# Patient Record
Sex: MALE | Race: BLACK OR AFRICAN AMERICAN | Employment: OTHER | RURAL
[De-identification: names, ages, dates, MRNs, and addresses within clinical notes are randomized per-mention and may not be internally consistent; named-entity substitution may affect disease eponyms.]

---

## 2017-01-05 ENCOUNTER — DOCUMENTATION ONLY (OUTPATIENT)
Dept: FAMILY MEDICINE CLINIC | Age: 72
End: 2017-01-05

## 2017-01-09 RX ORDER — OXYCODONE AND ACETAMINOPHEN 5; 325 MG/1; MG/1
1 TABLET ORAL
Qty: 30 TAB | Refills: 0 | Status: SHIPPED | OUTPATIENT
Start: 2017-01-09 | End: 2017-08-17 | Stop reason: ALTCHOICE

## 2017-01-15 DIAGNOSIS — M1A.39X0 CHRONIC GOUT DUE TO RENAL IMPAIRMENT OF MULTIPLE SITES WITHOUT TOPHUS: ICD-10-CM

## 2017-01-15 DIAGNOSIS — M05.9 RHEUMATOID ARTHRITIS WITH POSITIVE RHEUMATOID FACTOR, INVOLVING UNSPECIFIED SITE (HCC): ICD-10-CM

## 2017-01-16 RX ORDER — PREDNISONE 20 MG/1
TABLET ORAL
Qty: 30 TAB | Refills: 0 | Status: SHIPPED | OUTPATIENT
Start: 2017-01-16 | End: 2017-01-31 | Stop reason: SDUPTHER

## 2017-01-23 ENCOUNTER — OFFICE VISIT (OUTPATIENT)
Dept: FAMILY MEDICINE CLINIC | Age: 72
End: 2017-01-23

## 2017-01-23 VITALS
TEMPERATURE: 97.6 F | OXYGEN SATURATION: 97 % | HEART RATE: 81 BPM | HEIGHT: 73 IN | RESPIRATION RATE: 18 BRPM | BODY MASS INDEX: 20.94 KG/M2 | WEIGHT: 158 LBS | DIASTOLIC BLOOD PRESSURE: 72 MMHG | SYSTOLIC BLOOD PRESSURE: 144 MMHG

## 2017-01-23 DIAGNOSIS — N18.4 CKD (CHRONIC KIDNEY DISEASE), STAGE 4 (SEVERE): ICD-10-CM

## 2017-01-23 DIAGNOSIS — F10.90 HABITUAL ALCOHOL USE: ICD-10-CM

## 2017-01-23 DIAGNOSIS — M05.9 RHEUMATOID ARTHRITIS WITH POSITIVE RHEUMATOID FACTOR, INVOLVING UNSPECIFIED SITE (HCC): ICD-10-CM

## 2017-01-23 DIAGNOSIS — S80.01XA CONTUSION OF RIGHT KNEE, INITIAL ENCOUNTER: Primary | ICD-10-CM

## 2017-01-23 NOTE — MR AVS SNAPSHOT
Visit Information Date & Time Provider Department Dept. Phone Encounter #  
 1/23/2017  4:20 PM Law Hernandez MD [facility] BE[facility]IOR[facility]IC[facility] Primary Care [phone] [id number] Your Appointments 2/13/2017  2:40 PM  
PRE OP with Zahira Mercado MD  
[facility] BE[facility]AVIORAL MEDICINE Primary Care ([address] [facility]) Appt Note: pre-op [address] [facility] [phone] [id number] [phone]  
  
   
 [address] [facility] [phone] Upcoming Health Maintenance Date Due FOBT Q 1 YEAR AGE 50-75 3/8/1995 INFLUENZA AGE 9 TO ADULT 8/1/2016 Pneumococcal 65+ High/Highest Risk (2 of 2 - PCV13) 9/29/2016 MEDICARE YEARLY EXAM 6/15/2017 GLAUCOMA SCREENING Q2Y 11/4/2018 DTaP/Tdap/Td series (2 - Td) 7/1/2025 Allergies as of 1/23/2017  Review Complete On: 1/23/2017 By: Law Hernandez MD  
 No Known Allergies Current Immunizations  Never Reviewed Name Date Pneumococcal Polysaccharide (PPSV-23) 9/29/2015 TB Skin Test (PPD) Intradermal 2/19/2014 Tdap 7/1/2015 Not reviewed this visit Vitals BP Pulse Temp Resp Height(growth percentile) Weight(growth percentile) 144/72 (BP 1 Location: Right arm, BP Patient Position: Sitting) 81 97.6 °F (36.4 °C) (Oral) 18 6' 1\" (1.854 m) 158 lb (71.7 kg) SpO2 BMI Smoking Status 97% 20.85 kg/m2 Former Smoker Vitals History BMI and BSA Data Body Mass Index Body Surface Area  
 20.85 kg/m 2 1.92 m 2 Preferred Pharmacy Pharmacy Name Phone [id number] [address] [phone] Your Updated Medication List  
  
   
This list is accurate as of: 1/23/17  4:34 PM.  Always use your most recent med list.  
  
  
  
  
 albuterol 90 mcg/actuation inhaler Commonly known as:  PROAIR HFA Take 2 puffs by inhalation every four (4) hours as needed for Wheezing. allopurinol 100 mg tablet Commonly known as:  Moo Bless  
 Patient to take 1 tablet daily for 5 days and thereafter take 2 tablets daily by mouth  Indications: GOUTY ARTHRITIS  
  
 ammonium lactate 12 % topical cream  
Commonly known as:  LAC-HYDRIN Apply  to affected area two (2) times a day. rub in to affected area well * colchicine 0.6 mg capsule Commonly known as:  Afia Seed Take 2 pills now and then 1 daily for 3 days * COLCRYS 0.6 mg tablet Generic drug:  colchicine  
  
 famotidine 20 mg tablet Commonly known as:  PEPCID  
  
 ferrous sulfate 325 mg (65 mg iron) tablet Commonly known as:  Iron Take 1 Tab by mouth three (3) times daily (with meals). gabapentin 600 mg tablet Commonly known as:  NEURONTIN  
TAKE ONE TABLET BY MOUTH AT BEDTIME FOR PAIN  
  
 HUMIRA PEN 40 mg/0.8 mL Pnkt Generic drug:  adalimumab  
by SubCUTAneous route. latanoprost 0.005 % ophthalmic solution Commonly known as:  XALATAN  
  
 montelukast 10 mg tablet Commonly known as:  SINGULAIR  
TAKE 1 TABLET BY MOUTH ONCE DAILY  
  
 NIFEdipine ER 60 mg ER tablet Commonly known as:  ADALAT CC  
TAKE 2 TABLETS BY MOUTH ONCE DAILY  
  
 omeprazole 40 mg capsule Commonly known as:  PRILOSEC Take 1 Cap by mouth daily. oxyCODONE-acetaminophen 5-325 mg per tablet Commonly known as:  PERCOCET Take 1 Tab by mouth every eight (8) hours as needed for Pain. Max Daily Amount: 3 Tabs. * predniSONE 20 mg tablet Commonly known as:  DELTASONE  
TAKE 2 TABLETS BY MOUTH ONCE DAILY WITH BREAKFAST * predniSONE 20 mg tablet Commonly known as:  DELTASONE  
TAKE 5TABLETS BY MOUTH DAILY FOR 4 DAYS, 4TABS ON DAY 5,3TABS ON DAY 6,2TABS ON DAY 7,1TAB ON DAY 8  
  
 triamcinolone acetonide 0.1 % ointment Commonly known as:  KENALOG Apply 60 g to affected area two (2) times a day. use thin layer * Notice: This list has 4 medication(s) that are the same as other medications prescribed for you.  Read the directions carefully, and ask your doctor or other care provider to review them with you. Introducing [facility]! Glendy Arellano introduces [employer] patient portal. Now you can access parts of your medical record, email your doctor's office, and request medication refills online. 1. In your internet browser, go to [email or web address] 2. Click on the First Time User? Click Here link in the Sign In box. You will see the New Member Sign Up page. 3. Enter your [employer] Access Code exactly as it appears below. You will not need to use this code after youve completed the sign-up process. If you do not sign up before the expiration date, you must request a new code. · [employer] Access Code: [email or web address] Expires: 3/27/2017  9:41 AM 
 
4. Enter the last four digits of your Social Security Number (xxxx) and Date of Birth (mm/dd/yyyy) as indicated and click Submit. You will be taken to the next sign-up page. 5. Create a [employer] ID. This will be your [employer] login ID and cannot be changed, so think of one that is secure and easy to remember. 6. Create a [employer] password. You can change your password at any time. 7. Enter your Password Reset Question and Answer. This can be used at a later time if you forget your password. 8. Enter your e-mail address. You will receive e-mail notification when new information is available in [address] 9. Click Sign Up. You can now view and download portions of your medical record. 10. Click the Download Summary menu link to download a portable copy of your medical information. If you have questions, please visit the Frequently Asked Questions section of the [employer] website. Remember, [employer] is NOT to be used for urgent needs. For medical emergencies, dial 911. Now available from your iPhone and Android! Please provide this summary of care documentation to your next provider. Your primary care clinician is listed as Gunnar Knutson.  If you have any questions after today's visit, please call [phone].
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
## 2017-01-31 DIAGNOSIS — M05.9 RHEUMATOID ARTHRITIS WITH POSITIVE RHEUMATOID FACTOR, INVOLVING UNSPECIFIED SITE (HCC): ICD-10-CM

## 2017-01-31 DIAGNOSIS — M1A.39X0 CHRONIC GOUT DUE TO RENAL IMPAIRMENT OF MULTIPLE SITES WITHOUT TOPHUS: ICD-10-CM

## 2017-01-31 RX ORDER — PREDNISONE 20 MG/1
TABLET ORAL
Qty: 30 TAB | Refills: 0 | Status: SHIPPED | OUTPATIENT
Start: 2017-01-31 | End: 2017-02-13 | Stop reason: SDUPTHER

## 2017-02-07 RX ORDER — OXYCODONE AND ACETAMINOPHEN 5; 325 MG/1; MG/1
1 TABLET ORAL
Qty: 30 TAB | Refills: 0 | OUTPATIENT
Start: 2017-02-07

## 2017-02-13 ENCOUNTER — OFFICE VISIT (OUTPATIENT)
Dept: FAMILY MEDICINE CLINIC | Age: 72
End: 2017-02-13

## 2017-02-13 VITALS
OXYGEN SATURATION: 97 % | TEMPERATURE: 97.4 F | HEIGHT: 73 IN | DIASTOLIC BLOOD PRESSURE: 74 MMHG | HEART RATE: 66 BPM | RESPIRATION RATE: 18 BRPM | WEIGHT: 159.38 LBS | SYSTOLIC BLOOD PRESSURE: 163 MMHG | BODY MASS INDEX: 21.12 KG/M2

## 2017-02-13 DIAGNOSIS — Z87.19 HISTORY OF GI BLEED: ICD-10-CM

## 2017-02-13 DIAGNOSIS — K29.40 CHRONIC ATROPHIC GASTRITIS: ICD-10-CM

## 2017-02-13 DIAGNOSIS — D63.1 ANEMIA DUE TO CHRONIC KIDNEY DISEASE: ICD-10-CM

## 2017-02-13 DIAGNOSIS — M1A.39X0 CHRONIC GOUT DUE TO RENAL IMPAIRMENT OF MULTIPLE SITES WITHOUT TOPHUS: ICD-10-CM

## 2017-02-13 DIAGNOSIS — Z01.818 PRE-OP EXAMINATION: Primary | ICD-10-CM

## 2017-02-13 DIAGNOSIS — J45.20 MILD INTERMITTENT ASTHMA WITHOUT COMPLICATION: ICD-10-CM

## 2017-02-13 DIAGNOSIS — N18.9 ANEMIA DUE TO CHRONIC KIDNEY DISEASE: ICD-10-CM

## 2017-02-13 DIAGNOSIS — I10 ESSENTIAL HYPERTENSION, BENIGN: ICD-10-CM

## 2017-02-13 DIAGNOSIS — N18.4 CKD (CHRONIC KIDNEY DISEASE) STAGE 4, GFR 15-29 ML/MIN (HCC): ICD-10-CM

## 2017-02-13 NOTE — MR AVS SNAPSHOT
Visit Information Date & Time Provider Department Dept. Phone Encounter #  
 2/13/2017  2:40 PM Abdon [facility] MD [facility] [phone] [id number] Follow-up Instructions Return in about 3 months (around 5/13/2017). Follow-up and Disposition History Upcoming Health Maintenance Date Due FOBT Q 1 YEAR AGE 50-75 3/8/1995 INFLUENZA AGE 9 TO ADULT 8/1/2016 Pneumococcal 65+ High/Highest Risk (2 of 2 - PCV13) 9/29/2016 MEDICARE YEARLY EXAM 6/15/2017 GLAUCOMA SCREENING Q2Y 11/4/2018 DTaP/Tdap/Td series (2 - Td) 7/1/2025 Allergies as of 2/13/2017  Review Complete On: 2/13/2017 By: Abdon Barrett MD  
 No Known Allergies Current Immunizations  Never Reviewed Name Date Pneumococcal Polysaccharide (PPSV-23) 9/29/2015 TB Skin Test (PPD) Intradermal 2/19/2014 Tdap 7/1/2015 Not reviewed this visit You Were Diagnosed With   
  
 Codes Comments Pre-op examination    -  Primary ICD-10-CM: M48.827 ICD-9-CM: V72.84   
 CKD (chronic kidney disease) stage 4, GFR 15-29 ml/min ([facility])     ICD-10-CM: N18.4 ICD-9-CM: 784. 4 Chronic gout due to renal impairment of multiple sites without tophus     ICD-10-CM: M1A. 87 Sophia Eden ICD-9-CM: 274.02 Anemia due to chronic kidney disease     ICD-10-CM: N18.9, D63.1 ICD-9-CM: 285.21 Essential hypertension, benign     ICD-10-CM: I10 
ICD-9-CM: 401.1 Mild intermittent asthma without complication     YED-47-ZO: J45.20 ICD-9-CM: 493.90 Chronic atrophic gastritis     ICD-10-CM: K29.40 ICD-9-CM: 535.10 History of GI bleed     ICD-10-CM: Z87.19 ICD-9-CM: V12.79 Vitals BP Pulse Temp Resp Height(growth percentile) Weight(growth percentile) 163/74 66 97.4 °F (36.3 °C) (Oral) 18 6' 1\" (1.854 m) 159 lb 6 oz (72.3 kg) SpO2 BMI Smoking Status 97% 21.03 kg/m2 Former Smoker Vitals History BMI and BSA Data Body Mass Index Body Surface Area 21.03 kg/m 2 1.93 m 2 Preferred Pharmacy Pharmacy Name Phone [id number] [address] [address] [phone] Your Updated Medication List  
  
   
This list is accurate as of: 2/13/17  2:52 PM.  Always use your most recent med list.  
  
  
  
  
 albuterol 90 mcg/actuation inhaler Commonly known as:  PROAIR HFA Take 2 puffs by inhalation every four (4) hours as needed for Wheezing. allopurinol 100 mg tablet Commonly known as:  Shayy Huh Patient to take 1 tablet daily for 5 days and thereafter take 2 tablets daily by mouth  Indications: GOUTY ARTHRITIS  
  
 ammonium lactate 12 % topical cream  
Commonly known as:  LAC-HYDRIN Apply  to affected area two (2) times a day. rub in to affected area well * colchicine 0.6 mg capsule Commonly known as:  Medina Mais Take 2 pills now and then 1 daily for 3 days * COLCRYS 0.6 mg tablet Generic drug:  colchicine  
  
 famotidine 20 mg tablet Commonly known as:  PEPCID  
  
 ferrous sulfate 325 mg (65 mg iron) tablet Commonly known as:  Iron Take 1 Tab by mouth three (3) times daily (with meals). gabapentin 600 mg tablet Commonly known as:  NEURONTIN  
TAKE ONE TABLET BY MOUTH AT BEDTIME FOR PAIN  
  
 HUMIRA PEN 40 mg/0.8 mL Pnkt Generic drug:  adalimumab  
by SubCUTAneous route. latanoprost 0.005 % ophthalmic solution Commonly known as:  XALATAN  
  
 montelukast 10 mg tablet Commonly known as:  SINGULAIR  
TAKE 1 TABLET BY MOUTH ONCE DAILY  
  
 NIFEdipine ER 60 mg ER tablet Commonly known as:  ADALAT CC  
TAKE 2 TABLETS BY MOUTH ONCE DAILY  
  
 omeprazole 40 mg capsule Commonly known as:  PRILOSEC Take 1 Cap by mouth daily. oxyCODONE-acetaminophen 5-325 mg per tablet Commonly known as:  PERCOCET Take 1 Tab by mouth every eight (8) hours as needed for Pain. Max Daily Amount: 3 Tabs. predniSONE 20 mg tablet Commonly known as:  DELTASONE  
TAKE 2 TABLETS BY MOUTH ONCE DAILY WITH BREAKFAST  
  
 triamcinolone acetonide 0.1 % ointment Commonly known as:  KENALOG Apply 60 g to affected area two (2) times a day. use thin layer * Notice: This list has 2 medication(s) that are the same as other medications prescribed for you. Read the directions carefully, and ask your doctor or other care provider to review them with you. We Performed the Following CBC WITH AUTOMATED DIFF [38097 CPT(R)] IRON PROFILE [id number] CPT(R)] METABOLIC PANEL, COMPREHENSIVE [55234 CPT(R)] TSH 3RD GENERATION [71046 CPT(R)] Follow-up Instructions Return in about 3 months (around 5/13/2017). Introducing [facility]! [employer] introduces [employer] patient portal. Now you can access parts of your medical record, email your doctor's office, and request medication refills online. 1. In your internet browser, go to [email or web address] 2. Click on the First Time User? Click Here link in the Sign In box. You will see the New Member Sign Up page. 3. Enter your [employer] Access Code exactly as it appears below. You will not need to use this code after youve completed the sign-up process. If you do not sign up before the expiration date, you must request a new code. · [employer] Access Code: [email or web address] Expires: 3/27/2017  9:41 AM 
 
4. Enter the last four digits of your Social Security Number (xxxx) and Date of Birth (mm/dd/yyyy) as indicated and click Submit. You will be taken to the next sign-up page. 5. Create a [employer] ID. This will be your [employer] login ID and cannot be changed, so think of one that is secure and easy to remember. 6. Create a [employer] password. You can change your password at any time. 7. Enter your Password Reset Question and Answer. This can be used at a later time if you forget your password. 8. Enter your e-mail address. You will receive e-mail notification when new information is available in [address] 9. Click Sign Up. You can now view and download portions of your medical record. 10. Click the Download Summary menu link to download a portable copy of your medical information. If you have questions, please visit the Frequently Asked Questions section of the [employer] website. Remember, [employer] is NOT to be used for urgent needs. For medical emergencies, dial 911. Now available from your iPhone and Android! Please provide this summary of care documentation to your next provider. Your primary care clinician is listed as Barbara Flores. If you have any questions after today's visit, please call [phone].
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
## 2017-02-14 LAB
ALBUMIN SERPL-MCNC: 4.2 G/DL (ref 3.5–4.8)
ALBUMIN/GLOB SERPL: 1.8 {RATIO} (ref 1.1–2.5)
ALP SERPL-CCNC: 283 IU/L (ref 39–117)
ALT SERPL-CCNC: 8 IU/L (ref 0–44)
AST SERPL-CCNC: 11 IU/L (ref 0–40)
BASOPHILS # BLD AUTO: 0 X10E3/UL (ref 0–0.2)
BASOPHILS NFR BLD AUTO: 0 %
BILIRUB SERPL-MCNC: 0.2 MG/DL (ref 0–1.2)
BUN SERPL-MCNC: 61 MG/DL (ref 8–27)
BUN/CREAT SERPL: 17 (ref 10–22)
CALCIUM SERPL-MCNC: 8.7 MG/DL (ref 8.6–10.2)
CHLORIDE SERPL-SCNC: 105 MMOL/L (ref 96–106)
CO2 SERPL-SCNC: 15 MMOL/L (ref 18–29)
CREAT SERPL-MCNC: 3.68 MG/DL (ref 0.76–1.27)
EOSINOPHIL # BLD AUTO: 0 X10E3/UL (ref 0–0.4)
EOSINOPHIL NFR BLD AUTO: 0 %
ERYTHROCYTE [DISTWIDTH] IN BLOOD BY AUTOMATED COUNT: 16.4 % (ref 12.3–15.4)
GLOBULIN SER CALC-MCNC: 2.4 G/DL (ref 1.5–4.5)
GLUCOSE SERPL-MCNC: 198 MG/DL (ref 65–99)
HCT VFR BLD AUTO: 31.9 % (ref 37.5–51)
HGB BLD-MCNC: 9.6 G/DL (ref 12.6–17.7)
IMM GRANULOCYTES # BLD: 0 X10E3/UL (ref 0–0.1)
IMM GRANULOCYTES NFR BLD: 1 %
INTERPRETATION: NORMAL
IRON SATN MFR SERPL: 27 % (ref 15–55)
IRON SERPL-MCNC: 65 UG/DL (ref 38–169)
LYMPHOCYTES # BLD AUTO: 0.7 X10E3/UL (ref 0.7–3.1)
LYMPHOCYTES NFR BLD AUTO: 9 %
MCH RBC QN AUTO: 29.6 PG (ref 26.6–33)
MCHC RBC AUTO-ENTMCNC: 30.1 G/DL (ref 31.5–35.7)
MCV RBC AUTO: 99 FL (ref 79–97)
MONOCYTES # BLD AUTO: 0.3 X10E3/UL (ref 0.1–0.9)
MONOCYTES NFR BLD AUTO: 4 %
NEUTROPHILS # BLD AUTO: 6.6 X10E3/UL (ref 1.4–7)
NEUTROPHILS NFR BLD AUTO: 86 %
PLATELET # BLD AUTO: 351 X10E3/UL (ref 150–379)
POTASSIUM SERPL-SCNC: 6.1 MMOL/L (ref 3.5–5.2)
PROT SERPL-MCNC: 6.6 G/DL (ref 6–8.5)
RBC # BLD AUTO: 3.24 X10E6/UL (ref 4.14–5.8)
SODIUM SERPL-SCNC: 139 MMOL/L (ref 134–144)
TIBC SERPL-MCNC: 238 UG/DL (ref 250–450)
TSH SERPL DL<=0.005 MIU/L-ACNC: 0.99 UIU/ML (ref 0.45–4.5)
UIBC SERPL-MCNC: 173 UG/DL (ref 111–343)
WBC # BLD AUTO: 7.6 X10E3/UL (ref 3.4–10.8)

## 2017-02-15 DIAGNOSIS — M05.9 RHEUMATOID ARTHRITIS WITH POSITIVE RHEUMATOID FACTOR, INVOLVING UNSPECIFIED SITE (HCC): ICD-10-CM

## 2017-02-15 DIAGNOSIS — M1A.39X0 CHRONIC GOUT DUE TO RENAL IMPAIRMENT OF MULTIPLE SITES WITHOUT TOPHUS: ICD-10-CM

## 2017-02-15 RX ORDER — PREDNISONE 20 MG/1
TABLET ORAL
Qty: 30 TAB | Refills: 0 | Status: ON HOLD | OUTPATIENT
Start: 2017-02-15 | End: 2017-04-11

## 2017-02-16 ENCOUNTER — TELEPHONE (OUTPATIENT)
Dept: FAMILY MEDICINE CLINIC | Age: 72
End: 2017-02-16

## 2017-02-27 RX ORDER — NIFEDIPINE 60 MG/1
TABLET, EXTENDED RELEASE ORAL
Qty: 60 TAB | Refills: 4 | Status: ON HOLD | OUTPATIENT
Start: 2017-02-27 | End: 2017-04-13 | Stop reason: ALTCHOICE

## 2017-03-06 ENCOUNTER — TELEPHONE (OUTPATIENT)
Dept: FAMILY MEDICINE CLINIC | Age: 72
End: 2017-03-06

## 2017-03-06 NOTE — TELEPHONE ENCOUNTER
Swapna called to confirm RA dx and that patient was taking something for that. Stated that patient takes gabapentin. They stated ok and would add that to patient's chart.

## 2017-03-15 RX ORDER — PREDNISONE 20 MG/1
TABLET ORAL
Qty: 60 TAB | Refills: 2 | Status: ON HOLD | OUTPATIENT
Start: 2017-03-15 | End: 2017-04-11

## 2017-04-06 PROBLEM — S82.109A FRACTURE, TIBIA AND FIBULA, PROXIMAL: Status: ACTIVE | Noted: 2017-04-06

## 2017-04-06 PROBLEM — S82.839A FRACTURE, TIBIA AND FIBULA, PROXIMAL: Status: ACTIVE | Noted: 2017-04-06

## 2017-04-06 PROBLEM — S82.143A FRACTURE OF TIBIAL PLATEAU: Status: ACTIVE | Noted: 2017-04-06

## 2017-04-07 PROBLEM — M35.3 PMR (POLYMYALGIA RHEUMATICA) (HCC): Chronic | Status: ACTIVE | Noted: 2017-04-07

## 2017-04-07 PROBLEM — Z98.49 S/P CATARACT SURGERY: Status: ACTIVE | Noted: 2017-04-07

## 2017-04-07 PROBLEM — I10 UNCONTROLLED STAGE 2 HYPERTENSION: Status: ACTIVE | Noted: 2017-04-07

## 2017-04-08 PROBLEM — S82.143A FRACTURE OF TIBIAL PLATEAU: Status: RESOLVED | Noted: 2017-04-06 | Resolved: 2017-04-08

## 2017-04-08 PROBLEM — S82.839A FRACTURE, TIBIA AND FIBULA, PROXIMAL: Status: RESOLVED | Noted: 2017-04-06 | Resolved: 2017-04-08

## 2017-04-08 PROBLEM — S82.109A FRACTURE, TIBIA AND FIBULA, PROXIMAL: Status: RESOLVED | Noted: 2017-04-06 | Resolved: 2017-04-08

## 2017-04-10 PROBLEM — S82.401D CLOSED FRACTURE OF RIGHT TIBIA AND FIBULA WITH ROUTINE HEALING: Status: ACTIVE | Noted: 2017-04-10

## 2017-04-10 PROBLEM — S82.201D CLOSED FRACTURE OF RIGHT TIBIA AND FIBULA WITH ROUTINE HEALING: Status: ACTIVE | Noted: 2017-04-10

## 2017-04-13 PROBLEM — K56.609 SMALL BOWEL OBSTRUCTION (HCC): Status: ACTIVE | Noted: 2017-04-13

## 2017-04-21 ENCOUNTER — HOME HEALTH ADMISSION (OUTPATIENT)
Dept: HOME HEALTH SERVICES | Facility: HOME HEALTH | Age: 72
End: 2017-04-21

## 2017-04-22 ENCOUNTER — HOME CARE VISIT (OUTPATIENT)
Dept: HOME HEALTH SERVICES | Facility: HOME HEALTH | Age: 72
End: 2017-04-22

## 2017-05-05 ENCOUNTER — OFFICE VISIT (OUTPATIENT)
Dept: FAMILY MEDICINE CLINIC | Age: 72
End: 2017-05-05

## 2017-05-05 VITALS
HEIGHT: 72 IN | SYSTOLIC BLOOD PRESSURE: 125 MMHG | RESPIRATION RATE: 20 BRPM | DIASTOLIC BLOOD PRESSURE: 64 MMHG | TEMPERATURE: 96.5 F | OXYGEN SATURATION: 98 % | BODY MASS INDEX: 19.1 KG/M2 | HEART RATE: 76 BPM | WEIGHT: 141 LBS

## 2017-05-05 DIAGNOSIS — J32.0 CHRONIC MAXILLARY SINUSITIS: Primary | ICD-10-CM

## 2017-05-05 RX ORDER — CLINDAMYCIN HYDROCHLORIDE 150 MG/1
150 CAPSULE ORAL 3 TIMES DAILY
Qty: 30 CAP | Refills: 0 | Status: SHIPPED | OUTPATIENT
Start: 2017-05-05 | End: 2017-05-15

## 2017-05-05 RX ORDER — PREDNISONE 20 MG/1
TABLET ORAL
Qty: 30 TAB | Refills: 0 | Status: SHIPPED | OUTPATIENT
Start: 2017-05-05 | End: 2017-06-20 | Stop reason: SDUPTHER

## 2017-05-05 NOTE — MR AVS SNAPSHOT
Visit Information Date & Time Provider Department Dept. Phone Encounter #  
 5/5/2017  3:20 PM Kati Guallpa MD [facility] [phone] [id number] Your Appointments 8/4/2017  2:00 PM  
Medicare Physical with Kati Guallpa MD  
[facility]) Appt Note: [facility] Wellness Annual  
 [phone] [id number] [phone]  
  
   
 [address] [id number] Upcoming Health Maintenance Date Due FOBT Q 1 YEAR AGE 50-75 3/8/1995 Pneumococcal 65+ Low/Medium Risk (2 of 2 - PCV13) 9/29/2016 MEDICARE YEARLY EXAM 6/15/2017 INFLUENZA AGE 9 TO ADULT 8/1/2017 GLAUCOMA SCREENING Q2Y 11/4/2018 DTaP/Tdap/Td series (2 - Td) 7/1/2025 Allergies as of 5/5/2017  Review Complete On: 5/5/2017 By: Kati Guallpa MD  
 No Known Allergies Current Immunizations  Never Reviewed Name Date Pneumococcal Polysaccharide (PPSV-23) 9/29/2015 TB Skin Test (PPD) Intradermal 2/19/2014 Tdap 7/1/2015 Not reviewed this visit You Were Diagnosed With   
  
 Codes Comments Chronic maxillary sinusitis    -  Primary ICD-10-CM: J32.0 ICD-9-CM: 473.0 Vitals BP Pulse Temp Resp Height(growth percentile) Weight(growth percentile) 125/64 (BP 1 Location: Left arm, BP Patient Position: Sitting) 76 96.5 °F (35.8 °C) 20 6' (1.829 m) 141 lb (64 kg) SpO2 BMI Smoking Status 98% 19.12 kg/m2 Former Smoker Vitals History BMI and BSA Data Body Mass Index Body Surface Area  
 19.12 kg/m 2 1.8 m 2 Preferred Pharmacy Pharmacy Name Phone [id number] [address] [place] [phone] Your Updated Medication List  
  
   
This list is accurate as of: 5/5/17  4:26 PM.  Always use your most recent med list.  
  
  
  
  
 albuterol 90 mcg/actuation inhaler Commonly known as:  PROAIR HFA Take 2 puffs by inhalation every four (4) hours as needed for Wheezing. allopurinol 100 mg tablet Commonly known as:  Marigene  Patient to take 1 tablet daily for 5 days and thereafter take 2 tablets daily by mouth  Indications: GOUTY ARTHRITIS  
  
 ammonium lactate 12 % topical cream  
Commonly known as:  LAC-HYDRIN Apply  to affected area two (2) times a day. rub in to affected area well  
  
 clindamycin 150 mg capsule Commonly known as:  CLEOCIN Take 1 Cap by mouth three (3) times daily for 10 days. COLCRYS 0.6 mg tablet Generic drug:  colchicine  
  
 diclofenac 0.1 % ophthalmic solution Commonly known as:  VOLTAREN  
INSTILL 1 DROP INTO LEFT EYE 4 TIMES A DAY  
  
 docusate sodium 100 mg capsule Commonly known as:  COLACE  
TAKE ONE CAPSULE BY MOUTH TWICE A DAY  
  
 famotidine 20 mg tablet Commonly known as:  PEPCID Take 20 mg by mouth daily. ferrous sulfate 325 mg (65 mg iron) tablet Commonly known as:  Iron Take 1 Tab by mouth three (3) times daily (with meals). furosemide 40 mg tablet Commonly known as:  LASIX Take one tab po daily  Indications: RENAL DISEASE WITH EDEMA  
  
 gabapentin 600 mg tablet Commonly known as:  NEURONTIN  
TAKE ONE TABLET BY MOUTH AT BEDTIME FOR PAIN  
  
 guaiFENesin-codeine 100-10 mg/5 mL solution Commonly known as:  ROBITUSSIN AC Take 5 mL by mouth three (3) times daily as needed for Cough. Max Daily Amount: 15 mL. ipratropium-albuterol  mcg/actuation inhaler Commonly known as:  Rubye January Take 1 Puff by inhalation every six (6) hours as needed for Wheezing. labetalol 100 mg tablet Commonly known as:  Deetta Pu Take 2 Tabs by mouth two (2) times a day. latanoprost 0.005 % ophthalmic solution Commonly known as:  La Nena Rudd Administer 1 Drop to both eyes nightly. levoFLOXacin 500 mg tablet Commonly known as:  Jim Austin  
 Take 1 Tab by mouth daily. montelukast 10 mg tablet Commonly known as:  SINGULAIR  
TAKE 1 TABLET BY MOUTH ONCE DAILY  
  
 * NIFEdipine ER 60 mg ER tablet Commonly known as:  ADALAT CC Take 2 Tabs by mouth daily. * NIFEdipine ER 60 mg ER tablet Commonly known as:  ADALAT CC  
TAKE 2 TABLETS BY MOUTH EVERY DAY  
  
 * oxyCODONE-acetaminophen 5-325 mg per tablet Commonly known as:  PERCOCET Take 1 Tab by mouth every eight (8) hours as needed for Pain. Max Daily Amount: 3 Tabs. * oxyCODONE-acetaminophen 5-325 mg per tablet Commonly known as:  PERCOCET Take 1 Tab by mouth every eight (8) hours as needed. Max Daily Amount: 3 Tabs. prednisoLONE acetate 1 % ophthalmic suspension Commonly known as:  PRED FORTE Administer 1 Drop to left eye six (6) times daily. predniSONE 20 mg tablet Commonly known as:  DELTASONE  
5 po every day x 4 days then 4 on day 5, 3 on day 6, 2 on day 7, and 1 on day 8  
  
 triamcinolone acetonide 0.1 % ointment Commonly known as:  KENALOG Apply 60 g to affected area two (2) times a day. use thin layer * Notice: This list has 4 medication(s) that are the same as other medications prescribed for you. Read the directions carefully, and ask your doctor or other care provider to review them with you. Prescriptions Sent to Pharmacy Refills  
 predniSONE (DELTASONE) 20 mg tablet 0 Si po every day x 4 days then 4 on day 5, 3 on day 6, 2 on day 7, and 1 on day 8 Class: Normal  
 Pharmacy: [facility]acy #[id number] Yonis Rouse  [address] Ph #: [phone]  
 clindamycin (CLEOCIN) 150 mg capsule 0 Sig: Take 1 Cap by mouth three (3) times daily for 10 days. Class: Normal  
 Pharmacy: [facility]/pharmacy #[id number] Ignacio Sosa [facility] [address] Ph #: [phone] Route: Oral  
  
Introducing [facility]!    
 [facility] introduces PollVaultr patient portal. Now you can access parts of your medical record, email your doctor's office, and request medication refills online. 1. In your internet browser, go to [email or web address] 2. Click on the First Time User? Click Here link in the Sign In box. You will see the New Member Sign Up page. 3. Enter your Qnary Access Code exactly as it appears below. You will not need to use this code after youve completed the sign-up process. If you do not sign up before the expiration date, you must request a new code. · Qnary Access Code: [email or web address] Expires: 6/25/2017 12:03 PM 
 
4. Enter the last four digits of your Social Security Number (xxxx) and Date of Birth (mm/dd/yyyy) as indicated and click Submit. You will be taken to the next sign-up page. 5. Create a Qnary ID. This will be your Qnary login ID and cannot be changed, so think of one that is secure and easy to remember. 6. Create a Qnary password. You can change your password at any time. 7. Enter your Password Reset Question and Answer. This can be used at a later time if you forget your password. 8. Enter your e-mail address. You will receive e-mail notification when new information is available in [address] 9. Click Sign Up. You can now view and download portions of your medical record. 10. Click the Download Summary menu link to download a portable copy of your medical information. If you have questions, please visit the Frequently Asked Questions section of the Qnary website. Remember, Qnary is NOT to be used for urgent needs. For medical emergencies, dial 911. Now available from your iPhone and Android! Please provide this summary of care documentation to your next provider. Your primary care clinician is listed as Renetta Mirza. If you have any questions after today's visit, please call [phone].
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
## 2017-06-17 DIAGNOSIS — J32.0 CHRONIC MAXILLARY SINUSITIS: ICD-10-CM

## 2017-06-19 RX ORDER — PREDNISONE 20 MG/1
TABLET ORAL
Qty: 30 TAB | Refills: 0 | OUTPATIENT
Start: 2017-06-19

## 2017-06-20 RX ORDER — PREDNISONE 20 MG/1
TABLET ORAL
Qty: 30 TAB | Refills: 0 | Status: SHIPPED | OUTPATIENT
Start: 2017-06-20 | End: 2017-07-24 | Stop reason: SDUPTHER

## 2017-06-29 DIAGNOSIS — Z87.19 HISTORY OF GI BLEED: ICD-10-CM

## 2017-06-29 DIAGNOSIS — F10.90 HABITUAL ALCOHOL USE: ICD-10-CM

## 2017-06-29 RX ORDER — OMEPRAZOLE 40 MG/1
CAPSULE, DELAYED RELEASE ORAL
Qty: 90 CAP | Refills: 1 | OUTPATIENT
Start: 2017-06-29

## 2017-07-13 ENCOUNTER — TELEPHONE (OUTPATIENT)
Dept: FAMILY MEDICINE CLINIC | Age: 72
End: 2017-07-13

## 2017-07-13 NOTE — TELEPHONE ENCOUNTER
Kristen Birch from [facility] called as Mr. Karie Norris is in their office and is need of a referral    Dr Halima Smith [phone] [facility], [place] [id number]  [address] [facility], [address]  [phone]     DX: ESRD

## 2017-07-24 ENCOUNTER — OFFICE VISIT (OUTPATIENT)
Dept: FAMILY MEDICINE CLINIC | Age: 72
End: 2017-07-24

## 2017-07-24 VITALS
SYSTOLIC BLOOD PRESSURE: 123 MMHG | DIASTOLIC BLOOD PRESSURE: 74 MMHG | BODY MASS INDEX: 19.3 KG/M2 | WEIGHT: 142.5 LBS | OXYGEN SATURATION: 96 % | HEART RATE: 80 BPM | RESPIRATION RATE: 18 BRPM | HEIGHT: 72 IN

## 2017-07-24 DIAGNOSIS — Z00.00 ROUTINE GENERAL MEDICAL EXAMINATION AT A HEALTH CARE FACILITY: ICD-10-CM

## 2017-07-24 DIAGNOSIS — J32.0 CHRONIC MAXILLARY SINUSITIS: ICD-10-CM

## 2017-07-24 DIAGNOSIS — N18.4 CKD (CHRONIC KIDNEY DISEASE) STAGE 4, GFR 15-29 ML/MIN (HCC): ICD-10-CM

## 2017-07-24 DIAGNOSIS — M75.41 IMPINGEMENT SYNDROME OF BOTH SHOULDERS: ICD-10-CM

## 2017-07-24 DIAGNOSIS — I10 ESSENTIAL HYPERTENSION, BENIGN: ICD-10-CM

## 2017-07-24 DIAGNOSIS — M1A.39X0 CHRONIC GOUT DUE TO RENAL IMPAIRMENT OF MULTIPLE SITES WITHOUT TOPHUS: Primary | ICD-10-CM

## 2017-07-24 DIAGNOSIS — S82.201D CLOSED FRACTURE OF RIGHT TIBIA AND FIBULA WITH ROUTINE HEALING: ICD-10-CM

## 2017-07-24 DIAGNOSIS — Z79.52 LONG TERM (CURRENT) USE OF SYSTEMIC STEROIDS: ICD-10-CM

## 2017-07-24 DIAGNOSIS — Z23 ENCOUNTER FOR IMMUNIZATION: ICD-10-CM

## 2017-07-24 DIAGNOSIS — Z71.89 ADVANCE CARE PLANNING: ICD-10-CM

## 2017-07-24 DIAGNOSIS — Z13.39 SCREENING FOR ALCOHOLISM: ICD-10-CM

## 2017-07-24 DIAGNOSIS — N18.9 ANEMIA DUE TO CHRONIC KIDNEY DISEASE: ICD-10-CM

## 2017-07-24 DIAGNOSIS — M75.42 IMPINGEMENT SYNDROME OF BOTH SHOULDERS: ICD-10-CM

## 2017-07-24 DIAGNOSIS — S82.401D CLOSED FRACTURE OF RIGHT TIBIA AND FIBULA WITH ROUTINE HEALING: ICD-10-CM

## 2017-07-24 DIAGNOSIS — D63.1 ANEMIA DUE TO CHRONIC KIDNEY DISEASE: ICD-10-CM

## 2017-07-24 RX ORDER — PREDNISONE 20 MG/1
TABLET ORAL
Qty: 30 TAB | Refills: 0 | Status: SHIPPED | OUTPATIENT
Start: 2017-07-24 | End: 2017-08-17 | Stop reason: ALTCHOICE

## 2017-07-24 RX ORDER — GABAPENTIN 600 MG/1
600 TABLET ORAL 2 TIMES DAILY
Qty: 60 TAB | Refills: 2 | Status: SHIPPED | OUTPATIENT
Start: 2017-07-24 | End: 2018-03-05 | Stop reason: SDUPTHER

## 2017-07-24 NOTE — ACP (ADVANCE CARE PLANNING)
Advance Care Planning 7/24/2017   Patient's Healthcare Decision Maker is: -   Primary Decision Maker Name -   Primary Decision Maker Phone Number -   Primary Decision Maker Relationship to Patient -   Secondary Decision Maker Name -   Secondary Decision Maker Phone Number -   Secondary Decision Maker Relationship to Patient -   Confirm Advance Directive None   Patient Would Like to Complete Advance Directive Yes     Pt given form to take home

## 2017-07-24 NOTE — MR AVS SNAPSHOT
Visit Information Date & Time Provider Department Dept. Phone Encounter #  
 7/24/2017  6:00 PM Ave Lee MD [facility] [phone] [id number] Follow-up Instructions Return in about 3 months (around 10/24/2017). Your Appointments 8/4/2017  2:00 PM  
Medicare Physical with Mely Florez MD  
[facility]) Appt Note: [facility] Annual  
 [facility] [place] [id number] [phone]  
  
   
 [address] [place] [id number]  
  
    
 10/24/2017  2:00 PM  
Follow Up with Ave Lee MD  
[facility]) Appt Note: 3 mo f/u  
 [address] [place] [address] [phone]  
  
   
 [address] [place] [id number] Upcoming Health Maintenance Date Due FOBT Q 1 YEAR AGE 50-75 3/8/1995 INFLUENZA AGE 9 TO ADULT 8/1/2017 MEDICARE YEARLY EXAM 7/25/2018 GLAUCOMA SCREENING Q2Y 11/4/2018 DTaP/Tdap/Td series (2 - Td) 7/1/2025 Allergies as of 7/24/2017  Review Complete On: 7/24/2017 By: Ave Lee MD  
 No Known Allergies Current Immunizations  Never Reviewed Name Date Pneumococcal Conjugate (PCV-13) 7/24/2017 Pneumococcal Polysaccharide (PPSV-23) 9/29/2015 TB Skin Test (PPD) Intradermal 2/19/2014 Tdap 7/1/2015 Not reviewed this visit You Were Diagnosed With   
  
 Codes Comments Chronic gout due to renal impairment of multiple sites without tophus    -  Primary ICD-10-CM: M1A. 87 Sophia Sukumar Normannet ICD-9-CM: 274.02 Routine general medical examination at a health care facility     ICD-10-CM: Z00.00 ICD-9-CM: V70.0 Screening for alcoholism     ICD-10-CM: Z13.89 ICD-9-CM: V79.1 Anemia due to chronic kidney disease     ICD-10-CM: N18.9, D63.1 ICD-9-CM: 285.21 Impingement syndrome of both shoulders     ICD-10-CM: M75.41, M75.42 
ICD-9-CM: 726.2 Essential hypertension, benign     ICD-10-CM: I10 
ICD-9-CM: 351. 1 Chronic maxillary sinusitis     ICD-10-CM: J32.0 ICD-9-CM: 473.0 Long term (current) use of systemic steroids     ICD-10-CM: Z79.52 
ICD-9-CM: V58.65   
 CKD (chronic kidney disease) stage 4, GFR 15-29 ml/min ([facility])     ICD-10-CM: N18.4 ICD-9-CM: 607. 4 Closed fracture of right tibia and fibula with routine healing     ICD-10-CM: S82.201D, S82.401D ICD-9-CM: V54.16 Encounter for immunization     ICD-10-CM: P24 ICD-9-CM: V03.89 Vitals BP Pulse Resp Height(growth percentile) Weight(growth percentile) SpO2  
 123/74 (BP 1 Location: Left arm) 80 18 6' (1.829 m) 142 lb 8 oz (64.6 kg) 96% BMI Smoking Status 19.33 kg/m2 Former Smoker Vitals History BMI and BSA Data Body Mass Index Body Surface Area  
 19.33 kg/m 2 1.81 m 2 Preferred Pharmacy Pharmacy Name Phone [id number], [address] [phone] Your Updated Medication List  
  
   
This list is accurate as of: 7/24/17  6:49 PM.  Always use your most recent med list.  
  
  
  
  
 albuterol 90 mcg/actuation inhaler Commonly known as:  PROAIR HFA Take 2 puffs by inhalation every four (4) hours as needed for Wheezing. allopurinol 100 mg tablet Commonly known as:  Lovely Dias Patient to take 1 tablet daily for 5 days and thereafter take 2 tablets daily by mouth  Indications: GOUTY ARTHRITIS  
  
 ammonium lactate 12 % topical cream  
Commonly known as:  LAC-HYDRIN Apply  to affected area two (2) times a day. rub in to affected area well COLCRYS 0.6 mg tablet Generic drug:  colchicine  
  
 diclofenac 0.1 % ophthalmic solution Commonly known as:  VOLTAREN  
INSTILL 1 DROP INTO LEFT EYE 4 TIMES A DAY  
  
 docusate sodium 100 mg capsule Commonly known as:  COLACE  
TAKE ONE CAPSULE BY MOUTH TWICE A DAY  
  
 famotidine 20 mg tablet Commonly known as:  PEPCID Take 20 mg by mouth daily. ferrous sulfate 325 mg (65 mg iron) tablet Commonly known as:  Iron Take 1 Tab by mouth three (3) times daily (with meals). furosemide 40 mg tablet Commonly known as:  LASIX Take one tab po daily  Indications: RENAL DISEASE WITH EDEMA  
  
 gabapentin 600 mg tablet Commonly known as:  NEURONTIN Take 1 Tab by mouth two (2) times a day. guaiFENesin-codeine 100-10 mg/5 mL solution Commonly known as:  ROBITUSSIN AC Take 5 mL by mouth three (3) times daily as needed for Cough. Max Daily Amount: 15 mL. ipratropium-albuterol  mcg/actuation inhaler Commonly known as:  Daina Lints Take 1 Puff by inhalation every six (6) hours as needed for Wheezing. labetalol 100 mg tablet Commonly known as:  Caio Dubonnet Take 2 Tabs by mouth two (2) times a day. latanoprost 0.005 % ophthalmic solution Commonly known as:  Renetta Love Administer 1 Drop to both eyes nightly. levoFLOXacin 500 mg tablet Commonly known as:  Sheilda Laurier Take 1 Tab by mouth daily. montelukast 10 mg tablet Commonly known as:  SINGULAIR  
TAKE 1 TABLET BY MOUTH ONCE DAILY  
  
 * NIFEdipine ER 60 mg ER tablet Commonly known as:  ADALAT CC Take 2 Tabs by mouth daily. * NIFEdipine ER 60 mg ER tablet Commonly known as:  ADALAT CC  
TAKE 2 TABLETS BY MOUTH EVERY DAY  
  
 oxyCODONE IR 5 mg immediate release tablet Commonly known as:  Verlan Galo Take 1 Tab by mouth every four (4) hours as needed for Pain. Max Daily Amount: 30 mg.  
  
 oxyCODONE-acetaminophen 5-325 mg per tablet Commonly known as:  PERCOCET Take 1 Tab by mouth every eight (8) hours as needed for Pain. Max Daily Amount: 3 Tabs. prednisoLONE acetate 1 % ophthalmic suspension Commonly known as:  PRED FORTE Administer 1 Drop to left eye six (6) times daily. predniSONE 20 mg tablet Commonly known as:  Tram Smoker  
 5 po every day x 4 days then 4 on day 5, 3 on day 6, 2 on day 7, and 1 on day 8  
  
 triamcinolone acetonide 0.1 % ointment Commonly known as:  KENALOG Apply 60 g to affected area two (2) times a day. use thin layer * Notice: This list has 2 medication(s) that are the same as other medications prescribed for you. Read the directions carefully, and ask your doctor or other care provider to review them with you. Prescriptions Sent to Pharmacy Refills  
 gabapentin (NEURONTIN) 600 mg tablet 2 Sig: Take 1 Tab by mouth two (2) times a day. Class: Normal  
 Pharmacy: [id number] [place] Der, [address] [facility] [place] Ph #: [phone] Route: Oral  
 predniSONE (DELTASONE) 20 mg tablet 0 Si po every day x 4 days then 4 on day 5, 3 on day 6, 2 on day 7, and 1 on day 8 Class: Normal  
 Pharmacy: [facility] #[id number] [facility], [address] Ph #: [phone] We Performed the Following ADMIN PNEUMOCOCCAL VACCINE [ [facility]] CBC WITH AUTOMATED DIFF [88683 CPT(R)] COLLECTION VENOUS BLOOD,VENIPUNCTURE [id number] CPT(R)] HEMOGLOBIN A1C W/O EAG [68533 CPT(R)] METABOLIC PANEL, BASIC [92350 CPT(R)] OCCULT BLOOD, IMMUNOASSAY (FIT) [id number] CPT(R)] PNEUMOCOCCAL CONJ VACCINE 13 VALENT IM [id number] CPT(R)] PROSTATE SPECIFIC AG (PSA) [id number] CPT(R)] Follow-up Instructions Return in about 3 months (around 10/24/2017). Introducing [facility]! Bee Atkins introduces [employer] patient portal. Now you can access parts of your medical record, email your doctor's office, and request medication refills online. 1. In your internet browser, go to [email or web address]. [email or web address] 2. Click on the First Time User? Click Here link in the Sign In box. You will see the New Member Sign Up page. 3. Enter your [employer] Access Code exactly as it appears below.  You will not need to use this code after youve completed the sign-up process. If you do not sign up before the expiration date, you must request a new code. · [employer] Access Code: [email or web address] Expires: 10/21/2017  9:14 PM 
 
4. Enter the last four digits of your Social Security Number (xxxx) and Date of Birth (mm/dd/yyyy) as indicated and click Submit. You will be taken to the next sign-up page. 5. Create a [employer] ID. This will be your [employer] login ID and cannot be changed, so think of one that is secure and easy to remember. 6. Create a [employer] password. You can change your password at any time. 7. Enter your Password Reset Question and Answer. This can be used at a later time if you forget your password. 8. Enter your e-mail address. You will receive e-mail notification when new information is available in [address]. 9. Click Sign Up. You can now view and download portions of your medical record. 10. Click the Download Summary menu link to download a portable copy of your medical information. If you have questions, please visit the Frequently Asked Questions section of the [employer] website. Remember, [employer] is NOT to be used for urgent needs. For medical emergencies, dial 911. Now available from your iPhone and Android! Please provide this summary of care documentation to your next provider. Your primary care clinician is listed as Mickeal Crews. If you have any questions after today's visit, please call [phone].
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
## 2017-07-26 DIAGNOSIS — F10.90 HABITUAL ALCOHOL USE: ICD-10-CM

## 2017-07-26 DIAGNOSIS — Z87.19 HISTORY OF GI BLEED: ICD-10-CM

## 2017-07-26 LAB
BASOPHILS # BLD AUTO: 0 X10E3/UL (ref 0–0.2)
BASOPHILS NFR BLD AUTO: 0 %
BUN SERPL-MCNC: 55 MG/DL (ref 8–27)
BUN/CREAT SERPL: 15 (ref 10–24)
CALCIUM SERPL-MCNC: 8.6 MG/DL (ref 8.6–10.2)
CHLORIDE SERPL-SCNC: 105 MMOL/L (ref 96–106)
CO2 SERPL-SCNC: 17 MMOL/L (ref 18–29)
CREAT SERPL-MCNC: 3.56 MG/DL (ref 0.76–1.27)
EOSINOPHIL # BLD AUTO: 0.3 X10E3/UL (ref 0–0.4)
EOSINOPHIL NFR BLD AUTO: 3 %
ERYTHROCYTE [DISTWIDTH] IN BLOOD BY AUTOMATED COUNT: 16.1 % (ref 12.3–15.4)
GLUCOSE SERPL-MCNC: 124 MG/DL (ref 65–99)
HBA1C MFR BLD: 5.6 % (ref 4.8–5.6)
HCT VFR BLD AUTO: 36.5 % (ref 37.5–51)
HGB BLD-MCNC: 11.7 G/DL (ref 12.6–17.7)
IMM GRANULOCYTES # BLD: 0.1 X10E3/UL (ref 0–0.1)
IMM GRANULOCYTES NFR BLD: 1 %
INTERPRETATION: NORMAL
LYMPHOCYTES # BLD AUTO: 1.6 X10E3/UL (ref 0.7–3.1)
LYMPHOCYTES NFR BLD AUTO: 15 %
MCH RBC QN AUTO: 30 PG (ref 26.6–33)
MCHC RBC AUTO-ENTMCNC: 32.1 G/DL (ref 31.5–35.7)
MCV RBC AUTO: 94 FL (ref 79–97)
MONOCYTES # BLD AUTO: 1.4 X10E3/UL (ref 0.1–0.9)
MONOCYTES NFR BLD AUTO: 14 %
NEUTROPHILS # BLD AUTO: 6.9 X10E3/UL (ref 1.4–7)
NEUTROPHILS NFR BLD AUTO: 67 %
PLATELET # BLD AUTO: 245 X10E3/UL (ref 150–379)
POTASSIUM SERPL-SCNC: 4.7 MMOL/L (ref 3.5–5.2)
PSA SERPL-MCNC: 2.3 NG/ML (ref 0–4)
RBC # BLD AUTO: 3.9 X10E6/UL (ref 4.14–5.8)
SODIUM SERPL-SCNC: 140 MMOL/L (ref 134–144)
WBC # BLD AUTO: 10.3 X10E3/UL (ref 3.4–10.8)

## 2017-07-26 RX ORDER — OMEPRAZOLE 40 MG/1
40 CAPSULE, DELAYED RELEASE ORAL DAILY
Qty: 90 CAP | Refills: 1 | Status: SHIPPED | OUTPATIENT
Start: 2017-07-26 | End: 2018-01-31 | Stop reason: SDUPTHER

## 2017-07-27 LAB — HEMOCCULT STL QL IA: NEGATIVE

## 2017-08-17 ENCOUNTER — OFFICE VISIT (OUTPATIENT)
Dept: FAMILY MEDICINE CLINIC | Age: 72
End: 2017-08-17

## 2017-08-17 VITALS
HEIGHT: 72 IN | WEIGHT: 160 LBS | HEART RATE: 80 BPM | DIASTOLIC BLOOD PRESSURE: 95 MMHG | RESPIRATION RATE: 18 BRPM | OXYGEN SATURATION: 100 % | SYSTOLIC BLOOD PRESSURE: 160 MMHG | BODY MASS INDEX: 21.67 KG/M2

## 2017-08-17 DIAGNOSIS — N18.4 CKD (CHRONIC KIDNEY DISEASE) STAGE 4, GFR 15-29 ML/MIN (HCC): Chronic | ICD-10-CM

## 2017-08-17 DIAGNOSIS — I10 ESSENTIAL HYPERTENSION, BENIGN: ICD-10-CM

## 2017-08-17 DIAGNOSIS — M25.572 ACUTE LEFT ANKLE PAIN: Primary | ICD-10-CM

## 2017-08-17 RX ORDER — PREDNISONE 20 MG/1
TABLET ORAL
Qty: 30 TAB | Refills: 0 | Status: SHIPPED | OUTPATIENT
Start: 2017-08-17 | End: 2017-11-27 | Stop reason: SDUPTHER

## 2017-08-17 NOTE — MR AVS SNAPSHOT
Visit Information Date & Time Provider Department Dept. Phone Encounter #  
 8/17/2017  1:40 PM Edwin Vasuqez MD [facility] [phone] [id number] Your Appointments 10/24/2017  2:00 PM  
Follow Up with Shaan Salas MD  
[facility]) Appt Note: 3 mo f/u  
 [address] [facility] 67 [id number] [phone]  
  
   
 [address] [place] [address] 67 [phone] Upcoming Health Maintenance Date Due INFLUENZA AGE 9 TO ADULT 8/1/2017 FOBT Q 1 YEAR AGE 50-75 7/24/2018 MEDICARE YEARLY EXAM 7/25/2018 GLAUCOMA SCREENING Q2Y 11/4/2018 DTaP/Tdap/Td series (2 - Td) 7/1/2025 Allergies as of 8/17/2017  Review Complete On: 8/17/2017 By: Edwin Vasquez MD  
 No Known Allergies Current Immunizations  Never Reviewed Name Date Pneumococcal Conjugate (PCV-13) 7/24/2017 Pneumococcal Polysaccharide (PPSV-23) 9/29/2015 TB Skin Test (PPD) Intradermal 2/19/2014 Tdap 7/1/2015 Not reviewed this visit You Were Diagnosed With   
  
 Codes Comments Acute left ankle pain    -  Primary ICD-10-CM: M25.572 ICD-9-CM: 719.47 CKD (chronic kidney disease) stage 4, GFR 15-29 ml/min ([facility])     ICD-10-CM: N18.4 ICD-9-CM: 585.4 Essential hypertension, benign     ICD-10-CM: I10 
ICD-9-CM: 401.1 Vitals BP Pulse Resp Height(growth percentile) Weight(growth percentile) SpO2  
 (!) 160/95 (BP 1 Location: Left arm, BP Patient Position: Sitting) 80 18 6' (1.829 m) 160 lb (72.6 kg) 100% BMI Smoking Status 21.7 kg/m2 Former Smoker Vitals History BMI and BSA Data Body Mass Index Body Surface Area 21.7 kg/m 2 1.92 m 2 Preferred Pharmacy Pharmacy Name Phone [id number], [address] [facility] [address] [place] [phone] Your Updated Medication List  
  
   
 This list is accurate as of: 8/17/17  2:35 PM.  Always use your most recent med list.  
  
  
  
  
 albuterol 90 mcg/actuation inhaler Commonly known as:  PROAIR HFA Take 2 puffs by inhalation every four (4) hours as needed for Wheezing. allopurinol 100 mg tablet Commonly known as:  Ronny Massed Patient to take 1 tablet daily for 5 days and thereafter take 2 tablets daily by mouth  Indications: GOUTY ARTHRITIS  
  
 ammonium lactate 12 % topical cream  
Commonly known as:  LAC-HYDRIN Apply  to affected area two (2) times a day. rub in to affected area well COLCRYS 0.6 mg tablet Generic drug:  colchicine  
  
 diclofenac 0.1 % ophthalmic solution Commonly known as:  VOLTAREN  
INSTILL 1 DROP INTO LEFT EYE 4 TIMES A DAY  
  
 docusate sodium 100 mg capsule Commonly known as:  COLACE  
TAKE ONE CAPSULE BY MOUTH TWICE A DAY  
  
 famotidine 20 mg tablet Commonly known as:  PEPCID Take 20 mg by mouth daily. ferrous sulfate 325 mg (65 mg iron) tablet Commonly known as:  Iron Take 1 Tab by mouth three (3) times daily (with meals). furosemide 40 mg tablet Commonly known as:  LASIX Take one tab po daily  Indications: RENAL DISEASE WITH EDEMA  
  
 gabapentin 600 mg tablet Commonly known as:  NEURONTIN Take 1 Tab by mouth two (2) times a day. ipratropium-albuterol  mcg/actuation inhaler Commonly known as:  Dany Seneca Take 1 Puff by inhalation every six (6) hours as needed for Wheezing. labetalol 100 mg tablet Commonly known as:  Dacia Usman Take 2 Tabs by mouth two (2) times a day. latanoprost 0.005 % ophthalmic solution Commonly known as:  Luis Enrique Labrum Administer 1 Drop to both eyes nightly. montelukast 10 mg tablet Commonly known as:  SINGULAIR  
TAKE 1 TABLET BY MOUTH ONCE DAILY  
  
 NIFEdipine ER 60 mg ER tablet Commonly known as:  ADALAT CC Take 2 Tabs by mouth daily. omeprazole 40 mg capsule Commonly known as:  PRILOSEC Take 1 Cap by mouth daily. prednisoLONE acetate 1 % ophthalmic suspension Commonly known as:  PRED FORTE Administer 1 Drop to left eye six (6) times daily. predniSONE 20 mg tablet Commonly known as:  DELTASONE  
5 po every day x 4 days then 4 on day 5, 3 on day 6, 2 on day 7, and 1 on day 8  
  
 triamcinolone acetonide 0.1 % ointment Commonly known as:  KENALOG Apply 60 g to affected area two (2) times a day. use thin layer Prescriptions Sent to Pharmacy Refills  
 predniSONE (DELTASONE) 20 mg tablet 0 Si po every day x 4 days then 4 on day 5, 3 on day 6, 2 on day 7, and 1 on day 8 Class: Normal  
 Pharmacy: [facility][id number] [place]ba, [address] Ph #: [phone] We Performed the Following METABOLIC PANEL, COMPREHENSIVE [32676 CPT(R)] URIC ACID [id number] CPT(R)] To-Do List   
 2017 Imaging:  XR ANKLE LT MIN 3 V Introducing [facility]! Cornelio Rojas introduces [employer] patient portal. Now you can access parts of your medical record, email your doctor's office, and request medication refills online. 1. In your internet browser, go to [email or web address] [facility] 2. Click on the First Time User? Click Here link in the Sign In box. You will see the New Member Sign Up page. 3. Enter your Tickade Access Code exactly as it appears below. You will not need to use this code after youve completed the sign-up process. If you do not sign up before the expiration date, you must request a new code. · [employer] Access Code: [email or web address] Expires: 10/21/2017  9:14 PM 
 
4. Enter the last four digits of your Social Security Number (xxxx) and Date of Birth (mm/dd/yyyy) as indicated and click Submit. You will be taken to the next sign-up page. 5. Create a Tickade ID.  This will be your Tickade login ID and cannot be changed, so think of one that is secure and easy to remember. 6. Create a [employer] password. You can change your password at any time. 7. Enter your Password Reset Question and Answer. This can be used at a later time if you forget your password. 8. Enter your e-mail address. You will receive e-mail notification when new information is available in [address] 9. Click Sign Up. You can now view and download portions of your medical record. 10. Click the Download Summary menu link to download a portable copy of your medical information. If you have questions, please visit the Frequently Asked Questions section of the [employer] website. Remember, [employer] is NOT to be used for urgent needs. For medical emergencies, dial 911. Now available from your iPhone and Android! Please provide this summary of care documentation to your next provider. Your primary care clinician is listed as Felix Vieyra. If you have any questions after today's visit, please call [phone].
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
## 2017-08-18 LAB
ALBUMIN SERPL-MCNC: 3.7 G/DL (ref 3.5–4.8)
ALBUMIN/GLOB SERPL: 1.5 {RATIO} (ref 1.2–2.2)
ALP SERPL-CCNC: 88 IU/L (ref 39–117)
ALT SERPL-CCNC: 6 IU/L (ref 0–44)
AST SERPL-CCNC: 10 IU/L (ref 0–40)
BILIRUB SERPL-MCNC: <0.2 MG/DL (ref 0–1.2)
BUN SERPL-MCNC: 60 MG/DL (ref 8–27)
BUN/CREAT SERPL: 20 (ref 10–24)
CALCIUM SERPL-MCNC: 8.7 MG/DL (ref 8.6–10.2)
CHLORIDE SERPL-SCNC: 108 MMOL/L (ref 96–106)
CO2 SERPL-SCNC: 17 MMOL/L (ref 18–29)
CREAT SERPL-MCNC: 2.99 MG/DL (ref 0.76–1.27)
GLOBULIN SER CALC-MCNC: 2.4 G/DL (ref 1.5–4.5)
GLUCOSE SERPL-MCNC: 107 MG/DL (ref 65–99)
INTERPRETATION: NORMAL
POTASSIUM SERPL-SCNC: 5.9 MMOL/L (ref 3.5–5.2)
PROT SERPL-MCNC: 6.1 G/DL (ref 6–8.5)
SODIUM SERPL-SCNC: 140 MMOL/L (ref 134–144)
URATE SERPL-MCNC: 3.4 MG/DL (ref 3.7–8.6)

## 2017-08-18 NOTE — PROGRESS NOTES
Spoke with patient. Patient aware of results and states understanding at this time. Patient had xray done today.   Patient will be in on Monday for repeat K+

## 2017-09-08 DIAGNOSIS — M1A.39X0 CHRONIC GOUT DUE TO RENAL IMPAIRMENT OF MULTIPLE SITES WITHOUT TOPHUS: ICD-10-CM

## 2017-09-08 DIAGNOSIS — N18.4 CKD (CHRONIC KIDNEY DISEASE), STAGE 4 (SEVERE): ICD-10-CM

## 2017-09-08 RX ORDER — ALLOPURINOL 100 MG/1
TABLET ORAL
Qty: 60 TAB | Refills: 5 | Status: SHIPPED | OUTPATIENT
Start: 2017-09-08 | End: 2018-04-16 | Stop reason: SDUPTHER

## 2017-10-24 ENCOUNTER — OFFICE VISIT (OUTPATIENT)
Dept: FAMILY MEDICINE CLINIC | Age: 72
End: 2017-10-24

## 2017-10-24 VITALS
HEART RATE: 86 BPM | OXYGEN SATURATION: 97 % | WEIGHT: 152.5 LBS | HEIGHT: 72 IN | DIASTOLIC BLOOD PRESSURE: 90 MMHG | RESPIRATION RATE: 22 BRPM | BODY MASS INDEX: 20.65 KG/M2 | TEMPERATURE: 97 F | SYSTOLIC BLOOD PRESSURE: 185 MMHG

## 2017-10-24 DIAGNOSIS — I10 ESSENTIAL HYPERTENSION, BENIGN: ICD-10-CM

## 2017-10-24 DIAGNOSIS — K21.9 GASTROESOPHAGEAL REFLUX DISEASE WITHOUT ESOPHAGITIS: ICD-10-CM

## 2017-10-24 DIAGNOSIS — Z23 ENCOUNTER FOR IMMUNIZATION: ICD-10-CM

## 2017-10-24 DIAGNOSIS — G89.29 CHRONIC LEFT SHOULDER PAIN: ICD-10-CM

## 2017-10-24 DIAGNOSIS — M25.512 CHRONIC LEFT SHOULDER PAIN: ICD-10-CM

## 2017-10-24 DIAGNOSIS — N18.4 CKD (CHRONIC KIDNEY DISEASE) STAGE 4, GFR 15-29 ML/MIN (HCC): Primary | Chronic | ICD-10-CM

## 2017-10-24 RX ORDER — METHYLPREDNISOLONE ACETATE 80 MG/ML
80 INJECTION, SUSPENSION INTRA-ARTICULAR; INTRALESIONAL; INTRAMUSCULAR; SOFT TISSUE ONCE
Qty: 1 VIAL | Refills: 0
Start: 2017-10-24 | End: 2017-10-24

## 2017-10-24 NOTE — MR AVS SNAPSHOT
Visit Information Date & Time Provider Department Dept. Phone Encounter #  
 10/24/2017  2:00 PM Jose German MD [facility] [phone] [id number] Follow-up Instructions Return in about 3 months (around 1/24/2018). Upcoming Health Maintenance Date Due INFLUENZA AGE 9 TO ADULT 8/1/2017 FOBT Q 1 YEAR AGE 50-75 7/24/2018 MEDICARE YEARLY EXAM 7/25/2018 GLAUCOMA SCREENING Q2Y 11/4/2018 DTaP/Tdap/Td series (2 - Td) 7/1/2025 Allergies as of 10/24/2017  Review Complete On: 10/24/2017 By: Jose German MD  
 No Known Allergies Current Immunizations  Reviewed on 10/24/2017 Name Date Influenza High Dose Vaccine PF 10/24/2017 Pneumococcal Conjugate (PCV-13) 7/24/2017 Pneumococcal Polysaccharide (PPSV-23) 9/29/2015 TB Skin Test (PPD) Intradermal 2/19/2014 Tdap 7/1/2015 Reviewed by Milton Plaza LPN on 10/74/8948 at  2:20 PM  
You Were Diagnosed With   
  
 Codes Comments CKD (chronic kidney disease) stage 4, GFR 15-29 ml/min ([facility])    -  Primary ICD-10-CM: N18.4 ICD-9-CM: 585.4 Essential hypertension, benign     ICD-10-CM: I10 
ICD-9-CM: 069. 1 Chronic left shoulder pain     ICD-10-CM: M25.512, G89.29 ICD-9-CM: 719.41, 338.29 Encounter for immunization     ICD-10-CM: C17 ICD-9-CM: V03.89 Gastroesophageal reflux disease without esophagitis     ICD-10-CM: K21.9 ICD-9-CM: 530.81 Vitals BP Pulse Temp Resp Height(growth percentile) Weight(growth percentile) 185/90 (BP 1 Location: Right arm) 86 97 °F (36.1 °C) (Oral) 22 6' (1.829 m) 152 lb 8 oz (69.2 kg) SpO2 BMI Smoking Status 97% 20.68 kg/m2 Former Smoker Vitals History BMI and BSA Data Body Mass Index Body Surface Area  
 20.68 kg/m 2 1.87 m 2 Preferred Pharmacy Pharmacy Name Phone [id number], [facility] [address] [phone] Your Updated Medication List  
  
   
This list is accurate as of: 10/24/17  2:27 PM.  Always use your most recent med list.  
  
  
  
  
 albuterol 90 mcg/actuation inhaler Commonly known as:  PROAIR HFA Take 2 puffs by inhalation every four (4) hours as needed for Wheezing. allopurinol 100 mg tablet Commonly known as:  ZYLOPRIM  
TAKE 1 TABLET BY MOUTH DAILY FOR 5DAYS THEN TAKE 2 TABLETS BY MOUTH DAILY  
  
 ammonium lactate 12 % topical cream  
Commonly known as:  LAC-HYDRIN Apply  to affected area two (2) times a day. rub in to affected area well COLCRYS 0.6 mg tablet Generic drug:  colchicine  
  
 diclofenac 0.1 % ophthalmic solution Commonly known as:  VOLTAREN  
INSTILL 1 DROP INTO LEFT EYE 4 TIMES A DAY  
  
 docusate sodium 100 mg capsule Commonly known as:  COLACE  
TAKE ONE CAPSULE BY MOUTH TWICE A DAY  
  
 famotidine 20 mg tablet Commonly known as:  PEPCID Take 20 mg by mouth daily. ferrous sulfate 325 mg (65 mg iron) tablet Commonly known as:  Iron Take 1 Tab by mouth three (3) times daily (with meals). furosemide 40 mg tablet Commonly known as:  LASIX Take one tab po daily  Indications: RENAL DISEASE WITH EDEMA  
  
 gabapentin 600 mg tablet Commonly known as:  NEURONTIN Take 1 Tab by mouth two (2) times a day. ipratropium-albuterol  mcg/actuation inhaler Commonly known as:  Levell Jacks Take 1 Puff by inhalation every six (6) hours as needed for Wheezing. labetalol 100 mg tablet Commonly known as:  Jannetta Pore Take 2 Tabs by mouth two (2) times a day. latanoprost 0.005 % ophthalmic solution Commonly known as:  Fargo Sam Administer 1 Drop to both eyes nightly. methylPREDNISolone acetate 80 mg/mL injection Commonly known as:  DEPO-MEDROL 1 mL by IntraMUSCular route once for 1 dose. montelukast 10 mg tablet Commonly known as:  SINGULAIR  
TAKE 1 TABLET BY MOUTH ONCE DAILY NIFEdipine ER 60 mg ER tablet Commonly known as:  ADALAT CC Take 2 Tabs by mouth daily. omeprazole 40 mg capsule Commonly known as:  PRILOSEC Take 1 Cap by mouth daily. prednisoLONE acetate 1 % ophthalmic suspension Commonly known as:  PRED FORTE Administer 1 Drop to left eye six (6) times daily. predniSONE 20 mg tablet Commonly known as:  DELTASONE  
5 po every day x 4 days then 4 on day 5, 3 on day 6, 2 on day 7, and 1 on day 8  
  
 triamcinolone acetonide 0.1 % ointment Commonly known as:  KENALOG Apply 60 g to affected area two (2) times a day. use thin layer We Performed the Following ADMIN INFLUENZA VIRUS VAC [ [facility]] CBC WITH AUTOMATED DIFF [77712 CPT(R)] COLLECTION VENOUS BLOOD,VENIPUNCTURE [id number] CPT(R)] DRAIN/INJECT LARGE JOINT/BURSA [id number] CPT(R)] INFLUENZA VIRUS VACCINE, HIGH DOSE SEASONAL, PRESERVATIVE FREE [41568 CPT(R)] METABOLIC PANEL, COMPREHENSIVE [90584 CPT(R)] METHYLPREDNISOLONE ACETATE INJECTION 80 MG [ [facility]] DE THER/PROPH/DIAG INJECTION, SUBCUT/IM [id number] CPT(R)] Follow-up Instructions Return in about 3 months (around 1/24/2018). Introducing [facility]! [facility] introduces [employer] patient portal. Now you can access parts of your medical record, email your doctor's office, and request medication refills online. 1. In your internet browser, go to [email or web address] 2. Click on the First Time User? Click Here link in the Sign In box. You will see the New Member Sign Up page. 3. Enter your [employer] Access Code exactly as it appears below. You will not need to use this code after youve completed the sign-up process. If you do not sign up before the expiration date, you must request a new code. · [employer] Access Code: [email or web address] Expires: 1/22/2018  2:16 PM 
 
4.  Enter the last four digits of your Social Security Number (xxxx) and Date of Birth (mm/dd/yyyy) as indicated and click Submit. You will be taken to the next sign-up page. 5. Create a [facility] ID. This will be your [facility] login ID and cannot be changed, so think of one that is secure and easy to remember. 6. Create a [facility] password. You can change your password at any time. 7. Enter your Password Reset Question and Answer. This can be used at a later time if you forget your password. 8. Enter your e-mail address. You will receive e-mail notification when new information is available in [address] 9. Click Sign Up. You can now view and download portions of your medical record. 10. Click the Download Summary menu link to download a portable copy of your medical information. If you have questions, please visit the Frequently Asked Questions section of the [facility] website. Remember, [facility] [facility] is NOT to be used for urgent needs. For medical emergencies, dial 911. Now available from your iPhone and Android! Please provide this summary of care documentation to your next provider. Your primary care clinician is listed as Geroge Pleasure. If you have any questions after today's visit, please call [phone].
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
## 2017-10-25 LAB
ALBUMIN SERPL-MCNC: 3.5 G/DL (ref 3.5–4.8)
ALBUMIN/GLOB SERPL: 1.3 {RATIO} (ref 1.2–2.2)
ALP SERPL-CCNC: 104 IU/L (ref 39–117)
ALT SERPL-CCNC: 6 IU/L (ref 0–44)
AST SERPL-CCNC: 8 IU/L (ref 0–40)
BASOPHILS # BLD AUTO: 0 X10E3/UL (ref 0–0.2)
BASOPHILS NFR BLD AUTO: 0 %
BILIRUB SERPL-MCNC: 0.2 MG/DL (ref 0–1.2)
BUN SERPL-MCNC: 59 MG/DL (ref 8–27)
BUN/CREAT SERPL: 17 (ref 10–24)
CALCIUM SERPL-MCNC: 8.6 MG/DL (ref 8.6–10.2)
CHLORIDE SERPL-SCNC: 100 MMOL/L (ref 96–106)
CO2 SERPL-SCNC: 22 MMOL/L (ref 18–29)
CREAT SERPL-MCNC: 3.5 MG/DL (ref 0.76–1.27)
EOSINOPHIL # BLD AUTO: 0.6 X10E3/UL (ref 0–0.4)
EOSINOPHIL NFR BLD AUTO: 4 %
ERYTHROCYTE [DISTWIDTH] IN BLOOD BY AUTOMATED COUNT: 15.6 % (ref 12.3–15.4)
GFR SERPLBLD CREATININE-BSD FMLA CKD-EPI: 16 ML/MIN/1.73
GFR SERPLBLD CREATININE-BSD FMLA CKD-EPI: 19 ML/MIN/1.73
GLOBULIN SER CALC-MCNC: 2.6 G/DL (ref 1.5–4.5)
GLUCOSE SERPL-MCNC: 154 MG/DL (ref 65–99)
HCT VFR BLD AUTO: 28.8 % (ref 37.5–51)
HGB BLD-MCNC: 9.3 G/DL (ref 12.6–17.7)
IMM GRANULOCYTES # BLD: 0 X10E3/UL (ref 0–0.1)
IMM GRANULOCYTES NFR BLD: 0 %
INTERPRETATION: NORMAL
LYMPHOCYTES # BLD AUTO: 0.7 X10E3/UL (ref 0.7–3.1)
LYMPHOCYTES NFR BLD AUTO: 5 %
MCH RBC QN AUTO: 30.1 PG (ref 26.6–33)
MCHC RBC AUTO-ENTMCNC: 32.3 G/DL (ref 31.5–35.7)
MCV RBC AUTO: 93 FL (ref 79–97)
MONOCYTES # BLD AUTO: 1 X10E3/UL (ref 0.1–0.9)
MONOCYTES NFR BLD AUTO: 7 %
NEUTROPHILS # BLD AUTO: 11.8 X10E3/UL (ref 1.4–7)
NEUTROPHILS NFR BLD AUTO: 84 %
PLATELET # BLD AUTO: 383 X10E3/UL (ref 150–379)
POTASSIUM SERPL-SCNC: 4.9 MMOL/L (ref 3.5–5.2)
PROT SERPL-MCNC: 6.1 G/DL (ref 6–8.5)
RBC # BLD AUTO: 3.09 X10E6/UL (ref 4.14–5.8)
SODIUM SERPL-SCNC: 138 MMOL/L (ref 134–144)
WBC # BLD AUTO: 14.1 X10E3/UL (ref 3.4–10.8)

## 2017-10-30 NOTE — PROGRESS NOTES
Kidney function is worsening. Please send labs to Dr Rose Keith. His hemoglobin is lower than it was 3 months ago which may be due to the kidneys not functioning as well. Blood sugar is up. Please have him RTO to draw an A1c.

## 2017-11-27 DIAGNOSIS — M25.572 ACUTE LEFT ANKLE PAIN: ICD-10-CM

## 2017-11-27 RX ORDER — PREDNISONE 20 MG/1
TABLET ORAL
Qty: 30 TAB | Refills: 0 | Status: SHIPPED | OUTPATIENT
Start: 2017-11-27 | End: 2018-01-05 | Stop reason: SDUPTHER

## 2018-01-05 DIAGNOSIS — M25.572 ACUTE LEFT ANKLE PAIN: ICD-10-CM

## 2018-01-07 RX ORDER — PREDNISONE 20 MG/1
TABLET ORAL
Qty: 30 TAB | Refills: 0 | Status: SHIPPED | OUTPATIENT
Start: 2018-01-07 | End: 2018-02-03 | Stop reason: SDUPTHER

## 2018-01-08 ENCOUNTER — TELEPHONE (OUTPATIENT)
Dept: FAMILY MEDICINE CLINIC | Age: 73
End: 2018-01-08

## 2018-01-10 RX ORDER — NIFEDIPINE 60 MG/1
120 TABLET, EXTENDED RELEASE ORAL DAILY
Qty: 60 TAB | Refills: 1 | Status: SHIPPED | OUTPATIENT
Start: 2018-01-10 | End: 2018-05-16 | Stop reason: SDUPTHER

## 2018-01-11 RX ORDER — NIFEDIPINE 60 MG/1
TABLET, EXTENDED RELEASE ORAL
Qty: 60 TAB | Refills: 4 | Status: SHIPPED | OUTPATIENT
Start: 2018-01-11 | End: 2018-03-23 | Stop reason: CLARIF

## 2018-01-31 DIAGNOSIS — F10.90 HABITUAL ALCOHOL USE: ICD-10-CM

## 2018-01-31 DIAGNOSIS — Z87.19 HISTORY OF GI BLEED: ICD-10-CM

## 2018-01-31 RX ORDER — OMEPRAZOLE 40 MG/1
CAPSULE, DELAYED RELEASE ORAL
Qty: 90 CAP | Refills: 1 | Status: SHIPPED | OUTPATIENT
Start: 2018-01-31 | End: 2018-10-18 | Stop reason: SDUPTHER

## 2018-02-03 DIAGNOSIS — M25.572 ACUTE LEFT ANKLE PAIN: ICD-10-CM

## 2018-02-04 RX ORDER — PREDNISONE 20 MG/1
TABLET ORAL
Qty: 30 TAB | Refills: 0 | Status: SHIPPED | OUTPATIENT
Start: 2018-02-04 | End: 2018-02-20 | Stop reason: SDUPTHER

## 2018-02-12 RX ORDER — METHOCARBAMOL 500 MG/1
TABLET, FILM COATED ORAL
Qty: 20 TAB | Refills: 0 | Status: SHIPPED | OUTPATIENT
Start: 2018-02-12 | End: 2018-03-21 | Stop reason: CLARIF

## 2018-02-20 DIAGNOSIS — M25.572 ACUTE LEFT ANKLE PAIN: ICD-10-CM

## 2018-02-20 RX ORDER — PREDNISONE 20 MG/1
TABLET ORAL
Qty: 30 TAB | Refills: 0 | Status: SHIPPED | OUTPATIENT
Start: 2018-02-20 | End: 2018-03-19 | Stop reason: SDUPTHER

## 2018-02-21 RX ORDER — MONTELUKAST SODIUM 10 MG/1
TABLET ORAL
Qty: 30 TAB | Refills: 5 | Status: SHIPPED | OUTPATIENT
Start: 2018-02-21 | End: 2018-09-13 | Stop reason: SDUPTHER

## 2018-03-05 RX ORDER — GABAPENTIN 600 MG/1
TABLET ORAL
Qty: 60 TAB | Refills: 2 | Status: SHIPPED | OUTPATIENT
Start: 2018-03-05 | End: 2018-03-23 | Stop reason: CLARIF

## 2018-03-19 DIAGNOSIS — M25.572 ACUTE LEFT ANKLE PAIN: ICD-10-CM

## 2018-03-19 RX ORDER — PREDNISONE 20 MG/1
TABLET ORAL
Qty: 30 TAB | Refills: 0 | Status: CANCELLED | OUTPATIENT
Start: 2018-03-19

## 2018-03-19 RX ORDER — PREDNISONE 20 MG/1
100 TABLET ORAL DAILY
Qty: 5 TAB | Refills: 0 | Status: SHIPPED | OUTPATIENT
Start: 2018-03-19 | End: 2018-03-20

## 2018-03-20 ENCOUNTER — OFFICE VISIT (OUTPATIENT)
Dept: FAMILY MEDICINE CLINIC | Age: 73
End: 2018-03-20

## 2018-03-20 VITALS
WEIGHT: 141 LBS | OXYGEN SATURATION: 99 % | BODY MASS INDEX: 19.1 KG/M2 | TEMPERATURE: 97.6 F | DIASTOLIC BLOOD PRESSURE: 88 MMHG | RESPIRATION RATE: 18 BRPM | HEART RATE: 97 BPM | HEIGHT: 72 IN | SYSTOLIC BLOOD PRESSURE: 138 MMHG

## 2018-03-20 DIAGNOSIS — L85.3 DRY SKIN DERMATITIS: Primary | ICD-10-CM

## 2018-03-20 RX ORDER — PREDNISONE 20 MG/1
TABLET ORAL
Qty: 30 TAB | Refills: 0 | Status: SHIPPED | OUTPATIENT
Start: 2018-03-20 | End: 2018-04-09 | Stop reason: SDUPTHER

## 2018-03-20 RX ORDER — BETAMETHASONE DIPROPIONATE 0.5 MG/G
CREAM TOPICAL 2 TIMES DAILY
Qty: 50 G | Refills: 0 | Status: SHIPPED | OUTPATIENT
Start: 2018-03-20 | End: 2019-01-08 | Stop reason: ALTCHOICE
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
## 2018-03-20 NOTE — MR AVS SNAPSHOT
[address] [phone] Patient: Autumn Dale MRN: [id number] Visit Information Date & Time Provider Department Dept. Phone Encounter #  
 3/20/2018 11:20 AM Helen Taylor  [address] [place] [address] [phone] [id number] Upcoming Health Maintenance Date Due FOBT Q 1 YEAR AGE 50-75 7/24/2018 MEDICARE YEARLY EXAM 7/25/2018 GLAUCOMA SCREENING Q2Y 11/4/2018 DTaP/Tdap/Td series (2 - Td) 7/1/2025 Allergies as of 3/20/2018  Review Complete On: 3/20/2018 By: Raj Garcia RN No Known Allergies Current Immunizations  Reviewed on 10/24/2017 Name Date Influenza High Dose Vaccine PF 10/24/2017 Pneumococcal Conjugate (PCV-13) 7/24/2017 Pneumococcal Polysaccharide (PPSV-23) 9/29/2015 TB Skin Test (PPD) Intradermal 2/19/2014 Tdap 7/1/2015 Not reviewed this visit Vitals BP Pulse Temp Resp Height(growth percentile) Weight(growth percentile) (!) 146/92 (BP 1 Location: Left arm, BP Patient Position: Sitting) 97 97.6 °F (36.4 °C) (Oral) 18 6' (1.829 m) 141 lb (64 kg) SpO2 BMI Smoking Status 99% 19.12 kg/m2 Former Smoker Vitals History BMI and BSA Data Body Mass Index Body Surface Area  
 19.12 kg/m 2 1.8 m 2 Preferred Pharmacy Pharmacy Name Phone [id number], [address] [phone] Your Updated Medication List  
  
   
This list is accurate as of 3/20/18 11:54 AM.  Always use your most recent med list.  
  
  
  
  
 albuterol 90 mcg/actuation inhaler Commonly known as:  PROAIR HFA Take 2 puffs by inhalation every four (4) hours as needed for Wheezing. allopurinol 100 mg tablet Commonly known as:  ZYLOPRIM  
TAKE 1 TABLET BY MOUTH DAILY FOR 5DAYS THEN TAKE 2 TABLETS BY MOUTH DAILY ammonium lactate 12 % topical cream  
Commonly known as:  LAC-HYDRIN Apply  to affected area two (2) times a day. rub in to affected area well COLCRYS 0.6 mg tablet Generic drug:  colchicine  
  
 diclofenac 0.1 % ophthalmic solution Commonly known as:  VOLTAREN  
INSTILL 1 DROP INTO LEFT EYE 4 TIMES A DAY  
  
 docusate sodium 100 mg capsule Commonly known as:  COLACE  
TAKE ONE CAPSULE BY MOUTH TWICE A DAY  
  
 famotidine 20 mg tablet Commonly known as:  PEPCID Take 20 mg by mouth daily. ferrous sulfate 325 mg (65 mg iron) tablet Commonly known as:  Iron Take 1 Tab by mouth three (3) times daily (with meals). furosemide 40 mg tablet Commonly known as:  LASIX Take one tab po daily  Indications: RENAL DISEASE WITH EDEMA  
  
 gabapentin 600 mg tablet Commonly known as:  NEURONTIN  
TAKE 1 TABLET BY MOUTH TWICE A DAY  
  
 ipratropium-albuterol  mcg/actuation inhaler Commonly known as:  Raymond Evelio Take 1 Puff by inhalation every six (6) hours as needed for Wheezing. labetalol 100 mg tablet Commonly known as:  Chery Aleksandr Take 2 Tabs by mouth two (2) times a day. latanoprost 0.005 % ophthalmic solution Commonly known as:  Virlinda Seeds Administer 1 Drop to both eyes nightly. methocarbamol 500 mg tablet Commonly known as:  ROBAXIN  
TAKE 1 TABLET BY MOUTH THREE (3) TIMES DAILY. montelukast 10 mg tablet Commonly known as:  SINGULAIR  
TAKE 1 TABLET BY MOUTH ONCE DAILY  
  
 * NIFEdipine ER 60 mg ER tablet Commonly known as:  ADALAT CC Take 2 Tabs by mouth daily. * NIFEdipine ER 60 mg ER tablet Commonly known as:  ADALAT CC  
TAKE 2 TABLETS BY MOUTH EVERY DAY  
  
 omeprazole 40 mg capsule Commonly known as:  PRILOSEC  
TAKE ONE CAPSULE BY MOUTH DAILY prednisoLONE acetate 1 % ophthalmic suspension Commonly known as:  PRED FORTE Administer 1 Drop to left eye six (6) times daily. predniSONE 20 mg tablet Commonly known as:  Barbie Blew Take 5 Tabs by mouth daily for 1 day. traMADol 50 mg tablet Commonly known as:  ULTRAM  
Take 1 Tab by mouth every six (6) hours as needed for Pain. Max Daily Amount: 200 mg.  
  
 triamcinolone acetonide 0.1 % ointment Commonly known as:  KENALOG Apply 60 g to affected area two (2) times a day. use thin layer * Notice: This list has 2 medication(s) that are the same as other medications prescribed for you. Read the directions carefully, and ask your doctor or other care provider to review them with you. Introducing [facility]! [employer] introduces [employer] patient portal. Now you can access parts of your medical record, email your doctor's office, and request medication refills online. 1. In your internet browser, go to [email or web address] 2. Click on the First Time User? Click Here link in the Sign In box. You will see the New Member Sign Up page. 3. Enter your [employer] Access Code exactly as it appears below. You will not need to use this code after youve completed the sign-up process. If you do not sign up before the expiration date, you must request a new code. · [employer] Access Code: [email or web address] Expires: 4/30/2018  2:23 PM 
 
4. Enter the last four digits of your Social Security Number (xxxx) and Date of Birth (mm/dd/yyyy) as indicated and click Submit. You will be taken to the next sign-up page. 5. Create a Icarus ID. This will be your I[employer]us login ID and cannot be changed, so think of one that is secure and easy to remember. 6. Create a I[employer] password. You can change your password at any time. 7. Enter your Password Reset Question and Answer. This can be used at a later time if you forget your password. 8. Enter your e-mail address. You will receive e-mail notification when new information is available in [address] 9. Click Sign Up. You can now view and download portions of your medical record. 10. Click the Download Summary menu link to download a portable copy of your medical information. If you have questions, please visit the Frequently Asked Questions section of the [employer] website. Remember, [employer] is NOT to be used for urgent needs. For medical emergencies, dial 911. Now available from your iPhone and Android! Please provide this summary of care documentation to your next provider. Your primary care clinician is listed as Shanda Truong. If you have any questions after today's visit, please call [phone].

## 2018-03-23 RX ORDER — METOPROLOL SUCCINATE 100 MG/1
100 TABLET, EXTENDED RELEASE ORAL DAILY
COMMUNITY
End: 2018-08-01 | Stop reason: SDUPTHER

## 2018-03-23 RX ORDER — SODIUM BICARBONATE 650 MG/1
650 TABLET ORAL 4 TIMES DAILY
COMMUNITY
End: 2018-09-20 | Stop reason: SDUPTHER

## 2018-03-23 NOTE — PERIOP NOTES
Patient called to review meds. He is very [place], poor historian. Reviewed meds several times with readback. Gave address of hospital several times to patient & spouse. Patients wife upset about time change due to transportation concerns.

## 2018-03-23 NOTE — PERIOP NOTES
[facility]  Preoperative Instructions        Surgery Date 3/26/18          Time of Arrival 0730    1. On the day of your surgery, please report to the Surgical Services Registration Desk and sign in at your designated time. The Surgery Center is located to the right of the Emergency Room. 2. You must have someone with you to drive you home. You should not drive a car for 24 hours following surgery. Please make arrangements for a friend or family member to stay with you for the first 24 hours after your surgery. 3. Do not have anything to eat or drink (including water, gum, mints, coffee, juice) after midnight 3/25/18. ?? Stella Veras ? This may not apply to medications prescribed by your physician. ?(Please note below the special instructions with medications to take the morning of your procedure.)    4. We recommend you do not drink any alcoholic beverages for 24 hours before and after your surgery. 5. Contact your surgeons office for instructions on the following medications: non-steroidal anti-inflammatory drugs (i.e. Advil, Aleve), vitamins, and supplements. (Some surgeons will want you to stop these medications prior to surgery and others may allow you to take them)  **If you are currently taking Plavix, Coumadin, Aspirin and/or other blood-thinning agents, contact your surgeon for instructions. ** Your surgeon will partner with the physician prescribing these medications to determine if it is safe to stop or if you need to continue taking. Please do not stop taking these medications without instructions from your surgeon    6. Wear comfortable clothes. Wear glasses instead of contacts. Do not bring any money or jewelry. Please bring picture ID, insurance card, and any prearranged co-payment or hospital payment. Do not wear make-up, particularly mascara the morning of your surgery. Do not wear nail polish, particularly if you are having foot /hand surgery.   Wear your hair loose or down, no ponytails, buns, nichelle pins or clips. All body piercings must be removed. Please shower with antibacterial soap for three consecutive days before and on the morning of surgery, but do not apply any lotions, powders or deodorants after the shower on the day of surgery. Please use a fresh towels after each shower. Please sleep in clean clothes and change bed linens the night before surgery. Please do not shave for 48 hours prior to surgery. Shaving of the face is acceptable. 7. You should understand that if you do not follow these instructions your surgery may be cancelled. If your physical condition changes (I.e. fever, cold or flu) please contact your surgeon as soon as possible. 8. It is important that you be on time. If a situation occurs where you may be late, please call [phone] (OR Holding Area). 9. If you have any questions and or problems, please call [phone] (Pre-admission Testing). 10. Your surgery time may be subject to change. You will receive a phone call the evening prior if your time changes. 11.  If having outpatient surgery, you must have someone to drive you here, stay with you during the duration of your stay, and to drive you home at time of discharge. 12.   In an effort to improve the efficiency, privacy, and safety for all of our Pre-op patients visitors are not allowed in the Holding area. Once you arrive and are registered your family/visitors will be asked to remain in the waiting room. The Pre-op staff will get you from the Surgical Waiting Area and will explain to you and your family/visitors that the Pre-op phase is beginning. The staff will answer any questions and provide instructions for tracking of the patient, by use of the existing tracking number and color-coded status board in the waiting room.   At this time the staff will also ask for your designated spokesperson information in the event that the physician or staff need to provide an update or obtain any pertinent information. The designated spokesperson will be notified if the physician needs to speak to family during the pre-operative phase. If at any time your family/visitors has questions or concerns they may approach the volunteer desk in the waiting area for assistance. Special Instructions:use & bring inhaler. MEDICATIONS TO TAKE THE MORNING OF SURGERY WITH A SIP OF WATER:metoprolol, prednisone if still taking, prilosec,nifedipine      I understand a pre-operative phone call will be made to verify my surgery time. In the event that I am not available, I give permission for a message to be left on my answering service and/or with another person?   {yes @ [phone]         ___________________      __________   _________    (Signature of Patient)             (Witness)                (Date and Time)

## 2018-03-26 ENCOUNTER — ANESTHESIA EVENT (OUTPATIENT)
Dept: SURGERY | Age: 73
End: 2018-03-26
Payer: MEDICARE

## 2018-03-26 ENCOUNTER — ANESTHESIA (OUTPATIENT)
Dept: SURGERY | Age: 73
End: 2018-03-26
Payer: MEDICARE

## 2018-03-26 ENCOUNTER — HOSPITAL ENCOUNTER (OUTPATIENT)
Age: 73
Setting detail: OUTPATIENT SURGERY
Discharge: HOME | End: 2018-03-26
Attending: SURGERY | Admitting: SURGERY
Payer: MEDICARE

## 2018-03-26 VITALS
RESPIRATION RATE: 14 BRPM | WEIGHT: 143.96 LBS | SYSTOLIC BLOOD PRESSURE: 144 MMHG | DIASTOLIC BLOOD PRESSURE: 74 MMHG | HEIGHT: 72 IN | TEMPERATURE: 97.6 F | HEART RATE: 58 BPM | BODY MASS INDEX: 19.5 KG/M2 | OXYGEN SATURATION: 100 %

## 2018-03-26 DIAGNOSIS — L76.82 PAIN AT SURGICAL INCISION: Primary | ICD-10-CM

## 2018-03-26 LAB
ANION GAP BLD CALC-SCNC: 16 MMOL/L (ref 10–20)
BUN BLD-MCNC: 91 MG/DL (ref 9–20)
CA-I BLD-MCNC: 1.13 MMOL/L (ref 1.12–1.32)
CHLORIDE BLD-SCNC: 109 MMOL/L (ref 98–107)
CO2 BLD-SCNC: 21 MMOL/L (ref 21–32)
CREAT BLD-MCNC: 4.2 MG/DL (ref 0.6–1.3)
GLUCOSE BLD-MCNC: 90 MG/DL (ref 65–100)
HCT VFR BLD CALC: 34 % (ref 36.6–50.3)
POTASSIUM BLD-SCNC: 4.7 MMOL/L (ref 3.5–5.1)
SERVICE CMNT-IMP: ABNORMAL
SODIUM BLD-SCNC: 140 MMOL/L (ref 136–145)

## 2018-03-26 PROCEDURE — 77030002987 HC SUT PROL J&J -B: Performed by: SURGERY

## 2018-03-26 PROCEDURE — 77030020153 HC PRB DOPLR DISP MIZU -C: Performed by: SURGERY

## 2018-03-26 PROCEDURE — 74011000250 HC RX REV CODE- 250

## 2018-03-26 PROCEDURE — A4565 SLINGS: HCPCS

## 2018-03-26 PROCEDURE — 74011250636 HC RX REV CODE- 250/636: Performed by: SURGERY

## 2018-03-26 PROCEDURE — 77030018836 HC SOL IRR NACL ICUM -A: Performed by: SURGERY

## 2018-03-26 PROCEDURE — 76060000033 HC ANESTHESIA 1 TO 1.5 HR: Performed by: SURGERY

## 2018-03-26 PROCEDURE — 77030008463 HC STPLR SKN PROX J&J -B: Performed by: SURGERY

## 2018-03-26 PROCEDURE — 74011250636 HC RX REV CODE- 250/636

## 2018-03-26 PROCEDURE — 80047 BASIC METABLC PNL IONIZED CA: CPT

## 2018-03-26 PROCEDURE — 74011000272 HC RX REV CODE- 272: Performed by: SURGERY

## 2018-03-26 PROCEDURE — 74011000250 HC RX REV CODE- 250: Performed by: SURGERY

## 2018-03-26 PROCEDURE — 77030002996 HC SUT SLK J&J -A: Performed by: SURGERY

## 2018-03-26 PROCEDURE — 76010000149 HC OR TIME 1 TO 1.5 HR: Performed by: SURGERY

## 2018-03-26 PROCEDURE — 76210000006 HC OR PH I REC 0.5 TO 1 HR: Performed by: SURGERY

## 2018-03-26 PROCEDURE — 77030014008 HC SPNG HEMSTAT J&J -C: Performed by: SURGERY

## 2018-03-26 PROCEDURE — 77030011640 HC PAD GRND REM COVD -A: Performed by: SURGERY

## 2018-03-26 PROCEDURE — 77030020256 HC SOL INJ NACL 0.9%  500ML: Performed by: SURGERY

## 2018-03-26 PROCEDURE — 76210000021 HC REC RM PH II 0.5 TO 1 HR: Performed by: SURGERY

## 2018-03-26 RX ORDER — SODIUM CHLORIDE 0.9 % (FLUSH) 0.9 %
5-10 SYRINGE (ML) INJECTION AS NEEDED
Status: DISCONTINUED | OUTPATIENT
Start: 2018-03-26 | End: 2018-03-26 | Stop reason: HOSPADM

## 2018-03-26 RX ORDER — FENTANYL CITRATE 50 UG/ML
INJECTION, SOLUTION INTRAMUSCULAR; INTRAVENOUS AS NEEDED
Status: DISCONTINUED | OUTPATIENT
Start: 2018-03-26 | End: 2018-03-26 | Stop reason: HOSPADM

## 2018-03-26 RX ORDER — ONDANSETRON 2 MG/ML
4 INJECTION INTRAMUSCULAR; INTRAVENOUS AS NEEDED
Status: DISCONTINUED | OUTPATIENT
Start: 2018-03-26 | End: 2018-03-26 | Stop reason: HOSPADM

## 2018-03-26 RX ORDER — MIDAZOLAM HYDROCHLORIDE 1 MG/ML
1 INJECTION, SOLUTION INTRAMUSCULAR; INTRAVENOUS AS NEEDED
Status: DISCONTINUED | OUTPATIENT
Start: 2018-03-26 | End: 2018-03-26 | Stop reason: HOSPADM

## 2018-03-26 RX ORDER — MIDAZOLAM HYDROCHLORIDE 1 MG/ML
INJECTION, SOLUTION INTRAMUSCULAR; INTRAVENOUS AS NEEDED
Status: DISCONTINUED | OUTPATIENT
Start: 2018-03-26 | End: 2018-03-26 | Stop reason: HOSPADM

## 2018-03-26 RX ORDER — ROPIVACAINE HYDROCHLORIDE 5 MG/ML
INJECTION, SOLUTION EPIDURAL; INFILTRATION; PERINEURAL AS NEEDED
Status: DISCONTINUED | OUTPATIENT
Start: 2018-03-26 | End: 2018-03-26 | Stop reason: HOSPADM

## 2018-03-26 RX ORDER — CEFAZOLIN SODIUM/WATER 2 G/20 ML
2 SYRINGE (ML) INTRAVENOUS ONCE
Status: COMPLETED | OUTPATIENT
Start: 2018-03-26 | End: 2018-03-26

## 2018-03-26 RX ORDER — LIDOCAINE HYDROCHLORIDE 10 MG/ML
0.1 INJECTION, SOLUTION EPIDURAL; INFILTRATION; INTRACAUDAL; PERINEURAL AS NEEDED
Status: DISCONTINUED | OUTPATIENT
Start: 2018-03-26 | End: 2018-03-26 | Stop reason: HOSPADM

## 2018-03-26 RX ORDER — SODIUM CHLORIDE, SODIUM LACTATE, POTASSIUM CHLORIDE, CALCIUM CHLORIDE 600; 310; 30; 20 MG/100ML; MG/100ML; MG/100ML; MG/100ML
125 INJECTION, SOLUTION INTRAVENOUS CONTINUOUS
Status: DISCONTINUED | OUTPATIENT
Start: 2018-03-26 | End: 2018-03-26 | Stop reason: HOSPADM

## 2018-03-26 RX ORDER — SODIUM CHLORIDE 9 MG/ML
INJECTION, SOLUTION INTRAVENOUS
Status: DISCONTINUED | OUTPATIENT
Start: 2018-03-26 | End: 2018-03-26 | Stop reason: HOSPADM

## 2018-03-26 RX ORDER — CEFAZOLIN SODIUM 1 G/3ML
2 INJECTION, POWDER, FOR SOLUTION INTRAMUSCULAR; INTRAVENOUS ONCE
Status: DISCONTINUED | OUTPATIENT
Start: 2018-03-26 | End: 2018-03-26 | Stop reason: SDUPTHER

## 2018-03-26 RX ORDER — PROPOFOL 10 MG/ML
INJECTION, EMULSION INTRAVENOUS
Status: DISCONTINUED | OUTPATIENT
Start: 2018-03-26 | End: 2018-03-26 | Stop reason: HOSPADM

## 2018-03-26 RX ORDER — PROPOFOL 10 MG/ML
INJECTION, EMULSION INTRAVENOUS AS NEEDED
Status: DISCONTINUED | OUTPATIENT
Start: 2018-03-26 | End: 2018-03-26 | Stop reason: HOSPADM

## 2018-03-26 RX ORDER — SODIUM CHLORIDE 0.9 % (FLUSH) 0.9 %
5-10 SYRINGE (ML) INJECTION EVERY 8 HOURS
Status: DISCONTINUED | OUTPATIENT
Start: 2018-03-26 | End: 2018-03-26 | Stop reason: HOSPADM

## 2018-03-26 RX ORDER — FENTANYL CITRATE 50 UG/ML
25 INJECTION, SOLUTION INTRAMUSCULAR; INTRAVENOUS
Status: DISCONTINUED | OUTPATIENT
Start: 2018-03-26 | End: 2018-03-26 | Stop reason: HOSPADM

## 2018-03-26 RX ORDER — SODIUM CHLORIDE, SODIUM LACTATE, POTASSIUM CHLORIDE, CALCIUM CHLORIDE 600; 310; 30; 20 MG/100ML; MG/100ML; MG/100ML; MG/100ML
25 INJECTION, SOLUTION INTRAVENOUS CONTINUOUS
Status: DISCONTINUED | OUTPATIENT
Start: 2018-03-26 | End: 2018-03-26 | Stop reason: HOSPADM

## 2018-03-26 RX ORDER — FENTANYL CITRATE 50 UG/ML
50 INJECTION, SOLUTION INTRAMUSCULAR; INTRAVENOUS AS NEEDED
Status: DISCONTINUED | OUTPATIENT
Start: 2018-03-26 | End: 2018-03-26 | Stop reason: HOSPADM

## 2018-03-26 RX ORDER — SODIUM CHLORIDE 9 MG/ML
25 INJECTION, SOLUTION INTRAVENOUS CONTINUOUS
Status: DISCONTINUED | OUTPATIENT
Start: 2018-03-26 | End: 2018-03-26 | Stop reason: HOSPADM

## 2018-03-26 RX ORDER — HEPARIN SODIUM 1000 [USP'U]/ML
INJECTION, SOLUTION INTRAVENOUS; SUBCUTANEOUS AS NEEDED
Status: DISCONTINUED | OUTPATIENT
Start: 2018-03-26 | End: 2018-03-26 | Stop reason: HOSPADM

## 2018-03-26 RX ORDER — LIDOCAINE HYDROCHLORIDE 20 MG/ML
INJECTION, SOLUTION EPIDURAL; INFILTRATION; INTRACAUDAL; PERINEURAL AS NEEDED
Status: DISCONTINUED | OUTPATIENT
Start: 2018-03-26 | End: 2018-03-26 | Stop reason: HOSPADM

## 2018-03-26 RX ORDER — SODIUM CHLORIDE 9 MG/ML
500 INJECTION, SOLUTION INTRAVENOUS CONTINUOUS
Status: DISCONTINUED | OUTPATIENT
Start: 2018-03-26 | End: 2018-03-26 | Stop reason: HOSPADM

## 2018-03-26 RX ORDER — HYDROMORPHONE HYDROCHLORIDE 1 MG/ML
0.2 INJECTION, SOLUTION INTRAMUSCULAR; INTRAVENOUS; SUBCUTANEOUS
Status: DISCONTINUED | OUTPATIENT
Start: 2018-03-26 | End: 2018-03-26 | Stop reason: HOSPADM

## 2018-03-26 RX ORDER — OXYCODONE HYDROCHLORIDE 5 MG/1
5 TABLET ORAL AS NEEDED
Status: DISCONTINUED | OUTPATIENT
Start: 2018-03-26 | End: 2018-03-26 | Stop reason: HOSPADM

## 2018-03-26 RX ORDER — DIPHENHYDRAMINE HYDROCHLORIDE 50 MG/ML
12.5 INJECTION, SOLUTION INTRAMUSCULAR; INTRAVENOUS AS NEEDED
Status: DISCONTINUED | OUTPATIENT
Start: 2018-03-26 | End: 2018-03-26 | Stop reason: HOSPADM

## 2018-03-26 RX ORDER — HYDROCODONE BITARTRATE AND ACETAMINOPHEN 5; 325 MG/1; MG/1
1 TABLET ORAL
Qty: 15 TAB | Refills: 0 | Status: SHIPPED | OUTPATIENT
Start: 2018-03-26 | End: 2018-06-06 | Stop reason: ALTCHOICE

## 2018-03-26 RX ORDER — MIDAZOLAM HYDROCHLORIDE 1 MG/ML
0.5 INJECTION, SOLUTION INTRAMUSCULAR; INTRAVENOUS
Status: DISCONTINUED | OUTPATIENT
Start: 2018-03-26 | End: 2018-03-26 | Stop reason: HOSPADM

## 2018-03-26 RX ORDER — MORPHINE SULFATE 10 MG/ML
2 INJECTION, SOLUTION INTRAMUSCULAR; INTRAVENOUS
Status: DISCONTINUED | OUTPATIENT
Start: 2018-03-26 | End: 2018-03-26 | Stop reason: HOSPADM

## 2018-03-26 RX ADMIN — PROPOFOL 20 MG: 10 INJECTION, EMULSION INTRAVENOUS at 09:54

## 2018-03-26 RX ADMIN — PROPOFOL 20 MG: 10 INJECTION, EMULSION INTRAVENOUS at 09:58

## 2018-03-26 RX ADMIN — ROPIVACAINE HYDROCHLORIDE 30 ML: 5 INJECTION, SOLUTION EPIDURAL; INFILTRATION; PERINEURAL at 08:52

## 2018-03-26 RX ADMIN — PROPOFOL 20 MG: 10 INJECTION, EMULSION INTRAVENOUS at 09:56

## 2018-03-26 RX ADMIN — PROPOFOL 60 MCG/KG/MIN: 10 INJECTION, EMULSION INTRAVENOUS at 09:50

## 2018-03-26 RX ADMIN — HEPARIN SODIUM 5000 UNITS: 1000 INJECTION, SOLUTION INTRAVENOUS; SUBCUTANEOUS at 10:13

## 2018-03-26 RX ADMIN — LIDOCAINE HYDROCHLORIDE 60 MG: 20 INJECTION, SOLUTION EPIDURAL; INFILTRATION; INTRACAUDAL; PERINEURAL at 09:50

## 2018-03-26 RX ADMIN — FENTANYL CITRATE 50 MCG: 50 INJECTION, SOLUTION INTRAMUSCULAR; INTRAVENOUS at 08:51

## 2018-03-26 RX ADMIN — SODIUM CHLORIDE: 9 INJECTION, SOLUTION INTRAVENOUS at 09:36

## 2018-03-26 RX ADMIN — MIDAZOLAM HYDROCHLORIDE 2 MG: 1 INJECTION, SOLUTION INTRAMUSCULAR; INTRAVENOUS at 08:48

## 2018-03-26 RX ADMIN — FENTANYL CITRATE 50 MCG: 50 INJECTION, SOLUTION INTRAMUSCULAR; INTRAVENOUS at 08:48

## 2018-03-26 RX ADMIN — Medication 2 G: at 09:57

## 2018-03-26 NOTE — ANESTHESIA PREPROCEDURE EVALUATION
Anesthetic History   No history of anesthetic complications            Review of Systems / Medical History  Patient summary reviewed, nursing notes reviewed and pertinent labs reviewed    Pulmonary            Asthma : well controlled       Neuro/Psych              Cardiovascular    Hypertension          Hyperlipidemia         GI/Hepatic/Renal     GERD: well controlled    Renal disease: CRI       Endo/Other        Arthritis     Other Findings   Comments: Polymyalgia RheumaticaRheumatoid Arthritis           Physical Exam    Airway  Mallampati: II  TM Distance: > 6 cm  Neck ROM: normal range of motion   Mouth opening: Normal     Cardiovascular  Regular rate and rhythm,  S1 and S2 normal,  no murmur, click, rub, or gallop             Dental      Comments: Multiple missing teeth   Pulmonary  Breath sounds clear to auscultation               Abdominal  GI exam deferred       Other Findings            Anesthetic Plan    ASA: 3  Anesthesia type: MAC      Post-op pain plan if not by surgeon: peripheral nerve block single    Induction: Intravenous  Anesthetic plan and risks discussed with: Patient

## 2018-03-26 NOTE — ANESTHESIA POSTPROCEDURE EVALUATION
Post-Anesthesia Evaluation and Assessment    Patient: Tricia Tadeo MRN: [id number]  SSN: [id number]    YOB: 1945  Age: 68 y.o. Sex: male       Cardiovascular Function/Vital Signs  Visit Vitals    /89 (BP 1 Location: Right arm, BP Patient Position: At rest;Head of bed elevated (Comment degrees))    Pulse (!) 58    Temp 36.3 °C (97.4 °F)    Resp 11    Ht 6' (1.829 m)    Wt 65.3 kg (143 lb 15.4 oz)    SpO2 100%    BMI 19.52 kg/m2       Patient is status post MAC anesthesia for Procedure(s):  CREATION LEFT ARM ARTERIO VENOUS GRAFT . Nausea/Vomiting: None    Postoperative hydration reviewed and adequate. Pain:  Pain Scale 1: Numeric (0 - 10) (03/26/18 1130)  Pain Intensity 1: 0 (03/26/18 1130)   Managed    Neurological Status:   Neuro (WDL): Exceptions to WDL (03/26/18 1057)  Neuro  Neurologic State: Drowsy; Eyes open spontaneously; Eyes open to voice (03/26/18 1057)  Orientation Level: Oriented to person;Oriented to place;Oriented to situation (03/26/18 1057)  Cognition: Appropriate for age attention/concentration; Follows commands (03/26/18 1057)  Speech: Delayed responses; Appropriate for age (03/26/18 1057)  LUE Motor Response: Pharmacologically paralyzed; Flaccid;Numbness (03/26/18 1057)  LLE Motor Response: Purposeful (03/26/18 1057)  RUE Motor Response: Purposeful (03/26/18 1057)  RLE Motor Response: Purposeful (03/26/18 1057)   At baseline    Mental Status and Level of Consciousness: Arousable    Pulmonary Status:   O2 Device: Room air (03/26/18 1130)   Adequate oxygenation and airway patent    Complications related to anesthesia: None    Post-anesthesia assessment completed.  No concerns    Signed By: Connie Kelly MD     March 26, 2018

## 2018-03-26 NOTE — IP AVS SNAPSHOT
[address] 
[phone] Patient: Darling Mckinney MRN: [id number] [id number] About your hospitalization You were admitted on:  March 26, 2018 You last received care in the:  [facility] PACU You were discharged on:  March 26, 2018 Why you were hospitalized Your primary diagnosis was:  Not on File Follow-up Information Follow up With Details Comments Contact Info Phyllis Mason MD   [address] [place] [id number] [phone] Discharge Orders None A check phyllis indicates which time of day the medication should be taken. My Medications START taking these medications Instructions Each Dose to Equal  
 Morning Noon Evening Bedtime HYDROcodone-acetaminophen 5-325 mg per tablet Commonly known as:  Saad Jarvis Your last dose was: Your next dose is: Take 1 Tab by mouth every four (4) hours as needed for Pain. Max Daily Amount: 6 Tabs. 1 Tab CHANGE how you take these medications Instructions Each Dose to Equal  
 Morning Noon Evening Bedtime  
 triamcinolone acetonide 0.1 % ointment Commonly known as:  KENALOG What changed:   
- how much to take 
- additional instructions Your last dose was: Your next dose is:    
   
   
 Apply 60 g to affected area two (2) times a day. use thin layer 60 g CONTINUE taking these medications Instructions Each Dose to Equal  
 Morning Noon Evening Bedtime  
 albuterol 90 mcg/actuation inhaler Commonly known as:  PROAIR HFA Your last dose was: Your next dose is: Take 2 puffs by inhalation every four (4) hours as needed for Wheezing. 2 Puff  
    
   
   
   
  
 allopurinol 100 mg tablet Commonly known as:  Pheobe Price Your last dose was: Your next dose is: TAKE 1 TABLET BY MOUTH DAILY FOR 5DAYS THEN TAKE 2 TABLETS BY MOUTH DAILY  
     
   
   
   
  
 augmented betamethasone dipropionate 0.05 % topical cream  
Commonly known as:  Sung Hinojosa Your last dose was: Your next dose is:    
   
   
 Apply  to affected area two (2) times a day. COLCRYS 0.6 mg tablet Generic drug:  colchicine Your last dose was: Your next dose is:    
   
   
      
   
   
   
  
 diclofenac 0.1 % ophthalmic solution Commonly known as:  VOLTAREN Your last dose was: Your next dose is:    
   
   
 INSTILL 1 DROP INTO LEFT EYE 4 TIMES A DAY  
     
   
   
   
  
 ferrous sulfate 325 mg (65 mg iron) tablet Commonly known as:  Iron Your last dose was: Your next dose is: Take 1 Tab by mouth three (3) times daily (with meals). 325 mg  
    
   
   
   
  
 ipratropium-albuterol  mcg/actuation inhaler Commonly known as:  Citlali Anna Your last dose was: Your next dose is: Take 1 Puff by inhalation every six (6) hours as needed for Wheezing. 1 Puff  
    
   
   
   
  
 latanoprost 0.005 % ophthalmic solution Commonly known as:  Huan Coloradoaria Your last dose was: Your next dose is:    
   
   
 Administer 1 Drop to both eyes nightly. 1 Drop  
    
   
   
   
  
 metoprolol succinate 100 mg tablet Commonly known as:  TOPROL-XL Your last dose was: Your next dose is: Take 100 mg by mouth daily. 100 mg  
    
   
   
   
  
 montelukast 10 mg tablet Commonly known as:  SINGULAIR Your last dose was: Your next dose is: TAKE 1 TABLET BY MOUTH ONCE DAILY  
     
   
   
   
  
 NIFEdipine ER 60 mg ER tablet Commonly known as:  ADALAT CC Your last dose was: Your next dose is: Take 2 Tabs by mouth daily.   
 120 mg  
    
   
   
   
  
 omeprazole 40 mg capsule Commonly known as:  PRILOSEC Your last dose was: Your next dose is: TAKE ONE CAPSULE BY MOUTH DAILY prednisoLONE acetate 1 % ophthalmic suspension Commonly known as:  PRED FORTE Your last dose was: Your next dose is:    
   
   
 Administer 1 Drop to left eye six (6) times daily. 1 Drop  
    
   
   
   
  
 predniSONE 20 mg tablet Commonly known as:  Zenon Mccracken Your last dose was: Your next dose is:    
   
   
 5 tablets day for days 1 through 4, then 4 tablets on day 5, then 3 tablets on day 6, then 2 tablets on day 7, then 1 tablet on day 8.  
     
   
   
   
  
 sodium bicarbonate 650 mg tablet Your last dose was: Your next dose is: Take 650 mg by mouth four (4) times daily. 650 mg  
    
   
   
   
  
 traMADol 50 mg tablet Commonly known as:  ULTRAM  
   
Your last dose was: Your next dose is: Take 1 Tab by mouth every six (6) hours as needed for Pain. Max Daily Amount: 200 mg.  
 50 mg Where to Get Your Medications Information on where to get these meds will be given to you by the nurse or doctor. ! Ask your nurse or doctor about these medications HYDROcodone-acetaminophen 5-325 mg per tablet Discharge Instructions Patient Discharge Instructions Landen Sood / [id number] : 1945 Admitted 3/26/2018 Discharged: 3/26/2018 FOLLOW UP VISIT Appointment in: Two Weeks Call for appt at [facility] [phone] Emilia Saxena (RGUHO [id number]) Follow Up Date and Time: 3/26/2018 11:11 AM  
Department: [facility] PACU Released By/Authorizing: Chandler Beyer MD (auto-released) What to do at [facility] Recommended activity: Elevate arm No needle sticks or BP checks in affected arm Leave dressing for 72-96 hours. Information obtained by : 
I understand that if any problems occur once I am at home I am to contact my physician. I understand and acknowledge receipt of the instructions indicated above. R.N.'s Signature                                                                  Date/Time Patient or Representative Signature                                                          Date/Time Alicia Castillo MD  
 
 
DISCHARGE SUMMARY from Nurse PATIENT INSTRUCTIONS: 
 
After general anesthesia or intravenous sedation, for 24 hours or while taking prescription Narcotics: · Limit your activities · Do not drive and operate hazardous machinery · Do not make important personal or business decisions · Do  not drink alcoholic beverages · If you have not urinated within 8 hours after discharge, please contact your surgeon on call. Report the following to your surgeon: 
· Excessive pain, swelling, redness or odor of or around the surgical area · Temperature over 100.5 · Nausea and vomiting lasting longer than 4 hours or if unable to take medications · Any signs of decreased circulation or nerve impairment to extremity: change in color, persistent  numbness, tingling, coldness or increase pain · Any questions What to do at Home: *  Please give a list of your current medications to your Primary Care Provider. *  Please update this list whenever your medications are discontinued, doses are 
    changed, or new medications (including over-the-counter products) are added. *  Please carry medication information at all times in case of emergency situations. These are general instructions for a healthy lifestyle: No smoking/ No tobacco products/ Avoid exposure to second hand smoke Surgeon General's Warning:  Quitting smoking now greatly reduces serious risk to your health. Obesity, smoking, and sedentary lifestyle greatly increases your risk for illness A healthy diet, regular physical exercise & weight monitoring are important for maintaining a healthy lifestyle You may be retaining fluid if you have a history of heart failure or if you experience any of the following symptoms:  Weight gain of 3 pounds or more overnight or 5 pounds in a week, increased swelling in our hands or feet or shortness of breath while lying flat in bed. Please call your doctor as soon as you notice any of these symptoms; do not wait until your next office visit. Recognize signs and symptoms of STROKE: 
 
 
? soup 
? broth 
?  toast  
? crackers ? applesauce 
? bananas  
? mashed potatoes, 
? soft or scrambled eggs 
? oatmeal 
?  jello It is important to eat when taking your pain medication. This will help to prevent nausea. If possible, please try to time your meals with your medications. It is very important to stay hydrated following surgery. Sip fluids frequently while awake. Avoid acidic drinks such as citrus juices and soda for 24 hours. Carbonated beverages may cause bloating and gas. Acceptable fluids include: 
 
? water (flavor packets may add variety) ? coffee or tea (in moderation) ? Gatorade ? Ana Talia ? apple juice 
? cranberry juice You are encouraged to cough and deep breathe every hour when awake. This will help to prevent respiratory complications following anesthesia. You may want to hug a pillow when coughing and sneezing to add additional support to the surgical area and to decrease discomfort if you had abdominal or chest surgery. If you are discharged home with support stockings, you may remove them after 24 hours. Support stockings are used to help prevent blood clots in the legs following surgery. Please take time to review all of your Home Care Instructions and Medication Information sheets provided in your discharge packet. If you have any questions, please contact your surgeons office. Thank you. and appropriate educational materials and side effects teaching were provided. ___________________________________________________________________________________________________________________________________ Narcotic-Analgesic/Acetaminophen (Percocet, Norco, Lorcet HD, Lortab 10/325) - (By mouth) Why this medicine is used:  
Relieves pain. Contact a nurse or doctor right away if you have: 
· Extreme weakness, shallow breathing, slow heartbeat · Severe confusion, lightheadedness, dizziness, fainting · Yellow skin or eyes, dark urine or pale stools · Severe constipation, severe stomach pain, nausea, vomiting, loss of appetite · Sweating or cold, clammy skin Common side effects: · Mild constipation, nausea, vomiting · Sleepiness, tiredness · Itching, rash © 2017 [facility] Information is for End User's use only and may not be sold, redistributed or otherwise used for commercial purposes. Introducing [facility]! Kristin Sommer introduces Brandark patient portal. Now you can access parts of your medical record, email your doctor's office, and request medication refills online. 1. In your internet browser, go to [email or web address] 2. Click on the First Time User? Click Here link in the Sign In box. You will see the New Member Sign Up page. 3. Enter your Brandark Access Code exactly as it appears below. You will not need to use this code after youve completed the sign-up process. If you do not sign up before the expiration date, you must request a new code. · Brandark Access Code: [email or web address] Expires: 4/30/2018  2:23 PM 
 
4. Enter the last four digits of your Social Security Number (xxxx) and Date of Birth (mm/dd/yyyy) as indicated and click Submit. You will be taken to the next sign-up page. 5. Create a Flickrhart ID. This will be your iS[employer] Partnerst login ID and cannot be changed, so think of one that is secure and easy to remember. 6. Create a Lifetable password. You can change your password at any time. 7. Enter your Password Reset Question and Answer. This can be used at a later time if you forget your password. 8. Enter your e-mail address. You will receive e-mail notification when new information is available in [address] 9. Click Sign Up. You can now view and download portions of your medical record. 10. Click the Download Summary menu link to download a portable copy of your medical information. If you have questions, please visit the Frequently Asked Questions section of the Lifetable website. Remember, Lifetable is NOT to be used for urgent needs. For medical emergencies, dial 911. Now available from your iPhone and Android! Providers Seen During Your Hospitalization Provider Specialty Primary office phone Kaylin Zaragoza MD General and Vascular Surgery [phone] Your Primary Care Physician (PCP) Primary Care Physician Office Phone Office Fax Abdirizak Chavira [phone] You are allergic to the following No active allergies Recent Documentation Height Weight BMI Smoking Status 1.829 m 65.3 kg 19.52 kg/m2 Former Smoker Emergency Contacts Name Discharge Info Relation Home Work Mobile Amanda Dalton CAREGIVER [3] Daughter [21] [phone] Patient Belongings The following personal items are in your possession at time of discharge: 
  Dental Appliances: None  Visual Aid: Glasses, At home (prescription)   Hearing Aids/Status: Does not own  Home Medications: None   Jewelry: None  Clothing: Other (comment) (street clothes)    Other Valuables: None  Personal Items Sent to Safe: none Please provide this summary of care documentation to your next provider. Signatures-by signing, you are acknowledging that this After Visit Summary has been reviewed with you and you have received a copy. Patient Signature:  ____________________________________________________________ Date:  ____________________________________________________________  
  
Mita Chinmay Provider Signature:  ____________________________________________________________ Date:  ____________________________________________________________

## 2018-03-26 NOTE — DISCHARGE INSTRUCTIONS
Patient Discharge Instructions    Adan Guthrie / [id number] : 1945    Admitted 3/26/2018 Discharged: 3/26/2018     FOLLOW UP VISIT Appointment in: Two Weeks Call for appt at [facility] [phone] Susanne Borges ([id number])   Follow Up    Date and Time: 3/26/2018 11:11 AM   Department[facility]   Released By/Authorizing: Zackary Peraza MD (auto-released)              What to do at Home  Recommended activity: Elevate arm   No needle sticks or BP checks in affected arm  Leave dressing for 72-96 hours. Information obtained by :  I understand that if any problems occur once I am at home I am to contact my physician. I understand and acknowledge receipt of the instructions indicated above. R.N.'s Signature                                                                  Date/Time                                                                                                                                              Patient or Representative Signature                                                          Date/Time      Zackary Peraza MD       DISCHARGE SUMMARY from Nurse    PATIENT INSTRUCTIONS:    After general anesthesia or intravenous sedation, for 24 hours or while taking prescription Narcotics:  · Limit your activities  · Do not drive and operate hazardous machinery  · Do not make important personal or business decisions  · Do  not drink alcoholic beverages  · If you have not urinated within 8 hours after discharge, please contact your surgeon on call.     Report the following to your surgeon:  · Excessive pain, swelling, redness or odor of or around the surgical area  · Temperature over 100.5  · Nausea and vomiting lasting longer than 4 hours or if unable to take medications  · Any signs of decreased circulation or nerve impairment to extremity: change in color, persistent  numbness, tingling, coldness or increase pain  · Any questions    What to do at Home:    *  Please give a list of your current medications to your Primary Care Provider. *  Please update this list whenever your medications are discontinued, doses are      changed, or new medications (including over-the-counter products) are added. *  Please carry medication information at all times in case of emergency situations. These are general instructions for a healthy lifestyle:    No smoking/ No tobacco products/ Avoid exposure to second hand smoke  Surgeon General's Warning:  Quitting smoking now greatly reduces serious risk to your health. Obesity, smoking, and sedentary lifestyle greatly increases your risk for illness    A healthy diet, regular physical exercise & weight monitoring are important for maintaining a healthy lifestyle    You may be retaining fluid if you have a history of heart failure or if you experience any of the following symptoms:  Weight gain of 3 pounds or more overnight or 5 pounds in a week, increased swelling in our hands or feet or shortness of breath while lying flat in bed. Please call your doctor as soon as you notice any of these symptoms; do not wait until your next office visit. Recognize signs and symptoms of STROKE:    F-face looks uneven    A-arms unable to move or move unevenly    S-speech slurred or non-existent    T-time-call 911 as soon as signs and symptoms begin-DO NOT go       Back to bed or wait to see if you get better-TIME IS BRAIN. Warning Signs of HEART ATTACK     Call 911 if you have these symptoms:   Chest discomfort. Most heart attacks involve discomfort in the center of the chest that lasts more than a few minutes, or that goes away and comes back. It can feel like uncomfortable pressure, squeezing, fullness, or pain.  Discomfort in other areas of the upper body.  Symptoms can include pain or discomfort in one or both arms, the back, neck, jaw, or stomach.  Shortness of breath with or without chest discomfort.  Other signs may include breaking out in a cold sweat, nausea, or lightheadedness. Don't wait more than five minutes to call 911 - MINUTES MATTER! Fast action can save your life. Calling 911 is almost always the fastest way to get lifesaving treatment. Emergency Medical Services staff can begin treatment when they arrive -- up to an hour sooner than if someone gets to the hospital by car. The discharge information has been reviewed with the patient and caregiver. The patient and caregiver verbalized understanding. Discharge medications reviewed with the patient and caregiverA common side effect of anesthesia following surgery is nausea and/or vomiting. In order to decrease symptoms, it is wise to avoid foods that are high in fat, greasy foods, milk products, and spicy foods for the first 24 hours. Acceptable foods for the first 24 hours following surgery include but are not limited to:     soup   broth    toast    crackers    applesauce    bananas    mashed potatoes,   soft or scrambled eggs   oatmeal    jello    It is important to eat when taking your pain medication. This will help to prevent nausea. If possible, please try to time your meals with your medications. It is very important to stay hydrated following surgery. Sip fluids frequently while awake. Avoid acidic drinks such as citrus juices and soda for 24 hours. Carbonated beverages may cause bloating and gas. Acceptable fluids include:    - water (flavor packets may add variety)  - coffee or tea (in moderation)  - Gatorade  - Richard-aid  - apple juice  - cranberry juice    You are encouraged to cough and deep breathe every hour when awake. This will help to prevent respiratory complications following anesthesia.  You may want to hug a pillow when coughing and sneezing to add additional support to the surgical area and to decrease discomfort if you had abdominal or chest surgery. If you are discharged home with support stockings, you may remove them after 24 hours. Support stockings are used to help prevent blood clots in the legs following surgery. Please take time to review all of your Home Care Instructions and Medication Information sheets provided in your discharge packet. If you have any questions, please contact your surgeons office. Thank you. and appropriate educational materials and side effects teaching were provided. ___________________________________________________________________________________________________________________________________     Narcotic-Analgesic/Acetaminophen (Percocet, Norco, Lorcet HD, Lortab 10/325) - (By mouth)   Why this medicine is used:   Relieves pain. Contact a nurse or doctor right away if you have:  · Extreme weakness, shallow breathing, slow heartbeat  · Severe confusion, lightheadedness, dizziness, fainting  · Yellow skin or eyes, dark urine or pale stools  · Severe constipation, severe stomach pain, nausea, vomiting, loss of appetite  · Sweating or cold, clammy skin     Common side effects:  · Mild constipation, nausea, vomiting  · Sleepiness, tiredness  · Itching, rash  © 2017 [address] Information is for End User's use only and may not be sold, redistributed or otherwise used for commercial purposes.

## 2018-03-26 NOTE — OP NOTES
[facility]  ACUTE CARE OP NOTE    Sancho Morales  MR#: [id number]  : 1945  ACCOUNT #: [de-identified]   DATE OF SERVICE: 2018    PREOPERATIVE DIAGNOSIS:  End-stage renal disease. POSTOPERATIVE DIAGNOSIS:  End-stage renal disease. PROCEDURE PERFORMED:  Creation of a left arm radiocephalic (Shakeel) arteriovenous fistula. SURGEON:  Carolina Purcell MD     ANESTHESIA:  Regional block. ESTIMATED BLOOD LOSS: none    SPECIMENS REMOVED: none    INDICATIONS:  The patient is a 68-year-old gentleman in need of permanent hemodialysis access. It was initially thought that on office-based vein survey and physical exam, he had an adequate left arm vein for primary fistula. He was originally scheduled for a prosthetic bridge graft. However, following the sympathetic block in the holding area, he was noted to have excellent caliber and quality cephalic vein and plans were altered to create a primary fistula. PROCEDURE:  After informed consent, the patient was placed supine on the operating table. After adequate induction of regional block anesthesia, site and patient confirmation, administration of prophylactic antibiotics, the left arm was prepped and draped in sterile fashion. A vertical incision was made over the strong radial artery pulse and the radial artery dissected free and controlled. The cephalic vein was exposed through a parallel incision. It was ligated distally, transected. Distended with heparinized saline, marked for orientation and passed under a skin bridge to the radial artery. After systemic heparinization it was anastomosed end-to-side to the radial artery using a continuous Prolene suture technique. At the completion of the anastomosis, suture line hemostatic. There was excellent flow through the fistula as evidenced by a good Doppler signal and a palpable thrill. The two wounds were carefully closed with interrupted simple fine nylon sutures. Sterile dressing was applied. The patient tolerated the procedure well with no complications.       MD LISSA Pagan / Kaleb Lucas  D: 03/26/2018 15:47     T: 03/26/2018 19:18  JOB #: [id number]  CC: Demetrio Maciel MD

## 2018-03-26 NOTE — PERIOP NOTES
Left SC block completed by Dr Liza Cruz w/out complications noted. Pt awake and responds to verbal and nonverbal stimuli appropriately. Pt denies of any SOB or CP at this time. VSS.  Pt on 2lpm NC o2. Will continue to closely monitor pt;

## 2018-03-26 NOTE — ANESTHESIA PROCEDURE NOTES
Peripheral Block    Start time: 3/26/2018 8:47 AM  End time: 3/26/2018 8:52 AM  Performed by: Bijal Ahn  Authorized by: Bijal Ahn       Pre-procedure:    Indications: at surgeon's request and post-op pain management    Preanesthetic Checklist: patient identified, risks and benefits discussed, site marked, timeout performed and patient being monitored      Block Type:   Block Type:  Supraclavicular  Laterality:  Left  Monitoring:  Standard ASA monitoring, continuous pulse ox, frequent vital sign checks, heart rate, responsive to questions and oxygen  Injection Technique:  Single shot  Procedures: ultrasound guided and nerve stimulator    Patient Position: supine  Prep: betadine and povidone-iodine 7.5% surgical scrub    Location:  Supraclavicular  Needle Type:  Stimuplex  Needle Gauge:  22 G  Needle Localization:  Nerve stimulator and ultrasound guidance  Motor Response: minimal motor response >0.4 mA    Medication Injected:  0.5%  ropivacaine  Volume (mL):  30    Assessment:  Number of attempts:  1  Injection Assessment:  Incremental injection every 5 mL, local visualized surrounding nerve on ultrasound, negative aspiration for blood, no intravascular symptoms, negative aspiration for CSF, no paresthesia and ultrasound image on chart  Patient tolerance:  Patient tolerated the procedure well with no immediate complications

## 2018-03-26 NOTE — PERIOP NOTES
Handoff Report from Operating Room to PACU    Report received from DELIO Alfaro RN and Eri Brennan CRNA regarding Alfonso Mccurdy. Surgeon(s):  Milagros Small MD  And Procedure(s) (LRB):  CREATION LEFT ARM ARTERIO VENOUS GRAFT  (Left)  confirmed   with allergies and dressings discussed. Anesthesia type, drugs, patient history, complications, estimated blood loss, vital signs, intake and output, and last pain medication, lines, temperature and Peripheral nerve block were reviewed.

## 2018-04-04 ENCOUNTER — OFFICE VISIT (OUTPATIENT)
Dept: FAMILY MEDICINE CLINIC | Age: 73
End: 2018-04-04

## 2018-04-04 VITALS
HEART RATE: 65 BPM | SYSTOLIC BLOOD PRESSURE: 143 MMHG | RESPIRATION RATE: 18 BRPM | HEIGHT: 72 IN | OXYGEN SATURATION: 98 % | WEIGHT: 145 LBS | TEMPERATURE: 98.2 F | DIASTOLIC BLOOD PRESSURE: 80 MMHG | BODY MASS INDEX: 19.64 KG/M2

## 2018-04-04 DIAGNOSIS — R07.81 RIB PAIN ON RIGHT SIDE: ICD-10-CM

## 2018-04-04 DIAGNOSIS — M25.521 ELBOW PAIN, RIGHT: ICD-10-CM
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
## 2018-04-04 DIAGNOSIS — M79.642 PAIN OF LEFT HAND: ICD-10-CM

## 2018-04-04 NOTE — PATIENT INSTRUCTIONS
Musculoskeletal Pain: Care Instructions  Your Care Instructions    Different problems with the bones, muscles, nerves, ligaments, and tendons in the body can cause pain. One or more areas of your body may ache or burn. Or they may feel tired, stiff, or sore. The medical term for this type of pain is musculoskeletal pain. It can have many different causes. Sometimes the pain is caused by an injury such as a strain or sprain. Or you might have pain from using one part of your body in the same way over and over again. This is called overuse. In some cases, the cause of the pain is another health problem such as arthritis or fibromyalgia. The doctor will examine you and ask you questions about your health to help find the cause of your pain. Blood tests or imaging tests like an X-ray may also be helpful. But sometimes doctors can't find a cause of the pain. Treatment depends on your symptoms and the cause of the pain, if known. The doctor has checked you carefully, but problems can develop later. If you notice any problems or new symptoms, get medical treatment right away. Follow-up care is a key part of your treatment and safety. Be sure to make and go to all appointments, and call your doctor if you are having problems. It's also a good idea to know your test results and keep a list of the medicines you take. How can you care for yourself at home? · Rest until you feel better. · Do not do anything that makes the pain worse. Return to exercise gradually if you feel better and your doctor says it's okay. · Be safe with medicines. Read and follow all instructions on the label. ¨ If the doctor gave you a prescription medicine for pain, take it as prescribed. ¨ If you are not taking a prescription pain medicine, ask your doctor if you can take an over-the-counter medicine. · Put ice or a cold pack on the area for 10 to 20 minutes at a time to ease pain.  Put a thin cloth between the ice and your skin.  When should you call for help? Call your doctor now or seek immediate medical care if:  ? · You have new pain, or your pain gets worse. ? · You have new symptoms such as a fever, a rash, or chills. ? Watch closely for changes in your health, and be sure to contact your doctor if:  ? · You do not get better as expected. Where can you learn more? Go to http://jaden-lily.info/. Enter S956 in the search box to learn more about \"Musculoskeletal Pain: Care Instructions. \"  Current as of: October 14, 2016  Content Version: 11.4  © 0607-1826 [employer]. Care instructions adapted under license by [employer] (which disclaims liability or warranty for this information). If you have questions about a medical condition or this instruction, always ask your healthcare professional. [address] any warranty or liability for your use of this information.

## 2018-04-04 NOTE — MR AVS SNAPSHOT
[address] [phone] Patient: Mana Melgar MRN: [id number] VNQ:[birth year] Visit Information Date & Time Provider Department Dept. Phone Encounter #  
 4/4/2018 11:00 AM Breanna Castañeda  [address] [facility] [phone] [id number] Follow-up Instructions Return if symptoms worsen or fail to improve. Upcoming Health Maintenance Date Due FOBT Q 1 YEAR AGE 50-75 7/24/2018 MEDICARE YEARLY EXAM 7/25/2018 GLAUCOMA SCREENING Q2Y 11/4/2018 DTaP/Tdap/Td series (2 - Td) 7/1/2025 Allergies as of 4/4/2018  Review Complete On: 4/4/2018 By: Vijay Valera RN No Known Allergies Current Immunizations  Reviewed on 10/24/2017 Name Date Influenza High Dose Vaccine PF 10/24/2017 Pneumococcal Conjugate (PCV-13) 7/24/2017 Pneumococcal Polysaccharide (PPSV-23) 9/29/2015 TB Skin Test (PPD) Intradermal 2/19/2014 Tdap 7/1/2015 Not reviewed this visit You Were Diagnosed With   
  
 Codes Comments Fall from slip, trip, or stumble, initial encounter    -  Primary ICD-10-CM: W01. 0XXA ICD-9-CM: E885.9 Elbow pain, right     ICD-10-CM: M25.521 ICD-9-CM: 719.42 Vitals BP Pulse Temp Resp Height(growth percentile) Weight(growth percentile) 143/80 (BP 1 Location: Left arm, BP Patient Position: Sitting) 65 98.2 °F (36.8 °C) (Oral) 18 6' (1.829 m) 145 lb (65.8 kg) SpO2 BMI Smoking Status 98% 19.67 kg/m2 Former Smoker Vitals History BMI and BSA Data Body Mass Index Body Surface Area  
 19.67 kg/m 2 1.83 m 2 Preferred Pharmacy Pharmacy Name Phone [id number] [address] [place] [phone] Your Updated Medication List  
  
   
This list is accurate as of 4/4/18 11:12 AM.  Always use your most recent med list.  
  
  
  
  
 albuterol 90 mcg/actuation inhaler Commonly known as:  PROAIR HFA Take 2 puffs by inhalation every four (4) hours as needed for Wheezing. allopurinol 100 mg tablet Commonly known as:  ZYLOPRIM  
TAKE 1 TABLET BY MOUTH DAILY FOR 5DAYS THEN TAKE 2 TABLETS BY MOUTH DAILY  
  
 augmented betamethasone dipropionate 0.05 % topical cream  
Commonly known as:  Aminata Goody Apply  to affected area two (2) times a day. COLCRYS 0.6 mg tablet Generic drug:  colchicine  
  
 diclofenac 0.1 % ophthalmic solution Commonly known as:  VOLTAREN  
INSTILL 1 DROP INTO LEFT EYE 4 TIMES A DAY  
  
 ferrous sulfate 325 mg (65 mg iron) tablet Commonly known as:  Iron Take 1 Tab by mouth three (3) times daily (with meals). HYDROcodone-acetaminophen 5-325 mg per tablet Commonly known as:  Radha Dimes Take 1 Tab by mouth every four (4) hours as needed for Pain. Max Daily Amount: 6 Tabs. ipratropium-albuterol  mcg/actuation inhaler Commonly known as:  Roz Tesha Take 1 Puff by inhalation every six (6) hours as needed for Wheezing. latanoprost 0.005 % ophthalmic solution Commonly known as:  Lisa Shaw Administer 1 Drop to both eyes nightly. metoprolol succinate 100 mg tablet Commonly known as:  TOPROL-XL Take 100 mg by mouth daily. montelukast 10 mg tablet Commonly known as:  SINGULAIR  
TAKE 1 TABLET BY MOUTH ONCE DAILY  
  
 NIFEdipine ER 60 mg ER tablet Commonly known as:  ADALAT CC Take 2 Tabs by mouth daily. omeprazole 40 mg capsule Commonly known as:  PRILOSEC  
TAKE ONE CAPSULE BY MOUTH DAILY prednisoLONE acetate 1 % ophthalmic suspension Commonly known as:  PRED FORTE Administer 1 Drop to left eye six (6) times daily. predniSONE 20 mg tablet Commonly known as:  DELTASONE  
5 tablets day for days 1 through 4, then 4 tablets on day 5, then 3 tablets on day 6, then 2 tablets on day 7, then 1 tablet on day 8. sodium bicarbonate 650 mg tablet Take 650 mg by mouth four (4) times daily. traMADol 50 mg tablet Commonly known as:  ULTRAM  
Take 1 Tab by mouth every six (6) hours as needed for Pain. Max Daily Amount: 200 mg.  
  
 triamcinolone acetonide 0.1 % ointment Commonly known as:  KENALOG Apply 60 g to affected area two (2) times a day. use thin layer Follow-up Instructions Return if symptoms worsen or fail to improve. Patient Instructions Musculoskeletal Pain: Care Instructions Your Care Instructions Different problems with the bones, muscles, nerves, ligaments, and tendons in the body can cause pain. One or more areas of your body may ache or burn. Or they may feel tired, stiff, or sore. The medical term for this type of pain is musculoskeletal pain. It can have many different causes. Sometimes the pain is caused by an injury such as a strain or sprain. Or you might have pain from using one part of your body in the same way over and over again. This is called overuse. In some cases, the cause of the pain is another health problem such as arthritis or fibromyalgia. The doctor will examine you and ask you questions about your health to help find the cause of your pain. Blood tests or imaging tests like an X-ray may also be helpful. But sometimes doctors can't find a cause of the pain. Treatment depends on your symptoms and the cause of the pain, if known. The doctor has checked you carefully, but problems can develop later. If you notice any problems or new symptoms, get medical treatment right away. Follow-up care is a key part of your treatment and safety. Be sure to make and go to all appointments, and call your doctor if you are having problems. It's also a good idea to know your test results and keep a list of the medicines you take. How can you care for yourself at home? · Rest until you feel better. · Do not do anything that makes the pain worse. Return to exercise gradually if you feel better and your doctor says it's okay. · Be safe with medicines. Read and follow all instructions on the label. ¨ If the doctor gave you a prescription medicine for pain, take it as prescribed. ¨ If you are not taking a prescription pain medicine, ask your doctor if you can take an over-the-counter medicine. · Put ice or a cold pack on the area for 10 to 20 minutes at a time to ease pain. Put a thin cloth between the ice and your skin. When should you call for help? Call your doctor now or seek immediate medical care if: 
? · You have new pain, or your pain gets worse. ? · You have new symptoms such as a fever, a rash, or chills. ? Watch closely for changes in your health, and be sure to contact your doctor if: 
? · You do not get better as expected. Where can you learn more? Go to http://jaden-lily.info/. Enter X180 in the search box to learn more about \"Musculoskeletal Pain: Care Instructions. \" Current as of: October 14, 2016 Content Version: 11.4 © 5867-6740 [employer]. Care instructions adapted under license by [employer] (which disclaims liability or warranty for this information). If you have questions about a medical condition or this instruction, always ask your healthcare professional. [address] any warranty or liability for your use of this information. Introducing [facility]! Sanjuana Correa introduces jiffstore patient portal. Now you can access parts of your medical record, email your doctor's office, and request medication refills online. 1. In your internet browser, go to [email or web address] 2. Click on the First Time User? Click Here link in the Sign In box. You will see the New Member Sign Up page. 3. Enter your jiffstore Access Code exactly as it appears below.  You will not need to use this code after youve completed the sign-up process. If you do not sign up before the expiration date, you must request a new code. · APImetrics Access Code: [email or web address] Expires: 4/30/2018  2:23 PM 
 
4. Enter the last four digits of your Social Security Number (xxxx) and Date of Birth (mm/dd/yyyy) as indicated and click Submit. You will be taken to the next sign-up page. 5. Create a APImetrics ID. This will be your APImetrics login ID and cannot be changed, so think of one that is secure and easy to remember. 6. Create a APImetrics password. You can change your password at any time. 7. Enter your Password Reset Question and Answer. This can be used at a later time if you forget your password. 8. Enter your e-mail address. You will receive e-mail notification when new information is available in [address]. 9. Click Sign Up. You can now view and download portions of your medical record. 10. Click the Download Summary menu link to download a portable copy of your medical information. If you have questions, please visit the Frequently Asked Questions section of the APImetrics website. Remember, APImetrics is NOT to be used for urgent needs. For medical emergencies, dial 911. Now available from your iPhone and Android! Please provide this summary of care documentation to your next provider. Your primary care clinician is listed as Achilles Page. If you have any questions after today's visit, please call [phone].
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
## 2018-04-06 DIAGNOSIS — L85.3 DRY SKIN DERMATITIS: ICD-10-CM

## 2018-04-06 RX ORDER — PREDNISONE 20 MG/1
TABLET ORAL
Qty: 30 TAB | Refills: 0 | Status: CANCELLED | OUTPATIENT
Start: 2018-04-06

## 2018-04-06 NOTE — TELEPHONE ENCOUNTER
I tried to return pt's call on his home phone and got no answer and voicemail has not been set up. I also tried cell phone twice but it is not in service. If the pain in patient's arm has gotten worse, he ought to go to the ER and get an xray. If there is a fracture then the ER doctor will be able to give him pain medication. I am hesitant to call in any NSAIDS d/t poor kidney function. Regarding his prednisone refill request- this will slow down healing process of the arm so I would like to hold off on this for now.

## 2018-04-06 NOTE — TELEPHONE ENCOUNTER
Patient also requesting something for pain in his arms from his recent fall. Is there something that you could send in or do you want patient to be seen for another visit?

## 2018-04-09 DIAGNOSIS — L85.3 DRY SKIN DERMATITIS: ICD-10-CM

## 2018-04-09 RX ORDER — PREDNISONE 20 MG/1
TABLET ORAL
Qty: 30 TAB | Refills: 0 | Status: SHIPPED | OUTPATIENT
Start: 2018-04-09 | End: 2018-05-14

## 2018-04-16 DIAGNOSIS — M1A.39X0 CHRONIC GOUT DUE TO RENAL IMPAIRMENT OF MULTIPLE SITES WITHOUT TOPHUS: ICD-10-CM

## 2018-04-16 RX ORDER — ALLOPURINOL 100 MG/1
TABLET ORAL
Qty: 60 TAB | Refills: 0 | Status: SHIPPED | OUTPATIENT
Start: 2018-04-16 | End: 2018-05-16 | Stop reason: SDUPTHER

## 2018-04-20 ENCOUNTER — OFFICE VISIT (OUTPATIENT)
Dept: FAMILY MEDICINE CLINIC | Age: 73
End: 2018-04-20

## 2018-04-20 VITALS
BODY MASS INDEX: 19.23 KG/M2 | SYSTOLIC BLOOD PRESSURE: 136 MMHG | OXYGEN SATURATION: 100 % | DIASTOLIC BLOOD PRESSURE: 77 MMHG | RESPIRATION RATE: 18 BRPM | HEIGHT: 72 IN | HEART RATE: 104 BPM | WEIGHT: 142 LBS

## 2018-04-20 DIAGNOSIS — M25.521 ELBOW PAIN, RIGHT: Primary | ICD-10-CM

## 2018-04-20 RX ORDER — GABAPENTIN 400 MG/1
400 CAPSULE ORAL
Qty: 10 CAP | Refills: 0 | Status: SHIPPED | OUTPATIENT
Start: 2018-04-20 | End: 2018-05-21 | Stop reason: DRUGHIGH

## 2018-04-20 RX ORDER — DICLOFENAC SODIUM 10 MG/G
2 GEL TOPICAL
Qty: 100 G | Refills: 0 | Status: SHIPPED | OUTPATIENT
Start: 2018-04-20 | End: 2018-04-20

## 2018-04-20 RX ORDER — DICLOFENAC SODIUM 10 MG/G
2 GEL TOPICAL 4 TIMES DAILY
Qty: 100 G | Refills: 0 | Status: SHIPPED | OUTPATIENT
Start: 2018-04-20 | End: 2018-11-05 | Stop reason: SDUPTHER
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
## 2018-04-20 NOTE — PATIENT INSTRUCTIONS
Musculoskeletal Pain: Care Instructions  Your Care Instructions    Different problems with the bones, muscles, nerves, ligaments, and tendons in the body can cause pain. One or more areas of your body may ache or burn. Or they may feel tired, stiff, or sore. The medical term for this type of pain is musculoskeletal pain. It can have many different causes. Sometimes the pain is caused by an injury such as a strain or sprain. Or you might have pain from using one part of your body in the same way over and over again. This is called overuse. In some cases, the cause of the pain is another health problem such as arthritis or fibromyalgia. The doctor will examine you and ask you questions about your health to help find the cause of your pain. Blood tests or imaging tests like an X-ray may also be helpful. But sometimes doctors can't find a cause of the pain. Treatment depends on your symptoms and the cause of the pain, if known. The doctor has checked you carefully, but problems can develop later. If you notice any problems or new symptoms, get medical treatment right away. Follow-up care is a key part of your treatment and safety. Be sure to make and go to all appointments, and call your doctor if you are having problems. It's also a good idea to know your test results and keep a list of the medicines you take. How can you care for yourself at home? · Rest until you feel better. · Do not do anything that makes the pain worse. Return to exercise gradually if you feel better and your doctor says it's okay. · Be safe with medicines. Read and follow all instructions on the label. ¨ If the doctor gave you a prescription medicine for pain, take it as prescribed. ¨ If you are not taking a prescription pain medicine, ask your doctor if you can take an over-the-counter medicine. · Put ice or a cold pack on the area for 10 to 20 minutes at a time to ease pain.  Put a thin cloth between the ice and your skin.  When should you call for help? Call your doctor now or seek immediate medical care if:  ? · You have new pain, or your pain gets worse. ? · You have new symptoms such as a fever, a rash, or chills. ? Watch closely for changes in your health, and be sure to contact your doctor if:  ? · You do not get better as expected. Where can you learn more? Go to http://jaden-lily.info/. Enter C651 in the search box to learn more about \"Musculoskeletal Pain: Care Instructions. \"  Current as of: October 14, 2016  Content Version: 11.4  © 9916-4006 [employer]. Care instructions adapted under license by [employer] (which disclaims liability or warranty for this information). If you have questions about a medical condition or this instruction, always ask your healthcare professional. [address] any warranty or liability for your use of this information.

## 2018-04-25 RX ORDER — LATANOPROST 50 UG/ML
SOLUTION/ DROPS OPHTHALMIC
Qty: 2.5 ML | Refills: 3 | Status: SHIPPED | OUTPATIENT
Start: 2018-04-25 | End: 2018-09-06 | Stop reason: SDUPTHER

## 2018-05-01 DIAGNOSIS — L85.3 DRY SKIN DERMATITIS: ICD-10-CM

## 2018-05-01 RX ORDER — PREDNISONE 20 MG/1
TABLET ORAL
Qty: 30 TAB | Refills: 0 | OUTPATIENT
Start: 2018-05-01

## 2018-05-02 DIAGNOSIS — L85.3 DRY SKIN DERMATITIS: ICD-10-CM

## 2018-05-02 DIAGNOSIS — M1A.39X0 CHRONIC GOUT DUE TO RENAL IMPAIRMENT OF MULTIPLE SITES WITHOUT TOPHUS: ICD-10-CM

## 2018-05-03 RX ORDER — PREDNISONE 20 MG/1
TABLET ORAL
Qty: 30 TAB | Refills: 0 | OUTPATIENT
Start: 2018-05-03

## 2018-05-14 ENCOUNTER — OFFICE VISIT (OUTPATIENT)
Dept: FAMILY MEDICINE CLINIC | Age: 73
End: 2018-05-14

## 2018-05-14 VITALS
HEART RATE: 84 BPM | BODY MASS INDEX: 19.37 KG/M2 | WEIGHT: 143 LBS | DIASTOLIC BLOOD PRESSURE: 95 MMHG | HEIGHT: 72 IN | RESPIRATION RATE: 18 BRPM | OXYGEN SATURATION: 97 % | SYSTOLIC BLOOD PRESSURE: 158 MMHG

## 2018-05-14 DIAGNOSIS — L27.0 DERMATITIS DUE TO DRUG: ICD-10-CM

## 2018-05-14 DIAGNOSIS — R06.02 SOB (SHORTNESS OF BREATH) ON EXERTION: ICD-10-CM

## 2018-05-14 DIAGNOSIS — R60.0 BILATERAL LOWER EXTREMITY EDEMA: ICD-10-CM

## 2018-05-14 DIAGNOSIS — R53.83 FATIGUE, UNSPECIFIED TYPE: Primary | ICD-10-CM

## 2018-05-14 RX ORDER — HYDROXYZINE 25 MG/1
25 TABLET, FILM COATED ORAL
Qty: 60 TAB | Refills: 0 | Status: SHIPPED | OUTPATIENT
Start: 2018-05-14 | End: 2018-05-24

## 2018-05-14 RX ORDER — PREDNISONE 20 MG/1
TABLET ORAL
Qty: 30 TAB | Refills: 0 | Status: CANCELLED | OUTPATIENT
Start: 2018-05-14
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
## 2018-05-14 NOTE — PATIENT INSTRUCTIONS
Leg and Ankle Edema: Care Instructions  Your Care Instructions  Swelling in the legs, ankles, and feet is called edema. It is common after you sit or stand for a while. Long plane flights or car rides often cause swelling in the legs and feet. You may also have swelling if you have to stand for long periods of time at your job. Problems with the veins in the legs (varicose veins) and changes in hormones can also cause swelling. Sometimes the swelling in the ankles and feet is caused by a more serious problem, such as heart failure, infection, blood clots, or liver or kidney disease. Follow-up care is a key part of your treatment and safety. Be sure to make and go to all appointments, and call your doctor if you are having problems. It's also a good idea to know your test results and keep a list of the medicines you take. How can you care for yourself at home? · If your doctor gave you medicine, take it as prescribed. Call your doctor if you think you are having a problem with your medicine. · Whenever you are resting, raise your legs up. Try to keep the swollen area higher than the level of your heart. · Take breaks from standing or sitting in one position. ¨ Walk around to increase the blood flow in your lower legs. ¨ Move your feet and ankles often while you stand, or tighten and relax your leg muscles. · Wear support stockings. Put them on in the morning, before swelling gets worse. · Eat a balanced diet. Lose weight if you need to. · Limit the amount of salt (sodium) in your diet. Salt holds fluid in the body and may increase swelling. When should you call for help? Call 911 anytime you think you may need emergency care. For example, call if:  ? · You have symptoms of a blood clot in your lung (called a pulmonary embolism). These may include:  ¨ Sudden chest pain. ¨ Trouble breathing. ¨ Coughing up blood.    ?Call your doctor now or seek immediate medical care if:  ? · You have signs of a blood clot, such as:  ¨ Pain in your calf, back of the knee, thigh, or groin. ¨ Redness and swelling in your leg or groin. ? · You have symptoms of infection, such as:  ¨ Increased pain, swelling, warmth, or redness. ¨ Red streaks or pus. ¨ A fever. ? Watch closely for changes in your health, and be sure to contact your doctor if:  ? · Your swelling is getting worse. ? · You have new or worsening pain in your legs. ? · You do not get better as expected. Where can you learn more? Go to http://jaden-lily.info/. Enter I976 in the search box to learn more about \"Leg and Ankle Edema: Care Instructions. \"  Current as of: March 20, 2017  Content Version: 11.4  © 9120-4006 [employer]. Care instructions adapted under license by [employer] (which disclaims liability or warranty for this information). If you have questions about a medical condition or this instruction, always ask your healthcare professional. [place] [address] any warranty or liability for your use of this information. Shortness of Breath: Care Instructions  Your Care Instructions  Shortness of breath has many causes. Sometimes conditions such as anxiety can lead to shortness of breath. Some people get mild shortness of breath when they exercise. Trouble breathing also can be a symptom of a serious problem, such as asthma, lung disease, emphysema, heart problems, and pneumonia. If your shortness of breath continues, you may need tests and treatment. Watch for any changes in your breathing and other symptoms. Follow-up care is a key part of your treatment and safety. Be sure to make and go to all appointments, and call your doctor if you are having problems. It's also a good idea to know your test results and keep a list of the medicines you take. How can you care for yourself at home? · Do not smoke or allow others to smoke around you.  If you need help quitting, talk to your doctor about stop-smoking programs and medicines. These can increase your chances of quitting for good. · Get plenty of rest and sleep. · Take your medicines exactly as prescribed. Call your doctor if you think you are having a problem with your medicine. · Find healthy ways to deal with stress. ¨ Exercise daily. ¨ Get plenty of sleep. ¨ Eat regularly and well. When should you call for help? Call 911 anytime you think you may need emergency care. For example, call if:  ? · You have severe shortness of breath. ? · You have symptoms of a heart attack. These may include:  ¨ Chest pain or pressure, or a strange feeling in the chest.  ¨ Sweating. ¨ Shortness of breath. ¨ Nausea or vomiting. ¨ Pain, pressure, or a strange feeling in the back, neck, jaw, or upper belly or in one or both shoulders or arms. ¨ Lightheadedness or sudden weakness. ¨ A fast or irregular heartbeat. After you call 911, the  may tell you to chew 1 adult-strength or 2 to 4 low-dose aspirin. Wait for an ambulance. Do not try to drive yourself. ?Call your doctor now or seek immediate medical care if:  ? · Your shortness of breath gets worse or you start to wheeze. Wheezing is a high-pitched sound when you breathe. ? · You wake up at night out of breath or have to prop your head up on several pillows to breathe. ? · You are short of breath after only light activity or while at rest.   ? Watch closely for changes in your health, and be sure to contact your doctor if:  ? · You do not get better over the next 1 to 2 days. Where can you learn more? Go to http://jaden-lily.info/. Enter S780 in the search box to learn more about \"Shortness of Breath: Care Instructions. \"  Current as of: May 12, 2017  Content Version: 11.4  © 2560-0834 [employer]. Care instructions adapted under license by [employer] (which disclaims liability or warranty for this information).  If you have questions about a medical condition or this instruction, always ask your healthcare professional. [place] [address] any warranty or liability for your use of this information.

## 2018-05-14 NOTE — MR AVS SNAPSHOT
[address] 67 [phone] Patient: Luciano Notice MRN: [id number] Visit Information Date & Time Provider Department Dept. Phone Encounter #  
 5/14/2018  1:00 PM Elida Bo NP [address] [phone] [id number] Follow-up Instructions Return in about 1 week (around 5/21/2018) for follow up. Your Appointments 5/21/2018  1:00 PM  
Follow Up with NADIA Mehta [facility] ([address]) Appt Note: 1 week f/u  
 [address]  
[phone] [address] Upcoming Health Maintenance Date Due FOBT Q 1 YEAR AGE 50-75 7/24/2018 MEDICARE YEARLY EXAM 7/25/2018 Influenza Age 5 to Adult 8/1/2018 GLAUCOMA SCREENING Q2Y 11/4/2018 DTaP/Tdap/Td series (2 - Td) 7/1/2025 Allergies as of 5/14/2018  Review Complete On: 5/14/2018 By: Elida Bo NP No Known Allergies Current Immunizations  Reviewed on 10/24/2017 Name Date Influenza High Dose Vaccine PF 10/24/2017 Pneumococcal Conjugate (PCV-13) 7/24/2017 Pneumococcal Polysaccharide (PPSV-23) 9/29/2015 TB Skin Test (PPD) Intradermal 2/19/2014 Tdap 7/1/2015 Not reviewed this visit You Were Diagnosed With   
  
 Codes Comments Fatigue, unspecified type    -  Primary ICD-10-CM: R53.83 ICD-9-CM: 780.79 Dermatitis due to drug     ICD-10-CM: L27.0 ICD-9-CM: 693.0 SOB (shortness of breath) on exertion     ICD-10-CM: R06.02 
ICD-9-CM: 786.05 Bilateral lower extremity edema     ICD-10-CM: R60.0 ICD-9-CM: 173. 3 Vitals BP Pulse Resp Height(growth percentile) Weight(growth percentile) SpO2  
 (!) 158/95 (BP 1 Location: Right arm) 84 18 6' (1.829 m) 143 lb (64.9 kg) 97% BMI Smoking Status 19.39 kg/m2 Former Smoker Vitals History BMI and BSA Data Body Mass Index Body Surface Area  
 19.39 kg/m 2 1.82 m 2 Preferred Pharmacy Pharmacy Name Phone [id number] [address] [place] [phone] Your Updated Medication List  
  
   
This list is accurate as of 5/14/18  1:42 PM.  Always use your most recent med list.  
  
  
  
  
 albuterol 90 mcg/actuation inhaler Commonly known as:  PROAIR HFA Take 2 puffs by inhalation every four (4) hours as needed for Wheezing. allopurinol 100 mg tablet Commonly known as:  ZYLOPRIM  
TAKE 1 TABLET BY MOUTH DAILY FOR 5DAYS THEN TAKE 2 TABLETS BY MOUTH DAILY  
  
 augmented betamethasone dipropionate 0.05 % topical cream  
Commonly known as:  Estellelaine Brad Apply  to affected area two (2) times a day. COLCRYS 0.6 mg tablet Generic drug:  colchicine * diclofenac 0.1 % ophthalmic solution Commonly known as:  VOLTAREN  
INSTILL 1 DROP INTO LEFT EYE 4 TIMES A DAY  
  
 * diclofenac 1 % Gel Commonly known as:  VOLTAREN Apply 2 g to affected area four (4) times daily. ferrous sulfate 325 mg (65 mg iron) tablet Commonly known as:  Iron Take 1 Tab by mouth three (3) times daily (with meals). gabapentin 400 mg capsule Commonly known as:  NEURONTIN Take 1 Cap by mouth nightly. Indications: NEUROPATHIC PAIN  
  
 HYDROcodone-acetaminophen 5-325 mg per tablet Commonly known as:  Janas Gilmore Take 1 Tab by mouth every four (4) hours as needed for Pain. Max Daily Amount: 6 Tabs. hydrOXYzine HCl 25 mg tablet Commonly known as:  ATARAX Take 1 Tab by mouth three (3) times daily as needed for Itching for up to 10 days. Indications: Pruritus of Skin  
  
 ipratropium-albuterol  mcg/actuation inhaler Commonly known as:  Romero Morleybach Take 1 Puff by inhalation every six (6) hours as needed for Wheezing. latanoprost 0.005 % ophthalmic solution Commonly known as:  XALATAN  
INSTILL 1 DROP IN BOTH EYES AT BEDTIME  
  
 metoprolol succinate 100 mg tablet Commonly known as:  TOPROL-XL Take 100 mg by mouth daily. montelukast 10 mg tablet Commonly known as:  SINGULAIR  
TAKE 1 TABLET BY MOUTH ONCE DAILY  
  
 NIFEdipine ER 60 mg ER tablet Commonly known as:  ADALAT CC Take 2 Tabs by mouth daily. omeprazole 40 mg capsule Commonly known as:  PRILOSEC  
TAKE ONE CAPSULE BY MOUTH DAILY prednisoLONE acetate 1 % ophthalmic suspension Commonly known as:  PRED FORTE Administer 1 Drop to left eye six (6) times daily. predniSONE 20 mg tablet Commonly known as:  DELTASONE  
5 tablets day for days 1 through 4, then 4 tablets on day 5, then 3 tablets on day 6, then 2 tablets on day 7, then 1 tablet on day 8.  
  
 sodium bicarbonate 650 mg tablet Take 650 mg by mouth four (4) times daily. triamcinolone acetonide 0.1 % ointment Commonly known as:  KENALOG Apply 60 g to affected area two (2) times a day. use thin layer * Notice: This list has 2 medication(s) that are the same as other medications prescribed for you. Read the directions carefully, and ask your doctor or other care provider to review them with you. Prescriptions Sent to Pharmacy Refills  
 hydrOXYzine HCl (ATARAX) 25 mg tablet 0 Sig: Take 1 Tab by mouth three (3) times daily as needed for Itching for up to 10 days. Indications: Pruritus of Skin Class: Normal  
 Pharmacy: [id number] Elio Nguyen, [address] Ph #: [phone] Route: Oral  
  
Follow-up Instructions Return in about 1 week (around 5/21/2018) for follow up. To-Do List   
 05/14/2018 Imaging:  XR CHEST PA LAT   
  
 05/15/2018   ECHO:  2D ECHO COMPLETE ADULT (TTE) W OR WO CONTR   
  
 08/07/2018 1:20 PM  
  Appointment with Mayte Smith MD at [address]  PRIMARY CARE [facility] ([phone]) Patient Instructions Leg and Ankle Edema: Care Instructions Your Care Instructions Swelling in the legs, ankles, and feet is called edema. It is common after you sit or stand for a while. Long plane flights or car rides often cause swelling in the legs and feet. You may also have swelling if you have to stand for long periods of time at your job. Problems with the veins in the legs (varicose veins) and changes in hormones can also cause swelling. Sometimes the swelling in the ankles and feet is caused by a more serious problem, such as heart failure, infection, blood clots, or liver or kidney disease. Follow-up care is a key part of your treatment and safety. Be sure to make and go to all appointments, and call your doctor if you are having problems. It's also a good idea to know your test results and keep a list of the medicines you take. How can you care for yourself at home? · If your doctor gave you medicine, take it as prescribed. Call your doctor if you think you are having a problem with your medicine. · Whenever you are resting, raise your legs up. Try to keep the swollen area higher than the level of your heart. · Take breaks from standing or sitting in one position. ¨ Walk around to increase the blood flow in your lower legs. ¨ Move your feet and ankles often while you stand, or tighten and relax your leg muscles. · Wear support stockings. Put them on in the morning, before swelling gets worse. · Eat a balanced diet. Lose weight if you need to. · Limit the amount of salt (sodium) in your diet. Salt holds fluid in the body and may increase swelling. When should you call for help? Call 911 anytime you think you may need emergency care. For example, call if: 
? · You have symptoms of a blood clot in your lung (called a pulmonary embolism). These may include: 
¨ Sudden chest pain. ¨ Trouble breathing. ¨ Coughing up blood. ?Call your doctor now or seek immediate medical care if: 
? · You have signs of a blood clot, such as: 
¨ Pain in your calf, back of the knee, thigh, or groin. ¨ Redness and swelling in your leg or groin. ? · You have symptoms of infection, such as: 
¨ Increased pain, swelling, warmth, or redness. ¨ Red streaks or pus. ¨ A fever. ? Watch closely for changes in your health, and be sure to contact your doctor if: 
? · Your swelling is getting worse. ? · You have new or worsening pain in your legs. ? · You do not get better as expected. Where can you learn more? Go to http://jaden-lily.info/. Enter F539 in the search box to learn more about \"Leg and Ankle Edema: Care Instructions. \" Current as of: March 20, 2017 Content Version: 11.4 © 1064-7670 [employer]. Care instructions adapted under license by [employer] (which disclaims liability or warranty for this information). If you have questions about a medical condition or this instruction, always ask your healthcare professional. [place] [address] any warranty or liability for your use of this information. Shortness of Breath: Care Instructions Your Care Instructions Shortness of breath has many causes. Sometimes conditions such as anxiety can lead to shortness of breath. Some people get mild shortness of breath when they exercise. Trouble breathing also can be a symptom of a serious problem, such as asthma, lung disease, emphysema, heart problems, and pneumonia. If your shortness of breath continues, you may need tests and treatment. Watch for any changes in your breathing and other symptoms. Follow-up care is a key part of your treatment and safety. Be sure to make and go to all appointments, and call your doctor if you are having problems. It's also a good idea to know your test results and keep a list of the medicines you take. How can you care for yourself at home? · Do not smoke or allow others to smoke around you. If you need help quitting, talk to your doctor about stop-smoking programs and medicines. These can increase your chances of quitting for good. · Get plenty of rest and sleep. · Take your medicines exactly as prescribed. Call your doctor if you think you are having a problem with your medicine. · Find healthy ways to deal with stress. ¨ Exercise daily. ¨ Get plenty of sleep. ¨ Eat regularly and well. When should you call for help? Call 911 anytime you think you may need emergency care. For example, call if: 
? · You have severe shortness of breath. ? · You have symptoms of a heart attack. These may include: ¨ Chest pain or pressure, or a strange feeling in the chest. 
¨ Sweating. ¨ Shortness of breath. ¨ Nausea or vomiting. ¨ Pain, pressure, or a strange feeling in the back, neck, jaw, or upper belly or in one or both shoulders or arms. ¨ Lightheadedness or sudden weakness. ¨ A fast or irregular heartbeat. After you call 911, the  may tell you to chew 1 adult-strength or 2 to 4 low-dose aspirin. Wait for an ambulance. Do not try to drive yourself. ?Call your doctor now or seek immediate medical care if: 
? · Your shortness of breath gets worse or you start to wheeze. Wheezing is a high-pitched sound when you breathe. ? · You wake up at night out of breath or have to prop your head up on several pillows to breathe. ? · You are short of breath after only light activity or while at rest. ? Watch closely for changes in your health, and be sure to contact your doctor if: 
? · You do not get better over the next 1 to 2 days. Where can you learn more? Go to http://jaden-lily.info/. Enter S780 in the search box to learn more about \"Shortness of Breath: Care Instructions. \" Current as of: May 12, 2017 Content Version: 11.4 © 6251-3510 [employer], [employer].  Care instructions adapted under license by [address] (which disclaims liability or warranty for this information). If you have questions about a medical condition or this instruction, always ask your healthcare professional. [address] any warranty or liability for your use of this information. Introducing [facility]! Jaspreet Prajapati introduces [employer] patient portal. Now you can access parts of your medical record, email your doctor's office, and request medication refills online. 1. In your internet browser, go to [email or web address] 2. Click on the First Time User? Click Here link in the Sign In box. You will see the New Member Sign Up page. 3. Enter your [employer] Access Code exactly as it appears below. You will not need to use this code after youve completed the sign-up process. If you do not sign up before the expiration date, you must request a new code. · [employer] Access Code: [email or web address] Expires: 7/23/2018  5:31 AM 
 
4. Enter the last four digits of your Social Security Number (xxxx) and Date of Birth (mm/dd/yyyy) as indicated and click Submit. You will be taken to the next sign-up page. 5. Create a [employer] ID. This will be your [employer] login ID and cannot be changed, so think of one that is secure and easy to remember. 6. Create a [employer] password. You can change your password at any time. 7. Enter your Password Reset Question and Answer. This can be used at a later time if you forget your password. 8. Enter your e-mail address. You will receive e-mail notification when new information is available in [address] 9. Click Sign Up. You can now view and download portions of your medical record. 10. Click the Download Summary menu link to download a portable copy of your medical information. If you have questions, please visit the Frequently Asked Questions section of the [employer] website.  Remember, [employer] is NOT to be used for urgent needs. For medical emergencies, dial 911. Now available from your iPhone and Android! Please provide this summary of care documentation to your next provider. Your primary care clinician is listed as Curtis Benoit. If you have any questions after today's visit, please call [phone].

## 2018-05-14 NOTE — PROGRESS NOTES
1. Have you been to the ER, urgent care clinic since your last visit? Hospitalized since your last visit? Yes When: 4-7-18    2. Have you seen or consulted any other health care providers outside of the [address] [place] [address] since your last visit? Include any pap smears or colon screening.  No

## 2018-05-16 RX ORDER — ALLOPURINOL 100 MG/1
TABLET ORAL
Qty: 60 TAB | Refills: 0 | Status: SHIPPED | OUTPATIENT
Start: 2018-05-16 | End: 2018-06-23 | Stop reason: SDUPTHER

## 2018-05-16 RX ORDER — NIFEDIPINE 60 MG/1
120 TABLET, EXTENDED RELEASE ORAL DAILY
Qty: 60 TAB | Refills: 1 | Status: SHIPPED | OUTPATIENT
Start: 2018-05-16 | End: 2018-05-21 | Stop reason: SDUPTHER

## 2018-05-21 ENCOUNTER — OFFICE VISIT (OUTPATIENT)
Dept: FAMILY MEDICINE CLINIC | Age: 73
End: 2018-05-21

## 2018-05-21 VITALS
SYSTOLIC BLOOD PRESSURE: 124 MMHG | BODY MASS INDEX: 19.45 KG/M2 | HEIGHT: 72 IN | OXYGEN SATURATION: 98 % | HEART RATE: 68 BPM | WEIGHT: 143.6 LBS | RESPIRATION RATE: 18 BRPM | DIASTOLIC BLOOD PRESSURE: 70 MMHG

## 2018-05-21 DIAGNOSIS — R53.83 FATIGUE, UNSPECIFIED TYPE: Primary | ICD-10-CM

## 2018-05-21 DIAGNOSIS — G47.00 INSOMNIA, UNSPECIFIED TYPE: ICD-10-CM

## 2018-05-21 DIAGNOSIS — L27.0 DERMATITIS DUE TO DRUG: ICD-10-CM

## 2018-05-21 DIAGNOSIS — R06.02 SOB (SHORTNESS OF BREATH) ON EXERTION: ICD-10-CM

## 2018-05-21 RX ORDER — NIFEDIPINE 60 MG/1
120 TABLET, EXTENDED RELEASE ORAL DAILY
Qty: 60 TAB | Refills: 1 | Status: SHIPPED | OUTPATIENT
Start: 2018-05-21 | End: 2018-05-21 | Stop reason: SDUPTHER

## 2018-05-21 RX ORDER — GABAPENTIN 600 MG/1
600 TABLET ORAL
Qty: 30 TAB | Refills: 3 | Status: SHIPPED | OUTPATIENT
Start: 2018-05-21 | End: 2018-09-30 | Stop reason: SDUPTHER

## 2018-05-21 RX ORDER — NIFEDIPINE 60 MG/1
120 TABLET, EXTENDED RELEASE ORAL DAILY
Qty: 180 TAB | Refills: 1 | Status: SHIPPED | OUTPATIENT
Start: 2018-05-21 | End: 2018-12-03 | Stop reason: SDUPTHER

## 2018-05-21 RX ORDER — NIFEDIPINE 60 MG/1
TABLET, EXTENDED RELEASE ORAL
COMMUNITY
Start: 2018-05-16 | End: 2018-08-01 | Stop reason: ALTCHOICE

## 2018-05-21 NOTE — MR AVS SNAPSHOT
[address] [phone] Patient: Hans Foster MRN: [id number] CIQ:[id number] Visit Information Date & Time Provider Department Dept. Phone Encounter #  
 5/21/2018  1:00 PM Rodo Sagastume NP [address] [phone] [id number] Upcoming Health Maintenance Date Due FOBT Q 1 YEAR AGE 50-75 7/24/2018 MEDICARE YEARLY EXAM 7/25/2018 Influenza Age 5 to Adult 8/1/2018 GLAUCOMA SCREENING Q2Y 11/4/2018 DTaP/Tdap/Td series (2 - Td) 7/1/2025 Allergies as of 5/21/2018  Review Complete On: 5/21/2018 By: Edy Eddy LPN No Known Allergies Current Immunizations  Reviewed on 10/24/2017 Name Date Influenza High Dose Vaccine PF 10/24/2017 Pneumococcal Conjugate (PCV-13) 7/24/2017 Pneumococcal Polysaccharide (PPSV-23) 9/29/2015 TB Skin Test (PPD) Intradermal 2/19/2014 Tdap 7/1/2015 Not reviewed this visit You Were Diagnosed With   
  
 Codes Comments Fatigue, unspecified type    -  Primary ICD-10-CM: R53.83 ICD-9-CM: 780.79 SOB (shortness of breath) on exertion     ICD-10-CM: R06.02 
ICD-9-CM: 786.05 Dermatitis due to drug     ICD-10-CM: L27.0 ICD-9-CM: 693.0 Insomnia, unspecified type     ICD-10-CM: G47.00 ICD-9-CM: 780.52 Vitals BP Pulse Resp Height(growth percentile) Weight(growth percentile) SpO2  
 124/70 (BP 1 Location: Left arm, BP Patient Position: Sitting) 68 18 6' (1.829 m) 143 lb 9.6 oz (65.1 kg) 98% BMI Smoking Status 19.48 kg/m2 Former Smoker Vitals History BMI and BSA Data Body Mass Index Body Surface Area  
 19.48 kg/m 2 1.82 m 2 Preferred Pharmacy [facility] Name Phone [id number] [address] [phone] Your Updated Medication List  
  
   
 This list is accurate as of 5/21/18  1:35 PM.  Always use your most recent med list.  
  
  
  
  
 albuterol 90 mcg/actuation inhaler Commonly known as:  PROAIR HFA Take 2 puffs by inhalation every four (4) hours as needed for Wheezing. allopurinol 100 mg tablet Commonly known as:  ZYLOPRIM  
TAKE 1 TABLET BY MOUTH DAILY FOR 5DAYS THEN TAKE 2 TABLETS BY MOUTH DAILY  
  
 augmented betamethasone dipropionate 0.05 % topical cream  
Commonly known as:  Kanika Rosa Apply  to affected area two (2) times a day. COLCRYS 0.6 mg tablet Generic drug:  colchicine * diclofenac 0.1 % ophthalmic solution Commonly known as:  VOLTAREN  
INSTILL 1 DROP INTO LEFT EYE 4 TIMES A DAY  
  
 * diclofenac 1 % Gel Commonly known as:  VOLTAREN Apply 2 g to affected area four (4) times daily. ferrous sulfate 325 mg (65 mg iron) tablet Commonly known as:  Iron Take 1 Tab by mouth three (3) times daily (with meals). gabapentin 600 mg tablet Commonly known as:  NEURONTIN Take 1 Tab by mouth nightly. Indications: NEUROPATHIC PAIN  
  
 HYDROcodone-acetaminophen 5-325 mg per tablet Commonly known as:  Gleda Ridgel Take 1 Tab by mouth every four (4) hours as needed for Pain. Max Daily Amount: 6 Tabs. hydrOXYzine HCl 25 mg tablet Commonly known as:  ATARAX Take 1 Tab by mouth three (3) times daily as needed for Itching for up to 10 days. Indications: Pruritus of Skin  
  
 hydrOXYzine pamoate 25 mg capsule Commonly known as:  VISTARIL Take 1 Cap by mouth three (3) times daily as needed for Itching. ipratropium-albuterol  mcg/actuation inhaler Commonly known as:  Katrina Rash Take 1 Puff by inhalation every six (6) hours as needed for Wheezing. latanoprost 0.005 % ophthalmic solution Commonly known as:  XALATAN  
INSTILL 1 DROP IN BOTH EYES AT BEDTIME  
  
 metoprolol succinate 100 mg tablet Commonly known as:  TOPROL-XL Take 100 mg by mouth daily. montelukast 10 mg tablet Commonly known as:  SINGULAIR  
TAKE 1 TABLET BY MOUTH ONCE DAILY  
  
 * NIFEdipine ER 60 mg ER tablet Commonly known as:  PROCARDIA XL  
  
 * NIFEdipine ER 60 mg ER tablet Commonly known as:  ADALAT CC Take 2 Tabs by mouth daily. omeprazole 40 mg capsule Commonly known as:  PRILOSEC  
TAKE ONE CAPSULE BY MOUTH DAILY prednisoLONE acetate 1 % ophthalmic suspension Commonly known as:  PRED FORTE Administer 1 Drop to left eye six (6) times daily. predniSONE 20 mg tablet Commonly known as:  Jorge Tristan Take 2 Tabs by mouth daily for 10 days. With Breakfast  
  
 sodium bicarbonate 650 mg tablet Take 650 mg by mouth four (4) times daily. triamcinolone acetonide 0.1 % ointment Commonly known as:  KENALOG Apply 60 g to affected area two (2) times a day. use thin layer * Notice: This list has 4 medication(s) that are the same as other medications prescribed for you. Read the directions carefully, and ask your doctor or other care provider to review them with you. Prescriptions Sent to Pharmacy Refills  
 gabapentin (NEURONTIN) 600 mg tablet 3 Sig: Take 1 Tab by mouth nightly. Indications: NEUROPATHIC PAIN Class: Normal  
 [facility]acy[id number], [address] [place] [address] [address] Ph #: [phone] Route: Oral  
  
To-Do List   
 05/29/2018 10:00 AM  
  Appointment with [address] at [place] [address] ([phone]) GENERAL INSTRUCTIONS - If you have a hand-written prescription for this exam, you must bring it with you on the day of your exam. - Bring all relevant prior images from facilities outside of [employer]. Failure to provide images may delay reading by Radiologist. - Children under the age of 15 may not be left unattended. - [facility] patients must have an armband and be accompanied by a caregiver or family member. APPOINTMENT CANCELLATION - To reschedule or cancel an appointment, please contact the Scheduling Department at [phone].  
  
 08/07/2018 1:20 PM  
  Appointment with Remy Platt MD at [facility] ([phone]) Introducing [facility] SERVICES! [employer] introduces [employer] patient portal. Now you can access parts of your medical record, email your doctor's office, and request medication refills online. 1. In your internet browser, go to [email or web address] 2. Click on the First Time User? Click Here link in the Sign In box. You will see the New Member Sign Up page. 3. Enter your [employer] Access Code exactly as it appears below. You will not need to use this code after youve completed the sign-up process. If you do not sign up before the expiration date, you must request a new code. · [employer] Access Code: [email or web address] Expires: 7/23/2018  5:31 AM 
 
4. Enter the last four digits of your Social Security Number (xxxx) and Date of Birth (mm/dd/yyyy) as indicated and click Submit. You will be taken to the next sign-up page. 5. Create a [employer] ID. This will be your [employer] login ID and cannot be changed, so think of one that is secure and easy to remember. 6. Create a [employer] password. You can change your password at any time. 7. Enter your Password Reset Question and Answer. This can be used at a later time if you forget your password. 8. Enter your e-mail address. You will receive e-mail notification when new information is available in [employer]. 9. Click Sign Up. You can now view and download portions of your medical record. 10. Click the Download Summary menu link to download a portable copy of your medical information. If you have questions, please visit the Frequently Asked Questions section of the [employer] website. Remember, [employer] is NOT to be used for urgent needs. For medical emergencies, dial 911. Now available from your iPhone and Android! Please provide this summary of care documentation to your next provider. Your primary care clinician is listed as Felix Vieyra. If you have any questions after today's visit, please call [phone].
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
## 2018-05-21 NOTE — PROGRESS NOTES
1. Have you been to the ER, urgent care clinic since your last visit? Hospitalized since your last visit? Yes When: Last week Where: [facility] Reason for visit: Allergic reaction    2. Have you seen or consulted any other health care providers outside of the [facility] since your last visit? Include any pap smears or colon screening.  No

## 2018-06-06 ENCOUNTER — OFFICE VISIT (OUTPATIENT)
Dept: FAMILY MEDICINE CLINIC | Age: 73
End: 2018-06-06

## 2018-06-06 VITALS
DIASTOLIC BLOOD PRESSURE: 85 MMHG | HEIGHT: 72 IN | RESPIRATION RATE: 18 BRPM | OXYGEN SATURATION: 93 % | TEMPERATURE: 97.2 F | SYSTOLIC BLOOD PRESSURE: 135 MMHG | BODY MASS INDEX: 19.37 KG/M2 | HEART RATE: 63 BPM | WEIGHT: 143 LBS

## 2018-06-06 DIAGNOSIS — M35.3 PMR (POLYMYALGIA RHEUMATICA) (HCC): Chronic | ICD-10-CM

## 2018-06-06 DIAGNOSIS — M05.9 RHEUMATOID ARTHRITIS WITH POSITIVE RHEUMATOID FACTOR, INVOLVING UNSPECIFIED SITE (HCC): ICD-10-CM

## 2018-06-06 DIAGNOSIS — M1A.39X0 CHRONIC GOUT DUE TO RENAL IMPAIRMENT OF MULTIPLE SITES WITHOUT TOPHUS: ICD-10-CM

## 2018-06-06 DIAGNOSIS — N18.4 CKD (CHRONIC KIDNEY DISEASE) STAGE 4, GFR 15-29 ML/MIN (HCC): Primary | Chronic | ICD-10-CM

## 2018-06-06 RX ORDER — PREDNISONE 20 MG/1
TABLET ORAL
Qty: 30 TAB | Refills: 0 | Status: SHIPPED | OUTPATIENT
Start: 2018-06-06 | End: 2018-07-18 | Stop reason: ALTCHOICE

## 2018-06-06 NOTE — MR AVS SNAPSHOT
Karolina Dorado 
 
 
 [address] [facility] [phone] Patient: Dorian Yo MRN: [id number] KDY:5/9/1953 Visit Information Date & Time Provider Department Dept. Phone Encounter #  
 6/6/2018  2:40 PM Tao Martines  [address] [facility] [facility] [phone] [id number] Upcoming Health Maintenance Date Due FOBT Q 1 YEAR AGE 50-75 7/24/2018 MEDICARE YEARLY EXAM 7/25/2018 Influenza Age 5 to Adult 8/1/2018 GLAUCOMA SCREENING Q2Y 11/4/2018 DTaP/Tdap/Td series (2 - Td) 7/1/2025 Allergies as of 6/6/2018  Review Complete On: 6/6/2018 By: Tao Martines MD  
 No Known Allergies Current Immunizations  Reviewed on 10/24/2017 Name Date Influenza High Dose Vaccine PF 10/24/2017 Pneumococcal Conjugate (PCV-13) 7/24/2017 Pneumococcal Polysaccharide (PPSV-23) 9/29/2015 TB Skin Test (PPD) Intradermal 2/19/2014 Tdap 7/1/2015 Not reviewed this visit You Were Diagnosed With   
  
 Codes Comments CKD (chronic kidney disease) stage 4, GFR 15-29 ml/min ([facility])    -  Primary ICD-10-CM: N18.4 ICD-9-CM: 585.4 PMR (polymyalgia rheumatica) ([facility])     ICD-10-CM: M35.3 ICD-9-CM: 952 Rheumatoid arthritis with positive rheumatoid factor, involving unspecified site [facility])     ICD-10-CM: M05.9 ICD-9-CM: 714.0 Chronic gout due to renal impairment of multiple sites without tophus     ICD-10-CM: M1A. [address] ICD-9-CM: 274.02 Vitals BP Pulse Temp Resp Height(growth percentile) Weight(growth percentile) 135/85 63 97.2 °F (36.2 °C) 18 6' (1.829 m) 143 lb (64.9 kg) SpO2 BMI Smoking Status 93% 19.39 kg/m2 Former Smoker Vitals History BMI and BSA Data Body Mass Index Body Surface Area  
 19.39 kg/m 2 1.82 m 2 Preferred Pharmacy [facility] Phone [facility], [address] [phone] Your Updated Medication List  
  
   
This list is accurate as of 6/6/18  3:01 PM.  Always use your most recent med list.  
  
  
  
  
 albuterol 90 mcg/actuation inhaler Commonly known as:  PROAIR HFA Take 2 puffs by inhalation every four (4) hours as needed for Wheezing. allopurinol 100 mg tablet Commonly known as:  ZYLOPRIM  
TAKE 1 TABLET BY MOUTH DAILY FOR 5DAYS THEN TAKE 2 TABLETS BY MOUTH DAILY  
  
 augmented betamethasone dipropionate 0.05 % topical cream  
Commonly known as:  Glory Rodes Apply  to affected area two (2) times a day. COLCRYS 0.6 mg tablet Generic drug:  colchicine * diclofenac 0.1 % ophthalmic solution Commonly known as:  VOLTAREN  
INSTILL 1 DROP INTO LEFT EYE 4 TIMES A DAY  
  
 * diclofenac 1 % Gel Commonly known as:  VOLTAREN Apply 2 g to affected area four (4) times daily. ferrous sulfate 325 mg (65 mg iron) tablet Commonly known as:  Iron Take 1 Tab by mouth three (3) times daily (with meals). gabapentin 600 mg tablet Commonly known as:  NEURONTIN Take 1 Tab by mouth nightly. Indications: NEUROPATHIC PAIN  
  
 hydrOXYzine pamoate 25 mg capsule Commonly known as:  VISTARIL Take 1 Cap by mouth three (3) times daily as needed for Itching. ipratropium-albuterol  mcg/actuation inhaler Commonly known as:  Vishaljin Redmondory Take 1 Puff by inhalation every six (6) hours as needed for Wheezing. latanoprost 0.005 % ophthalmic solution Commonly known as:  XALATAN  
INSTILL 1 DROP IN BOTH EYES AT BEDTIME  
  
 metoprolol succinate 100 mg tablet Commonly known as:  TOPROL-XL Take 100 mg by mouth daily. montelukast 10 mg tablet Commonly known as:  SINGULAIR  
TAKE 1 TABLET BY MOUTH ONCE DAILY  
  
 * NIFEdipine ER 60 mg ER tablet Commonly known as:  PROCARDIA XL  
  
 * NIFEdipine ER 60 mg ER tablet Commonly known as:  ADALAT CC Take 2 Tabs by mouth daily. omeprazole 40 mg capsule Commonly known as:  PRILOSEC  
TAKE ONE CAPSULE BY MOUTH DAILY prednisoLONE acetate 1 % ophthalmic suspension Commonly known as:  PRED FORTE Administer 1 Drop to left eye six (6) times daily. sodium bicarbonate 650 mg tablet Take 650 mg by mouth four (4) times daily. triamcinolone acetonide 0.1 % ointment Commonly known as:  KENALOG Apply 60 g to affected area two (2) times a day. use thin layer * Notice: This list has 4 medication(s) that are the same as other medications prescribed for you. Read the directions carefully, and ask your doctor or other care provider to review them with you. We Performed the Following COLLECTION VENOUS BLOOD,VENIPUNCTURE N3060155 CPT(R)] METABOLIC PANEL, BASIC [81604 CPT(R)] URIC ACID [id number] CPT(R)] To-Do List   
 08/07/2018 1:20 PM  
  Appointment with Tl Nieto MD at [address] [place] [address] ([phone]) Introducing [facility] SERVICES! Jaspreet Prajapati introduces [employer] patient portal. Now you can access parts of your medical record, email your doctor's office, and request medication refills online. 1. In your internet browser, go to [email or web address] 2. Click on the First Time User? Click Here link in the Sign In box. You will see the New Member Sign Up page. 3. Enter your [employer] Access Code exactly as it appears below. You will not need to use this code after youve completed the sign-up process. If you do not sign up before the expiration date, you must request a new code. · [employer] Access Code: [email or web address] Expires: 7/23/2018  5:31 AM 
 
4. Enter the last four digits of your Social Security Number (xxxx) and Date of Birth (mm/dd/yyyy) as indicated and click Submit. You will be taken to the next sign-up page. 5. Create a [employer] ID. This will be your [employer] login ID and cannot be changed, so think of one that is secure and easy to remember. 6. Create a [employer] password. You can change your password at any time. 7. Enter your Password Reset Question and Answer. This can be used at a later time if you forget your password. 8. Enter your e-mail address. You will receive e-mail notification when new information is available in [address] 9. Click Sign Up. You can now view and download portions of your medical record. 10. Click the Download Summary menu link to download a portable copy of your medical information. If you have questions, please visit the Frequently Asked Questions section of the [employer] website. Remember, [employer] is NOT to be used for urgent needs. For medical emergencies, dial 911. Now available from your iPhone and Android! Please provide this summary of care documentation to your next provider. Your primary care clinician is listed as oJshua Nath. If you have any questions after today's visit, please call [phone].
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
## 2018-06-07 LAB
BUN SERPL-MCNC: 77 MG/DL (ref 8–27)
BUN/CREAT SERPL: 17 (ref 10–24)
CALCIUM SERPL-MCNC: 8.3 MG/DL (ref 8.6–10.2)
CHLORIDE SERPL-SCNC: 104 MMOL/L (ref 96–106)
CO2 SERPL-SCNC: 18 MMOL/L (ref 18–29)
CREAT SERPL-MCNC: 4.59 MG/DL (ref 0.76–1.27)
GFR SERPLBLD CREATININE-BSD FMLA CKD-EPI: 12 ML/MIN/1.73
GFR SERPLBLD CREATININE-BSD FMLA CKD-EPI: 14 ML/MIN/1.73
GLUCOSE SERPL-MCNC: 120 MG/DL (ref 65–99)
INTERPRETATION: NORMAL
POTASSIUM SERPL-SCNC: 5.4 MMOL/L (ref 3.5–5.2)
SODIUM SERPL-SCNC: 139 MMOL/L (ref 134–144)
URATE SERPL-MCNC: 5.9 MG/DL (ref 3.7–8.6)

## 2018-06-07 NOTE — PROGRESS NOTES
Called both numbers and did not get an answer. Unable to leave voicemail on both.  Will try patient again later

## 2018-06-13 ENCOUNTER — DOCUMENTATION ONLY (OUTPATIENT)
Dept: FAMILY MEDICINE CLINIC | Age: 73
End: 2018-06-13

## 2018-06-13 ENCOUNTER — OFFICE VISIT (OUTPATIENT)
Dept: FAMILY MEDICINE CLINIC | Age: 73
End: 2018-06-13

## 2018-06-13 VITALS
TEMPERATURE: 98.4 F | BODY MASS INDEX: 19.75 KG/M2 | RESPIRATION RATE: 17 BRPM | HEART RATE: 65 BPM | HEIGHT: 72 IN | SYSTOLIC BLOOD PRESSURE: 124 MMHG | OXYGEN SATURATION: 99 % | WEIGHT: 145.8 LBS | DIASTOLIC BLOOD PRESSURE: 66 MMHG

## 2018-06-13 DIAGNOSIS — L29.9 ITCHING: Primary | ICD-10-CM

## 2018-06-13 DIAGNOSIS — M25.511 ACUTE PAIN OF RIGHT SHOULDER: ICD-10-CM

## 2018-06-13 RX ORDER — HYDROXYZINE PAMOATE 25 MG/1
25 CAPSULE ORAL
Qty: 20 CAP | Refills: 0 | Status: SHIPPED | OUTPATIENT
Start: 2018-06-13 | End: 2018-06-18 | Stop reason: SDUPTHER

## 2018-06-13 RX ORDER — TRAMADOL HYDROCHLORIDE 50 MG/1
50 TABLET ORAL
Qty: 30 TAB | Refills: 0 | Status: SHIPPED | OUTPATIENT
Start: 2018-06-13 | End: 2018-09-04 | Stop reason: ALTCHOICE

## 2018-06-13 NOTE — PROGRESS NOTES
1. Have you been to the ER, urgent care clinic since your last visit? Hospitalized since your last visit? No    2. Have you seen or consulted any other health care providers outside of the [address] [place] [address] since your last visit? Include any pap smears or colon screening.  No
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
## 2018-06-13 NOTE — MR AVS SNAPSHOT
Aditya Buckner 
 
 
 [address] [facility] [phone] Patient: Chantal Szymanski MRN: [id number] Visit Information Date & Time Provider Department Dept. Phone Encounter #  
 6/13/2018  1:40 PM Yoselin Moreno MD [address] [id number] Upcoming Health Maintenance Date Due FOBT Q 1 YEAR AGE 50-75 7/24/2018 MEDICARE YEARLY EXAM 7/25/2018 Influenza Age 5 to Adult 8/1/2018 GLAUCOMA SCREENING Q2Y 11/4/2018 DTaP/Tdap/Td series (2 - Td) 7/1/2025 Allergies as of 6/13/2018  Review Complete On: 6/13/2018 By: Yoselin Moreno MD  
 No Known Allergies Current Immunizations  Reviewed on 10/24/2017 Name Date Influenza High Dose Vaccine PF 10/24/2017 Pneumococcal Conjugate (PCV-13) 7/24/2017 Pneumococcal Polysaccharide (PPSV-23) 9/29/2015 TB Skin Test (PPD) Intradermal 2/19/2014 Tdap 7/1/2015 Not reviewed this visit You Were Diagnosed With   
  
 Codes Comments Itching    -  Primary ICD-10-CM: L29.9 ICD-9-CM: 698.9 Acute pain of right shoulder     ICD-10-CM: M25.511 ICD-9-CM: 719.41 Vitals BP Pulse Temp Resp Height(growth percentile) Weight(growth percentile) 124/66 (BP 1 Location: Left arm, BP Patient Position: Sitting) 65 98.4 °F (36.9 °C) (Oral) 17 6' (1.829 m) 145 lb 12.8 oz (66.1 kg) SpO2 BMI Smoking Status 99% 19.77 kg/m2 Former Smoker Vitals History BMI and BSA Data Body Mass Index Body Surface Area  
 19.77 kg/m 2 1.83 m 2 Preferred Pharmacy Pharmacy Name Phone [id number] [place] [phone] Your Updated Medication List  
  
   
This list is accurate as of 6/13/18  2:43 PM.  Always use your most recent med list.  
  
  
  
  
 albuterol 90 mcg/actuation inhaler Commonly known as:  PROAIR HFA  
 Take 2 puffs by inhalation every four (4) hours as needed for Wheezing. allopurinol 100 mg tablet Commonly known as:  ZYLOPRIM  
TAKE 1 TABLET BY MOUTH DAILY FOR 5DAYS THEN TAKE 2 TABLETS BY MOUTH DAILY  
  
 augmented betamethasone dipropionate 0.05 % topical cream  
Commonly known as:  Natalia Imus Apply  to affected area two (2) times a day. COLCRYS 0.6 mg tablet Generic drug:  colchicine * diclofenac 0.1 % ophthalmic solution Commonly known as:  VOLTAREN  
INSTILL 1 DROP INTO LEFT EYE 4 TIMES A DAY  
  
 * diclofenac 1 % Gel Commonly known as:  VOLTAREN Apply 2 g to affected area four (4) times daily. ferrous sulfate 325 mg (65 mg iron) tablet Commonly known as:  Iron Take 1 Tab by mouth three (3) times daily (with meals). gabapentin 600 mg tablet Commonly known as:  NEURONTIN Take 1 Tab by mouth nightly. Indications: NEUROPATHIC PAIN  
  
 hydrOXYzine pamoate 25 mg capsule Commonly known as:  VISTARIL Take 1 Cap by mouth three (3) times daily as needed for Itching. ipratropium-albuterol  mcg/actuation inhaler Commonly known as:  Lie[place]hal Bolus Take 1 Puff by inhalation every six (6) hours as needed for Wheezing. latanoprost 0.005 % ophthalmic solution Commonly known as:  XALATAN  
INSTILL 1 DROP IN BOTH EYES AT BEDTIME  
  
 metoprolol succinate 100 mg tablet Commonly known as:  TOPROL-XL Take 100 mg by mouth daily. montelukast 10 mg tablet Commonly known as:  SINGULAIR  
TAKE 1 TABLET BY MOUTH ONCE DAILY  
  
 * NIFEdipine ER 60 mg ER tablet Commonly known as:  PROCARDIA XL  
  
 * NIFEdipine ER 60 mg ER tablet Commonly known as:  ADALAT CC Take 2 Tabs by mouth daily. omeprazole 40 mg capsule Commonly known as:  PRILOSEC  
TAKE ONE CAPSULE BY MOUTH DAILY prednisoLONE acetate 1 % ophthalmic suspension Commonly known as:  PRED FORTE Administer 1 Drop to left eye six (6) times daily. predniSONE 20 mg tablet Commonly known as:  DELTASONE  
5 tablets day for days 1 through 4, then 4 tablets on day 5, then 3 tablets on day 6, then 2 tablets on day 7, then 1 tablet on day 8.  
  
 sodium bicarbonate 650 mg tablet Take 650 mg by mouth four (4) times daily. traMADol 50 mg tablet Commonly known as:  ULTRAM  
Take 1 Tab by mouth every six (6) hours as needed for Pain. Max Daily Amount: 200 mg. Indications: Pain  
  
 triamcinolone acetonide 0.1 % ointment Commonly known as:  KENALOG Apply 60 g to affected area two (2) times a day. use thin layer * Notice: This list has 4 medication(s) that are the same as other medications prescribed for you. Read the directions carefully, and ask your doctor or other care provider to review them with you. Prescriptions Printed Refills  
 traMADol (ULTRAM) 50 mg tablet 0 Sig: Take 1 Tab by mouth every six (6) hours as needed for Pain. Max Daily Amount: 200 mg. Indications: Pain Class: Print Route: Oral  
  
Prescriptions Sent to Pharmacy Refills  
 hydrOXYzine pamoate (VISTARIL) 25 mg capsule 0 Sig: Take 1 Cap by mouth three (3) times daily as needed for Itching. Class: Normal  
 Pharmacy: [facility][id number] [place], [address] Ph #: [phone] Route: Oral  
  
We Performed the Following REFERRAL TO ORTHOPEDICS [OXY046 Custom] To-Do List   
 08/07/2018 1:20 PM  
  Appointment with Cooper Celis MD at [address] [address] ([phone])  
  
 08/13/2018 Imaging:  XR SHOULDER RT AP/LAT MIN 2 V Referral Information Referral ID Referred By Referred To  
  
 [id number] Niraj Pearce MD   
   [address] Phone: [phone] Fax: [phone] Visits Status Start Date End Date 1 New Request 6/13/18 6/13/19 If your referral has a status of pending review or denied, additional information will be sent to support the outcome of this decision. Introducing [facility]! [employer] introduces [employer] patient portal. Now you can access parts of your medical record, email your doctor's office, and request medication refills online. 1. In your internet browser, go to [email or web address] 2. Click on the First Time User? Click Here link in the Sign In box. You will see the New Member Sign Up page. 3. Enter your [employer] Access Code exactly as it appears below. You will not need to use this code after youve completed the sign-up process. If you do not sign up before the expiration date, you must request a new code. · [employer] Access Code: [email or web address] Expires: 7/23/2018  5:31 AM 
 
4. Enter the last four digits of your Social Security Number (xxxx) and Date of Birth (mm/dd/yyyy) as indicated and click Submit. You will be taken to the next sign-up page. 5. Create a [employer] ID. This will be your [employer] login ID and cannot be changed, so think of one that is secure and easy to remember. 6. Create a [employer] password. You can change your password at any time. 7. Enter your Password Reset Question and Answer. This can be used at a later time if you forget your password. 8. Enter your e-mail address. You will receive e-mail notification when new information is available in [address] 9. Click Sign Up. You can now view and download portions of your medical record. 10. Click the Download Summary menu link to download a portable copy of your medical information. If you have questions, please visit the Frequently Asked Questions section of the [employer] website. Remember, [employer] is NOT to be used for urgent needs. For medical emergencies, dial 911. Now available from your iPhone and Android! Please provide this summary of care documentation to your next provider. Your primary care clinician is listed as Lucy Ramirez. If you have any questions after today's visit, please call [phone].

## 2018-06-18 DIAGNOSIS — L29.9 ITCHING: ICD-10-CM

## 2018-06-18 RX ORDER — HYDROXYZINE PAMOATE 25 MG/1
25 CAPSULE ORAL
Qty: 20 CAP | Refills: 0 | Status: SHIPPED | OUTPATIENT
Start: 2018-06-18 | End: 2018-07-09 | Stop reason: SDUPTHER

## 2018-06-23 DIAGNOSIS — M1A.39X0 CHRONIC GOUT DUE TO RENAL IMPAIRMENT OF MULTIPLE SITES WITHOUT TOPHUS: ICD-10-CM

## 2018-06-25 DIAGNOSIS — M1A.39X0 CHRONIC GOUT DUE TO RENAL IMPAIRMENT OF MULTIPLE SITES WITHOUT TOPHUS: ICD-10-CM

## 2018-06-25 DIAGNOSIS — M05.9 RHEUMATOID ARTHRITIS WITH POSITIVE RHEUMATOID FACTOR, INVOLVING UNSPECIFIED SITE (HCC): ICD-10-CM

## 2018-06-25 DIAGNOSIS — M35.3 PMR (POLYMYALGIA RHEUMATICA) (HCC): Chronic | ICD-10-CM

## 2018-06-25 RX ORDER — ALLOPURINOL 100 MG/1
TABLET ORAL
Qty: 60 TAB | Refills: 0 | Status: SHIPPED | OUTPATIENT
Start: 2018-06-25 | End: 2018-08-01 | Stop reason: SDUPTHER

## 2018-06-26 ENCOUNTER — TELEPHONE (OUTPATIENT)
Dept: FAMILY MEDICINE CLINIC | Age: 73
End: 2018-06-26

## 2018-06-26 RX ORDER — PREDNISONE 20 MG/1
TABLET ORAL
Qty: 30 TAB | Refills: 0 | OUTPATIENT
Start: 2018-06-26

## 2018-07-06 ENCOUNTER — TELEPHONE (OUTPATIENT)
Dept: FAMILY MEDICINE CLINIC | Age: 73
End: 2018-07-06

## 2018-07-06 RX ORDER — ALBUTEROL SULFATE 90 UG/1
2 AEROSOL, METERED RESPIRATORY (INHALATION)
Qty: 1 INHALER | Refills: 2 | Status: SHIPPED | OUTPATIENT
Start: 2018-07-06 | End: 2019-01-02 | Stop reason: SDUPTHER

## 2018-07-09 DIAGNOSIS — L29.9 ITCHING: ICD-10-CM

## 2018-07-09 RX ORDER — HYDROXYZINE PAMOATE 25 MG/1
25 CAPSULE ORAL
Qty: 60 CAP | Refills: 5 | Status: SHIPPED | OUTPATIENT
Start: 2018-07-09 | End: 2018-11-27 | Stop reason: SINTOL

## 2018-07-12 ENCOUNTER — TELEPHONE (OUTPATIENT)
Dept: FAMILY MEDICINE CLINIC | Age: 73
End: 2018-07-12

## 2018-07-12 PROBLEM — I77.0 A-V FISTULA (HCC): Status: ACTIVE | Noted: 2018-07-12

## 2018-07-12 NOTE — TELEPHONE ENCOUNTER
Pt stopped in asking if he could have a refill on prednisone. He states he has no energy, no appetite and wants something to help.

## 2018-07-13 NOTE — TELEPHONE ENCOUNTER
Returned pt's call but it went straight to voice mail box which has not been set up yet. Per pt's nephrologist Dr Robert Randall, patient is not to have any more prednisone. She was trying to wean him off of it. The reason he has no appetite and no energy is because of his poor kidney function. In her recent progress note Dr Robert Randall stated that he needs to start dialysis as soon as his fistula is ready for use. It sounds like they are waiting for it to mature. Pt should start feeling better once he starts dialysis.

## 2018-07-18 ENCOUNTER — OFFICE VISIT (OUTPATIENT)
Dept: FAMILY MEDICINE CLINIC | Age: 73
End: 2018-07-18

## 2018-07-18 VITALS
SYSTOLIC BLOOD PRESSURE: 132 MMHG | HEART RATE: 109 BPM | OXYGEN SATURATION: 97 % | WEIGHT: 135.2 LBS | DIASTOLIC BLOOD PRESSURE: 79 MMHG | RESPIRATION RATE: 18 BRPM | BODY MASS INDEX: 18.31 KG/M2 | TEMPERATURE: 98.5 F | HEIGHT: 72 IN

## 2018-07-18 DIAGNOSIS — M15.9 OSTEOARTHRITIS OF MULTIPLE JOINTS, UNSPECIFIED OSTEOARTHRITIS TYPE: Primary | ICD-10-CM
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
## 2018-07-24 DIAGNOSIS — M1A.39X0 CHRONIC GOUT DUE TO RENAL IMPAIRMENT OF MULTIPLE SITES WITHOUT TOPHUS: ICD-10-CM

## 2018-07-24 RX ORDER — ALLOPURINOL 100 MG/1
TABLET ORAL
Qty: 60 TAB | Refills: 0 | Status: SHIPPED | OUTPATIENT
Start: 2018-07-24 | End: 2018-08-01 | Stop reason: SDUPTHER

## 2018-08-01 ENCOUNTER — OFFICE VISIT (OUTPATIENT)
Dept: FAMILY MEDICINE CLINIC | Age: 73
End: 2018-08-01

## 2018-08-01 VITALS
HEIGHT: 72 IN | RESPIRATION RATE: 16 BRPM | WEIGHT: 143.8 LBS | BODY MASS INDEX: 19.48 KG/M2 | HEART RATE: 78 BPM | TEMPERATURE: 98.7 F | OXYGEN SATURATION: 95 % | SYSTOLIC BLOOD PRESSURE: 118 MMHG | DIASTOLIC BLOOD PRESSURE: 63 MMHG

## 2018-08-01 DIAGNOSIS — I10 ESSENTIAL HYPERTENSION, BENIGN: Primary | ICD-10-CM

## 2018-08-01 DIAGNOSIS — M1A.39X0 CHRONIC GOUT DUE TO RENAL IMPAIRMENT OF MULTIPLE SITES WITHOUT TOPHUS: ICD-10-CM

## 2018-08-01 DIAGNOSIS — I95.2 HYPOTENSION DUE TO DRUGS: ICD-10-CM

## 2018-08-01 RX ORDER — ALLOPURINOL 100 MG/1
100 TABLET ORAL DAILY
Qty: 90 TAB | Refills: 1 | Status: SHIPPED | OUTPATIENT
Start: 2018-08-01 | End: 2021-05-28

## 2018-08-01 RX ORDER — METOPROLOL SUCCINATE 50 MG/1
50 TABLET, EXTENDED RELEASE ORAL DAILY
Qty: 90 TAB | Refills: 1
Start: 2018-08-01 | End: 2018-11-27
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
## 2018-08-01 NOTE — PROGRESS NOTES
1. Have you been to the ER, urgent care clinic since your last visit? Hospitalized since your last visit?no    2. Have you seen or consulted any other health care providers outside of the [address] [place] [address] since your last visit? Include any pap smears or colon screening.  yes

## 2018-08-23 ENCOUNTER — TELEPHONE (OUTPATIENT)
Dept: FAMILY MEDICINE CLINIC | Age: 73
End: 2018-08-23

## 2018-09-04 RX ORDER — DICLOFENAC SODIUM 10 MG/G
GEL TOPICAL
Qty: 100 G | Refills: 0 | Status: SHIPPED | OUTPATIENT
Start: 2018-09-04 | End: 2018-11-04 | Stop reason: SDUPTHER

## 2018-09-06 RX ORDER — LATANOPROST 50 UG/ML
SOLUTION/ DROPS OPHTHALMIC
Qty: 2.5 ML | Refills: 3 | Status: SHIPPED | OUTPATIENT
Start: 2018-09-06

## 2018-09-13 RX ORDER — MONTELUKAST SODIUM 10 MG/1
TABLET ORAL
Qty: 30 TAB | Refills: 5 | Status: SHIPPED | OUTPATIENT
Start: 2018-09-13 | End: 2018-11-27

## 2018-09-18 PROBLEM — S82.201D CLOSED FRACTURE OF RIGHT TIBIA AND FIBULA WITH ROUTINE HEALING: Status: RESOLVED | Noted: 2017-04-10 | Resolved: 2018-09-18

## 2018-09-18 PROBLEM — S82.401D CLOSED FRACTURE OF RIGHT TIBIA AND FIBULA WITH ROUTINE HEALING: Status: RESOLVED | Noted: 2017-04-10 | Resolved: 2018-09-18

## 2018-09-21 RX ORDER — SODIUM BICARBONATE 650 MG/1
TABLET ORAL
Qty: 60 TAB | Refills: 10 | Status: SHIPPED | OUTPATIENT
Start: 2018-09-21 | End: 2018-11-27 | Stop reason: ALTCHOICE

## 2018-09-30 DIAGNOSIS — G47.00 INSOMNIA, UNSPECIFIED TYPE: ICD-10-CM

## 2018-10-01 RX ORDER — GABAPENTIN 600 MG/1
TABLET ORAL
Qty: 30 TAB | Refills: 3 | Status: SHIPPED | OUTPATIENT
Start: 2018-10-01 | End: 2018-11-08 | Stop reason: DRUGHIGH

## 2018-10-18 DIAGNOSIS — F10.90 HABITUAL ALCOHOL USE: ICD-10-CM

## 2018-10-18 DIAGNOSIS — Z87.19 HISTORY OF GI BLEED: ICD-10-CM

## 2018-10-18 RX ORDER — OMEPRAZOLE 40 MG/1
CAPSULE, DELAYED RELEASE ORAL
Qty: 90 CAP | Refills: 1 | Status: SHIPPED | OUTPATIENT
Start: 2018-10-18 | End: 2019-06-21 | Stop reason: SDUPTHER

## 2018-10-23 ENCOUNTER — OFFICE VISIT (OUTPATIENT)
Dept: CARDIOLOGY CLINIC | Age: 73
End: 2018-10-23

## 2018-10-23 VITALS
OXYGEN SATURATION: 97 % | RESPIRATION RATE: 18 BRPM | DIASTOLIC BLOOD PRESSURE: 60 MMHG | WEIGHT: 140 LBS | SYSTOLIC BLOOD PRESSURE: 102 MMHG | HEIGHT: 72 IN | BODY MASS INDEX: 18.96 KG/M2 | HEART RATE: 60 BPM

## 2018-10-23 DIAGNOSIS — D63.1 ANEMIA DUE TO CHRONIC KIDNEY DISEASE, ON CHRONIC DIALYSIS (HCC): ICD-10-CM

## 2018-10-23 DIAGNOSIS — K29.40 CHRONIC ATROPHIC GASTRITIS: ICD-10-CM

## 2018-10-23 DIAGNOSIS — R42 DIZZINESS: ICD-10-CM

## 2018-10-23 DIAGNOSIS — J45.20 MILD INTERMITTENT ASTHMA WITHOUT COMPLICATION: ICD-10-CM

## 2018-10-23 DIAGNOSIS — M05.9 RHEUMATOID ARTHRITIS WITH POSITIVE RHEUMATOID FACTOR, INVOLVING UNSPECIFIED SITE (HCC): ICD-10-CM

## 2018-10-23 DIAGNOSIS — R06.09 DOE (DYSPNEA ON EXERTION): ICD-10-CM

## 2018-10-23 DIAGNOSIS — I49.1 PAC (PREMATURE ATRIAL CONTRACTION): ICD-10-CM

## 2018-10-23 DIAGNOSIS — N18.6 ANEMIA DUE TO CHRONIC KIDNEY DISEASE, ON CHRONIC DIALYSIS (HCC): ICD-10-CM

## 2018-10-23 DIAGNOSIS — M35.3 PMR (POLYMYALGIA RHEUMATICA) (HCC): ICD-10-CM

## 2018-10-23 DIAGNOSIS — K21.9 GASTROESOPHAGEAL REFLUX DISEASE WITHOUT ESOPHAGITIS: ICD-10-CM

## 2018-10-23 DIAGNOSIS — R94.31 ABNORMAL EKG: ICD-10-CM

## 2018-10-23 DIAGNOSIS — Z99.2 ANEMIA DUE TO CHRONIC KIDNEY DISEASE, ON CHRONIC DIALYSIS (HCC): ICD-10-CM

## 2018-10-23 DIAGNOSIS — Z99.2 ESRD ON HEMODIALYSIS (HCC): ICD-10-CM

## 2018-10-23 DIAGNOSIS — I10 ESSENTIAL HYPERTENSION: Primary | ICD-10-CM

## 2018-10-23 DIAGNOSIS — N18.6 ESRD ON HEMODIALYSIS (HCC): ICD-10-CM

## 2018-10-23 NOTE — PATIENT INSTRUCTIONS
1st:  Please call [phone] to schedule the echocardiogram and Lexiscan stress test.  Location: [address] [place] [address].    2nd:  Please call JEFF at [phone] to schedule the holter monitor application.    Location:  Dr Dalila Capps office at [address] [place] [address]

## 2018-10-23 NOTE — PROGRESS NOTES
Verified patient with two patient identifiers. Medications reviewed/approved by Dr. Ban Montes De Oca. A verbal from Dr. Ban Montes De Oca was given to remove any medications that were deleted during the visit. Medication(s) removed:none    Chief Complaint   Patient presents with    Dizziness     New patient evaluation - referred by Mana Freitas NP    Abnormal Lab Results     elevated BNP     1. Have you been to the ER, urgent care clinic since your last visit? Hospitalized since your last visit? New pt    2. Have you seen or consulted any other health care providers outside of the [address] [place] [address] since your last visit? Include any pap smears or colon screening. New pt.
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
## 2018-11-06 RX ORDER — DICLOFENAC SODIUM 10 MG/G
GEL TOPICAL
Qty: 100 G | Refills: 0 | Status: SHIPPED | OUTPATIENT
Start: 2018-11-06 | End: 2019-01-21 | Stop reason: SDUPTHER

## 2018-11-13 ENCOUNTER — TELEPHONE (OUTPATIENT)
Dept: CARDIOLOGY CLINIC | Age: 73
End: 2018-11-13

## 2018-11-13 NOTE — TELEPHONE ENCOUNTER
Pt notified (2 identifiers) per Dr Jayden Duque stress nuclear scan was normal.\"    Pt acknowledged understanding and had no further questions.

## 2018-11-13 NOTE — TELEPHONE ENCOUNTER
----- Message from Ophelia Gamboa MD sent at 11/13/2018  4:28 PM EST -----  Regarding: stress MPI  Advise stress nuclear scan was normal.  Thanks [employer] [place]

## 2018-11-19 PROBLEM — H35.033 HYPERTENSIVE RETINOPATHY OF BOTH EYES: Chronic | Status: ACTIVE | Noted: 2018-11-19

## 2019-01-03 RX ORDER — ALBUTEROL SULFATE 90 UG/1
AEROSOL, METERED RESPIRATORY (INHALATION)
Qty: 18 INHALER | Refills: 2 | Status: SHIPPED | OUTPATIENT
Start: 2019-01-03 | End: 2020-08-13

## 2019-01-22 RX ORDER — DICLOFENAC SODIUM 10 MG/G
GEL TOPICAL
Qty: 100 G | Refills: 0 | Status: SHIPPED | OUTPATIENT
Start: 2019-01-22 | End: 2019-02-12 | Stop reason: SDUPTHER

## 2019-02-15 RX ORDER — DICLOFENAC SODIUM 10 MG/G
GEL TOPICAL
Qty: 100 G | Refills: 0 | Status: SHIPPED | OUTPATIENT
Start: 2019-02-15 | End: 2019-03-20 | Stop reason: SDUPTHER

## 2019-03-22 RX ORDER — DICLOFENAC SODIUM 10 MG/G
GEL TOPICAL
Qty: 100 G | Refills: 0 | Status: SHIPPED | OUTPATIENT
Start: 2019-03-22 | End: 2019-03-25 | Stop reason: SDUPTHER

## 2019-04-13 RX ORDER — MONTELUKAST SODIUM 10 MG/1
TABLET ORAL
Qty: 30 TAB | Refills: 5 | Status: SHIPPED | OUTPATIENT
Start: 2019-04-13 | End: 2019-10-07 | Stop reason: SDUPTHER

## 2019-10-07 RX ORDER — MONTELUKAST SODIUM 10 MG/1
TABLET ORAL
Qty: 90 TAB | Refills: 1 | Status: SHIPPED | OUTPATIENT
Start: 2019-10-07 | End: 2020-03-28

## 2020-01-12 NOTE — PERIOP NOTES
Pt. For discharge today. VSS,denies pain, nausea, A-V fistula left wrist area, dressing C/D/I + thrill & bruitt. Reviewed discharge instructions, Rx., & F/U care with pt. & caregiver with good understanding. Left arm is sling. Dr. Josr Perkins in to see prior to D/C home. IV d/c'd, pt . Discharged home. You can access the FollowMyHealth Patient Portal offered by [facility] by registering at the following website: [email or web address]. By joining [facility]’s FollowMyHealth portal, you will also be able to view your health information using other applications (apps) compatible with our system.

## 2020-02-18 PROBLEM — I10 UNCONTROLLED STAGE 2 HYPERTENSION: Status: RESOLVED | Noted: 2017-04-07 | Resolved: 2020-02-18

## 2020-03-28 RX ORDER — MONTELUKAST SODIUM 10 MG/1
TABLET ORAL
Qty: 90 TAB | Refills: 1 | Status: SHIPPED | OUTPATIENT
Start: 2020-03-28 | End: 2020-09-28

## 2020-08-05 ENCOUNTER — OFFICE VISIT (OUTPATIENT)
Dept: PRIMARY CARE CLINIC | Age: 75
End: 2020-08-05

## 2020-08-05 DIAGNOSIS — Z20.828 EXPOSURE TO SARS-ASSOCIATED CORONAVIRUS: Primary | ICD-10-CM

## 2020-08-05 NOTE — PROGRESS NOTES
Pt presents to the flu clinic for covid testing. He states that he was sent by his pcp. He reports that he needs to be seen for back pain but was referred here. I saw where he had a recent vv with Gladys Canseco NP a few days ago. I contacted Gladys Canseco, she recommended that he contact the office for a vv/phone visit to discuss the back pain and continue with a covid test. Pt agreed and expressed understanding.  KT

## 2020-08-07 ENCOUNTER — TELEPHONE (OUTPATIENT)
Dept: PRIMARY CARE CLINIC | Age: 75
End: 2020-08-07

## 2020-08-07 LAB — SARS-COV-2, NAA: NOT DETECTED

## 2020-09-28 RX ORDER — MONTELUKAST SODIUM 10 MG/1
TABLET ORAL
Qty: 90 TAB | Refills: 1 | Status: SHIPPED | OUTPATIENT
Start: 2020-09-28 | End: 2021-03-08

## 2021-01-20 ENCOUNTER — OFFICE VISIT (OUTPATIENT)
Dept: PRIMARY CARE CLINIC | Age: 76
End: 2021-01-20

## 2021-01-20 DIAGNOSIS — Z20.822 ENCOUNTER FOR LABORATORY TESTING FOR COVID-19 VIRUS: Primary | ICD-10-CM

## 2021-01-22 LAB — SARS-COV-2, NAA: DETECTED

## 2021-01-22 NOTE — PROGRESS NOTES
Sp/w patient's gf, confirmed with 2 identifiers. Advised of positive results. Informed pt that if he starts having trouble breathing and is unable to speak in full sentences, pt needs to go to the nearest ER. PT's expressed understanding and she will advise pt of pos. Results.  KT

## 2021-03-08 RX ORDER — MONTELUKAST SODIUM 10 MG/1
TABLET ORAL
Qty: 90 TAB | Refills: 1 | Status: SHIPPED | OUTPATIENT
Start: 2021-03-08 | End: 2021-08-30

## 2021-05-06 ENCOUNTER — TELEPHONE (OUTPATIENT)
Dept: FAMILY MEDICINE CLINIC | Age: 76
End: 2021-05-06

## 2021-05-06 NOTE — TELEPHONE ENCOUNTER
Pt called stating his arthritis pain is really bad. Especially in his arms. His diclofenac gel is not helping a lot. Could you prescribe something stronger?

## 2021-05-07 RX ORDER — PREDNISONE 10 MG/1
TABLET ORAL
Qty: 21 TAB | Refills: 0 | Status: SHIPPED | OUTPATIENT
Start: 2021-05-07 | End: 2021-08-03 | Stop reason: ALTCHOICE

## 2021-05-07 NOTE — TELEPHONE ENCOUNTER
I called in a prednisone dose pack for him to take.  He should let me know if his pain does not improve

## 2021-05-24 ENCOUNTER — TELEPHONE (OUTPATIENT)
Dept: FAMILY MEDICINE CLINIC | Age: 76
End: 2021-05-24

## 2021-05-24 NOTE — TELEPHONE ENCOUNTER
Pt called stating his pain is no better & wanted to let Lovely Mayo know.  Lovely Mayo stated to let her know if his pain wasn't better

## 2021-05-25 NOTE — TELEPHONE ENCOUNTER
Please ask him to schedule an OV for a physical exam . It can be with Dr Imelda Paredes since I will be off. Dr Imelda Paredes can decide if he can start Tramadol or he may need a pain management consult if arm pain is coming from his neck.

## 2021-05-28 ENCOUNTER — OFFICE VISIT (OUTPATIENT)
Dept: FAMILY MEDICINE CLINIC | Age: 76
End: 2021-05-28
Payer: MEDICARE

## 2021-05-28 VITALS
DIASTOLIC BLOOD PRESSURE: 60 MMHG | HEIGHT: 72 IN | OXYGEN SATURATION: 94 % | BODY MASS INDEX: 19.77 KG/M2 | RESPIRATION RATE: 18 BRPM | SYSTOLIC BLOOD PRESSURE: 109 MMHG | TEMPERATURE: 97.6 F | WEIGHT: 146 LBS | HEART RATE: 81 BPM

## 2021-05-28 DIAGNOSIS — M1A.39X0 CHRONIC GOUT DUE TO RENAL IMPAIRMENT OF MULTIPLE SITES WITHOUT TOPHUS: ICD-10-CM

## 2021-05-28 DIAGNOSIS — M05.79 RHEUMATOID ARTHRITIS INVOLVING MULTIPLE SITES WITH POSITIVE RHEUMATOID FACTOR (HCC): Primary | ICD-10-CM

## 2021-05-28 DIAGNOSIS — M35.3 PMR (POLYMYALGIA RHEUMATICA) (HCC): Chronic | ICD-10-CM

## 2021-05-28 DIAGNOSIS — N18.6 ESRD ON HEMODIALYSIS (HCC): ICD-10-CM

## 2021-05-28 DIAGNOSIS — Z99.2 ESRD ON HEMODIALYSIS (HCC): ICD-10-CM

## 2021-05-28 PROCEDURE — G8510 SCR DEP NEG, NO PLAN REQD: HCPCS | Performed by: FAMILY MEDICINE

## 2021-05-28 PROCEDURE — G9231 DOC ESRD DIA TRANS PREG: HCPCS | Performed by: FAMILY MEDICINE

## 2021-05-28 PROCEDURE — G8427 DOCREV CUR MEDS BY ELIG CLIN: HCPCS | Performed by: FAMILY MEDICINE

## 2021-05-28 PROCEDURE — G8420 CALC BMI NORM PARAMETERS: HCPCS | Performed by: FAMILY MEDICINE

## 2021-05-28 PROCEDURE — 99213 OFFICE O/P EST LOW 20 MIN: CPT | Performed by: FAMILY MEDICINE

## 2021-05-28 PROCEDURE — 1101F PT FALLS ASSESS-DOCD LE1/YR: CPT | Performed by: FAMILY MEDICINE

## 2021-05-28 PROCEDURE — G8536 NO DOC ELDER MAL SCRN: HCPCS | Performed by: FAMILY MEDICINE

## 2021-05-28 RX ORDER — CALCIUM ACETATE 667 MG/1
1 CAPSULE ORAL
COMMUNITY

## 2021-05-28 RX ORDER — GABAPENTIN 600 MG/1
600 TABLET ORAL 3 TIMES DAILY
Qty: 90 TABLET | Refills: 2 | Status: SHIPPED | OUTPATIENT
Start: 2021-05-28 | End: 2021-10-25 | Stop reason: SDUPTHER
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
## 2021-05-28 NOTE — PROGRESS NOTES
1. Have you been to the ER, urgent care clinic since your last visit? Hospitalized since your last visit?no    2. Have you seen or consulted any other health care providers outside of the [address] [place] [address] since your last visit? Include any pap smears or colon screening.  no

## 2021-06-01 ENCOUNTER — TELEPHONE (OUTPATIENT)
Dept: FAMILY MEDICINE CLINIC | Age: 76
End: 2021-06-01

## 2021-06-01 DIAGNOSIS — D64.9 ANEMIA, UNSPECIFIED TYPE: Primary | ICD-10-CM

## 2021-06-01 DIAGNOSIS — K62.5 RECTAL BLEEDING: ICD-10-CM

## 2021-06-01 NOTE — TELEPHONE ENCOUNTER
Ashley ZUNIGA from [address] called because they were worried about pt Hemoglobin dropping from 10 to 7 and pt mentioned he has wiped some blood when he has had a bowel movement so they wanted pt to come by to  a stool card and he did that today and will bring back she just wanted us to send results to them

## 2021-06-02 ENCOUNTER — LAB ONLY (OUTPATIENT)
Dept: FAMILY MEDICINE CLINIC | Age: 76
End: 2021-06-02
Payer: MEDICARE

## 2021-06-02 DIAGNOSIS — D64.9 ANEMIA, UNSPECIFIED TYPE: ICD-10-CM

## 2021-06-02 DIAGNOSIS — D64.9 ANEMIA, UNSPECIFIED TYPE: Primary | ICD-10-CM

## 2021-06-02 LAB
HEMOCCULT STL QL: POSITIVE
VALID INTERNAL CONTROL?: YES

## 2021-06-02 PROCEDURE — 82270 OCCULT BLOOD FECES: CPT | Performed by: NURSE PRACTITIONER

## 2021-06-02 NOTE — TELEPHONE ENCOUNTER
[id number] Verónica Do, he will probably need to see Dr Emelina RODRIGUEZ. I will go ahead and place an urgent referral order.  He can schedule the appt around his dialysis schedule and try to return the FOBT cards ASAP

## 2021-06-03 NOTE — PROGRESS NOTES
Stool is positive for blood. Please fax results to [address] [facility] in [facility] .  He has upcoming appt with Dr Irasema Fry office next week

## 2021-06-04 ENCOUNTER — DOCUMENTATION ONLY (OUTPATIENT)
Dept: FAMILY MEDICINE CLINIC | Age: 76
End: 2021-06-04

## 2021-06-04 PROBLEM — H40.9 GLAUCOMA: Status: ACTIVE | Noted: 2021-06-04

## 2021-06-10 ENCOUNTER — OFFICE VISIT (OUTPATIENT)
Dept: SURGERY | Age: 76
End: 2021-06-10
Payer: MEDICARE

## 2021-06-10 VITALS
BODY MASS INDEX: 19.77 KG/M2 | HEART RATE: 80 BPM | RESPIRATION RATE: 16 BRPM | OXYGEN SATURATION: 97 % | DIASTOLIC BLOOD PRESSURE: 54 MMHG | WEIGHT: 146 LBS | SYSTOLIC BLOOD PRESSURE: 105 MMHG | HEIGHT: 72 IN

## 2021-06-10 DIAGNOSIS — R19.5 POSITIVE FIT (FECAL IMMUNOCHEMICAL TEST): Primary | ICD-10-CM

## 2021-06-10 PROCEDURE — 99202 OFFICE O/P NEW SF 15 MIN: CPT | Performed by: SURGERY
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
## 2021-06-10 NOTE — PROGRESS NOTES
1. Have you been to the ER, urgent care clinic since your last visit? Hospitalized since your last visit? new pt    2. Have you seen or consulted any other health care providers outside of the [address] [place] [address] since your last visit? Include any pap smears or colon screening.  new pt

## 2021-06-18 ENCOUNTER — APPOINTMENT (OUTPATIENT)
Dept: GENERAL RADIOLOGY | Age: 76
End: 2021-06-18
Attending: FAMILY MEDICINE
Payer: MEDICARE

## 2021-06-18 ENCOUNTER — HOSPITAL ENCOUNTER (EMERGENCY)
Age: 76
Discharge: HOME | End: 2021-06-18
Attending: FAMILY MEDICINE
Payer: MEDICARE

## 2021-06-18 VITALS
SYSTOLIC BLOOD PRESSURE: 116 MMHG | BODY MASS INDEX: 19.14 KG/M2 | RESPIRATION RATE: 18 BRPM | TEMPERATURE: 98.7 F | DIASTOLIC BLOOD PRESSURE: 62 MMHG | HEART RATE: 93 BPM | OXYGEN SATURATION: 94 % | HEIGHT: 72 IN | WEIGHT: 141.31 LBS

## 2021-06-18 DIAGNOSIS — S92.354A NONDISP FX OF FIFTH METATARSAL BONE, RIGHT FOOT, INIT: Primary | ICD-10-CM

## 2021-06-18 PROCEDURE — 73610 X-RAY EXAM OF ANKLE: CPT

## 2021-06-18 PROCEDURE — 99284 EMERGENCY DEPT VISIT MOD MDM: CPT

## 2021-06-18 PROCEDURE — 74011250637 HC RX REV CODE- 250/637: Performed by: FAMILY MEDICINE

## 2021-06-18 PROCEDURE — 77030036687 HC SHOE PSTOP S2SG -A

## 2021-06-18 PROCEDURE — 73630 X-RAY EXAM OF FOOT: CPT

## 2021-06-18 RX ORDER — HYDROCODONE BITARTRATE AND ACETAMINOPHEN 5; 325 MG/1; MG/1
1 TABLET ORAL
Status: COMPLETED | OUTPATIENT
Start: 2021-06-18 | End: 2021-06-18

## 2021-06-18 RX ORDER — HYDROCODONE BITARTRATE AND ACETAMINOPHEN 5; 325 MG/1; MG/1
1 TABLET ORAL
Qty: 12 TABLET | Refills: 0 | Status: SHIPPED | OUTPATIENT
Start: 2021-06-18 | End: 2021-06-21

## 2021-06-18 RX ADMIN — HYDROCODONE BITARTRATE AND ACETAMINOPHEN 1 TABLET: 5; 325 TABLET ORAL at 20:07
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
## 2021-06-19 NOTE — DISCHARGE INSTRUCTIONS
Ice 20 minutes the hour 2 days of foot while awake for 1 day and then heat, postop shoe and Ace wrap until seen by orthopedics in 3 to 5 days, return if worse or for uncontrolled pain redness or swelling. Norco if needed for pain every 6 hours.

## 2021-06-19 NOTE — ED NOTES
Patient states he understands discharge instructions and follow up information if needed. Patient was assisted to his personal vehicle my wheel chair. Pain 5/10.

## 2021-07-12 ENCOUNTER — TELEPHONE (OUTPATIENT)
Dept: FAMILY MEDICINE CLINIC | Age: 76
End: 2021-07-12

## 2021-07-12 ENCOUNTER — DOCUMENTATION ONLY (OUTPATIENT)
Dept: SURGERY | Age: 76
End: 2021-07-12

## 2021-07-12 NOTE — TELEPHONE ENCOUNTER
Gene Heimlich says they had to cancel pt colonoscopy because of his many health issues they would like pt to have this done at a larger facility pt would like to know where you suggest him to go

## 2021-07-13 DIAGNOSIS — Z12.11 SCREENING FOR COLON CANCER: Primary | ICD-10-CM

## 2021-07-13 NOTE — TELEPHONE ENCOUNTER
Tried to contact patient to give info and VM is not set up. Referral has been completed. Patient just needs to receive  info.

## 2021-07-13 NOTE — TELEPHONE ENCOUNTER
I have placed a referral to Gi, Dr Brandi Dong, to have colonoscopy done at [facility]. Please give pt contact info so he can schedule his consult appt.

## 2021-07-19 ENCOUNTER — DOCUMENTATION ONLY (OUTPATIENT)
Dept: FAMILY MEDICINE CLINIC | Age: 76
End: 2021-07-19

## 2021-07-27 NOTE — TELEPHONE ENCOUNTER
Pt aware 2 identifiers verified name and    And pt says he will call Dr Zuleika Donato to schedule colonoscopy

## 2021-08-03 DIAGNOSIS — M05.79 RHEUMATOID ARTHRITIS INVOLVING MULTIPLE SITES WITH POSITIVE RHEUMATOID FACTOR (HCC): ICD-10-CM

## 2021-08-03 RX ORDER — PREDNISONE 10 MG/1
TABLET ORAL
Qty: 90 TABLET | Refills: 0 | Status: SHIPPED | OUTPATIENT
Start: 2021-08-03 | End: 2021-10-25 | Stop reason: SDUPTHER

## 2021-08-30 RX ORDER — MONTELUKAST SODIUM 10 MG/1
TABLET ORAL
Qty: 90 TABLET | Refills: 1 | Status: SHIPPED | OUTPATIENT
Start: 2021-08-30 | End: 2022-01-17

## 2021-08-31 ENCOUNTER — HOSPITAL ENCOUNTER (EMERGENCY)
Age: 76
Discharge: HOME | End: 2021-08-31
Attending: EMERGENCY MEDICINE
Payer: MEDICARE

## 2021-08-31 VITALS
OXYGEN SATURATION: 97 % | HEIGHT: 72 IN | RESPIRATION RATE: 16 BRPM | HEART RATE: 86 BPM | DIASTOLIC BLOOD PRESSURE: 73 MMHG | BODY MASS INDEX: 20.32 KG/M2 | WEIGHT: 150 LBS | TEMPERATURE: 98.2 F | SYSTOLIC BLOOD PRESSURE: 118 MMHG

## 2021-08-31 DIAGNOSIS — S61.401A OPEN WOUND OF RIGHT HAND EXCEPT FINGERS WITH TENDON INVOLVEMENT, INITIAL ENCOUNTER: Primary | ICD-10-CM

## 2021-08-31 DIAGNOSIS — S66.901A OPEN WOUND OF RIGHT HAND EXCEPT FINGERS WITH TENDON INVOLVEMENT, INITIAL ENCOUNTER: Primary | ICD-10-CM

## 2021-08-31 DIAGNOSIS — S61.411A LACERATION OF RIGHT HAND WITHOUT FOREIGN BODY, INITIAL ENCOUNTER: ICD-10-CM

## 2021-08-31 PROCEDURE — 74011250637 HC RX REV CODE- 250/637: Performed by: EMERGENCY MEDICINE

## 2021-08-31 PROCEDURE — 99283 EMERGENCY DEPT VISIT LOW MDM: CPT

## 2021-08-31 PROCEDURE — 77030018842 HC SOL IRR SOD CL 9% BAXT -A

## 2021-08-31 PROCEDURE — 74011000250 HC RX REV CODE- 250: Performed by: EMERGENCY MEDICINE

## 2021-08-31 PROCEDURE — 96372 THER/PROPH/DIAG INJ SC/IM: CPT

## 2021-08-31 PROCEDURE — 74011250636 HC RX REV CODE- 250/636: Performed by: EMERGENCY MEDICINE

## 2021-08-31 RX ORDER — HYDROXYZINE PAMOATE 25 MG/1
CAPSULE ORAL
COMMUNITY
Start: 2021-08-16 | End: 2021-12-24

## 2021-08-31 RX ORDER — IBUPROFEN 600 MG/1
TABLET ORAL
COMMUNITY
Start: 2021-06-23 | End: 2022-09-02

## 2021-08-31 RX ORDER — HYDROCODONE BITARTRATE AND ACETAMINOPHEN 5; 325 MG/1; MG/1
TABLET ORAL
COMMUNITY
Start: 2021-06-23 | End: 2021-09-02

## 2021-08-31 RX ORDER — ACETAMINOPHEN 325 MG/1
975 TABLET ORAL
Status: COMPLETED | OUTPATIENT
Start: 2021-08-31 | End: 2021-08-31

## 2021-08-31 RX ORDER — CEPHALEXIN 500 MG/1
500 CAPSULE ORAL 3 TIMES DAILY
Qty: 21 CAPSULE | Refills: 0 | Status: SHIPPED | OUTPATIENT
Start: 2021-08-31 | End: 2021-09-07

## 2021-08-31 RX ADMIN — CEFAZOLIN 1000 MG: 1 INJECTION, POWDER, FOR SOLUTION INTRAMUSCULAR; INTRAVENOUS at 15:30

## 2021-08-31 RX ADMIN — ACETAMINOPHEN 975 MG: 325 TABLET ORAL at 15:38

## 2021-08-31 NOTE — ED NOTES
Massimo-Renan splint applied to right hand to provide protection of laceration. Pt verbalizes understanding of splint care.
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
## 2021-09-02 ENCOUNTER — HOSPITAL ENCOUNTER (EMERGENCY)
Age: 76
Discharge: HOME | End: 2021-09-02
Attending: EMERGENCY MEDICINE
Payer: MEDICARE

## 2021-09-02 VITALS
DIASTOLIC BLOOD PRESSURE: 89 MMHG | HEIGHT: 72 IN | RESPIRATION RATE: 18 BRPM | OXYGEN SATURATION: 97 % | HEART RATE: 93 BPM | TEMPERATURE: 98.4 F | SYSTOLIC BLOOD PRESSURE: 121 MMHG | WEIGHT: 140 LBS | BODY MASS INDEX: 18.96 KG/M2

## 2021-09-02 DIAGNOSIS — S66.921D HAND LACERATION INVOLVING TENDON, RIGHT, SUBSEQUENT ENCOUNTER: Primary | ICD-10-CM

## 2021-09-02 DIAGNOSIS — S61.411D HAND LACERATION INVOLVING TENDON, RIGHT, SUBSEQUENT ENCOUNTER: Primary | ICD-10-CM

## 2021-09-02 LAB
ANION GAP SERPL CALC-SCNC: 8 MMOL/L (ref 5–15)
BASOPHILS # BLD: 0.1 K/UL (ref 0–0.1)
BASOPHILS NFR BLD: 1 % (ref 0–1)
BUN SERPL-MCNC: 46 MG/DL (ref 6–20)
BUN/CREAT SERPL: 9 (ref 12–20)
CALCIUM SERPL-MCNC: 7.6 MG/DL (ref 8.5–10.1)
CHLORIDE SERPL-SCNC: 99 MMOL/L (ref 97–108)
CO2 SERPL-SCNC: 34 MMOL/L (ref 21–32)
CREAT SERPL-MCNC: 5.06 MG/DL (ref 0.7–1.3)
DIFFERENTIAL METHOD BLD: ABNORMAL
EOSINOPHIL # BLD: 0 K/UL (ref 0–0.4)
EOSINOPHIL NFR BLD: 0 % (ref 0–7)
ERYTHROCYTE [DISTWIDTH] IN BLOOD BY AUTOMATED COUNT: 14.2 % (ref 11.5–14.5)
GLUCOSE SERPL-MCNC: 120 MG/DL (ref 65–100)
HCT VFR BLD AUTO: 31.1 % (ref 36.6–50.3)
HGB BLD-MCNC: 10.8 G/DL (ref 12.1–17)
IMM GRANULOCYTES # BLD AUTO: 0 K/UL (ref 0–0.04)
IMM GRANULOCYTES NFR BLD AUTO: 0 % (ref 0–0.5)
LYMPHOCYTES # BLD: 0.7 K/UL (ref 0.8–3.5)
LYMPHOCYTES NFR BLD: 8 % (ref 12–49)
MCH RBC QN AUTO: 32.6 PG (ref 26–34)
MCHC RBC AUTO-ENTMCNC: 34.7 G/DL (ref 30–36.5)
MCV RBC AUTO: 94 FL (ref 80–99)
MONOCYTES # BLD: 0.4 K/UL (ref 0–1)
MONOCYTES NFR BLD: 5 % (ref 5–13)
NEUTS SEG # BLD: 7.5 K/UL (ref 1.8–8)
NEUTS SEG NFR BLD: 86 % (ref 32–75)
NRBC # BLD: 0 K/UL (ref 0–0.01)
NRBC BLD-RTO: 0 PER 100 WBC
PLATELET # BLD AUTO: 198 K/UL (ref 150–400)
PMV BLD AUTO: 11.3 FL (ref 8.9–12.9)
POTASSIUM SERPL-SCNC: 3.8 MMOL/L (ref 3.5–5.1)
RBC # BLD AUTO: 3.31 M/UL (ref 4.1–5.7)
RBC MORPH BLD: ABNORMAL
SODIUM SERPL-SCNC: 141 MMOL/L (ref 136–145)
WBC # BLD AUTO: 8.7 K/UL (ref 4.1–11.1)

## 2021-09-02 PROCEDURE — 99283 EMERGENCY DEPT VISIT LOW MDM: CPT

## 2021-09-02 PROCEDURE — 80048 BASIC METABOLIC PNL TOTAL CA: CPT

## 2021-09-02 PROCEDURE — 36415 COLL VENOUS BLD VENIPUNCTURE: CPT

## 2021-09-02 PROCEDURE — 85025 COMPLETE CBC W/AUTO DIFF WBC: CPT

## 2021-09-02 RX ORDER — HYDROCODONE BITARTRATE AND ACETAMINOPHEN 5; 325 MG/1; MG/1
1 TABLET ORAL
Qty: 12 TABLET | Refills: 0 | Status: SHIPPED | OUTPATIENT
Start: 2021-09-02 | End: 2021-09-05
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
## 2021-09-02 NOTE — ED TRIAGE NOTES
Pt arrived by POV with complaint of right hand/arm pain. Pt was seen on 8/31/21 for laceration to right hand with was repaired with steri strips and an [employer] splint was placed. Pt reports the pain was so bad today when he was at dialysis he was not able to finish his treatment. Pt is awake and alert.   Pt educated on ER flow

## 2021-09-16 DIAGNOSIS — F10.90 HABITUAL ALCOHOL USE: ICD-10-CM

## 2021-09-16 DIAGNOSIS — Z87.19 HISTORY OF GI BLEED: ICD-10-CM

## 2021-09-17 RX ORDER — OMEPRAZOLE 40 MG/1
40 CAPSULE, DELAYED RELEASE ORAL 2 TIMES DAILY
Qty: 180 CAPSULE | Refills: 1 | Status: SHIPPED | OUTPATIENT
Start: 2021-09-17 | End: 2022-01-23

## 2021-10-23 ENCOUNTER — HOSPITAL ENCOUNTER (EMERGENCY)
Age: 76
Discharge: HOME | End: 2021-10-23
Attending: EMERGENCY MEDICINE
Payer: MEDICARE

## 2021-10-23 VITALS
OXYGEN SATURATION: 98 % | HEIGHT: 72 IN | WEIGHT: 145 LBS | BODY MASS INDEX: 19.64 KG/M2 | TEMPERATURE: 99.1 F | RESPIRATION RATE: 18 BRPM | SYSTOLIC BLOOD PRESSURE: 167 MMHG | HEART RATE: 81 BPM | DIASTOLIC BLOOD PRESSURE: 87 MMHG

## 2021-10-23 DIAGNOSIS — K29.00 ACUTE SUPERFICIAL GASTRITIS WITHOUT HEMORRHAGE: Primary | ICD-10-CM

## 2021-10-23 DIAGNOSIS — R63.0 DECREASED APPETITE: ICD-10-CM

## 2021-10-23 LAB
ALBUMIN SERPL-MCNC: 3.3 G/DL (ref 3.5–5)
ALBUMIN/GLOB SERPL: 0.8 {RATIO} (ref 1.1–2.2)
ALP SERPL-CCNC: 125 U/L (ref 45–117)
ALT SERPL-CCNC: 16 U/L (ref 12–78)
ANION GAP SERPL CALC-SCNC: 9 MMOL/L (ref 5–15)
APPEARANCE UR: CLEAR
AST SERPL-CCNC: 24 U/L (ref 15–37)
BACTERIA URNS QL MICRO: NEGATIVE /HPF
BASOPHILS # BLD: 0.2 K/UL (ref 0–0.1)
BASOPHILS NFR BLD: 2 % (ref 0–1)
BILIRUB SERPL-MCNC: 0.3 MG/DL (ref 0.2–1)
BILIRUB UR QL CFM: NEGATIVE
BUN SERPL-MCNC: 26 MG/DL (ref 6–20)
BUN/CREAT SERPL: 5 (ref 12–20)
CALCIUM SERPL-MCNC: 8.2 MG/DL (ref 8.5–10.1)
CHLORIDE SERPL-SCNC: 103 MMOL/L (ref 97–108)
CO2 SERPL-SCNC: 32 MMOL/L (ref 21–32)
COLOR UR: ABNORMAL
CREAT SERPL-MCNC: 5.61 MG/DL (ref 0.7–1.3)
DIFFERENTIAL METHOD BLD: ABNORMAL
EOSINOPHIL # BLD: 1.1 K/UL (ref 0–0.4)
EOSINOPHIL NFR BLD: 15 % (ref 0–7)
EPITH CASTS URNS QL MICRO: ABNORMAL /LPF
ERYTHROCYTE [DISTWIDTH] IN BLOOD BY AUTOMATED COUNT: 16.4 % (ref 11.5–14.5)
GLOBULIN SER CALC-MCNC: 4.2 G/DL (ref 2–4)
GLUCOSE SERPL-MCNC: 119 MG/DL (ref 65–100)
GLUCOSE UR STRIP.AUTO-MCNC: NEGATIVE MG/DL
HCT VFR BLD AUTO: 32.4 % (ref 36.6–50.3)
HGB BLD-MCNC: 10.7 G/DL (ref 12.1–17)
HGB UR QL STRIP: NEGATIVE
IMM GRANULOCYTES # BLD AUTO: 0 K/UL (ref 0–0.04)
IMM GRANULOCYTES NFR BLD AUTO: 0 % (ref 0–0.5)
KETONES UR QL STRIP.AUTO: ABNORMAL MG/DL
LEUKOCYTE ESTERASE UR QL STRIP.AUTO: NEGATIVE
LIPASE SERPL-CCNC: 94 U/L (ref 73–393)
LYMPHOCYTES # BLD: 1.6 K/UL (ref 0.8–3.5)
LYMPHOCYTES NFR BLD: 21 % (ref 12–49)
MAGNESIUM SERPL-MCNC: 1.9 MG/DL (ref 1.6–2.4)
MCH RBC QN AUTO: 32.9 PG (ref 26–34)
MCHC RBC AUTO-ENTMCNC: 33 G/DL (ref 30–36.5)
MCV RBC AUTO: 99.7 FL (ref 80–99)
MONOCYTES # BLD: 0.8 K/UL (ref 0–1)
MONOCYTES NFR BLD: 10 % (ref 5–13)
NEUTS SEG # BLD: 3.8 K/UL (ref 1.8–8)
NEUTS SEG NFR BLD: 52 % (ref 32–75)
NITRITE UR QL STRIP.AUTO: NEGATIVE
NRBC # BLD: 0 K/UL (ref 0–0.01)
NRBC BLD-RTO: 0 PER 100 WBC
PH UR STRIP: 6 [PH] (ref 5–8)
PLATELET # BLD AUTO: 306 K/UL (ref 150–400)
PMV BLD AUTO: 10.2 FL (ref 8.9–12.9)
POTASSIUM SERPL-SCNC: 3.5 MMOL/L (ref 3.5–5.1)
PROT SERPL-MCNC: 7.5 G/DL (ref 6.4–8.2)
PROT UR STRIP-MCNC: >300 MG/DL
RBC # BLD AUTO: 3.25 M/UL (ref 4.1–5.7)
RBC #/AREA URNS HPF: ABNORMAL /HPF (ref 0–5)
RBC MORPH BLD: ABNORMAL
SODIUM SERPL-SCNC: 144 MMOL/L (ref 136–145)
SP GR UR REFRACTOMETRY: 1.01 (ref 1–1.03)
UA: UC IF INDICATED,UAUC: ABNORMAL
UROBILINOGEN UR QL STRIP.AUTO: 1 EU/DL (ref 0.2–1)
WBC # BLD AUTO: 7.5 K/UL (ref 4.1–11.1)
WBC URNS QL MICRO: ABNORMAL /HPF (ref 0–4)

## 2021-10-23 PROCEDURE — 99283 EMERGENCY DEPT VISIT LOW MDM: CPT

## 2021-10-23 PROCEDURE — 36415 COLL VENOUS BLD VENIPUNCTURE: CPT

## 2021-10-23 PROCEDURE — 85025 COMPLETE CBC W/AUTO DIFF WBC: CPT

## 2021-10-23 PROCEDURE — 80053 COMPREHEN METABOLIC PANEL: CPT

## 2021-10-23 PROCEDURE — 83735 ASSAY OF MAGNESIUM: CPT

## 2021-10-23 PROCEDURE — 83690 ASSAY OF LIPASE: CPT

## 2021-10-23 PROCEDURE — 81001 URINALYSIS AUTO W/SCOPE: CPT

## 2021-10-23 NOTE — ED TRIAGE NOTES
Pt presents with nondescript feeling of malaise, lack of appetite starting 4 days prior. Able to eat without vomiting, able to eat meals. Dialysis pt, left early Thursday due to feeling 'off'. Ambulatory to ED without difficulty, vitals wnl. Pt IS COVID vaccinated. Wife reports negative test Thursday.

## 2021-10-23 NOTE — ED NOTES
I have reviewed discharge instructions with the patient and spouse. The patient and spouse verbalized understanding. Discharge medications discussed with patient. No questions at this time. Ambulated without difficulty.

## 2021-10-23 NOTE — ED NOTES
Received report, nondescript generalized discomfort/malaise. 4 days of symptoms, dialysis pt, left dialysis early on Thursday due to feeling unwell. No n/v/d, cough, fever.
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
## 2021-10-25 ENCOUNTER — OFFICE VISIT (OUTPATIENT)
Dept: FAMILY MEDICINE CLINIC | Age: 76
End: 2021-10-25
Payer: MEDICARE

## 2021-10-25 VITALS
HEART RATE: 66 BPM | RESPIRATION RATE: 18 BRPM | TEMPERATURE: 97.7 F | BODY MASS INDEX: 18.71 KG/M2 | DIASTOLIC BLOOD PRESSURE: 63 MMHG | HEIGHT: 72 IN | WEIGHT: 138.13 LBS | SYSTOLIC BLOOD PRESSURE: 112 MMHG

## 2021-10-25 DIAGNOSIS — H61.21 IMPACTED CERUMEN OF RIGHT EAR: ICD-10-CM

## 2021-10-25 DIAGNOSIS — M50.30 DDD (DEGENERATIVE DISC DISEASE), CERVICAL: ICD-10-CM

## 2021-10-25 DIAGNOSIS — M05.79 RHEUMATOID ARTHRITIS INVOLVING MULTIPLE SITES WITH POSITIVE RHEUMATOID FACTOR (HCC): ICD-10-CM

## 2021-10-25 DIAGNOSIS — R19.5 POSITIVE FIT (FECAL IMMUNOCHEMICAL TEST): ICD-10-CM

## 2021-10-25 DIAGNOSIS — M51.36 DDD (DEGENERATIVE DISC DISEASE), LUMBAR: ICD-10-CM

## 2021-10-25 DIAGNOSIS — N18.6 ESRD ON HEMODIALYSIS (HCC): ICD-10-CM

## 2021-10-25 DIAGNOSIS — I77.0 A-V FISTULA (HCC): ICD-10-CM

## 2021-10-25 DIAGNOSIS — Z99.2 ESRD ON HEMODIALYSIS (HCC): ICD-10-CM

## 2021-10-25 DIAGNOSIS — R10.13 ABDOMINAL PAIN, EPIGASTRIC: Primary | ICD-10-CM

## 2021-10-25 DIAGNOSIS — R42 DIZZINESSES: ICD-10-CM

## 2021-10-25 DIAGNOSIS — Z12.11 SCREENING FOR COLON CANCER: ICD-10-CM

## 2021-10-25 PROCEDURE — G8432 DEP SCR NOT DOC, RNG: HCPCS | Performed by: NURSE PRACTITIONER

## 2021-10-25 PROCEDURE — G9231 DOC ESRD DIA TRANS PREG: HCPCS | Performed by: NURSE PRACTITIONER

## 2021-10-25 PROCEDURE — G8427 DOCREV CUR MEDS BY ELIG CLIN: HCPCS | Performed by: NURSE PRACTITIONER

## 2021-10-25 PROCEDURE — 1101F PT FALLS ASSESS-DOCD LE1/YR: CPT | Performed by: NURSE PRACTITIONER

## 2021-10-25 PROCEDURE — G8536 NO DOC ELDER MAL SCRN: HCPCS | Performed by: NURSE PRACTITIONER

## 2021-10-25 PROCEDURE — 69209 REMOVE IMPACTED EAR WAX UNI: CPT | Performed by: NURSE PRACTITIONER

## 2021-10-25 PROCEDURE — G8420 CALC BMI NORM PARAMETERS: HCPCS | Performed by: NURSE PRACTITIONER

## 2021-10-25 PROCEDURE — 99214 OFFICE O/P EST MOD 30 MIN: CPT | Performed by: NURSE PRACTITIONER

## 2021-10-25 RX ORDER — GABAPENTIN 600 MG/1
600 TABLET ORAL 3 TIMES DAILY
Qty: 90 TABLET | Refills: 2 | Status: SHIPPED | OUTPATIENT
Start: 2021-10-25 | End: 2022-07-21

## 2021-10-25 RX ORDER — PREDNISONE 10 MG/1
TABLET ORAL
Qty: 90 TABLET | Refills: 1 | Status: SHIPPED | OUTPATIENT
Start: 2021-10-25 | End: 2022-04-21
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
## 2021-10-25 NOTE — PROGRESS NOTES
right ear lavage completed. Warm water and Hydrogen peroxide mix used to remove cerumen from right ear. Right Canal still not cleared after 8 lavages. Pt was told to use ear drops to soften wax for the small amount left  1. Have you been to the ER, urgent care clinic since your last visit? Hospitalized since your last visit? Yes When: ER 10-23-21    2. Have you seen or consulted any other health care providers outside of the [address] [place] [address] since your last visit? Include any pap smears or colon screening.  Yes When: orthopedic 10-1-21

## 2021-10-27 ENCOUNTER — DOCUMENTATION ONLY (OUTPATIENT)
Dept: FAMILY MEDICINE CLINIC | Age: 76
End: 2021-10-27

## 2021-11-12 ENCOUNTER — TELEPHONE (OUTPATIENT)
Dept: FAMILY MEDICINE CLINIC | Age: 76
End: 2021-11-12

## 2021-11-12 NOTE — TELEPHONE ENCOUNTER
Pt is wheezing sometimes an wants to know what he could do.  He stated he did have a inhaler in the past but not anymore

## 2021-11-13 DIAGNOSIS — R06.2 WHEEZING: ICD-10-CM

## 2021-11-13 RX ORDER — ALBUTEROL SULFATE 90 UG/1
AEROSOL, METERED RESPIRATORY (INHALATION)
Qty: 1 EACH | Refills: 0 | Status: SHIPPED | OUTPATIENT
Start: 2021-11-13 | End: 2022-01-23

## 2021-11-13 NOTE — TELEPHONE ENCOUNTER
He has a hx of asthm I have sent a refill over for his Albuterol inhaler. He should use it every 4 hours as needed for wheezing.  However if wheezing continues and he is requiring it every day he will need a different inhaler so tell him to follow up with us if wheezing does not improve

## 2021-11-20 ENCOUNTER — HOSPITAL ENCOUNTER (EMERGENCY)
Age: 76
Discharge: HOME | End: 2021-11-20
Attending: EMERGENCY MEDICINE
Payer: MEDICARE

## 2021-11-20 ENCOUNTER — APPOINTMENT (OUTPATIENT)
Dept: GENERAL RADIOLOGY | Age: 76
End: 2021-11-20
Attending: EMERGENCY MEDICINE
Payer: MEDICARE

## 2021-11-20 VITALS
BODY MASS INDEX: 19.64 KG/M2 | HEART RATE: 90 BPM | OXYGEN SATURATION: 95 % | WEIGHT: 145 LBS | RESPIRATION RATE: 16 BRPM | SYSTOLIC BLOOD PRESSURE: 174 MMHG | HEIGHT: 72 IN | DIASTOLIC BLOOD PRESSURE: 100 MMHG | TEMPERATURE: 98.3 F

## 2021-11-20 DIAGNOSIS — S20.212A RIB CONTUSION, LEFT, INITIAL ENCOUNTER: Primary | ICD-10-CM

## 2021-11-20 PROCEDURE — 99283 EMERGENCY DEPT VISIT LOW MDM: CPT

## 2021-11-20 PROCEDURE — 71101 X-RAY EXAM UNILAT RIBS/CHEST: CPT

## 2021-11-20 RX ORDER — HYDROCODONE BITARTRATE AND ACETAMINOPHEN 5; 325 MG/1; MG/1
1 TABLET ORAL
Qty: 10 TABLET | Refills: 0 | Status: SHIPPED | OUTPATIENT
Start: 2021-11-20 | End: 2021-11-23

## 2021-11-20 NOTE — ED NOTES
I have reviewed discharge instructions with the patient. The patient verbalized understanding. Discharge medications discussed with patient. No questions at this time. Ambulated out without difficulty. Pt's BP is still elevated however he has not taken his morning BP medications.
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
## 2021-12-01 NOTE — PROGRESS NOTES
12/1/2021         RE: Dimple Oviedo  [address]  [facility] [address]        Dear Colleague,    Thank you for referring your patient, Dimple Oviedo, to the [facility]. Please see a copy of my visit note below.    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    HISTORY OF PRESENT ILLNESS:  Ms. 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 Her oncologic history is as under.    Ms. Oviedo is doing well. She denies any complains suggestive of recurrent disease. She has carpal tunnel symptoms in her left hand and is wearing a brace. She has numbness in the thumb, index and middle finger of her left hand.      ONCOLOGIC HISTORY:  1. 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   - Jan 2018 to September 2018- Persistent gross hematuria and underwent multiple procedures.    - 2/19/18 and 4/3/18- cystoscopies with bladder biopsies  which were negative for bladder tumor  - 1/17/18, 3/8/18, 7/26/18, 9/10/18- Multiple urine cytologies have come back positive by FISH for high-grade transitional cell carcinoma  - 9/10/18- Underwent a bilateral cystoureteroscopy with biopsy and brushing that showed  right upper tract cytology suspicious for high-grade urothelial ca. Right ureter brushing negative from that day. Left upper tract negative.  - 9/10/18- CT A/P without contrast showed new small bilateral pleural effusions and interstitial pulmonary edema but no evidence of locoregional or metastatic disease.  - 9/12/18- Presented to ED with oliguria, CRESENCIO, and mild hydronephrosis bilaterally on CT scan and  Pt seen by eye doctor Dr Patricia Abraham for glaucoma and dry eye syndrome. He has not been using his eye drops as instructed. He was advised to do so. Exam revealed hypertensive retinopathy. "underwent bilateral ureteral stent placment  - 11/13/2018- Right robotic nephroureterectomy, excision of sandip-caval mass and LN excision by Stuart King. Pathology showed \"Right nephroureterectomy: Invasive high grade urothelial carcinoma measuring 3.5 cm, located in renal pelvis and invading through renal parenchyma into perinephric fat. Urothelial carcinoma in-situ present. Margins free of tumor. Sandip-caval mass: Metastatic urothelial carcinoma involving one lymph node with at least 1 cm tumor deposit. Pericaval Extranodal Extension present. Five additional benign pericaval lymph nodes. Pathologic stage: pT4N1 (1 of 6 lymph nodes).\"  - 12/11/18- PET/CT whole body demonstrated significant residual FDG avid disease. For example, \"there is an FDG avid ill-defined pericaval mass immediately medial to the surgical clips measuring approximately 2.0 x 1.4 cm, with max SUV measuring up to12.2, see series 4 image 21. Additional FDG avid retrocaval and interaortocaval lymphadenopathy are noted.There is a 1.2 cm nodule in the right hemipelvis posterior lateral tothe bladder with max SUV measuring 8.3, see series 4 image 77. The bladder is incompletely distended.\" No suspicious thoracic or bone uptake.  - 12/12/18- Started adjuvant chemotherapy (carbo+gem) per POUT trial. Cycle 2 - 1/2/19. Cycle 3 - 1/23/19. Cycle 4 - 02/13/19.  - 1/22/19 - CT C/A/P with contrast compared with prior PET/CT: \"1. New well-circumscribed soft tissue pulmonary nodules of the right lower lobe and left upper lobe. Findings could be infectious, inflammatory, or represent metastatic disease. Continued attention on follow-up recommended. Postoperative changes of right nephrectomy. No evidence for local recurrence in the present study.\"  - 3/1/2019- CT C/A/P with contrast - \"1. Bilateral pulmonary nodules measuring up to 4 mm in the right lower lobe, previously measuring 8 mm. No new or enlarging pulmonary nodules. 2. No convincing evidence for " "metastatic disease in the abdomen, pelvis and bones.\"  - 6/3/2019- CT C/A/P with contrast - \"1. Surgical changes of right nephrectomy without evidence of residual or recurrent disease on this noncontrast exam.  Consider contrast enhanced examination on follow-up. 2. No evidence of metastatic disease in the abdomen, or pelvis on noncontrast CT.  Scattered tiny pulmonary nodules in the chest are not significantly changed.  Previously described prominent right lower lobe pulmonary nodule (previously measuring up to 8 mm) is no longer visualized. 3. Stable bilateral pulmonary nodules measuring maximally 4 mm.\"  - 09/11/19: CT C/A/P without contrast - \"1. Stable exam without evidence convincing for new disease. 2. Stable pulmonary nodules. 3. 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 4. Stable heterogeneous enlargement of the thyroid with underlying nodules suggested.\"  - 12/4/19: CT C/A/P without contrast - \"No acute change since prior exam. No evidence of recurrent or metastatic disease. Tiny lung nodules are stable. Prior right nephrectomy. Left renal artery aneurysm is stable.\"  - 04/08/20, 7/27/20: CT C/A/P with contrast - GABRIELLE.  - 01/12/21: CT C/A/P with contrast - \"1.  Slight increased size of a 6 mm left upper lobe nodule and new 4 mm linear opacity along the right major fissure. These are indeterminate but could represent progression of metastatic disease. 2.  Slight increased size of mildly enlarged mediastinal and hilar lymph nodes. 3.  Mild pulmonary edema. 4.  No recurrent or metastatic disease in the abdomen and pelvis. 5.  Mild stranding around the urinary bladder dome may be related to cystitis. Punctate focus of gas within the urinary bladder either secondary to infection or instrumentation. 6.  Enlarged multinodular thyroid gland.\"    2. Non-muscle invasive bladder cancer:  - 10/3/19: Cystoscopy showed a small area of erythema on retroflexion, possibly pre-existing or due to retroflexion " of the scope. Urine cytology from that time showed cells suspicious for malignancy.   - 1/13/20: Bladder biopsy showed high grade urothelial carcinoma in-situ  - 2/12/20-3/18/20: Intravesical BCG therapy  - 7/24/20 and last cystoscopy was on the same day. It was negative. However, urine cytology was positive for high-grade urothelial carcinoma.   - 11/25/20-12/10/2020: 3 weekly doses of intravesical BCG 50mg.   - 12/10/20: Cytology suspicious and FISH urovysion positive for TCC.    REVIEW OF SYSTEMS: 14 point ROS negative other than the symptoms noted above in the HPI.    PAST MEDICAL AND SURGICAL HISTORY:   Past Medical History:   Diagnosis Date     Arthritis      Calculus of kidney      Chemotherapy-induced neutropenia (H) 3/6/2019     Congestive heart failure (H)      Esophageal reflux      GERD (gastroesophageal reflux disease)      Hyperlipidemia LDL goal <130 5/9/2010     Malignant melanoma of skin of trunk, except scrotum (H)      Nonspecific abnormal finding     has living will 2004 -      Nontoxic multinodular goiter     no further eval /tx rec per pt     Osteopenia      Other psoriasis      Personal history of colonic polyps      PMR (polymyalgia rheumatica) (H)      Restless legs syndrome 7/11/2018     Stress-induced cardiomyopathy      Undiagnosed cardiac murmurs      Unspecified constipation      Unspecified essential hypertension      Urothelial carcinoma (H) 3/22/2018     Past Surgical History:   Procedure Laterality Date     ARTHROPLASTY KNEE Right 11/9/2020    Procedure: ARTHROPLASTY, KNEE, TOTAL, RIGHT;  Surgeon: Edward Otero MD;  [facility]     BIOPSY       COLONOSCOPY  2014     COMBINED CYSTOSCOPY, INSERT STENT URETER(S) Bilateral 9/12/2018    Procedure: COMBINED CYSTOSCOPY, INSERT STENT URETER(S);  Cystoscopy Bilateral ureteral Stent Placement.;  Surgeon: Justin Henry MD;  [facility]     COMBINED CYSTOSCOPY, RETROGRADES, URETEROSCOPY, INSERT STENT Bilateral 4/3/2018     Procedure: COMBINED CYSTOSCOPY, RETROGRADES, URETEROSCOPY, INSERT STENT;;  Surgeon: Stuart King MD;  [facility]     COMBINED CYSTOSCOPY, RETROGRADES, URETEROSCOPY, INSERT STENT Bilateral 9/10/2018    Procedure: COMBINED CYSTOSCOPY, RETROGRADES, URETEROSCOPY, INSERT STENT;  Cystoscopy, Bilateral Ureteroscopy, Bladder Biopsies, Retrogram Pyelograms, Ureteral Washings and brushings, cysview;  Surgeon: Stuart King MD;  [facility]     COMBINED CYSTOSCOPY, RETROGRADES, URETEROSCOPY, INSERT STENT Left 8/26/2020    Procedure: Cystoscopy, Left Ureteral Wash, Left ureteral Retrograde Pyelogram;  Surgeon: Justin Henry MD;  [facility]     CYSTOSCOPY, BIOPSY BLADDER INSTILL OPTICAL AGENT N/A 4/3/2018    Procedure: CYSTOSCOPY, BIOPSY BLADDER INSTILL OPTICAL AGENT;  Cystoscopy, Blue Light Cystoscopy, Bladder Biopsies, Bilateral Selective ureteral washings for Cytology, Bilateral Retrograde Pyelograms, Bilateral Ureteroscopy;  Surgeon: Stuart King MD;  [facility]     CYSTOSCOPY, BIOPSY BLADDER, COMBINED N/A 2/19/2018    Procedure: COMBINED CYSTOSCOPY, BIOPSY BLADDER;  Cystoscopy, Bladder Biopsy;  Surgeon: Kenna La MD;  [facility]     CYSTOSCOPY, BIOPSY BLADDER, COMBINED N/A 8/26/2020    Procedure: Cystoscopy, biopsy bladder, combined;  Surgeon: Justin Henry MD;  [facility]     CYSTOSCOPY, FULGURATE BLADDER TUMOR, COMBINED N/A 1/13/2020    Procedure: CYSTOSCOPY, WITH  bladder biopsies and fulguration;  Surgeon: Stuart King MD;  [facility]     CYSTOSCOPY, REMOVE STENT(S), COMBINED  11/13/2018    Procedure: Flexible Cystoscopy with Stent Removal;  Surgeon: Stuart King MD;  [facility]     DAVINCI LYMPHADENECTOMY N/A 11/13/2018    Procedure: Davinci Lymphadenectomy ;  Surgeon: Stuart King MD;  [facility]     DAVINCI NEPHROURETERECTOMY N/A 11/13/2018    Procedure: Right  DaVinci Assisted Nephroureterectomy;  Surgeon: Stuart King MD;  [facility]     ENDOSCOPIC ULTRASOUND LOWER GASTROINTESTIONAL TRACT (GI) N/A 10/30/2015    Procedure: ENDOSCOPIC ULTRASOUND LOWER GASTROINTESTIONAL TRACT (GI);  Surgeon: Daniel Jean-Baptiste MD;  [facility]     EYE SURGERY  12/4/17     INSERT PORT VASCULAR ACCESS Right 12/19/2018    Procedure: Chest Port Placement - right;  Surgeon: Stuart Chavez PA-C;  [facility]     IR CHEST PORT PLACEMENT > 5 YRS OF AGE  12/19/2018     IR PORT REMOVAL RIGHT  6/26/2019     LAPAROSCOPIC CHOLECYSTECTOMY WITH CHOLANGIOGRAMS N/A 11/1/2015    Procedure: LAPAROSCOPIC CHOLECYSTECTOMY WITH CHOLANGIOGRAMS;  Surgeon: Tonie Warren MD;  [facility]     REMOVE PORT VASCULAR ACCESS Right 6/26/2019    Procedure: Right Port Removal;  Surgeon: Froilan Irizarry PA-C;  [facility]     SURGICAL HISTORY OF -   1996    malignant melanoma     SURGICAL HISTORY OF -   1968    thyroid nodule     SURGICAL HISTORY OF -       D & C     ZZC NONSPECIFIC PROCEDURE  2005    colonoscopy polyp repeat 2010     SOCIAL HISTORY:   Social History     Tobacco Use     Smoking status: Never Smoker     Smokeless tobacco: Never Used   Vaping Use     Vaping Use: Never used   Substance Use Topics     Alcohol use: No     Alcohol/week: 0.0 standard drinks     Comment: rare     Drug use: No     FAMILY HISTORY:   Family History   Problem Relation Age of Onset     Cancer Father         dec - esophageal and laryngeal     Heart Disease Mother      Respiratory Mother         dec     Breast Cancer Daughter      Other Cancer Daughter      Thyroid Disease Daughter      Asthma Daughter      Hyperlipidemia Son      Diabetes Son      Anesthesia Reaction No family hx of      Deep Vein Thrombosis (DVT) No family hx of      ALLERGIES:   Allergies   Allergen Reactions     Codeine      CURRENT MEDICATIONS:   Current Outpatient Medications:      alendronate (FOSAMAX) 70  MG tablet, TAKE 1 TABLET EVERY 7 DAYS AT LEAST 60 MINUTES BEFORE BREAKFAST AS DIRECTED., Disp: 12 tablet, Rfl: 3     amoxicillin (AMOXIL) 500 MG tablet, Take 4 tablets (2000 mg) by mouth 1 hour before dental procedures/cleanings., Disp: 4 tablet, Rfl: 3     bisacodyl (DULCOLAX) 5 MG EC tablet, Take 2 tablets by mouth at bedtime two days prior to procedure.  Take 2 tabs by mouth at 3pm one day prior to procedure., Disp: 4 tablet, Rfl: 0     diltiazem ER (DILT-XR) 180 MG 24 hr capsule, Take 1 capsule (180 mg) by mouth daily, Disp: 90 capsule, Rfl: 0     ferrous sulfate 325 (65 Fe) MG TBEC EC tablet, Take 325 mg by mouth every morning , Disp: , Rfl:      furosemide (LASIX) 20 MG tablet, Take 1 tablet (20 mg) by mouth 2 times daily .  Take in the morning and after lunch., Disp: 90 tablet, Rfl: 1     irbesartan (AVAPRO) 300 MG tablet, TAKE 1 TABLET EVERY DAY, Disp: 90 tablet, Rfl: 2     lovastatin (MEVACOR) 40 MG tablet, TAKE 1 TABLET AT BEDTIME, Disp: 90 tablet, Rfl: 0     methimazole (TAPAZOLE) 5 MG tablet, Take 5 mg alternating with 2.5 mg every other day, Disp: 90 tablet, Rfl: 3     Omega-3 Fatty Acids (FISH OIL) 500 MG CAPS, Take 1 capsule by mouth every morning , Disp: , Rfl:      omeprazole (PRILOSEC) 20 MG DR capsule, TAKE 1 CAPSULE EVERY DAY, Disp: 90 capsule, Rfl: 1     ondansetron (ZOFRAN-ODT) 4 MG ODT tab, Take 1 tablet (4 mg) by mouth every 6 hours as needed for nausea or vomiting, Disp: 20 tablet, Rfl: 0     propranolol ER (INDERAL LA) 60 MG 24 hr capsule, Take 1 capsule (60 mg) by mouth daily, Disp: 90 capsule, Rfl: 2     rivaroxaban ANTICOAGULANT (XARELTO) 15 MG TABS tablet, Take 1 tablet (15 mg) by mouth daily (with dinner), Disp: 90 tablet, Rfl: 0     rOPINIRole (REQUIP) 0.25 MG tablet, TAKE 1 TABLET IN THE AFTERNOON AND TAKE 2 TABLETS EVERY NIGHT, Disp: 270 tablet, Rfl: 3     sertraline (ZOLOFT) 100 MG tablet, TAKE 1 TABLET EVERY MORNING, Disp: 90 tablet, Rfl: 3     traZODone (DESYREL) 50 MG tablet,  TAKE 1/2 TABLET AT BEDTIME, Disp: 45 tablet, Rfl: 3     diltiazem ER COATED BEADS (CARDIZEM CD/CARTIA XT) 180 MG 24 hr capsule, , Disp: , Rfl:     Current Facility-Administered Medications:      lidocaine (XYLOCAINE) 2 % external gel, , Urethral, Once, Justin Henry MD    PHYSICAL EXAMINATION:  Deferred. Phone visit due to COVID.    LABORATORY DATA:   Recent Labs   Lab Test 11/19/21  0921 11/19/21  0911 09/02/21  0644 09/01/21  1505 08/11/21  1059 08/11/21  1031 05/11/21  1007   NA  --  141 139 136  --  134 138   POTASSIUM  --  4.4 4.0 3.5  --  4.0 4.2   CHLORIDE  --  105 109 102  --  102 101   CO2  --  28 25 26  --  25 29   ANIONGAP  --  8 5 8  --  7 8   BUN  --  22 14 14  --  20 24   CR 1.2* 1.08* 0.97 1.00 1.0 0.92 1.04   GLC  --  130* 107* 167*  --  111* 161*   SHREYA  --  9.2 8.8 9.2  --  9.2 9.3     Recent Labs   Lab Test 09/02/21  0644 09/01/21  1505 02/03/19  2102 12/12/18  1050 01/16/18  1247 01/16/18  0646 12/13/17  2236 04/18/16  0905 11/02/15  0912 11/01/15  0727 10/31/15  0810   MAG 2.4* 2.3 1.8 2.1 2.1   < > 2.0  --   --  2.0  --    PHOS  --   --   --   --   --   --  2.4* 3.2 3.2 2.1* 2.4*    < > = values in this interval not displayed.     Recent Labs   Lab Test 11/19/21  0911 09/01/21  1505 08/11/21  1031 05/11/21  1007 01/12/21  1230   WBC 7.5 6.3 9.5 7.1 6.2   HGB 11.4* 12.4 11.4* 12.6 11.7    260 213 220 171   MCV 95 96 98 98 100   NEUTROPHIL 72 66 89 72.6 67.6     Recent Labs   Lab Test 11/19/21  0911 08/11/21  1031 05/11/21  1007 09/11/18  0612 07/17/18  1346   BILITOTAL 0.5 0.4 0.5   < >  --    ALKPHOS 98 88 94   < >  --    ALT 23 17 18   < >  --    AST 15 14 10   < >  --    ALBUMIN 3.4 3.5 3.6   < >  --     176  --   --  204    < > = values in this interval not displayed.     TSH   Date Value Ref Range Status   09/02/2021 1.24 0.40 - 4.00 mU/L Final   07/05/2021 1.68 0.40 - 4.00 mU/L Final   05/27/2021 1.93 0.40 - 4.00 mU/L Final   01/27/2021 2.42 0.40 - 4.00 mU/L Final      No results for input(s): CEA in the last 80201 hours.  Results for orders placed or performed in visit on 11/19/21   CT Chest/Abdomen/Pelvis w Contrast    Narrative    EXAM: CT CHEST/ABDOMEN/PELVIS W CONTRAST  LOCATION: [facility]  DATE/TIME: 11/19/2021 9:08 AM    INDICATION: surveillance bladder cancer  COMPARISON: Multiple with the most recent CT chest, abdomen and pelvis 08/11/2021.  TECHNIQUE: CT scan of the chest, abdomen, and pelvis was performed following injection of IV contrast. Multiplanar reformats were obtained. Dose reduction techniques were used.   CONTRAST: Isovue 370 105cc    FINDINGS:   LUNGS AND PLEURA: Stable few pulmonary nodules with largest in the left upper lobe measures 5 mm (series 6 image 84). Mild interlobular septal thickening. No focal consolidation.    MEDIASTINUM/AXILLAE: Unchanged enlarged multinodular thyroid. No lymphadenopathy. Normal caliber aorta. Normal heart size. Moderate hiatal hernia.    CORONARY ARTERY CALCIFICATION: Mild.    HEPATOBILIARY: No focal mass. Cholecystectomy. Stable extrahepatic biliary dilatation likely related to age and postcholecystectomy state.    PANCREAS: Normal.    SPLEEN: Normal.    ADRENAL GLANDS: Normal.    KIDNEYS/BLADDER: Right nephrectomy. Stable 4 mm nonobstructing left intrarenal calculus. No suspicious left renal mass, filling disc the left renal collecting system or ureter. Urinary bladder is only partially distended without gross abnormality.    BOWEL: Normal.    LYMPH NODES: Stable borderline enlarged 11 mm lesion right common iliac lymph node (series 3 image 407).    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    PELVIC ORGANS: Normal.    MUSCULOSKELETAL: Normal.      Impression    IMPRESSION:  1.  Stable exam without evidence of new or progressive malignancy.  2.  Stable bilateral pulmonary nodules measuring up to 5 mm.  3.  Right nephrectomy.  4.  Nonobstructing 4 mm left  intrarenal calculus.  5.  Stable borderline enlarged right common lymph node.  6.  Unchanged left renal artery aneurysm.  7.  Unchanged multinodular thyroid.     *Note: Due to a large number of results and/or encounters for the requested time period, some results have not been displayed. A complete set of results can be found in Results Review.         ASSESSMENT AND PLAN: Ms. Oviedo is a delightful 88 year old lady with localized stage IV ([id number]) high-grade, muscle-invasive transitional cell carcinoma of right renal pelvis, status post right robotic nephroureterectomy and 4 cycles of adjuvant carbo/gem; as well as NMIBC.    1. Upper tract urothelial cancer:   - She completed 4 cycles of adjuvant carboplatin plus gemcitabine chemotherapy per POUT trial.    She completed chemotherapy in Feb 2019 nearly 3 yrs ago  - No residual side effects or evidence of recurrence.  - Reviewed restaging CT scan from Jan, March, May and now Aug 2021; there is no clear evidence of disease recurrence in abd/pelvis.   She continues to have pulmonary nodules which remain stable.    Lung findings are non-specific and these are also not responsible for her shortness of breath    - We will continue to monitor and her get a repeat CT C/A/P without contrast in 6 months.     2. High grade urothelial carcinoma in situ:  - Bx proven carcinoma in situ in 1/2020, completed the first round of intravesical BCG treatment 2/2020-3/2020 and second in 11/2020-12/2020.  - She was initially followed by Dr. King and now is under care of Dr. Froilan Henry.   - She did not have urine cytology done and I will get it after this visit.   - She will continue on 3 weekly BCG every 3 months as part of her maintenance along with follow up cystoscopies    3. Chemo induced anemia and thrombocytopenia:  - Resolved.      4. Persistent HERMAN:  - She follows Dr. Griffith in Cardiology for her h/o moderate AORTIC STENOSIS, CHF, and Afib now with worsening SOA and  lymphedema with no cancer-related etiology evident.     Return to clinic in 6 months with restaging scans.    All of the above was explained to the patient in lay language and several questions were answered. They are in agreement with the plan.     25 minutes spent on the date of the encounter doing chart review, history and exam, documentation and further activities as noted above     Sd Fine    Hematologist and Medical Oncologist   [facility]             Again, thank you for allowing me to participate in the care of your patient.        Sincerely,        Sd Fine MD

## 2021-12-28 ENCOUNTER — OFFICE VISIT (OUTPATIENT)
Dept: FAMILY MEDICINE CLINIC | Age: 76
End: 2021-12-28
Payer: MEDICARE

## 2021-12-28 ENCOUNTER — TELEPHONE (OUTPATIENT)
Dept: FAMILY MEDICINE CLINIC | Age: 76
End: 2021-12-28

## 2021-12-28 VITALS
TEMPERATURE: 97.9 F | SYSTOLIC BLOOD PRESSURE: 133 MMHG | OXYGEN SATURATION: 96 % | HEART RATE: 102 BPM | RESPIRATION RATE: 18 BRPM | HEIGHT: 72 IN | BODY MASS INDEX: 19.44 KG/M2 | WEIGHT: 143.5 LBS | DIASTOLIC BLOOD PRESSURE: 70 MMHG

## 2021-12-28 DIAGNOSIS — H61.23 BILATERAL HEARING LOSS DUE TO CERUMEN IMPACTION: Primary | ICD-10-CM

## 2021-12-28 PROCEDURE — G8427 DOCREV CUR MEDS BY ELIG CLIN: HCPCS | Performed by: FAMILY MEDICINE

## 2021-12-28 PROCEDURE — 69209 REMOVE IMPACTED EAR WAX UNI: CPT | Performed by: FAMILY MEDICINE

## 2021-12-28 PROCEDURE — 99213 OFFICE O/P EST LOW 20 MIN: CPT | Performed by: FAMILY MEDICINE

## 2021-12-28 PROCEDURE — 1101F PT FALLS ASSESS-DOCD LE1/YR: CPT | Performed by: FAMILY MEDICINE

## 2021-12-28 PROCEDURE — G8510 SCR DEP NEG, NO PLAN REQD: HCPCS | Performed by: FAMILY MEDICINE

## 2021-12-28 PROCEDURE — G8420 CALC BMI NORM PARAMETERS: HCPCS | Performed by: FAMILY MEDICINE

## 2021-12-28 PROCEDURE — G8536 NO DOC ELDER MAL SCRN: HCPCS | Performed by: FAMILY MEDICINE
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
## 2021-12-28 NOTE — PROGRESS NOTES
1. Have you been to the ER, urgent care clinic since your last visit? Hospitalized since your last visit? Yes When: ER 11-20-21    2. Have you seen or consulted any other health care providers outside of the [address] [place] [address] since your last visit? Include any pap smears or colon screening.  No

## 2021-12-28 NOTE — TELEPHONE ENCOUNTER
----- Message from Gerardo Barbour sent at 12/27/2021  5:07 PM EST -----  Subject: Appointment Request    Reason for Call: Ear Problem    QUESTIONS  Type of Appointment? Established Patient  Reason for appointment request? No appointments available during search  Additional Information for Provider? pt said he feels like he has water in   his ear and would like to schedule an appt   ---------------------------------------------------------------------------  --------------  CALL BACK INFO  What is the best way for the office to contact you? OK to leave message on   voicemail  Preferred Call Back Phone Number?  [phone]  ---------------------------------------------------------------------------  --------------  SCRIPT ANSWERS

## 2022-01-17 RX ORDER — MONTELUKAST SODIUM 10 MG/1
TABLET ORAL
Qty: 90 TABLET | Refills: 1 | Status: SHIPPED | OUTPATIENT
Start: 2022-01-17 | End: 2022-07-09

## 2022-01-18 ENCOUNTER — VIRTUAL VISIT (OUTPATIENT)
Dept: FAMILY MEDICINE CLINIC | Age: 77
End: 2022-01-18
Payer: MEDICARE

## 2022-01-18 DIAGNOSIS — Z20.822 EXPOSURE TO COVID-19 VIRUS: Primary | ICD-10-CM

## 2022-01-18 DIAGNOSIS — R05.9 COUGH: ICD-10-CM

## 2022-01-18 PROCEDURE — 99441 PR PHYS/QHP TELEPHONE EVALUATION 5-10 MIN: CPT | Performed by: NURSE PRACTITIONER

## 2022-01-18 NOTE — PROGRESS NOTES
Lili Lopez is a 68 y.o. male evaluated via telephone on 1/18/2022. Consent:  He and/or health care decision maker is aware that that he may receive a bill for this telephone service, depending on his insurance coverage, and has provided verbal consent to proceed: Yes    CC: covid exposure      HPI  Mr. Blayne Michel is a 69 yo male with ESRD who presents today via telephone encounter with requests for a Covid test. He lives with his daughter and she tested positive yesterday. She feels fine, has no symptoms. Patient states he has a cough but denies any fever, chills, headache, body aches, sore throat, SOB, or CP. PLAN    Exposure to Covid with active cough  Advised pt to come to clinic parking lot for Covid test.   We will call him with the results. Documentation:  I communicated with the patient and/or health care decision maker about the plan of care as noted above. I affirm this is a Patient Initiated Episode with an Established Patient who has not had a related appointment within my department in the past 7 days or scheduled within the next 24 hours.     Total Time: minutes: 5-10 minutes    Note: not billable if this call serves to triage the patient into an appointment for the relevant concern      Mikey Li NP

## 2022-01-18 NOTE — PROGRESS NOTES
1. Have you been to the ER, urgent care clinic since your last visit? Hospitalized since your last visit? No    2. Have you seen or consulted any other health care providers outside of the [address] [place] [address] since your last visit? Include any pap smears or colon screening.  No     Chief Complaint   Patient presents with    Cough     daughter is positive for COVID

## 2022-01-20 ENCOUNTER — LAB ONLY (OUTPATIENT)
Dept: FAMILY MEDICINE CLINIC | Age: 77
End: 2022-01-20
Payer: MEDICARE

## 2022-01-20 DIAGNOSIS — Z11.52 ENCOUNTER FOR SCREENING FOR COVID-19: Primary | ICD-10-CM

## 2022-01-20 LAB
SARS-COV-2 POC: POSITIVE
SARS-COV-2, NAA 2 DAY TAT: NORMAL
SARS-COV-2, NAA: NOT DETECTED

## 2022-01-20 PROCEDURE — 87426 SARSCOV CORONAVIRUS AG IA: CPT | Performed by: NURSE PRACTITIONER

## 2022-01-20 NOTE — PROGRESS NOTES
The dialysis center called wanting his results so they could do dialysis and we called the lab and the send out wasn't going to be done until Saturday, had pt come in for rapid it was positive, pt is aware and dialysis is aware and they want results faxed to [phone], I will fax now.

## 2022-01-22 DIAGNOSIS — F10.90 HABITUAL ALCOHOL USE: ICD-10-CM

## 2022-01-22 DIAGNOSIS — R06.2 WHEEZING: ICD-10-CM

## 2022-01-22 DIAGNOSIS — Z87.19 HISTORY OF GI BLEED: ICD-10-CM

## 2022-01-23 RX ORDER — ALBUTEROL SULFATE 90 UG/1
AEROSOL, METERED RESPIRATORY (INHALATION)
Qty: 6.7 EACH | Refills: 0 | Status: SHIPPED | OUTPATIENT
Start: 2022-01-23 | End: 2022-09-02

## 2022-01-23 RX ORDER — OMEPRAZOLE 40 MG/1
40 CAPSULE, DELAYED RELEASE ORAL 2 TIMES DAILY
Qty: 180 CAPSULE | Refills: 1 | Status: SHIPPED | OUTPATIENT
Start: 2022-01-23 | End: 2022-06-21 | Stop reason: SDUPTHER

## 2022-02-02 DIAGNOSIS — M05.79 RHEUMATOID ARTHRITIS INVOLVING MULTIPLE SITES WITH POSITIVE RHEUMATOID FACTOR (HCC): ICD-10-CM

## 2022-02-03 RX ORDER — DICLOFENAC SODIUM 10 MG/G
GEL TOPICAL
Qty: 100 G | Refills: 1 | Status: SHIPPED | OUTPATIENT
Start: 2022-02-03

## 2022-03-18 PROBLEM — J45.20 MILD INTERMITTENT ASTHMA WITHOUT COMPLICATION: Status: ACTIVE | Noted: 2017-02-13

## 2022-03-18 PROBLEM — M35.3 PMR (POLYMYALGIA RHEUMATICA) (HCC): Status: ACTIVE | Noted: 2017-04-07

## 2022-03-18 PROBLEM — Z98.49 S/P CATARACT SURGERY: Status: ACTIVE | Noted: 2017-04-07

## 2022-03-18 PROBLEM — I77.0 A-V FISTULA (HCC): Status: ACTIVE | Noted: 2018-07-12

## 2022-03-19 PROBLEM — K56.609 SMALL BOWEL OBSTRUCTION (HCC): Status: ACTIVE | Noted: 2017-04-13

## 2022-03-19 PROBLEM — H40.9 GLAUCOMA: Status: ACTIVE | Noted: 2021-06-04

## 2022-03-19 PROBLEM — K21.9 GASTROESOPHAGEAL REFLUX DISEASE WITHOUT ESOPHAGITIS: Status: ACTIVE | Noted: 2017-10-24

## 2022-03-20 PROBLEM — H35.033 HYPERTENSIVE RETINOPATHY OF BOTH EYES: Status: ACTIVE | Noted: 2018-11-19

## 2022-03-22 ENCOUNTER — HOSPITAL ENCOUNTER (EMERGENCY)
Age: 77
Discharge: ANOTHER HOSPITAL | End: 2022-03-22
Attending: EMERGENCY MEDICINE
Payer: MEDICARE

## 2022-03-22 ENCOUNTER — APPOINTMENT (OUTPATIENT)
Dept: GENERAL RADIOLOGY | Age: 77
End: 2022-03-22
Attending: EMERGENCY MEDICINE
Payer: MEDICARE

## 2022-03-22 VITALS
RESPIRATION RATE: 21 BRPM | WEIGHT: 150 LBS | DIASTOLIC BLOOD PRESSURE: 106 MMHG | HEART RATE: 85 BPM | HEIGHT: 72 IN | SYSTOLIC BLOOD PRESSURE: 174 MMHG | TEMPERATURE: 97.8 F | OXYGEN SATURATION: 97 % | BODY MASS INDEX: 20.32 KG/M2

## 2022-03-22 DIAGNOSIS — Z91.14 NON COMPLIANCE W MEDICATION REGIMEN: ICD-10-CM

## 2022-03-22 DIAGNOSIS — J81.0 ACUTE PULMONARY EDEMA (HCC): Primary | ICD-10-CM

## 2022-03-22 LAB
ALBUMIN SERPL-MCNC: 3.1 G/DL (ref 3.5–5)
ALBUMIN/GLOB SERPL: 0.8 {RATIO} (ref 1.1–2.2)
ALP SERPL-CCNC: 110 U/L (ref 45–117)
ALT SERPL-CCNC: 18 U/L (ref 12–78)
ANION GAP SERPL CALC-SCNC: 15 MMOL/L (ref 5–15)
AST SERPL-CCNC: 25 U/L (ref 15–37)
ATRIAL RATE: 88 BPM
BASOPHILS # BLD: 0.1 K/UL (ref 0–0.1)
BASOPHILS NFR BLD: 2 % (ref 0–1)
BILIRUB SERPL-MCNC: 0.5 MG/DL (ref 0.2–1)
BNP SERPL-MCNC: ABNORMAL PG/ML (ref 0–450)
BUN SERPL-MCNC: 72 MG/DL (ref 6–20)
BUN/CREAT SERPL: 9 (ref 12–20)
CALCIUM SERPL-MCNC: 7.7 MG/DL (ref 8.5–10.1)
CALCULATED P AXIS, ECG09: 46 DEGREES
CALCULATED R AXIS, ECG10: 2 DEGREES
CALCULATED T AXIS, ECG11: 114 DEGREES
CHLORIDE SERPL-SCNC: 105 MMOL/L (ref 97–108)
CO2 SERPL-SCNC: 23 MMOL/L (ref 21–32)
COMMENT, HOLDF: NORMAL
CREAT SERPL-MCNC: 7.89 MG/DL (ref 0.7–1.3)
DIAGNOSIS, 93000: NORMAL
DIFFERENTIAL METHOD BLD: ABNORMAL
EOSINOPHIL # BLD: 0.3 K/UL (ref 0–0.4)
EOSINOPHIL NFR BLD: 4 % (ref 0–7)
ERYTHROCYTE [DISTWIDTH] IN BLOOD BY AUTOMATED COUNT: 13.2 % (ref 11.5–14.5)
FLUAV RNA SPEC QL NAA+PROBE: NOT DETECTED
FLUBV RNA SPEC QL NAA+PROBE: NOT DETECTED
GLOBULIN SER CALC-MCNC: 3.7 G/DL (ref 2–4)
GLUCOSE SERPL-MCNC: 93 MG/DL (ref 65–100)
HCT VFR BLD AUTO: 33.8 % (ref 36.6–50.3)
HGB BLD-MCNC: 11.2 G/DL (ref 12.1–17)
IMM GRANULOCYTES # BLD AUTO: 0 K/UL (ref 0–0.04)
IMM GRANULOCYTES NFR BLD AUTO: 0 % (ref 0–0.5)
LYMPHOCYTES # BLD: 1.7 K/UL (ref 0.8–3.5)
LYMPHOCYTES NFR BLD: 29 % (ref 12–49)
MAGNESIUM SERPL-MCNC: 2.1 MG/DL (ref 1.6–2.4)
MCH RBC QN AUTO: 33.8 PG (ref 26–34)
MCHC RBC AUTO-ENTMCNC: 33.1 G/DL (ref 30–36.5)
MCV RBC AUTO: 102.1 FL (ref 80–99)
MONOCYTES # BLD: 0.6 K/UL (ref 0–1)
MONOCYTES NFR BLD: 10 % (ref 5–13)
NEUTS SEG # BLD: 3.1 K/UL (ref 1.8–8)
NEUTS SEG NFR BLD: 55 % (ref 32–75)
NRBC # BLD: 0 K/UL (ref 0–0.01)
NRBC BLD-RTO: 0 PER 100 WBC
P-R INTERVAL, ECG05: 142 MS
PLATELET # BLD AUTO: 186 K/UL (ref 150–400)
PMV BLD AUTO: 11 FL (ref 8.9–12.9)
POTASSIUM SERPL-SCNC: 4.5 MMOL/L (ref 3.5–5.1)
PROT SERPL-MCNC: 6.8 G/DL (ref 6.4–8.2)
Q-T INTERVAL, ECG07: 412 MS
QRS DURATION, ECG06: 102 MS
QTC CALCULATION (BEZET), ECG08: 498 MS
RBC # BLD AUTO: 3.31 M/UL (ref 4.1–5.7)
SAMPLES BEING HELD,HOLD: NORMAL
SARS-COV-2, COV2: NOT DETECTED
SODIUM SERPL-SCNC: 143 MMOL/L (ref 136–145)
TROPONIN-HIGH SENSITIVITY: 89 NG/L (ref 0–76)
VENTRICULAR RATE, ECG03: 88 BPM
WBC # BLD AUTO: 5.8 K/UL (ref 4.1–11.1)

## 2022-03-22 PROCEDURE — 80053 COMPREHEN METABOLIC PANEL: CPT

## 2022-03-22 PROCEDURE — 83735 ASSAY OF MAGNESIUM: CPT

## 2022-03-22 PROCEDURE — 74011000250 HC RX REV CODE- 250: Performed by: EMERGENCY MEDICINE

## 2022-03-22 PROCEDURE — 99285 EMERGENCY DEPT VISIT HI MDM: CPT

## 2022-03-22 PROCEDURE — 84484 ASSAY OF TROPONIN QUANT: CPT

## 2022-03-22 PROCEDURE — 36415 COLL VENOUS BLD VENIPUNCTURE: CPT

## 2022-03-22 PROCEDURE — 93005 ELECTROCARDIOGRAM TRACING: CPT

## 2022-03-22 PROCEDURE — 71045 X-RAY EXAM CHEST 1 VIEW: CPT

## 2022-03-22 PROCEDURE — 85025 COMPLETE CBC W/AUTO DIFF WBC: CPT

## 2022-03-22 PROCEDURE — 83880 ASSAY OF NATRIURETIC PEPTIDE: CPT

## 2022-03-22 PROCEDURE — 87636 SARSCOV2 & INF A&B AMP PRB: CPT

## 2022-03-22 PROCEDURE — 74011250637 HC RX REV CODE- 250/637: Performed by: EMERGENCY MEDICINE

## 2022-03-22 RX ORDER — ERGOCALCIFEROL 1.25 MG/1
CAPSULE ORAL
COMMUNITY
Start: 2022-02-23

## 2022-03-22 RX ORDER — SODIUM CHLORIDE 0.9 % (FLUSH) 0.9 %
5-10 SYRINGE (ML) INJECTION ONCE
Status: COMPLETED | OUTPATIENT
Start: 2022-03-22 | End: 2022-03-22

## 2022-03-22 RX ADMIN — SODIUM CHLORIDE, PRESERVATIVE FREE 10 ML: 5 INJECTION INTRAVENOUS at 09:50

## 2022-03-22 RX ADMIN — NITROGLYCERIN 1 INCH: 20 OINTMENT TOPICAL at 10:16

## 2022-03-22 NOTE — ED NOTES
Patient to go to [facility] ER to ER ALS transport. patient and nursing supervisor aware.

## 2022-03-22 NOTE — ED TRIAGE NOTES
Pt arrived by POV with complaint \"short winded\". Pt reports he has had sob for 2 days and missed his dialysis on Saturday  Because he was not feeling well and is due to dialysis today. No LANDEROS noted, no lower extremity edema. Pt is awake alert and oriented x 4, speaking in full complete sentences  Nad. Pt educated on ER flow.   NO LEFT ARM PROCEDURES

## 2022-03-22 NOTE — ED NOTES
TRANSFER - OUT REPORT:    Verbal report given to Eze Meyer RN at [facility] at 21 [phone] and Yady Linen with [facility] at 1145(name) on Emil Li  being transferred to [facility] MERE [facility](unit) for routine progression of care       Report consisted of patients Situation, Background, Assessment and   Recommendations(SBAR). Information from the following report(s) SBAR, Kardex, ED Summary, MAR, Recent Results, Med Rec Status and Cardiac Rhythm NSR with St depression was reviewed with the receiving nurse. Lines:   Peripheral IV 03/22/22 Right Antecubital (Active)   Site Assessment Clean, dry, & intact 03/22/22 1016   Phlebitis Assessment 0 03/22/22 1016   Infiltration Assessment 0 03/22/22 1016   Dressing Status Clean, dry, & intact 03/22/22 1016   Dressing Type Transparent 03/22/22 1016   Hub Color/Line Status Pink 03/22/22 1016   Alcohol Cap Used No 03/22/22 1016        Opportunity for questions and clarification was provided.       Patient transported with:   Monitor and 02 2 liters nc

## 2022-03-22 NOTE — PROGRESS NOTES
ALS transport arranged via [address] [place] [address] Timi Aldana) for transfer to [facility] - ED. Transport to include cardiac monitoring.   Will call back with ETA

## 2022-03-26 ENCOUNTER — DOCUMENTATION ONLY (OUTPATIENT)
Dept: FAMILY MEDICINE CLINIC | Age: 77
End: 2022-03-26

## 2022-03-26 NOTE — PROGRESS NOTES
Pt went to [facility] ED with c/o CP and SOB on 3-22-22. CXR revealed possible R lobe PNA with bilateral pleural effusions. His troponin was elevated. He was given Nitro and underwent emergency dialysis (he has ESRD on HD). CP resolved after given Nitro patch. Repeat CXR did not show PNA. Sepsis protocol was canceled. Dx'd with mild to mod CHF. Report did not say whether he was admitted to hospital or discharged home.

## 2022-04-20 DIAGNOSIS — M05.79 RHEUMATOID ARTHRITIS INVOLVING MULTIPLE SITES WITH POSITIVE RHEUMATOID FACTOR (HCC): ICD-10-CM

## 2022-04-21 RX ORDER — PREDNISONE 10 MG/1
TABLET ORAL
Qty: 90 TABLET | Refills: 1 | Status: SHIPPED | OUTPATIENT
Start: 2022-04-21

## 2022-04-25 PROBLEM — I50.9 CHF (CONGESTIVE HEART FAILURE) (HCC): Status: ACTIVE | Noted: 2022-04-25

## 2022-06-21 DIAGNOSIS — F10.90 HABITUAL ALCOHOL USE: ICD-10-CM

## 2022-06-21 DIAGNOSIS — Z87.19 HISTORY OF GI BLEED: ICD-10-CM

## 2022-06-21 RX ORDER — OMEPRAZOLE 40 MG/1
40 CAPSULE, DELAYED RELEASE ORAL 2 TIMES DAILY
Qty: 180 CAPSULE | Refills: 1 | Status: SHIPPED | OUTPATIENT
Start: 2022-06-21

## 2022-07-09 RX ORDER — MONTELUKAST SODIUM 10 MG/1
TABLET ORAL
Qty: 90 TABLET | Refills: 1 | Status: SHIPPED | OUTPATIENT
Start: 2022-07-09

## 2022-07-20 DIAGNOSIS — M05.79 RHEUMATOID ARTHRITIS INVOLVING MULTIPLE SITES WITH POSITIVE RHEUMATOID FACTOR (HCC): ICD-10-CM

## 2022-07-22 RX ORDER — GABAPENTIN 600 MG/1
600 TABLET ORAL 3 TIMES DAILY
Qty: 90 TABLET | Refills: 0 | Status: SHIPPED | OUTPATIENT
Start: 2022-07-22

## 2022-08-02 ENCOUNTER — HOSPITAL ENCOUNTER (EMERGENCY)
Age: 77
Discharge: HOME | End: 2022-08-02
Attending: EMERGENCY MEDICINE
Payer: MEDICARE

## 2022-08-02 ENCOUNTER — APPOINTMENT (OUTPATIENT)
Dept: GENERAL RADIOLOGY | Age: 77
End: 2022-08-02
Attending: EMERGENCY MEDICINE
Payer: MEDICARE

## 2022-08-02 VITALS
RESPIRATION RATE: 18 BRPM | OXYGEN SATURATION: 96 % | BODY MASS INDEX: 19.64 KG/M2 | HEART RATE: 89 BPM | DIASTOLIC BLOOD PRESSURE: 72 MMHG | WEIGHT: 145 LBS | SYSTOLIC BLOOD PRESSURE: 125 MMHG | TEMPERATURE: 98 F | HEIGHT: 72 IN

## 2022-08-02 DIAGNOSIS — S63.501A WRIST SPRAIN, RIGHT, INITIAL ENCOUNTER: Primary | ICD-10-CM

## 2022-08-02 PROCEDURE — 99283 EMERGENCY DEPT VISIT LOW MDM: CPT

## 2022-08-02 PROCEDURE — 73110 X-RAY EXAM OF WRIST: CPT

## 2022-08-02 PROCEDURE — 74011250637 HC RX REV CODE- 250/637: Performed by: EMERGENCY MEDICINE

## 2022-08-02 PROCEDURE — 73130 X-RAY EXAM OF HAND: CPT

## 2022-08-02 RX ORDER — ACETAMINOPHEN 500 MG
1000 TABLET ORAL ONCE
Status: COMPLETED | OUTPATIENT
Start: 2022-08-02 | End: 2022-08-02

## 2022-08-02 RX ADMIN — ACETAMINOPHEN 1000 MG: 500 TABLET ORAL at 15:06
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
## 2022-08-02 NOTE — ED TRIAGE NOTES
Pt arrived by POV for for right wrist pain. Pt reports he was lifting something yesterday and heard a crack and continues with pain today. Pt noted with right hand swelling. No left arm procedures due to dialysis.   Pt is awake alert and oriented x 4, pt educated on ER flow Pt came in for headache since Saturday. Pain started around the eye area then traveled to the left posterior head. Pt complaining of right facial numbness, blurred vision. Took Motrin with no relief of pain. Pt came in for right shoulder pain since thursday, states lifting a heavy bag. Pt experiencing right hand middle finger numbness. Pt had relief from Motrin that day ever since not getting relief.

## 2022-08-31 ENCOUNTER — APPOINTMENT (OUTPATIENT)
Dept: GENERAL RADIOLOGY | Age: 77
End: 2022-08-31
Attending: EMERGENCY MEDICINE
Payer: MEDICARE

## 2022-08-31 ENCOUNTER — HOSPITAL ENCOUNTER (EMERGENCY)
Age: 77
Discharge: HOME | End: 2022-08-31
Attending: EMERGENCY MEDICINE
Payer: MEDICARE

## 2022-08-31 VITALS
TEMPERATURE: 98.2 F | HEIGHT: 72 IN | BODY MASS INDEX: 18.96 KG/M2 | SYSTOLIC BLOOD PRESSURE: 153 MMHG | WEIGHT: 140 LBS | RESPIRATION RATE: 15 BRPM | DIASTOLIC BLOOD PRESSURE: 94 MMHG | HEART RATE: 80 BPM | OXYGEN SATURATION: 93 %

## 2022-08-31 DIAGNOSIS — J18.9 PNEUMONIA OF BOTH LUNGS DUE TO INFECTIOUS ORGANISM, UNSPECIFIED PART OF LUNG: Primary | ICD-10-CM

## 2022-08-31 LAB
ALBUMIN SERPL-MCNC: 3.4 G/DL (ref 3.5–5)
ALBUMIN/GLOB SERPL: 0.9 {RATIO} (ref 1.1–2.2)
ALP SERPL-CCNC: 106 U/L (ref 45–117)
ALT SERPL-CCNC: 10 U/L (ref 12–78)
ANION GAP SERPL CALC-SCNC: 10 MMOL/L (ref 5–15)
AST SERPL-CCNC: 12 U/L (ref 15–37)
ATRIAL RATE: 86 BPM
BASOPHILS # BLD: 0.1 K/UL (ref 0–0.1)
BASOPHILS NFR BLD: 2 % (ref 0–1)
BILIRUB SERPL-MCNC: 0.6 MG/DL (ref 0.2–1)
BUN SERPL-MCNC: 38 MG/DL (ref 6–20)
BUN/CREAT SERPL: 7 (ref 12–20)
CALCIUM SERPL-MCNC: 8.5 MG/DL (ref 8.5–10.1)
CALCULATED P AXIS, ECG09: 52 DEGREES
CALCULATED R AXIS, ECG10: 18 DEGREES
CALCULATED T AXIS, ECG11: 123 DEGREES
CHLORIDE SERPL-SCNC: 101 MMOL/L (ref 97–108)
CO2 SERPL-SCNC: 31 MMOL/L (ref 21–32)
CREAT SERPL-MCNC: 5.63 MG/DL (ref 0.7–1.3)
DIAGNOSIS, 93000: NORMAL
DIFFERENTIAL METHOD BLD: ABNORMAL
EOSINOPHIL # BLD: 1.2 K/UL (ref 0–0.4)
EOSINOPHIL NFR BLD: 19 % (ref 0–7)
ERYTHROCYTE [DISTWIDTH] IN BLOOD BY AUTOMATED COUNT: 14.5 % (ref 11.5–14.5)
FLUAV RNA SPEC QL NAA+PROBE: NOT DETECTED
FLUBV RNA SPEC QL NAA+PROBE: NOT DETECTED
GLOBULIN SER CALC-MCNC: 3.6 G/DL (ref 2–4)
GLUCOSE BLD STRIP.AUTO-MCNC: 100 MG/DL (ref 65–117)
GLUCOSE SERPL-MCNC: 105 MG/DL (ref 65–100)
HCT VFR BLD AUTO: 36.4 % (ref 36.6–50.3)
HGB BLD-MCNC: 12.1 G/DL (ref 12.1–17)
IMM GRANULOCYTES # BLD AUTO: 0 K/UL (ref 0–0.04)
IMM GRANULOCYTES NFR BLD AUTO: 0 % (ref 0–0.5)
LACTATE SERPL-SCNC: 1.4 MMOL/L (ref 0.4–2)
LACTATE SERPL-SCNC: 1.8 MMOL/L (ref 0.4–2)
LYMPHOCYTES # BLD: 1.6 K/UL (ref 0.8–3.5)
LYMPHOCYTES NFR BLD: 25 % (ref 12–49)
MCH RBC QN AUTO: 33.2 PG (ref 26–34)
MCHC RBC AUTO-ENTMCNC: 33.2 G/DL (ref 30–36.5)
MCV RBC AUTO: 100 FL (ref 80–99)
MONOCYTES # BLD: 1 K/UL (ref 0–1)
MONOCYTES NFR BLD: 15 % (ref 5–13)
NEUTS SEG # BLD: 2.5 K/UL (ref 1.8–8)
NEUTS SEG NFR BLD: 39 % (ref 32–75)
NRBC # BLD: 0 K/UL (ref 0–0.01)
NRBC BLD-RTO: 0 PER 100 WBC
P-R INTERVAL, ECG05: 144 MS
PLATELET # BLD AUTO: 241 K/UL (ref 150–400)
PLATELET COMMENTS,PCOM: ABNORMAL
PMV BLD AUTO: 11.3 FL (ref 8.9–12.9)
POTASSIUM SERPL-SCNC: 3.9 MMOL/L (ref 3.5–5.1)
PROT SERPL-MCNC: 7 G/DL (ref 6.4–8.2)
Q-T INTERVAL, ECG07: 412 MS
QRS DURATION, ECG06: 94 MS
QTC CALCULATION (BEZET), ECG08: 493 MS
RBC # BLD AUTO: 3.64 M/UL (ref 4.1–5.7)
RBC MORPH BLD: ABNORMAL
SARS-COV-2, COV2: NOT DETECTED
SERVICE CMNT-IMP: NORMAL
SODIUM SERPL-SCNC: 142 MMOL/L (ref 136–145)
TROPONIN-HIGH SENSITIVITY: 58 NG/L (ref 0–76)
VENTRICULAR RATE, ECG03: 86 BPM
WBC # BLD AUTO: 6.4 K/UL (ref 4.1–11.1)

## 2022-08-31 PROCEDURE — 96374 THER/PROPH/DIAG INJ IV PUSH: CPT

## 2022-08-31 PROCEDURE — 99285 EMERGENCY DEPT VISIT HI MDM: CPT

## 2022-08-31 PROCEDURE — 84484 ASSAY OF TROPONIN QUANT: CPT

## 2022-08-31 PROCEDURE — 36415 COLL VENOUS BLD VENIPUNCTURE: CPT

## 2022-08-31 PROCEDURE — 93005 ELECTROCARDIOGRAM TRACING: CPT

## 2022-08-31 PROCEDURE — 87636 SARSCOV2 & INF A&B AMP PRB: CPT

## 2022-08-31 PROCEDURE — 87040 BLOOD CULTURE FOR BACTERIA: CPT

## 2022-08-31 PROCEDURE — 85025 COMPLETE CBC W/AUTO DIFF WBC: CPT

## 2022-08-31 PROCEDURE — 71045 X-RAY EXAM CHEST 1 VIEW: CPT

## 2022-08-31 PROCEDURE — 74011250636 HC RX REV CODE- 250/636: Performed by: EMERGENCY MEDICINE

## 2022-08-31 PROCEDURE — 80053 COMPREHEN METABOLIC PANEL: CPT

## 2022-08-31 PROCEDURE — 74011250637 HC RX REV CODE- 250/637: Performed by: EMERGENCY MEDICINE

## 2022-08-31 PROCEDURE — 82962 GLUCOSE BLOOD TEST: CPT

## 2022-08-31 PROCEDURE — 96375 TX/PRO/DX INJ NEW DRUG ADDON: CPT

## 2022-08-31 PROCEDURE — 83605 ASSAY OF LACTIC ACID: CPT

## 2022-08-31 RX ORDER — SODIUM CHLORIDE 0.9 % (FLUSH) 0.9 %
5-10 SYRINGE (ML) INJECTION AS NEEDED
Status: DISCONTINUED | OUTPATIENT
Start: 2022-08-31 | End: 2022-08-31 | Stop reason: HOSPADM

## 2022-08-31 RX ORDER — PROMETHAZINE HYDROCHLORIDE AND DEXTROMETHORPHAN HYDROBROMIDE 6.25; 15 MG/5ML; MG/5ML
5 SYRUP ORAL
Qty: 120 ML | Refills: 0 | Status: SHIPPED | OUTPATIENT
Start: 2022-08-31 | End: 2022-09-07

## 2022-08-31 RX ORDER — MORPHINE SULFATE 2 MG/ML
2 INJECTION, SOLUTION INTRAMUSCULAR; INTRAVENOUS
Status: COMPLETED | OUTPATIENT
Start: 2022-08-31 | End: 2022-08-31

## 2022-08-31 RX ORDER — DOXYCYCLINE 100 MG/1
100 CAPSULE ORAL EVERY 12 HOURS
Status: DISCONTINUED | OUTPATIENT
Start: 2022-08-31 | End: 2022-08-31 | Stop reason: HOSPADM

## 2022-08-31 RX ORDER — DOXYCYCLINE HYCLATE 100 MG
100 TABLET ORAL 2 TIMES DAILY
Qty: 14 TABLET | Refills: 0 | Status: SHIPPED | OUTPATIENT
Start: 2022-08-31 | End: 2022-09-07

## 2022-08-31 RX ORDER — ONDANSETRON 2 MG/ML
4 INJECTION INTRAMUSCULAR; INTRAVENOUS
Status: COMPLETED | OUTPATIENT
Start: 2022-08-31 | End: 2022-08-31

## 2022-08-31 RX ADMIN — ONDANSETRON 4 MG: 2 INJECTION INTRAMUSCULAR; INTRAVENOUS at 11:18

## 2022-08-31 RX ADMIN — DOXYCYCLINE 100 MG: 100 CAPSULE ORAL at 12:17

## 2022-08-31 RX ADMIN — MORPHINE SULFATE 2 MG: 2 INJECTION, SOLUTION INTRAMUSCULAR; INTRAVENOUS at 11:18

## 2022-08-31 NOTE — ED TRIAGE NOTES
Pt arrives with c/o left rib and back pain with some chest tightness intermittently. Denies any fall.
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
## 2022-08-31 NOTE — ROUTINE PROCESS
Pre walking HR low 90s and Pre walking saturation on RA was 90-92%. Pt walked abut 8 minutes on room air with beginning walk saturation on RA at 95-96% with HR upper 90s. Middle of walk on RA with Saturation 92% with -110. Toward end of walk saturation on RA is 90-92% with . Pt tolerated his walk; denies SOB, not LANDEROS. Discuss lung exercise and deep breathing with patient and family. Finished reviewing discharge instructions to patient and his wife. Allow time for questions and answered. Pt left with all of his belongings.

## 2022-09-02 ENCOUNTER — TELEPHONE (OUTPATIENT)
Dept: FAMILY MEDICINE CLINIC | Age: 77
End: 2022-09-02

## 2022-09-02 ENCOUNTER — NURSE TRIAGE (OUTPATIENT)
Dept: OTHER | Facility: CLINIC | Age: 77
End: 2022-09-02

## 2022-09-02 ENCOUNTER — HOSPITAL ENCOUNTER (EMERGENCY)
Age: 77
Discharge: ANOTHER HOSPITAL | End: 2022-09-02
Attending: EMERGENCY MEDICINE
Payer: MEDICARE

## 2022-09-02 ENCOUNTER — APPOINTMENT (OUTPATIENT)
Dept: GENERAL RADIOLOGY | Age: 77
End: 2022-09-02
Attending: EMERGENCY MEDICINE
Payer: MEDICARE

## 2022-09-02 VITALS
SYSTOLIC BLOOD PRESSURE: 150 MMHG | BODY MASS INDEX: 18.99 KG/M2 | RESPIRATION RATE: 20 BRPM | DIASTOLIC BLOOD PRESSURE: 97 MMHG | HEART RATE: 101 BPM | OXYGEN SATURATION: 93 % | TEMPERATURE: 97.6 F | WEIGHT: 140 LBS

## 2022-09-02 DIAGNOSIS — J81.0 ACUTE PULMONARY EDEMA (HCC): ICD-10-CM

## 2022-09-02 DIAGNOSIS — J18.9 HCAP (HEALTHCARE-ASSOCIATED PNEUMONIA): Primary | ICD-10-CM

## 2022-09-02 DIAGNOSIS — Z99.2 ESRD (END STAGE RENAL DISEASE) ON DIALYSIS (HCC): ICD-10-CM

## 2022-09-02 DIAGNOSIS — N18.6 ESRD (END STAGE RENAL DISEASE) ON DIALYSIS (HCC): ICD-10-CM

## 2022-09-02 DIAGNOSIS — R06.2 WHEEZING: ICD-10-CM

## 2022-09-02 LAB
ALBUMIN SERPL-MCNC: 3.5 G/DL (ref 3.5–5)
ALBUMIN/GLOB SERPL: 0.9 {RATIO} (ref 1.1–2.2)
ALP SERPL-CCNC: 95 U/L (ref 45–117)
ALT SERPL-CCNC: 9 U/L (ref 12–78)
ANION GAP SERPL CALC-SCNC: 12 MMOL/L (ref 5–15)
APPEARANCE UR: CLEAR
AST SERPL-CCNC: 15 U/L (ref 15–37)
BACTERIA URNS QL MICRO: NEGATIVE /HPF
BASOPHILS # BLD: 0.1 K/UL (ref 0–0.1)
BASOPHILS NFR BLD: 2 % (ref 0–1)
BILIRUB SERPL-MCNC: 0.5 MG/DL (ref 0.2–1)
BILIRUB UR QL: NEGATIVE
BNP SERPL-MCNC: ABNORMAL PG/ML (ref 0–450)
BUN SERPL-MCNC: 52 MG/DL (ref 6–20)
BUN/CREAT SERPL: 7 (ref 12–20)
CALCIUM SERPL-MCNC: 8.6 MG/DL (ref 8.5–10.1)
CHLORIDE SERPL-SCNC: 103 MMOL/L (ref 97–108)
CO2 SERPL-SCNC: 28 MMOL/L (ref 21–32)
COLOR UR: ABNORMAL
CREAT SERPL-MCNC: 7.48 MG/DL (ref 0.7–1.3)
DIFFERENTIAL METHOD BLD: ABNORMAL
EOSINOPHIL # BLD: 0.9 K/UL (ref 0–0.4)
EOSINOPHIL NFR BLD: 13 % (ref 0–7)
EPITH CASTS URNS QL MICRO: ABNORMAL /LPF
ERYTHROCYTE [DISTWIDTH] IN BLOOD BY AUTOMATED COUNT: 14.2 % (ref 11.5–14.5)
FLUAV RNA SPEC QL NAA+PROBE: NOT DETECTED
FLUBV RNA SPEC QL NAA+PROBE: NOT DETECTED
GLOBULIN SER CALC-MCNC: 4 G/DL (ref 2–4)
GLUCOSE SERPL-MCNC: 119 MG/DL (ref 65–100)
GLUCOSE UR STRIP.AUTO-MCNC: NEGATIVE MG/DL
HCT VFR BLD AUTO: 36.8 % (ref 36.6–50.3)
HGB BLD-MCNC: 12 G/DL (ref 12.1–17)
HGB UR QL STRIP: ABNORMAL
IMM GRANULOCYTES # BLD AUTO: 0 K/UL (ref 0–0.04)
IMM GRANULOCYTES NFR BLD AUTO: 0 % (ref 0–0.5)
KETONES UR QL STRIP.AUTO: NEGATIVE MG/DL
LACTATE SERPL-SCNC: 1.5 MMOL/L (ref 0.4–2)
LACTATE SERPL-SCNC: 1.8 MMOL/L (ref 0.4–2)
LEUKOCYTE ESTERASE UR QL STRIP.AUTO: NEGATIVE
LYMPHOCYTES # BLD: 1.8 K/UL (ref 0.8–3.5)
LYMPHOCYTES NFR BLD: 26 % (ref 12–49)
MCH RBC QN AUTO: 32.9 PG (ref 26–34)
MCHC RBC AUTO-ENTMCNC: 32.6 G/DL (ref 30–36.5)
MCV RBC AUTO: 100.8 FL (ref 80–99)
MONOCYTES # BLD: 0.9 K/UL (ref 0–1)
MONOCYTES NFR BLD: 13 % (ref 5–13)
NEUTS SEG # BLD: 3.2 K/UL (ref 1.8–8)
NEUTS SEG NFR BLD: 46 % (ref 32–75)
NITRITE UR QL STRIP.AUTO: NEGATIVE
NRBC # BLD: 0 K/UL (ref 0–0.01)
NRBC BLD-RTO: 0 PER 100 WBC
PH UR STRIP: 7 [PH] (ref 5–8)
PLATELET # BLD AUTO: 234 K/UL (ref 150–400)
PMV BLD AUTO: 11.4 FL (ref 8.9–12.9)
POTASSIUM SERPL-SCNC: 3.9 MMOL/L (ref 3.5–5.1)
PROT SERPL-MCNC: 7.5 G/DL (ref 6.4–8.2)
PROT UR STRIP-MCNC: >300 MG/DL
RBC # BLD AUTO: 3.65 M/UL (ref 4.1–5.7)
RBC #/AREA URNS HPF: ABNORMAL /HPF (ref 0–5)
SARS-COV-2, COV2: NOT DETECTED
SODIUM SERPL-SCNC: 143 MMOL/L (ref 136–145)
SP GR UR REFRACTOMETRY: 1.01 (ref 1–1.03)
TROPONIN-HIGH SENSITIVITY: 51 NG/L (ref 0–76)
UA: UC IF INDICATED,UAUC: ABNORMAL
UROBILINOGEN UR QL STRIP.AUTO: 0.2 EU/DL (ref 0.2–1)
WBC # BLD AUTO: 6.9 K/UL (ref 4.1–11.1)
WBC URNS QL MICRO: ABNORMAL /HPF (ref 0–4)

## 2022-09-02 PROCEDURE — 83880 ASSAY OF NATRIURETIC PEPTIDE: CPT

## 2022-09-02 PROCEDURE — 36415 COLL VENOUS BLD VENIPUNCTURE: CPT

## 2022-09-02 PROCEDURE — 83605 ASSAY OF LACTIC ACID: CPT

## 2022-09-02 PROCEDURE — 94640 AIRWAY INHALATION TREATMENT: CPT

## 2022-09-02 PROCEDURE — 96365 THER/PROPH/DIAG IV INF INIT: CPT

## 2022-09-02 PROCEDURE — 74011250636 HC RX REV CODE- 250/636: Performed by: EMERGENCY MEDICINE

## 2022-09-02 PROCEDURE — 99285 EMERGENCY DEPT VISIT HI MDM: CPT

## 2022-09-02 PROCEDURE — 74011250637 HC RX REV CODE- 250/637: Performed by: EMERGENCY MEDICINE

## 2022-09-02 PROCEDURE — 80053 COMPREHEN METABOLIC PANEL: CPT

## 2022-09-02 PROCEDURE — 84484 ASSAY OF TROPONIN QUANT: CPT

## 2022-09-02 PROCEDURE — 85025 COMPLETE CBC W/AUTO DIFF WBC: CPT

## 2022-09-02 PROCEDURE — 87636 SARSCOV2 & INF A&B AMP PRB: CPT

## 2022-09-02 PROCEDURE — 81001 URINALYSIS AUTO W/SCOPE: CPT

## 2022-09-02 PROCEDURE — 77030029684 HC NEB SM VOL KT MONA -A

## 2022-09-02 PROCEDURE — 74011000250 HC RX REV CODE- 250: Performed by: EMERGENCY MEDICINE

## 2022-09-02 PROCEDURE — 96367 TX/PROPH/DG ADDL SEQ IV INF: CPT

## 2022-09-02 PROCEDURE — 96375 TX/PRO/DX INJ NEW DRUG ADDON: CPT

## 2022-09-02 PROCEDURE — 74011000258 HC RX REV CODE- 258: Performed by: EMERGENCY MEDICINE

## 2022-09-02 PROCEDURE — 71045 X-RAY EXAM CHEST 1 VIEW: CPT

## 2022-09-02 PROCEDURE — 93005 ELECTROCARDIOGRAM TRACING: CPT

## 2022-09-02 PROCEDURE — 87040 BLOOD CULTURE FOR BACTERIA: CPT

## 2022-09-02 RX ORDER — FUROSEMIDE 10 MG/ML
80 INJECTION INTRAMUSCULAR; INTRAVENOUS
Status: COMPLETED | OUTPATIENT
Start: 2022-09-02 | End: 2022-09-02

## 2022-09-02 RX ORDER — LEVOFLOXACIN 5 MG/ML
500 INJECTION, SOLUTION INTRAVENOUS
Status: DISCONTINUED | OUTPATIENT
Start: 2022-09-04 | End: 2022-09-03 | Stop reason: HOSPADM

## 2022-09-02 RX ORDER — IPRATROPIUM BROMIDE AND ALBUTEROL SULFATE 2.5; .5 MG/3ML; MG/3ML
3 SOLUTION RESPIRATORY (INHALATION) ONCE
Status: COMPLETED | OUTPATIENT
Start: 2022-09-02 | End: 2022-09-02

## 2022-09-02 RX ORDER — ALBUTEROL SULFATE 90 UG/1
AEROSOL, METERED RESPIRATORY (INHALATION)
Qty: 6.7 EACH | Refills: 0 | Status: SHIPPED | OUTPATIENT
Start: 2022-09-02

## 2022-09-02 RX ORDER — LEVOFLOXACIN 5 MG/ML
750 INJECTION, SOLUTION INTRAVENOUS ONCE
Status: COMPLETED | OUTPATIENT
Start: 2022-09-02 | End: 2022-09-02

## 2022-09-02 RX ORDER — SODIUM CHLORIDE 0.9 % (FLUSH) 0.9 %
5-10 SYRINGE (ML) INJECTION AS NEEDED
Status: DISCONTINUED | OUTPATIENT
Start: 2022-09-02 | End: 2022-09-03 | Stop reason: HOSPADM

## 2022-09-02 RX ORDER — ACETAMINOPHEN 500 MG
1000 TABLET ORAL ONCE
Status: COMPLETED | OUTPATIENT
Start: 2022-09-02 | End: 2022-09-02

## 2022-09-02 RX ADMIN — PIPERACILLIN AND TAZOBACTAM 4.5 G: 4; .5 INJECTION, POWDER, FOR SOLUTION INTRAVENOUS at 15:06

## 2022-09-02 RX ADMIN — FUROSEMIDE 80 MG: 10 INJECTION, SOLUTION INTRAMUSCULAR; INTRAVENOUS at 13:40

## 2022-09-02 RX ADMIN — IPRATROPIUM BROMIDE AND ALBUTEROL SULFATE 3 ML: 2.5; .5 SOLUTION RESPIRATORY (INHALATION) at 14:37

## 2022-09-02 RX ADMIN — LEVOFLOXACIN 750 MG: 750 INJECTION, SOLUTION INTRAVENOUS at 15:41

## 2022-09-02 RX ADMIN — ACETAMINOPHEN 1000 MG: 500 TABLET ORAL at 13:39

## 2022-09-02 NOTE — ED NOTES
Patient educated on medications to be administered. Verified  Allergies. Pain medication administered as ordered. Bed rails up and call bell within reach.  Given urinal

## 2022-09-02 NOTE — TELEPHONE ENCOUNTER
Patient called was diagnosed with pneumonia recently and complaining of SOB since early am.  Patient advised to go to the ER

## 2022-09-02 NOTE — ED NOTES
Called to give report and the call was transferred to the nursing supervisor who changed the bed assignment to room 235. Given charge nurses direct number.
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
## 2022-09-02 NOTE — PROGRESS NOTES
Pharmacy Note - Zosyn    3375 mg Zosyn q 8 h ordered for treatment of HAP Pneumonia. Per [facility] Renal / Extended Infusion B Lactam Policy, Zosyn will be changed to 4500 mg IVPB over 30 min x 1 to be followed in 8 hours by Zosyn 3375 mg IVPB q 12 h, each to infuse over 4 hours. Estimated Creatinine Clearance: Estimated Creatinine Clearance: 7.4 mL/min (A) (based on SCr of 7.48 mg/dL (H)). Dialysis Status, ANAND, CKD: HD    BMI:  Body mass index is 18.99 kg/m². Recent Labs     22  1156 22  1056   WBC 6.9 6.4     Temp (24hrs), Av.3 °F (37.4 °C), Min:99.3 °F (37.4 °C), Max:99.3 °F (37.4 °C)      Rationale for Adjustment:  BS . Renal / Extended Infusion B Lactam Policy    Pharmacy will continue to monitor and adjust dose as necessary. Please call Inpatient Pharmacy with any questions. Thank you,  Judy Nation MS R. Ph

## 2022-09-02 NOTE — ED NOTES
Admission information to [facility] [address] [facility] bed 207. Phone to accepting nurse at [phone]. Accepting MD Toño Spence.

## 2022-09-02 NOTE — PROGRESS NOTES
Pharmacy Note - Levofloxacin    750 mg Levofloxacin IVPB q 24 h ordered for treatment of Pneumonia (HAP). Per [facility] Renal Policy, Levofloxacin will be changed to 750 mg IVPB x 1 to be followed by 500 mg IVPB q 48 h thereafter. Estimated Creatinine Clearance: Estimated Creatinine Clearance: 7.4 mL/min (A) (based on SCr of 7.48 mg/dL (H)). Dialysis Status, ANAND, CKD: Hemo Dialysis, micki. Not specified at this time    BMI:  Body mass index is 18.99 kg/m². Recent Labs     22  1156 22  1056   WBC 6.9 6.4     Temp (24hrs), Av.3 °F (37.4 °C), Min:99.3 °F (37.4 °C), Max:99.3 °F (37.4 °C)      Rationale for Adjustment:  [facility] Renal Dosing Policy, HD patient. Pharmacy will continue to monitor and adjust dose as necessary. Please call Inpatient Pharmacy with any questions. Thank you,  Sydnie Lind MS R. Ph

## 2022-09-02 NOTE — TELEPHONE ENCOUNTER
Received call from [address] at [facility] with Red Flag Complaint. Subjective: Caller states \"I went to Emergency Room the day before yesterday. They put me on antibiotics and said I had pneumonia\"     Unsure of which antibitic he was placed on     Current Symptoms: shortness of breath when walking or laying down, left sided back pain, intermittent wheeze    Onset: 4 days ago;       Pain Severity: 8/10; Temperature: denies       Denies - chest pain / cough / blue lips  / heart palpitations      Recommended disposition: Go to Office Now    Care advice provided, patient verbalizes understanding; denies any other questions or concerns; instructed to call back for any new or worsening symptoms. Patient/Caller agrees with recommended disposition; writer provided warm transfer to Juliann Leblanc at [facility] for appointment scheduling    Attention Provider: Thank you for allowing me to participate in the care of your patient. The patient was connected to triage in response to information provided to the [facility]. Please do not respond through this encounter as the response is not directed to a shared pool.         Reason for Disposition   MILD difficulty breathing (e.g., minimal/no SOB at rest, SOB with walking, pulse < 100) of new-onset or worse than normal    Protocols used: Breathing Difficulty-ADULT-[place]

## 2022-09-02 NOTE — ED NOTES
Bedside shift change report given to Uriel Grimaldo RN's (oncoming nurse) by Kailey Carpenter RN (offgoing nurse). Report included the following information SBAR, Kardex and ED Summary.

## 2022-09-04 LAB
ATRIAL RATE: 96 BPM
CALCULATED P AXIS, ECG09: 35 DEGREES
CALCULATED R AXIS, ECG10: 7 DEGREES
CALCULATED T AXIS, ECG11: 125 DEGREES
DIAGNOSIS, 93000: NORMAL
P-R INTERVAL, ECG05: 152 MS
Q-T INTERVAL, ECG07: 366 MS
QRS DURATION, ECG06: 100 MS
QTC CALCULATION (BEZET), ECG08: 462 MS
VENTRICULAR RATE, ECG03: 96 BPM

## 2022-09-06 LAB
BACTERIA SPEC CULT: NORMAL
BACTERIA SPEC CULT: NORMAL
SERVICE CMNT-IMP: NORMAL
SERVICE CMNT-IMP: NORMAL

## 2022-09-08 ENCOUNTER — TELEPHONE (OUTPATIENT)
Dept: FAMILY MEDICINE CLINIC | Age: 77
End: 2022-09-08

## 2022-09-08 NOTE — TELEPHONE ENCOUNTER
Cannot refill without appt.  Grayson Perez is a controlled substance and I have not seen him in 9 months

## 2022-09-08 NOTE — TELEPHONE ENCOUNTER
----- Message from Medardo Malin sent at 9/7/2022  3:18 PM EDT -----  Subject: Refill Request    QUESTIONS  Name of Medication? gabapentin (NEURONTIN) 600 mg tablet  Patient-reported dosage and instructions? 600MG   How many days do you have left? 0  Preferred Pharmacy? [id number]  Pharmacy phone number (if available)? [phone]  Additional Information for Provider? Please confirm with patient   ---------------------------------------------------------------------------  --------------  CALL BACK INFO  What is the best way for the office to contact you? OK to leave message on   voicemail  Preferred Call Back Phone Number? [phone]  ---------------------------------------------------------------------------  --------------  SCRIPT ANSWERS  Relationship to Patient?  Self

## 2022-09-08 NOTE — TELEPHONE ENCOUNTER
Called patient. He is going to set up appointment to be seen. Transferred call to [facility].

## 2022-10-18 ENCOUNTER — TELEPHONE (OUTPATIENT)
Dept: FAMILY MEDICINE CLINIC | Age: 77
End: 2022-10-18

## 2022-10-18 DIAGNOSIS — L29.9 PRURITUS, UNSPECIFIED: ICD-10-CM

## 2022-10-18 RX ORDER — HYDROXYZINE PAMOATE 25 MG/1
CAPSULE ORAL
Qty: 60 CAPSULE | Refills: 0 | Status: SHIPPED | OUTPATIENT
Start: 2022-10-18

## 2022-10-18 NOTE — TELEPHONE ENCOUNTER
Pt has an appt on 11/09. Ae you willing to fill his Hydroxyzine ? Send to [facility] in [place] if approved.

## 2022-11-14 DIAGNOSIS — M05.79 RHEUMATOID ARTHRITIS INVOLVING MULTIPLE SITES WITH POSITIVE RHEUMATOID FACTOR (HCC): ICD-10-CM

## 2022-11-14 NOTE — TELEPHONE ENCOUNTER
----- Message from [facility] sent at 11/14/2022 12:03 PM EST -----  Subject: Refill Request    QUESTIONS  Name of Medication? gabapentin (NEURONTIN) 600 mg tablet  Patient-reported dosage and instructions? once daily  How many days do you have left? 0  [facility]erred Pharmacy? [id number]  Pharmacy phone number (if available)? [phone]  ---------------------------------------------------------------------------  --------------,  Name of Medication? predniSONE (DELTASONE) 10 mg tablet  Patient-reported dosage and instructions? once daily  How many days do you have left? 0  Preferred Pharmacy? [id number]  Pharmacy phone number (if available)? [phone]  Additional Information for Provider? Next appointment 11/21/2022  ---------------------------------------------------------------------------  --------------  CALL BACK INFO  What is the best way for the office to contact you? Do not leave any   message, patient will call back for answer  Preferred Call Back Phone Number? [phone]  ---------------------------------------------------------------------------  --------------  SCRIPT ANSWERS  Relationship to Patient?  Self

## 2022-11-15 RX ORDER — PREDNISONE 10 MG/1
10 TABLET ORAL DAILY
Qty: 14 TABLET | Refills: 0 | OUTPATIENT
Start: 2022-11-15

## 2022-11-15 RX ORDER — GABAPENTIN 600 MG/1
600 TABLET ORAL 3 TIMES DAILY
Qty: 90 TABLET | Refills: 0 | OUTPATIENT
Start: 2022-11-15

## 2022-11-28 DIAGNOSIS — M05.79 RHEUMATOID ARTHRITIS INVOLVING MULTIPLE SITES WITH POSITIVE RHEUMATOID FACTOR (HCC): ICD-10-CM

## 2022-11-28 NOTE — TELEPHONE ENCOUNTER
----- Message from Kika Toya sent at 11/28/2022  3:07 PM EST -----  Subject: Refill Request    QUESTIONS  Name of Medication? predniSONE (DELTASONE) 10 mg tablet  Patient-reported dosage and instructions? 10mg. Tablet. Take one by mouth   QD   How many days do you have left? 0  Preferred Pharmacy? [id number]  Pharmacy phone number (if available)? [phone]  Additional Information for Provider? Patient is in need of this   medication.   ---------------------------------------------------------------------------  --------------  CALL BACK INFO  What is the best way for the office to contact you? OK to leave message on   voicemail  Preferred Call Back Phone Number? [phone]  ---------------------------------------------------------------------------  --------------  SCRIPT ANSWERS  Relationship to Patient?  Self

## 2022-11-29 RX ORDER — PREDNISONE 10 MG/1
10 TABLET ORAL DAILY
Qty: 14 TABLET | Refills: 0 | Status: SHIPPED | OUTPATIENT
Start: 2022-11-29

## 2022-12-07 ENCOUNTER — OFFICE VISIT (OUTPATIENT)
Dept: FAMILY MEDICINE CLINIC | Age: 77
End: 2022-12-07
Payer: MEDICARE

## 2022-12-07 VITALS
HEART RATE: 70 BPM | OXYGEN SATURATION: 98 % | BODY MASS INDEX: 18.56 KG/M2 | RESPIRATION RATE: 18 BRPM | SYSTOLIC BLOOD PRESSURE: 111 MMHG | WEIGHT: 137 LBS | TEMPERATURE: 97 F | HEIGHT: 72 IN | DIASTOLIC BLOOD PRESSURE: 68 MMHG

## 2022-12-07 DIAGNOSIS — I50.9 CHRONIC CONGESTIVE HEART FAILURE, UNSPECIFIED HEART FAILURE TYPE (HCC): ICD-10-CM

## 2022-12-07 DIAGNOSIS — N18.6 ESRD ON HEMODIALYSIS (HCC): ICD-10-CM

## 2022-12-07 DIAGNOSIS — M05.79 RHEUMATOID ARTHRITIS INVOLVING MULTIPLE SITES WITH POSITIVE RHEUMATOID FACTOR (HCC): Primary | ICD-10-CM

## 2022-12-07 DIAGNOSIS — K21.9 GASTROESOPHAGEAL REFLUX DISEASE WITHOUT ESOPHAGITIS: ICD-10-CM

## 2022-12-07 DIAGNOSIS — M51.36 DDD (DEGENERATIVE DISC DISEASE), LUMBAR: ICD-10-CM

## 2022-12-07 DIAGNOSIS — Z99.2 ESRD ON HEMODIALYSIS (HCC): ICD-10-CM

## 2022-12-07 DIAGNOSIS — F51.04 PSYCHOPHYSIOLOGICAL INSOMNIA: ICD-10-CM

## 2022-12-07 DIAGNOSIS — M50.30 DDD (DEGENERATIVE DISC DISEASE), CERVICAL: ICD-10-CM

## 2022-12-07 DIAGNOSIS — Z79.899 CONTROLLED SUBSTANCE AGREEMENT SIGNED: ICD-10-CM

## 2022-12-07 DIAGNOSIS — I77.0 A-V FISTULA (HCC): ICD-10-CM

## 2022-12-07 PROBLEM — M51.369 DDD (DEGENERATIVE DISC DISEASE), LUMBAR: Status: ACTIVE | Noted: 2022-12-07

## 2022-12-07 PROBLEM — K56.609 SMALL BOWEL OBSTRUCTION (HCC): Status: RESOLVED | Noted: 2017-04-13 | Resolved: 2022-12-07

## 2022-12-07 PROBLEM — M35.3 PMR (POLYMYALGIA RHEUMATICA) (HCC): Chronic | Status: RESOLVED | Noted: 2017-04-07 | Resolved: 2022-12-07

## 2022-12-07 PROBLEM — M35.3 PMR (POLYMYALGIA RHEUMATICA) (HCC): Status: RESOLVED | Noted: 2017-04-07 | Resolved: 2022-12-07

## 2022-12-07 PROCEDURE — 99214 OFFICE O/P EST MOD 30 MIN: CPT | Performed by: NURSE PRACTITIONER

## 2022-12-07 PROCEDURE — G8420 CALC BMI NORM PARAMETERS: HCPCS | Performed by: NURSE PRACTITIONER

## 2022-12-07 PROCEDURE — 3074F SYST BP LT 130 MM HG: CPT | Performed by: NURSE PRACTITIONER

## 2022-12-07 PROCEDURE — G8432 DEP SCR NOT DOC, RNG: HCPCS | Performed by: NURSE PRACTITIONER

## 2022-12-07 PROCEDURE — G8427 DOCREV CUR MEDS BY ELIG CLIN: HCPCS | Performed by: NURSE PRACTITIONER

## 2022-12-07 PROCEDURE — G8536 NO DOC ELDER MAL SCRN: HCPCS | Performed by: NURSE PRACTITIONER

## 2022-12-07 PROCEDURE — 1123F ACP DISCUSS/DSCN MKR DOCD: CPT | Performed by: NURSE PRACTITIONER

## 2022-12-07 PROCEDURE — 1101F PT FALLS ASSESS-DOCD LE1/YR: CPT | Performed by: NURSE PRACTITIONER

## 2022-12-07 PROCEDURE — 3078F DIAST BP <80 MM HG: CPT | Performed by: NURSE PRACTITIONER

## 2022-12-07 PROCEDURE — G9231 DOC ESRD DIA TRANS PREG: HCPCS | Performed by: NURSE PRACTITIONER

## 2022-12-07 RX ORDER — FAMOTIDINE 20 MG/1
20 TABLET, FILM COATED ORAL 2 TIMES DAILY
Qty: 60 TABLET | Refills: 2 | Status: SHIPPED | OUTPATIENT
Start: 2022-12-07

## 2022-12-07 RX ORDER — TRAZODONE HYDROCHLORIDE 50 MG/1
50 TABLET ORAL
Qty: 30 TABLET | Refills: 0 | Status: SHIPPED | OUTPATIENT
Start: 2022-12-07

## 2022-12-07 RX ORDER — GABAPENTIN 600 MG/1
600 TABLET ORAL 3 TIMES DAILY
Qty: 90 TABLET | Refills: 0 | Status: SHIPPED | OUTPATIENT
Start: 2022-12-07

## 2022-12-07 RX ORDER — SEVELAMER CARBONATE 800 MG/1
2 TABLET, FILM COATED ORAL
COMMUNITY
Start: 2022-10-18

## 2022-12-07 RX ORDER — PREDNISONE 10 MG/1
10 TABLET ORAL DAILY
Qty: 90 TABLET | Refills: 0 | Status: SHIPPED | OUTPATIENT
Start: 2022-12-07
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
## 2022-12-26 DIAGNOSIS — M05.79 RHEUMATOID ARTHRITIS INVOLVING MULTIPLE SITES WITH POSITIVE RHEUMATOID FACTOR (HCC): ICD-10-CM

## 2022-12-26 RX ORDER — DICLOFENAC SODIUM 10 MG/G
GEL TOPICAL
Qty: 100 G | Refills: 1 | Status: SHIPPED | OUTPATIENT
Start: 2022-12-26

## 2022-12-29 ENCOUNTER — PATIENT OUTREACH (OUTPATIENT)
Dept: CASE MANAGEMENT | Age: 77
End: 2022-12-29

## 2022-12-29 NOTE — PROGRESS NOTES
Ambulatory Care Management Note    Date/Time:  12/29/2022 3:48 PM    This patient was received as a referral from [facility] [employer] [facility]. Ambulatory Care Manager outreached to patient today to offer care management services. Introduction to self and role of care manager provided. AC attempted to make a PCP f/up appt for pt as Ms Tj Tejeda wanted to see him around 1/07/23, but there were no open visits available for a Mon-Wed-Fri (pt has  Tu-Th-Sa) in the near future except tomorrow at 1600, but pt said that was too soon. Pt then informed ACM that he will make the appt himself. [facility] also reminded pt to have his cardiac echogram done and to c/b if he needs any assistance. Pt was grateful for the call and stated he would c/b if necessary. Patient declined care management services at this time. No follow up call was scheduled.   Patient has Ambulatory Care Manager's contact number for any questions or concerns.    Benny Thompson

## 2023-01-04 RX ORDER — TRAZODONE HYDROCHLORIDE 50 MG/1
50 TABLET ORAL
Qty: 90 TABLET | Refills: 1 | Status: SHIPPED | OUTPATIENT
Start: 2023-01-04

## 2023-01-10 RX ORDER — MONTELUKAST SODIUM 10 MG/1
TABLET ORAL
Qty: 90 TABLET | Refills: 1 | Status: SHIPPED | OUTPATIENT
Start: 2023-01-10

## 2023-02-06 DIAGNOSIS — M05.79 RHEUMATOID ARTHRITIS INVOLVING MULTIPLE SITES WITH POSITIVE RHEUMATOID FACTOR (HCC): ICD-10-CM

## 2023-02-07 RX ORDER — DICLOFENAC SODIUM 10 MG/G
GEL TOPICAL
Qty: 100 G | Refills: 1 | Status: SHIPPED | OUTPATIENT
Start: 2023-02-07

## 2023-03-01 ENCOUNTER — TELEPHONE (OUTPATIENT)
Dept: FAMILY MEDICINE CLINIC | Age: 78
End: 2023-03-01

## 2023-03-01 NOTE — TELEPHONE ENCOUNTER
Pt states he needs something for pain. He wants Voltaren. I explained to him the diclofenac gel is the Voltaren and that was called in already. He states the diclofenac is for rashes and not pain.

## 2023-03-13 DIAGNOSIS — M05.79 RHEUMATOID ARTHRITIS INVOLVING MULTIPLE SITES WITH POSITIVE RHEUMATOID FACTOR (HCC): ICD-10-CM

## 2023-03-13 RX ORDER — PREDNISONE 10 MG/1
TABLET ORAL
Qty: 90 TABLET | Refills: 0 | Status: SHIPPED | OUTPATIENT
Start: 2023-03-13

## 2023-03-14 DIAGNOSIS — R06.2 WHEEZING: ICD-10-CM

## 2023-03-15 RX ORDER — ALBUTEROL SULFATE 90 UG/1
AEROSOL, METERED RESPIRATORY (INHALATION)
Qty: 6.7 EACH | Refills: 0 | Status: SHIPPED | OUTPATIENT
Start: 2023-03-15

## 2023-03-28 DIAGNOSIS — M05.79 RHEUMATOID ARTHRITIS INVOLVING MULTIPLE SITES WITH POSITIVE RHEUMATOID FACTOR (HCC): ICD-10-CM

## 2023-03-31 RX ORDER — GABAPENTIN 600 MG/1
TABLET ORAL
Qty: 90 TABLET | Refills: 0 | Status: SHIPPED | OUTPATIENT
Start: 2023-03-31

## 2023-04-11 ENCOUNTER — APPOINTMENT (OUTPATIENT)
Dept: NON INVASIVE DIAGNOSTICS | Age: 78
DRG: 189 | End: 2023-04-11
Attending: INTERNAL MEDICINE
Payer: MEDICARE

## 2023-04-11 ENCOUNTER — HOSPITAL ENCOUNTER (INPATIENT)
Age: 78
LOS: 9 days | Discharge: HOME | DRG: 189 | End: 2023-04-20
Attending: EMERGENCY MEDICINE | Admitting: STUDENT IN AN ORGANIZED HEALTH CARE EDUCATION/TRAINING PROGRAM
Payer: MEDICARE

## 2023-04-11 ENCOUNTER — APPOINTMENT (OUTPATIENT)
Dept: GENERAL RADIOLOGY | Age: 78
DRG: 189 | End: 2023-04-11
Attending: EMERGENCY MEDICINE
Payer: MEDICARE

## 2023-04-11 DIAGNOSIS — J81.0 ACUTE PULMONARY EDEMA (HCC): ICD-10-CM

## 2023-04-11 DIAGNOSIS — R06.02 SOB (SHORTNESS OF BREATH): ICD-10-CM

## 2023-04-11 DIAGNOSIS — D64.9 ANEMIA, UNSPECIFIED TYPE: ICD-10-CM

## 2023-04-11 DIAGNOSIS — J96.01 ACUTE RESPIRATORY FAILURE WITH HYPOXIA (HCC): ICD-10-CM

## 2023-04-11 DIAGNOSIS — I50.9 HEART FAILURE, UNSPECIFIED HF CHRONICITY, UNSPECIFIED HEART FAILURE TYPE (HCC): ICD-10-CM

## 2023-04-11 DIAGNOSIS — K92.1 GASTROINTESTINAL HEMORRHAGE WITH MELENA: Primary | ICD-10-CM

## 2023-04-11 PROBLEM — R53.83 FATIGUE: Status: ACTIVE | Noted: 2023-04-11

## 2023-04-11 LAB
ALBUMIN SERPL-MCNC: 3.1 G/DL (ref 3.5–5)
ALBUMIN SERPL-MCNC: 3.1 G/DL (ref 3.5–5)
ALBUMIN/GLOB SERPL: 0.8 (ref 1.1–2.2)
ALP SERPL-CCNC: 76 U/L (ref 45–117)
ALT SERPL-CCNC: 11 U/L (ref 12–78)
ANION GAP SERPL CALC-SCNC: 5 MMOL/L (ref 5–15)
ANION GAP SERPL CALC-SCNC: 6 MMOL/L (ref 5–15)
ARTERIAL PATENCY WRIST A: POSITIVE
AST SERPL-CCNC: 12 U/L (ref 15–37)
BASE EXCESS BLD CALC-SCNC: 1.7 MMOL/L
BASOPHILS # BLD: 0.2 K/UL (ref 0–0.1)
BASOPHILS NFR BLD: 1 % (ref 0–1)
BDY SITE: NORMAL
BILIRUB SERPL-MCNC: 0.7 MG/DL (ref 0.2–1)
BUN SERPL-MCNC: 50 MG/DL (ref 6–20)
BUN SERPL-MCNC: 55 MG/DL (ref 6–20)
BUN/CREAT SERPL: 7 (ref 12–20)
BUN/CREAT SERPL: 7 (ref 12–20)
CALCIUM SERPL-MCNC: 7.7 MG/DL (ref 8.5–10.1)
CALCIUM SERPL-MCNC: 8.1 MG/DL (ref 8.5–10.1)
CHLORIDE SERPL-SCNC: 105 MMOL/L (ref 97–108)
CHLORIDE SERPL-SCNC: 107 MMOL/L (ref 97–108)
CO2 SERPL-SCNC: 26 MMOL/L (ref 21–32)
CO2 SERPL-SCNC: 28 MMOL/L (ref 21–32)
COMMENT, HOLDF: NORMAL
CREAT SERPL-MCNC: 7.15 MG/DL (ref 0.7–1.3)
CREAT SERPL-MCNC: 7.93 MG/DL (ref 0.7–1.3)
DIFFERENTIAL METHOD BLD: ABNORMAL
EOSINOPHIL # BLD: 1.2 K/UL (ref 0–0.4)
EOSINOPHIL NFR BLD: 8 % (ref 0–7)
ERYTHROCYTE [DISTWIDTH] IN BLOOD BY AUTOMATED COUNT: 14.7 % (ref 11.5–14.5)
ERYTHROCYTE [DISTWIDTH] IN BLOOD BY AUTOMATED COUNT: 15.2 % (ref 11.5–14.5)
GAS FLOW.O2 O2 DELIVERY SYS: NORMAL
GAS FLOW.O2 SETTING OXYMISER: 10 BPM
GLOBULIN SER CALC-MCNC: 3.9 G/DL (ref 2–4)
GLUCOSE SERPL-MCNC: 114 MG/DL (ref 65–100)
GLUCOSE SERPL-MCNC: 168 MG/DL (ref 65–100)
HBV CORE IGM SER QL: NONREACTIVE
HBV SURFACE AB SER QL: NONREACTIVE
HBV SURFACE AB SER-ACNC: <3.1 MIU/ML
HBV SURFACE AG SER QL: <0.1 INDEX
HBV SURFACE AG SER QL: NEGATIVE
HCO3 BLD-SCNC: 25.9 MMOL/L (ref 22–26)
HCT VFR BLD AUTO: 25.9 % (ref 36.6–50.3)
HCT VFR BLD AUTO: 26.9 % (ref 36.6–50.3)
HGB BLD-MCNC: 8.7 G/DL (ref 12.1–17)
HGB BLD-MCNC: 9.3 G/DL (ref 12.1–17)
IMM GRANULOCYTES # BLD AUTO: 0.2 K/UL (ref 0–0.04)
IMM GRANULOCYTES NFR BLD AUTO: 1 % (ref 0–0.5)
LYMPHOCYTES # BLD: 2.1 K/UL (ref 0.8–3.5)
LYMPHOCYTES NFR BLD: 14 % (ref 12–49)
MAGNESIUM SERPL-MCNC: 1.8 MG/DL (ref 1.6–2.4)
MCH RBC QN AUTO: 32.5 PG (ref 26–34)
MCH RBC QN AUTO: 32.7 PG (ref 26–34)
MCHC RBC AUTO-ENTMCNC: 33.6 G/DL (ref 30–36.5)
MCHC RBC AUTO-ENTMCNC: 34.6 G/DL (ref 30–36.5)
MCV RBC AUTO: 94.7 FL (ref 80–99)
MCV RBC AUTO: 96.6 FL (ref 80–99)
MONOCYTES # BLD: 1.2 K/UL (ref 0–1)
MONOCYTES NFR BLD: 8 % (ref 5–13)
NEUTS SEG # BLD: 10.3 K/UL (ref 1.8–8)
NEUTS SEG NFR BLD: 68 % (ref 32–75)
NRBC # BLD: 0 K/UL (ref 0–0.01)
NRBC # BLD: 0 K/UL (ref 0–0.01)
NRBC BLD-RTO: 0 PER 100 WBC
NRBC BLD-RTO: 0 PER 100 WBC
O2/TOTAL GAS SETTING VFR VENT: 70 %
PCO2 BLD: 37.7 MMHG (ref 35–45)
PEEP RESPIRATORY: 7 CMH2O
PH BLD: 7.44 (ref 7.35–7.45)
PHOSPHATE SERPL-MCNC: 3.3 MG/DL (ref 2.6–4.7)
PIP ISTAT,IPIP: 15
PLATELET # BLD AUTO: 174 K/UL (ref 150–400)
PLATELET # BLD AUTO: 201 K/UL (ref 150–400)
PMV BLD AUTO: 11.7 FL (ref 8.9–12.9)
PMV BLD AUTO: 12.3 FL (ref 8.9–12.9)
PO2 BLD: 85 MMHG (ref 80–100)
POTASSIUM SERPL-SCNC: 3.5 MMOL/L (ref 3.5–5.1)
POTASSIUM SERPL-SCNC: 3.7 MMOL/L (ref 3.5–5.1)
PROT SERPL-MCNC: 7 G/DL (ref 6.4–8.2)
RBC # BLD AUTO: 2.68 M/UL (ref 4.1–5.7)
RBC # BLD AUTO: 2.84 M/UL (ref 4.1–5.7)
RBC MORPH BLD: ABNORMAL
SAMPLES BEING HELD,HOLD: NORMAL
SAO2 % BLD: 96.9 % (ref 92–97)
SODIUM SERPL-SCNC: 138 MMOL/L (ref 136–145)
SODIUM SERPL-SCNC: 139 MMOL/L (ref 136–145)
SPECIMEN TYPE: NORMAL
VENTILATION MODE VENT: NORMAL
WBC # BLD AUTO: 15.2 K/UL (ref 4.1–11.1)
WBC # BLD AUTO: 8.4 K/UL (ref 4.1–11.1)

## 2023-04-11 PROCEDURE — 74011250636 HC RX REV CODE- 250/636: Performed by: EMERGENCY MEDICINE

## 2023-04-11 PROCEDURE — 86705 HEP B CORE ANTIBODY IGM: CPT

## 2023-04-11 PROCEDURE — 74011250636 HC RX REV CODE- 250/636

## 2023-04-11 PROCEDURE — 80053 COMPREHEN METABOLIC PANEL: CPT

## 2023-04-11 PROCEDURE — 65660000001 HC RM ICU INTERMED STEPDOWN

## 2023-04-11 PROCEDURE — 74011250637 HC RX REV CODE- 250/637: Performed by: INTERNAL MEDICINE

## 2023-04-11 PROCEDURE — 74011000250 HC RX REV CODE- 250: Performed by: EMERGENCY MEDICINE

## 2023-04-11 PROCEDURE — 74011250637 HC RX REV CODE- 250/637

## 2023-04-11 PROCEDURE — 90935 HEMODIALYSIS ONE EVALUATION: CPT

## 2023-04-11 PROCEDURE — 5A1D70Z PERFORMANCE OF URINARY FILTRATION, INTERMITTENT, LESS THAN 6 HOURS PER DAY: ICD-10-PCS | Performed by: INTERNAL MEDICINE

## 2023-04-11 PROCEDURE — 36600 WITHDRAWAL OF ARTERIAL BLOOD: CPT

## 2023-04-11 PROCEDURE — 5A09357 ASSISTANCE WITH RESPIRATORY VENTILATION, LESS THAN 24 CONSECUTIVE HOURS, CONTINUOUS POSITIVE AIRWAY PRESSURE: ICD-10-PCS

## 2023-04-11 PROCEDURE — 96374 THER/PROPH/DIAG INJ IV PUSH: CPT

## 2023-04-11 PROCEDURE — C9113 INJ PANTOPRAZOLE SODIUM, VIA: HCPCS

## 2023-04-11 PROCEDURE — 74011000250 HC RX REV CODE- 250

## 2023-04-11 PROCEDURE — 82803 BLOOD GASES ANY COMBINATION: CPT

## 2023-04-11 PROCEDURE — 99285 EMERGENCY DEPT VISIT HI MDM: CPT

## 2023-04-11 PROCEDURE — 85027 COMPLETE CBC AUTOMATED: CPT

## 2023-04-11 PROCEDURE — 83735 ASSAY OF MAGNESIUM: CPT

## 2023-04-11 PROCEDURE — 94660 CPAP INITIATION&MGMT: CPT

## 2023-04-11 PROCEDURE — 80069 RENAL FUNCTION PANEL: CPT

## 2023-04-11 PROCEDURE — 85025 COMPLETE CBC W/AUTO DIFF WBC: CPT

## 2023-04-11 PROCEDURE — 71045 X-RAY EXAM CHEST 1 VIEW: CPT

## 2023-04-11 PROCEDURE — 36415 COLL VENOUS BLD VENIPUNCTURE: CPT

## 2023-04-11 PROCEDURE — 87340 HEPATITIS B SURFACE AG IA: CPT

## 2023-04-11 PROCEDURE — 86706 HEP B SURFACE ANTIBODY: CPT

## 2023-04-11 PROCEDURE — 74011250637 HC RX REV CODE- 250/637: Performed by: STUDENT IN AN ORGANIZED HEALTH CARE EDUCATION/TRAINING PROGRAM

## 2023-04-11 RX ORDER — NIFEDIPINE 60 MG/1
120 TABLET, EXTENDED RELEASE ORAL DAILY
Status: DISCONTINUED | OUTPATIENT
Start: 2023-04-11 | End: 2023-04-11

## 2023-04-11 RX ORDER — UREA 10 %
325 LOTION (ML) TOPICAL
Status: DISCONTINUED | OUTPATIENT
Start: 2023-04-11 | End: 2023-04-20 | Stop reason: HOSPADM

## 2023-04-11 RX ORDER — SODIUM CHLORIDE 0.9 % (FLUSH) 0.9 %
5-40 SYRINGE (ML) INJECTION EVERY 8 HOURS
Status: DISCONTINUED | OUTPATIENT
Start: 2023-04-11 | End: 2023-04-12 | Stop reason: SDUPTHER

## 2023-04-11 RX ORDER — GABAPENTIN 300 MG/1
600 CAPSULE ORAL 3 TIMES DAILY
Status: DISCONTINUED | OUTPATIENT
Start: 2023-04-11 | End: 2023-04-20 | Stop reason: HOSPADM

## 2023-04-11 RX ORDER — TRAMADOL HYDROCHLORIDE 50 MG/1
50 TABLET ORAL
Status: DISCONTINUED | OUTPATIENT
Start: 2023-04-11 | End: 2023-04-20 | Stop reason: HOSPADM

## 2023-04-11 RX ORDER — NIFEDIPINE 30 MG/1
120 TABLET, FILM COATED, EXTENDED RELEASE ORAL DAILY
Status: DISCONTINUED | OUTPATIENT
Start: 2023-04-11 | End: 2023-04-11 | Stop reason: CLARIF

## 2023-04-11 RX ORDER — LORAZEPAM 2 MG/ML
1 INJECTION INTRAMUSCULAR
Status: COMPLETED | OUTPATIENT
Start: 2023-04-11 | End: 2023-04-11

## 2023-04-11 RX ORDER — METOPROLOL TARTRATE 25 MG/1
25 TABLET, FILM COATED ORAL EVERY 12 HOURS
Status: DISCONTINUED | OUTPATIENT
Start: 2023-04-11 | End: 2023-04-13

## 2023-04-11 RX ORDER — ACETAMINOPHEN 650 MG/1
650 SUPPOSITORY RECTAL
Status: DISCONTINUED | OUTPATIENT
Start: 2023-04-11 | End: 2023-04-20 | Stop reason: HOSPADM

## 2023-04-11 RX ORDER — SODIUM CHLORIDE 0.9 % (FLUSH) 0.9 %
5-40 SYRINGE (ML) INJECTION AS NEEDED
Status: DISCONTINUED | OUTPATIENT
Start: 2023-04-11 | End: 2023-04-12 | Stop reason: SDUPTHER

## 2023-04-11 RX ORDER — ERGOCALCIFEROL 1.25 MG/1
50000 CAPSULE ORAL
Status: DISCONTINUED | OUTPATIENT
Start: 2023-04-17 | End: 2023-04-20 | Stop reason: HOSPADM

## 2023-04-11 RX ORDER — UREA 10 %
325 LOTION (ML) TOPICAL
COMMUNITY

## 2023-04-11 RX ORDER — IPRATROPIUM BROMIDE AND ALBUTEROL SULFATE 2.5; .5 MG/3ML; MG/3ML
3 SOLUTION RESPIRATORY (INHALATION)
Status: DISCONTINUED | OUTPATIENT
Start: 2023-04-11 | End: 2023-04-20 | Stop reason: HOSPADM

## 2023-04-11 RX ORDER — ONDANSETRON 2 MG/ML
4 INJECTION INTRAMUSCULAR; INTRAVENOUS
Status: DISCONTINUED | OUTPATIENT
Start: 2023-04-11 | End: 2023-04-20 | Stop reason: HOSPADM

## 2023-04-11 RX ORDER — MONTELUKAST SODIUM 10 MG/1
10 TABLET ORAL DAILY
Status: DISCONTINUED | OUTPATIENT
Start: 2023-04-11 | End: 2023-04-20 | Stop reason: HOSPADM

## 2023-04-11 RX ORDER — METOPROLOL TARTRATE 5 MG/5ML
5 INJECTION INTRAVENOUS
Status: DISCONTINUED | OUTPATIENT
Start: 2023-04-11 | End: 2023-04-20 | Stop reason: HOSPADM

## 2023-04-11 RX ORDER — ACETAMINOPHEN 325 MG/1
650 TABLET ORAL
Status: DISCONTINUED | OUTPATIENT
Start: 2023-04-11 | End: 2023-04-20 | Stop reason: HOSPADM

## 2023-04-11 RX ORDER — MORPHINE SULFATE 2 MG/ML
1 INJECTION, SOLUTION INTRAMUSCULAR; INTRAVENOUS ONCE
Status: COMPLETED | OUTPATIENT
Start: 2023-04-11 | End: 2023-04-11

## 2023-04-11 RX ORDER — ONDANSETRON 4 MG/1
4 TABLET, ORALLY DISINTEGRATING ORAL
Status: DISCONTINUED | OUTPATIENT
Start: 2023-04-11 | End: 2023-04-20 | Stop reason: HOSPADM

## 2023-04-11 RX ORDER — POLYETHYLENE GLYCOL 3350 17 G/17G
17 POWDER, FOR SOLUTION ORAL DAILY PRN
Status: DISCONTINUED | OUTPATIENT
Start: 2023-04-11 | End: 2023-04-20 | Stop reason: HOSPADM

## 2023-04-11 RX ORDER — BUMETANIDE 0.25 MG/ML
1 INJECTION INTRAMUSCULAR; INTRAVENOUS
Status: COMPLETED | OUTPATIENT
Start: 2023-04-11 | End: 2023-04-11

## 2023-04-11 RX ORDER — CALCIUM ACETATE 667 MG/1
1 CAPSULE ORAL
Status: DISCONTINUED | OUTPATIENT
Start: 2023-04-11 | End: 2023-04-20 | Stop reason: HOSPADM

## 2023-04-11 RX ADMIN — SODIUM CHLORIDE, PRESERVATIVE FREE 10 ML: 5 INJECTION INTRAVENOUS at 14:33

## 2023-04-11 RX ADMIN — MONTELUKAST 10 MG: 10 TABLET, FILM COATED ORAL at 13:37

## 2023-04-11 RX ADMIN — TRAMADOL HYDROCHLORIDE 50 MG: 50 TABLET ORAL at 17:44

## 2023-04-11 RX ADMIN — SODIUM CHLORIDE, PRESERVATIVE FREE 10 ML: 5 INJECTION INTRAVENOUS at 21:41

## 2023-04-11 RX ADMIN — BUMETANIDE 1 MG: 0.25 INJECTION INTRAMUSCULAR; INTRAVENOUS at 05:11

## 2023-04-11 RX ADMIN — SODIUM CHLORIDE, PRESERVATIVE FREE 10 ML: 5 INJECTION INTRAVENOUS at 07:10

## 2023-04-11 RX ADMIN — ACETAMINOPHEN 325MG 650 MG: 325 TABLET ORAL at 14:49

## 2023-04-11 RX ADMIN — METOPROLOL TARTRATE 25 MG: 25 TABLET, FILM COATED ORAL at 13:43

## 2023-04-11 RX ADMIN — MORPHINE SULFATE 1 MG: 2 INJECTION, SOLUTION INTRAMUSCULAR; INTRAVENOUS at 07:09

## 2023-04-11 RX ADMIN — PANTOPRAZOLE SODIUM 40 MG: 40 INJECTION, POWDER, LYOPHILIZED, FOR SOLUTION INTRAVENOUS at 13:37

## 2023-04-11 RX ADMIN — CALCIUM ACETATE 667 MG: 667 CAPSULE ORAL at 17:44

## 2023-04-11 RX ADMIN — METOPROLOL TARTRATE 25 MG: 25 TABLET, FILM COATED ORAL at 21:39

## 2023-04-11 RX ADMIN — PANTOPRAZOLE SODIUM 40 MG: 40 INJECTION, POWDER, LYOPHILIZED, FOR SOLUTION INTRAVENOUS at 21:39

## 2023-04-11 RX ADMIN — FERROUS SULFATE TAB 325 MG (65 MG ELEMENTAL FE) 325 MG: 325 (65 FE) TAB at 13:37

## 2023-04-11 RX ADMIN — LORAZEPAM 1 MG: 2 INJECTION INTRAMUSCULAR; INTRAVENOUS at 04:26

## 2023-04-12 ENCOUNTER — APPOINTMENT (OUTPATIENT)
Dept: NON INVASIVE DIAGNOSTICS | Age: 78
DRG: 189 | End: 2023-04-12
Attending: INTERNAL MEDICINE
Payer: MEDICARE

## 2023-04-12 LAB
ALBUMIN SERPL-MCNC: 2.8 G/DL (ref 3.5–5)
ALBUMIN/GLOB SERPL: 0.7 (ref 1.1–2.2)
ALP SERPL-CCNC: 71 U/L (ref 45–117)
ALT SERPL-CCNC: 10 U/L (ref 12–78)
ANION GAP SERPL CALC-SCNC: 2 MMOL/L (ref 5–15)
AST SERPL-CCNC: 10 U/L (ref 15–37)
BASOPHILS # BLD: 0.1 K/UL (ref 0–0.1)
BASOPHILS NFR BLD: 1 % (ref 0–1)
BILIRUB SERPL-MCNC: 0.7 MG/DL (ref 0.2–1)
BUN SERPL-MCNC: 27 MG/DL (ref 6–20)
BUN/CREAT SERPL: 5 (ref 12–20)
CALCIUM SERPL-MCNC: 8.3 MG/DL (ref 8.5–10.1)
CHLORIDE SERPL-SCNC: 98 MMOL/L (ref 97–108)
CHOLEST SERPL-MCNC: 160 MG/DL
CO2 SERPL-SCNC: 33 MMOL/L (ref 21–32)
CREAT SERPL-MCNC: 5.43 MG/DL (ref 0.7–1.3)
DIFFERENTIAL METHOD BLD: ABNORMAL
EOSINOPHIL # BLD: 0.6 K/UL (ref 0–0.4)
EOSINOPHIL NFR BLD: 8 % (ref 0–7)
ERYTHROCYTE [DISTWIDTH] IN BLOOD BY AUTOMATED COUNT: 15.2 % (ref 11.5–14.5)
GLOBULIN SER CALC-MCNC: 4.1 G/DL (ref 2–4)
GLUCOSE SERPL-MCNC: 118 MG/DL (ref 65–100)
HCT VFR BLD AUTO: 24.3 % (ref 36.6–50.3)
HDLC SERPL-MCNC: 88 MG/DL
HDLC SERPL: 1.8 (ref 0–5)
HGB BLD-MCNC: 8.1 G/DL (ref 12.1–17)
IMM GRANULOCYTES # BLD AUTO: 0 K/UL (ref 0–0.04)
IMM GRANULOCYTES NFR BLD AUTO: 0 % (ref 0–0.5)
LDLC SERPL CALC-MCNC: 55.2 MG/DL (ref 0–100)
LYMPHOCYTES # BLD: 1.1 K/UL (ref 0.8–3.5)
LYMPHOCYTES NFR BLD: 16 % (ref 12–49)
MCH RBC QN AUTO: 32.4 PG (ref 26–34)
MCHC RBC AUTO-ENTMCNC: 33.3 G/DL (ref 30–36.5)
MCV RBC AUTO: 97.2 FL (ref 80–99)
MONOCYTES # BLD: 0.6 K/UL (ref 0–1)
MONOCYTES NFR BLD: 8 % (ref 5–13)
NEUTS SEG # BLD: 4.5 K/UL (ref 1.8–8)
NEUTS SEG NFR BLD: 67 % (ref 32–75)
NRBC # BLD: 0 K/UL (ref 0–0.01)
NRBC BLD-RTO: 0 PER 100 WBC
PLATELET # BLD AUTO: 182 K/UL (ref 150–400)
PMV BLD AUTO: 12 FL (ref 8.9–12.9)
POTASSIUM SERPL-SCNC: 4.1 MMOL/L (ref 3.5–5.1)
PROT SERPL-MCNC: 6.9 G/DL (ref 6.4–8.2)
RBC # BLD AUTO: 2.5 M/UL (ref 4.1–5.7)
RBC MORPH BLD: ABNORMAL
RBC MORPH BLD: ABNORMAL
SODIUM SERPL-SCNC: 133 MMOL/L (ref 136–145)
TRIGL SERPL-MCNC: 84 MG/DL (ref ?–150)
TSH SERPL DL<=0.05 MIU/L-ACNC: 1.11 UIU/ML (ref 0.36–3.74)
VLDLC SERPL CALC-MCNC: 16.8 MG/DL
WBC # BLD AUTO: 6.9 K/UL (ref 4.1–11.1)

## 2023-04-12 PROCEDURE — 74011250636 HC RX REV CODE- 250/636: Performed by: INTERNAL MEDICINE

## 2023-04-12 PROCEDURE — 74011000250 HC RX REV CODE- 250: Performed by: INTERNAL MEDICINE

## 2023-04-12 PROCEDURE — 88342 IMHCHEM/IMCYTCHM 1ST ANTB: CPT

## 2023-04-12 PROCEDURE — 74011250637 HC RX REV CODE- 250/637: Performed by: INTERNAL MEDICINE

## 2023-04-12 PROCEDURE — 84443 ASSAY THYROID STIM HORMONE: CPT

## 2023-04-12 PROCEDURE — 76060000031 HC ANESTHESIA FIRST 0.5 HR: Performed by: INTERNAL MEDICINE

## 2023-04-12 PROCEDURE — C9113 INJ PANTOPRAZOLE SODIUM, VIA: HCPCS

## 2023-04-12 PROCEDURE — 74011250637 HC RX REV CODE- 250/637

## 2023-04-12 PROCEDURE — 80061 LIPID PANEL: CPT

## 2023-04-12 PROCEDURE — 88305 TISSUE EXAM BY PATHOLOGIST: CPT

## 2023-04-12 PROCEDURE — 77030019988 HC FCPS ENDOSC DISP BSC -B: Performed by: INTERNAL MEDICINE

## 2023-04-12 PROCEDURE — 65660000001 HC RM ICU INTERMED STEPDOWN

## 2023-04-12 PROCEDURE — 76040000019: Performed by: INTERNAL MEDICINE

## 2023-04-12 PROCEDURE — 85025 COMPLETE CBC W/AUTO DIFF WBC: CPT

## 2023-04-12 PROCEDURE — 74011250636 HC RX REV CODE- 250/636

## 2023-04-12 PROCEDURE — 80053 COMPREHEN METABOLIC PANEL: CPT

## 2023-04-12 PROCEDURE — 77010033678 HC OXYGEN DAILY

## 2023-04-12 PROCEDURE — 2709999900 HC NON-CHARGEABLE SUPPLY: Performed by: INTERNAL MEDICINE

## 2023-04-12 PROCEDURE — 36415 COLL VENOUS BLD VENIPUNCTURE: CPT

## 2023-04-12 PROCEDURE — 74011000250 HC RX REV CODE- 250

## 2023-04-12 PROCEDURE — 0DB68ZX EXCISION OF STOMACH, VIA NATURAL OR ARTIFICIAL OPENING ENDOSCOPIC, DIAGNOSTIC: ICD-10-PCS | Performed by: INTERNAL MEDICINE

## 2023-04-12 PROCEDURE — 0DB48ZX EXCISION OF ESOPHAGOGASTRIC JUNCTION, VIA NATURAL OR ARTIFICIAL OPENING ENDOSCOPIC, DIAGNOSTIC: ICD-10-PCS | Performed by: INTERNAL MEDICINE

## 2023-04-12 PROCEDURE — 0DB78ZX EXCISION OF STOMACH, PYLORUS, VIA NATURAL OR ARTIFICIAL OPENING ENDOSCOPIC, DIAGNOSTIC: ICD-10-PCS | Performed by: INTERNAL MEDICINE

## 2023-04-12 RX ORDER — BISACODYL 5 MG
5 TABLET, DELAYED RELEASE (ENTERIC COATED) ORAL ONCE
Status: COMPLETED | OUTPATIENT
Start: 2023-04-12 | End: 2023-04-12

## 2023-04-12 RX ORDER — POLYETHYLENE GLYCOL 3350, SODIUM SULFATE ANHYDROUS, SODIUM BICARBONATE, SODIUM CHLORIDE, POTASSIUM CHLORIDE 236; 22.74; 6.74; 5.86; 2.97 G/4L; G/4L; G/4L; G/4L; G/4L
4000 POWDER, FOR SOLUTION ORAL ONCE
Status: COMPLETED | OUTPATIENT
Start: 2023-04-12 | End: 2023-04-12

## 2023-04-12 RX ORDER — EPINEPHRINE 0.1 MG/ML
1 INJECTION INTRACARDIAC; INTRAVENOUS
Status: DISCONTINUED | OUTPATIENT
Start: 2023-04-12 | End: 2023-04-12 | Stop reason: HOSPADM

## 2023-04-12 RX ORDER — NALOXONE HYDROCHLORIDE 0.4 MG/ML
0.4 INJECTION, SOLUTION INTRAMUSCULAR; INTRAVENOUS; SUBCUTANEOUS
Status: DISCONTINUED | OUTPATIENT
Start: 2023-04-12 | End: 2023-04-12 | Stop reason: HOSPADM

## 2023-04-12 RX ORDER — MIDAZOLAM HYDROCHLORIDE 1 MG/ML
.25-5 INJECTION, SOLUTION INTRAMUSCULAR; INTRAVENOUS
Status: DISCONTINUED | OUTPATIENT
Start: 2023-04-12 | End: 2023-04-12 | Stop reason: HOSPADM

## 2023-04-12 RX ORDER — SODIUM CHLORIDE 0.9 % (FLUSH) 0.9 %
5-40 SYRINGE (ML) INJECTION EVERY 8 HOURS
Status: DISCONTINUED | OUTPATIENT
Start: 2023-04-12 | End: 2023-04-16 | Stop reason: SDUPTHER

## 2023-04-12 RX ORDER — ATROPINE SULFATE 0.1 MG/ML
0.5 INJECTION INTRAVENOUS
Status: DISCONTINUED | OUTPATIENT
Start: 2023-04-12 | End: 2023-04-12 | Stop reason: HOSPADM

## 2023-04-12 RX ORDER — FENTANYL CITRATE 50 UG/ML
25 INJECTION, SOLUTION INTRAMUSCULAR; INTRAVENOUS
Status: DISCONTINUED | OUTPATIENT
Start: 2023-04-12 | End: 2023-04-12 | Stop reason: HOSPADM

## 2023-04-12 RX ORDER — DEXTROMETHORPHAN/PSEUDOEPHED 2.5-7.5/.8
1.2 DROPS ORAL
Status: DISCONTINUED | OUTPATIENT
Start: 2023-04-12 | End: 2023-04-12 | Stop reason: HOSPADM

## 2023-04-12 RX ORDER — FLUMAZENIL 0.1 MG/ML
0.2 INJECTION INTRAVENOUS
Status: DISCONTINUED | OUTPATIENT
Start: 2023-04-12 | End: 2023-04-12 | Stop reason: HOSPADM

## 2023-04-12 RX ORDER — SODIUM CHLORIDE 9 MG/ML
75 INJECTION, SOLUTION INTRAVENOUS CONTINUOUS
Status: DISPENSED | OUTPATIENT
Start: 2023-04-12 | End: 2023-04-12

## 2023-04-12 RX ORDER — SODIUM CHLORIDE 0.9 % (FLUSH) 0.9 %
5-40 SYRINGE (ML) INJECTION AS NEEDED
Status: DISCONTINUED | OUTPATIENT
Start: 2023-04-12 | End: 2023-04-16 | Stop reason: SDUPTHER

## 2023-04-12 RX ADMIN — METOPROLOL TARTRATE 25 MG: 25 TABLET, FILM COATED ORAL at 10:34

## 2023-04-12 RX ADMIN — MONTELUKAST 10 MG: 10 TABLET, FILM COATED ORAL at 10:34

## 2023-04-12 RX ADMIN — SODIUM CHLORIDE, PRESERVATIVE FREE 10 ML: 5 INJECTION INTRAVENOUS at 10:39

## 2023-04-12 RX ADMIN — CALCIUM ACETATE 667 MG: 667 CAPSULE ORAL at 18:22

## 2023-04-12 RX ADMIN — CALCIUM ACETATE 667 MG: 667 CAPSULE ORAL at 13:59

## 2023-04-12 RX ADMIN — CALCIUM ACETATE 667 MG: 667 CAPSULE ORAL at 10:34

## 2023-04-12 RX ADMIN — POLYETHYLENE GLYCOL-3350 AND ELECTROLYTES 4000 ML: 236; 6.74; 5.86; 2.97; 22.74 POWDER, FOR SOLUTION ORAL at 18:22

## 2023-04-12 RX ADMIN — BISACODYL 5 MG: 5 TABLET, COATED ORAL at 13:59

## 2023-04-12 RX ADMIN — SODIUM CHLORIDE, PRESERVATIVE FREE 10 ML: 5 INJECTION INTRAVENOUS at 18:23

## 2023-04-12 RX ADMIN — SODIUM CHLORIDE 75 ML/HR: 9 INJECTION, SOLUTION INTRAVENOUS at 08:02

## 2023-04-12 RX ADMIN — SODIUM CHLORIDE, PRESERVATIVE FREE 10 ML: 5 INJECTION INTRAVENOUS at 21:23

## 2023-04-12 RX ADMIN — METOPROLOL TARTRATE 25 MG: 25 TABLET, FILM COATED ORAL at 21:19

## 2023-04-12 RX ADMIN — FERROUS SULFATE TAB 325 MG (65 MG ELEMENTAL FE) 325 MG: 325 (65 FE) TAB at 10:35

## 2023-04-12 RX ADMIN — PANTOPRAZOLE SODIUM 40 MG: 40 INJECTION, POWDER, LYOPHILIZED, FOR SOLUTION INTRAVENOUS at 21:19

## 2023-04-12 RX ADMIN — SODIUM CHLORIDE, PRESERVATIVE FREE 10 ML: 5 INJECTION INTRAVENOUS at 05:42

## 2023-04-12 RX ADMIN — PANTOPRAZOLE SODIUM 40 MG: 40 INJECTION, POWDER, LYOPHILIZED, FOR SOLUTION INTRAVENOUS at 10:35

## 2023-04-13 ENCOUNTER — APPOINTMENT (OUTPATIENT)
Dept: NON INVASIVE DIAGNOSTICS | Age: 78
DRG: 189 | End: 2023-04-13
Attending: INTERNAL MEDICINE
Payer: MEDICARE

## 2023-04-13 LAB
ALBUMIN SERPL-MCNC: 2.9 G/DL (ref 3.5–5)
ALBUMIN/GLOB SERPL: 0.8 (ref 1.1–2.2)
ALP SERPL-CCNC: 69 U/L (ref 45–117)
ALT SERPL-CCNC: 11 U/L (ref 12–78)
ANION GAP SERPL CALC-SCNC: 8 MMOL/L (ref 5–15)
AST SERPL-CCNC: 10 U/L (ref 15–37)
BASOPHILS # BLD: 0.1 K/UL (ref 0–0.1)
BASOPHILS NFR BLD: 2 % (ref 0–1)
BILIRUB SERPL-MCNC: 0.7 MG/DL (ref 0.2–1)
BUN SERPL-MCNC: 46 MG/DL (ref 6–20)
BUN/CREAT SERPL: 6 (ref 12–20)
CALCIUM SERPL-MCNC: 8.3 MG/DL (ref 8.5–10.1)
CHLORIDE SERPL-SCNC: 100 MMOL/L (ref 97–108)
CO2 SERPL-SCNC: 30 MMOL/L (ref 21–32)
CREAT SERPL-MCNC: 7.3 MG/DL (ref 0.7–1.3)
DIFFERENTIAL METHOD BLD: ABNORMAL
ECHO AO ASC DIAM: 3 CM
ECHO AO ASCENDING AORTA INDEX: 1.57 CM/M2
ECHO AO ROOT DIAM: 4.1 CM
ECHO AO ROOT INDEX: 2.15 CM/M2
ECHO AV AREA PEAK VELOCITY: 2.4 CM2
ECHO AV AREA/BSA PEAK VELOCITY: 1.3 CM2/M2
ECHO AV PEAK GRADIENT: 11 MMHG
ECHO AV PEAK VELOCITY: 1.7 M/S
ECHO AV VELOCITY RATIO: 0.53
ECHO LA DIAMETER INDEX: 2.36 CM/M2
ECHO LA DIAMETER: 4.5 CM
ECHO LA TO AORTIC ROOT RATIO: 1.1
ECHO LV E' LATERAL VELOCITY: 7 CM/S
ECHO LV FRACTIONAL SHORTENING: 10 % (ref 28–44)
ECHO LV INTERNAL DIMENSION DIASTOLE INDEX: 3.09 CM/M2
ECHO LV INTERNAL DIMENSION DIASTOLIC: 5.9 CM (ref 4.2–5.9)
ECHO LV INTERNAL DIMENSION SYSTOLIC INDEX: 2.77 CM/M2
ECHO LV INTERNAL DIMENSION SYSTOLIC: 5.3 CM
ECHO LV IVSD: 0.8 CM (ref 0.6–1)
ECHO LV MASS 2D: 239.9 G (ref 88–224)
ECHO LV MASS INDEX 2D: 125.6 G/M2 (ref 49–115)
ECHO LV POSTERIOR WALL DIASTOLIC: 1.2 CM (ref 0.6–1)
ECHO LV RELATIVE WALL THICKNESS RATIO: 0.41
ECHO LVOT AREA: 4.5 CM2
ECHO LVOT DIAM: 2.4 CM
ECHO LVOT PEAK GRADIENT: 3 MMHG
ECHO LVOT PEAK VELOCITY: 0.9 M/S
ECHO MV A VELOCITY: 0.37 M/S
ECHO MV E DECELERATION TIME (DT): 148.7 MS
ECHO MV E VELOCITY: 1.35 M/S
ECHO MV E/A RATIO: 3.65
ECHO MV E/E' LATERAL: 19.29
ECHO PV MAX VELOCITY: 0.6 M/S
ECHO PV PEAK GRADIENT: 1 MMHG
ECHO RV INTERNAL DIMENSION: 4.1 CM
ECHO RV TAPSE: 1.5 CM (ref 1.7–?)
ECHO TV REGURGITANT MAX VELOCITY: 3.82 M/S
ECHO TV REGURGITANT PEAK GRADIENT: 58 MMHG
EOSINOPHIL # BLD: 1.1 K/UL (ref 0–0.4)
EOSINOPHIL NFR BLD: 17 % (ref 0–7)
ERYTHROCYTE [DISTWIDTH] IN BLOOD BY AUTOMATED COUNT: 15 % (ref 11.5–14.5)
GLOBULIN SER CALC-MCNC: 3.8 G/DL (ref 2–4)
GLUCOSE SERPL-MCNC: 108 MG/DL (ref 65–100)
HCT VFR BLD AUTO: 24.3 % (ref 36.6–50.3)
HGB BLD-MCNC: 8.2 G/DL (ref 12.1–17)
IMM GRANULOCYTES # BLD AUTO: 0 K/UL (ref 0–0.04)
IMM GRANULOCYTES NFR BLD AUTO: 0 % (ref 0–0.5)
LYMPHOCYTES # BLD: 1.3 K/UL (ref 0.8–3.5)
LYMPHOCYTES NFR BLD: 20 % (ref 12–49)
MCH RBC QN AUTO: 32.3 PG (ref 26–34)
MCHC RBC AUTO-ENTMCNC: 33.7 G/DL (ref 30–36.5)
MCV RBC AUTO: 95.7 FL (ref 80–99)
MONOCYTES # BLD: 0.9 K/UL (ref 0–1)
MONOCYTES NFR BLD: 14 % (ref 5–13)
NEUTS SEG # BLD: 3.1 K/UL (ref 1.8–8)
NEUTS SEG NFR BLD: 47 % (ref 32–75)
NRBC # BLD: 0 K/UL (ref 0–0.01)
NRBC BLD-RTO: 0 PER 100 WBC
PHOSPHATE SERPL-MCNC: 3.3 MG/DL (ref 2.6–4.7)
PLATELET # BLD AUTO: 208 K/UL (ref 150–400)
PMV BLD AUTO: 11.9 FL (ref 8.9–12.9)
POTASSIUM SERPL-SCNC: 3.8 MMOL/L (ref 3.5–5.1)
PROT SERPL-MCNC: 6.7 G/DL (ref 6.4–8.2)
RBC # BLD AUTO: 2.54 M/UL (ref 4.1–5.7)
RBC MORPH BLD: ABNORMAL
RBC MORPH BLD: ABNORMAL
SODIUM SERPL-SCNC: 138 MMOL/L (ref 136–145)
WBC # BLD AUTO: 6.5 K/UL (ref 4.1–11.1)

## 2023-04-13 PROCEDURE — 74011000250 HC RX REV CODE- 250: Performed by: INTERNAL MEDICINE

## 2023-04-13 PROCEDURE — 85025 COMPLETE CBC W/AUTO DIFF WBC: CPT

## 2023-04-13 PROCEDURE — 93306 TTE W/DOPPLER COMPLETE: CPT

## 2023-04-13 PROCEDURE — 77010033678 HC OXYGEN DAILY

## 2023-04-13 PROCEDURE — 74011000250 HC RX REV CODE- 250

## 2023-04-13 PROCEDURE — 74011250637 HC RX REV CODE- 250/637: Performed by: NURSE PRACTITIONER

## 2023-04-13 PROCEDURE — 90935 HEMODIALYSIS ONE EVALUATION: CPT

## 2023-04-13 PROCEDURE — 74011250636 HC RX REV CODE- 250/636

## 2023-04-13 PROCEDURE — 84100 ASSAY OF PHOSPHORUS: CPT

## 2023-04-13 PROCEDURE — 36415 COLL VENOUS BLD VENIPUNCTURE: CPT

## 2023-04-13 PROCEDURE — 74011250637 HC RX REV CODE- 250/637: Performed by: STUDENT IN AN ORGANIZED HEALTH CARE EDUCATION/TRAINING PROGRAM

## 2023-04-13 PROCEDURE — 65660000001 HC RM ICU INTERMED STEPDOWN

## 2023-04-13 PROCEDURE — 74011250637 HC RX REV CODE- 250/637: Performed by: INTERNAL MEDICINE

## 2023-04-13 PROCEDURE — 80053 COMPREHEN METABOLIC PANEL: CPT

## 2023-04-13 PROCEDURE — 74011250636 HC RX REV CODE- 250/636: Performed by: INTERNAL MEDICINE

## 2023-04-13 PROCEDURE — C9113 INJ PANTOPRAZOLE SODIUM, VIA: HCPCS

## 2023-04-13 PROCEDURE — 74011250637 HC RX REV CODE- 250/637

## 2023-04-13 RX ORDER — SORBITOL SOLUTION 70 %
60 SOLUTION, ORAL MISCELLANEOUS
Status: DISPENSED | OUTPATIENT
Start: 2023-04-14 | End: 2023-04-14

## 2023-04-13 RX ORDER — SORBITOL SOLUTION 70 %
60 SOLUTION, ORAL MISCELLANEOUS
Status: DISPENSED | OUTPATIENT
Start: 2023-04-13 | End: 2023-04-14

## 2023-04-13 RX ORDER — METOPROLOL TARTRATE 25 MG/1
12.5 TABLET, FILM COATED ORAL EVERY 12 HOURS
Status: DISCONTINUED | OUTPATIENT
Start: 2023-04-13 | End: 2023-04-14

## 2023-04-13 RX ADMIN — SODIUM CHLORIDE, PRESERVATIVE FREE 10 ML: 5 INJECTION INTRAVENOUS at 05:45

## 2023-04-13 RX ADMIN — SODIUM CHLORIDE, PRESERVATIVE FREE 10 ML: 5 INJECTION INTRAVENOUS at 13:18

## 2023-04-13 RX ADMIN — METOPROLOL TARTRATE 12.5 MG: 25 TABLET, FILM COATED ORAL at 21:49

## 2023-04-13 RX ADMIN — SORBITOL SOLUTION (BULK) 60 ML: 70 SOLUTION at 21:49

## 2023-04-13 RX ADMIN — ACETAMINOPHEN 325MG 650 MG: 325 TABLET ORAL at 12:28

## 2023-04-13 RX ADMIN — PANTOPRAZOLE SODIUM 40 MG: 40 INJECTION, POWDER, LYOPHILIZED, FOR SOLUTION INTRAVENOUS at 09:47

## 2023-04-13 RX ADMIN — EPOETIN ALFA-EPBX 10000 UNITS: 10000 INJECTION, SOLUTION INTRAVENOUS; SUBCUTANEOUS at 23:15

## 2023-04-13 RX ADMIN — MONTELUKAST 10 MG: 10 TABLET, FILM COATED ORAL at 09:47

## 2023-04-13 RX ADMIN — TRAMADOL HYDROCHLORIDE 50 MG: 50 TABLET ORAL at 14:58

## 2023-04-13 RX ADMIN — PANTOPRAZOLE SODIUM 40 MG: 40 INJECTION, POWDER, LYOPHILIZED, FOR SOLUTION INTRAVENOUS at 21:50

## 2023-04-13 RX ADMIN — SODIUM CHLORIDE, PRESERVATIVE FREE 10 ML: 5 INJECTION INTRAVENOUS at 22:14

## 2023-04-14 ENCOUNTER — ANESTHESIA EVENT (OUTPATIENT)
Dept: ENDOSCOPY | Age: 78
DRG: 377 | End: 2023-04-14
Payer: MEDICARE

## 2023-04-14 ENCOUNTER — ANESTHESIA (OUTPATIENT)
Dept: ENDOSCOPY | Age: 78
DRG: 377 | End: 2023-04-14
Payer: MEDICARE

## 2023-04-14 LAB
25(OH)D3 SERPL-MCNC: 46.5 NG/ML (ref 30–100)
ANION GAP SERPL CALC-SCNC: 8 MMOL/L (ref 5–15)
BUN SERPL-MCNC: 26 MG/DL (ref 6–20)
BUN/CREAT SERPL: 5 (ref 12–20)
CALCIUM SERPL-MCNC: 9.7 MG/DL (ref 8.5–10.1)
CHLORIDE SERPL-SCNC: 97 MMOL/L (ref 97–108)
CO2 SERPL-SCNC: 31 MMOL/L (ref 21–32)
CREAT SERPL-MCNC: 5.05 MG/DL (ref 0.7–1.3)
ERYTHROCYTE [DISTWIDTH] IN BLOOD BY AUTOMATED COUNT: 14.7 % (ref 11.5–14.5)
FERRITIN SERPL-MCNC: 2103 NG/ML (ref 26–388)
GLUCOSE SERPL-MCNC: 111 MG/DL (ref 65–100)
HCT VFR BLD AUTO: 32 % (ref 36.6–50.3)
HGB BLD-MCNC: 10.6 G/DL (ref 12.1–17)
IRON SATN MFR SERPL: 29 % (ref 20–50)
IRON SERPL-MCNC: 73 UG/DL (ref 35–150)
MCH RBC QN AUTO: 32.1 PG (ref 26–34)
MCHC RBC AUTO-ENTMCNC: 33.1 G/DL (ref 30–36.5)
MCV RBC AUTO: 97 FL (ref 80–99)
NRBC # BLD: 0 K/UL (ref 0–0.01)
NRBC BLD-RTO: 0 PER 100 WBC
PLATELET # BLD AUTO: 267 K/UL (ref 150–400)
PMV BLD AUTO: 12 FL (ref 8.9–12.9)
POTASSIUM SERPL-SCNC: 3.5 MMOL/L (ref 3.5–5.1)
RBC # BLD AUTO: 3.3 M/UL (ref 4.1–5.7)
SODIUM SERPL-SCNC: 136 MMOL/L (ref 136–145)
TIBC SERPL-MCNC: 254 UG/DL (ref 250–450)
WBC # BLD AUTO: 6 K/UL (ref 4.1–11.1)

## 2023-04-14 PROCEDURE — 82306 VITAMIN D 25 HYDROXY: CPT

## 2023-04-14 PROCEDURE — 82728 ASSAY OF FERRITIN: CPT

## 2023-04-14 PROCEDURE — 80048 BASIC METABOLIC PNL TOTAL CA: CPT

## 2023-04-14 PROCEDURE — 74011000250 HC RX REV CODE- 250: Performed by: INTERNAL MEDICINE

## 2023-04-14 PROCEDURE — 74011250637 HC RX REV CODE- 250/637: Performed by: INTERNAL MEDICINE

## 2023-04-14 PROCEDURE — 74011250636 HC RX REV CODE- 250/636: Performed by: REGISTERED NURSE

## 2023-04-14 PROCEDURE — 74011250636 HC RX REV CODE- 250/636

## 2023-04-14 PROCEDURE — 76040000007: Performed by: INTERNAL MEDICINE

## 2023-04-14 PROCEDURE — 74011000250 HC RX REV CODE- 250: Performed by: REGISTERED NURSE

## 2023-04-14 PROCEDURE — 74011250637 HC RX REV CODE- 250/637: Performed by: NURSE PRACTITIONER

## 2023-04-14 PROCEDURE — 74011000250 HC RX REV CODE- 250

## 2023-04-14 PROCEDURE — C9113 INJ PANTOPRAZOLE SODIUM, VIA: HCPCS

## 2023-04-14 PROCEDURE — 65660000001 HC RM ICU INTERMED STEPDOWN

## 2023-04-14 PROCEDURE — 85027 COMPLETE CBC AUTOMATED: CPT

## 2023-04-14 PROCEDURE — 83540 ASSAY OF IRON: CPT

## 2023-04-14 PROCEDURE — 76060000032 HC ANESTHESIA 0.5 TO 1 HR: Performed by: INTERNAL MEDICINE

## 2023-04-14 PROCEDURE — 77010033678 HC OXYGEN DAILY

## 2023-04-14 PROCEDURE — 36415 COLL VENOUS BLD VENIPUNCTURE: CPT

## 2023-04-14 PROCEDURE — 0DJD8ZZ INSPECTION OF LOWER INTESTINAL TRACT, VIA NATURAL OR ARTIFICIAL OPENING ENDOSCOPIC: ICD-10-PCS | Performed by: INTERNAL MEDICINE

## 2023-04-14 PROCEDURE — 2709999900 HC NON-CHARGEABLE SUPPLY: Performed by: INTERNAL MEDICINE

## 2023-04-14 PROCEDURE — 74011250637 HC RX REV CODE- 250/637

## 2023-04-14 RX ORDER — DEXTROMETHORPHAN/PSEUDOEPHED 2.5-7.5/.8
1.2 DROPS ORAL
Status: DISCONTINUED | OUTPATIENT
Start: 2023-04-14 | End: 2023-04-14 | Stop reason: HOSPADM

## 2023-04-14 RX ORDER — DIPHENHYDRAMINE HYDROCHLORIDE 50 MG/ML
12.5 INJECTION, SOLUTION INTRAMUSCULAR; INTRAVENOUS
Status: DISCONTINUED | OUTPATIENT
Start: 2023-04-14 | End: 2023-04-18 | Stop reason: SDUPTHER

## 2023-04-14 RX ORDER — ATROPINE SULFATE 0.1 MG/ML
0.5 INJECTION INTRAVENOUS
Status: DISCONTINUED | OUTPATIENT
Start: 2023-04-14 | End: 2023-04-14 | Stop reason: HOSPADM

## 2023-04-14 RX ORDER — LIDOCAINE HYDROCHLORIDE 20 MG/ML
INJECTION, SOLUTION EPIDURAL; INFILTRATION; INTRACAUDAL; PERINEURAL AS NEEDED
Status: DISCONTINUED | OUTPATIENT
Start: 2023-04-14 | End: 2023-04-14 | Stop reason: HOSPADM

## 2023-04-14 RX ORDER — MIDAZOLAM HYDROCHLORIDE 1 MG/ML
.25-5 INJECTION, SOLUTION INTRAMUSCULAR; INTRAVENOUS
Status: DISCONTINUED | OUTPATIENT
Start: 2023-04-14 | End: 2023-04-14 | Stop reason: HOSPADM

## 2023-04-14 RX ORDER — PROPOFOL 10 MG/ML
INJECTION, EMULSION INTRAVENOUS AS NEEDED
Status: DISCONTINUED | OUTPATIENT
Start: 2023-04-14 | End: 2023-04-14 | Stop reason: HOSPADM

## 2023-04-14 RX ORDER — SODIUM CHLORIDE 9 MG/ML
75 INJECTION, SOLUTION INTRAVENOUS CONTINUOUS
Status: DISPENSED | OUTPATIENT
Start: 2023-04-14 | End: 2023-04-14

## 2023-04-14 RX ORDER — FENTANYL CITRATE 50 UG/ML
25 INJECTION, SOLUTION INTRAMUSCULAR; INTRAVENOUS
Status: DISCONTINUED | OUTPATIENT
Start: 2023-04-14 | End: 2023-04-14 | Stop reason: HOSPADM

## 2023-04-14 RX ORDER — NALOXONE HYDROCHLORIDE 0.4 MG/ML
0.4 INJECTION, SOLUTION INTRAMUSCULAR; INTRAVENOUS; SUBCUTANEOUS
Status: DISCONTINUED | OUTPATIENT
Start: 2023-04-14 | End: 2023-04-14 | Stop reason: HOSPADM

## 2023-04-14 RX ORDER — EPINEPHRINE 0.1 MG/ML
1 INJECTION INTRACARDIAC; INTRAVENOUS
Status: DISCONTINUED | OUTPATIENT
Start: 2023-04-14 | End: 2023-04-14 | Stop reason: HOSPADM

## 2023-04-14 RX ORDER — SODIUM CHLORIDE 0.9 % (FLUSH) 0.9 %
5-40 SYRINGE (ML) INJECTION EVERY 8 HOURS
Status: DISCONTINUED | OUTPATIENT
Start: 2023-04-14 | End: 2023-04-20 | Stop reason: HOSPADM

## 2023-04-14 RX ORDER — SODIUM CHLORIDE 9 MG/ML
INJECTION, SOLUTION INTRAVENOUS
Status: DISCONTINUED | OUTPATIENT
Start: 2023-04-14 | End: 2023-04-14 | Stop reason: HOSPADM

## 2023-04-14 RX ORDER — PRAVASTATIN SODIUM 10 MG/1
10 TABLET ORAL
Status: DISCONTINUED | OUTPATIENT
Start: 2023-04-14 | End: 2023-04-20 | Stop reason: HOSPADM

## 2023-04-14 RX ORDER — FLUMAZENIL 0.1 MG/ML
0.2 INJECTION INTRAVENOUS
Status: DISCONTINUED | OUTPATIENT
Start: 2023-04-14 | End: 2023-04-14 | Stop reason: HOSPADM

## 2023-04-14 RX ORDER — METOPROLOL SUCCINATE 25 MG/1
25 TABLET, EXTENDED RELEASE ORAL DAILY
Status: DISCONTINUED | OUTPATIENT
Start: 2023-04-14 | End: 2023-04-16

## 2023-04-14 RX ORDER — SODIUM CHLORIDE 0.9 % (FLUSH) 0.9 %
5-40 SYRINGE (ML) INJECTION AS NEEDED
Status: DISCONTINUED | OUTPATIENT
Start: 2023-04-14 | End: 2023-04-20 | Stop reason: HOSPADM

## 2023-04-14 RX ADMIN — PROPOFOL 30 MG: 10 INJECTION, EMULSION INTRAVENOUS at 15:21

## 2023-04-14 RX ADMIN — SORBITOL SOLUTION (BULK) 60 ML: 70 SOLUTION at 08:00

## 2023-04-14 RX ADMIN — PROPOFOL 30 MG: 10 INJECTION, EMULSION INTRAVENOUS at 15:18

## 2023-04-14 RX ADMIN — MONTELUKAST 10 MG: 10 TABLET, FILM COATED ORAL at 17:13

## 2023-04-14 RX ADMIN — SODIUM CHLORIDE, PRESERVATIVE FREE 10 ML: 5 INJECTION INTRAVENOUS at 22:00

## 2023-04-14 RX ADMIN — PANTOPRAZOLE SODIUM 40 MG: 40 INJECTION, POWDER, LYOPHILIZED, FOR SOLUTION INTRAVENOUS at 08:02

## 2023-04-14 RX ADMIN — PROPOFOL 10 MG: 10 INJECTION, EMULSION INTRAVENOUS at 15:29

## 2023-04-14 RX ADMIN — SORBITOL SOLUTION (BULK) 60 ML: 70 SOLUTION at 06:07

## 2023-04-14 RX ADMIN — SORBITOL SOLUTION (BULK) 60 ML: 70 SOLUTION at 00:10

## 2023-04-14 RX ADMIN — PROPOFOL 30 MG: 10 INJECTION, EMULSION INTRAVENOUS at 15:26

## 2023-04-14 RX ADMIN — SACUBITRIL AND VALSARTAN 1 TABLET: 24; 26 TABLET, FILM COATED ORAL at 22:02

## 2023-04-14 RX ADMIN — PRAVASTATIN SODIUM 10 MG: 10 TABLET ORAL at 22:02

## 2023-04-14 RX ADMIN — SODIUM CHLORIDE: 0.9 INJECTION, SOLUTION INTRAVENOUS at 15:11

## 2023-04-14 RX ADMIN — PROPOFOL 30 MG: 10 INJECTION, EMULSION INTRAVENOUS at 15:15

## 2023-04-14 RX ADMIN — METOPROLOL SUCCINATE 25 MG: 25 TABLET, EXTENDED RELEASE ORAL at 17:13

## 2023-04-14 RX ADMIN — PANTOPRAZOLE SODIUM 40 MG: 40 INJECTION, POWDER, LYOPHILIZED, FOR SOLUTION INTRAVENOUS at 22:01

## 2023-04-14 RX ADMIN — SODIUM CHLORIDE, PRESERVATIVE FREE 10 ML: 5 INJECTION INTRAVENOUS at 19:35

## 2023-04-14 RX ADMIN — PROPOFOL 50 MG: 10 INJECTION, EMULSION INTRAVENOUS at 15:14

## 2023-04-14 RX ADMIN — SODIUM CHLORIDE, PRESERVATIVE FREE 10 ML: 5 INJECTION INTRAVENOUS at 05:38

## 2023-04-14 RX ADMIN — LIDOCAINE HYDROCHLORIDE 40 MG: 20 INJECTION, SOLUTION EPIDURAL; INFILTRATION; INTRACAUDAL; PERINEURAL at 15:14

## 2023-04-15 LAB
ALBUMIN SERPL-MCNC: 3.1 G/DL (ref 3.5–5)
ALBUMIN/GLOB SERPL: 0.7 (ref 1.1–2.2)
ALP SERPL-CCNC: 86 U/L (ref 45–117)
ALT SERPL-CCNC: 15 U/L (ref 12–78)
ANION GAP SERPL CALC-SCNC: 10 MMOL/L (ref 5–15)
AST SERPL-CCNC: 10 U/L (ref 15–37)
BILIRUB SERPL-MCNC: 0.5 MG/DL (ref 0.2–1)
BUN SERPL-MCNC: 39 MG/DL (ref 6–20)
BUN/CREAT SERPL: 5 (ref 12–20)
CALCIUM SERPL-MCNC: 8.6 MG/DL (ref 8.5–10.1)
CHLORIDE SERPL-SCNC: 100 MMOL/L (ref 97–108)
CO2 SERPL-SCNC: 28 MMOL/L (ref 21–32)
CREAT SERPL-MCNC: 7.66 MG/DL (ref 0.7–1.3)
ERYTHROCYTE [DISTWIDTH] IN BLOOD BY AUTOMATED COUNT: 14.8 % (ref 11.5–14.5)
GLOBULIN SER CALC-MCNC: 4.3 G/DL (ref 2–4)
GLUCOSE SERPL-MCNC: 111 MG/DL (ref 65–100)
HCT VFR BLD AUTO: 31.9 % (ref 36.6–50.3)
HGB BLD-MCNC: 10.4 G/DL (ref 12.1–17)
MAGNESIUM SERPL-MCNC: 2.2 MG/DL (ref 1.6–2.4)
MCH RBC QN AUTO: 31.4 PG (ref 26–34)
MCHC RBC AUTO-ENTMCNC: 32.6 G/DL (ref 30–36.5)
MCV RBC AUTO: 96.4 FL (ref 80–99)
NRBC # BLD: 0 K/UL (ref 0–0.01)
NRBC BLD-RTO: 0 PER 100 WBC
PHOSPHATE SERPL-MCNC: 3 MG/DL (ref 2.6–4.7)
PLATELET # BLD AUTO: 247 K/UL (ref 150–400)
PMV BLD AUTO: 11.3 FL (ref 8.9–12.9)
POTASSIUM SERPL-SCNC: 3.2 MMOL/L (ref 3.5–5.1)
PROT SERPL-MCNC: 7.4 G/DL (ref 6.4–8.2)
RBC # BLD AUTO: 3.31 M/UL (ref 4.1–5.7)
SODIUM SERPL-SCNC: 138 MMOL/L (ref 136–145)
WBC # BLD AUTO: 5.9 K/UL (ref 4.1–11.1)

## 2023-04-15 PROCEDURE — 36415 COLL VENOUS BLD VENIPUNCTURE: CPT

## 2023-04-15 PROCEDURE — P9047 ALBUMIN (HUMAN), 25%, 50ML: HCPCS | Performed by: INTERNAL MEDICINE

## 2023-04-15 PROCEDURE — 77010033678 HC OXYGEN DAILY

## 2023-04-15 PROCEDURE — 74011250637 HC RX REV CODE- 250/637: Performed by: INTERNAL MEDICINE

## 2023-04-15 PROCEDURE — 85027 COMPLETE CBC AUTOMATED: CPT

## 2023-04-15 PROCEDURE — 84100 ASSAY OF PHOSPHORUS: CPT

## 2023-04-15 PROCEDURE — 74011250637 HC RX REV CODE- 250/637: Performed by: STUDENT IN AN ORGANIZED HEALTH CARE EDUCATION/TRAINING PROGRAM

## 2023-04-15 PROCEDURE — 74011250636 HC RX REV CODE- 250/636

## 2023-04-15 PROCEDURE — 74011250636 HC RX REV CODE- 250/636: Performed by: INTERNAL MEDICINE

## 2023-04-15 PROCEDURE — C9113 INJ PANTOPRAZOLE SODIUM, VIA: HCPCS

## 2023-04-15 PROCEDURE — 74011000250 HC RX REV CODE- 250

## 2023-04-15 PROCEDURE — 83735 ASSAY OF MAGNESIUM: CPT

## 2023-04-15 PROCEDURE — 65270000029 HC RM PRIVATE

## 2023-04-15 PROCEDURE — 74011000250 HC RX REV CODE- 250: Performed by: INTERNAL MEDICINE

## 2023-04-15 PROCEDURE — 90935 HEMODIALYSIS ONE EVALUATION: CPT

## 2023-04-15 PROCEDURE — 74011250637 HC RX REV CODE- 250/637

## 2023-04-15 PROCEDURE — 80053 COMPREHEN METABOLIC PANEL: CPT

## 2023-04-15 RX ORDER — ALBUMIN HUMAN 250 G/1000ML
25 SOLUTION INTRAVENOUS
Status: DISCONTINUED | OUTPATIENT
Start: 2023-04-15 | End: 2023-04-20 | Stop reason: HOSPADM

## 2023-04-15 RX ADMIN — PANTOPRAZOLE SODIUM 40 MG: 40 INJECTION, POWDER, LYOPHILIZED, FOR SOLUTION INTRAVENOUS at 23:04

## 2023-04-15 RX ADMIN — PRAVASTATIN SODIUM 10 MG: 10 TABLET ORAL at 23:04

## 2023-04-15 RX ADMIN — TRAMADOL HYDROCHLORIDE 50 MG: 50 TABLET ORAL at 23:04

## 2023-04-15 RX ADMIN — ACETAMINOPHEN 325MG 650 MG: 325 TABLET ORAL at 20:53

## 2023-04-15 RX ADMIN — TRAMADOL HYDROCHLORIDE 50 MG: 50 TABLET ORAL at 13:49

## 2023-04-15 RX ADMIN — DIPHENHYDRAMINE HYDROCHLORIDE 12.5 MG: 50 INJECTION, SOLUTION INTRAMUSCULAR; INTRAVENOUS at 13:49

## 2023-04-15 RX ADMIN — SODIUM CHLORIDE, PRESERVATIVE FREE 10 ML: 5 INJECTION INTRAVENOUS at 23:04

## 2023-04-15 RX ADMIN — SODIUM CHLORIDE, PRESERVATIVE FREE 10 ML: 5 INJECTION INTRAVENOUS at 13:50

## 2023-04-15 RX ADMIN — SODIUM CHLORIDE, PRESERVATIVE FREE 10 ML: 5 INJECTION INTRAVENOUS at 05:56

## 2023-04-15 RX ADMIN — DIPHENHYDRAMINE HYDROCHLORIDE 12.5 MG: 50 INJECTION, SOLUTION INTRAMUSCULAR; INTRAVENOUS at 07:45

## 2023-04-15 RX ADMIN — DIPHENHYDRAMINE HYDROCHLORIDE 12.5 MG: 50 INJECTION, SOLUTION INTRAMUSCULAR; INTRAVENOUS at 20:26

## 2023-04-15 RX ADMIN — SODIUM CHLORIDE, PRESERVATIVE FREE 10 ML: 5 INJECTION INTRAVENOUS at 13:53

## 2023-04-15 RX ADMIN — ALBUMIN (HUMAN) 25 G: 0.25 INJECTION, SOLUTION INTRAVENOUS at 10:32

## 2023-04-16 ENCOUNTER — APPOINTMENT (OUTPATIENT)
Dept: CT IMAGING | Age: 78
DRG: 189 | End: 2023-04-16
Attending: PHYSICIAN ASSISTANT
Payer: MEDICARE

## 2023-04-16 PROCEDURE — 74011250637 HC RX REV CODE- 250/637: Performed by: INTERNAL MEDICINE

## 2023-04-16 PROCEDURE — 84153 ASSAY OF PSA TOTAL: CPT

## 2023-04-16 PROCEDURE — 74011250637 HC RX REV CODE- 250/637: Performed by: PHYSICIAN ASSISTANT

## 2023-04-16 PROCEDURE — 74011000250 HC RX REV CODE- 250

## 2023-04-16 PROCEDURE — 74011250636 HC RX REV CODE- 250/636: Performed by: PHYSICIAN ASSISTANT

## 2023-04-16 PROCEDURE — 74011250637 HC RX REV CODE- 250/637

## 2023-04-16 PROCEDURE — C9113 INJ PANTOPRAZOLE SODIUM, VIA: HCPCS

## 2023-04-16 PROCEDURE — 65270000029 HC RM PRIVATE

## 2023-04-16 PROCEDURE — 74011250636 HC RX REV CODE- 250/636: Performed by: INTERNAL MEDICINE

## 2023-04-16 PROCEDURE — 36415 COLL VENOUS BLD VENIPUNCTURE: CPT

## 2023-04-16 PROCEDURE — 74011000250 HC RX REV CODE- 250: Performed by: INTERNAL MEDICINE

## 2023-04-16 PROCEDURE — 74011250636 HC RX REV CODE- 250/636

## 2023-04-16 PROCEDURE — 71250 CT THORAX DX C-: CPT

## 2023-04-16 PROCEDURE — 82378 CARCINOEMBRYONIC ANTIGEN: CPT

## 2023-04-16 RX ORDER — CLARITHROMYCIN 500 MG/1
250 TABLET, FILM COATED ORAL 2 TIMES DAILY
Status: DISCONTINUED | OUTPATIENT
Start: 2023-04-16 | End: 2023-04-20 | Stop reason: HOSPADM

## 2023-04-16 RX ORDER — METRONIDAZOLE 250 MG/1
500 TABLET ORAL 3 TIMES DAILY
Status: DISCONTINUED | OUTPATIENT
Start: 2023-04-16 | End: 2023-04-20 | Stop reason: HOSPADM

## 2023-04-16 RX ORDER — METOPROLOL SUCCINATE 25 MG/1
12.5 TABLET, EXTENDED RELEASE ORAL DAILY
Status: DISCONTINUED | OUTPATIENT
Start: 2023-04-17 | End: 2023-04-18

## 2023-04-16 RX ADMIN — CLARITHROMYCIN 250 MG: 500 TABLET, FILM COATED ORAL at 13:01

## 2023-04-16 RX ADMIN — PANTOPRAZOLE SODIUM 40 MG: 40 INJECTION, POWDER, LYOPHILIZED, FOR SOLUTION INTRAVENOUS at 21:41

## 2023-04-16 RX ADMIN — PANTOPRAZOLE SODIUM 40 MG: 40 INJECTION, POWDER, LYOPHILIZED, FOR SOLUTION INTRAVENOUS at 09:34

## 2023-04-16 RX ADMIN — METOPROLOL SUCCINATE 25 MG: 25 TABLET, EXTENDED RELEASE ORAL at 09:34

## 2023-04-16 RX ADMIN — FERROUS SULFATE TAB 325 MG (65 MG ELEMENTAL FE) 325 MG: 325 (65 FE) TAB at 09:34

## 2023-04-16 RX ADMIN — DIPHENHYDRAMINE HYDROCHLORIDE 12.5 MG: 50 INJECTION, SOLUTION INTRAMUSCULAR; INTRAVENOUS at 03:33

## 2023-04-16 RX ADMIN — CLARITHROMYCIN 250 MG: 500 TABLET, FILM COATED ORAL at 21:41

## 2023-04-16 RX ADMIN — PRAVASTATIN SODIUM 10 MG: 10 TABLET ORAL at 21:41

## 2023-04-16 RX ADMIN — MONTELUKAST 10 MG: 10 TABLET, FILM COATED ORAL at 09:33

## 2023-04-16 RX ADMIN — SODIUM CHLORIDE, PRESERVATIVE FREE 10 ML: 5 INJECTION INTRAVENOUS at 21:43

## 2023-04-16 RX ADMIN — METRONIDAZOLE 500 MG: 250 TABLET ORAL at 15:49

## 2023-04-16 RX ADMIN — SODIUM CHLORIDE, PRESERVATIVE FREE 10 ML: 5 INJECTION INTRAVENOUS at 15:52

## 2023-04-16 RX ADMIN — METRONIDAZOLE 500 MG: 250 TABLET ORAL at 21:41

## 2023-04-16 RX ADMIN — SODIUM CHLORIDE, PRESERVATIVE FREE 10 ML: 5 INJECTION INTRAVENOUS at 06:39

## 2023-04-16 RX ADMIN — METHYLPREDNISOLONE SODIUM SUCCINATE 60 MG: 40 INJECTION, POWDER, FOR SOLUTION INTRAMUSCULAR; INTRAVENOUS at 15:48

## 2023-04-16 RX ADMIN — ACETAMINOPHEN 325MG 650 MG: 325 TABLET ORAL at 19:25

## 2023-04-17 LAB
ANION GAP SERPL CALC-SCNC: 8 MMOL/L (ref 5–15)
BUN SERPL-MCNC: 47 MG/DL (ref 6–20)
BUN/CREAT SERPL: 5 (ref 12–20)
CALCIUM SERPL-MCNC: 8.3 MG/DL (ref 8.5–10.1)
CEA SERPL-MCNC: 1.4 NG/ML
CHLORIDE SERPL-SCNC: 98 MMOL/L (ref 97–108)
CO2 SERPL-SCNC: 27 MMOL/L (ref 21–32)
CREAT SERPL-MCNC: 9.19 MG/DL (ref 0.7–1.3)
ERYTHROCYTE [DISTWIDTH] IN BLOOD BY AUTOMATED COUNT: 14.9 % (ref 11.5–14.5)
GLUCOSE SERPL-MCNC: 110 MG/DL (ref 65–100)
HCT VFR BLD AUTO: 29.2 % (ref 36.6–50.3)
HGB BLD-MCNC: 9.7 G/DL (ref 12.1–17)
MCH RBC QN AUTO: 32.2 PG (ref 26–34)
MCHC RBC AUTO-ENTMCNC: 33.2 G/DL (ref 30–36.5)
MCV RBC AUTO: 97 FL (ref 80–99)
NRBC # BLD: 0 K/UL (ref 0–0.01)
NRBC BLD-RTO: 0 PER 100 WBC
PLATELET # BLD AUTO: 213 K/UL (ref 150–400)
PMV BLD AUTO: 11.1 FL (ref 8.9–12.9)
POTASSIUM SERPL-SCNC: 3.9 MMOL/L (ref 3.5–5.1)
PSA SERPL-MCNC: 2.1 NG/ML (ref 0.01–4)
RBC # BLD AUTO: 3.01 M/UL (ref 4.1–5.7)
SODIUM SERPL-SCNC: 133 MMOL/L (ref 136–145)
WBC # BLD AUTO: 5.9 K/UL (ref 4.1–11.1)

## 2023-04-17 PROCEDURE — 77030041063 HC CATH DX ANGI DXTRTY MEDT -A: Performed by: INTERNAL MEDICINE

## 2023-04-17 PROCEDURE — 77030019569 HC BND COMPR RAD TERU -B: Performed by: INTERNAL MEDICINE

## 2023-04-17 PROCEDURE — 85027 COMPLETE CBC AUTOMATED: CPT

## 2023-04-17 PROCEDURE — 74011250636 HC RX REV CODE- 250/636: Performed by: INTERNAL MEDICINE

## 2023-04-17 PROCEDURE — 77010033678 HC OXYGEN DAILY

## 2023-04-17 PROCEDURE — 74011250637 HC RX REV CODE- 250/637: Performed by: PHYSICIAN ASSISTANT

## 2023-04-17 PROCEDURE — B2111ZZ FLUOROSCOPY OF MULTIPLE CORONARY ARTERIES USING LOW OSMOLAR CONTRAST: ICD-10-PCS | Performed by: INTERNAL MEDICINE

## 2023-04-17 PROCEDURE — 74011250637 HC RX REV CODE- 250/637: Performed by: INTERNAL MEDICINE

## 2023-04-17 PROCEDURE — 74011000250 HC RX REV CODE- 250: Performed by: PHYSICIAN ASSISTANT

## 2023-04-17 PROCEDURE — 77030010221 HC SPLNT WR POS TELE -B: Performed by: INTERNAL MEDICINE

## 2023-04-17 PROCEDURE — C1769 GUIDE WIRE: HCPCS | Performed by: INTERNAL MEDICINE

## 2023-04-17 PROCEDURE — 4A023N7 MEASUREMENT OF CARDIAC SAMPLING AND PRESSURE, LEFT HEART, PERCUTANEOUS APPROACH: ICD-10-PCS | Performed by: INTERNAL MEDICINE

## 2023-04-17 PROCEDURE — 65270000029 HC RM PRIVATE

## 2023-04-17 PROCEDURE — 77030015766: Performed by: INTERNAL MEDICINE

## 2023-04-17 PROCEDURE — 77030019698 HC SYR ANGI MDLON MRTM -A: Performed by: INTERNAL MEDICINE

## 2023-04-17 PROCEDURE — C1894 INTRO/SHEATH, NON-LASER: HCPCS | Performed by: INTERNAL MEDICINE

## 2023-04-17 PROCEDURE — 74011250637 HC RX REV CODE- 250/637

## 2023-04-17 PROCEDURE — 99152 MOD SED SAME PHYS/QHP 5/>YRS: CPT | Performed by: INTERNAL MEDICINE

## 2023-04-17 PROCEDURE — 99153 MOD SED SAME PHYS/QHP EA: CPT | Performed by: INTERNAL MEDICINE

## 2023-04-17 PROCEDURE — 74011000250 HC RX REV CODE- 250

## 2023-04-17 PROCEDURE — 74011000636 HC RX REV CODE- 636: Performed by: INTERNAL MEDICINE

## 2023-04-17 PROCEDURE — C9113 INJ PANTOPRAZOLE SODIUM, VIA: HCPCS

## 2023-04-17 PROCEDURE — 74011000250 HC RX REV CODE- 250: Performed by: INTERNAL MEDICINE

## 2023-04-17 PROCEDURE — 77030040934 HC CATH DIAG DXTERITY MEDT -A: Performed by: INTERNAL MEDICINE

## 2023-04-17 PROCEDURE — 80048 BASIC METABOLIC PNL TOTAL CA: CPT

## 2023-04-17 PROCEDURE — 93458 L HRT ARTERY/VENTRICLE ANGIO: CPT | Performed by: INTERNAL MEDICINE

## 2023-04-17 PROCEDURE — 76937 US GUIDE VASCULAR ACCESS: CPT | Performed by: INTERNAL MEDICINE

## 2023-04-17 PROCEDURE — 36415 COLL VENOUS BLD VENIPUNCTURE: CPT

## 2023-04-17 PROCEDURE — 74011250636 HC RX REV CODE- 250/636

## 2023-04-17 PROCEDURE — 77030008543 HC TBNG MON PRSS MRTM -A: Performed by: INTERNAL MEDICINE

## 2023-04-17 PROCEDURE — 2709999900 HC NON-CHARGEABLE SUPPLY: Performed by: INTERNAL MEDICINE

## 2023-04-17 RX ORDER — HEPARIN SODIUM 200 [USP'U]/100ML
INJECTION, SOLUTION INTRAVENOUS
Status: COMPLETED | OUTPATIENT
Start: 2023-04-17 | End: 2023-04-17

## 2023-04-17 RX ORDER — LATANOPROST 50 UG/ML
2 SOLUTION/ DROPS OPHTHALMIC EVERY EVENING
Status: DISCONTINUED | OUTPATIENT
Start: 2023-04-17 | End: 2023-04-20 | Stop reason: HOSPADM

## 2023-04-17 RX ORDER — SODIUM CHLORIDE 0.9 % (FLUSH) 0.9 %
5-40 SYRINGE (ML) INJECTION AS NEEDED
Status: DISCONTINUED | OUTPATIENT
Start: 2023-04-17 | End: 2023-04-20 | Stop reason: HOSPADM

## 2023-04-17 RX ORDER — VERAPAMIL HYDROCHLORIDE 2.5 MG/ML
INJECTION, SOLUTION INTRAVENOUS AS NEEDED
Status: DISCONTINUED | OUTPATIENT
Start: 2023-04-17 | End: 2023-04-17 | Stop reason: HOSPADM

## 2023-04-17 RX ORDER — SODIUM CHLORIDE 0.9 % (FLUSH) 0.9 %
5-40 SYRINGE (ML) INJECTION EVERY 8 HOURS
Status: DISCONTINUED | OUTPATIENT
Start: 2023-04-17 | End: 2023-04-20 | Stop reason: HOSPADM

## 2023-04-17 RX ORDER — SODIUM CHLORIDE 9 MG/ML
100 INJECTION, SOLUTION INTRAVENOUS CONTINUOUS
Status: DISPENSED | OUTPATIENT
Start: 2023-04-17 | End: 2023-04-17

## 2023-04-17 RX ORDER — GUAIFENESIN 100 MG/5ML
162 LIQUID (ML) ORAL
Status: COMPLETED | OUTPATIENT
Start: 2023-04-17 | End: 2023-04-17

## 2023-04-17 RX ORDER — NALOXONE HYDROCHLORIDE 0.4 MG/ML
0.4 INJECTION, SOLUTION INTRAMUSCULAR; INTRAVENOUS; SUBCUTANEOUS AS NEEDED
Status: DISCONTINUED | OUTPATIENT
Start: 2023-04-17 | End: 2023-04-20 | Stop reason: HOSPADM

## 2023-04-17 RX ORDER — MIDAZOLAM HYDROCHLORIDE 1 MG/ML
INJECTION, SOLUTION INTRAMUSCULAR; INTRAVENOUS AS NEEDED
Status: DISCONTINUED | OUTPATIENT
Start: 2023-04-17 | End: 2023-04-17 | Stop reason: HOSPADM

## 2023-04-17 RX ORDER — DIPHENHYDRAMINE HYDROCHLORIDE 50 MG/ML
25 INJECTION, SOLUTION INTRAMUSCULAR; INTRAVENOUS
Status: DISCONTINUED | OUTPATIENT
Start: 2023-04-17 | End: 2023-04-19

## 2023-04-17 RX ORDER — HEPARIN SODIUM 1000 [USP'U]/ML
INJECTION, SOLUTION INTRAVENOUS; SUBCUTANEOUS AS NEEDED
Status: DISCONTINUED | OUTPATIENT
Start: 2023-04-17 | End: 2023-04-17 | Stop reason: HOSPADM

## 2023-04-17 RX ORDER — LIDOCAINE HYDROCHLORIDE 10 MG/ML
INJECTION, SOLUTION EPIDURAL; INFILTRATION; INTRACAUDAL; PERINEURAL AS NEEDED
Status: DISCONTINUED | OUTPATIENT
Start: 2023-04-17 | End: 2023-04-17 | Stop reason: HOSPADM

## 2023-04-17 RX ORDER — FENTANYL CITRATE 50 UG/ML
INJECTION, SOLUTION INTRAMUSCULAR; INTRAVENOUS AS NEEDED
Status: DISCONTINUED | OUTPATIENT
Start: 2023-04-17 | End: 2023-04-17 | Stop reason: HOSPADM

## 2023-04-17 RX ORDER — TRAZODONE HYDROCHLORIDE 50 MG/1
50 TABLET ORAL
Status: DISCONTINUED | OUTPATIENT
Start: 2023-04-17 | End: 2023-04-20 | Stop reason: HOSPADM

## 2023-04-17 RX ORDER — SEVELAMER CARBONATE 800 MG/1
1600 TABLET, FILM COATED ORAL
Status: DISCONTINUED | OUTPATIENT
Start: 2023-04-17 | End: 2023-04-20 | Stop reason: HOSPADM

## 2023-04-17 RX ORDER — HYDROXYZINE 25 MG/1
25 TABLET, FILM COATED ORAL
Status: DISCONTINUED | OUTPATIENT
Start: 2023-04-17 | End: 2023-04-20 | Stop reason: HOSPADM

## 2023-04-17 RX ORDER — PREDNISONE 10 MG/1
10 TABLET ORAL DAILY
Status: DISCONTINUED | OUTPATIENT
Start: 2023-04-18 | End: 2023-04-20 | Stop reason: HOSPADM

## 2023-04-17 RX ADMIN — PANTOPRAZOLE SODIUM 40 MG: 40 INJECTION, POWDER, LYOPHILIZED, FOR SOLUTION INTRAVENOUS at 09:57

## 2023-04-17 RX ADMIN — METRONIDAZOLE 500 MG: 250 TABLET ORAL at 22:29

## 2023-04-17 RX ADMIN — DIPHENHYDRAMINE HYDROCHLORIDE 25 MG: 50 INJECTION, SOLUTION INTRAMUSCULAR; INTRAVENOUS at 17:39

## 2023-04-17 RX ADMIN — SODIUM CHLORIDE, PRESERVATIVE FREE 10 ML: 5 INJECTION INTRAVENOUS at 22:30

## 2023-04-17 RX ADMIN — SEVELAMER CARBONATE 1600 MG: 800 TABLET, FILM COATED ORAL at 14:55

## 2023-04-17 RX ADMIN — SODIUM CHLORIDE, PRESERVATIVE FREE 10 ML: 5 INJECTION INTRAVENOUS at 14:00

## 2023-04-17 RX ADMIN — TRAZODONE HYDROCHLORIDE 50 MG: 50 TABLET ORAL at 22:29

## 2023-04-17 RX ADMIN — METRONIDAZOLE 500 MG: 250 TABLET ORAL at 17:40

## 2023-04-17 RX ADMIN — LATANOPROST 2 DROP: 50 SOLUTION OPHTHALMIC at 18:57

## 2023-04-17 RX ADMIN — ERGOCALCIFEROL 50000 UNITS: 1.25 CAPSULE ORAL at 09:55

## 2023-04-17 RX ADMIN — CLARITHROMYCIN 250 MG: 500 TABLET, FILM COATED ORAL at 22:29

## 2023-04-17 RX ADMIN — HYDROXYZINE HYDROCHLORIDE 25 MG: 25 TABLET, FILM COATED ORAL at 18:57

## 2023-04-17 RX ADMIN — FERROUS SULFATE TAB 325 MG (65 MG ELEMENTAL FE) 325 MG: 325 (65 FE) TAB at 09:56

## 2023-04-17 RX ADMIN — PRAVASTATIN SODIUM 10 MG: 10 TABLET ORAL at 22:30

## 2023-04-17 RX ADMIN — SODIUM CHLORIDE, PRESERVATIVE FREE 5 ML: 5 INJECTION INTRAVENOUS at 14:00

## 2023-04-17 RX ADMIN — SODIUM CHLORIDE, PRESERVATIVE FREE 10 ML: 5 INJECTION INTRAVENOUS at 06:49

## 2023-04-17 RX ADMIN — SEVELAMER CARBONATE 1600 MG: 800 TABLET, FILM COATED ORAL at 17:39

## 2023-04-17 RX ADMIN — CLARITHROMYCIN 250 MG: 500 TABLET, FILM COATED ORAL at 09:56

## 2023-04-17 RX ADMIN — ASPIRIN 162 MG: 81 TABLET, CHEWABLE ORAL at 09:56

## 2023-04-17 RX ADMIN — METRONIDAZOLE 500 MG: 250 TABLET ORAL at 09:56

## 2023-04-17 RX ADMIN — PANTOPRAZOLE SODIUM 40 MG: 40 INJECTION, POWDER, LYOPHILIZED, FOR SOLUTION INTRAVENOUS at 22:30

## 2023-04-17 RX ADMIN — MONTELUKAST 10 MG: 10 TABLET, FILM COATED ORAL at 09:56

## 2023-04-17 RX ADMIN — DIPHENHYDRAMINE HYDROCHLORIDE 12.5 MG: 50 INJECTION, SOLUTION INTRAMUSCULAR; INTRAVENOUS at 10:17

## 2023-04-17 NOTE — PROGRESS NOTES
Spiritual Care Assessment/Progress Note  [facility]      NAME: Ar Duarte      MRN: [id number]  AGE: 66 y.o. SEX: male  Jew Affiliation: Episcopal   Language: English     4/17/2023     Total Time (in minutes): 16     Spiritual Assessment begun in [facility] through conversation with:         [x]Patient        [] Family    [] Friend(s)        Reason for Consult: Initial/Spiritual assessment, patient floor     Spiritual beliefs: (Please include comment if needed)     [x] Identifies with a alexandr tradition:         [x] Supported by a alexandr community:            [] Claims no spiritual orientation:           [] Seeking spiritual identity:                [] Adheres to an individual form of spirituality:           [] Not able to assess:                           Identified resources for coping:      [x] Prayer                               [] Music                  [] Guided Imagery     [x] Family/friends                 [] Pet visits     [] Devotional reading                         [] Unknown     [] Other:                                                Interventions offered during this visit: (See comments for more details)    Patient Interventions: Affirmation of emotions/emotional suffering, Affirmation of alexandr, Catharsis/review of pertinent events in supportive environment, Coping skills reviewed/reinforced, Iconic (affirming the presence of God/Higher Power), Initial/Spiritual assessment, patient floor, Prayer (assurance of)     Family/Friend(s):  Affirmation of alexandr, Iconic (affirming the presence of God/Higher Power), Initial Assessment, Prayer (assurance of), Jew beliefs/image of God discussed     Plan of Care:     [] Support spiritual and/or cultural needs    [] Support AMD and/or advance care planning process      [] Support grieving process   [] Coordinate Rites and/or Rituals    [] Coordination with community clergy   [x] No spiritual needs identified at this time   [] Detailed Plan of Care below (See Comments)  [] Make referral to Music Therapy  [] Make referral to Pet Therapy     [] Make referral to Addiction services  [] Make referral to [facility]  [] Make referral to Spiritual Care Partner  [] No future visits requested        [x] Contact Spiritual Care for further referrals     Comments:  Reviewed chart prior to visit on [facility] for spiritual assessment. Patient's wife was visiting. Mr. Key Carpenter was laying in bed, awake and appearing to be in fair spirits. He indicated he is doing okay this morning. His wife acknowledged they belong to a [employer] and have good spiritual support. No spiritual concerns or other support needs expressed at this time. Provided supportive listening presence and assurance of prayer. Advised of ongoing availability of pastoral support.       FAYE Joel, [facility], Staff [address] [place] [address] Avenue    [address] [place] [address] Road Paging Service  [phone]

## 2023-04-17 NOTE — PROGRESS NOTES
4/17/2023 1:57 PM  Patient without complaints. Last VS: Visit Vitals  /68 (BP 1 Location: Right upper arm, BP Patient Position: Lying)   Pulse 64   Temp 97.9 °F (36.6 °C)   Resp 18   Ht 6' (1.829 m)   Wt 54.5 kg (120 lb 2.4 oz)   SpO2 95%   BMI 16.30 kg/m²     Cath site without hematoma, bleeding or new bruit. Distal pulses at baseline. Continue current plan of care. Results of cath discussed with patient and wife/daughter; Also discussed Kym Wagoner and he is agreeable.

## 2023-04-17 NOTE — PROGRESS NOTES
Comprehensive Nutrition Assessment    Type and Reason for Visit: Reassess    Nutrition Recommendations/Plan:   Continue current diet  Added Ensure Plus TID  Please document % meals and supplements consumed in flowsheet I/O's under intake      Malnutrition Assessment:  Malnutrition Status:  Severe malnutrition (04/13/23 1122)    Context:  Chronic illness     Findings of the 6 clinical characteristics of malnutrition:   Energy Intake:  Mild decrease in energy intake (specify)  Weight Loss:  Greater than 5% over 1 month     Body Fat Loss:  Severe body fat loss, Triceps, Orbital, Fat overlying ribs, Buccal region   Muscle Mass Loss:  Severe muscle mass loss, Clavicles (pectoralis &deltoids), Hand (interosseous), Scapula (trapezius), Temples (temporalis)  Fluid Accumulation:  No significant fluid accumulation,     Strength:  Not performed     Nutrition Assessment:     Chart reviewed for follow up and consult received. Pt reports his appetite has been doing better since we last spoke. Supplements ordered and he is looking forward to them. Continue to encourage intake of meals and supplements. Wt Readings from Last 5 Encounters:   04/17/23 54.5 kg (120 lb 2.4 oz)   04/10/23 70.3 kg (155 lb)   12/07/22 62.1 kg (137 lb)   09/02/22 63.5 kg (140 lb)   08/31/22 63.5 kg (140 lb)   ]    Nutrition Related Findings:    Labs: Na 133, Cr 9.19. Meds: ergocalcierol, ferrous sulfate, flagyl, protonix, prednisone, renvela. BM 4/14. Wound Type: None    Current Nutrition Intake & Therapies:  Average Meal Intake: Unable to assess  Average Supplement Intake: None ordered  ADULT ORAL NUTRITION SUPPLEMENT Breakfast, Lunch, Dinner; Standard High Calorie/High Protein  ADULT DIET Regular; Low Fat/Low Chol/High Fiber/2 gm Na; Strawberry Ensure preferred please    Anthropometric Measures:  Height: 6' (182.9 cm)  Ideal Body Weight (IBW): 178 lbs (81 kg)     Current Body Wt:  54.5 kg (120 lb 2.4 oz), 74.2 % IBW.  Bed scale  Current BMI (kg/m2): 16.3        Weight Adjustment: No adjustment                 BMI Category: Underweight (BMI less than 18.5)    Estimated Daily Nutrient Needs:  Energy Requirements Based On: Formula  Weight Used for Energy Requirements: Current  Energy (kcal/day): 2100 kcals (BMR x 1.3AF + 200 for wt gain)  Weight Used for Protein Requirements: Current  Protein (g/day): 91-105g (1.3-1.5g/kg)  Method Used for Fluid Requirements: Standard renal  Fluid (ml/day): 1500mL    Nutrition Diagnosis:   Inadequate protein-energy intake related to catabolic illness (decreased appetite) as evidenced by poor intake prior to admission, Criteria as identified in malnutrition assessment, NPO or clear liquid status due to medical condition  Dx continues; improving since diet advanced.      Nutrition Interventions:   Food and/or Nutrient Delivery: Continue current diet, Continue oral nutrition supplement  Nutrition Education/Counseling: No recommendations at this time  Coordination of Nutrition Care: Continue to monitor while inpatient       Goals:  Previous Goal Met: Progressing toward goal(s)  Goals: PO intake 75% or greater, by next RD assessment       Nutrition Monitoring and Evaluation:   Behavioral-Environmental Outcomes: None identified  Food/Nutrient Intake Outcomes: Diet advancement/tolerance, Food and nutrient intake, Supplement intake  Physical Signs/Symptoms Outcomes: Biochemical data, GI status, Nutrition focused physical findings, Weight    Discharge Planning:    Continue current diet, Continue oral nutrition supplement    Romana Louis RD  Contact:

## 2023-04-17 NOTE — ANESTHESIA POSTPROCEDURE EVALUATION
Procedure(s):  COLONOSCOPY. MAC    Anesthesia Post Evaluation      Multimodal analgesia: multimodal analgesia used between 6 hours prior to anesthesia start to PACU discharge  Patient location during evaluation: PACU  Patient participation: complete - patient participated  Level of consciousness: awake and alert  Pain management: adequate  Airway patency: patent  Anesthetic complications: no  Cardiovascular status: acceptable, hemodynamically stable and blood pressure returned to baseline  Respiratory status: acceptable and room air  Hydration status: euvolemic  Post anesthesia nausea and vomiting:  none  Final Post Anesthesia Temperature Assessment:  Normothermia (36.0-37.5 degrees C)      INITIAL Post-op Vital signs:   Vitals Value Taken Time   /65 04/17/23 0754   Temp 36.6 °C (97.9 °F) 04/17/23 0415   Pulse 72 04/17/23 0807   Resp 18 04/17/23 0415   SpO2 98 % 04/17/23 0749   Vitals shown include unvalidated device data.

## 2023-04-17 NOTE — PROGRESS NOTES
Hospitalist Progress Note    Subjective:   Daily Progress Note: 4/17/2023     Hospital Course:  Mr. Andrez Schultz is a 65 y/o male with PMH significant for ESRD on HD T/H/Sa, HTN, asthma, RA, GERD who presented to [facility] ED w c/o worsening SOB, fatigue. He was transferred to [facility] for further workup, found to be anemic Hgb 6.7 with (+) FOBT, received 1 unit PRBC and Hgb improved to 9.3.  GI consulted, endoscopy and colonoscopy showed no active bleeding, did demonstrate diverticulitis in sigmoid colon, biopsy (+) H pylori so pt was started on protonix, flagyl, clarithromycin. Cardiology consulted for possible ischemic cardiomyopathy, Echo demonstrated EF 15-20%, plan for cardiac catheterization afternoon of 4/17, further cardiac workup or interventions pending cardiac cath findings. Nephro consulted for continued HD while hospitalized. Subjective:  Pt examined sitting at edge of bed. Endorses unintentional weight loss, receptive to dietician evaluating him. No acute complaints, awaiting cardiac cath later today.     Current Facility-Administered Medications   Medication Dose Route Frequency    hydrOXYzine HCL (ATARAX) tablet 25 mg  25 mg Oral Q4H PRN    latanoprost (XALATAN) 0.005 % ophthalmic solution 2 Drop  2 Drop Both Eyes QPM    [START ON 4/18/2023] predniSONE (DELTASONE) tablet 10 mg  10 mg Oral DAILY    sevelamer carbonate (RENVELA) tab 1,600 mg  1,600 mg Oral TID WITH MEALS    traZODone (DESYREL) tablet 50 mg  50 mg Oral QHS    0.9% sodium chloride infusion  100 mL/hr IntraVENous CONTINUOUS    sodium chloride (NS) flush 5-40 mL  5-40 mL IntraVENous Q8H    sodium chloride (NS) flush 5-40 mL  5-40 mL IntraVENous PRN    naloxone (NARCAN) injection 0.4 mg  0.4 mg IntraVENous PRN    diphenhydrAMINE (BENADRYL) injection 25 mg  25 mg IntraVENous Q4H PRN    metroNIDAZOLE (FLAGYL) tablet 500 mg  500 mg Oral TID    clarithromycin (BIAXIN) tablet 250 mg  250 mg Oral BID    [Held by provider] metoprolol succinate (TOPROL-XL) XL tablet 12.5 mg  12.5 mg Oral DAILY    albumin human 25% (BUMINATE) solution 25 g  25 g IntraVENous DIALYSIS PRN    [Held by provider] sacubitriL-valsartan (ENTRESTO) 24-26 mg tablet 1 Tablet  1 Tablet Oral Q12H    pravastatin (PRAVACHOL) tablet 10 mg  10 mg Oral QHS    sodium chloride (NS) flush 5-40 mL  5-40 mL IntraVENous Q8H    sodium chloride (NS) flush 5-40 mL  5-40 mL IntraVENous PRN    diphenhydrAMINE (BENADRYL) injection 12.5 mg  12.5 mg IntraVENous Q6H PRN    epoetin latricia-epbx (RETACRIT) injection 10,000 Units  10,000 Units SubCUTAneous DIALYSIS TUE, THU & SAT    [Held by provider] calcium acetate(phosphat bind) (PHOSLO) capsule 667 mg  1 Capsule Oral TID WITH MEALS    [Held by provider] gabapentin (NEURONTIN) capsule 600 mg  600 mg Oral TID    montelukast (SINGULAIR) tablet 10 mg  10 mg Oral DAILY    ergocalciferol capsule 50,000 Units  50,000 Units Oral Q7D    pantoprazole (PROTONIX) 40 mg in 0.9% sodium chloride 10 mL injection  40 mg IntraVENous Q12H    acetaminophen (TYLENOL) tablet 650 mg  650 mg Oral Q6H PRN    Or    acetaminophen (TYLENOL) suppository 650 mg  650 mg Rectal Q6H PRN    polyethylene glycol (MIRALAX) packet 17 g  17 g Oral DAILY PRN    ondansetron (ZOFRAN ODT) tablet 4 mg  4 mg Oral Q8H PRN    Or    ondansetron (ZOFRAN) injection 4 mg  4 mg IntraVENous Q6H PRN    albuterol-ipratropium (DUO-NEB) 2.5 MG-0.5 MG/3 ML  3 mL Nebulization Q4H PRN    ferrous sulfate tablet 325 mg  325 mg Oral ACB    metoprolol (LOPRESSOR) injection 5 mg  5 mg IntraVENous Q6H PRN    traMADoL (ULTRAM) tablet 50 mg  50 mg Oral Q6H PRN        Review of Systems:    Review of Systems   Constitutional:  Positive for malaise/fatigue and weight loss. Negative for chills and fever. Respiratory:  Negative for cough, sputum production and shortness of breath. Cardiovascular:  Negative for chest pain and leg swelling.    Gastrointestinal:  Negative for constipation, diarrhea, nausea and vomiting. Neurological:  Positive for weakness. Negative for dizziness. Objective:     Visit Vitals  BP (!) 125/96   Pulse 62   Temp 97.6 °F (36.4 °C)   Resp 16   Ht 6' (1.829 m)   Wt 54.5 kg (120 lb 2.4 oz)   SpO2 94%   BMI 16.30 kg/m²    O2 Flow Rate (L/min): 2 l/min O2 Device: None (Room air)    Temp (24hrs), Av.7 °F (36.5 °C), Min:97.4 °F (36.3 °C), Max:98 °F (36.7 °C)       0701 -  1900  In: 250 [I.V.:250]  Out: -   No intake/output data recorded. PHYSICAL EXAM:    Physical Exam  Constitutional:       Appearance: He is underweight. Comments: Chronically ill appearing   HENT:      Head: Normocephalic and atraumatic. Mouth/Throat:      Mouth: Mucous membranes are moist.   Eyes:      Pupils: Pupils are equal, round, and reactive to light. Cardiovascular:      Rate and Rhythm: Normal rate and regular rhythm. Pulses: Normal pulses. Heart sounds: Murmur heard. Pulmonary:      Effort: Pulmonary effort is normal. No respiratory distress. Breath sounds: Examination of the right-lower field reveals decreased breath sounds. Examination of the left-lower field reveals decreased breath sounds. Decreased breath sounds present. Abdominal:      General: Bowel sounds are normal. There is no distension. Palpations: Abdomen is soft. Musculoskeletal:      Right lower leg: No edema. Left lower leg: No edema. Skin:     General: Skin is warm and dry. Capillary Refill: Capillary refill takes less than 2 seconds. Neurological:      Mental Status: He is alert and oriented to person, place, and time. Motor: Weakness present.           Data Review    Recent Results (from the past 24 hour(s))   CEA    Collection Time: 23  9:49 PM   Result Value Ref Range    CEA 1.4 ng/mL   PSA SCREENING (SCREENING)    Collection Time: 23  9:49 PM   Result Value Ref Range    Prostate Specific Ag 2.1 0.01 - 4.0 ng/mL   CBC W/O DIFF    Collection Time: 23  8:07 AM   Result Value Ref Range    WBC 5.9 4.1 - 11.1 K/uL    RBC 3.01 (L) 4.10 - 5.70 M/uL    HGB 9.7 (L) 12.1 - 17.0 g/dL    HCT 29.2 (L) 36.6 - 50.3 %    MCV 97.0 80.0 - 99.0 FL    MCH 32.2 26.0 - 34.0 PG    MCHC 33.2 30.0 - 36.5 g/dL    RDW 14.9 (H) 11.5 - 14.5 %    PLATELET 877 132 - 576 K/uL    MPV 11.1 8.9 - 12.9 FL    NRBC 0.0 0  WBC    ABSOLUTE NRBC 0.00 0.00 - 1.61 K/uL   METABOLIC PANEL, BASIC    Collection Time: 04/17/23 11:40 AM   Result Value Ref Range    Sodium 133 (L) 136 - 145 mmol/L    Potassium 3.9 3.5 - 5.1 mmol/L    Chloride 98 97 - 108 mmol/L    CO2 27 21 - 32 mmol/L    Anion gap 8 5 - 15 mmol/L    Glucose 110 (H) 65 - 100 mg/dL    BUN 47 (H) 6 - 20 MG/DL    Creatinine 9.19 (H) 0.70 - 1.30 MG/DL    BUN/Creatinine ratio 5 (L) 12 - 20      eGFR 5 (L) >60 ml/min/1.73m2    Calcium 8.3 (L) 8.5 - 10.1 MG/DL       CT CHEST WO CONT   Final Result      1. There is cardiomegaly. 2. The lungs demonstrate overall improved aeration with near complete resolution   of the interstitial edema. Minimal interstitial edema persists. 3.. Findings suggest pulmonary hypertension and clinical correlation is   suggested. .   4. When compared with the CT scan of the abdomen of 1/30/2018 the patient has   lost weight. XR CHEST PORT   Final Result   Increasing bibasilar airspace disease, and diffuse interstitial/airspace   opacities which could be edema and/or pneumonia. Active Problems:    Fatigue (4/11/2023)        Assessment/Plan:   Acute blood loss anemia, resolved Upper GI blood/H pylori  S/p blood transfusion 4/11  Hgb: 10.4, stable remains too high for 1 unit of packed red blood cells from a hemoglobin of 6.9  S/p EGD: normal esophagus, normal gastric mucosa. Biopsy taken, pathology shows positive for H. Pylori, active gastritis, no dysplasia. S/p colonoscopy: normal terminal ileum, significant sigmoid colon diverticulosis, 2 internal hemorrhoids.  No masses, polyps, inflammation, vascular lesions or hematin seen. Continue protonix  Start metronidazole x 14 days for positive H. pylori  Start clarithromycin x 14 days for positive H. pylori  Continue ferrous sulfate  GI has signed off.  4/17 Hgb remains stable 9.7, on Epogen per nephrology, no further bleeding reported    Cardiomyopathy possibly ischemic  Echo: severely reduced left ventricular systolic function, EF 84-08%. Left ventricle dilated. Normal wall thickness. Global hypokinesis present. Abnormal diastolic function. Right ventricle: reduced systolic function. Mitral valve: mod-severe regurgitation. RVSP: 65-75 mmHg. Cardiology following. Stop nifedipine, change to beta blocker  Continue metoprolol decrease dose to 12.5 mg due to blood pressure  Will need LifeVest on d/c. Continue Entresto, with hold parameters. 4/17 To cardiac cath this afternoon, further workup per cards depending on cath outcomes    ESRD on HD T/H/Sa  Nephrology consulted for continued HD    Unintentional weight loss, fatigue, weakness  Severe protein calorie malnutrition  BMI 16.3, appears to have lost ~15 lbs in past 4 months, further historic weights not seen in medical record  Could be 2/2 HD and chronic illness but cannot exclude possibility of malignancy  PSA, CEA sent and pending  Nutritional consult placed  PT/OT eval/tx    Chronic hypertension with hypotension at this visit  Hyperlipidemia  Metoprolol continued with parameters and decreased dose to 12.5mg  Continue statin    Medical Decision Making:    Labs reviewed by myself   CBC, BMP    Diagnostic data reviewed by myself   CT    Toxic drug monitoring    none    Discussed case with   Patient, Nurse, Case Mgmt    MDM Discussion & Disposition  Pt w new onset cardiomyopathy EF 15-20%, will require LifeVest at discharge, undergoing cardiac cath today 4/17, further hospital course depending on cardiac cath findings. Anticipate additional 24-48h hospitalization.     DVT Prophylaxis: SCDs only 2/2 acute GI nolan  Code Status:  Full Code  POA:Camille Alonso (Daughter)   [phone]     Time Spent: 45 minutes    _______________________________________________________________    SHEELA Cain

## 2023-04-17 NOTE — PROGRESS NOTES
[facility] Progress Note        NAME: Papito Burger       :  1945       MRN:  [id number]     Date/Time: 2023    Risk of deterioration: low       Assessment:    Plan:  ESRD- HD TTS- [place]    CMO - EF15-20% with multivalvular heart disease-mod-sev MR, mild - mod TR with elevated RVSP 75    Secondary hyperparathyroidism    Anemia    Hypertension    GI bleed    Arrhythmia-now NSR    No acute need for HD today. Plan HD in AM.    H/H not at goal.  Continue LEXUS. Subjective:     Chief Complaint:  no complaints          Objective:     VITALS:   Last 24hrs VS reviewed since prior progress note.  Most recent are:  Visit Vitals  /68 (BP 1 Location: Right upper arm, BP Patient Position: Lying)   Pulse 64   Temp 97.9 °F (36.6 °C)   Resp 18   Ht 6' (1.829 m)   Wt 54.5 kg (120 lb 2.4 oz)   SpO2 95%   BMI 16.30 kg/m²     SpO2 Readings from Last 6 Encounters:   23 95%   23 91%   22 98%   22 93%   22 93%   22 96%    O2 Flow Rate (L/min): 2 l/min   No intake or output data in the 24 hours ending 23 1213       Telemetry Reviewed       PHYSICAL EXAM:    Alert     ___________________________________________________    Attending Physician: Darci Grace MD     ____________________________________________________  MEDICATIONS:  Current Facility-Administered Medications   Medication Dose Route Frequency    metroNIDAZOLE (FLAGYL) tablet 500 mg  500 mg Oral TID    clarithromycin (BIAXIN) tablet 250 mg  250 mg Oral BID    [Held by provider] metoprolol succinate (TOPROL-XL) XL tablet 12.5 mg  12.5 mg Oral DAILY    albumin human 25% (BUMINATE) solution 25 g  25 g IntraVENous DIALYSIS PRN    [Held by provider] sacubitriL-valsartan (ENTRESTO) 24-26 mg tablet 1 Tablet  1 Tablet Oral Q12H    pravastatin (PRAVACHOL) tablet 10 mg  10 mg Oral QHS    sodium chloride (NS) flush 5-40 mL  5-40 mL IntraVENous Q8H    sodium chloride (NS) flush 5-40 mL  5-40 mL IntraVENous PRN diphenhydrAMINE (BENADRYL) injection 12.5 mg  12.5 mg IntraVENous Q6H PRN    epoetin latricia-epbx (RETACRIT) injection 10,000 Units  10,000 Units SubCUTAneous DIALYSIS TUE, THU & SAT    [Held by provider] calcium acetate(phosphat bind) (PHOSLO) capsule 667 mg  1 Capsule Oral TID WITH MEALS    [Held by provider] gabapentin (NEURONTIN) capsule 600 mg  600 mg Oral TID    montelukast (SINGULAIR) tablet 10 mg  10 mg Oral DAILY    ergocalciferol capsule 50,000 Units  50,000 Units Oral Q7D    pantoprazole (PROTONIX) 40 mg in 0.9% sodium chloride 10 mL injection  40 mg IntraVENous Q12H    acetaminophen (TYLENOL) tablet 650 mg  650 mg Oral Q6H PRN    Or    acetaminophen (TYLENOL) suppository 650 mg  650 mg Rectal Q6H PRN    polyethylene glycol (MIRALAX) packet 17 g  17 g Oral DAILY PRN    ondansetron (ZOFRAN ODT) tablet 4 mg  4 mg Oral Q8H PRN    Or    ondansetron (ZOFRAN) injection 4 mg  4 mg IntraVENous Q6H PRN    albuterol-ipratropium (DUO-NEB) 2.5 MG-0.5 MG/3 ML  3 mL Nebulization Q4H PRN    ferrous sulfate tablet 325 mg  325 mg Oral ACB    metoprolol (LOPRESSOR) injection 5 mg  5 mg IntraVENous Q6H PRN    traMADoL (ULTRAM) tablet 50 mg  50 mg Oral Q6H PRN        LABS:  Recent Labs     04/17/23  0807 04/15/23  0330   WBC 5.9 5.9   HGB 9.7* 10.4*   HCT 29.2* 31.9*    247       Recent Labs     04/15/23  0330      K 3.2*      CO2 28   BUN 39*   CREA 7.66*   *   CA 8.6   MG 2.2   PHOS 3.0       Recent Labs     04/15/23  0330   ALT 15   AP 86   TBILI 0.5   TP 7.4   ALB 3.1*   GLOB 4.3*       No results for input(s): INR, PTP, APTT, INREXT, INREXT in the last 72 hours. No results for input(s): FE, TIBC, PSAT, FERR in the last 72 hours. No results for input(s): PH, PCO2, PO2 in the last 72 hours. No results for input(s): CPK, CKNDX, TROIQ in the last 72 hours.     No lab exists for component: CPKMB  Lab Results   Component Value Date/Time    Glucose (POC) 100 08/31/2022 11:09 AM    Glucose (POC) 90 03/26/2018 08:10 AM
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
## 2023-04-17 NOTE — PROGRESS NOTES
Transition of Care Plan:    RUR: 20%- High Risk  Disposition: Home   OP HD: [facility] [place] Tues/Thur/Sat    If SNF or IPR: Date FOC offered:  Date FOC received:  Date authorization started with reference number: pending  Date authorization received and expires: pending  Accepting facility:    Follow up appointments: PCP, Specialist  DME needed: none  Transportation at Discharge: Friend  [place] or means to access home:      has access  IM Medicare Letter: 2nd IM Letter to be given at d/c  Is patient a [place] and connected with the [facility]? If yes, was [place] transfer form completed and [facility] notified? Caregiver Contact: Ivy Renee - [phone]  Discharge Caregiver contacted prior to discharge? If pt desires   Care Conference needed?:    no          CM reviewed pt chart.   Per chart review,plan for cardiac cath if BP stays stable    CM will continue to follow pt for D/C planning and arrange services as deemed neccessary    [address]  Phone: [phone]

## 2023-04-18 LAB
ANION GAP SERPL CALC-SCNC: 8 MMOL/L (ref 5–15)
BUN SERPL-MCNC: 62 MG/DL (ref 6–20)
BUN/CREAT SERPL: 6 (ref 12–20)
CALCIUM SERPL-MCNC: 7.5 MG/DL (ref 8.5–10.1)
CHLORIDE SERPL-SCNC: 103 MMOL/L (ref 97–108)
CO2 SERPL-SCNC: 23 MMOL/L (ref 21–32)
CREAT SERPL-MCNC: 9.69 MG/DL (ref 0.7–1.3)
ERYTHROCYTE [DISTWIDTH] IN BLOOD BY AUTOMATED COUNT: 15.3 % (ref 11.5–14.5)
GLUCOSE SERPL-MCNC: 107 MG/DL (ref 65–100)
HCT VFR BLD AUTO: 27.8 % (ref 36.6–50.3)
HGB BLD-MCNC: 9.2 G/DL (ref 12.1–17)
MCH RBC QN AUTO: 31.7 PG (ref 26–34)
MCHC RBC AUTO-ENTMCNC: 33.1 G/DL (ref 30–36.5)
MCV RBC AUTO: 95.9 FL (ref 80–99)
NRBC # BLD: 0 K/UL (ref 0–0.01)
NRBC BLD-RTO: 0 PER 100 WBC
PLATELET # BLD AUTO: 229 K/UL (ref 150–400)
PMV BLD AUTO: 11.3 FL (ref 8.9–12.9)
POTASSIUM SERPL-SCNC: 4.3 MMOL/L (ref 3.5–5.1)
RBC # BLD AUTO: 2.9 M/UL (ref 4.1–5.7)
SODIUM SERPL-SCNC: 134 MMOL/L (ref 136–145)
WBC # BLD AUTO: 6 K/UL (ref 4.1–11.1)

## 2023-04-18 PROCEDURE — 74011000250 HC RX REV CODE- 250: Performed by: INTERNAL MEDICINE

## 2023-04-18 PROCEDURE — 74011250637 HC RX REV CODE- 250/637: Performed by: INTERNAL MEDICINE

## 2023-04-18 PROCEDURE — 90935 HEMODIALYSIS ONE EVALUATION: CPT

## 2023-04-18 PROCEDURE — 74011636637 HC RX REV CODE- 636/637: Performed by: PHYSICIAN ASSISTANT

## 2023-04-18 PROCEDURE — 74011250636 HC RX REV CODE- 250/636: Performed by: INTERNAL MEDICINE

## 2023-04-18 PROCEDURE — 85027 COMPLETE CBC AUTOMATED: CPT

## 2023-04-18 PROCEDURE — C9113 INJ PANTOPRAZOLE SODIUM, VIA: HCPCS

## 2023-04-18 PROCEDURE — 36415 COLL VENOUS BLD VENIPUNCTURE: CPT

## 2023-04-18 PROCEDURE — 74011000250 HC RX REV CODE- 250

## 2023-04-18 PROCEDURE — 65660000001 HC RM ICU INTERMED STEPDOWN

## 2023-04-18 PROCEDURE — 74011250637 HC RX REV CODE- 250/637

## 2023-04-18 PROCEDURE — 80048 BASIC METABOLIC PNL TOTAL CA: CPT

## 2023-04-18 PROCEDURE — 74011250636 HC RX REV CODE- 250/636

## 2023-04-18 PROCEDURE — 74011250637 HC RX REV CODE- 250/637: Performed by: PHYSICIAN ASSISTANT

## 2023-04-18 RX ORDER — METOPROLOL SUCCINATE 25 MG/1
12.5 TABLET, EXTENDED RELEASE ORAL DAILY
Status: DISCONTINUED | OUTPATIENT
Start: 2023-04-18 | End: 2023-04-20 | Stop reason: HOSPADM

## 2023-04-18 RX ADMIN — DIPHENHYDRAMINE HYDROCHLORIDE 25 MG: 50 INJECTION, SOLUTION INTRAMUSCULAR; INTRAVENOUS at 13:24

## 2023-04-18 RX ADMIN — PRAVASTATIN SODIUM 10 MG: 10 TABLET ORAL at 21:30

## 2023-04-18 RX ADMIN — SODIUM CHLORIDE, PRESERVATIVE FREE 20 ML: 5 INJECTION INTRAVENOUS at 22:00

## 2023-04-18 RX ADMIN — SODIUM CHLORIDE, PRESERVATIVE FREE 10 ML: 5 INJECTION INTRAVENOUS at 17:22

## 2023-04-18 RX ADMIN — EPOETIN ALFA-EPBX 10000 UNITS: 10000 INJECTION, SOLUTION INTRAVENOUS; SUBCUTANEOUS at 21:32

## 2023-04-18 RX ADMIN — ACETAMINOPHEN 325MG 650 MG: 325 TABLET ORAL at 21:22

## 2023-04-18 RX ADMIN — SODIUM CHLORIDE, PRESERVATIVE FREE 10 ML: 5 INJECTION INTRAVENOUS at 06:37

## 2023-04-18 RX ADMIN — PANTOPRAZOLE SODIUM 40 MG: 40 INJECTION, POWDER, LYOPHILIZED, FOR SOLUTION INTRAVENOUS at 13:21

## 2023-04-18 RX ADMIN — FERROUS SULFATE TAB 325 MG (65 MG ELEMENTAL FE) 325 MG: 325 (65 FE) TAB at 13:22

## 2023-04-18 RX ADMIN — METOPROLOL SUCCINATE 12.5 MG: 25 TABLET, EXTENDED RELEASE ORAL at 13:21

## 2023-04-18 RX ADMIN — SEVELAMER CARBONATE 1600 MG: 800 TABLET, FILM COATED ORAL at 17:17

## 2023-04-18 RX ADMIN — ACETAMINOPHEN 325MG 650 MG: 325 TABLET ORAL at 14:52

## 2023-04-18 RX ADMIN — PREDNISONE 10 MG: 10 TABLET ORAL at 13:21

## 2023-04-18 RX ADMIN — SODIUM CHLORIDE, PRESERVATIVE FREE 10 ML: 5 INJECTION INTRAVENOUS at 03:38

## 2023-04-18 RX ADMIN — SODIUM CHLORIDE, PRESERVATIVE FREE 10 ML: 5 INJECTION INTRAVENOUS at 21:23

## 2023-04-18 RX ADMIN — SEVELAMER CARBONATE 1600 MG: 800 TABLET, FILM COATED ORAL at 13:21

## 2023-04-18 RX ADMIN — DIPHENHYDRAMINE HYDROCHLORIDE 25 MG: 50 INJECTION, SOLUTION INTRAMUSCULAR; INTRAVENOUS at 21:22

## 2023-04-18 RX ADMIN — METRONIDAZOLE 500 MG: 250 TABLET ORAL at 17:17

## 2023-04-18 RX ADMIN — METRONIDAZOLE 500 MG: 250 TABLET ORAL at 13:21

## 2023-04-18 RX ADMIN — SODIUM CHLORIDE, PRESERVATIVE FREE 10 ML: 5 INJECTION INTRAVENOUS at 17:21

## 2023-04-18 RX ADMIN — LATANOPROST 2 DROP: 50 SOLUTION OPHTHALMIC at 18:00

## 2023-04-18 RX ADMIN — METRONIDAZOLE 500 MG: 250 TABLET ORAL at 21:30

## 2023-04-18 RX ADMIN — PANTOPRAZOLE SODIUM 40 MG: 40 INJECTION, POWDER, LYOPHILIZED, FOR SOLUTION INTRAVENOUS at 21:31

## 2023-04-18 RX ADMIN — MONTELUKAST 10 MG: 10 TABLET, FILM COATED ORAL at 13:22

## 2023-04-18 RX ADMIN — CLARITHROMYCIN 250 MG: 500 TABLET, FILM COATED ORAL at 17:18

## 2023-04-18 RX ADMIN — HYDROXYZINE HYDROCHLORIDE 25 MG: 25 TABLET, FILM COATED ORAL at 17:20

## 2023-04-18 NOTE — PROGRESS NOTES
Occupational Therapy   Chart reviewed for OT eval. Patient currently with HD; will retry later for OT eval as able.  Lyndon Vicente OTALANA/L
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
## 2023-04-18 NOTE — PROGRESS NOTES
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 SBP stable. H/H not at goal.  Continue LEXUS. Subjective:     Chief Complaint:  no complaints          Objective:     VITALS:   Last 24hrs VS reviewed since prior progress note.  Most recent are:  Visit Vitals  BP 99/63 (BP 1 Location: Left lower arm, BP Patient Position: At rest)   Pulse (!) 52   Temp 97.8 °F (36.6 °C)   Resp 16   Ht 6' (1.829 m)   Wt 60.8 kg (134 lb 0.6 oz)   SpO2 93%   BMI 18.18 kg/m²     SpO2 Readings from Last 6 Encounters:   23 93%   23 91%   22 98%   22 93%   22 93%   22 96%    O2 Flow Rate (L/min): 2 l/min     Intake/Output Summary (Last 24 hours) at 2023 1049  Last data filed at 2023 1344  Gross per 24 hour   Intake 250 ml   Output --   Net 250 ml          Telemetry Reviewed       PHYSICAL EXAM:    Alert     ___________________________________________________    Attending Physician: Joanne Hurley MD     ____________________________________________________  MEDICATIONS:  Current Facility-Administered Medications   Medication Dose Route Frequency    metoprolol succinate (TOPROL-XL) XL tablet 12.5 mg  12.5 mg Oral DAILY    hydrOXYzine HCL (ATARAX) tablet 25 mg  25 mg Oral Q4H PRN    latanoprost (XALATAN) 0.005 % ophthalmic solution 2 Drop  2 Drop Both Eyes QPM    predniSONE (DELTASONE) tablet 10 mg  10 mg Oral DAILY    sevelamer carbonate (RENVELA) tab 1,600 mg  1,600 mg Oral TID WITH MEALS    traZODone (DESYREL) tablet 50 mg  50 mg Oral QHS    sodium chloride (NS) flush 5-40 mL  5-40 mL IntraVENous Q8H    sodium chloride (NS) flush 5-40 mL  5-40 mL IntraVENous PRN    naloxone (NARCAN) injection 0.4 mg  0.4 mg IntraVENous PRN    diphenhydrAMINE (BENADRYL) injection 25 mg  25 mg IntraVENous Q4H PRN    metroNIDAZOLE (FLAGYL) tablet 500 mg  500 mg Oral TID    clarithromycin (BIAXIN) tablet 250 mg  250 mg Oral BID    albumin human 25% (BUMINATE) solution 25 g  25 g IntraVENous DIALYSIS PRN    pravastatin (PRAVACHOL) tablet 10 mg  10 mg Oral QHS    sodium chloride (NS) flush 5-40 mL  5-40 mL IntraVENous Q8H    sodium chloride (NS) flush 5-40 mL  5-40 mL IntraVENous PRN    diphenhydrAMINE (BENADRYL) injection 12.5 mg  12.5 mg IntraVENous Q6H PRN    epoetin latricia-epbx (RETACRIT) injection 10,000 Units  10,000 Units SubCUTAneous DIALYSIS TUE, THU & SAT    [Held by provider] calcium acetate(phosphat bind) (PHOSLO) capsule 667 mg  1 Capsule Oral TID WITH MEALS    [Held by provider] gabapentin (NEURONTIN) capsule 600 mg  600 mg Oral TID    montelukast (SINGULAIR) tablet 10 mg  10 mg Oral DAILY    ergocalciferol capsule 50,000 Units  50,000 Units Oral Q7D    pantoprazole (PROTONIX) 40 mg in 0.9% sodium chloride 10 mL injection  40 mg IntraVENous Q12H    acetaminophen (TYLENOL) tablet 650 mg  650 mg Oral Q6H PRN    Or    acetaminophen (TYLENOL) suppository 650 mg  650 mg Rectal Q6H PRN    polyethylene glycol (MIRALAX) packet 17 g  17 g Oral DAILY PRN    ondansetron (ZOFRAN ODT) tablet 4 mg  4 mg Oral Q8H PRN    Or    ondansetron (ZOFRAN) injection 4 mg  4 mg IntraVENous Q6H PRN    albuterol-ipratropium (DUO-NEB) 2.5 MG-0.5 MG/3 ML  3 mL Nebulization Q4H PRN    ferrous sulfate tablet 325 mg  325 mg Oral ACB    metoprolol (LOPRESSOR) injection 5 mg  5 mg IntraVENous Q6H PRN    traMADoL (ULTRAM) tablet 50 mg  50 mg Oral Q6H PRN        LABS:  Recent Labs     04/18/23  0305 04/17/23  0807   WBC 6.0 5.9   HGB 9.2* 9.7*   HCT 27.8* 29.2*    213       Recent Labs     04/18/23  0305 04/17/23  1140   * 133*   K 4.3 3.9    98   CO2 23 27 BUN 62* 47*   CREA 9.69* 9.19*   * 110*   CA 7.5* 8.3*       No results for input(s): ALT, AP, TBIL, TBILI, TP, ALB, GLOB, GGT, AML, LPSE in the last 72 hours. No lab exists for component: SGOT, GPT, AMYP, HLPSE    No results for input(s): INR, PTP, APTT, INREXT, INREXT in the last 72 hours. No results for input(s): FE, TIBC, PSAT, FERR in the last 72 hours. No results for input(s): PH, PCO2, PO2 in the last 72 hours. No results for input(s): CPK, CKNDX, TROIQ in the last 72 hours.     No lab exists for component: CPKMB  Lab Results   Component Value Date/Time    Glucose (POC) 100 08/31/2022 11:09 AM    Glucose (POC) 90 03/26/2018 08:10 AM

## 2023-04-18 NOTE — CARDIO/PULMONARY
Cardiopulmonary Rehab:    Chart reviewed. Pt is a 66 y.o. M admitted with Fatigue [R53.83]. Smoking history assessed. Patient is a former smoker. EF 15-20%  on 4/13/23 per echo. S/p cardiac cath on 4/17/23, per Dr. Luana Kumar cath report : \"No focal CAD\". Patient does not currently have any qualifying diagnoses for CP rehab.

## 2023-04-18 NOTE — PROGRESS NOTES
Problem: Falls - Risk of  Goal: *Absence of Falls  Description: Document Mercedes Garza Fall Risk and appropriate interventions in the flowsheet.   Outcome: Progressing Towards Goal  Note: Fall Risk Interventions:  Mobility Interventions: Patient to call before getting OOB, Strengthening exercises (ROM-active/passive)         Medication Interventions: Teach patient to arise slowly                   Problem: General Medical Care Plan  Goal: *Vital signs within specified parameters  Outcome: Progressing Towards Goal     Problem: Patient Education: Go to Patient Education Activity  Goal: Patient/Family Education  Outcome: Progressing Towards Goal

## 2023-04-18 NOTE — PROGRESS NOTES
04/18/23 0826   Assessment  Type   Assessment Type Shift assessment  (Dialysis)   Respiratory   Respiratory (WDL) X   Breath Sounds Bilateral Diminished; Lower   Cardiac   Cardiac (WDL) X   B/P Outside of Defined Limits Yes   Cardiac Rhythm Sinus Rhythm;PVC   Peripheral Vascular   Peripheral Vascular (WDL) X   LUE Peripheral Vascular   Arteriovenous Access Yes   Patency Assessment Bruit;Patent; Thrill   Site Assessment Clean, dry & intact   Dressing Status Clean, dry & intact   Edema   LUE No Edema   LLE No Edema   RUE No Edema   RLE No Edema   Skin Integumentary   Skin Integumentary (WDL) X   Skin Integrity Incision (comment)  (R radial incision CDI)   Neuro   Neuro (WDL) WDL        04/18/23 0833   Patient Information   Acute or Chronic Care Chronic   Informed Consent Verified Yes   Vital Signs   Temp 97.8 °F (36.6 °C)   Temp Source Oral   Pulse (Heart Rate) 66   Heart Rate Source Monitor   Resp Rate 16   Level of Consciousness 0   BP (!) 130/43   MAP (Calculated) 72   BP 1 Method Automatic   BP 1 Location Left lower arm   BP Patient Position At rest   MEWS Score 1   Dialysis Weight   Goal/Amount of Fluid to Remove (mL) 2000 mL   Dialyzer/Set Up Inspection   Dialyzer/Set Up Inspection Revaclear   Alarms Verified Yes   Test Pass Yes   pH 7.4   Machine Conductivity 14.1   Meter Conductivity 14.1   Reverse Osmosis Safety Checks   Reverse Osmosis Machine Log Completed Yes   Total Chlorine Test Negative   Machine Initiation   Machine Number [id number]   Hemodialysis Start Time 5309   Unused Lines Clamped Yes   Machine Temperature 96.8 °F (36 °C)   Dialysis Initiation   All Connections Secured Yes   NS Bag  Yes   Saline Line Double Clamped Yes   Prime Given   Air Foam Detector Engaged Yes   Dialysate NA (mEq/L) 140   Dialysate K (mEq/L) 3   Dialysate CA (mEq/L) 2.5   Dialysate HCO3 (mEq/L) 35   Citrasate No   ICEBOAT I;C;E;B;O;A;T   During Hemodialysis    Transducer Checks Dry   Saline Given (mL) 200 mL   Heparin Bolus (units) 0 units   Continuous Heparin Infusion (Units/hr) 0 Units/hr   Blood Flow Rate (ml/min) 400 ml/min   Dialysate Flow Rate (ml/hr) 700 ml/hr   Arterial Access Pressure (mmHg) -210   Venous Return Pressure (mmHg) 150   Transmembrane Pressure (mmHg) 40 mmHg   Ultrafiltration Rate (ml/hr) 870 ml/hr   Fluid Removed (mL) 0   $$ Dialysis Charges   $$ Method Hemodialysis     Primary RN SBAR: Artemio Finley RN  Patient Education: procedural  Hepatitis B surface Ag   Date/Time Value Ref Range Status   04/11/2023 09:23 AM <0.10 Index Final     Hep B surface Ag Interp. Date/Time Value Ref Range Status   04/11/2023 09:23 AM Negative NEG   Final     Hepatitis B surface Ab   Date/Time Value Ref Range Status   04/11/2023 09:23 AM <3.10 mIU/mL Final     Hep B surface Ab Interp. Date/Time Value Ref Range Status   04/11/2023 09:23 AM NONREACTIVE NR   Final     Comment:     (NOTE)  The [employer] Anti-HBs2 assay is traceable to the [address] Hepatitis B Immunoglobulin 1st International   Reference Preparation (4079). Samples with a calculated value of 10   mIU/mL or greater are considered reactive (protective) in accordance   with the CDC guidelines. The accepted criteria for immunity to HBV is   anti-HBs activity greater than or equal to 10 mIU/mL, as defined by   the [facility] International Reference Preparation. Assay performance has not been established in pregnant women,   patients who are immunosuppressed or immunocompromised, nor have   performance characteristics been established in conjunction with   other 's assays for specific HBV serologic markers. This   assay does not differentiate between vaccine induced immune response   and a response due to infection with HBV.  Passively acquired anti-HBs   may be identified following patient transfusion, receipt of   immunoglobulin products, etc.

## 2023-04-18 NOTE — PROGRESS NOTES
04/18/23 1135   Vital Signs   Temp 97.6 °F (36.4 °C)   Temp Source Oral   Pulse (Heart Rate) 62   Heart Rate Source Monitor   Resp Rate 16   Level of Consciousness 0   /61   MAP (Calculated) 82   BP 1 Method Automatic   BP 1 Location Left leg   BP Patient Position At rest   MEWS Score 1   Dialysis Initiation   All Connections Secured Yes   Saline Line Double Clamped Yes   Air Foam Detector Engaged Yes   During Hemodialysis    Transducer Checks Dry   Saline Given (mL) 300 mL   Heparin Bolus (units) 0 units   Continuous Heparin Infusion (Units/hr) 0 Units/hr   Fluid Removed (mL) 750   NET Fluid Removed (mL) 50 ml   Post-Dialysis   Rinseback Volume (ml) 300 ml   Duration of Treatment (hours) 3 hours   Patient Response to Treatment Fair   Patient Disposition Return to room   Condition of Dialyzer Filter Good   Hemodialysis End Time 1135     Primary RN SBAR: Allyson Ansari RN  Comments:   UF off intermittently throughout first half of treatment. Unable to pull fluid today. Patient was NPO yesterday for cardiac cath. MD notified, ok to leave UF off. No other issues with treatment.

## 2023-04-18 NOTE — PROGRESS NOTES
Physical Therapy Note    Patient is [place] for dialysis. Will complete acute PT evaluation as able.

## 2023-04-18 NOTE — PROGRESS NOTES
Hospitalist Progress Note    Subjective:   Daily Progress Note: 4/18/2023     Hospital Course:  Mr. Laine Laureano is a 65 y/o male with PMH significant for ESRD on HD T/H/Sa, HTN, asthma, RA, GERD who presented to [facility] ED w c/o worsening SOB, fatigue. He was transferred to [facility] for further workup, found to be anemic Hgb 6.7 with (+) FOBT, received 1 unit PRBC and Hgb improved to 9.3.  GI consulted, endoscopy and colonoscopy showed no active bleeding, did demonstrate diverticulitis in sigmoid colon, biopsy (+) H pylori so pt was started on protonix, flagyl, clarithromycin. Cardiology consulted for possible ischemic cardiomyopathy, Echo demonstrated EF 15-20%, plan for cardiac catheterization afternoon of 4/17, further cardiac workup or interventions pending cardiac cath findings. Nephro consulted for continued HD while hospitalized. Cardiac cath unremarkable no interventions needed. LifeVest fitting to happen later this afternoon 4/18. BP meds adjustment per cards. Possible DC 4/19? Will need home w [facility] 78 and outpt HD and close follow up with cards. Subjective:  Pt examined lying in bed resting following return from dialysis. Denies any new s/s or c/o except tired after HD.     Current Facility-Administered Medications   Medication Dose Route Frequency    metoprolol succinate (TOPROL-XL) XL tablet 12.5 mg  12.5 mg Oral DAILY    hydrOXYzine HCL (ATARAX) tablet 25 mg  25 mg Oral Q4H PRN    latanoprost (XALATAN) 0.005 % ophthalmic solution 2 Drop  2 Drop Both Eyes QPM    predniSONE (DELTASONE) tablet 10 mg  10 mg Oral DAILY    sevelamer carbonate (RENVELA) tab 1,600 mg  1,600 mg Oral TID WITH MEALS    traZODone (DESYREL) tablet 50 mg  50 mg Oral QHS    sodium chloride (NS) flush 5-40 mL  5-40 mL IntraVENous Q8H    sodium chloride (NS) flush 5-40 mL  5-40 mL IntraVENous PRN    naloxone (NARCAN) injection 0.4 mg  0.4 mg IntraVENous PRN    diphenhydrAMINE (BENADRYL) injection 25 mg  25 mg IntraVENous Q4H PRN    metroNIDAZOLE (FLAGYL) tablet 500 mg  500 mg Oral TID    clarithromycin (BIAXIN) tablet 250 mg  250 mg Oral BID    albumin human 25% (BUMINATE) solution 25 g  25 g IntraVENous DIALYSIS PRN    pravastatin (PRAVACHOL) tablet 10 mg  10 mg Oral QHS    sodium chloride (NS) flush 5-40 mL  5-40 mL IntraVENous Q8H    sodium chloride (NS) flush 5-40 mL  5-40 mL IntraVENous PRN    epoetin latricia-epbx (RETACRIT) injection 10,000 Units  10,000 Units SubCUTAneous DIALYSIS TUE, THU & SAT    [Held by provider] calcium acetate(phosphat bind) (PHOSLO) capsule 667 mg  1 Capsule Oral TID WITH MEALS    [Held by provider] gabapentin (NEURONTIN) capsule 600 mg  600 mg Oral TID    montelukast (SINGULAIR) tablet 10 mg  10 mg Oral DAILY    ergocalciferol capsule 50,000 Units  50,000 Units Oral Q7D    pantoprazole (PROTONIX) 40 mg in 0.9% sodium chloride 10 mL injection  40 mg IntraVENous Q12H    acetaminophen (TYLENOL) tablet 650 mg  650 mg Oral Q6H PRN    Or    acetaminophen (TYLENOL) suppository 650 mg  650 mg Rectal Q6H PRN    polyethylene glycol (MIRALAX) packet 17 g  17 g Oral DAILY PRN    ondansetron (ZOFRAN ODT) tablet 4 mg  4 mg Oral Q8H PRN    Or    ondansetron (ZOFRAN) injection 4 mg  4 mg IntraVENous Q6H PRN    albuterol-ipratropium (DUO-NEB) 2.5 MG-0.5 MG/3 ML  3 mL Nebulization Q4H PRN    ferrous sulfate tablet 325 mg  325 mg Oral ACB    metoprolol (LOPRESSOR) injection 5 mg  5 mg IntraVENous Q6H PRN    traMADoL (ULTRAM) tablet 50 mg  50 mg Oral Q6H PRN        Review of Systems:    Review of Systems   Constitutional:  Positive for malaise/fatigue and weight loss. Negative for chills and fever. Respiratory:  Negative for cough, sputum production and shortness of breath. Cardiovascular:  Negative for chest pain and leg swelling. Gastrointestinal:  Negative for constipation, diarrhea, nausea and vomiting. Neurological:  Positive for weakness. Negative for dizziness.       Objective:     Visit Vitals  /62 (BP 1 Location: Left leg, BP Patient Position: At rest)   Pulse 72   Temp 97.5 °F (36.4 °C)   Resp 18   Ht 6' (1.829 m)   Wt 60.8 kg (134 lb 0.6 oz)   SpO2 98%   BMI 18.18 kg/m²    O2 Flow Rate (L/min): 2 l/min O2 Device: None (Room air)    Temp (24hrs), Av.7 °F (36.5 °C), Min:97.4 °F (36.3 °C), Max:97.9 °F (36.6 °C)       07 - 1900  In: -   Out: 50   1901 -  07  In: 250 [I.V.:250]  Out: -     PHYSICAL EXAM:    Physical Exam  Constitutional:       Appearance: He is underweight. Comments: Chronically ill appearing   HENT:      Head: Normocephalic and atraumatic. Mouth/Throat:      Mouth: Mucous membranes are moist.   Eyes:      Pupils: Pupils are equal, round, and reactive to light. Cardiovascular:      Rate and Rhythm: Normal rate and regular rhythm. Pulses: Normal pulses. Heart sounds: Murmur heard. Pulmonary:      Effort: Pulmonary effort is normal. No respiratory distress. Breath sounds: Examination of the right-lower field reveals decreased breath sounds. Examination of the left-lower field reveals decreased breath sounds. Decreased breath sounds present. Abdominal:      General: Bowel sounds are normal. There is no distension. Palpations: Abdomen is soft. Musculoskeletal:      Right lower leg: No edema. Left lower leg: No edema. Skin:     General: Skin is warm and dry. Capillary Refill: Capillary refill takes less than 2 seconds. Neurological:      Mental Status: He is alert and oriented to person, place, and time. Motor: Weakness present.           Data Review    Recent Results (from the past 24 hour(s))   METABOLIC PANEL, BASIC    Collection Time: 23  3:05 AM   Result Value Ref Range    Sodium 134 (L) 136 - 145 mmol/L    Potassium 4.3 3.5 - 5.1 mmol/L    Chloride 103 97 - 108 mmol/L    CO2 23 21 - 32 mmol/L    Anion gap 8 5 - 15 mmol/L    Glucose 107 (H) 65 - 100 mg/dL    BUN 62 (H) 6 - 20 MG/DL    Creatinine 9.69 (H) 0.70 - 1.30 MG/DL    BUN/Creatinine ratio 6 (L) 12 - 20      eGFR 5 (L) >60 ml/min/1.73m2    Calcium 7.5 (L) 8.5 - 10.1 MG/DL   CBC W/O DIFF    Collection Time: 04/18/23  3:05 AM   Result Value Ref Range    WBC 6.0 4.1 - 11.1 K/uL    RBC 2.90 (L) 4.10 - 5.70 M/uL    HGB 9.2 (L) 12.1 - 17.0 g/dL    HCT 27.8 (L) 36.6 - 50.3 %    MCV 95.9 80.0 - 99.0 FL    MCH 31.7 26.0 - 34.0 PG    MCHC 33.1 30.0 - 36.5 g/dL    RDW 15.3 (H) 11.5 - 14.5 %    PLATELET 906 588 - 503 K/uL    MPV 11.3 8.9 - 12.9 FL    NRBC 0.0 0  WBC    ABSOLUTE NRBC 0.00 0.00 - 0.01 K/uL       CT CHEST WO CONT   Final Result      1. There is cardiomegaly. 2. The lungs demonstrate overall improved aeration with near complete resolution   of the interstitial edema. Minimal interstitial edema persists. 3.. Findings suggest pulmonary hypertension and clinical correlation is   suggested. .   4. When compared with the CT scan of the abdomen of 1/30/2018 the patient has   lost weight. XR CHEST PORT   Final Result   Increasing bibasilar airspace disease, and diffuse interstitial/airspace   opacities which could be edema and/or pneumonia. Active Problems:    Fatigue (4/11/2023)        Assessment/Plan:   Acute blood loss anemia, resolved Upper GI blood/H pylori  S/p blood transfusion 4/11  Hgb: 10.4, stable remains too high for 1 unit of packed red blood cells from a hemoglobin of 6.9  S/p EGD: normal esophagus, normal gastric mucosa. Biopsy taken, pathology shows positive for H. Pylori, active gastritis, no dysplasia. S/p colonoscopy: normal terminal ileum, significant sigmoid colon diverticulosis, 2 internal hemorrhoids. No masses, polyps, inflammation, vascular lesions or hematin seen.    Continue protonix  Start metronidazole x 14 days for positive H. pylori  Start clarithromycin x 14 days for positive H. pylori  Continue ferrous sulfate  GI has signed off.  4/18 Hgb remains stable 9.2, on Epogen per nephrology, no further bleeding reported    Cardiomyopathy possibly ischemic  Echo: severely reduced left ventricular systolic function, EF 22-50%. Left ventricle dilated. Normal wall thickness. Global hypokinesis present. Abnormal diastolic function. Right ventricle: reduced systolic function. Mitral valve: mod-severe regurgitation. RVSP: 65-75 mmHg. Cardiology following. Stop nifedipine, change to beta blocker  Continue metoprolol decrease dose to 12.5 mg due to blood pressure  Will need LifeVest on d/c. Continue Entresto, with hold parameters. 4/18 Unremarkable cardiac cath on 4/17. LifeVest being fitted later today  BP med adjustments per cards. ESRD on HD T/H/Sa  Nephrology consulted for continued HD  4/18 HD today but unable to pull off any volume    Unintentional weight loss, fatigue, weakness  Severe protein calorie malnutrition  BMI 16.3, appears to have lost ~15 lbs in past 4 months, further historic weights not seen in medical record  Could be 2/2 HD and chronic illness but cannot exclude possibility of malignancy  PSA, CEA sent and pending  Nutritional consult placed  PT/OT eval/tx    Chronic hypertension with hypotension at this visit  Hyperlipidemia  Metoprolol continued with parameters and decreased dose to 12.5mg  Continue statin    Medical Decision Making:    Labs reviewed by myself   CBC, BMP    Diagnostic data reviewed by myself   CT    Toxic drug monitoring    none    Discussed case with   Patient, Nurse, Case Mgmt    MDM Discussion & Disposition  Pt w new onset cardiomyopathy EF 15-20%, will require LifeVest at discharge, [place] [id number] setup, outpt dialysis. Anticipate additional 24h hospitalization.     DVT Prophylaxis: SCDs only 2/2 acute GI bleed  Code Status:  Full Code  POA:Camille Alonso (Daughter)   [phone]     Time Spent: 25 minutes    _______________________________________________________________    SHEELA Goodman

## 2023-04-18 NOTE — PROGRESS NOTES
Physical Therapy Note    Patient is in the room with family. Patient and family deny the need for acute PT evaluation. Patient reports he is ambulatory to the restroom. Recommend D/C with [facility] PT if patient is agreeable.

## 2023-04-19 LAB
ANION GAP SERPL CALC-SCNC: 5 MMOL/L (ref 5–15)
BUN SERPL-MCNC: 41 MG/DL (ref 6–20)
BUN/CREAT SERPL: 6 (ref 12–20)
CALCIUM SERPL-MCNC: 7.9 MG/DL (ref 8.5–10.1)
CHLORIDE SERPL-SCNC: 103 MMOL/L (ref 97–108)
CO2 SERPL-SCNC: 28 MMOL/L (ref 21–32)
CREAT SERPL-MCNC: 6.45 MG/DL (ref 0.7–1.3)
ERYTHROCYTE [DISTWIDTH] IN BLOOD BY AUTOMATED COUNT: 15.6 % (ref 11.5–14.5)
GLUCOSE SERPL-MCNC: 130 MG/DL (ref 65–100)
HCT VFR BLD AUTO: 27.3 % (ref 36.6–50.3)
HGB BLD-MCNC: 8.9 G/DL (ref 12.1–17)
MCH RBC QN AUTO: 31.7 PG (ref 26–34)
MCHC RBC AUTO-ENTMCNC: 32.6 G/DL (ref 30–36.5)
MCV RBC AUTO: 97.2 FL (ref 80–99)
NRBC # BLD: 0 K/UL (ref 0–0.01)
NRBC BLD-RTO: 0 PER 100 WBC
PLATELET # BLD AUTO: 218 K/UL (ref 150–400)
PMV BLD AUTO: 11.2 FL (ref 8.9–12.9)
POTASSIUM SERPL-SCNC: 4.5 MMOL/L (ref 3.5–5.1)
RBC # BLD AUTO: 2.81 M/UL (ref 4.1–5.7)
SODIUM SERPL-SCNC: 136 MMOL/L (ref 136–145)
WBC # BLD AUTO: 4.9 K/UL (ref 4.1–11.1)

## 2023-04-19 PROCEDURE — 74011250636 HC RX REV CODE- 250/636

## 2023-04-19 PROCEDURE — 80048 BASIC METABOLIC PNL TOTAL CA: CPT

## 2023-04-19 PROCEDURE — 74011000250 HC RX REV CODE- 250: Performed by: INTERNAL MEDICINE

## 2023-04-19 PROCEDURE — 74011000250 HC RX REV CODE- 250

## 2023-04-19 PROCEDURE — 74011250637 HC RX REV CODE- 250/637: Performed by: INTERNAL MEDICINE

## 2023-04-19 PROCEDURE — 36415 COLL VENOUS BLD VENIPUNCTURE: CPT

## 2023-04-19 PROCEDURE — 74011636637 HC RX REV CODE- 636/637: Performed by: PHYSICIAN ASSISTANT

## 2023-04-19 PROCEDURE — 74011250637 HC RX REV CODE- 250/637

## 2023-04-19 PROCEDURE — 74011250637 HC RX REV CODE- 250/637: Performed by: PHYSICIAN ASSISTANT

## 2023-04-19 PROCEDURE — 85027 COMPLETE CBC AUTOMATED: CPT

## 2023-04-19 PROCEDURE — 65660000001 HC RM ICU INTERMED STEPDOWN

## 2023-04-19 PROCEDURE — C9113 INJ PANTOPRAZOLE SODIUM, VIA: HCPCS

## 2023-04-19 RX ORDER — DIPHENHYDRAMINE HCL 25 MG
25 CAPSULE ORAL
Status: DISCONTINUED | OUTPATIENT
Start: 2023-04-19 | End: 2023-04-20 | Stop reason: HOSPADM

## 2023-04-19 RX ADMIN — DIPHENHYDRAMINE HYDROCHLORIDE 25 MG: 25 CAPSULE ORAL at 09:30

## 2023-04-19 RX ADMIN — CLARITHROMYCIN 250 MG: 500 TABLET, FILM COATED ORAL at 08:56

## 2023-04-19 RX ADMIN — SEVELAMER CARBONATE 1600 MG: 800 TABLET, FILM COATED ORAL at 11:31

## 2023-04-19 RX ADMIN — METRONIDAZOLE 500 MG: 250 TABLET ORAL at 17:40

## 2023-04-19 RX ADMIN — SEVELAMER CARBONATE 1600 MG: 800 TABLET, FILM COATED ORAL at 08:56

## 2023-04-19 RX ADMIN — DIPHENHYDRAMINE HYDROCHLORIDE 25 MG: 25 CAPSULE ORAL at 17:43

## 2023-04-19 RX ADMIN — MONTELUKAST 10 MG: 10 TABLET, FILM COATED ORAL at 08:56

## 2023-04-19 RX ADMIN — PREDNISONE 10 MG: 10 TABLET ORAL at 08:56

## 2023-04-19 RX ADMIN — SODIUM CHLORIDE, PRESERVATIVE FREE 10 ML: 5 INJECTION INTRAVENOUS at 21:24

## 2023-04-19 RX ADMIN — SODIUM CHLORIDE, PRESERVATIVE FREE 10 ML: 5 INJECTION INTRAVENOUS at 14:00

## 2023-04-19 RX ADMIN — METRONIDAZOLE 500 MG: 250 TABLET ORAL at 08:56

## 2023-04-19 RX ADMIN — METRONIDAZOLE 500 MG: 250 TABLET ORAL at 21:18

## 2023-04-19 RX ADMIN — SODIUM CHLORIDE, PRESERVATIVE FREE 10 ML: 5 INJECTION INTRAVENOUS at 06:29

## 2023-04-19 RX ADMIN — HYDROXYZINE HYDROCHLORIDE 25 MG: 25 TABLET, FILM COATED ORAL at 21:18

## 2023-04-19 RX ADMIN — METOPROLOL SUCCINATE 12.5 MG: 25 TABLET, EXTENDED RELEASE ORAL at 08:56

## 2023-04-19 RX ADMIN — PANTOPRAZOLE SODIUM 40 MG: 40 INJECTION, POWDER, LYOPHILIZED, FOR SOLUTION INTRAVENOUS at 11:31

## 2023-04-19 RX ADMIN — SEVELAMER CARBONATE 1600 MG: 800 TABLET, FILM COATED ORAL at 17:40

## 2023-04-19 RX ADMIN — FERROUS SULFATE TAB 325 MG (65 MG ELEMENTAL FE) 325 MG: 325 (65 FE) TAB at 08:56

## 2023-04-19 RX ADMIN — ACETAMINOPHEN 325MG 650 MG: 325 TABLET ORAL at 13:48

## 2023-04-19 RX ADMIN — PRAVASTATIN SODIUM 10 MG: 10 TABLET ORAL at 21:18

## 2023-04-19 RX ADMIN — PANTOPRAZOLE SODIUM 40 MG: 40 INJECTION, POWDER, LYOPHILIZED, FOR SOLUTION INTRAVENOUS at 21:19

## 2023-04-19 RX ADMIN — ACETAMINOPHEN 325MG 650 MG: 325 TABLET ORAL at 21:21

## 2023-04-19 RX ADMIN — TRAZODONE HYDROCHLORIDE 50 MG: 50 TABLET ORAL at 21:18

## 2023-04-19 RX ADMIN — LATANOPROST 2 DROP: 50 SOLUTION OPHTHALMIC at 17:41

## 2023-04-19 RX ADMIN — CLARITHROMYCIN 250 MG: 500 TABLET, FILM COATED ORAL at 21:18
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
## 2023-04-19 NOTE — PROGRESS NOTES
Problem: Falls - Risk of  Goal: *Absence of Falls  Description: Document Reinier Velazquez Fall Risk and appropriate interventions in the flowsheet.   Outcome: Progressing Towards Goal  Note: Fall Risk Interventions:  Mobility Interventions: Patient to call before getting OOB, Strengthening exercises (ROM-active/passive)         Medication Interventions: Teach patient to arise slowly                   Problem: Patient Education: Go to Patient Education Activity  Goal: Patient/Family Education  Outcome: Progressing Towards Goal     Problem: General Medical Care Plan  Goal: *Vital signs within specified parameters  Outcome: Progressing Towards Goal

## 2023-04-19 NOTE — PROGRESS NOTES
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 Plan HD in AM.        H/H not at goal.  Continue LEXUS. Subjective:     Chief Complaint:  \"I feel fine. \"          Objective:     VITALS:   Last 24hrs VS reviewed since prior progress note.  Most recent are:  Visit Vitals  /70   Pulse 67   Temp 97.8 °F (36.6 °C)   Resp 17   Ht 6' (1.829 m)   Wt 60.3 kg (132 lb 15 oz)   SpO2 96%   BMI 18.03 kg/m²     SpO2 Readings from Last 6 Encounters:   23 96%   23 91%   22 98%   22 93%   22 93%   22 96%    O2 Flow Rate (L/min): 2 l/min     Intake/Output Summary (Last 24 hours) at 2023 0955  Last data filed at 2023 1135  Gross per 24 hour   Intake --   Output 50 ml   Net -50 ml          Telemetry Reviewed       PHYSICAL EXAM:    Alert     ___________________________________________________    Attending Physician: Dena Flower MD     ____________________________________________________  MEDICATIONS:  Current Facility-Administered Medications   Medication Dose Route Frequency    diphenhydrAMINE (BENADRYL) capsule 25 mg  25 mg Oral Q6H PRN    metoprolol succinate (TOPROL-XL) XL tablet 12.5 mg  12.5 mg Oral DAILY    hydrOXYzine HCL (ATARAX) tablet 25 mg  25 mg Oral Q4H PRN    latanoprost (XALATAN) 0.005 % ophthalmic solution 2 Drop  2 Drop Both Eyes QPM    predniSONE (DELTASONE) tablet 10 mg  10 mg Oral DAILY    sevelamer carbonate (RENVELA) tab 1,600 mg  1,600 mg Oral TID WITH MEALS    traZODone (DESYREL) tablet 50 mg  50 mg Oral QHS    sodium chloride (NS) flush 5-40 mL  5-40 mL IntraVENous Q8H sodium chloride (NS) flush 5-40 mL  5-40 mL IntraVENous PRN    naloxone (NARCAN) injection 0.4 mg  0.4 mg IntraVENous PRN    metroNIDAZOLE (FLAGYL) tablet 500 mg  500 mg Oral TID    clarithromycin (BIAXIN) tablet 250 mg  250 mg Oral BID    albumin human 25% (BUMINATE) solution 25 g  25 g IntraVENous DIALYSIS PRN    pravastatin (PRAVACHOL) tablet 10 mg  10 mg Oral QHS    sodium chloride (NS) flush 5-40 mL  5-40 mL IntraVENous Q8H    sodium chloride (NS) flush 5-40 mL  5-40 mL IntraVENous PRN    epoetin latricia-epbx (RETACRIT) injection 10,000 Units  10,000 Units SubCUTAneous DIALYSIS TUE, THU & SAT    [Held by provider] calcium acetate(phosphat bind) (PHOSLO) capsule 667 mg  1 Capsule Oral TID WITH MEALS    [Held by provider] gabapentin (NEURONTIN) capsule 600 mg  600 mg Oral TID    montelukast (SINGULAIR) tablet 10 mg  10 mg Oral DAILY    ergocalciferol capsule 50,000 Units  50,000 Units Oral Q7D    pantoprazole (PROTONIX) 40 mg in 0.9% sodium chloride 10 mL injection  40 mg IntraVENous Q12H    acetaminophen (TYLENOL) tablet 650 mg  650 mg Oral Q6H PRN    Or    acetaminophen (TYLENOL) suppository 650 mg  650 mg Rectal Q6H PRN    polyethylene glycol (MIRALAX) packet 17 g  17 g Oral DAILY PRN    ondansetron (ZOFRAN ODT) tablet 4 mg  4 mg Oral Q8H PRN    Or    ondansetron (ZOFRAN) injection 4 mg  4 mg IntraVENous Q6H PRN    albuterol-ipratropium (DUO-NEB) 2.5 MG-0.5 MG/3 ML  3 mL Nebulization Q4H PRN    ferrous sulfate tablet 325 mg  325 mg Oral ACB    metoprolol (LOPRESSOR) injection 5 mg  5 mg IntraVENous Q6H PRN    traMADoL (ULTRAM) tablet 50 mg  50 mg Oral Q6H PRN        LABS:  Recent Labs     04/19/23  0323 04/18/23  0305   WBC 4.9 6.0   HGB 8.9* 9.2*   HCT 27.3* 27.8*    229       Recent Labs     04/19/23  0323 04/18/23  0305 04/17/23  1140    134* 133*   K 4.5 4.3 3.9    103 98   CO2 28 23 27   BUN 41* 62* 47*   CREA 6.45* 9.69* 9.19*   * 107* 110*   CA 7.9* 7.5* 8.3*       No results for input(s): ALT, AP, TBIL, TBILI, TP, ALB, GLOB, GGT, AML, LPSE in the last 72 hours. No lab exists for component: SGOT, GPT, AMYP, HLPSE    No results for input(s): INR, PTP, APTT, INREXT, INREXT in the last 72 hours. No results for input(s): FE, TIBC, PSAT, FERR in the last 72 hours. No results for input(s): PH, PCO2, PO2 in the last 72 hours. No results for input(s): CPK, CKNDX, TROIQ in the last 72 hours.     No lab exists for component: CPKMB  Lab Results   Component Value Date/Time    Glucose (POC) 100 08/31/2022 11:09 AM    Glucose (POC) 90 03/26/2018 08:10 AM

## 2023-04-19 NOTE — PROGRESS NOTES
0700 Bedside shift change report given to [facility]ar [facility] (oncoming nurse) by Nereida Freitas (offgoing nurse). Report included the following information SBAR and Kardex. End of Shift Note    Bedside shift change report given to P[place]ito (oncoming nurse) by Danica Rob RN (offgoing nurse). Report included the following information SBAR and Kardex    Shift worked: Day     Shift summary and any significant changes:    Uneventful shift. Spoke with Tao Mason NP and they would like to keep him tonight as Nephrology noted that his H&H is not at goal and they would also like for him to get dialysis tomorrow prior to sending him home. Concerns for physician to address:       [facility]          Activity:  Activity Level: Up with Assistance  Number times ambulated in hallways past shift: 0  Number of times OOB to chair past shift: 0    Cardiac:   Cardiac Monitoring: Yes      Cardiac Rhythm: Sinus Rhythm    Access:  Current line(s): PIV and HD access     Genitourinary:   Urinary status: oliguric    Respiratory:   O2 Device: None (Room air)  Chronic home O2 use?: NO  Incentive spirometer at bedside: NO       GI:  Last Bowel Movement Date: 04/17/23  Current diet:  ADULT ORAL NUTRITION SUPPLEMENT Breakfast, Lunch, Dinner; Standard High Calorie/High Protein  ADULT DIET Regular; Low Fat/Low Chol/High Fiber/2 gm Na; Strawberry Ensure preferred please  Passing flatus: YES  Tolerating current diet: YES       Pain Management:   Patient states pain is manageable on current regimen: YES    Skin:  Torres Score: 19  Interventions: increase time out of bed and PT/OT consult    Patient Safety:  Fall Score:  Total Score: 2  Interventions: gripper socks, pt to call before getting OOB, and stay with me (per policy)       Length of Stay:  Expected LOS: 3d 12h  Actual LOS: 1316 Rena Hutchison RN

## 2023-04-19 NOTE — PROGRESS NOTES
Occupational Therapy    Thoroughly reviewed chart and attempted to see pt. Pt says he is functioning at his independent baseline and does not need OT at this time or after discharge. Will complete OT order.      Nikki Darby, OT

## 2023-04-19 NOTE — PROGRESS NOTES
Transition of Care Plan:     RUR: 22%- High Risk  Disposition: Home   OP HD: [facility] [place]/Thur/Sat   Follow up appointments: PCP, Specialist  DME needed: none  Transportation at Discharge: Friend  [place] or means to access home:      has access  IM Medicare Letter: 2nd IM Letter to be given at d/c  Is patient a [place] and connected with the [facility] [address] [facility]? If yes, was [employer] transfer form completed and [facility] notified? Caregiver Contact: Ben Lord - [phone]  Discharge Caregiver contacted prior to discharge? If pt desires   Care Conference needed?:    no           CM spoke with Pt and discussed recommendation for [facility] at discharge. Pt politely declined stated that he does not feel that he needs HH at this time. Pt reports that someone will be home with him throughout the day. CM notified attending via [employer].       Meryle Siren, [facility], CM  [place]

## 2023-04-19 NOTE — PROGRESS NOTES
Problem: Falls - Risk of  Goal: *Absence of Falls  Description: Document Kerline Davila Fall Risk and appropriate interventions in the flowsheet.   Outcome: Progressing Towards Goal  Note: Fall Risk Interventions:  Mobility Interventions: Patient to call before getting OOB, Strengthening exercises (ROM-active/passive)         Medication Interventions: Teach patient to arise slowly                   Problem: General Medical Care Plan  Goal: *Vital signs within specified parameters  Outcome: Progressing Towards Goal

## 2023-04-20 VITALS
SYSTOLIC BLOOD PRESSURE: 118 MMHG | RESPIRATION RATE: 16 BRPM | HEART RATE: 76 BPM | WEIGHT: 135.58 LBS | HEIGHT: 72 IN | TEMPERATURE: 97 F | DIASTOLIC BLOOD PRESSURE: 69 MMHG | BODY MASS INDEX: 18.36 KG/M2 | OXYGEN SATURATION: 95 %

## 2023-04-20 LAB
ANION GAP SERPL CALC-SCNC: 6 MMOL/L (ref 5–15)
BUN SERPL-MCNC: 50 MG/DL (ref 6–20)
BUN/CREAT SERPL: 7 (ref 12–20)
CALCIUM SERPL-MCNC: 7 MG/DL (ref 8.5–10.1)
CHLORIDE SERPL-SCNC: 108 MMOL/L (ref 97–108)
CO2 SERPL-SCNC: 24 MMOL/L (ref 21–32)
CREAT SERPL-MCNC: 7.16 MG/DL (ref 0.7–1.3)
ERYTHROCYTE [DISTWIDTH] IN BLOOD BY AUTOMATED COUNT: 16.3 % (ref 11.5–14.5)
GLUCOSE SERPL-MCNC: 100 MG/DL (ref 65–100)
HCT VFR BLD AUTO: 26.6 % (ref 36.6–50.3)
HGB BLD-MCNC: 8.6 G/DL (ref 12.1–17)
MCH RBC QN AUTO: 32.1 PG (ref 26–34)
MCHC RBC AUTO-ENTMCNC: 32.3 G/DL (ref 30–36.5)
MCV RBC AUTO: 99.3 FL (ref 80–99)
NRBC # BLD: 0 K/UL (ref 0–0.01)
NRBC BLD-RTO: 0 PER 100 WBC
PLATELET # BLD AUTO: 235 K/UL (ref 150–400)
PMV BLD AUTO: 11.8 FL (ref 8.9–12.9)
POTASSIUM SERPL-SCNC: 5.1 MMOL/L (ref 3.5–5.1)
RBC # BLD AUTO: 2.68 M/UL (ref 4.1–5.7)
SODIUM SERPL-SCNC: 138 MMOL/L (ref 136–145)
WBC # BLD AUTO: 5.2 K/UL (ref 4.1–11.1)

## 2023-04-20 PROCEDURE — P9047 ALBUMIN (HUMAN), 25%, 50ML: HCPCS | Performed by: INTERNAL MEDICINE

## 2023-04-20 PROCEDURE — 74011250636 HC RX REV CODE- 250/636: Performed by: INTERNAL MEDICINE

## 2023-04-20 PROCEDURE — 36415 COLL VENOUS BLD VENIPUNCTURE: CPT

## 2023-04-20 PROCEDURE — C9113 INJ PANTOPRAZOLE SODIUM, VIA: HCPCS

## 2023-04-20 PROCEDURE — 74011000250 HC RX REV CODE- 250: Performed by: INTERNAL MEDICINE

## 2023-04-20 PROCEDURE — 85027 COMPLETE CBC AUTOMATED: CPT

## 2023-04-20 PROCEDURE — 90935 HEMODIALYSIS ONE EVALUATION: CPT

## 2023-04-20 PROCEDURE — 74011250636 HC RX REV CODE- 250/636

## 2023-04-20 PROCEDURE — 80048 BASIC METABOLIC PNL TOTAL CA: CPT

## 2023-04-20 PROCEDURE — 74011250637 HC RX REV CODE- 250/637: Performed by: PHYSICIAN ASSISTANT

## 2023-04-20 PROCEDURE — 74011250637 HC RX REV CODE- 250/637

## 2023-04-20 PROCEDURE — 74011000250 HC RX REV CODE- 250

## 2023-04-20 PROCEDURE — 74011636637 HC RX REV CODE- 636/637: Performed by: PHYSICIAN ASSISTANT

## 2023-04-20 RX ORDER — METOPROLOL SUCCINATE 25 MG/1
12.5 TABLET, EXTENDED RELEASE ORAL DAILY
Qty: 15 TABLET | Refills: 0 | Status: SHIPPED | OUTPATIENT
Start: 2023-04-21 | End: 2023-05-21

## 2023-04-20 RX ORDER — CLARITHROMYCIN 250 MG/1
250 TABLET, FILM COATED ORAL 2 TIMES DAILY
Qty: 20 TABLET | Refills: 0 | Status: SHIPPED | OUTPATIENT
Start: 2023-04-20 | End: 2023-04-30

## 2023-04-20 RX ORDER — PRAVASTATIN SODIUM 10 MG/1
10 TABLET ORAL
Qty: 30 TABLET | Refills: 0 | Status: SHIPPED | OUTPATIENT
Start: 2023-04-20 | End: 2023-05-20

## 2023-04-20 RX ORDER — METRONIDAZOLE 500 MG/1
500 TABLET ORAL 3 TIMES DAILY
Qty: 31 TABLET | Refills: 0 | Status: SHIPPED | OUTPATIENT
Start: 2023-04-20 | End: 2023-05-01

## 2023-04-20 RX ORDER — LOSARTAN POTASSIUM 25 MG/1
12.5 TABLET ORAL DAILY
Status: DISCONTINUED | OUTPATIENT
Start: 2023-04-21 | End: 2023-04-20 | Stop reason: HOSPADM

## 2023-04-20 RX ORDER — LOSARTAN POTASSIUM 25 MG/1
12.5 TABLET ORAL DAILY
Qty: 15 TABLET | Refills: 0 | Status: SHIPPED | OUTPATIENT
Start: 2023-04-21 | End: 2023-05-21

## 2023-04-20 RX ORDER — ALBUMIN HUMAN 250 G/1000ML
12.5 SOLUTION INTRAVENOUS ONCE
Status: DISCONTINUED | OUTPATIENT
Start: 2023-04-20 | End: 2023-04-20 | Stop reason: HOSPADM

## 2023-04-20 RX ORDER — PANTOPRAZOLE SODIUM 40 MG/1
40 TABLET, DELAYED RELEASE ORAL 2 TIMES DAILY
Qty: 20 TABLET | Refills: 0 | Status: SHIPPED | OUTPATIENT
Start: 2023-04-20 | End: 2023-04-30

## 2023-04-20 RX ADMIN — FERROUS SULFATE TAB 325 MG (65 MG ELEMENTAL FE) 325 MG: 325 (65 FE) TAB at 08:02

## 2023-04-20 RX ADMIN — SEVELAMER CARBONATE 1600 MG: 800 TABLET, FILM COATED ORAL at 08:01

## 2023-04-20 RX ADMIN — PANTOPRAZOLE SODIUM 40 MG: 40 INJECTION, POWDER, LYOPHILIZED, FOR SOLUTION INTRAVENOUS at 08:02

## 2023-04-20 RX ADMIN — SEVELAMER CARBONATE 1600 MG: 800 TABLET, FILM COATED ORAL at 12:59

## 2023-04-20 RX ADMIN — SODIUM CHLORIDE, PRESERVATIVE FREE 10 ML: 5 INJECTION INTRAVENOUS at 06:06

## 2023-04-20 RX ADMIN — SEVELAMER CARBONATE 1600 MG: 800 TABLET, FILM COATED ORAL at 17:12

## 2023-04-20 RX ADMIN — MONTELUKAST 10 MG: 10 TABLET, FILM COATED ORAL at 08:02

## 2023-04-20 RX ADMIN — CLARITHROMYCIN 250 MG: 500 TABLET, FILM COATED ORAL at 08:01

## 2023-04-20 RX ADMIN — METRONIDAZOLE 500 MG: 250 TABLET ORAL at 17:12

## 2023-04-20 RX ADMIN — DIPHENHYDRAMINE HYDROCHLORIDE 25 MG: 25 CAPSULE ORAL at 14:29

## 2023-04-20 RX ADMIN — ACETAMINOPHEN 325MG 650 MG: 325 TABLET ORAL at 12:59

## 2023-04-20 RX ADMIN — SODIUM CHLORIDE, PRESERVATIVE FREE 10 ML: 5 INJECTION INTRAVENOUS at 14:00

## 2023-04-20 RX ADMIN — PREDNISONE 10 MG: 10 TABLET ORAL at 08:01

## 2023-04-20 RX ADMIN — DIPHENHYDRAMINE HYDROCHLORIDE 25 MG: 25 CAPSULE ORAL at 08:02

## 2023-04-20 RX ADMIN — ALBUMIN (HUMAN) 25 G: 0.25 INJECTION, SOLUTION INTRAVENOUS at 09:46

## 2023-04-20 NOTE — PROGRESS NOTES
Hospitalist Progress Note    Subjective:   Daily Progress Note: 4/19/2023     Hospital Course:  Mr. Krista Berumen is a 67 y/o male with PMH significant for ESRD on HD T/H/Sa, HTN, asthma, RA, GERD who presented to [facility] ED w c/o worsening SOB, fatigue. He was transferred to [facility] for further workup, found to be anemic Hgb 6.7 with (+) FOBT, received 1 unit PRBC and Hgb improved to 9.3.  GI consulted, endoscopy and colonoscopy showed no active bleeding, did demonstrate diverticulitis in sigmoid colon, biopsy (+) H pylori so pt was started on protonix, flagyl, clarithromycin. Cardiology consulted for possible ischemic cardiomyopathy, Echo demonstrated EF 15-20%, plan for cardiac catheterization afternoon of 4/17, further cardiac workup or interventions pending cardiac cath findings. Nephro consulted for continued HD while hospitalized. Cardiac cath unremarkable no interventions needed. LifeVest fitting to happen later this afternoon 4/18. BP meds adjustment per cards. Patient recommended for home health care, but declined need for this. Patient set up for resumption of outpatient HD. Patient has LifeVest on as of 4/19. Plan is for patient to receive dialysis 4/20 then likely discharge later in the day. Subjective:  Pt examined lying in bed. Denies any new s/s or c/o. Demonstrates he has LifeVest on. Asking about going home.     Current Facility-Administered Medications   Medication Dose Route Frequency    diphenhydrAMINE (BENADRYL) capsule 25 mg  25 mg Oral Q6H PRN    metoprolol succinate (TOPROL-XL) XL tablet 12.5 mg  12.5 mg Oral DAILY    hydrOXYzine HCL (ATARAX) tablet 25 mg  25 mg Oral Q4H PRN    latanoprost (XALATAN) 0.005 % ophthalmic solution 2 Drop  2 Drop Both Eyes QPM    predniSONE (DELTASONE) tablet 10 mg  10 mg Oral DAILY    sevelamer carbonate (RENVELA) tab 1,600 mg  1,600 mg Oral TID WITH MEALS    traZODone (DESYREL) tablet 50 mg  50 mg Oral QHS    sodium chloride (NS) flush 5-40 mL  5-40 mL IntraVENous Q8H    sodium chloride (NS) flush 5-40 mL  5-40 mL IntraVENous PRN    naloxone (NARCAN) injection 0.4 mg  0.4 mg IntraVENous PRN    metroNIDAZOLE (FLAGYL) tablet 500 mg  500 mg Oral TID    clarithromycin (BIAXIN) tablet 250 mg  250 mg Oral BID    albumin human 25% (BUMINATE) solution 25 g  25 g IntraVENous DIALYSIS PRN    pravastatin (PRAVACHOL) tablet 10 mg  10 mg Oral QHS    sodium chloride (NS) flush 5-40 mL  5-40 mL IntraVENous Q8H    sodium chloride (NS) flush 5-40 mL  5-40 mL IntraVENous PRN    epoetin latricia-epbx (RETACRIT) injection 10,000 Units  10,000 Units SubCUTAneous DIALYSIS TUE, THU & SAT    [Held by provider] calcium acetate(phosphat bind) (PHOSLO) capsule 667 mg  1 Capsule Oral TID WITH MEALS    [Held by provider] gabapentin (NEURONTIN) capsule 600 mg  600 mg Oral TID    montelukast (SINGULAIR) tablet 10 mg  10 mg Oral DAILY    ergocalciferol capsule 50,000 Units  50,000 Units Oral Q7D    pantoprazole (PROTONIX) 40 mg in 0.9% sodium chloride 10 mL injection  40 mg IntraVENous Q12H    acetaminophen (TYLENOL) tablet 650 mg  650 mg Oral Q6H PRN    Or    acetaminophen (TYLENOL) suppository 650 mg  650 mg Rectal Q6H PRN    polyethylene glycol (MIRALAX) packet 17 g  17 g Oral DAILY PRN    ondansetron (ZOFRAN ODT) tablet 4 mg  4 mg Oral Q8H PRN    Or    ondansetron (ZOFRAN) injection 4 mg  4 mg IntraVENous Q6H PRN    albuterol-ipratropium (DUO-NEB) 2.5 MG-0.5 MG/3 ML  3 mL Nebulization Q4H PRN    ferrous sulfate tablet 325 mg  325 mg Oral ACB    metoprolol (LOPRESSOR) injection 5 mg  5 mg IntraVENous Q6H PRN    traMADoL (ULTRAM) tablet 50 mg  50 mg Oral Q6H PRN        Review of Systems:    Review of Systems   Constitutional:  Positive for malaise/fatigue and weight loss. Negative for chills and fever. Respiratory:  Negative for cough, sputum production and shortness of breath. Cardiovascular:  Negative for chest pain and leg swelling.    Gastrointestinal:  Negative for constipation, diarrhea, nausea and vomiting. Neurological:  Positive for weakness. Negative for dizziness. Objective:     Visit Vitals  BP 92/62 (BP 1 Location: Left upper arm, BP Patient Position: At rest)   Pulse 77   Temp 97.8 °F (36.6 °C)   Resp 17   Ht 6' (1.829 m)   Wt 60.3 kg (132 lb 15 oz)   SpO2 97%   BMI 18.03 kg/m²    O2 Flow Rate (L/min): 2 l/min O2 Device: None (Room air)    Temp (24hrs), Av.8 °F (36.6 °C), Min:97.6 °F (36.4 °C), Max:97.9 °F (36.6 °C)      No intake/output data recorded.  0701 -  1900  In: -   Out: 50     PHYSICAL EXAM:    Physical Exam  Constitutional:       Appearance: He is underweight. Comments: Chronically ill appearing   HENT:      Head: Normocephalic and atraumatic. Mouth/Throat:      Mouth: Mucous membranes are moist.   Eyes:      Pupils: Pupils are equal, round, and reactive to light. Cardiovascular:      Rate and Rhythm: Normal rate and regular rhythm. Pulses: Normal pulses. Heart sounds: Murmur heard. Pulmonary:      Effort: Pulmonary effort is normal. No respiratory distress. Breath sounds: Examination of the right-lower field reveals decreased breath sounds. Examination of the left-lower field reveals decreased breath sounds. Decreased breath sounds present. Abdominal:      General: Bowel sounds are normal. There is no distension. Palpations: Abdomen is soft. Musculoskeletal:      Right lower leg: No edema. Left lower leg: No edema. Skin:     General: Skin is warm and dry. Capillary Refill: Capillary refill takes less than 2 seconds. Neurological:      Mental Status: He is alert and oriented to person, place, and time. Motor: Weakness present.           Data Review    Recent Results (from the past 24 hour(s))   METABOLIC PANEL, BASIC    Collection Time: 23  3:23 AM   Result Value Ref Range    Sodium 136 136 - 145 mmol/L    Potassium 4.5 3.5 - 5.1 mmol/L    Chloride 103 97 - 108 mmol/L    CO2 28 21 - 32 mmol/L    Anion gap 5 5 - 15 mmol/L    Glucose 130 (H) 65 - 100 mg/dL    BUN 41 (H) 6 - 20 MG/DL    Creatinine 6.45 (H) 0.70 - 1.30 MG/DL    BUN/Creatinine ratio 6 (L) 12 - 20      eGFR 8 (L) >60 ml/min/1.73m2    Calcium 7.9 (L) 8.5 - 10.1 MG/DL   CBC W/O DIFF    Collection Time: 04/19/23  3:23 AM   Result Value Ref Range    WBC 4.9 4.1 - 11.1 K/uL    RBC 2.81 (L) 4.10 - 5.70 M/uL    HGB 8.9 (L) 12.1 - 17.0 g/dL    HCT 27.3 (L) 36.6 - 50.3 %    MCV 97.2 80.0 - 99.0 FL    MCH 31.7 26.0 - 34.0 PG    MCHC 32.6 30.0 - 36.5 g/dL    RDW 15.6 (H) 11.5 - 14.5 %    PLATELET 982 295 - 738 K/uL    MPV 11.2 8.9 - 12.9 FL    NRBC 0.0 0  WBC    ABSOLUTE NRBC 0.00 0.00 - 0.01 K/uL       CT CHEST WO CONT   Final Result      1. There is cardiomegaly. 2. The lungs demonstrate overall improved aeration with near complete resolution   of the interstitial edema. Minimal interstitial edema persists. 3.. Findings suggest pulmonary hypertension and clinical correlation is   suggested. .   4. When compared with the CT scan of the abdomen of 1/30/2018 the patient has   lost weight. XR CHEST PORT   Final Result   Increasing bibasilar airspace disease, and diffuse interstitial/airspace   opacities which could be edema and/or pneumonia. Active Problems:    Fatigue (4/11/2023)        Assessment/Plan:   Acute blood loss anemia, resolved Upper GI blood/H pylori  S/p blood transfusion 4/11  Hgb: 10.4, stable remains too high for 1 unit of packed red blood cells from a hemoglobin of 6.9  S/p EGD: normal esophagus, normal gastric mucosa. Biopsy taken, pathology shows positive for H. Pylori, active gastritis, no dysplasia. S/p colonoscopy: normal terminal ileum, significant sigmoid colon diverticulosis, 2 internal hemorrhoids. No masses, polyps, inflammation, vascular lesions or hematin seen.    Continue protonix  Start metronidazole x 14 days for positive H. pylori  Start clarithromycin x 14 days for positive H. pylori  Continue ferrous sulfate  GI has signed off.  4/18 Hgb remains stable 9.2, on Epogen per nephrology, no further bleeding reported    Cardiomyopathy possibly ischemic  Echo: severely reduced left ventricular systolic function, EF 42-68%. Left ventricle dilated. Normal wall thickness. Global hypokinesis present. Abnormal diastolic function. Right ventricle: reduced systolic function. Mitral valve: mod-severe regurgitation. RVSP: 65-75 mmHg. Cardiology following. Stop nifedipine, change to beta blocker  Continue metoprolol decrease dose to 12.5 mg due to blood pressure  Will need LifeVest on d/c. Continue Entresto, with hold parameters. 4/18 Unremarkable cardiac cath on 4/17. LifeVest being fitted later today  BP med adjustments per cards. 4/19  Patient recommended for home health care, but declined need for this. Patient set up for resumption of outpatient HD. Patient has LifeVest on as of 4/19. Plan is for patient to receive dialysis 4/20 then likely discharge later in the day. ESRD on HD T/H/Sa  Nephrology consulted for continued HD  4/18 HD today but unable to pull off any volume  4/19 plan for dialysis tomorrow 4/20 a.m., then likely DC if cleared by nephrology    ?   Hemochromatosis  Ferritin 2103  Possibly contributory to patient's cardiac and renal disease  Can follow-up outpatient with [address] [place] [address] liver specialists for further work-up/diagnostics    Unintentional weight loss, fatigue, weakness  Severe protein calorie malnutrition  BMI 16.3, appears to have lost ~15 lbs in past 4 months, further historic weights not seen in medical record  Could be 2/2 HD and chronic illness but cannot exclude possibility of malignancy  PSA, CEA sent and pending  Nutritional consult placed  PT/OT eval/tx  PT/OT recommended home health care, patient refuses this, cancer markers all negative    Chronic hypertension with hypotension at this visit  Hyperlipidemia  Metoprolol continued with parameters and decreased dose to 12.5mg  Continue statin    Medical Decision Making:    Labs reviewed by myself   CBC, BMP    Diagnostic data reviewed by myself   CT    Toxic drug monitoring    none    Discussed case with   Patient, Nurse, Case Mgmt    MDM Discussion & Disposition  Pt w new onset cardiomyopathy EF 15-20%, ESRD on HD. Patient recommended for home health care, but declined need for this. Patient set up for resumption of outpatient HD. Patient has LifeVest on as of 4/19. Plan is for patient to receive dialysis 4/20 then likely discharge later in the day.     DVT Prophylaxis: SCDs only 2/2 acute GI bleed  Code Status:  Full Code  POA:Camille Alonso (Daughter)   [phone]     Time Spent: 25 minutes    _______________________________________________________________    SHEELA Thomas

## 2023-04-20 NOTE — DISCHARGE SUMMARY
Hospitalist Discharge Summary     Patient ID:    Elaine Marx  [id number]  56 y.o.  1945    Admit date: 4/11/2023    Discharge date : 4/20/2023        Final Diagnoses: Active Problems:    Fatigue (4/11/2023)    Upper GI bleed  Acute blood loss anemia  Chronic anemia of CKD  Nonischemic cardiomyopathy  HFrEF EF 15-20%  ESRD on HD  Severe protein calorie malnutirition  Hypertension  Hyperlipidemia  Asthma  Tobacco use    Reason for Hospitalization:  Fatigue, GI bleed    Hospital Course:   Mr. Elaine Marx is a 67 y/o male with PMH significant for ESRD on HD T/H/Sa, HTN, asthma, RA, GERD who presented to [facility] ED w c/o worsening SOB, fatigue. He was transferred to [facility] for further workup, found to be anemic Hgb 6.7 with (+) FOBT, received 1 unit PRBC and Hgb improved to 9.3.  GI consulted, endoscopy and colonoscopy showed no active bleeding, did demonstrate diverticulitis in sigmoid colon, biopsy (+) H pylori so pt was started on protonix, flagyl, clarithromycin to complete on 4/30,rx to be sent for this. Cardiology consulted for possible ischemic cardiomyopathy, Echo demonstrated EF 15-20%, cardiac cath was unremarkable no CAD. Pt to discharge home with LifeVest which has been applied and pt received education. Nephro consulted for continued HD while hospitalized, last HD 4/20 was able to get 2L off and nephrology OK with discharge following this HD session. Patient recommended for home health care, but declined need for this. Patient set up for resumption of outpatient HD. Pt cleared for discharge per cardiology. He will be prescribed metoprolol, pravastatin, losartan. Pt will need to follow up closely with PCP, GI, nephrology, and cardiology at discharge. Pt verbalized understanding and agreement with these discharge and follow up instructions.     Discharge Medications:   Current Discharge Medication List        START taking these medications    Details clarithromycin (BIAXIN) 250 mg tablet Take 1 Tablet by mouth two (2) times a day for 20 doses. Indications: inflammation of the stomach lining caused by H. pylori  Qty: 20 Tablet, Refills: 0  Start date: 4/20/2023, End date: 4/30/2023      metroNIDAZOLE (FLAGYL) 500 mg tablet Take 1 Tablet by mouth three (3) times daily for 31 doses. Qty: 31 Tablet, Refills: 0  Start date: 4/20/2023, End date: 5/1/2023      metoprolol succinate (TOPROL-XL) 25 mg XL tablet Take 0.5 Tablets by mouth daily for 30 days. Qty: 15 Tablet, Refills: 0  Start date: 4/21/2023, End date: 5/21/2023      pravastatin (PRAVACHOL) 10 mg tablet Take 1 Tablet by mouth nightly for 30 days. Qty: 30 Tablet, Refills: 0  Start date: 4/20/2023, End date: 5/20/2023      pantoprazole (PROTONIX) 40 mg tablet Take 1 Tablet by mouth two (2) times a day for 10 days. Qty: 20 Tablet, Refills: 0  Start date: 4/20/2023, End date: 4/30/2023      losartan (COZAAR) 25 mg tablet Take 0.5 Tablets by mouth daily for 30 days. Qty: 15 Tablet, Refills: 0  Start date: 4/21/2023, End date: 5/21/2023           CONTINUE these medications which have NOT CHANGED    Details   ferrous sulfate 325 mg (65 mg iron) tablet Take 1 Tablet by mouth Daily (before breakfast). gabapentin (NEURONTIN) 600 mg tablet TAKE 1 TABLET BY MOUTH THREE TIMES A DAY MAX DAILY AMOUNT: 1800 MG  Qty: 90 Tablet, Refills: 0    Comments: Not to exceed 5 additional fills before 06/05/2023 DX Code Needed  NEED NEW SCRIPT. Associated Diagnoses: Rheumatoid arthritis involving multiple sites with positive rheumatoid factor (HCC)      albuterol (PROVENTIL HFA, VENTOLIN HFA, PROAIR HFA) 90 mcg/actuation inhaler INHALE 2 PUFFS BY INHALATION EVERY 4 HOURS AS NEEDED FOR WHEEZING. Qty: 6.7 Each, Refills: 0    Comments: DX Code Needed  . Associated Diagnoses: Wheezing      predniSONE (DELTASONE) 10 mg tablet TAKE 1 TABLET BY MOUTH DAILY.   Qty: 90 Tablet, Refills: 0    Comments: DX Code Needed  NEED NEW SCRIPT. Associated Diagnoses: Rheumatoid arthritis involving multiple sites with positive rheumatoid factor (HCC)      montelukast (SINGULAIR) 10 mg tablet TAKE 1 TABLET BY MOUTH EVERY DAY  Qty: 90 Tablet, Refills: 1      traZODone (DESYREL) 50 mg tablet Take 1 Tablet by mouth nightly. Qty: 90 Tablet, Refills: 1      hydrOXYzine pamoate (VISTARIL) 25 mg capsule TAKE 1 CAP BY MOUTH THREE (3) TIMES DAILY AS NEEDED FOR ITCHING. Qty: 180 Capsule, Refills: 1    Comments: DX Code Needed  . Associated Diagnoses: Pruritus, unspecified      sevelamer carbonate (RENVELA) 800 mg tab tab Take 2 Tablets by mouth three (3) times daily (with meals). famotidine (PEPCID) 20 mg tablet Take 1 Tablet by mouth two (2) times a day. Qty: 60 Tablet, Refills: 2      ergocalciferol (ERGOCALCIFEROL) 1,250 mcg (50,000 unit) capsule TAKE 1 CAPSULE BY MOUTH WEEKLY      carbamide peroxide (DEBROX) 6.5 % otic solution Administer 5 Drops into each ear two (2) times a day. Qty: 7.5 mL, Refills: 0      calcium acetate,phosphat bind, (PHOSLO) 667 mg cap Take 1 Capsule by mouth three (3) times daily (with meals). 1 cap twice daily with snacks      B-complex with vitamin C (SUPER B COMPLEX-VITAMIN C PO) Take  by mouth.      lidocaine (LIDODERM) 5 % Apply patch to the affected area for 12 hours a day and remove for 12 hours a day. Qty: 15 Each, Refills: 3    Associated Diagnoses: Chronic right-sided low back pain with right-sided sciatica      latanoprost (XALATAN) 0.005 % ophthalmic solution INSTILL 1 DROP IN BOTH EYES AT BEDTIME  Qty: 2.5 mL, Refills: 3           STOP taking these medications       diclofenac (VOLTAREN) 1 % gel Comments:   Reason for Stopping:         NIFEdipine ER (ADALAT CC) 60 mg ER tablet Comments:   Reason for Stopping:         omeprazole (PRILOSEC) 40 mg capsule Comments:   Reason for Stopping:         diclofenac (VOLTAREN) 1 % gel Comments:   Reason for Stopping: Follow up Care:    1Hammad Messer ERASTO NP in 1-2 weeks. Follow-up Information       Follow up With Specialties Details Why Contact Info    Carson Loredo NP Nurse Practitioner Follow up  [address] [facility] [phone]      Carson Loredo NP Nurse Practitioner   [address] [facility]  [phone]      Ijeoma Hines, [address] [facility] Vascular Surgery, Cardiovascular Disease Physician Follow up in 2 week(s)  [address]  [id number]  [place] [id number]  [phone]      Kameron Cannon MD Nephrology Follow up in 1 week(s)  [place]   [address]  [address] [facility]      Adelso Rose MD Gastroenterology Follow up in 2 week(s)  [address]  [phone]                * Follow-up Care/Patient Instructions: Activity: Activity as tolerated  Diet: Regular Diet  Wound Care: None needed      Condition at Discharge:  Stable  __________________________________________________________________    Disposition  Home  [address] RN/PT/OT  ____________________________________________________________________    Code Status:  Full Code  ___________________________________________________________________    Discharge Exam:  Patient seen and examined by me on discharge day. Physical Exam  Constitutional:       Appearance: He is underweight. Comments: Chronically ill appearing   HENT:      Head: Normocephalic and atraumatic. Mouth/Throat:      Mouth: Mucous membranes are moist.   Eyes:      Pupils: Pupils are equal, round, and reactive to light. Cardiovascular:      Rate and Rhythm: Normal rate and regular rhythm. Pulses: Normal pulses. Heart sounds: Murmur heard. Pulmonary:      Effort: Pulmonary effort is normal. No respiratory distress. Breath sounds: Examination of the right-lower field reveals decreased breath sounds. Examination of the left-lower field reveals decreased breath sounds. Decreased breath sounds present. Abdominal:      General: Bowel sounds are normal. There is no distension. Palpations: Abdomen is soft. Musculoskeletal:      Right lower leg: No edema. Left lower leg: No edema. Skin:     General: Skin is warm and dry. Capillary Refill: Capillary refill takes less than 2 seconds. Neurological:      Mental Status: He is alert and oriented to person, place, and time. Motor: Weakness present. CONSULTATIONS: Cardiology and Nephrology and GI    Significant Diagnostic Studies:   Recent Results (from the past 24 hour(s))   METABOLIC PANEL, BASIC    Collection Time: 04/20/23  3:46 AM   Result Value Ref Range    Sodium 138 136 - 145 mmol/L    Potassium 5.1 3.5 - 5.1 mmol/L    Chloride 108 97 - 108 mmol/L    CO2 24 21 - 32 mmol/L    Anion gap 6 5 - 15 mmol/L    Glucose 100 65 - 100 mg/dL    BUN 50 (H) 6 - 20 MG/DL    Creatinine 7.16 (H) 0.70 - 1.30 MG/DL    BUN/Creatinine ratio 7 (L) 12 - 20      eGFR 7 (L) >60 ml/min/1.73m2    Calcium 7.0 (L) 8.5 - 10.1 MG/DL   CBC W/O DIFF    Collection Time: 04/20/23  3:46 AM   Result Value Ref Range    WBC 5.2 4.1 - 11.1 K/uL    RBC 2.68 (L) 4.10 - 5.70 M/uL    HGB 8.6 (L) 12.1 - 17.0 g/dL    HCT 26.6 (L) 36.6 - 50.3 %    MCV 99.3 (H) 80.0 - 99.0 FL    MCH 32.1 26.0 - 34.0 PG    MCHC 32.3 30.0 - 36.5 g/dL    RDW 16.3 (H) 11.5 - 14.5 %    PLATELET 467 151 - 387 K/uL    MPV 11.8 8.9 - 12.9 FL    NRBC 0.0 0  WBC    ABSOLUTE NRBC 0.00 0.00 - 0.01 K/uL     CT CHEST WO CONT   Final Result      1. There is cardiomegaly. 2. The lungs demonstrate overall improved aeration with near complete resolution   of the interstitial edema. Minimal interstitial edema persists. 3.. Findings suggest pulmonary hypertension and clinical correlation is   suggested. .   4. When compared with the CT scan of the abdomen of 1/30/2018 the patient has   lost weight.       XR CHEST PORT   Final Result   Increasing bibasilar airspace disease, and diffuse interstitial/airspace   opacities which could be edema and/or pneumonia. Discharge: time spent 40 minutes in discharge  Education and counseling.      Signed:  SHEELA Aguiar  4/20/2023  11:05 AM

## 2023-04-20 NOTE — PROGRESS NOTES
Problem: Falls - Risk of  Goal: *Absence of Falls  Description: Document Marcy Fraire Fall Risk and appropriate interventions in the flowsheet.   Outcome: Progressing Towards Goal  Note: Fall Risk Interventions:  Mobility Interventions: Patient to call before getting OOB, Strengthening exercises (ROM-active/passive)         Medication Interventions: Teach patient to arise slowly                   Problem: Patient Education: Go to Patient Education Activity  Goal: Patient/Family Education  Outcome: Progressing Towards Goal     Problem: General Medical Care Plan  Goal: *Vital signs within specified parameters  Outcome: Progressing Towards Goal  Goal: *Labs within defined limits  Outcome: Progressing Towards Goal  Goal: *Absence of infection signs and symptoms  Outcome: Progressing Towards Goal  Goal: *Optimal pain control at patient's stated goal  Outcome: Progressing Towards Goal  Goal: *Skin integrity maintained  Outcome: Progressing Towards Goal  Goal: *Fluid volume balance  Outcome: Progressing Towards Goal  Goal: *Optimize nutritional status  Outcome: Progressing Towards Goal  Goal: *Anxiety reduced or absent  Outcome: Progressing Towards Goal  Goal: *Progressive mobility and function (eg: ADL's)  Outcome: Progressing Towards Goal     Problem: Patient Education: Go to Patient Education Activity  Goal: Patient/Family Education  Outcome: Progressing Towards Goal

## 2023-04-20 NOTE — PROGRESS NOTES
[facility] Progress Note        NAME: Alexa Crabtree       :  1945       MRN:  [id number]     Date/Time: 2023    Risk of deterioration: low       Assessment:    Plan:  ESRD- HD TTS- [facility]    CMO - EF15-20% with multivalvular heart disease-mod-sev MR, mild - mod TR with elevated RVSP 75    Secondary hyperparathyroidism    Anemia    Hypertension    GI bleed    Arrhythmia-now NSR    Seen on dialysis at 0910. SBP stable. 2K bath. DW DAvita. H/H not at goal.  Continue LEXUS. Subjective:     Chief Complaint:  No complaints         Objective:     VITALS:   Last 24hrs VS reviewed since prior progress note.  Most recent are:  Visit Vitals  BP (!) 107/53   Pulse 61   Temp 98 °F (36.7 °C)   Resp 16   Ht 6' (1.829 m)   Wt 61.5 kg (135 lb 9.3 oz)   SpO2 95%   BMI 18.39 kg/m²     SpO2 Readings from Last 6 Encounters:   23 95%   23 91%   22 98%   22 93%   22 93%   22 96%    O2 Flow Rate (L/min): 2 l/min   No intake or output data in the 24 hours ending 23 1043       Telemetry Reviewed       PHYSICAL EXAM:    Alert     ___________________________________________________    Attending Physician: Cordell Blackwell MD     ____________________________________________________  MEDICATIONS:  Current Facility-Administered Medications   Medication Dose Route Frequency    albumin human 25% (BUMINATE) solution 12.5 g  12.5 g IntraVENous ONCE    diphenhydrAMINE (BENADRYL) capsule 25 mg  25 mg Oral Q6H PRN    metoprolol succinate (TOPROL-XL) XL tablet 12.5 mg  12.5 mg Oral DAILY    hydrOXYzine HCL (ATARAX) tablet 25 mg  25 mg Oral Q4H PRN    latanoprost (XALATAN) 0.005 % ophthalmic solution 2 Drop  2 Drop Both Eyes QPM    predniSONE (DELTASONE) tablet 10 mg  10 mg Oral DAILY    sevelamer carbonate (RENVELA) tab 1,600 mg  1,600 mg Oral TID WITH MEALS    traZODone (DESYREL) tablet 50 mg  50 mg Oral QHS    sodium chloride (NS) flush 5-40 mL  5-40 mL IntraVENous Q8H    sodium chloride (NS) flush 5-40 mL  5-40 mL IntraVENous PRN    naloxone (NARCAN) injection 0.4 mg  0.4 mg IntraVENous PRN    metroNIDAZOLE (FLAGYL) tablet 500 mg  500 mg Oral TID    clarithromycin (BIAXIN) tablet 250 mg  250 mg Oral BID    albumin human 25% (BUMINATE) solution 25 g  25 g IntraVENous DIALYSIS PRN    pravastatin (PRAVACHOL) tablet 10 mg  10 mg Oral QHS    sodium chloride (NS) flush 5-40 mL  5-40 mL IntraVENous Q8H    sodium chloride (NS) flush 5-40 mL  5-40 mL IntraVENous PRN    epoetin latricia-epbx (RETACRIT) injection 10,000 Units  10,000 Units SubCUTAneous DIALYSIS TUE, THU & SAT    [Held by provider] calcium acetate(phosphat bind) (PHOSLO) capsule 667 mg  1 Capsule Oral TID WITH MEALS    [Held by provider] gabapentin (NEURONTIN) capsule 600 mg  600 mg Oral TID    montelukast (SINGULAIR) tablet 10 mg  10 mg Oral DAILY    ergocalciferol capsule 50,000 Units  50,000 Units Oral Q7D    pantoprazole (PROTONIX) 40 mg in 0.9% sodium chloride 10 mL injection  40 mg IntraVENous Q12H    acetaminophen (TYLENOL) tablet 650 mg  650 mg Oral Q6H PRN    Or    acetaminophen (TYLENOL) suppository 650 mg  650 mg Rectal Q6H PRN    polyethylene glycol (MIRALAX) packet 17 g  17 g Oral DAILY PRN    ondansetron (ZOFRAN ODT) tablet 4 mg  4 mg Oral Q8H PRN    Or    ondansetron (ZOFRAN) injection 4 mg  4 mg IntraVENous Q6H PRN    albuterol-ipratropium (DUO-NEB) 2.5 MG-0.5 MG/3 ML  3 mL Nebulization Q4H PRN    ferrous sulfate tablet 325 mg  325 mg Oral ACB    metoprolol (LOPRESSOR) injection 5 mg  5 mg IntraVENous Q6H PRN    traMADoL (ULTRAM) tablet 50 mg  50 mg Oral Q6H PRN        LABS:  Recent Labs     04/20/23  0346 04/19/23  0323   WBC 5.2 4.9   HGB 8.6* 8.9*   HCT 26.6* 27.3*    218       Recent Labs     04/20/23  0346 04/19/23  0323 04/18/23  0305    136 134*   K 5.1 4.5 4.3    103 103   CO2 24 28 23   BUN 50* 41* 62*   CREA 7.16* 6.45* 9.69*    130* 107*   CA 7.0* 7.9* 7.5*       No results for input(s): ALT, AP, TBIL, TBILI, TP, ALB, GLOB, GGT, AML, LPSE in the last 72 hours. No lab exists for component: SGOT, GPT, AMYP, HLPSE    No results for input(s): INR, PTP, APTT, INREXT, INREXT in the last 72 hours. No results for input(s): FE, TIBC, PSAT, FERR in the last 72 hours. No results for input(s): PH, PCO2, PO2 in the last 72 hours. No results for input(s): CPK, CKNDX, TROIQ in the last 72 hours.     No lab exists for component: CPKMB  Lab Results   Component Value Date/Time    Glucose (POC) 100 08/31/2022 11:09 AM    Glucose (POC) 90 03/26/2018 08:10 AM

## 2023-04-20 NOTE — PROGRESS NOTES
Cardiology Progress Note      4/20/2023 1:25 PM    Admit Date: 4/11/2023          Subjective: Sitting up in bed. No new c/o. Notified re d/c plans          Visit Vitals  /60 (BP 1 Location: Right lower arm, BP Patient Position: At rest)   Pulse 71   Temp 98 °F (36.7 °C)   Resp 16   Ht 6' (1.829 m)   Wt 61.5 kg (135 lb 9.3 oz)   SpO2 95%   BMI 18.39 kg/m²     No intake/output data recorded. Objective:      Physical Exam:  VS as above  Chest CTA  Card RRR no gallop     Data Review:   Labs:      Hgb 8.6  BUN 50  Creat 7.2  K 5.1     Telemetry: SR R 70-80      Assessment:      Cardiac Assessment/Plan:     1) Nonischemic CM with severe systolic dysfxn , new Dx 2/8387 as below; Nl TSH. No CAD by cath 4/17/23   Acute on chronic sytolic ht failure   2) Brief PAT: NOT afib on initial ECG; Sinus here  3) HTN  4) MR: mod-severe 4/2023  5) Dyslipidemia  6) Marked anemia (Hg 6.7) & heme + 4/2023  7) ESRD  8) RA; ?PMR  9) Asthma, tobacco use. Plan: Agree with d/c. Add losartan.  F/U Dr Phyllis Florence 2-3 weeks

## 2023-04-20 NOTE — PROCEDURES
Hemodialysis / [phone]    Vitals Pre Post Assessment Pre Post   BP BP: 113/64 (04/20/23 0905) 129/65 LOC A&Ox4, follows commands A&Ox4, follows commands   HR Pulse (Heart Rate): (!) 59 (04/20/23 0905)   66 Lungs CTA bilaterally, denies shortness of breath CTA bilaterally, denies shortness of breath   Resp Resp Rate: 16 (04/20/23 0905) 16 Cardiac SB, S1, S2, lifevest on SB, S1, S2, lifevest on   Temp Temp: 98 °F (36.7 °C) (04/20/23 0905) 98 Skin LUE AVF LUE AVF   Weight    Edema None None   Tele status SB; Lifevest on SB; Lifevest on Pain Pain Intensity 1: 0 (04/20/23 0726) 0     Orders   Duration: Start: 4228 End: 1206 Total: 3 hours   Dialyzer: Dialyzer/Set Up Inspection: Adnehaa Lilia (04/20/23 0905)   K Bath: Dialysate K (mEq/L): 2 (telephone orders to modify bath to 2K per Brendan Eis @ [phone]) (04/20/23 0905)   Ca Bath: Dialysate CA (mEq/L): 2.5 (04/20/23 0905)   Na: Dialysate NA (mEq/L): 140 (04/20/23 0905)   Bicarb: Dialysate HCO3 (mEq/L): 35 (04/20/23 0905)   Target Fluid Removal: Goal/Amount of Fluid to Remove (mL): 2000 mL (04/20/23 0905)     Access   Type & Location: LUE AVF: Pre-assessment: skin CDI. No s/s of infection. + B/T. No issues with cannulation. Running well at -350, arterial pressure out of normal limits with higher BFR. Post assessment: No issues with hemostasis, + B/T remains. Dressing CDI.       Comments:                                        Labs   HBsAg (Antigen) / date: 04/11/23 Negative                                          HBsAb (Antibody) / date: 04/11/23 Susceptible    Source: [facility]   Obtained/Reviewed  Critical Results Called HGB   Date Value Ref Range Status   04/20/2023 8.6 (L) 12.1 - 17.0 g/dL Final     Potassium   Date Value Ref Range Status   04/20/2023 5.1 3.5 - 5.1 mmol/L Final     Comment:     SPECIMEN HEMOLYZED, RESULTS MAY BE AFFECTED     Calcium   Date Value Ref Range Status   04/20/2023 7.0 (L) 8.5 - 10.1 MG/DL Final     BUN   Date Value Ref Range Status   04/20/2023 50 (H) 6 - 20 MG/DL Final     Creatinine   Date Value Ref Range Status   04/20/2023 7.16 (H) 0.70 - 1.30 MG/DL Final        Meds Given   Name Dose Route   Albumin 25% 25 g IV               Adequacy / Fluid    Total Liters Process: 51.2 L   Net Fluid Removed: 2000 mL      Comments   Time Out Done:   (Time) @ 6446   Admitting Diagnosis: Acute blood loss anemia   Consent obtained/signed: Informed Consent Verified: Yes (04/18/23 7529)   Machine / RO # Machine Number: [id number] (04/20/23 8063)   Primary Nurse Rpt Pre: Luciano Salas RN   Primary Nurse Rpt Post: Luciano Salas RN   Pt Education: Ordered Dialysis Treatment   Care Plan: CKD   Pts outpatient clinic: HD [facility] Summary   Comments:          SBAR received from Primary RN. Pt arrived to HD suite A&Ox4. Chronic consent applies. 3906: Pt cannulated with 73C needles per policy & without issue. VSS. Dialysis Tx initiated. 0920: Lines visible, patent, and intact. 0412: Hypotensive, nonsymptomatic, Albumin administered for BP support, goal modified for patient tolerance. 1020: Vital signs stable, lines patent and intact. 1130: Vital signs stable, lines patent and intact. No complaints of pain. 1206: Tx ended. VSS. All possible blood returned to patient. Hemostasis achieved without issue. Bed locked and in the lowest position, call bell and belongings in reach. SBAR given to Primary, RN. Patient is stable at time of their departure. All Dialysis related medications have been reviewed.

## 2023-04-20 NOTE — PROGRESS NOTES
0700 Bedside shift change report given to [address] (oncoming nurse) by Mary Burgess (offgoing nurse). Report included the following information SBAR and Kardex. Shift worked: Day     Shift summary and any significant changes:    Pt went to dialysis this morning. 2L off. Gave albumin BP's soft    Pt to be discharged home with family     Concerns for physician to address:       Zone phone for oncoming shift:          Activity:  Activity Level: Up with Assistance  Number times ambulated in hallways past shift: 0  Number of times OOB to chair past shift: 1    Cardiac:   Cardiac Monitoring: Yes      Cardiac Rhythm: Sinus Rhythm    Access:  Current line(s): PIV and HD access     Genitourinary:   Urinary status: oliguric    Respiratory:   O2 Device: None (Room air)  Chronic home O2 use?: NO  Incentive spirometer at bedside: NO       GI:  Last Bowel Movement Date: 04/17/23  Current diet:  ADULT ORAL NUTRITION SUPPLEMENT Breakfast, Lunch, Dinner; Standard High Calorie/High Protein  ADULT DIET Regular; Low Fat/Low Chol/High Fiber/2 gm Na; Strawberry Ensure preferred please  Passing flatus: YES  Tolerating current diet: YES       Pain Management:   Patient states pain is manageable on current regimen: YES    Skin:  Torres Score: 23  Interventions: increase time out of bed and nutritional support     Patient Safety:  Fall Score:  Total Score: 2  Interventions: gripper socks, pt to call before getting OOB, and stay with me (per policy)       Length of Stay:  Expected LOS: 3d 12h  Actual LOS: 9      Marilyn Kc RN

## 2023-04-20 NOTE — PROGRESS NOTES
Problem: Falls - Risk of  Goal: *Absence of Falls  Description: Document Anabel Medel Fall Risk and appropriate interventions in the flowsheet.   Outcome: Progressing Towards Goal  Note: Fall Risk Interventions:  Mobility Interventions: Patient to call before getting OOB, Strengthening exercises (ROM-active/passive)         Medication Interventions: Teach patient to arise slowly                   Problem: Patient Education: Go to Patient Education Activity  Goal: Patient/Family Education  Outcome: Progressing Towards Goal     Problem: General Medical Care Plan  Goal: *Vital signs within specified parameters  Outcome: Progressing Towards Goal  Goal: *Labs within defined limits  Outcome: Progressing Towards Goal  Goal: *Absence of infection signs and symptoms  Outcome: Progressing Towards Goal  Goal: *Optimal pain control at patient's stated goal  Outcome: Progressing Towards Goal  Goal: *Skin integrity maintained  Outcome: Progressing Towards Goal  Goal: *Fluid volume balance  Outcome: Progressing Towards Goal  Goal: *Optimize nutritional status  Outcome: Progressing Towards Goal  Goal: *Anxiety reduced or absent  Outcome: Progressing Towards Goal     Problem: Patient Education: Go to Patient Education Activity  Goal: Patient/Family Education  Outcome: Progressing Towards Goal

## 2023-04-20 NOTE — PROGRESS NOTES
TRANSFER - IN REPORT:    Verbal report received from Anup Fragoso RN(name) on Anuja Dress  being received from [facility](unit) for ordered procedure      Report consisted of patients Situation, Background, Assessment and   Recommendations(SBAR). Information from the following report(s) Kardex was reviewed with the receiving nurse. Opportunity for questions and clarification was provided. Assessment completed upon patients arrival to unit and care assumed.

## 2023-04-21 ENCOUNTER — PATIENT OUTREACH (OUTPATIENT)
Dept: CASE MANAGEMENT | Age: 78
End: 2023-04-21

## 2023-04-25 NOTE — PROGRESS NOTES
Physician Progress Note      PATIENT:               Patricia Ortiz  CSN #:                  [id number]  :                       1945  ADMIT DATE:       2023 3:18 AM  DISCH DATE:        2023 6:26 PM  RESPONDING  PROVIDER #:        Dee Doty MD          QUERY TEXT:    Pt admitted with gib, sob. Pt noted to have fluid overload, tx'd to facility w prbc tx, esrd,  Nonischemic CM with severe systolic dysfxn. If possible, please document in progress notes and discharge summary the etiology of the fluid overload. The medical record reflects the following:  Risk Factors: ESRD on HD,newly determined GI bleed with anemia, prbc tx,  shortening HD tx outpt  Clinical Indicators: pn-Acute respiratory failure, Likely d/t fluid overload.  pn-Admitted w/ dyspnea/anemia; vol overload (worse after Xfusion). ED-received transfusion but continues to feel progressively worsened shortness of breath. Has had increased oxygen requirement here in the emergency department via nasal cannula.  card- Nonischemic CM with severe systolic dysfxn  Treatment: HD, bipap, nephro cx, card cx, cath, dc'd on Lifevest  Thanks, Paola Knapp RN/CDI [phone]  Options provided:  -- fluid overload due to blood transfusion  -- fluid overload due to ESRD  -- fluid overload due to blood transfusion, ESRD  -- fluid overload due to blood transfusion, ESRD, Nonischemic CM with severe systolic dysfxn. -- fluid overload due to Nonischemic CM with severe systolic dysfxn  -- Other - I will add my own diagnosis  -- Disagree - Not applicable / Not valid  -- Disagree - Clinically unable to determine / Unknown  -- Refer to Clinical Documentation Reviewer    PROVIDER RESPONSE TEXT:    This patient has fluid overload due to ESRD.     Query created by: Jessica Encarnacion on 2023 11:40 AM      Electronically signed by:  Dee Doty MD 2023 2:19 PM

## 2023-04-26 ENCOUNTER — OFFICE VISIT (OUTPATIENT)
Dept: FAMILY MEDICINE CLINIC | Age: 78
End: 2023-04-26
Payer: MEDICARE

## 2023-04-26 ENCOUNTER — TELEPHONE (OUTPATIENT)
Dept: FAMILY MEDICINE CLINIC | Age: 78
End: 2023-04-26

## 2023-04-26 VITALS
SYSTOLIC BLOOD PRESSURE: 111 MMHG | WEIGHT: 135 LBS | DIASTOLIC BLOOD PRESSURE: 65 MMHG | HEART RATE: 65 BPM | BODY MASS INDEX: 18.28 KG/M2 | RESPIRATION RATE: 18 BRPM | OXYGEN SATURATION: 96 % | HEIGHT: 72 IN

## 2023-04-26 DIAGNOSIS — N18.6 ESRD ON HEMODIALYSIS (HCC): ICD-10-CM

## 2023-04-26 DIAGNOSIS — K57.92 DIVERTICULITIS: ICD-10-CM

## 2023-04-26 DIAGNOSIS — I50.9 CHRONIC CONGESTIVE HEART FAILURE, UNSPECIFIED HEART FAILURE TYPE (HCC): ICD-10-CM

## 2023-04-26 DIAGNOSIS — Z09 HOSPITAL DISCHARGE FOLLOW-UP: ICD-10-CM

## 2023-04-26 DIAGNOSIS — M51.36 DDD (DEGENERATIVE DISC DISEASE), LUMBAR: ICD-10-CM

## 2023-04-26 DIAGNOSIS — M05.79 RHEUMATOID ARTHRITIS INVOLVING MULTIPLE SITES WITH POSITIVE RHEUMATOID FACTOR (HCC): ICD-10-CM

## 2023-04-26 DIAGNOSIS — Z99.2 ESRD ON HEMODIALYSIS (HCC): ICD-10-CM

## 2023-04-26 DIAGNOSIS — Z87.19 HISTORY OF GI BLEED: Primary | ICD-10-CM

## 2023-04-26 DIAGNOSIS — M50.30 DDD (DEGENERATIVE DISC DISEASE), CERVICAL: ICD-10-CM

## 2023-04-26 DIAGNOSIS — I77.0 A-V FISTULA (HCC): ICD-10-CM

## 2023-04-26 DIAGNOSIS — F51.04 PSYCHOPHYSIOLOGICAL INSOMNIA: ICD-10-CM

## 2023-04-26 PROCEDURE — 36415 COLL VENOUS BLD VENIPUNCTURE: CPT | Performed by: NURSE PRACTITIONER

## 2023-04-26 PROCEDURE — 1111F DSCHRG MED/CURRENT MED MERGE: CPT | Performed by: NURSE PRACTITIONER

## 2023-04-26 PROCEDURE — G8427 DOCREV CUR MEDS BY ELIG CLIN: HCPCS | Performed by: NURSE PRACTITIONER

## 2023-04-26 PROCEDURE — 99215 OFFICE O/P EST HI 40 MIN: CPT | Performed by: NURSE PRACTITIONER

## 2023-04-26 RX ORDER — GABAPENTIN 600 MG/1
TABLET ORAL
Qty: 90 TABLET | Refills: 2 | Status: SHIPPED | OUTPATIENT
Start: 2023-04-26

## 2023-04-26 NOTE — TELEPHONE ENCOUNTER
Please let patient know I contacted the liver specialist's office to find out why he was referred and they stated it was simply a hospital follow up based on one of the findings from his bloodwork. I reviewed the labs and cannot figure out what issue they are referring to so I guess Dr Cari Gosselin will discuss this with him at the appt.

## 2023-04-26 NOTE — PROGRESS NOTES
1. \"Have you been to the ER, urgent care clinic since your last visit? Hospitalized since your last visit? \" Yes Reason for visit: CHF    2. \"Have you seen or consulted any other health care providers outside of the [address] [place] [address] since your last visit? \" Yes Reason for visit: CHF      3. For patients aged 39-70: Has the patient had a colonoscopy / FIT/ Cologuard? Yes - no Care Gap present      If the patient is female:    4. For patients aged 41-77: Has the patient had a mammogram within the past 2 years? NA - based on age or sex      11. For patients aged 21-65: Has the patient had a pap smear?  NA - based on age or sex
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
## 2023-04-27 ENCOUNTER — DOCUMENTATION ONLY (OUTPATIENT)
Dept: FAMILY MEDICINE CLINIC | Age: 78
End: 2023-04-27

## 2023-04-28 LAB
ERYTHROCYTE [DISTWIDTH] IN BLOOD BY AUTOMATED COUNT: 15 % (ref 11.6–15.4)
HCT VFR BLD AUTO: 28.1 % (ref 37.5–51)
HGB BLD-MCNC: 9.8 G/DL (ref 13–17.7)
MCH RBC QN AUTO: 33.8 PG (ref 26.6–33)
MCHC RBC AUTO-ENTMCNC: 34.9 G/DL (ref 31.5–35.7)
MCV RBC AUTO: 97 FL (ref 79–97)
PLATELET # BLD AUTO: 304 X10E3/UL (ref 150–450)
RBC # BLD AUTO: 2.9 X10E6/UL (ref 4.14–5.8)
WBC # BLD AUTO: 5.8 X10E3/UL (ref 3.4–10.8)

## 2023-05-01 DIAGNOSIS — L29.9 PRURITUS, UNSPECIFIED: ICD-10-CM

## 2023-05-03 RX ORDER — HYDROXYZINE PAMOATE 25 MG/1
CAPSULE ORAL
Qty: 180 CAPSULE | Refills: 1 | Status: SHIPPED | OUTPATIENT
Start: 2023-05-03

## 2023-05-06 RX ORDER — FERROUS SULFATE 325(65) MG
325 TABLET ORAL
COMMUNITY

## 2023-05-06 RX ORDER — LOSARTAN POTASSIUM 25 MG/1
12.5 TABLET ORAL DAILY
Qty: 15 TABLET | Refills: 0 | COMMUNITY
Start: 2023-04-21 | End: 2023-05-21

## 2023-05-06 RX ORDER — METOPROLOL SUCCINATE 25 MG/1
12.5 TABLET, EXTENDED RELEASE ORAL DAILY
Qty: 15 TABLET | Refills: 0 | COMMUNITY
Start: 2023-04-21 | End: 2023-05-21

## 2023-05-06 RX ORDER — PRAVASTATIN SODIUM 10 MG
1 TABLET ORAL NIGHTLY
Qty: 30 TABLET | Refills: 0 | COMMUNITY
Start: 2023-04-20 | End: 2023-05-20

## 2023-05-18 RX ORDER — OMEPRAZOLE 40 MG/1
40 CAPSULE, DELAYED RELEASE ORAL 2 TIMES DAILY
Qty: 90 CAPSULE | Refills: 1 | Status: SHIPPED | OUTPATIENT
Start: 2023-05-18

## 2023-06-19 DIAGNOSIS — M05.79 RHEUMATOID ARTHRITIS WITH RHEUMATOID FACTOR OF MULTIPLE SITES WITHOUT ORGAN OR SYSTEMS INVOLVEMENT (HCC): ICD-10-CM

## 2023-06-19 RX ORDER — PREDNISONE 10 MG/1
TABLET ORAL
Qty: 90 TABLET | Refills: 0 | Status: SHIPPED | OUTPATIENT
Start: 2023-06-19

## 2023-06-27 ENCOUNTER — CLINICAL DOCUMENTATION (OUTPATIENT)
Age: 78
End: 2023-06-27

## 2023-07-03 RX ORDER — OMEPRAZOLE 40 MG/1
40 CAPSULE, DELAYED RELEASE ORAL 2 TIMES DAILY
Qty: 90 CAPSULE | Refills: 1 | Status: SHIPPED | OUTPATIENT
Start: 2023-07-03

## 2023-07-03 NOTE — TELEPHONE ENCOUNTER
----- Message from [facility] sent at 7/3/2023  8:40 AM EDT -----  Subject: Refill Request    QUESTIONS  Name of Medication? omeprazole (PRILOSEC) 40 MG delayed release capsule  Patient-reported dosage and instructions? dosage unknown, 2xdaily  How many days do you have left? 0  Preferred Pharmacy? [id number]  Pharmacy phone number (if available)? [phone]  ---------------------------------------------------------------------------  --------------  CALL BACK INFO  What is the best way for the office to contact you? OK to leave message on   voicemail  Preferred Call Back Phone Number? [phone]  ---------------------------------------------------------------------------  --------------  SCRIPT ANSWERS  Relationship to Patient?  Self

## 2023-07-07 ENCOUNTER — OFFICE VISIT (OUTPATIENT)
Age: 78
End: 2023-07-07

## 2023-07-07 VITALS
TEMPERATURE: 97.9 F | DIASTOLIC BLOOD PRESSURE: 77 MMHG | WEIGHT: 144 LBS | SYSTOLIC BLOOD PRESSURE: 138 MMHG | BODY MASS INDEX: 19.5 KG/M2 | RESPIRATION RATE: 18 BRPM | OXYGEN SATURATION: 94 % | HEART RATE: 75 BPM | HEIGHT: 72 IN

## 2023-07-07 DIAGNOSIS — Z00.00 MEDICARE ANNUAL WELLNESS VISIT, SUBSEQUENT: Primary | ICD-10-CM

## 2023-07-07 DIAGNOSIS — Z12.11 COLON CANCER SCREENING: ICD-10-CM

## 2023-07-07 DIAGNOSIS — G89.29 CHRONIC PAIN OF LEFT KNEE: ICD-10-CM

## 2023-07-07 DIAGNOSIS — N18.6 ESRD ON HEMODIALYSIS (HCC): ICD-10-CM

## 2023-07-07 DIAGNOSIS — Z99.2 ESRD ON HEMODIALYSIS (HCC): ICD-10-CM

## 2023-07-07 DIAGNOSIS — M25.562 CHRONIC PAIN OF LEFT KNEE: ICD-10-CM

## 2023-07-07 RX ORDER — GABAPENTIN 600 MG/1
TABLET ORAL
Qty: 180 TABLET | Refills: 2 | Status: SHIPPED | OUTPATIENT
Start: 2023-07-07 | End: 2023-10-05

## 2023-07-07 SDOH — ECONOMIC STABILITY: FOOD INSECURITY: WITHIN THE PAST 12 MONTHS, YOU WORRIED THAT YOUR FOOD WOULD RUN OUT BEFORE YOU GOT MONEY TO BUY MORE.: NEVER TRUE

## 2023-07-07 SDOH — ECONOMIC STABILITY: HOUSING INSECURITY
IN THE LAST 12 MONTHS, WAS THERE A TIME WHEN YOU DID NOT HAVE A STEADY PLACE TO SLEEP OR SLEPT IN A SHELTER (INCLUDING NOW)?: NO

## 2023-07-07 SDOH — ECONOMIC STABILITY: INCOME INSECURITY: HOW HARD IS IT FOR YOU TO PAY FOR THE VERY BASICS LIKE FOOD, HOUSING, MEDICAL CARE, AND HEATING?: NOT VERY HARD

## 2023-07-07 SDOH — ECONOMIC STABILITY: FOOD INSECURITY: WITHIN THE PAST 12 MONTHS, THE FOOD YOU BOUGHT JUST DIDN'T LAST AND YOU DIDN'T HAVE MONEY TO GET MORE.: NEVER TRUE

## 2023-07-07 SDOH — HEALTH STABILITY: PHYSICAL HEALTH: ON AVERAGE, HOW MANY DAYS PER WEEK DO YOU ENGAGE IN MODERATE TO STRENUOUS EXERCISE (LIKE A BRISK WALK)?: 7 DAYS

## 2023-07-07 SDOH — HEALTH STABILITY: PHYSICAL HEALTH: ON AVERAGE, HOW MANY MINUTES DO YOU ENGAGE IN EXERCISE AT THIS LEVEL?: 10 MIN

## 2023-07-07 SDOH — ECONOMIC STABILITY: INCOME INSECURITY: HOW HARD IS IT FOR YOU TO PAY FOR THE VERY BASICS LIKE FOOD, HOUSING, MEDICAL CARE, AND HEATING?: NOT HARD AT ALL

## 2023-07-07 ASSESSMENT — LIFESTYLE VARIABLES
HOW OFTEN DO YOU HAVE SIX OR MORE DRINKS ON ONE OCCASION: 1
HOW MANY STANDARD DRINKS CONTAINING ALCOHOL DO YOU HAVE ON A TYPICAL DAY: PATIENT DOES NOT DRINK
HOW MANY STANDARD DRINKS CONTAINING ALCOHOL DO YOU HAVE ON A TYPICAL DAY: 0
HOW OFTEN DO YOU HAVE A DRINK CONTAINING ALCOHOL: 1
HOW OFTEN DO YOU HAVE A DRINK CONTAINING ALCOHOL: NEVER
HOW MANY STANDARD DRINKS CONTAINING ALCOHOL DO YOU HAVE ON A TYPICAL DAY: PATIENT DOES NOT DRINK
HOW OFTEN DO YOU HAVE A DRINK CONTAINING ALCOHOL: NEVER

## 2023-07-07 ASSESSMENT — ENCOUNTER SYMPTOMS
ABDOMINAL DISTENTION: 0
COUGH: 0
ANAL BLEEDING: 0
WHEEZING: 0
CONSTIPATION: 0
NAUSEA: 0
BACK PAIN: 0
VOICE CHANGE: 0
EYE REDNESS: 0
BLOOD IN STOOL: 0
ABDOMINAL PAIN: 0
RHINORRHEA: 0
SINUS PRESSURE: 0
CHEST TIGHTNESS: 0
EYE PAIN: 0
SINUS PAIN: 0
DIARRHEA: 0
SHORTNESS OF BREATH: 0
VOMITING: 0

## 2023-07-07 ASSESSMENT — PATIENT HEALTH QUESTIONNAIRE - PHQ9
SUM OF ALL RESPONSES TO PHQ QUESTIONS 1-9: 0
SUM OF ALL RESPONSES TO PHQ9 QUESTIONS 1 & 2: 0
SUM OF ALL RESPONSES TO PHQ QUESTIONS 1-9: 0
1. LITTLE INTEREST OR PLEASURE IN DOING THINGS: 0
SUM OF ALL RESPONSES TO PHQ QUESTIONS 1-9: 0
1. LITTLE INTEREST OR PLEASURE IN DOING THINGS: 0
SUM OF ALL RESPONSES TO PHQ QUESTIONS 1-9: 0
2. FEELING DOWN, DEPRESSED OR HOPELESS: 0
2. FEELING DOWN, DEPRESSED OR HOPELESS: 0
SUM OF ALL RESPONSES TO PHQ QUESTIONS 1-9: 0
SUM OF ALL RESPONSES TO PHQ9 QUESTIONS 1 & 2: 0
SUM OF ALL RESPONSES TO PHQ QUESTIONS 1-9: 0

## 2023-07-17 ENCOUNTER — NURSE ONLY (OUTPATIENT)
Age: 78
End: 2023-07-17

## 2023-07-17 DIAGNOSIS — Z12.11 COLON CANCER SCREENING: Primary | ICD-10-CM

## 2023-07-17 RX ORDER — MONTELUKAST SODIUM 10 MG/1
TABLET ORAL
Qty: 90 TABLET | Refills: 1 | Status: SHIPPED | OUTPATIENT
Start: 2023-07-17

## 2023-07-19 LAB — HEMOCCULT STL QL IA: NEGATIVE

## 2023-07-20 ENCOUNTER — TELEPHONE (OUTPATIENT)
Age: 78
End: 2023-07-20

## 2023-07-20 NOTE — TELEPHONE ENCOUNTER
Pt is asking for a referral for Dr. Coreen Mcknight for his left knee pain. He had surgery on this knee already.

## 2023-07-21 DIAGNOSIS — G89.29 CHRONIC PAIN OF LEFT KNEE: Primary | ICD-10-CM

## 2023-07-21 DIAGNOSIS — M25.562 CHRONIC PAIN OF LEFT KNEE: Primary | ICD-10-CM

## 2023-07-24 ENCOUNTER — APPOINTMENT (OUTPATIENT)
Facility: HOSPITAL | Age: 78
End: 2023-07-24
Payer: MEDICARE

## 2023-07-24 ENCOUNTER — HOSPITAL ENCOUNTER (INPATIENT)
Facility: HOSPITAL | Age: 78
LOS: 2 days | Discharge: HOME | DRG: 640 | End: 2023-07-27
Attending: EMERGENCY MEDICINE | Admitting: INTERNAL MEDICINE
Payer: MEDICARE

## 2023-07-24 ENCOUNTER — HOSPITAL ENCOUNTER (EMERGENCY)
Facility: HOSPITAL | Age: 78
Discharge: ANOTHER HOSPITAL | End: 2023-07-24
Attending: EMERGENCY MEDICINE
Payer: MEDICARE

## 2023-07-24 VITALS
RESPIRATION RATE: 17 BRPM | BODY MASS INDEX: 19.37 KG/M2 | WEIGHT: 143 LBS | HEIGHT: 72 IN | TEMPERATURE: 98.7 F | HEART RATE: 86 BPM | OXYGEN SATURATION: 99 % | SYSTOLIC BLOOD PRESSURE: 123 MMHG | DIASTOLIC BLOOD PRESSURE: 75 MMHG

## 2023-07-24 DIAGNOSIS — E87.5 HYPERKALEMIA: Primary | ICD-10-CM

## 2023-07-24 DIAGNOSIS — E87.5 ACUTE HYPERKALEMIA: Primary | ICD-10-CM

## 2023-07-24 LAB
ALBUMIN SERPL-MCNC: 3.7 G/DL (ref 3.5–5)
ALBUMIN/GLOB SERPL: 1 (ref 1.1–2.2)
ALP SERPL-CCNC: 90 U/L (ref 45–117)
ALT SERPL-CCNC: 10 U/L (ref 12–78)
ANION GAP SERPL CALC-SCNC: 17 MMOL/L (ref 5–15)
APPEARANCE UR: CLEAR
AST SERPL-CCNC: 13 U/L (ref 15–37)
BACTERIA URNS QL MICRO: NEGATIVE /HPF
BASOPHILS # BLD: 0.1 K/UL (ref 0–0.1)
BASOPHILS NFR BLD: 1 % (ref 0–1)
BILIRUB SERPL-MCNC: 0.4 MG/DL (ref 0.2–1)
BILIRUB UR QL: NEGATIVE
BUN SERPL-MCNC: 100 MG/DL (ref 6–20)
BUN/CREAT SERPL: 8 (ref 12–20)
CALCIUM SERPL-MCNC: 7.3 MG/DL (ref 8.5–10.1)
CHLORIDE SERPL-SCNC: 96 MMOL/L (ref 97–108)
CO2 SERPL-SCNC: 24 MMOL/L (ref 21–32)
COLOR UR: ABNORMAL
CREAT SERPL-MCNC: 12.46 MG/DL (ref 0.7–1.3)
DIFFERENTIAL METHOD BLD: ABNORMAL
EOSINOPHIL # BLD: 0.1 K/UL (ref 0–0.4)
EOSINOPHIL NFR BLD: 1 % (ref 0–7)
EPITH CASTS URNS QL MICRO: ABNORMAL /LPF
ERYTHROCYTE [DISTWIDTH] IN BLOOD BY AUTOMATED COUNT: 15 % (ref 11.5–14.5)
GLOBULIN SER CALC-MCNC: 3.8 G/DL (ref 2–4)
GLUCOSE BLD STRIP.AUTO-MCNC: 68 MG/DL (ref 65–117)
GLUCOSE SERPL-MCNC: 94 MG/DL (ref 65–100)
GLUCOSE UR STRIP.AUTO-MCNC: NEGATIVE MG/DL
HCT VFR BLD AUTO: 38.7 % (ref 36.6–50.3)
HGB BLD-MCNC: 13 G/DL (ref 12.1–17)
HGB UR QL STRIP: ABNORMAL
IMM GRANULOCYTES # BLD AUTO: 0 K/UL (ref 0–0.04)
IMM GRANULOCYTES NFR BLD AUTO: 0 % (ref 0–0.5)
KETONES UR QL STRIP.AUTO: NEGATIVE MG/DL
LEUKOCYTE ESTERASE UR QL STRIP.AUTO: NEGATIVE
LYMPHOCYTES # BLD: 1.1 K/UL (ref 0.8–3.5)
LYMPHOCYTES NFR BLD: 18 % (ref 12–49)
MCH RBC QN AUTO: 31.9 PG (ref 26–34)
MCHC RBC AUTO-ENTMCNC: 33.6 G/DL (ref 30–36.5)
MCV RBC AUTO: 95.1 FL (ref 80–99)
MONOCYTES # BLD: 0.4 K/UL (ref 0–1)
MONOCYTES NFR BLD: 7 % (ref 5–13)
NEUTS SEG # BLD: 4.2 K/UL (ref 1.8–8)
NEUTS SEG NFR BLD: 72 % (ref 32–75)
NITRITE UR QL STRIP.AUTO: NEGATIVE
NRBC # BLD: 0 K/UL (ref 0–0.01)
NRBC BLD-RTO: 0 PER 100 WBC
PH UR STRIP: 7 (ref 5–8)
PLATELET # BLD AUTO: 245 K/UL (ref 150–400)
PMV BLD AUTO: 11.8 FL (ref 8.9–12.9)
POTASSIUM SERPL-SCNC: 8.7 MMOL/L (ref 3.5–5.1)
PROT SERPL-MCNC: 7.5 G/DL (ref 6.4–8.2)
PROT UR STRIP-MCNC: 100 MG/DL
RBC # BLD AUTO: 4.07 M/UL (ref 4.1–5.7)
RBC #/AREA URNS HPF: ABNORMAL /HPF (ref 0–5)
SERVICE CMNT-IMP: NORMAL
SODIUM SERPL-SCNC: 137 MMOL/L (ref 136–145)
SP GR UR REFRACTOMETRY: 1.01 (ref 1–1.03)
TROPONIN I SERPL HS-MCNC: 29 NG/L (ref 0–76)
URINE CULTURE IF INDICATED: ABNORMAL
UROBILINOGEN UR QL STRIP.AUTO: 0.2 EU/DL (ref 0.2–1)
WBC # BLD AUTO: 5.9 K/UL (ref 4.1–11.1)
WBC URNS QL MICRO: ABNORMAL /HPF (ref 0–4)

## 2023-07-24 PROCEDURE — 80053 COMPREHEN METABOLIC PANEL: CPT

## 2023-07-24 PROCEDURE — 6370000000 HC RX 637 (ALT 250 FOR IP): Performed by: EMERGENCY MEDICINE

## 2023-07-24 PROCEDURE — 93005 ELECTROCARDIOGRAM TRACING: CPT | Performed by: EMERGENCY MEDICINE

## 2023-07-24 PROCEDURE — 94640 AIRWAY INHALATION TREATMENT: CPT

## 2023-07-24 PROCEDURE — 96375 TX/PRO/DX INJ NEW DRUG ADDON: CPT

## 2023-07-24 PROCEDURE — 87340 HEPATITIS B SURFACE AG IA: CPT

## 2023-07-24 PROCEDURE — 86706 HEP B SURFACE ANTIBODY: CPT

## 2023-07-24 PROCEDURE — 36415 COLL VENOUS BLD VENIPUNCTURE: CPT

## 2023-07-24 PROCEDURE — 96365 THER/PROPH/DIAG IV INF INIT: CPT

## 2023-07-24 PROCEDURE — 6360000002 HC RX W HCPCS: Performed by: EMERGENCY MEDICINE

## 2023-07-24 PROCEDURE — 85025 COMPLETE CBC W/AUTO DIFF WBC: CPT

## 2023-07-24 PROCEDURE — 71045 X-RAY EXAM CHEST 1 VIEW: CPT

## 2023-07-24 PROCEDURE — 70450 CT HEAD/BRAIN W/O DYE: CPT

## 2023-07-24 PROCEDURE — 81001 URINALYSIS AUTO W/SCOPE: CPT

## 2023-07-24 PROCEDURE — 84484 ASSAY OF TROPONIN QUANT: CPT

## 2023-07-24 PROCEDURE — 82962 GLUCOSE BLOOD TEST: CPT

## 2023-07-24 PROCEDURE — 99285 EMERGENCY DEPT VISIT HI MDM: CPT

## 2023-07-24 PROCEDURE — 2500000003 HC RX 250 WO HCPCS: Performed by: EMERGENCY MEDICINE

## 2023-07-24 RX ORDER — ALBUTEROL SULFATE 2.5 MG/3ML
2.5 SOLUTION RESPIRATORY (INHALATION)
Status: COMPLETED | OUTPATIENT
Start: 2023-07-24 | End: 2023-07-24

## 2023-07-24 RX ORDER — LORAZEPAM 2 MG/ML
2 INJECTION INTRAMUSCULAR ONCE
Status: COMPLETED | OUTPATIENT
Start: 2023-07-24 | End: 2023-07-24

## 2023-07-24 RX ORDER — DEXTROSE MONOHYDRATE 25 G/50ML
25 INJECTION, SOLUTION INTRAVENOUS PRN
Status: DISCONTINUED | OUTPATIENT
Start: 2023-07-24 | End: 2023-07-27 | Stop reason: HOSPADM

## 2023-07-24 RX ORDER — CALCIUM GLUCONATE 20 MG/ML
1000 INJECTION, SOLUTION INTRAVENOUS ONCE
Status: DISCONTINUED | OUTPATIENT
Start: 2023-07-24 | End: 2023-07-24 | Stop reason: SDUPTHER

## 2023-07-24 RX ORDER — LORAZEPAM 2 MG/ML
INJECTION INTRAMUSCULAR
Status: DISCONTINUED
Start: 2023-07-24 | End: 2023-07-24 | Stop reason: HOSPADM

## 2023-07-24 RX ORDER — CALCIUM GLUCONATE 20 MG/ML
1000 INJECTION, SOLUTION INTRAVENOUS ONCE
Status: COMPLETED | OUTPATIENT
Start: 2023-07-24 | End: 2023-07-24

## 2023-07-24 RX ORDER — LEVETIRACETAM 500 MG/5ML
1000 INJECTION, SOLUTION, CONCENTRATE INTRAVENOUS ONCE
Status: COMPLETED | OUTPATIENT
Start: 2023-07-24 | End: 2023-07-24

## 2023-07-24 RX ORDER — DEXTROSE MONOHYDRATE 25 G/50ML
25 INJECTION, SOLUTION INTRAVENOUS ONCE
Status: COMPLETED | OUTPATIENT
Start: 2023-07-24 | End: 2023-07-24

## 2023-07-24 RX ADMIN — SODIUM ZIRCONIUM CYCLOSILICATE 10 G: 5 POWDER, FOR SUSPENSION ORAL at 18:51

## 2023-07-24 RX ADMIN — INSULIN HUMAN 10 UNITS: 100 INJECTION, SOLUTION PARENTERAL at 18:47

## 2023-07-24 RX ADMIN — LEVETIRACETAM 1000 MG: 100 INJECTION, SOLUTION INTRAVENOUS at 19:50

## 2023-07-24 RX ADMIN — LORAZEPAM 2 MG: 2 INJECTION INTRAMUSCULAR; INTRAVENOUS at 19:45

## 2023-07-24 RX ADMIN — CALCIUM GLUCONATE 1000 MG: 20 INJECTION, SOLUTION INTRAVENOUS at 18:54

## 2023-07-24 RX ADMIN — DEXTROSE MONOHYDRATE 25 G: 25 INJECTION, SOLUTION INTRAVENOUS at 18:44

## 2023-07-24 RX ADMIN — CALCIUM GLUCONATE 1000 MG: 20 INJECTION, SOLUTION INTRAVENOUS at 19:47

## 2023-07-24 RX ADMIN — ALBUTEROL SULFATE 2.5 MG: 2.5 SOLUTION RESPIRATORY (INHALATION) at 19:04

## 2023-07-24 RX ADMIN — SODIUM BICARBONATE 25 MEQ: 84 INJECTION, SOLUTION INTRAVENOUS at 18:49

## 2023-07-24 ASSESSMENT — PAIN - FUNCTIONAL ASSESSMENT
PAIN_FUNCTIONAL_ASSESSMENT: NONE - DENIES PAIN
PAIN_FUNCTIONAL_ASSESSMENT: 0-10
PAIN_FUNCTIONAL_ASSESSMENT: WONG-BAKER FACES

## 2023-07-24 ASSESSMENT — PAIN SCALES - WONG BAKER: WONGBAKER_NUMERICALRESPONSE: 0

## 2023-07-24 ASSESSMENT — LIFESTYLE VARIABLES
HOW MANY STANDARD DRINKS CONTAINING ALCOHOL DO YOU HAVE ON A TYPICAL DAY: PATIENT DOES NOT DRINK
HOW OFTEN DO YOU HAVE A DRINK CONTAINING ALCOHOL: NEVER

## 2023-07-24 ASSESSMENT — PAIN SCALES - GENERAL: PAINLEVEL_OUTOF10: 0

## 2023-07-24 NOTE — ED NOTES
Bedside shift change report given to [id number] Olamide Maldonadobrian Mancilla (oncoming nurse) by me (offgoing nurse). Report included the following information Nurse Handoff Report, ED Encounter Summary, and ED SBAR.        Lincoln Guajardo RN  07/24/23 9005

## 2023-07-24 NOTE — ED NOTES
Report called to Leslee Hoffman at [facility], [address]  07/24/23 [address] [facility] [place]

## 2023-07-24 NOTE — ED NOTES
Patient educated on medications to be administered. Verified  Allergies. Pain medication administered as ordered. Bed rails up and call bell within reach.       Ilana Durbin RN  07/24/23 [id number]

## 2023-07-25 ENCOUNTER — CLINICAL DOCUMENTATION (OUTPATIENT)
Age: 78
End: 2023-07-25

## 2023-07-25 ENCOUNTER — APPOINTMENT (OUTPATIENT)
Facility: HOSPITAL | Age: 78
DRG: 640 | End: 2023-07-25
Payer: MEDICARE

## 2023-07-25 PROBLEM — R53.1 WEAKNESS GENERALIZED: Status: ACTIVE | Noted: 2023-07-25

## 2023-07-25 PROBLEM — Z79.52 LONG TERM (CURRENT) USE OF SYSTEMIC STEROIDS: Status: ACTIVE | Noted: 2023-07-25

## 2023-07-25 PROBLEM — E87.5 HYPERKALEMIA: Status: ACTIVE | Noted: 2023-07-25

## 2023-07-25 PROBLEM — R56.9 SEIZURE (HCC): Status: ACTIVE | Noted: 2023-07-25

## 2023-07-25 LAB
ALBUMIN SERPL-MCNC: 3.5 G/DL (ref 3.5–5)
ALBUMIN/GLOB SERPL: 0.9 (ref 1.1–2.2)
ALP SERPL-CCNC: 80 U/L (ref 45–117)
ALT SERPL-CCNC: 13 U/L (ref 12–78)
ANION GAP SERPL CALC-SCNC: 15 MMOL/L (ref 5–15)
ANION GAP SERPL CALC-SCNC: 9 MMOL/L (ref 5–15)
ARTERIAL PATENCY WRIST A: POSITIVE
AST SERPL-CCNC: 10 U/L (ref 15–37)
BASE EXCESS BLD CALC-SCNC: 1.1 MMOL/L
BASOPHILS # BLD: 0.1 K/UL (ref 0–0.1)
BASOPHILS NFR BLD: 1 % (ref 0–1)
BDY SITE: ABNORMAL
BILIRUB SERPL-MCNC: 0.4 MG/DL (ref 0.2–1)
BUN SERPL-MCNC: 33 MG/DL (ref 6–20)
BUN SERPL-MCNC: 98 MG/DL (ref 6–20)
BUN/CREAT SERPL: 7 (ref 12–20)
BUN/CREAT SERPL: 8 (ref 12–20)
CALCIUM SERPL-MCNC: 5.9 MG/DL (ref 8.5–10.1)
CALCIUM SERPL-MCNC: 7.9 MG/DL (ref 8.5–10.1)
CHLORIDE SERPL-SCNC: 110 MMOL/L (ref 97–108)
CHLORIDE SERPL-SCNC: 96 MMOL/L (ref 97–108)
CK SERPL-CCNC: 71 U/L (ref 39–308)
CO2 SERPL-SCNC: 21 MMOL/L (ref 21–32)
CO2 SERPL-SCNC: 21 MMOL/L (ref 21–32)
CREAT SERPL-MCNC: 11.9 MG/DL (ref 0.7–1.3)
CREAT SERPL-MCNC: 4.67 MG/DL (ref 0.7–1.3)
DIFFERENTIAL METHOD BLD: ABNORMAL
EKG ATRIAL RATE: 64 BPM
EKG ATRIAL RATE: 74 BPM
EKG DIAGNOSIS: NORMAL
EKG DIAGNOSIS: NORMAL
EKG P AXIS: 59 DEGREES
EKG P AXIS: 67 DEGREES
EKG P-R INTERVAL: 186 MS
EKG P-R INTERVAL: 220 MS
EKG Q-T INTERVAL: 448 MS
EKG Q-T INTERVAL: 454 MS
EKG QRS DURATION: 116 MS
EKG QRS DURATION: 138 MS
EKG QTC CALCULATION (BAZETT): 468 MS
EKG QTC CALCULATION (BAZETT): 497 MS
EKG R AXIS: -10 DEGREES
EKG R AXIS: -20 DEGREES
EKG T AXIS: 101 DEGREES
EKG T AXIS: 143 DEGREES
EKG VENTRICULAR RATE: 64 BPM
EKG VENTRICULAR RATE: 74 BPM
EOSINOPHIL # BLD: 0.3 K/UL (ref 0–0.4)
EOSINOPHIL NFR BLD: 4 % (ref 0–7)
ERYTHROCYTE [DISTWIDTH] IN BLOOD BY AUTOMATED COUNT: 15 % (ref 11.5–14.5)
GAS FLOW.O2 O2 DELIVERY SYS: ABNORMAL
GLOBULIN SER CALC-MCNC: 4.1 G/DL (ref 2–4)
GLUCOSE BLD STRIP.AUTO-MCNC: 106 MG/DL (ref 65–117)
GLUCOSE BLD STRIP.AUTO-MCNC: 130 MG/DL (ref 65–117)
GLUCOSE BLD STRIP.AUTO-MCNC: 132 MG/DL (ref 65–117)
GLUCOSE BLD STRIP.AUTO-MCNC: 149 MG/DL (ref 65–117)
GLUCOSE BLD STRIP.AUTO-MCNC: 158 MG/DL (ref 65–117)
GLUCOSE BLD STRIP.AUTO-MCNC: 74 MG/DL (ref 65–117)
GLUCOSE BLD STRIP.AUTO-MCNC: 85 MG/DL (ref 65–117)
GLUCOSE BLD STRIP.AUTO-MCNC: 86 MG/DL (ref 65–117)
GLUCOSE SERPL-MCNC: 80 MG/DL (ref 65–100)
GLUCOSE SERPL-MCNC: 87 MG/DL (ref 65–100)
HBV SURFACE AB SER QL: NONREACTIVE
HBV SURFACE AB SER-ACNC: <3.1 MIU/ML
HBV SURFACE AG SER QL: <0.1 INDEX
HBV SURFACE AG SER QL: NEGATIVE
HCO3 BLD-SCNC: 26.9 MMOL/L (ref 22–26)
HCT VFR BLD AUTO: 39.4 % (ref 36.6–50.3)
HGB BLD-MCNC: 13.3 G/DL (ref 12.1–17)
IMM GRANULOCYTES # BLD AUTO: 0 K/UL (ref 0–0.04)
IMM GRANULOCYTES NFR BLD AUTO: 0 % (ref 0–0.5)
LYMPHOCYTES # BLD: 1.1 K/UL (ref 0.8–3.5)
LYMPHOCYTES NFR BLD: 18 % (ref 12–49)
MAGNESIUM SERPL-MCNC: 1.5 MG/DL (ref 1.6–2.4)
MCH RBC QN AUTO: 32 PG (ref 26–34)
MCHC RBC AUTO-ENTMCNC: 33.8 G/DL (ref 30–36.5)
MCV RBC AUTO: 94.9 FL (ref 80–99)
MONOCYTES # BLD: 0.6 K/UL (ref 0–1)
MONOCYTES NFR BLD: 10 % (ref 5–13)
NEUTS SEG # BLD: 4.4 K/UL (ref 1.8–8)
NEUTS SEG NFR BLD: 67 % (ref 32–75)
NRBC # BLD: 0 K/UL (ref 0–0.01)
NRBC BLD-RTO: 0 PER 100 WBC
O2/TOTAL GAS SETTING VFR VENT: 3 %
PCO2 BLD: 46 MMHG (ref 35–45)
PH BLD: 7.38 (ref 7.35–7.45)
PHOSPHATE SERPL-MCNC: 3 MG/DL (ref 2.6–4.7)
PLATELET # BLD AUTO: 157 K/UL (ref 150–400)
PMV BLD AUTO: 11.2 FL (ref 8.9–12.9)
PO2 BLD: 59 MMHG (ref 80–100)
POTASSIUM SERPL-SCNC: 3 MMOL/L (ref 3.5–5.1)
POTASSIUM SERPL-SCNC: 6.8 MMOL/L (ref 3.5–5.1)
PROCALCITONIN SERPL-MCNC: 0.18 NG/ML
PROT SERPL-MCNC: 7.6 G/DL (ref 6.4–8.2)
RBC # BLD AUTO: 4.15 M/UL (ref 4.1–5.7)
SAO2 % BLD: 89 % (ref 92–97)
SARS-COV-2 RDRP RESP QL NAA+PROBE: NOT DETECTED
SERVICE CMNT-IMP: ABNORMAL
SERVICE CMNT-IMP: NORMAL
SODIUM SERPL-SCNC: 132 MMOL/L (ref 136–145)
SODIUM SERPL-SCNC: 140 MMOL/L (ref 136–145)
SOURCE: NORMAL
SPECIMEN TYPE: ABNORMAL
WBC # BLD AUTO: 6.4 K/UL (ref 4.1–11.1)

## 2023-07-25 PROCEDURE — 2580000003 HC RX 258: Performed by: NURSE PRACTITIONER

## 2023-07-25 PROCEDURE — 6370000000 HC RX 637 (ALT 250 FOR IP): Performed by: INTERNAL MEDICINE

## 2023-07-25 PROCEDURE — 84145 PROCALCITONIN (PCT): CPT

## 2023-07-25 PROCEDURE — 36600 WITHDRAWAL OF ARTERIAL BLOOD: CPT

## 2023-07-25 PROCEDURE — 99223 1ST HOSP IP/OBS HIGH 75: CPT | Performed by: PSYCHIATRY & NEUROLOGY

## 2023-07-25 PROCEDURE — 6360000002 HC RX W HCPCS: Performed by: NURSE PRACTITIONER

## 2023-07-25 PROCEDURE — 2700000000 HC OXYGEN THERAPY PER DAY

## 2023-07-25 PROCEDURE — 80048 BASIC METABOLIC PNL TOTAL CA: CPT

## 2023-07-25 PROCEDURE — 95819 EEG AWAKE AND ASLEEP: CPT

## 2023-07-25 PROCEDURE — 2500000003 HC RX 250 WO HCPCS: Performed by: NURSE PRACTITIONER

## 2023-07-25 PROCEDURE — 36415 COLL VENOUS BLD VENIPUNCTURE: CPT

## 2023-07-25 PROCEDURE — 1100000003 HC PRIVATE W/ TELEMETRY

## 2023-07-25 PROCEDURE — 6370000000 HC RX 637 (ALT 250 FOR IP): Performed by: NURSE PRACTITIONER

## 2023-07-25 PROCEDURE — 85025 COMPLETE CBC W/AUTO DIFF WBC: CPT

## 2023-07-25 PROCEDURE — 95816 EEG AWAKE AND DROWSY: CPT | Performed by: PSYCHIATRY & NEUROLOGY

## 2023-07-25 PROCEDURE — 87635 SARS-COV-2 COVID-19 AMP PRB: CPT

## 2023-07-25 PROCEDURE — 71045 X-RAY EXAM CHEST 1 VIEW: CPT

## 2023-07-25 PROCEDURE — 90935 HEMODIALYSIS ONE EVALUATION: CPT

## 2023-07-25 PROCEDURE — 82962 GLUCOSE BLOOD TEST: CPT

## 2023-07-25 PROCEDURE — 82550 ASSAY OF CK (CPK): CPT

## 2023-07-25 PROCEDURE — 83735 ASSAY OF MAGNESIUM: CPT

## 2023-07-25 PROCEDURE — 84100 ASSAY OF PHOSPHORUS: CPT

## 2023-07-25 PROCEDURE — 82803 BLOOD GASES ANY COMBINATION: CPT

## 2023-07-25 RX ORDER — PRAVASTATIN SODIUM 10 MG
10 TABLET ORAL NIGHTLY
Status: DISCONTINUED | OUTPATIENT
Start: 2023-07-25 | End: 2023-07-27 | Stop reason: HOSPADM

## 2023-07-25 RX ORDER — SODIUM CHLORIDE 9 MG/ML
INJECTION, SOLUTION INTRAVENOUS PRN
Status: DISCONTINUED | OUTPATIENT
Start: 2023-07-25 | End: 2023-07-27 | Stop reason: HOSPADM

## 2023-07-25 RX ORDER — PREDNISONE 5 MG/1
10 TABLET ORAL DAILY
Status: DISCONTINUED | OUTPATIENT
Start: 2023-07-25 | End: 2023-07-27 | Stop reason: HOSPADM

## 2023-07-25 RX ORDER — FAMOTIDINE 20 MG/1
20 TABLET, FILM COATED ORAL 2 TIMES DAILY
Status: DISCONTINUED | OUTPATIENT
Start: 2023-07-25 | End: 2023-07-25

## 2023-07-25 RX ORDER — SODIUM CHLORIDE 0.9 % (FLUSH) 0.9 %
5-40 SYRINGE (ML) INJECTION PRN
Status: DISCONTINUED | OUTPATIENT
Start: 2023-07-25 | End: 2023-07-27 | Stop reason: HOSPADM

## 2023-07-25 RX ORDER — POLYETHYLENE GLYCOL 3350 17 G/17G
17 POWDER, FOR SOLUTION ORAL DAILY PRN
Status: DISCONTINUED | OUTPATIENT
Start: 2023-07-25 | End: 2023-07-27 | Stop reason: HOSPADM

## 2023-07-25 RX ORDER — HEPARIN SODIUM 5000 [USP'U]/ML
5000 INJECTION, SOLUTION INTRAVENOUS; SUBCUTANEOUS EVERY 8 HOURS SCHEDULED
Status: DISCONTINUED | OUTPATIENT
Start: 2023-07-25 | End: 2023-07-27 | Stop reason: HOSPADM

## 2023-07-25 RX ORDER — ACETAMINOPHEN 325 MG/1
650 TABLET ORAL EVERY 6 HOURS PRN
Status: DISCONTINUED | OUTPATIENT
Start: 2023-07-25 | End: 2023-07-27 | Stop reason: HOSPADM

## 2023-07-25 RX ORDER — IPRATROPIUM BROMIDE AND ALBUTEROL SULFATE 2.5; .5 MG/3ML; MG/3ML
1 SOLUTION RESPIRATORY (INHALATION) EVERY 4 HOURS PRN
Status: DISCONTINUED | OUTPATIENT
Start: 2023-07-25 | End: 2023-07-27 | Stop reason: HOSPADM

## 2023-07-25 RX ORDER — SODIUM CHLORIDE 0.9 % (FLUSH) 0.9 %
5-40 SYRINGE (ML) INJECTION EVERY 12 HOURS SCHEDULED
Status: DISCONTINUED | OUTPATIENT
Start: 2023-07-25 | End: 2023-07-27 | Stop reason: HOSPADM

## 2023-07-25 RX ORDER — LATANOPROST 50 UG/ML
1 SOLUTION/ DROPS OPHTHALMIC NIGHTLY
Status: DISCONTINUED | OUTPATIENT
Start: 2023-07-25 | End: 2023-07-27 | Stop reason: HOSPADM

## 2023-07-25 RX ORDER — ACETAMINOPHEN 650 MG/1
650 SUPPOSITORY RECTAL EVERY 6 HOURS PRN
Status: DISCONTINUED | OUTPATIENT
Start: 2023-07-25 | End: 2023-07-27 | Stop reason: HOSPADM

## 2023-07-25 RX ORDER — FAMOTIDINE 20 MG/1
20 TABLET, FILM COATED ORAL DAILY
Status: DISCONTINUED | OUTPATIENT
Start: 2023-07-25 | End: 2023-07-27 | Stop reason: HOSPADM

## 2023-07-25 RX ORDER — NIFEDIPINE 60 MG/1
120 TABLET, EXTENDED RELEASE ORAL DAILY
Status: DISCONTINUED | OUTPATIENT
Start: 2023-07-25 | End: 2023-07-25

## 2023-07-25 RX ORDER — CASTOR OIL AND BALSAM, PERU 788; 87 MG/G; MG/G
OINTMENT TOPICAL 2 TIMES DAILY
Status: DISCONTINUED | OUTPATIENT
Start: 2023-07-25 | End: 2023-07-27 | Stop reason: HOSPADM

## 2023-07-25 RX ORDER — CALCIUM GLUCONATE 20 MG/ML
1000 INJECTION, SOLUTION INTRAVENOUS ONCE
Status: COMPLETED | OUTPATIENT
Start: 2023-07-25 | End: 2023-07-25

## 2023-07-25 RX ADMIN — HEPARIN SODIUM 5000 UNITS: 5000 INJECTION INTRAVENOUS; SUBCUTANEOUS at 14:36

## 2023-07-25 RX ADMIN — HEPARIN SODIUM 5000 UNITS: 5000 INJECTION INTRAVENOUS; SUBCUTANEOUS at 06:31

## 2023-07-25 RX ADMIN — PREDNISONE 10 MG: 5 TABLET ORAL at 09:13

## 2023-07-25 RX ADMIN — ACETAMINOPHEN 650 MG: 325 TABLET ORAL at 12:25

## 2023-07-25 RX ADMIN — Medication 1 AMPULE: at 09:14

## 2023-07-25 RX ADMIN — METOPROLOL TARTRATE 12.5 MG: 25 TABLET, FILM COATED ORAL at 09:13

## 2023-07-25 RX ADMIN — FAMOTIDINE 20 MG: 20 TABLET, FILM COATED ORAL at 09:13

## 2023-07-25 RX ADMIN — HEPARIN SODIUM 5000 UNITS: 5000 INJECTION INTRAVENOUS; SUBCUTANEOUS at 21:44

## 2023-07-25 RX ADMIN — METOPROLOL TARTRATE 12.5 MG: 25 TABLET, FILM COATED ORAL at 21:44

## 2023-07-25 RX ADMIN — PRAVASTATIN SODIUM 10 MG: 10 TABLET ORAL at 21:45

## 2023-07-25 RX ADMIN — SODIUM CHLORIDE, PRESERVATIVE FREE 10 ML: 5 INJECTION INTRAVENOUS at 21:58

## 2023-07-25 RX ADMIN — SODIUM CHLORIDE, PRESERVATIVE FREE 10 ML: 5 INJECTION INTRAVENOUS at 09:14

## 2023-07-25 RX ADMIN — Medication 1 AMPULE: at 21:57

## 2023-07-25 RX ADMIN — Medication: at 09:13

## 2023-07-25 RX ADMIN — ACETAMINOPHEN 650 MG: 325 TABLET ORAL at 19:00

## 2023-07-25 RX ADMIN — CALCIUM GLUCONATE 1000 MG: 20 INJECTION, SOLUTION INTRAVENOUS at 06:30

## 2023-07-25 ASSESSMENT — PAIN DESCRIPTION - DESCRIPTORS
DESCRIPTORS: ACHING

## 2023-07-25 ASSESSMENT — PAIN DESCRIPTION - ORIENTATION
ORIENTATION: POSTERIOR
ORIENTATION: MID
ORIENTATION: POSTERIOR

## 2023-07-25 ASSESSMENT — PAIN SCALES - GENERAL
PAINLEVEL_OUTOF10: 0
PAINLEVEL_OUTOF10: 3
PAINLEVEL_OUTOF10: 8
PAINLEVEL_OUTOF10: 0
PAINLEVEL_OUTOF10: 0
PAINLEVEL_OUTOF10: 8

## 2023-07-25 ASSESSMENT — PAIN SCALES - WONG BAKER
WONGBAKER_NUMERICALRESPONSE: 0

## 2023-07-25 ASSESSMENT — PAIN DESCRIPTION - LOCATION
LOCATION: BACK

## 2023-07-25 ASSESSMENT — PAIN - FUNCTIONAL ASSESSMENT: PAIN_FUNCTIONAL_ASSESSMENT: ACTIVITIES ARE NOT PREVENTED

## 2023-07-25 NOTE — PROGRESS NOTES
0710: Bedside and Verbal shift change report given to Paulino Perdomo RN by Josr Olivas RN. Report included the following information Nurse Handoff Report, ED Encounter Summary, ED SBAR, Adult Overview, Intake/Output, MAR, Recent Results, and Cardiac Rhythm SR .   0800: Shift assessment complete, patient asleep on initial assessment but responds to voice. Oriented x4, moves all extremities independently. Patient has not voided at this time and wife is at the bedside. 1020: Dr. Hilliard Alu at bedside for IDR, orders to transfer patient out of the ICU.   1115: EEG at bedside. 1200: Reassessment complete, no acute changes to note. 1600: Reassessment complete, no acute changes to note. 1635 Attempted to call transfer report for patient to move to room 3411.   1705: Transfer report called on patient to Dorian Weiss RN.

## 2023-07-25 NOTE — PROGRESS NOTES
Comprehensive Nutrition Assessment    Type and Reason for Visit:  Initial (low BMI)    Nutrition Recommendations/Plan:   Continue diet as tolerated  RD to add Ensure Clear and Nepro BID  Please document % meals and supplements consumed in flowsheet I/O's under intake      Malnutrition Assessment:  Malnutrition Status:  Severe malnutrition (07/25/23 1320)    Context:  Chronic Illness     Findings of the 6 clinical characteristics of malnutrition:  Energy Intake:  No significant decrease in energy intake  Weight Loss:  No significant weight loss     Body Fat Loss:  Severe body fat loss Buccal region, Fat Overlying Ribs   Muscle Mass Loss:  Severe muscle mass loss Clavicles (pectoralis & deltoids), Thigh (quadraceps)  Fluid Accumulation:  No significant fluid accumulation     Strength:  Not Performed    Nutrition Assessment:  Pt admitted with hyperkalemia. PMH: CHF, ESRD, noncompliance with HD tx (shortened sessions). Chart reviewed, case discussed during CCU rounds. Pt sitting up in bed awake and alert. He came in severely hyperkalemic, but this has resolved with HD. He ate well for lunch and denies any decline in appetite PTA. He feels he has gained wt but unable to determine if this is due to fluid retention. Pt is open to trying PO supplements. BG have been on the low side, encourage ONS intake to maintain BG. Patient Vitals for the past 120 hrs:   PO Meals Eaten (%)   07/25/23 0900 76 - 100%     Wt Readings from Last 10 Encounters:   07/25/23 68.2 kg (150 lb 5.7 oz)   07/24/23 64.9 kg (143 lb)   07/07/23 65.3 kg (144 lb)   04/26/23 61.2 kg (135 lb)   04/20/23 61.5 kg (135 lb 9.3 oz)   12/07/22 62.1 kg (137 lb)   12/28/21 65.1 kg (143 lb 8 oz)   10/25/21 62.7 kg (138 lb 2 oz)   06/10/21 66.2 kg (146 lb)   05/28/21 66.2 kg (146 lb)            Nutrition Related Findings:    Meds: pepcid, prednisone.   BM: 7/25 Wound Type: None       Current Nutrition Intake & Therapies:    Average Meal Intake:

## 2023-07-25 NOTE — H&P
Name: Dao Valentin   : 1945   MRN: [id number]   Date: 2023        Chief Complaint   Patient presents with    Other     \[facility]\"" transfer for emergent dialysis. Potassium was 8.7 at \[facility]\"". Pt had seizure there as well, no hx of. Received Ativan and Keppra before transport. Transport also reported BG was low in transport, provided D10 en route. HPI:     67 y/o M pt with pmh of ESRD on HD T, TH, sat, HFrEF 15-20%, HTN, Asthma, RA, chronic pain presented to \[facility]\"" for bilateral lower extremity weakness and fatigue. Upon arrival to \[facility]\"" lab work significant for a K of 8.7, , Cr 12.46. Given calcium, insulin/dextrose, bicarb, albuterol and transfer to [facility] initiated for emergent HD. While at \[facility]\"" had witnessed tonic clonic seizure. Given 2 mg ativan with cessation of seizure activity. Post ictal post seizure. Head CT done since pt has no hx of seizures and was negative for acute pathology. Transferred to [facility] for HD and admission. Upon arrival to [facility], pt HDS, SPO2 in the 80s, placed on 4 L NC with some improvement. ABG done= 7.38/46/59/89/27. Placed on midflow 15 LPM with improvement in SPO2 to 97%. HD started and ICU consulted for admission. Upon my arrival pt still altered. Opens eyes to voice, moves all extremities, and follows simple commands but lethargic. HDS, HR 60's. Wife and daughters and bedside. Will be admitted to the ICU for further management.     Active Problems Being Managed:     Acute metabolic encephalopathy  Seizure  Acute hypoxic respiratory failure  Hyperkalemia  ESRD on HD  HFrEF 15-20%  RA   Chronic pain      Assessment/Plan:     Acute metabolic encephalopathy-likely due to post ictal state  Seizure- new onset   Chronic pain  -CT head negative for acute pathology  -neurology consult in the am  -seizure precautions  -PRN ativan for clinical seizure activity  -hold POA gabapentin (if re-started, start at significantly lower dose, as per outpatient notes

## 2023-07-25 NOTE — PROGRESS NOTES
TRANSFER - IN REPORT:    Verbal report received from Kate Meza RN on Melanie Loop  being received from CCU for routine progression of patient care      Report consisted of patient's Situation, Background, Assessment and   Recommendations(SBAR). Information from the following report(s) Nurse Handoff Report, MAR, Recent Results, and Cardiac Rhythm Sinus Rhythm  was reviewed with the receiving nurse. Opportunity for questions and clarification was provided. Assessment completed upon patient's arrival to unit and care assumed.

## 2023-07-25 NOTE — ED PROVIDER NOTES
Pt care assumed from Dr. Gaye Gonsalez , ED provider. Pt complaint(s), current treatment plan, progression and available diagnostic results have been discussed thoroughly. The patient was seen and evaluated on my shift. Patient has been accepted by the emergency [address], nephrology aware to perform emergent dialysis. Patient received hyperkalemia treatment. Just prior to transport he began to have generalized tonic-clonic seizure activity and was not responding. Patient with no history of seizures. It responded to 2 mg of Ativan with no further seizure activity. Obtained a stat head CT which showed no acute findings. Patient transferred to EMS care, did have them check blood glucose and they noted to be 59, they will treat with either amp of D50 or 250 mL bag of D10 in route, did not want to delay dialysis any further at this point due to his significant hyperkalemia. At time of transport patient was responding to sternal rub, gag intact, not responding to voice however likely due to postictal state/Ativan, no evidence of ongoing seizure at this time. During the seizure episode there was no QRS widening and suspicion for cardiac event was low however did give additional gram of calcium gluconate. Isabela Calderon MD      Critical Care Note        NOTES   :  I have provided a total of 45 minutes of critical time not including time spent on separately documented procedures. The reason for providing this level of medical care for this critically ill patient was due to a critical illness that impaired one or more vital organ systems such that there was a high probability of imminent or life threatening deterioration in the patients condition. This care involved high complexity decision making to assess, manipulate, and support vital system functions,  lab review, consultations with specialist, family decision- making, bedside attention and documentation.  During this entire
FINAL IMPRESSION     1. Acute hyperkalemia          DISPOSITION/PLAN       CLINICAL IMPRESSION    Transfer: The patient is being transferred to [facility] for hyperkalemia//need for emergent dialysis. The results of their tests and reasons for their transfer have been discussed with the patient and/or available family. The patient/family has conveyed agreement and understanding for the need to be admitted and for their admission diagnosis. Consultation has been made with Dr. Janneth Merino, who agrees to accept the transfer. I am the Primary Clinician of Record. Alina Trivedi MD (electronically signed)    (Please note that parts of this dictation were completed with voice recognition software. Quite often unanticipated grammatical, syntax, homophones, and other interpretive errors are inadvertently transcribed by the computer software. Please disregards these errors.  Please excuse any errors that have escaped final proofreading.)         Austin Bauman MD  07/24/23 1126
Essential hypertension

## 2023-07-25 NOTE — PROGRESS NOTES
Nephrology    Consult received from [facility] ED - ESRD on HD  - Presents with weakness/fatigue - labs with K 8.7, not hemolyzed  - Received insulin/dextrose, bicarb, calcium, Lokelma in OSH ED  - Urgent HD ordered given severe hyperkalemia - 2K bath, 3.5 hour treatment time, 2L UF goal  - Evins Duet aware of patient's arrival  - Plan of care discussed with Dr Loren Fields (nephrology attending)  - Patient of [address], [address], but called to our group. Plan to complete consult tonight then hand over to their group for ongoing care. Dr Paulina Medellin with [address] notified via [employer].   - Will see patient shortly    --Sandip Rodriguez NP  [facility]

## 2023-07-25 NOTE — PROGRESS NOTES
0: Report called by Carlos Andersen RN from the ED for transfer. Report given over telephone in SBAR format with all questions and concerns addressed. 0502: Pt arrived to unit with ED nurse. Pt was transferred to the ICU bed and bathed with CHG wipes. 0530: Admission assessment completed with all findings documented. 0615: Lab results reviewed, advised by El Clark to start calcium gluconate for calcium level of 5.9.    0700: Report given at bedside to Arthur Hugo RN in [employer] with all questions and concerns addressed.

## 2023-07-25 NOTE — PROGRESS NOTES
Name: Prashant Hopper   MRN: [id number]  : 1945  2023 11:54 AM      Admit Date: 2023    Admit Diagnosis: Hyperkalemia [E87.5]    Assessment/Plan:     Principal Problem:    Hyperkalemia  Resolved Problems:    * No resolved hospital problems. *          23     Assessment:         Encephalopathic, metabolic versus uremic versus septic  Seizure  Hypoxic respiratory failure  Severe hyperkalemia-resolved s/p dialysis  Congestive heart failure with reduced ejection fraction 15 to 20%  Noncompliance about adherence to prescribed dialysis treatment  History of rheumatoid arthritis with history of chronic pain  Secondary hyperparathyroidism    Discussion/MDM: Patient with multiple medical comorbidities, each with high likelihood for morbidity and mortality if left untreated. Patient being treated with medication that requires intensive monitoring due to increased risk for toxicity. I have reviewed patient's presenting subjective and objective findings, as well as all laboratory studies, imaging studies, and vital signs to date as well as treatment rendered and patient's response to those treatments. In addition, prior medical, surgical and relevant social and family histories were reviewed. Discussion:     Presented with severe hyperkalemia and and had dialysis urgently in the night. Usually on a  schedule and runs for 3 hours 30 minutes. Inciting cause for hyperkalemia and not clear-IV with poor clearance or recirculation lesions. Will increase time on dialysate and explore access for recirculation.   Appreciate neurology input  Appreciate RNA np taking emergent care for the patient  I called the outpatient dialysis center at [facility] and they can for that heut his treatment short by more than 60 minutes last time on Saturday and usually cuts this treatment short each time    Plan: done by dragon, [employer] voice recognition software. Often unanticipated grammatical, syntax, phones and other interpretive errors are inadvertently transcribed. Please excuse errors that have escaped final proofreading. Please contact me if you suspect dictation or transcription errors.       Dr Bucio [facility] Jeni- [phone]  [facility]   [address]    Fax: [phone]

## 2023-07-25 NOTE — PROCEDURES
EEG REPORT    Patient Name: Sachin Abbott  : 1945  Age: 66 y.o. Ordering physician:Dr. Alan Guzmán   Date of EE2023  11:32 - 11:54  Diagnosis: seizure  Interpreting physician: Ko West D.O. FAAN    Procedure: EEG    CLINICAL INDICATION: The patient is a 66 y.o. male who is being evaluated for baseline electro cerebral activities and to rule out seizure focus. Current Facility-Administered Medications   Medication Dose Route Frequency    sodium chloride flush 0.9 % injection 5-40 mL  5-40 mL IntraVENous 2 times per day    sodium chloride flush 0.9 % injection 5-40 mL  5-40 mL IntraVENous PRN    0.9 % sodium chloride infusion   IntraVENous PRN    polyethylene glycol (GLYCOLAX) packet 17 g  17 g Oral Daily PRN    acetaminophen (TYLENOL) tablet 650 mg  650 mg Oral Q6H PRN    Or    acetaminophen (TYLENOL) suppository 650 mg  650 mg Rectal Q6H PRN    ipratropium 0.5 mg-albuterol 2.5 mg (DUONEB) nebulizer solution 1 Dose  1 Dose Inhalation Q4H PRN    latanoprost (XALATAN) 0.005 % ophthalmic solution 1 drop  1 drop Both Eyes Nightly    predniSONE (DELTASONE) tablet 10 mg  10 mg Oral Daily    metoprolol tartrate (LOPRESSOR) tablet 12.5 mg  12.5 mg Oral BID    pravastatin (PRAVACHOL) tablet 10 mg  10 mg Oral Nightly    heparin (porcine) injection 5,000 Units  5,000 Units SubCUTAneous 3 times per day    alcohol 62% (NOZIN) nasal  1 ampule  1 ampule Topical BID    balsum peru-castor oil (VENELEX) ointment   Topical BID    famotidine (PEPCID) tablet 20 mg  20 mg Oral Daily    dextrose 50 % IV solution  25 g IntraVENous PRN           DESCRIPTION OF PROCEDURE:     This is a digitally recorded electroencephalogram  Electrodes were applied in accordance with the international 10-20 system of electrode placement.     18 channels of scalp EEG are recorded  A channel was used for EoG  Another channel was used for ECG   The data is stored digitally and reviewed in reformatted montages for optimal

## 2023-07-25 NOTE — ED NOTES
Pts family is at bedside. At this time pt is awake and talking to this RN and his family.       [place] CHADD Drew  07/25/23 6819

## 2023-07-26 LAB
ANION GAP SERPL CALC-SCNC: 11 MMOL/L (ref 5–15)
ANION GAP SERPL CALC-SCNC: 8 MMOL/L (ref 5–15)
BASOPHILS # BLD: 0.1 K/UL (ref 0–0.1)
BASOPHILS NFR BLD: 2 % (ref 0–1)
BUN SERPL-MCNC: 60 MG/DL (ref 6–20)
BUN SERPL-MCNC: 62 MG/DL (ref 6–20)
BUN/CREAT SERPL: 7 (ref 12–20)
BUN/CREAT SERPL: 7 (ref 12–20)
CALCIUM SERPL-MCNC: 7.4 MG/DL (ref 8.5–10.1)
CALCIUM SERPL-MCNC: 7.5 MG/DL (ref 8.5–10.1)
CHLORIDE SERPL-SCNC: 95 MMOL/L (ref 97–108)
CHLORIDE SERPL-SCNC: 96 MMOL/L (ref 97–108)
CO2 SERPL-SCNC: 26 MMOL/L (ref 21–32)
CO2 SERPL-SCNC: 27 MMOL/L (ref 21–32)
CREAT SERPL-MCNC: 8.6 MG/DL (ref 0.7–1.3)
CREAT SERPL-MCNC: 8.71 MG/DL (ref 0.7–1.3)
DIFFERENTIAL METHOD BLD: ABNORMAL
EOSINOPHIL # BLD: 0.8 K/UL (ref 0–0.4)
EOSINOPHIL NFR BLD: 12 % (ref 0–7)
ERYTHROCYTE [DISTWIDTH] IN BLOOD BY AUTOMATED COUNT: 15 % (ref 11.5–14.5)
GLUCOSE SERPL-MCNC: 93 MG/DL (ref 65–100)
GLUCOSE SERPL-MCNC: 97 MG/DL (ref 65–100)
HCT VFR BLD AUTO: 36.1 % (ref 36.6–50.3)
HGB BLD-MCNC: 12 G/DL (ref 12.1–17)
IMM GRANULOCYTES # BLD AUTO: 0 K/UL (ref 0–0.04)
IMM GRANULOCYTES NFR BLD AUTO: 0 % (ref 0–0.5)
LYMPHOCYTES # BLD: 1.6 K/UL (ref 0.8–3.5)
LYMPHOCYTES NFR BLD: 22 % (ref 12–49)
MAGNESIUM SERPL-MCNC: 2.4 MG/DL (ref 1.6–2.4)
MCH RBC QN AUTO: 32.1 PG (ref 26–34)
MCHC RBC AUTO-ENTMCNC: 33.2 G/DL (ref 30–36.5)
MCV RBC AUTO: 96.5 FL (ref 80–99)
MONOCYTES # BLD: 0.9 K/UL (ref 0–1)
MONOCYTES NFR BLD: 13 % (ref 5–13)
NEUTS SEG # BLD: 3.7 K/UL (ref 1.8–8)
NEUTS SEG NFR BLD: 51 % (ref 32–75)
NRBC # BLD: 0 K/UL (ref 0–0.01)
NRBC BLD-RTO: 0 PER 100 WBC
PHOSPHATE SERPL-MCNC: 6.4 MG/DL (ref 2.6–4.7)
PLATELET # BLD AUTO: 200 K/UL (ref 150–400)
PMV BLD AUTO: 12 FL (ref 8.9–12.9)
POTASSIUM SERPL-SCNC: 5.2 MMOL/L (ref 3.5–5.1)
POTASSIUM SERPL-SCNC: 5.2 MMOL/L (ref 3.5–5.1)
RBC # BLD AUTO: 3.74 M/UL (ref 4.1–5.7)
SODIUM SERPL-SCNC: 130 MMOL/L (ref 136–145)
SODIUM SERPL-SCNC: 133 MMOL/L (ref 136–145)
WBC # BLD AUTO: 7.1 K/UL (ref 4.1–11.1)

## 2023-07-26 PROCEDURE — 97165 OT EVAL LOW COMPLEX 30 MIN: CPT

## 2023-07-26 PROCEDURE — 6370000000 HC RX 637 (ALT 250 FOR IP): Performed by: NURSE PRACTITIONER

## 2023-07-26 PROCEDURE — 2580000003 HC RX 258: Performed by: NURSE PRACTITIONER

## 2023-07-26 PROCEDURE — 80048 BASIC METABOLIC PNL TOTAL CA: CPT

## 2023-07-26 PROCEDURE — 85025 COMPLETE CBC W/AUTO DIFF WBC: CPT

## 2023-07-26 PROCEDURE — 90935 HEMODIALYSIS ONE EVALUATION: CPT

## 2023-07-26 PROCEDURE — 6370000000 HC RX 637 (ALT 250 FOR IP): Performed by: INTERNAL MEDICINE

## 2023-07-26 PROCEDURE — 6360000002 HC RX W HCPCS: Performed by: NURSE PRACTITIONER

## 2023-07-26 PROCEDURE — 99232 SBSQ HOSP IP/OBS MODERATE 35: CPT | Performed by: PSYCHIATRY & NEUROLOGY

## 2023-07-26 PROCEDURE — 1100000003 HC PRIVATE W/ TELEMETRY

## 2023-07-26 PROCEDURE — 36415 COLL VENOUS BLD VENIPUNCTURE: CPT

## 2023-07-26 PROCEDURE — 5A1D70Z PERFORMANCE OF URINARY FILTRATION, INTERMITTENT, LESS THAN 6 HOURS PER DAY: ICD-10-PCS | Performed by: STUDENT IN AN ORGANIZED HEALTH CARE EDUCATION/TRAINING PROGRAM

## 2023-07-26 PROCEDURE — 83735 ASSAY OF MAGNESIUM: CPT

## 2023-07-26 PROCEDURE — 84100 ASSAY OF PHOSPHORUS: CPT

## 2023-07-26 RX ADMIN — PRAVASTATIN SODIUM 10 MG: 10 TABLET ORAL at 21:18

## 2023-07-26 RX ADMIN — SODIUM CHLORIDE, PRESERVATIVE FREE 10 ML: 5 INJECTION INTRAVENOUS at 21:19

## 2023-07-26 RX ADMIN — Medication 1 AMPULE: at 08:21

## 2023-07-26 RX ADMIN — SODIUM CHLORIDE, PRESERVATIVE FREE 10 ML: 5 INJECTION INTRAVENOUS at 08:22

## 2023-07-26 RX ADMIN — ACETAMINOPHEN 650 MG: 325 TABLET ORAL at 09:30

## 2023-07-26 RX ADMIN — PREDNISONE 10 MG: 5 TABLET ORAL at 08:21

## 2023-07-26 RX ADMIN — Medication: at 21:19

## 2023-07-26 RX ADMIN — Medication 1 AMPULE: at 21:17

## 2023-07-26 RX ADMIN — HEPARIN SODIUM 5000 UNITS: 5000 INJECTION INTRAVENOUS; SUBCUTANEOUS at 06:31

## 2023-07-26 RX ADMIN — METOPROLOL TARTRATE 12.5 MG: 25 TABLET, FILM COATED ORAL at 21:17

## 2023-07-26 RX ADMIN — HEPARIN SODIUM 5000 UNITS: 5000 INJECTION INTRAVENOUS; SUBCUTANEOUS at 22:04

## 2023-07-26 RX ADMIN — HEPARIN SODIUM 5000 UNITS: 5000 INJECTION INTRAVENOUS; SUBCUTANEOUS at 13:37

## 2023-07-26 RX ADMIN — FAMOTIDINE 20 MG: 20 TABLET, FILM COATED ORAL at 08:21

## 2023-07-26 RX ADMIN — METOPROLOL TARTRATE 12.5 MG: 25 TABLET, FILM COATED ORAL at 13:37

## 2023-07-26 RX ADMIN — LATANOPROST 1 DROP: 50 SOLUTION OPHTHALMIC at 21:19

## 2023-07-26 RX ADMIN — ACETAMINOPHEN 650 MG: 325 TABLET ORAL at 21:18

## 2023-07-26 RX ADMIN — Medication: at 08:22

## 2023-07-26 ASSESSMENT — PAIN SCALES - GENERAL
PAINLEVEL_OUTOF10: 6
PAINLEVEL_OUTOF10: 6
PAINLEVEL_OUTOF10: 2
PAINLEVEL_OUTOF10: 0
PAINLEVEL_OUTOF10: 4

## 2023-07-26 ASSESSMENT — PAIN DESCRIPTION - ORIENTATION: ORIENTATION: ANTERIOR

## 2023-07-26 ASSESSMENT — PAIN DESCRIPTION - DESCRIPTORS: DESCRIPTORS: ACHING

## 2023-07-26 ASSESSMENT — PAIN DESCRIPTION - LOCATION
LOCATION: HEAD
LOCATION: HEAD

## 2023-07-26 ASSESSMENT — PAIN - FUNCTIONAL ASSESSMENT: PAIN_FUNCTIONAL_ASSESSMENT: ACTIVITIES ARE NOT PREVENTED

## 2023-07-26 NOTE — PROGRESS NOTES
mg/dL    BUN 62 (H) 6 - 20 MG/DL    Creatinine 8.71 (H) 0.70 - 1.30 MG/DL    Bun/Cre Ratio 7 (L) 12 - 20      Est, Glom Filt Rate 6 (L) >60 ml/min/1.73m2    Calcium 7.5 (L) 8.5 - 10.1 MG/DL   CBC with Auto Differential    Collection Time: 07/26/23  3:39 AM   Result Value Ref Range    WBC 7.1 4.1 - 11.1 K/uL    RBC 3.74 (L) 4.10 - 5.70 M/uL    Hemoglobin 12.0 (L) 12.1 - 17.0 g/dL    Hematocrit 36.1 (L) 36.6 - 50.3 %    MCV 96.5 80.0 - 99.0 FL    MCH 32.1 26.0 - 34.0 PG    MCHC 33.2 30.0 - 36.5 g/dL    RDW 15.0 (H) 11.5 - 14.5 %    Platelets 846 179 - 183 K/uL    MPV 12.0 8.9 - 12.9 FL    Nucleated RBCs 0.0 0  WBC    nRBC 0.00 0.00 - 0.01 K/uL    Neutrophils % 51 32 - 75 %    Lymphocytes % 22 12 - 49 %    Monocytes % 13 5 - 13 %    Eosinophils % 12 (H) 0 - 7 %    Basophils % 2 (H) 0 - 1 %    Immature Granulocytes 0 0.0 - 0.5 %    Neutrophils Absolute 3.7 1.8 - 8.0 K/UL    Lymphocytes Absolute 1.6 0.8 - 3.5 K/UL    Monocytes Absolute 0.9 0.0 - 1.0 K/UL    Eosinophils Absolute 0.8 (H) 0.0 - 0.4 K/UL    Basophils Absolute 0.1 0.0 - 0.1 K/UL    Absolute Immature Granulocyte 0.0 0.00 - 0.04 K/UL    Differential Type AUTOMATED     Basic Metabolic Panel    Collection Time: 07/26/23  3:41 AM   Result Value Ref Range    Sodium 133 (L) 136 - 145 mmol/L    Potassium 5.2 (H) 3.5 - 5.1 mmol/L    Chloride 95 (L) 97 - 108 mmol/L    CO2 27 21 - 32 mmol/L    Anion Gap 11 5 - 15 mmol/L    Glucose 97 65 - 100 mg/dL    BUN 60 (H) 6 - 20 MG/DL    Creatinine 8.60 (H) 0.70 - 1.30 MG/DL    Bun/Cre Ratio 7 (L) 12 - 20      Est, Glom Filt Rate 6 (L) >60 ml/min/1.73m2    Calcium 7.4 (L) 8.5 - 10.1 MG/DL        No intake or output data in the 24 hours ending 07/26/23 1318         Data Review:   Recent Labs     07/26/23  0339 07/26/23  0341   * 133*   K 5.2* 5.2*   BUN 62* 60*   CREATININE 8.71* 8.60*   WBC 7.1  --    HGB 12.0*  --    HCT 36.1*  --      --          No current facility-administered medications on file prior to extended release tablet Take 2 tablets by mouth daily (Patient not taking: Reported on 7/25/2023)      sevelamer (RENVELA) 800 MG tablet Take 2 tablets by mouth 3 times daily (with meals)      traZODone (DESYREL) 50 MG tablet Take 1 tablet by mouth nightly (Patient not taking: Reported on 7/25/2023)          Care Plan discussed with:  Patient x    Family  x    RN     Care Manager        Hospitalist    Consultant:            Comments   >50% of visit spent in counseling and coordination of care           Signed By: Kenny Leija MD     July 26, 2023       This dictation was done by dragon, computer voice recognition software. Often unanticipated grammatical, syntax, phones and other interpretive errors are inadvertently transcribed. Please excuse errors that have escaped final proofreading. Please contact me if you suspect dictation or transcription errors.       Dr Gutiérrez [place] No- [phone]  [address]  [address]    Fax: [phone]

## 2023-07-26 NOTE — PLAN OF CARE
Problem: Discharge Planning  Goal: Discharge to home or other facility with appropriate resources  Outcome: Progressing     Problem: Safety - Adult  Goal: Free from fall injury  Outcome: Progressing     Problem: Neurosensory - Adult  Goal: Achieves stable or improved neurological status  Outcome: Progressing  Goal: Absence of seizures  Outcome: Progressing  Goal: Remains free of injury related to seizures activity  Outcome: Progressing  Goal: Achieves maximal functionality and self care  Outcome: Progressing     Problem: Respiratory - Adult  Goal: Achieves optimal ventilation and oxygenation  Outcome: Progressing     Problem: Cardiovascular - Adult  Goal: Maintains optimal cardiac output and hemodynamic stability  Outcome: Progressing  Flowsheets (Taken 7/25/2023 1953 by Gil Mcpherson RN)  Maintains optimal cardiac output and hemodynamic stability: Monitor blood pressure and heart rate  Goal: Absence of cardiac dysrhythmias or at baseline  Outcome: Progressing     Problem: Musculoskeletal - Adult  Goal: Return mobility to safest level of function  Outcome: Progressing  Flowsheets (Taken 7/25/2023 1953 by Gil Mcpherson RN)  Return Mobility to Safest Level of Function: Assess patient stability and activity tolerance for standing, transferring and ambulating with or without assistive devices  Goal: Maintain proper alignment of affected body part  Outcome: Progressing  Goal: Return ADL status to a safe level of function  Outcome: Progressing     Problem: Chronic Conditions and Co-morbidities  Goal: Patient's chronic conditions and co-morbidity symptoms are monitored and maintained or improved  Outcome: Progressing     Problem: Pain  Goal: Verbalizes/displays adequate comfort level or baseline comfort level  Outcome: Progressing

## 2023-07-26 NOTE — PROGRESS NOTES
..End of Shift Note    Bedside shift change report given to Yasmin Ferrari RN (oncoming nurse) by Aixa Galindo RN (offgoing nurse). Report included the following information SBAR    Shift worked:  0700 - 1900     Shift summary and any significant changes:    Pt. Tolerated all medication w/o issue. Completed treatment with dialysis during day shift. Neurology input note during day shift. Notified MD Stella Ellis that daughter would like to speak to him. Concerns for physician to address: No     Zone phone for oncoming shift:  No       Activity:     Number times ambulated in hallways past shift: 0  Number of times OOB to chair past shift: 2    Cardiac:   Cardiac Monitoring: Yes           Access:  Current line(s): PIV     Genitourinary:   Urinary status: voiding    Respiratory:      Chronic home O2 use?: NO  Incentive spirometer at bedside: No       GI:     Current diet:  ADULT DIET; Regular; 4 carb choices (60 gm/meal); Low Fat/Low Chol/High Fiber/MALLORIE; Low Sodium (2 gm); Low Potassium (Less than 3000 mg/day);  Low Phosphorus (Less than 1000 mg)  ADULT ORAL NUTRITION SUPPLEMENT; Breakfast, Dinner; Clear Liquid Oral Supplement  ADULT ORAL NUTRITION SUPPLEMENT; Lunch, Dinner; Renal Oral Supplement  Passing flatus: YES  Tolerating current diet: YES       Pain Management:   Patient states pain is manageable on current regimen: YES    Skin:     Interventions: increase time out of bed    Patient Safety:  Fall Score:    Interventions: bed/chair alarm       Length of Stay:  Expected LOS: 3  Actual LOS: 1      Aixa Galindo RN

## 2023-07-26 NOTE — PROGRESS NOTES
End of Shift Note    Bedside shift change report given to [facility] Lucho (oncoming nurse) by Rich Wheat RN (offgoing nurse). Report included the following information SBAR, Kardex, MAR, Recent Results, and Cardiac Rhythm Sinus Rhythm with occasional PVCs    Shift worked:  7 am to 7:30 pm     Shift summary and any significant changes:     Patient admitted from CCU at 1730. Admission assessment, vitals and dual skin completed. Patient had back pain during shift; 1 dose PRN Tylenol administered, see MAR. Patient 1 assist to bathroom/bedside commode; urinal at bedside. IV flushed and patent. Left AV fistula positive bruit and thrill. Patient's family at bedside during shift. Nursing rounds and education completed. Concerns for physician to address:       Zone phone for oncoming shift:          Activity:     Number times ambulated in hallways past shift: 0  Number of times OOB to chair past shift: 0    Cardiac:   Cardiac Monitoring: Yes           Access:  Current line(s): PIV     Genitourinary:   Urinary status: voiding    Respiratory:      Chronic home O2 use?: NO  Incentive spirometer at bedside: NO       GI:     Current diet:  ADULT DIET; Regular; 4 carb choices (60 gm/meal); Low Fat/Low Chol/High Fiber/MALLORIE; Low Sodium (2 gm); Low Potassium (Less than 3000 mg/day); Low Phosphorus (Less than 1000 mg)  ADULT ORAL NUTRITION SUPPLEMENT; Breakfast, Dinner; Clear Liquid Oral Supplement  ADULT ORAL NUTRITION SUPPLEMENT; Lunch, Dinner; Renal Oral Supplement  Passing flatus: YES  Tolerating current diet: YES       Pain Management:   Patient states pain is manageable on current regimen: YES    Skin:     Interventions: float heels and increase time out of bed    Patient Safety:  Fall Score:    Interventions: assistive device (walker, cane.  etc), gripper socks, and pt to call before getting OOB       Length of Stay:  Expected LOS: 3  Actual LOS: 0      Rich Wheat RN

## 2023-07-26 NOTE — PROGRESS NOTES
End of Shift Note    Bedside shift change report given to Ed RN (oncoming nurse) by Barbra Helm RN (offgoing nurse). Report included the following information SBAR, Kardex, Intake/Output, MAR, Recent Results, and Cardiac Rhythm sinus rhythm w/ PVC's    Shift worked:  7p-7a     Shift summary and any significant changes:     Patient tolerated care. Scheduled meds given. PRN Tylenol given. Pt up the bathroom. Pt clean up with wash cloth and soap. Lab work drawn.         Length of Stay:  Expected LOS: 3  Actual LOS: 1      Barbra Helm, RN

## 2023-07-26 NOTE — PROGRESS NOTES
Physical Therapy:  Orders received, chart reviewed. PT evaluation attempted, patient politely declining. Reports he has been sitting up for a few hours and his hip started to hurt so he just returned to bed. C/o fatigue following HD and requesting therapist to return tomorrow. Will defer per patient request and re-attempt tomorrow.     April Sarah Leija PT, DPT

## 2023-07-26 NOTE — PROGRESS NOTES
auscultation  Heart:  no murmurs, regular rate  Lower extremity: peripheral pulses palpable and no edema  Skin: intact    Neurological exam:    Awake, alert, oriented to person, place and time  Recent and remote memory were normal  Attention and concentration were intact  Language was intact. There was no aphasia  Speech: no dysarthria  Fund of knowledge was preserved    Cranial nerves:   II-XII were tested    Perrrla  Visual fields were full  Eomi, no evidence of nystagmus  Facial sensation:  normal and symmetric  Facial motor: normal and symmetric  Hearing intact  SCM strength intact  Tongue: midline without fasciculations    Motor: Tone normal    No evidence of fasciculations    Strength testing:   deltoid triceps biceps Wrist ext. Wrist flex. intrinsics Hip flex. Hip ext. Knee ext. Knee flex Dorsi flex Plantar flex   Right 5 5 5 5 5 5 4+ 4 5 5 5 5   Left 5 5 5 5 5 5 4+ 4 5 5 5 5     He does have mild proximal lower extremity weakness. A bit stronger today    Sensory:  Upper extremity: intact to pp, light touch, and vibration > 10 seconds  Lower extremity: Vibration was absent at his toes and present for 2 seconds at his ankles. It is present for 7 seconds at his knees. Pinprick was decreased to mid shin bilaterally    Reflexes:    Right Left  Biceps  2 2  Triceps 2 2  Brachiorad. 2 2  Patella  1 1  Achilles - -    Plantar response:  flexor bilaterally    Cerebellar testing:  no tremor apparent    Labs:     Lab Results   Component Value Date/Time     07/26/2023 03:41 AM    K 5.2 07/26/2023 03:41 AM    CL 95 07/26/2023 03:41 AM    BUN 60 07/26/2023 03:41 AM    WBC 7.1 07/26/2023 03:39 AM    HCT 36.1 07/26/2023 03:39 AM    HGB 12.0 07/26/2023 03:39 AM     07/26/2023 03:39 AM              Principal Problem:    Hyperkalemia  Active Problems:    Seizure ([address])    Long term (current) use of systemic steroids    Weakness generalized  Resolved Problems:    * No resolved hospital problems.  *      I spent 35   minutes providing care to this  acutely ill inpatient with > 50% of the time counseling and assisting in the coordination of care of the patient on the patient's hospital floor/unit.                Signed By:  Trinity Yañez DO FAAN    July 26, 2023

## 2023-07-26 NOTE — CARE COORDINATION
Care Management Initial Assessment       RUR: 16% \"moderate risk\"  Readmission? No  1st IM letter given? Yes   1st  letter given: No     Initial note: Chart reviewed for updates. CM met with pt at bedside, introduced role, confirmed demographics were up-to-date and discussed d/c planning. Pt lives with a female friend Leann Rouse) in a one level home with 1 step before getting into the front door. Pt is independent with ADL's at home and uses a cane to ambulate. Pt does not have a history of inpatient and outpatient rehab. Pt also denied history of HH. Pt uses [facility] pharmacy. Pt was driving prior admission. Pt's friend Leann Rouse will provide transport at d/c. Complete assessment is below:     07/26/23 8009   Service Assessment   Patient Orientation Alert and Oriented   Cognition Alert   History Provided By Patient   Primary Caregiver Self   Accompanied By/Relationship N/A   [address]   Patient's Healthcare Decision Maker is: Legal Next of Kin  (Pt's daughter London Persaud [phone])   Prior Functional Level Independent in ADLs/IADLs   Current Functional Level Independent in ADLs/IADLs   Can patient return to prior living arrangement Yes   Ability to make needs known: Good   Family able to assist with home care needs: Yes   Would you like for me to discuss the discharge plan with any other family members/significant others, and if so, who?  No  (Daughter)   Financial Resources Medicare   Community Resources None   Social/Functional History   Lives With Friend(s)   Type of [facility]  One level   Entrance Stairs - Number of Steps 1   Home Equipment [employer]   ADL Assistance Independent   Homemaking Assistance Independent   Ambulation Assistance Independent   Transfer Assistance Independent   Active  Yes   Discharge Planning   Type of [address] Friends   Current Services Prior To Admission Durable Medical Equipment   Current DME Prior to [employer]   Patient expects to be discharged to: [facility]    [phone]

## 2023-07-27 VITALS
HEIGHT: 72 IN | WEIGHT: 150.35 LBS | OXYGEN SATURATION: 100 % | SYSTOLIC BLOOD PRESSURE: 109 MMHG | HEART RATE: 69 BPM | TEMPERATURE: 97.5 F | RESPIRATION RATE: 13 BRPM | DIASTOLIC BLOOD PRESSURE: 72 MMHG | BODY MASS INDEX: 20.36 KG/M2

## 2023-07-27 LAB
ANION GAP SERPL CALC-SCNC: 6 MMOL/L (ref 5–15)
BASOPHILS # BLD: 0.1 K/UL (ref 0–0.1)
BASOPHILS NFR BLD: 2 % (ref 0–1)
BUN SERPL-MCNC: 44 MG/DL (ref 6–20)
BUN/CREAT SERPL: 7 (ref 12–20)
CALCIUM SERPL-MCNC: 7.9 MG/DL (ref 8.5–10.1)
CHLORIDE SERPL-SCNC: 95 MMOL/L (ref 97–108)
CO2 SERPL-SCNC: 31 MMOL/L (ref 21–32)
CREAT SERPL-MCNC: 6.43 MG/DL (ref 0.7–1.3)
DIFFERENTIAL METHOD BLD: ABNORMAL
EOSINOPHIL # BLD: 1 K/UL (ref 0–0.4)
EOSINOPHIL NFR BLD: 14 % (ref 0–7)
ERYTHROCYTE [DISTWIDTH] IN BLOOD BY AUTOMATED COUNT: 14.8 % (ref 11.5–14.5)
GLUCOSE SERPL-MCNC: 94 MG/DL (ref 65–100)
HCT VFR BLD AUTO: 40.2 % (ref 36.6–50.3)
HGB BLD-MCNC: 13.5 G/DL (ref 12.1–17)
IMM GRANULOCYTES # BLD AUTO: 0 K/UL (ref 0–0.04)
IMM GRANULOCYTES NFR BLD AUTO: 0 % (ref 0–0.5)
LYMPHOCYTES # BLD: 1.7 K/UL (ref 0.8–3.5)
LYMPHOCYTES NFR BLD: 23 % (ref 12–49)
MAGNESIUM SERPL-MCNC: 2.3 MG/DL (ref 1.6–2.4)
MCH RBC QN AUTO: 32.1 PG (ref 26–34)
MCHC RBC AUTO-ENTMCNC: 33.6 G/DL (ref 30–36.5)
MCV RBC AUTO: 95.7 FL (ref 80–99)
MONOCYTES # BLD: 1 K/UL (ref 0–1)
MONOCYTES NFR BLD: 14 % (ref 5–13)
NEUTS SEG # BLD: 3.6 K/UL (ref 1.8–8)
NEUTS SEG NFR BLD: 47 % (ref 32–75)
NRBC # BLD: 0 K/UL (ref 0–0.01)
NRBC BLD-RTO: 0 PER 100 WBC
PHOSPHATE SERPL-MCNC: 5.6 MG/DL (ref 2.6–4.7)
PLATELET # BLD AUTO: 204 K/UL (ref 150–400)
PMV BLD AUTO: 11.9 FL (ref 8.9–12.9)
POTASSIUM SERPL-SCNC: 4.4 MMOL/L (ref 3.5–5.1)
RBC # BLD AUTO: 4.2 M/UL (ref 4.1–5.7)
RBC MORPH BLD: ABNORMAL
SODIUM SERPL-SCNC: 132 MMOL/L (ref 136–145)
WBC # BLD AUTO: 7.4 K/UL (ref 4.1–11.1)

## 2023-07-27 PROCEDURE — 97161 PT EVAL LOW COMPLEX 20 MIN: CPT

## 2023-07-27 PROCEDURE — 6370000000 HC RX 637 (ALT 250 FOR IP): Performed by: NURSE PRACTITIONER

## 2023-07-27 PROCEDURE — 2580000003 HC RX 258: Performed by: NURSE PRACTITIONER

## 2023-07-27 PROCEDURE — 80048 BASIC METABOLIC PNL TOTAL CA: CPT

## 2023-07-27 PROCEDURE — 90935 HEMODIALYSIS ONE EVALUATION: CPT

## 2023-07-27 PROCEDURE — 6370000000 HC RX 637 (ALT 250 FOR IP): Performed by: INTERNAL MEDICINE

## 2023-07-27 PROCEDURE — 83735 ASSAY OF MAGNESIUM: CPT

## 2023-07-27 PROCEDURE — 85025 COMPLETE CBC W/AUTO DIFF WBC: CPT

## 2023-07-27 PROCEDURE — 6360000002 HC RX W HCPCS: Performed by: NURSE PRACTITIONER

## 2023-07-27 PROCEDURE — 6370000000 HC RX 637 (ALT 250 FOR IP): Performed by: STUDENT IN AN ORGANIZED HEALTH CARE EDUCATION/TRAINING PROGRAM

## 2023-07-27 PROCEDURE — 97530 THERAPEUTIC ACTIVITIES: CPT

## 2023-07-27 PROCEDURE — 36415 COLL VENOUS BLD VENIPUNCTURE: CPT

## 2023-07-27 PROCEDURE — 84100 ASSAY OF PHOSPHORUS: CPT

## 2023-07-27 RX ORDER — LOPERAMIDE HYDROCHLORIDE 2 MG/1
2 CAPSULE ORAL 4 TIMES DAILY PRN
Status: DISCONTINUED | OUTPATIENT
Start: 2023-07-27 | End: 2023-07-27 | Stop reason: HOSPADM

## 2023-07-27 RX ADMIN — METOPROLOL TARTRATE 12.5 MG: 25 TABLET, FILM COATED ORAL at 12:28

## 2023-07-27 RX ADMIN — ACETAMINOPHEN 650 MG: 325 TABLET ORAL at 12:28

## 2023-07-27 RX ADMIN — FAMOTIDINE 20 MG: 20 TABLET, FILM COATED ORAL at 12:41

## 2023-07-27 RX ADMIN — Medication 1 AMPULE: at 12:28

## 2023-07-27 RX ADMIN — LOPERAMIDE HYDROCHLORIDE 2 MG: 2 CAPSULE ORAL at 14:13

## 2023-07-27 RX ADMIN — HEPARIN SODIUM 5000 UNITS: 5000 INJECTION INTRAVENOUS; SUBCUTANEOUS at 14:13

## 2023-07-27 RX ADMIN — PREDNISONE 10 MG: 5 TABLET ORAL at 12:29

## 2023-07-27 RX ADMIN — SODIUM CHLORIDE, PRESERVATIVE FREE 10 ML: 5 INJECTION INTRAVENOUS at 12:29

## 2023-07-27 RX ADMIN — HEPARIN SODIUM 5000 UNITS: 5000 INJECTION INTRAVENOUS; SUBCUTANEOUS at 06:48

## 2023-07-27 RX ADMIN — Medication: at 12:30

## 2023-07-27 ASSESSMENT — PAIN SCALES - GENERAL
PAINLEVEL_OUTOF10: 3
PAINLEVEL_OUTOF10: 0

## 2023-07-27 ASSESSMENT — PAIN DESCRIPTION - LOCATION: LOCATION: HEAD

## 2023-07-27 NOTE — PROGRESS NOTES
PCP hospital follow-up transitional care appointment has been scheduled with YUNIEL Mckeon on 8/1/23 1100. Pending patient discharge.   Estephania Chua, Care Management Assistant

## 2023-07-27 NOTE — DISCHARGE INSTRUCTIONS
All of your medications from before your hospitalization are the same EXCEPT:  There were several medications that you stated you were not taking on admission. Please follow up with your PCP or your cardiologist to make sure you are on the correct regimen as prescribed. Your new prescription for you to start is metoprolol for your heart health. Please take all of your medications as prescribed. If prescribed any medications, please read all pharmacy instructions and inserts. Inform your doctor and pharmacist about all other medications and alternative therapies. Please follow up with your PCP in 1-2 weeks to be reassessed after your hospital stay. Please also follow up with cardiology and nephrology. If you start feeling any symptoms similar to what brought you into the hospital, please come back to the ED to be re-evaluated.

## 2023-07-27 NOTE — PROGRESS NOTES
Physical therapy    Chart reviewed. Attempted to see pt for PT evaluation, however pt is currently [facility] for HD.   Will defer and continue to follow,

## 2023-07-27 NOTE — PROGRESS NOTES
End of Shift Note    Bedside shift change report given to Ed RN (oncoming nurse) by Kanika Dotson RN (offgoing nurse). Report included the following information SBAR, Kardex, MAR, and Cardiac Rhythm sin    Shift worked:  7p-7a     Shift summary and any significant changes:     Patient tolerated care. Scheduled meds given. PRN Tylenol given. Pt up the bathroom.   Labs drawn       Length of Stay:  Expected LOS: 3  Actual LOS: 2      Kanika Dotson, RN

## 2023-07-27 NOTE — CARE COORDINATION
07/27/23 1120   Discharge Planning   Patient expects to be discharged to: [facility] Discharge   Transition of Care Consult (CM Consult) [facility] Discharge None   [facility] Information Provided? No   Mode of Transport at Discharge Other (see comment)  (family to transport)   Confirm Follow Up Transport Family   Condition of Participation: Discharge Planning   The Patient and/or Patient Representative was provided with a Choice of Provider? Patient   The Patient and/Or Patient Representative agree with the Discharge Plan? No   Freedom of Choice list was provided with basic dialogue that supports the patient's individualized plan of care/goals, treatment preferences, and shares the quality data associated with the providers? No     CM aware that pt has been cleared by Nephro. Pt will d/c on today and transition home with family support and cont'd dialysis. Pt will d/c late afternoon after dialysis is completed. Pts family will transport home at the time of d/c,-late afternoon. CM completed the need of the pt at this time.     PALMER Tee   [phone]

## 2023-07-27 NOTE — PROGRESS NOTES
.. DISCHARGE NOTE FROM [facility] NURSE    Patient determined to be stable for discharge by attending provider. I have reviewed the discharge instructions and follow-up appointments with the Patient. They verbalized understanding and all questions were answered to their satisfaction. No complaints or further questions were expressed. Hard scripts for medications given to patient Appropriate educational materials and medication side effect teaching were provided. PIV were removed prior to discharge. Patient did not discharge with any line, mayen, or drain. All personal items collected during admission were returned to the patient prior to discharge.     Post-op patient: No Merry Spurling, RN

## 2023-07-27 NOTE — PROGRESS NOTES
MG/DL    Bun/Cre Ratio 7 (L) 12 - 20      Est, Glom Filt Rate 8 (L) >60 ml/min/1.73m2    Calcium 7.9 (L) 8.5 - 10.1 MG/DL   CBC with Auto Differential    Collection Time: 07/27/23  2:34 AM   Result Value Ref Range    WBC 7.4 4.1 - 11.1 K/uL    RBC 4.20 4. 10 - 5.70 M/uL    Hemoglobin 13.5 12.1 - 17.0 g/dL    Hematocrit 40.2 36.6 - 50.3 %    MCV 95.7 80.0 - 99.0 FL    MCH 32.1 26.0 - 34.0 PG    MCHC 33.6 30.0 - 36.5 g/dL    RDW 14.8 (H) 11.5 - 14.5 %    Platelets 964 741 - 029 K/uL    MPV 11.9 8.9 - 12.9 FL    Nucleated RBCs 0.0 0  WBC    nRBC 0.00 0.00 - 0.01 K/uL    Neutrophils % 47 32 - 75 %    Lymphocytes % 23 12 - 49 %    Monocytes % 14 (H) 5 - 13 %    Eosinophils % 14 (H) 0 - 7 %    Basophils % 2 (H) 0 - 1 %    Immature Granulocytes 0 0.0 - 0.5 %    Neutrophils Absolute 3.6 1.8 - 8.0 K/UL    Lymphocytes Absolute 1.7 0.8 - 3.5 K/UL    Monocytes Absolute 1.0 0.0 - 1.0 K/UL    Eosinophils Absolute 1.0 (H) 0.0 - 0.4 K/UL    Basophils Absolute 0.1 0.0 - 0.1 K/UL    Absolute Immature Granulocyte 0.0 0.00 - 0.04 K/UL    Differential Type SMEAR SCANNED      RBC Comment NORMOCYTIC, NORMOCHROMIC            Intake/Output Summary (Last 24 hours) at 7/27/2023 1208  Last data filed at 7/27/2023 1138  Gross per 24 hour   Intake 1000 ml   Output 4500 ml   Net -3500 ml            Data Review:   Recent Labs     07/27/23  0234   *   K 4.4   BUN 44*   CREATININE 6.43*   WBC 7.4   HGB 13.5   HCT 40.2            No current facility-administered medications on file prior to encounter.      Current Outpatient Medications on File Prior to Encounter   Medication Sig Dispense Refill    montelukast (SINGULAIR) 10 MG tablet TAKE 1 TABLET BY MOUTH EVERY DAY 90 tablet 1    gabapentin (NEURONTIN) 600 MG tablet Take 2 tablets every 8 hours 180 tablet 2    omeprazole (PRILOSEC) 40 MG delayed release capsule Take 1 capsule by mouth 2 times daily (Patient not taking: Reported on 7/25/2023) 90 capsule 1    predniSONE (Ge Biju) 10 MG tablet TAKE 1 TABLET BY MOUTH EVERY DAY 90 tablet 0    ferrous sulfate (IRON 325) 325 (65 Fe) MG tablet Take 1 tablet by mouth every morning (before breakfast)      albuterol sulfate HFA (PROVENTIL;VENTOLIN;PROAIR) 108 (90 Base) MCG/ACT inhaler INHALE 2 PUFFS BY INHALATION EVERY 4 HOURS AS NEEDED FOR WHEEZING. calcium acetate (PHOSLO) 667 MG CAPS capsule Take 1 capsule by mouth 3 times daily (with meals)      diclofenac sodium (VOLTAREN) 1 % GEL Apply to affected areas up to 3 times daily as needed for pain      ergocalciferol (ERGOCALCIFEROL) 1.25 MG (10750 UT) capsule TAKE 1 CAPSULE BY MOUTH WEEKLY      famotidine (PEPCID) 20 MG tablet Take 1 tablet by mouth 2 times daily (Patient not taking: Reported on 7/25/2023)      hydrOXYzine pamoate (VISTARIL) 25 MG capsule TAKE 1 CAP BY MOUTH THREE (3) TIMES DAILY AS NEEDED FOR ITCHING. albuterol-ipratropium (COMBIVENT RESPIMAT)  MCG/ACT AERS inhaler Inhale 1 puff into the lungs 4 times daily (Patient not taking: Reported on 7/25/2023)      latanoprost (XALATAN) 0.005 % ophthalmic solution INSTILL 1 DROP IN BOTH EYES AT BEDTIME      Latanoprostene Bunod 0.024 % SOLN Apply to eye nightly      lidocaine (LIDODERM) 5 % Apply patch to the affected area for 12 hours a day and remove for 12 hours a day. NIFEdipine (ADALAT CC) 60 MG extended release tablet Take 2 tablets by mouth daily (Patient not taking: Reported on 7/25/2023)      sevelamer (RENVELA) 800 MG tablet Take 2 tablets by mouth 3 times daily (with meals)          Care Plan discussed with:  Patient x    Family  x    RN     Care Manager        Hospitalist    Consultant:            Comments   >50% of visit spent in counseling and coordination of care           Signed By: Lulu Rodriguez MD     July 27, 2023       This dictation was done by dragon, computer voice recognition software.   Often unanticipated grammatical, syntax, phones and other interpretive errors are inadvertently

## 2023-07-27 NOTE — DISCHARGE SUMMARY
Discharge Summary    Name: Hakeem Lopez  [id number]  YOB: 1945 (Age: 66 y.o.)   Date of Admission: 7/24/2023  Date of Discharge: 7/27/2023  Attending Physician: Elodia Rashid MD    Discharge Diagnosis:     Acute hypoxic respiratory failure  Hyperkalemia  Hypocalcemia  ESRD on HD Tuesday Thursday and Saturday  HFrEF 31-67%  Acute metabolic encephalopathy  Seizure  Generalized weakness    Consultations:  IP CONSULT TO NEUROLOGY and nephrology      Brief Admission History/Reason for Admission Per Zulema Good MD:     65 y/o M pt with pmh of ESRD on HD T, TH, sat, HFrEF 15-20%, HTN, Asthma, RA, chronic pain presented to [facility] [place] for bilateral lower extremity weakness and fatigue. Upon arrival to [facility] [place] lab work significant for a K of 8.7, , Cr 12.46. Given calcium, insulin/dextrose, bicarb, albuterol and transfer to [facility] initiated for emergent HD. While at [facility] [place] had witnessed tonic clonic seizure. Given 2 mg ativan with cessation of seizure activity. Post ictal post seizure. Head CT done since pt has no hx of seizures and was negative for acute pathology. Transferred to [facility] for HD and admission. Upon arrival to [facility], pt HDS, SPO2 in the 80s, placed on 4 L NC with some improvement. ABG done= 7.38/46/59/89/27. Placed on midflow 15 LPM with improvement in SPO2 to 97%. HD started and ICU consulted for admission. Upon my arrival pt still altered. Opens eyes to voice, moves all extremities, and follows simple commands but lethargic. HDS, HR 60's. Wife and daughters and bedside. Will be admitted to the ICU for further management. Brief Hospital Course by Main Problems:     Acute hypoxic respiratory failure  Hyperkalemia  Hypocalcemia  ESRD on HD Tuesday Thursday and Saturday  HFrEF 15-20%  Status post emergent hemodialysis with improvement of potassium.   H/o non-adherence to prescribed dialysis treatment: Per nephro note \"Presented with severe

## 2023-07-27 NOTE — PLAN OF CARE
Problem: Discharge Planning  Goal: Discharge to home or other facility with appropriate resources  Outcome: Progressing     Problem: Discharge Planning  Goal: Discharge to home or other facility with appropriate resources  Outcome: Progressing     Problem: Safety - Adult  Goal: Free from fall injury  Outcome: Progressing     Problem: Neurosensory - Adult  Goal: Achieves stable or improved neurological status  Outcome: Progressing  Goal: Absence of seizures  Outcome: Progressing  Goal: Remains free of injury related to seizures activity  Outcome: Progressing  Goal: Achieves maximal functionality and self care  Outcome: Progressing     Problem: Respiratory - Adult  Goal: Achieves optimal ventilation and oxygenation  Outcome: Progressing     Problem: Cardiovascular - Adult  Goal: Maintains optimal cardiac output and hemodynamic stability  Outcome: Progressing  Goal: Absence of cardiac dysrhythmias or at baseline  Outcome: Progressing     Problem: Musculoskeletal - Adult  Goal: Return mobility to safest level of function  Outcome: Progressing  Flowsheets (Taken 7/26/2023 2012 by Kaushik Coronel RN)  Return Mobility to Safest Level of Function: Assess patient stability and activity tolerance for standing, transferring and ambulating with or without assistive devices  Goal: Maintain proper alignment of affected body part  Outcome: Progressing  Goal: Return ADL status to a safe level of function  Outcome: Progressing     Problem: Chronic Conditions and Co-morbidities  Goal: Patient's chronic conditions and co-morbidity symptoms are monitored and maintained or improved  Outcome: Progressing     Problem: Pain  Goal: Verbalizes/displays adequate comfort level or baseline comfort level  Outcome: Progressing  Flowsheets (Taken 7/26/2023 2200 by Kaushik Coronel RN)  Verbalizes/displays adequate comfort level or baseline comfort level: Encourage patient to monitor pain and request assistance

## 2023-07-31 ENCOUNTER — HOSPITAL ENCOUNTER (OUTPATIENT)
Facility: HOSPITAL | Age: 78
Discharge: HOME | End: 2023-08-03
Payer: MEDICARE

## 2023-07-31 DIAGNOSIS — M25.561 ACUTE PAIN OF BOTH KNEES: ICD-10-CM

## 2023-07-31 DIAGNOSIS — M25.562 ACUTE PAIN OF BOTH KNEES: ICD-10-CM

## 2023-07-31 PROCEDURE — 73564 X-RAY EXAM KNEE 4 OR MORE: CPT

## 2023-07-31 PROCEDURE — 73562 X-RAY EXAM OF KNEE 3: CPT

## 2023-08-15 ENCOUNTER — CLINICAL DOCUMENTATION (OUTPATIENT)
Age: 78
End: 2023-08-15

## 2023-08-15 RX ORDER — LOSARTAN POTASSIUM 25 MG/1
25 TABLET ORAL DAILY
COMMUNITY
End: 2023-08-15 | Stop reason: SDUPTHER

## 2023-08-15 RX ORDER — METOPROLOL SUCCINATE 25 MG/1
12.5 TABLET, EXTENDED RELEASE ORAL DAILY
Qty: 30 TABLET | Refills: 3
Start: 2023-08-15

## 2023-08-15 RX ORDER — LOSARTAN POTASSIUM 25 MG/1
TABLET ORAL
Qty: 30 TABLET | Refills: 5
Start: 2023-08-15

## 2023-08-15 NOTE — PROGRESS NOTES
Pt seen by cardiology Dr Taylor Rubio on 8-11-23 for cardiomyopathy, CHF, and HTN. 4/2023 = EF = 15-20%. He is wearing a Life Vest. Has been to ER with seizures. His Losartan was increased to 25mg daily on non-dialysis days and cont 12.5mg on dialysis days. Cont Toprol XR 12.5mg daily. Repeat ECHO and if still low, change LifeVest to ICD.  F/U 3 months

## 2023-08-18 ENCOUNTER — OFFICE VISIT (OUTPATIENT)
Age: 78
End: 2023-08-18

## 2023-08-18 VITALS
HEART RATE: 68 BPM | RESPIRATION RATE: 18 BRPM | OXYGEN SATURATION: 93 % | HEIGHT: 72 IN | DIASTOLIC BLOOD PRESSURE: 61 MMHG | SYSTOLIC BLOOD PRESSURE: 97 MMHG | WEIGHT: 142 LBS | TEMPERATURE: 98.1 F | BODY MASS INDEX: 19.23 KG/M2

## 2023-08-18 DIAGNOSIS — M05.79 RHEUMATOID ARTHRITIS INVOLVING MULTIPLE SITES WITH POSITIVE RHEUMATOID FACTOR (HCC): ICD-10-CM

## 2023-08-18 DIAGNOSIS — Z09 HOSPITAL DISCHARGE FOLLOW-UP: ICD-10-CM

## 2023-08-18 DIAGNOSIS — L30.9 DERMATITIS DUE TO UNKNOWN CAUSE: ICD-10-CM

## 2023-08-18 DIAGNOSIS — N28.89 LEFT RENAL MASS: ICD-10-CM

## 2023-08-18 DIAGNOSIS — I50.9 CHRONIC CONGESTIVE HEART FAILURE, UNSPECIFIED HEART FAILURE TYPE (HCC): Primary | ICD-10-CM

## 2023-08-18 DIAGNOSIS — M51.36 DDD (DEGENERATIVE DISC DISEASE), LUMBAR: ICD-10-CM

## 2023-08-18 RX ORDER — PREDNISONE 10 MG/1
TABLET ORAL
Qty: 21 TABLET | Refills: 0 | Status: SHIPPED | OUTPATIENT
Start: 2023-08-18

## 2023-08-18 SDOH — ECONOMIC STABILITY: FOOD INSECURITY: WITHIN THE PAST 12 MONTHS, THE FOOD YOU BOUGHT JUST DIDN'T LAST AND YOU DIDN'T HAVE MONEY TO GET MORE.: NEVER TRUE

## 2023-08-18 SDOH — ECONOMIC STABILITY: INCOME INSECURITY: HOW HARD IS IT FOR YOU TO PAY FOR THE VERY BASICS LIKE FOOD, HOUSING, MEDICAL CARE, AND HEATING?: NOT HARD AT ALL

## 2023-08-18 SDOH — ECONOMIC STABILITY: FOOD INSECURITY: WITHIN THE PAST 12 MONTHS, YOU WORRIED THAT YOUR FOOD WOULD RUN OUT BEFORE YOU GOT MONEY TO BUY MORE.: NEVER TRUE

## 2023-08-18 ASSESSMENT — ANXIETY QUESTIONNAIRES
7. FEELING AFRAID AS IF SOMETHING AWFUL MIGHT HAPPEN: 0
6. BECOMING EASILY ANNOYED OR IRRITABLE: 0
1. FEELING NERVOUS, ANXIOUS, OR ON EDGE: 0
2. NOT BEING ABLE TO STOP OR CONTROL WORRYING: 0
3. WORRYING TOO MUCH ABOUT DIFFERENT THINGS: 0
5. BEING SO RESTLESS THAT IT IS HARD TO SIT STILL: 0
GAD7 TOTAL SCORE: 0
4. TROUBLE RELAXING: 0
IF YOU CHECKED OFF ANY PROBLEMS ON THIS QUESTIONNAIRE, HOW DIFFICULT HAVE THESE PROBLEMS MADE IT FOR YOU TO DO YOUR WORK, TAKE CARE OF THINGS AT HOME, OR GET ALONG WITH OTHER PEOPLE: NOT DIFFICULT AT ALL

## 2023-08-18 ASSESSMENT — PATIENT HEALTH QUESTIONNAIRE - PHQ9
2. FEELING DOWN, DEPRESSED OR HOPELESS: 0
SUM OF ALL RESPONSES TO PHQ QUESTIONS 1-9: 0
1. LITTLE INTEREST OR PLEASURE IN DOING THINGS: 0
SUM OF ALL RESPONSES TO PHQ QUESTIONS 1-9: 0
SUM OF ALL RESPONSES TO PHQ QUESTIONS 1-9: 0
SUM OF ALL RESPONSES TO PHQ9 QUESTIONS 1 & 2: 0
SUM OF ALL RESPONSES TO PHQ QUESTIONS 1-9: 0

## 2023-08-18 NOTE — PROGRESS NOTES
Subjective:     CC: WILIAM Sinclair is a 66 y.o. male with ESRD, RA, and CHF who presents today for a WILIAM. He was first hospitalized at [facility] from 4/11 through 4/20/23 for a GI bleed r/t diverticulitis and H pylori infection. He was very anemic and required a blood transfusion. GI was consulted. His endoscopy and colonoscopy showed no active bleeding. He was started on protonix, flagyl, clarithromycin. He followed up with GI Dr Solomon Woodson on 6-23-23. Stool sample test of H pylori eradication was ordered. He continued to take his PPI. He was then hospitalized again at [facility] from 7/24 through 7/27 acute hypoxic respiratory failure, metabolic encephalopathy, and electrolyte disturbances. He had a tonic clonic seizure while at [facility]. His dialysis center reported that he is supposed to run on the machine for 3.5 hours per session but he usually cuts his treatments by more than an hour. His treatment time was increased to 4 hours. Ct of head was neg. He was given Ativan as needed for seizures. Neurology signed off. He was told to start Metoprolol 12.5mg BID. He then saw cardiology Dr Mono Javed on 8-11-23 for cardiomyopathy, CHF, and HTN. 4/2023 = EF = 15-20%. He is wearing a Life Vest. Has been to ER with seizures. His Losartan was increased to 25mg daily on non-dialysis days and cont 12.5mg on dialysis days. Cont Toprol XR 12.5mg daily. Repeat ECHO and if still low, change LifeVest to ICD. He will F/U in 3 months . Then on 8-1-23 he was again hospitalized at [facility] in [address] [facility] for. Imaging Results - CT angiogram abdomen pelvis w contrast (08/01/2023 1:54 PM EDT)  Impressions   08/01/2023 2:51 PM EDT    1. No active GI bleed identified. 2.  Very mild wall thickening of the rectosigmoid, question proctocolitis. 3.  Colonic diverticulosis without evidence of acute diverticulitis.   4.  Wall thickening of the urinary bladder related to cystitis or chronic urinary bladder outlet

## 2023-08-21 ENCOUNTER — HOSPITAL ENCOUNTER (EMERGENCY)
Facility: HOSPITAL | Age: 78
Discharge: HOME | End: 2023-08-21
Attending: EMERGENCY MEDICINE
Payer: MEDICARE

## 2023-08-21 VITALS
HEART RATE: 67 BPM | OXYGEN SATURATION: 96 % | DIASTOLIC BLOOD PRESSURE: 86 MMHG | WEIGHT: 145 LBS | SYSTOLIC BLOOD PRESSURE: 151 MMHG | RESPIRATION RATE: 15 BRPM | HEIGHT: 72 IN | BODY MASS INDEX: 19.64 KG/M2 | TEMPERATURE: 98.1 F

## 2023-08-21 DIAGNOSIS — B02.30 ZOSTER OPHTHALMICUS: Primary | ICD-10-CM

## 2023-08-21 PROCEDURE — 99283 EMERGENCY DEPT VISIT LOW MDM: CPT

## 2023-08-21 RX ORDER — VALACYCLOVIR HYDROCHLORIDE 1 G/1
500 TABLET, FILM COATED ORAL DAILY
Qty: 4 TABLET | Refills: 0 | Status: SHIPPED | OUTPATIENT
Start: 2023-08-21 | End: 2023-08-28

## 2023-08-21 ASSESSMENT — PAIN SCALES - GENERAL: PAINLEVEL_OUTOF10: 0

## 2023-08-21 ASSESSMENT — PAIN - FUNCTIONAL ASSESSMENT
PAIN_FUNCTIONAL_ASSESSMENT: 0-10
PAIN_FUNCTIONAL_ASSESSMENT: 0-10

## 2023-08-21 ASSESSMENT — LIFESTYLE VARIABLES
HOW OFTEN DO YOU HAVE A DRINK CONTAINING ALCOHOL: NEVER
HOW MANY STANDARD DRINKS CONTAINING ALCOHOL DO YOU HAVE ON A TYPICAL DAY: PATIENT DOES NOT DRINK

## 2023-08-21 NOTE — ED NOTES
Discharge instructions and medications reviewed with Pt. Pt has no questions at this time.       Brock Herndon RN  08/21/23 0174

## 2023-08-21 NOTE — ED TRIAGE NOTES
Pt arrived with c/o sores on the left side of his face that started last week, was seen and treated with a skin cream but he states it is not working.  He has no other complaints at this time

## 2023-08-23 ENCOUNTER — CLINICAL DOCUMENTATION (OUTPATIENT)
Age: 78
End: 2023-08-23

## 2023-08-23 ENCOUNTER — TELEPHONE (OUTPATIENT)
Age: 78
End: 2023-08-23

## 2023-08-23 DIAGNOSIS — N28.89 LEFT RENAL MASS: Primary | ICD-10-CM

## 2023-08-23 NOTE — TELEPHONE ENCOUNTER
Please let patient know that his kidney doctor is not able to do a biopsy of his kidney so he will need to see a urologist. I called around and a lot of them are booked out until October but [facility] urology at [facility] in [facility] said they would get him in soon after reviewing the records. They will be calling him to set up an appt so tell him to keep his phone nearby.  If he doesn't hear from them after a couple of weeks he can call them at [phone]

## 2023-08-23 NOTE — TELEPHONE ENCOUNTER
Valentina Ayala from Nephrology [phone] called and is asking to get this patient a referral to see a Neurologist [facility] did a CT Scan and has a kidney mass on his left kidney.

## 2023-08-24 ENCOUNTER — CLINICAL DOCUMENTATION (OUTPATIENT)
Age: 78
End: 2023-08-24

## 2023-08-24 NOTE — ED PROVIDER NOTES
EMERGENCY DEPARTMENT HISTORY AND PHYSICAL EXAM      Date: 8/21/2023  Patient Name: Kishan Galloway    History of Presenting Illness     Chief Complaint   Patient presents with    Skin Problem       History Provided By: Patient    HPI: Kishan Galloway, 66 y.o. male with PMHx as noted below presents emergency department chief complaint of rash, lesions to left side of his face. Patient states they have been there now for several days, painful/irritated. Pt denies any other alleviating or exacerbating factors. Additionally, pt specifically denies any recent fever, chills, headache, nausea, vomiting, abdominal pain, CP, SOB, lightheadedness, dizziness, numbness, weakness, BLE swelling, heart palpitations, urinary sxs, diarrhea, constipation, melena, hematochezia, cough, or congestion. PCP: ELVI Gerber NP    No current facility-administered medications for this encounter. Current Outpatient Medications   Medication Sig Dispense Refill    valACYclovir (VALTREX) 1 g tablet Take 0.5 tablets by mouth daily for 7 days 4 tablet 0    mupirocin (BACTROBAN) 2 % ointment Apply topically to rash 2 times daily x 1 week. 3 g 0    predniSONE (DELTASONE) 10 MG tablet take 6 pills today then take 1 less pill every day until gone 21 tablet 0    metoprolol succinate (TOPROL XL) 25 MG extended release tablet Take 0.5 tablets by mouth daily 30 tablet 3    losartan (COZAAR) 25 MG tablet Take 1/2 pill TIW on dialysis days and take 1 whole pill all other days 30 tablet 5    gabapentin (NEURONTIN) 600 MG tablet Take 2 tablets every 8 hours 180 tablet 2    predniSONE (DELTASONE) 10 MG tablet TAKE 1 TABLET BY MOUTH EVERY DAY 90 tablet 0    ferrous sulfate (IRON 325) 325 (65 Fe) MG tablet Take 1 tablet by mouth every morning (before breakfast)      albuterol sulfate HFA (PROVENTIL;VENTOLIN;PROAIR) 108 (90 Base) MCG/ACT inhaler INHALE 2 PUFFS BY INHALATION EVERY 4 HOURS AS NEEDED FOR WHEEZING.       calcium acetate (PHOSLO)

## 2023-08-28 ENCOUNTER — TELEPHONE (OUTPATIENT)
Age: 78
End: 2023-08-28

## 2023-08-28 DIAGNOSIS — B02.30 HERPES ZOSTER WITH OPHTHALMIC COMPLICATION, UNSPECIFIED HERPES ZOSTER EYE DISEASE: Primary | ICD-10-CM

## 2023-08-28 NOTE — TELEPHONE ENCOUNTER
Medication Refill Request    Gabrielle Sainz is requesting a refill of the following medication(s):   Valtrex  Please send refill to:     [facility]/pharmacy [address] [phone] Hulan Runner [phone]  [address] [address]  Phone: [phone] Fax: [phone]    Patient stated he has shingles and needs another dose.  Are you willing to refill

## 2023-08-29 RX ORDER — VALACYCLOVIR HYDROCHLORIDE 1 G/1
TABLET, FILM COATED ORAL
Qty: 4 TABLET | Refills: 0 | Status: SHIPPED | OUTPATIENT
Start: 2023-08-29

## 2023-09-06 ENCOUNTER — OFFICE VISIT (OUTPATIENT)
Age: 78
End: 2023-09-06
Payer: MEDICARE

## 2023-09-06 ENCOUNTER — TELEPHONE (OUTPATIENT)
Age: 78
End: 2023-09-06

## 2023-09-06 VITALS
HEART RATE: 63 BPM | HEIGHT: 72 IN | RESPIRATION RATE: 18 BRPM | WEIGHT: 145 LBS | OXYGEN SATURATION: 94 % | SYSTOLIC BLOOD PRESSURE: 150 MMHG | BODY MASS INDEX: 19.64 KG/M2 | DIASTOLIC BLOOD PRESSURE: 80 MMHG

## 2023-09-06 DIAGNOSIS — B02.29 POST HERPETIC NEURALGIA: Primary | ICD-10-CM

## 2023-09-06 PROCEDURE — 3079F DIAST BP 80-89 MM HG: CPT | Performed by: NURSE PRACTITIONER

## 2023-09-06 PROCEDURE — 3077F SYST BP >= 140 MM HG: CPT | Performed by: NURSE PRACTITIONER

## 2023-09-06 PROCEDURE — 99213 OFFICE O/P EST LOW 20 MIN: CPT | Performed by: NURSE PRACTITIONER

## 2023-09-06 PROCEDURE — 1123F ACP DISCUSS/DSCN MKR DOCD: CPT | Performed by: NURSE PRACTITIONER

## 2023-09-06 RX ORDER — LIDOCAINE 50 MG/G
OINTMENT TOPICAL
Qty: 10 G | Refills: 0 | Status: SHIPPED | OUTPATIENT
Start: 2023-09-06

## 2023-09-06 NOTE — TELEPHONE ENCOUNTER
Prior [place] is needed for Lidocaine 5% Ointment. Go to [employer]    KEY:  [id number]

## 2023-09-12 ENCOUNTER — TELEPHONE (OUTPATIENT)
Age: 78
End: 2023-09-12

## 2023-09-12 NOTE — TELEPHONE ENCOUNTER
Received fax from [facility] Lidocaine was denied. Diagnosis of rheumatoid arthritis did not meet the requirements.

## 2023-09-27 ENCOUNTER — TELEPHONE (OUTPATIENT)
Age: 78
End: 2023-09-27

## 2023-09-27 DIAGNOSIS — M05.79 RHEUMATOID ARTHRITIS WITH RHEUMATOID FACTOR OF MULTIPLE SITES WITHOUT ORGAN OR SYSTEMS INVOLVEMENT (HCC): ICD-10-CM

## 2023-09-27 NOTE — TELEPHONE ENCOUNTER
PT needs refill on   Prednisone    [id number] [address] [place], [place] [phone] - F [phone]

## 2023-09-28 RX ORDER — PREDNISONE 10 MG/1
10 TABLET ORAL DAILY
Qty: 90 TABLET | Refills: 1 | Status: SHIPPED | OUTPATIENT
Start: 2023-09-28

## 2023-10-03 ENCOUNTER — HOSPITAL ENCOUNTER (EMERGENCY)
Facility: HOSPITAL | Age: 78
Discharge: HOME | End: 2023-10-03
Attending: EMERGENCY MEDICINE
Payer: MEDICARE

## 2023-10-03 ENCOUNTER — APPOINTMENT (OUTPATIENT)
Facility: HOSPITAL | Age: 78
End: 2023-10-03
Payer: MEDICARE

## 2023-10-03 VITALS
DIASTOLIC BLOOD PRESSURE: 79 MMHG | BODY MASS INDEX: 19.64 KG/M2 | HEART RATE: 72 BPM | RESPIRATION RATE: 18 BRPM | HEIGHT: 72 IN | TEMPERATURE: 98.1 F | OXYGEN SATURATION: 99 % | WEIGHT: 145 LBS | SYSTOLIC BLOOD PRESSURE: 157 MMHG

## 2023-10-03 DIAGNOSIS — S82.64XA CLOSED NONDISPLACED FRACTURE OF LATERAL MALLEOLUS OF RIGHT FIBULA, INITIAL ENCOUNTER: Primary | ICD-10-CM

## 2023-10-03 PROCEDURE — 73610 X-RAY EXAM OF ANKLE: CPT

## 2023-10-03 PROCEDURE — 99283 EMERGENCY DEPT VISIT LOW MDM: CPT

## 2023-10-03 RX ORDER — HYDROCODONE BITARTRATE AND ACETAMINOPHEN 5; 325 MG/1; MG/1
1 TABLET ORAL EVERY 6 HOURS PRN
Qty: 12 TABLET | Refills: 0 | Status: SHIPPED | OUTPATIENT
Start: 2023-10-03 | End: 2023-10-06

## 2023-10-03 ASSESSMENT — PAIN SCALES - GENERAL
PAINLEVEL_OUTOF10: 10
PAINLEVEL_OUTOF10: 0

## 2023-10-03 ASSESSMENT — ENCOUNTER SYMPTOMS
SORE THROAT: 0
SHORTNESS OF BREATH: 0
DIARRHEA: 0
ABDOMINAL PAIN: 0
NAUSEA: 0
COUGH: 0
VOMITING: 0
EYE REDNESS: 0

## 2023-10-03 ASSESSMENT — PATIENT HEALTH QUESTIONNAIRE - PHQ9
SUM OF ALL RESPONSES TO PHQ QUESTIONS 1-9: 0
2. FEELING DOWN, DEPRESSED OR HOPELESS: 0
SUM OF ALL RESPONSES TO PHQ QUESTIONS 1-9: 0
SUM OF ALL RESPONSES TO PHQ QUESTIONS 1-9: 0
1. LITTLE INTEREST OR PLEASURE IN DOING THINGS: 0
SUM OF ALL RESPONSES TO PHQ9 QUESTIONS 1 & 2: 0
SUM OF ALL RESPONSES TO PHQ QUESTIONS 1-9: 0

## 2023-10-03 ASSESSMENT — PAIN - FUNCTIONAL ASSESSMENT: PAIN_FUNCTIONAL_ASSESSMENT: 0-10

## 2023-10-03 NOTE — ED NOTES
D/C instructions provided and reviewed with patient. Opportunity provided for discussion. All questions answered nothing further at this time.        Berna Ortega, RN  10/03/23 5061

## 2023-10-03 NOTE — ED PROVIDER NOTES
Decision Making):   DDX: 19-year-old male with right ankle injury. Will obtain plain films to rule out fracture, dislocation. ED Course:   Initial assessment performed. The patients presenting problems have been discussed, and they are in agreement with the care plan formulated and outlined with them. I have encouraged them to ask questions as they arise throughout their visit. PROGRESS NOTE    Pt reevaluated. Patient with lateral malleolar/distal fibula fracture. Placed in walking boot for comfort. Nonweightbearing. Gave crutches with instructions. Recommended follow-up with orthopedics in 1 week. Written by Dory Strong MD     Progress note:    Pt noted to be feeling better and ready for discharge. Updated pt and/or family on all final lab and/or  imaging findings. Will follow up as instructed. All questions have been answered, pt voiced understanding and agreement with plan. Specific return precautions provided as well as instructions to return to the ED should sx worsen at any time. Vital signs stable for discharge. I have also put together some discharge instructions for them that include: 1) educational information regarding their diagnosis, 2) how to care for their diagnosis at home, as well a 3) list of reasons why they would want to return to the ED prior to their follow-up appointment, should their condition change. Written by Dory Strong MD        Critical Care Time:   0    Disposition:  Discharge    PLAN:  1. Medication List        START taking these medications      HYDROcodone-acetaminophen 5-325 MG per tablet  Commonly known as: Norco  Take 1 tablet by mouth every 6 hours as needed for Pain for up to 3 days. Intended supply: 3 days.  Take lowest dose possible to manage pain Max Daily Amount: 4 tablets            ASK your doctor about these medications      albuterol sulfate  (90 Base) MCG/ACT inhaler  Commonly known as:

## 2023-10-03 NOTE — ED TRIAGE NOTES
Pt arrived with c/o a mechanical falling twisting his right ankle. He states it is a 10/10 and can not apply any pressure to it.

## 2023-10-11 ENCOUNTER — OFFICE VISIT (OUTPATIENT)
Age: 78
End: 2023-10-11
Payer: MEDICARE

## 2023-10-11 VITALS
HEART RATE: 76 BPM | BODY MASS INDEX: 20.13 KG/M2 | OXYGEN SATURATION: 95 % | DIASTOLIC BLOOD PRESSURE: 90 MMHG | HEIGHT: 72 IN | SYSTOLIC BLOOD PRESSURE: 168 MMHG | TEMPERATURE: 98.6 F | RESPIRATION RATE: 20 BRPM | WEIGHT: 148.6 LBS

## 2023-10-11 DIAGNOSIS — R82.90 ABNORMAL URINALYSIS: ICD-10-CM

## 2023-10-11 DIAGNOSIS — G89.29 CHRONIC PAIN OF LEFT KNEE: Primary | ICD-10-CM

## 2023-10-11 DIAGNOSIS — N28.89 RIGHT RENAL MASS: ICD-10-CM

## 2023-10-11 DIAGNOSIS — M25.562 CHRONIC PAIN OF LEFT KNEE: Primary | ICD-10-CM

## 2023-10-11 DIAGNOSIS — R63.0 POOR APPETITE: ICD-10-CM

## 2023-10-11 DIAGNOSIS — M05.79 RHEUMATOID ARTHRITIS INVOLVING MULTIPLE SITES WITH POSITIVE RHEUMATOID FACTOR (HCC): ICD-10-CM

## 2023-10-11 LAB
BILIRUBIN, URINE, POC: NEGATIVE
BLOOD URINE, POC: NEGATIVE
GLUCOSE URINE, POC: NEGATIVE
KETONES, URINE, POC: NEGATIVE
LEUKOCYTE ESTERASE, URINE, POC: ABNORMAL
NITRITE, URINE, POC: NEGATIVE
PH, URINE, POC: 8.5 (ref 4.6–8)
PROTEIN,URINE, POC: 300
SPECIFIC GRAVITY, URINE, POC: 1.01 (ref 1–1.03)
URINALYSIS CLARITY, POC: CLEAR
URINALYSIS COLOR, POC: ABNORMAL
UROBILINOGEN, POC: ABNORMAL

## 2023-10-11 PROCEDURE — 1123F ACP DISCUSS/DSCN MKR DOCD: CPT | Performed by: NURSE PRACTITIONER

## 2023-10-11 PROCEDURE — 3078F DIAST BP <80 MM HG: CPT | Performed by: NURSE PRACTITIONER

## 2023-10-11 PROCEDURE — 99214 OFFICE O/P EST MOD 30 MIN: CPT | Performed by: NURSE PRACTITIONER

## 2023-10-11 PROCEDURE — 81001 URINALYSIS AUTO W/SCOPE: CPT | Performed by: NURSE PRACTITIONER

## 2023-10-11 PROCEDURE — 3074F SYST BP LT 130 MM HG: CPT | Performed by: NURSE PRACTITIONER

## 2023-10-11 RX ORDER — IBUPROFEN 600 MG/1
600 TABLET ORAL EVERY 6 HOURS PRN
COMMUNITY
End: 2023-10-12

## 2023-10-11 RX ORDER — GABAPENTIN 600 MG/1
TABLET ORAL
Qty: 180 TABLET | Refills: 2 | Status: SHIPPED | OUTPATIENT
Start: 2023-10-11 | End: 2024-01-09

## 2023-10-11 RX ORDER — HYDROXYZINE PAMOATE 25 MG/1
CAPSULE ORAL
Qty: 90 CAPSULE | Refills: 3 | Status: SHIPPED | OUTPATIENT
Start: 2023-10-11

## 2023-10-11 RX ORDER — MONTELUKAST SODIUM 10 MG/1
10 TABLET ORAL DAILY
Qty: 90 TABLET | Refills: 1 | Status: SHIPPED | OUTPATIENT
Start: 2023-10-11

## 2023-10-11 NOTE — PROGRESS NOTES
Subjective:     CC: left leg pain, poor appetite, abdominal pain    David Leija is a 66 y.o. male with ESRD, RA, and CHF who presents today with c/o left hip pain, poor appetite, and abdominal pain. He mentions he fell a week ago and fractured his right fibula near the malleola (ankle). Saw orthopedist Dr Latesha Whitley , he told him he could start putting a little weight on it, no brace. Because he has had to put most of his weight on his left leg, his left knee has started to hurt now. He is also seeing Dr Latesha Whitley for this and he states he gave him a steroid joint injection and a brace to wear. He has RA and cannot take any DMARDS due to ESRD so he takes Prednisone 10mg daily and Gabapentin 1200mg q 8 hours prn pain. He has been out for a week so has been taking about 4000mg of Tylenol per day. Needs a refill on the gabapentin. Appetite been down since he fell. He denies any abdominal pain but states \"something doesn't feel right. \" He denies any constipation or loose stools. Denies nausea or vomting. No fever but has been sweating at night. He has not missed any dialysis treatments. Of note, he was hospitalized at [facility] from 4/11 through 4/20/23 for a GI bleed r/t diverticulitis and H pylori infection. He was very anemic and required a blood transfusion. GI was consulted. His endoscopy and colonoscopy showed no active bleeding. He was started on protonix, flagyl, clarithromycin. He followed up with GI Dr Larry Brooks on 6-23-23. Stool sample test of H pylori eradication was ordered. He is no longer taking his PPI. He was then hospitalized again at [facility] from 7/24 through 7/27 acute hypoxic respiratory failure, metabolic encephalopathy, and electrolyte disturbances. He had a tonic clonic seizure while at \[facility]\"". His dialysis center reported that he is supposed to run on the machine for 3.5 hours per session but he usually cuts his treatments by more than an hour.  His treatment time was increased to 4

## 2023-10-12 ENCOUNTER — TELEPHONE (OUTPATIENT)
Age: 78
End: 2023-10-12

## 2023-10-12 NOTE — TELEPHONE ENCOUNTER
Called patient's dialysis center in [place] to request his most recent copy of labs

## 2023-10-13 ENCOUNTER — CLINICAL DOCUMENTATION (OUTPATIENT)
Age: 78
End: 2023-10-13

## 2023-10-15 LAB
BACTERIA UR CULT: ABNORMAL
BACTERIA UR CULT: ABNORMAL

## 2023-10-18 DIAGNOSIS — N30.00 ACUTE CYSTITIS WITHOUT HEMATURIA: Primary | ICD-10-CM

## 2023-10-18 RX ORDER — AMOXICILLIN AND CLAVULANATE POTASSIUM 500; 125 MG/1; MG/1
1 TABLET, FILM COATED ORAL DAILY
Qty: 7 TABLET | Refills: 0 | Status: SHIPPED | OUTPATIENT
Start: 2023-10-18 | End: 2023-10-25

## 2023-10-21 LAB
BACTERIA UR CULT: ABNORMAL
BACTERIA UR CULT: ABNORMAL

## 2023-10-23 ENCOUNTER — HOSPITAL ENCOUNTER (OUTPATIENT)
Facility: HOSPITAL | Age: 78
Discharge: HOME | End: 2023-10-26
Payer: MEDICARE

## 2023-10-23 DIAGNOSIS — S82.64XA CLOSED NONDISPLACED FRACTURE OF LATERAL MALLEOLUS OF RIGHT FIBULA, INITIAL ENCOUNTER: ICD-10-CM

## 2023-10-23 PROCEDURE — 73610 X-RAY EXAM OF ANKLE: CPT

## 2023-11-06 DIAGNOSIS — R06.2 WHEEZING: ICD-10-CM

## 2023-11-06 RX ORDER — ALBUTEROL SULFATE 90 UG/1
AEROSOL, METERED RESPIRATORY (INHALATION)
Qty: 8.5 EACH | Refills: 1 | Status: SHIPPED | OUTPATIENT
Start: 2023-11-06

## 2023-11-06 NOTE — TELEPHONE ENCOUNTER
Patient requesting refill on     Requested Prescriptions     Pending Prescriptions Disp Refills    albuterol sulfate HFA (PROVENTIL;VENTOLIN;PROAIR) 108 (90 Base) MCG/ACT inhaler [Pharmacy Med Name: ALBUTEROL HFA (PROAIR) INHALER] 8.5 each      Sig: INHALE 2 PUFFS BY INHALATION EVERY 4 HOURS AS NEEDED FOR WHEEZING.         Last OV 10/11/2023

## 2023-11-13 ENCOUNTER — HOSPITAL ENCOUNTER (EMERGENCY)
Facility: HOSPITAL | Age: 78
Discharge: HOME | End: 2023-11-13
Attending: FAMILY MEDICINE | Admitting: FAMILY MEDICINE
Payer: MEDICARE

## 2023-11-13 ENCOUNTER — APPOINTMENT (OUTPATIENT)
Facility: HOSPITAL | Age: 78
End: 2023-11-13
Payer: MEDICARE

## 2023-11-13 VITALS
TEMPERATURE: 98.1 F | WEIGHT: 153 LBS | BODY MASS INDEX: 20.72 KG/M2 | OXYGEN SATURATION: 95 % | SYSTOLIC BLOOD PRESSURE: 139 MMHG | RESPIRATION RATE: 18 BRPM | HEIGHT: 72 IN | HEART RATE: 75 BPM | DIASTOLIC BLOOD PRESSURE: 79 MMHG

## 2023-11-13 DIAGNOSIS — Z99.2 ANEMIA DUE TO CHRONIC KIDNEY DISEASE, ON CHRONIC DIALYSIS (HCC): ICD-10-CM

## 2023-11-13 DIAGNOSIS — D63.1 ANEMIA DUE TO CHRONIC KIDNEY DISEASE, ON CHRONIC DIALYSIS (HCC): ICD-10-CM

## 2023-11-13 DIAGNOSIS — N18.6 ANEMIA DUE TO CHRONIC KIDNEY DISEASE, ON CHRONIC DIALYSIS (HCC): ICD-10-CM

## 2023-11-13 DIAGNOSIS — I50.9 ACUTE CONGESTIVE HEART FAILURE, UNSPECIFIED HEART FAILURE TYPE (HCC): Primary | ICD-10-CM

## 2023-11-13 LAB
ALBUMIN SERPL-MCNC: 3.3 G/DL (ref 3.5–5)
ALBUMIN/GLOB SERPL: 0.8 (ref 1.1–2.2)
ALP SERPL-CCNC: 105 U/L (ref 45–117)
ALT SERPL-CCNC: 16 U/L (ref 12–78)
ANION GAP SERPL CALC-SCNC: 11 MMOL/L (ref 5–15)
APPEARANCE UR: CLEAR
AST SERPL-CCNC: 13 U/L (ref 15–37)
BACTERIA URNS QL MICRO: NEGATIVE /HPF
BASOPHILS # BLD: 0.1 K/UL (ref 0–0.1)
BASOPHILS NFR BLD: 1 % (ref 0–1)
BILIRUB SERPL-MCNC: 0.3 MG/DL (ref 0.2–1)
BILIRUB UR QL: NEGATIVE
BUN SERPL-MCNC: 45 MG/DL (ref 6–20)
BUN/CREAT SERPL: 5 (ref 12–20)
CALCIUM SERPL-MCNC: 8.1 MG/DL (ref 8.5–10.1)
CHLORIDE SERPL-SCNC: 98 MMOL/L (ref 97–108)
CO2 SERPL-SCNC: 28 MMOL/L (ref 21–32)
COLOR UR: ABNORMAL
CREAT SERPL-MCNC: 9.49 MG/DL (ref 0.7–1.3)
DIFFERENTIAL METHOD BLD: ABNORMAL
EOSINOPHIL # BLD: 2 K/UL (ref 0–0.4)
EOSINOPHIL NFR BLD: 22 % (ref 0–7)
EPITH CASTS URNS QL MICRO: ABNORMAL /LPF
ERYTHROCYTE [DISTWIDTH] IN BLOOD BY AUTOMATED COUNT: 14.8 % (ref 11.5–14.5)
FLUAV RNA SPEC QL NAA+PROBE: NOT DETECTED
FLUBV RNA SPEC QL NAA+PROBE: NOT DETECTED
GLOBULIN SER CALC-MCNC: 4.4 G/DL (ref 2–4)
GLUCOSE SERPL-MCNC: 100 MG/DL (ref 65–100)
GLUCOSE UR STRIP.AUTO-MCNC: NEGATIVE MG/DL
HCT VFR BLD AUTO: 27 % (ref 36.6–50.3)
HEMOCCULT STL QL: NEGATIVE
HGB BLD-MCNC: 8.7 G/DL (ref 12.1–17)
HGB UR QL STRIP: NEGATIVE
IMM GRANULOCYTES # BLD AUTO: 0 K/UL (ref 0–0.04)
IMM GRANULOCYTES NFR BLD AUTO: 0 % (ref 0–0.5)
KETONES UR QL STRIP.AUTO: NEGATIVE MG/DL
LEUKOCYTE ESTERASE UR QL STRIP.AUTO: NEGATIVE
LYMPHOCYTES # BLD: 1.4 K/UL (ref 0.8–3.5)
LYMPHOCYTES NFR BLD: 16 % (ref 12–49)
MAGNESIUM SERPL-MCNC: 2.1 MG/DL (ref 1.6–2.4)
MCH RBC QN AUTO: 31.9 PG (ref 26–34)
MCHC RBC AUTO-ENTMCNC: 32.2 G/DL (ref 30–36.5)
MCV RBC AUTO: 98.9 FL (ref 80–99)
MONOCYTES # BLD: 1.2 K/UL (ref 0–1)
MONOCYTES NFR BLD: 13 % (ref 5–13)
NEUTS SEG # BLD: 4.3 K/UL (ref 1.8–8)
NEUTS SEG NFR BLD: 48 % (ref 32–75)
NITRITE UR QL STRIP.AUTO: NEGATIVE
NRBC # BLD: 0 K/UL (ref 0–0.01)
NRBC BLD-RTO: 0 PER 100 WBC
NT PRO BNP: ABNORMAL PG/ML (ref 0–450)
PH UR STRIP: 7 (ref 5–8)
PLATELET # BLD AUTO: 251 K/UL (ref 150–400)
PMV BLD AUTO: 10.3 FL (ref 8.9–12.9)
POTASSIUM SERPL-SCNC: 4.6 MMOL/L (ref 3.5–5.1)
PROT SERPL-MCNC: 7.7 G/DL (ref 6.4–8.2)
PROT UR STRIP-MCNC: 100 MG/DL
RBC # BLD AUTO: 2.73 M/UL (ref 4.1–5.7)
RBC #/AREA URNS HPF: ABNORMAL /HPF (ref 0–5)
RBC MORPH BLD: ABNORMAL
SARS-COV-2 RNA RESP QL NAA+PROBE: NOT DETECTED
SODIUM SERPL-SCNC: 137 MMOL/L (ref 136–145)
SP GR UR REFRACTOMETRY: 1.01 (ref 1–1.03)
TROPONIN I SERPL HS-MCNC: 45 NG/L (ref 0–76)
TROPONIN I SERPL HS-MCNC: 50 NG/L (ref 0–76)
URINE CULTURE IF INDICATED: ABNORMAL
UROBILINOGEN UR QL STRIP.AUTO: 0.2 EU/DL (ref 0.2–1)
WBC # BLD AUTO: 9 K/UL (ref 4.1–11.1)
WBC URNS QL MICRO: ABNORMAL /HPF (ref 0–4)

## 2023-11-13 PROCEDURE — 96374 THER/PROPH/DIAG INJ IV PUSH: CPT

## 2023-11-13 PROCEDURE — 6360000002 HC RX W HCPCS: Performed by: FAMILY MEDICINE

## 2023-11-13 PROCEDURE — 87636 SARSCOV2 & INF A&B AMP PRB: CPT

## 2023-11-13 PROCEDURE — 6370000000 HC RX 637 (ALT 250 FOR IP): Performed by: FAMILY MEDICINE

## 2023-11-13 PROCEDURE — 84484 ASSAY OF TROPONIN QUANT: CPT

## 2023-11-13 PROCEDURE — 83880 ASSAY OF NATRIURETIC PEPTIDE: CPT

## 2023-11-13 PROCEDURE — 99285 EMERGENCY DEPT VISIT HI MDM: CPT

## 2023-11-13 PROCEDURE — 83735 ASSAY OF MAGNESIUM: CPT

## 2023-11-13 PROCEDURE — 80053 COMPREHEN METABOLIC PANEL: CPT

## 2023-11-13 PROCEDURE — 71045 X-RAY EXAM CHEST 1 VIEW: CPT

## 2023-11-13 PROCEDURE — 94640 AIRWAY INHALATION TREATMENT: CPT

## 2023-11-13 PROCEDURE — 85025 COMPLETE CBC W/AUTO DIFF WBC: CPT

## 2023-11-13 PROCEDURE — 82272 OCCULT BLD FECES 1-3 TESTS: CPT

## 2023-11-13 PROCEDURE — 36415 COLL VENOUS BLD VENIPUNCTURE: CPT

## 2023-11-13 PROCEDURE — 81001 URINALYSIS AUTO W/SCOPE: CPT

## 2023-11-13 PROCEDURE — 93005 ELECTROCARDIOGRAM TRACING: CPT | Performed by: FAMILY MEDICINE

## 2023-11-13 RX ORDER — OMEPRAZOLE 40 MG/1
40 CAPSULE, DELAYED RELEASE ORAL 2 TIMES DAILY
Qty: 180 CAPSULE | Refills: 0 | Status: SHIPPED | OUTPATIENT
Start: 2023-11-13

## 2023-11-13 RX ORDER — FUROSEMIDE 10 MG/ML
80 INJECTION INTRAMUSCULAR; INTRAVENOUS
Status: COMPLETED | OUTPATIENT
Start: 2023-11-13 | End: 2023-11-13

## 2023-11-13 RX ORDER — IPRATROPIUM BROMIDE AND ALBUTEROL SULFATE 2.5; .5 MG/3ML; MG/3ML
1 SOLUTION RESPIRATORY (INHALATION)
Status: COMPLETED | OUTPATIENT
Start: 2023-11-13 | End: 2023-11-13

## 2023-11-13 RX ADMIN — IPRATROPIUM BROMIDE AND ALBUTEROL SULFATE 1 DOSE: .5; 2.5 SOLUTION RESPIRATORY (INHALATION) at 11:05

## 2023-11-13 RX ADMIN — FUROSEMIDE 80 MG: 10 INJECTION, SOLUTION INTRAMUSCULAR; INTRAVENOUS at 12:05

## 2023-11-13 ASSESSMENT — PAIN - FUNCTIONAL ASSESSMENT
PAIN_FUNCTIONAL_ASSESSMENT: 0-10
PAIN_FUNCTIONAL_ASSESSMENT: 0-10

## 2023-11-13 ASSESSMENT — PAIN SCALES - GENERAL
PAINLEVEL_OUTOF10: 0
PAINLEVEL_OUTOF10: 0

## 2023-11-13 NOTE — TELEPHONE ENCOUNTER
Patient requesting refill on     Requested Prescriptions     Pending Prescriptions Disp Refills    omeprazole (PRILOSEC) 40 MG delayed release capsule [Pharmacy Med Name: OMEPRAZOLE DR 40 MG CAPSULE] 180 capsule      Sig: TAKE 1 CAPSULE BY MOUTH TWICE A DAY        Last OV 10/11/2023

## 2023-11-13 NOTE — ED PROVIDER NOTES
10 MG tablet  Commonly known as: SINGULAIR  Take 1 tablet by mouth daily     omeprazole 40 MG delayed release capsule  Commonly known as: PRILOSEC  TAKE 1 CAPSULE BY MOUTH TWICE A DAY     predniSONE 10 MG tablet  Commonly known as: DELTASONE  Take 1 tablet by mouth daily     sevelamer 800 MG tablet  Commonly known as: RENVELA           * This list has 2 medication(s) that are the same as other medications prescribed for you. Read the directions carefully, and ask your doctor or other care provider to review them with you. Where to Get Your Medications        These medications were sent to [facility]/pharmacy [address] [place], [place] [id number]      Phone: [phone]   omeprazole 40 MG delayed release capsule           DISCONTINUED MEDICATIONS:  Discharge Medication List as of 11/13/2023  2:42 PM          I am the Primary Clinician of Record. Airam Boogie MD (electronically signed)      (Please note that parts of this dictation were completed with voice recognition software. Quite often unanticipated grammatical, syntax, homophones, and other interpretive errors are inadvertently transcribed by the computer software. Please disregards these errors.  Please excuse any errors that have escaped final proofreading.)          Airam Boogie MD  11/14/23 3863

## 2023-11-13 NOTE — ED NOTES
I have reviewed discharge instructions with the patient and spouse. The patient and spouse verbalized understanding. Discharge medications discussed with patient. No questions at this time. Ambulated without difficulty.  Feels better no wheezing at present     Barbra Benoit  11/13/23 9389

## 2023-11-13 NOTE — ED NOTES
Patient educated on medications to be administered. Verified  Allergies. Pain medication administered as ordered. Bed rails up and call bell within reach.    Has urinal for measuring voides     Ana Lam RN  11/13/23 1211

## 2023-11-13 NOTE — ED NOTES
[facility] with provider to hold on IV start based on labs and chest xray results.       Sri Harmon, RN  11/13/23 2761

## 2023-11-13 NOTE — ED TRIAGE NOTES
SOB for 3-5 days. Has a cough at night and audible wheezing. Patient more winded. Gets dialysis x3 weekly. Last time there was [place].   Shunt in left arm

## 2023-11-15 LAB
EKG ATRIAL RATE: 73 BPM
EKG DIAGNOSIS: NORMAL
EKG P AXIS: 52 DEGREES
EKG P-R INTERVAL: 142 MS
EKG Q-T INTERVAL: 456 MS
EKG QRS DURATION: 108 MS
EKG QTC CALCULATION (BAZETT): 502 MS
EKG R AXIS: 3 DEGREES
EKG T AXIS: 115 DEGREES
EKG VENTRICULAR RATE: 73 BPM

## 2023-11-21 ENCOUNTER — TRANSCRIBE ORDERS (OUTPATIENT)
Facility: HOSPITAL | Age: 78
End: 2023-11-21

## 2023-11-21 ENCOUNTER — HOSPITAL ENCOUNTER (OUTPATIENT)
Facility: HOSPITAL | Age: 78
Discharge: HOME | End: 2023-11-24
Payer: MEDICARE

## 2023-11-21 DIAGNOSIS — M25.562 LEFT KNEE PAIN, UNSPECIFIED CHRONICITY: Primary | ICD-10-CM

## 2023-11-21 DIAGNOSIS — M25.562 LEFT KNEE PAIN, UNSPECIFIED CHRONICITY: ICD-10-CM

## 2023-11-21 PROCEDURE — 73564 X-RAY EXAM KNEE 4 OR MORE: CPT

## 2023-12-06 ENCOUNTER — OFFICE VISIT (OUTPATIENT)
Age: 78
End: 2023-12-06
Payer: MEDICARE

## 2023-12-06 VITALS
HEART RATE: 74 BPM | BODY MASS INDEX: 21.81 KG/M2 | WEIGHT: 161 LBS | OXYGEN SATURATION: 94 % | HEIGHT: 72 IN | TEMPERATURE: 98.2 F | RESPIRATION RATE: 20 BRPM | SYSTOLIC BLOOD PRESSURE: 136 MMHG | DIASTOLIC BLOOD PRESSURE: 70 MMHG

## 2023-12-06 DIAGNOSIS — N28.89 LEFT RENAL MASS: Primary | ICD-10-CM

## 2023-12-06 DIAGNOSIS — I50.9 CHRONIC CONGESTIVE HEART FAILURE, UNSPECIFIED HEART FAILURE TYPE (HCC): ICD-10-CM

## 2023-12-06 PROCEDURE — 3075F SYST BP GE 130 - 139MM HG: CPT | Performed by: NURSE PRACTITIONER

## 2023-12-06 PROCEDURE — 3078F DIAST BP <80 MM HG: CPT | Performed by: NURSE PRACTITIONER

## 2023-12-06 PROCEDURE — 1123F ACP DISCUSS/DSCN MKR DOCD: CPT | Performed by: NURSE PRACTITIONER

## 2023-12-06 PROCEDURE — 99214 OFFICE O/P EST MOD 30 MIN: CPT | Performed by: NURSE PRACTITIONER

## 2023-12-06 SDOH — ECONOMIC STABILITY: FOOD INSECURITY: WITHIN THE PAST 12 MONTHS, YOU WORRIED THAT YOUR FOOD WOULD RUN OUT BEFORE YOU GOT MONEY TO BUY MORE.: NEVER TRUE

## 2023-12-06 SDOH — ECONOMIC STABILITY: FOOD INSECURITY: WITHIN THE PAST 12 MONTHS, THE FOOD YOU BOUGHT JUST DIDN'T LAST AND YOU DIDN'T HAVE MONEY TO GET MORE.: NEVER TRUE

## 2023-12-06 SDOH — ECONOMIC STABILITY: INCOME INSECURITY: HOW HARD IS IT FOR YOU TO PAY FOR THE VERY BASICS LIKE FOOD, HOUSING, MEDICAL CARE, AND HEATING?: NOT HARD AT ALL

## 2023-12-06 ASSESSMENT — ANXIETY QUESTIONNAIRES
4. TROUBLE RELAXING: 0
3. WORRYING TOO MUCH ABOUT DIFFERENT THINGS: 0
6. BECOMING EASILY ANNOYED OR IRRITABLE: 0
2. NOT BEING ABLE TO STOP OR CONTROL WORRYING: 0
1. FEELING NERVOUS, ANXIOUS, OR ON EDGE: 0
7. FEELING AFRAID AS IF SOMETHING AWFUL MIGHT HAPPEN: 0
5. BEING SO RESTLESS THAT IT IS HARD TO SIT STILL: 0
IF YOU CHECKED OFF ANY PROBLEMS ON THIS QUESTIONNAIRE, HOW DIFFICULT HAVE THESE PROBLEMS MADE IT FOR YOU TO DO YOUR WORK, TAKE CARE OF THINGS AT HOME, OR GET ALONG WITH OTHER PEOPLE: NOT DIFFICULT AT ALL
GAD7 TOTAL SCORE: 0

## 2023-12-06 ASSESSMENT — PATIENT HEALTH QUESTIONNAIRE - PHQ9
SUM OF ALL RESPONSES TO PHQ9 QUESTIONS 1 & 2: 0
2. FEELING DOWN, DEPRESSED OR HOPELESS: 0
1. LITTLE INTEREST OR PLEASURE IN DOING THINGS: 0
SUM OF ALL RESPONSES TO PHQ QUESTIONS 1-9: 0

## 2023-12-06 NOTE — PROGRESS NOTES
No acute distress. +thin and frail. HEENT :  Eyes: Sclera white, conjunctiva clear. PERRLA. Extra ocular movements intact. Neck: Supple with FROM. Lungs: Breathing even and unlabored. All lobes clear to auscultation bilaterally   Heart :RRR, S1 and S2 normal intensity, no extra heart sounds  Extremities: Non-edematous  Back: No CVA tenderness  Musculoskeletal: +edema to bilateral knees, ambulated with a cane  Neuro: Cranial nerves grossly normal.  Psych: Mood and thought content appropriate for situation. Dressed appropriately and with good hygiene. Skin: Warm and dry and intact    Assessment/ Plan:     Mass, left kidney   I emphasized the importance of this scan and again gave him the phone # to schedule. I also gave him Dr Rosmery Polk office info again today and reminded him to schedule this. I informed him he likely needs a biopsy ASAP because he could have kidney cancer. He verbalized understanding. CHF  Stable  He is not sure if he is to continue wearing his LifeVest or return it  I told him I would call the cardiologist tomorrow and find out and let him know  He will follow up with cardiology in 1-2024  Go to ER or RTO for SOB, CP, swelling, or lightheadedness    F/U here 3 months        Verbal and written instructions (see AVS) provided. Patient expresses understanding of diagnosis and treatment plan. Health Maintenance Due   Topic Date Due    Shingles vaccine (1 of 2) Never done    Respiratory Syncytial Virus (RSV) age 61 yrs+ (3 - 1-dose 60+ series) Never done    Hepatitis B vaccine (1 of 1 - Risk Dialysis 4-dose series) 07/08/2021    Flu vaccine (1) 08/01/2023    COVID-19 Vaccine (3 - 2023-24 season) 09/01/2023         No follow-up provider specified. Gordon Hughes.  Jonathan Fernández, YUNIEL

## 2023-12-11 ENCOUNTER — TELEPHONE (OUTPATIENT)
Age: 78
End: 2023-12-11

## 2023-12-11 NOTE — TELEPHONE ENCOUNTER
Please let patient know I was not able to get in touch with his cardiologist to ask about the Life Vest. They put me on hold for a very long time. He can try calling next.

## 2024-01-10 DIAGNOSIS — M05.79 RHEUMATOID ARTHRITIS WITH RHEUMATOID FACTOR OF MULTIPLE SITES WITHOUT ORGAN OR SYSTEMS INVOLVEMENT (HCC): ICD-10-CM

## 2024-01-10 DIAGNOSIS — L30.9 DERMATITIS DUE TO UNKNOWN CAUSE: ICD-10-CM

## 2024-01-11 ENCOUNTER — CLINICAL DOCUMENTATION (OUTPATIENT)
Age: 79
End: 2024-01-11

## 2024-01-11 RX ORDER — PREDNISONE 10 MG/1
10 TABLET ORAL DAILY
Qty: 90 TABLET | Refills: 1 | Status: SHIPPED | OUTPATIENT
Start: 2024-01-11

## 2024-01-11 RX ORDER — HYDROXYZINE PAMOATE 25 MG/1
CAPSULE ORAL
Qty: 90 CAPSULE | Refills: 3 | Status: SHIPPED | OUTPATIENT
Start: 2024-01-11

## 2024-01-11 RX ORDER — TRAZODONE HYDROCHLORIDE 50 MG/1
50 TABLET ORAL NIGHTLY
Qty: 90 TABLET | Refills: 1 | Status: SHIPPED | OUTPATIENT
Start: 2024-01-11

## 2024-01-11 NOTE — TELEPHONE ENCOUNTER
Patient requesting refill on     Requested Prescriptions     Pending Prescriptions Disp Refills    hydrOXYzine pamoate (VISTARIL) 25 MG capsule [Pharmacy Med Name: HYDROXYZINE BHARGAV 25 MG CAP] 90 capsule 3     Sig: TAKE 1 CAP BY MOUTH THREE (3) TIMES DAILY AS NEEDED FOR ITCHING.    predniSONE (DELTASONE) 10 MG tablet [Pharmacy Med Name: PREDNISONE 10 MG TABLET] 90 tablet 1     Sig: TAKE 1 TABLET BY MOUTH EVERY DAY    mupirocin (BACTROBAN) 2 % ointment [Pharmacy Med Name: MUPIROCIN 2% OINTMENT] 66 g      Sig: APPLY TOPICALLY TO RASH 2 TIMES DAILY X 1 WEEK.    traZODone (DESYREL) 50 MG tablet [Pharmacy Med Name: TRAZODONE 50 MG TABLET] 90 tablet 1     Sig: TAKE 1 TABLET BY MOUTH NIGHTLY        Last OV 12/6/2023

## 2024-01-11 NOTE — PROGRESS NOTES
Patient seen by [facility], Dr Jake Uriostegui on 12/29/23 for Cardiomyopathy and CHF.  Plan: Cont Losartan 25 QD on non dialysis days and continue losartan 12.5 on dialysis days.  Try Toprol XL 12.5 on nondialysis days.  Repeat echo  and if EF still low-change to LifeVest ICD.  2-D w/CFD Echo to be done.  Follow up with Dr. Uriostegui in 3 months.

## 2024-01-22 ENCOUNTER — HOSPITAL ENCOUNTER (EMERGENCY)
Facility: HOSPITAL | Age: 79
Discharge: HOME | End: 2024-01-22
Attending: EMERGENCY MEDICINE
Payer: MEDICARE

## 2024-01-22 ENCOUNTER — APPOINTMENT (OUTPATIENT)
Facility: HOSPITAL | Age: 79
End: 2024-01-22
Payer: MEDICARE

## 2024-01-22 VITALS
DIASTOLIC BLOOD PRESSURE: 82 MMHG | HEART RATE: 71 BPM | HEIGHT: 72 IN | TEMPERATURE: 98 F | SYSTOLIC BLOOD PRESSURE: 142 MMHG | OXYGEN SATURATION: 97 % | RESPIRATION RATE: 15 BRPM | WEIGHT: 145 LBS | BODY MASS INDEX: 19.64 KG/M2

## 2024-01-22 DIAGNOSIS — J22 LOWER RESPIRATORY INFECTION: Primary | ICD-10-CM

## 2024-01-22 PROCEDURE — 71046 X-RAY EXAM CHEST 2 VIEWS: CPT

## 2024-01-22 PROCEDURE — 94640 AIRWAY INHALATION TREATMENT: CPT

## 2024-01-22 PROCEDURE — 6370000000 HC RX 637 (ALT 250 FOR IP): Performed by: EMERGENCY MEDICINE

## 2024-01-22 PROCEDURE — 99283 EMERGENCY DEPT VISIT LOW MDM: CPT

## 2024-01-22 RX ORDER — CEFDINIR 300 MG/1
CAPSULE ORAL
Qty: 3 CAPSULE | Refills: 0 | Status: SHIPPED | OUTPATIENT
Start: 2024-01-22

## 2024-01-22 RX ORDER — AZITHROMYCIN 250 MG/1
250 TABLET, FILM COATED ORAL SEE ADMIN INSTRUCTIONS
Qty: 6 TABLET | Refills: 0 | Status: SHIPPED | OUTPATIENT
Start: 2024-01-22 | End: 2024-01-27

## 2024-01-22 RX ORDER — IPRATROPIUM BROMIDE AND ALBUTEROL SULFATE 2.5; .5 MG/3ML; MG/3ML
1 SOLUTION RESPIRATORY (INHALATION)
Status: COMPLETED | OUTPATIENT
Start: 2024-01-22 | End: 2024-01-22

## 2024-01-22 RX ORDER — PREDNISONE 20 MG/1
40 TABLET ORAL DAILY
Qty: 10 TABLET | Refills: 0 | Status: SHIPPED | OUTPATIENT
Start: 2024-01-22 | End: 2024-01-27

## 2024-01-22 RX ORDER — PREDNISONE 20 MG/1
40 TABLET ORAL ONCE
Status: COMPLETED | OUTPATIENT
Start: 2024-01-22 | End: 2024-01-22

## 2024-01-22 RX ADMIN — IPRATROPIUM BROMIDE AND ALBUTEROL SULFATE 1 DOSE: 2.5; .5 SOLUTION RESPIRATORY (INHALATION) at 13:34

## 2024-01-22 RX ADMIN — PREDNISONE 40 MG: 20 TABLET ORAL at 13:22

## 2024-01-22 ASSESSMENT — PAIN SCALES - GENERAL: PAINLEVEL_OUTOF10: 0

## 2024-01-22 ASSESSMENT — PAIN - FUNCTIONAL ASSESSMENT: PAIN_FUNCTIONAL_ASSESSMENT: 0-10

## 2024-01-22 NOTE — ED TRIAGE NOTES
Pt arrived by POV for cough.  Pt reports for week he has had a cough with wheezing, cough is productive yellow,  pt reports some B[place]ORD.  Pt is awake alert and oriented X 4, pt educated on ER flow.  This writer apologized for any delay that may occur, pt and/or family educated on acuity of the unit at this time.  Pt placed back in waiting room at this time
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
## 2024-01-22 NOTE — ED NOTES
Discharge instructions and medications reviewed with Pt and spouse. Pt has no questions at this time and will  medications from pharmacy.

## 2024-01-31 ENCOUNTER — OFFICE VISIT (OUTPATIENT)
Age: 79
End: 2024-01-31
Payer: MEDICARE

## 2024-01-31 VITALS
WEIGHT: 160 LBS | SYSTOLIC BLOOD PRESSURE: 131 MMHG | HEIGHT: 72 IN | DIASTOLIC BLOOD PRESSURE: 68 MMHG | RESPIRATION RATE: 18 BRPM | TEMPERATURE: 98.7 F | BODY MASS INDEX: 21.67 KG/M2 | OXYGEN SATURATION: 92 % | HEART RATE: 59 BPM

## 2024-01-31 DIAGNOSIS — J20.9 ACUTE BRONCHITIS, UNSPECIFIED ORGANISM: Primary | ICD-10-CM

## 2024-01-31 DIAGNOSIS — Z00.00 MEDICARE ANNUAL WELLNESS VISIT, SUBSEQUENT: ICD-10-CM

## 2024-01-31 DIAGNOSIS — N28.89 LEFT RENAL MASS: ICD-10-CM

## 2024-01-31 PROCEDURE — 3075F SYST BP GE 130 - 139MM HG: CPT | Performed by: NURSE PRACTITIONER

## 2024-01-31 PROCEDURE — G0439 PPPS, SUBSEQ VISIT: HCPCS | Performed by: NURSE PRACTITIONER

## 2024-01-31 PROCEDURE — 1123F ACP DISCUSS/DSCN MKR DOCD: CPT | Performed by: NURSE PRACTITIONER

## 2024-01-31 PROCEDURE — 3078F DIAST BP <80 MM HG: CPT | Performed by: NURSE PRACTITIONER

## 2024-01-31 PROCEDURE — 99213 OFFICE O/P EST LOW 20 MIN: CPT | Performed by: NURSE PRACTITIONER

## 2024-01-31 RX ORDER — LEVOFLOXACIN 250 MG/1
TABLET, FILM COATED ORAL
Qty: 7 TABLET | Refills: 0 | Status: SHIPPED | OUTPATIENT
Start: 2024-01-31

## 2024-01-31 RX ORDER — PREDNISONE 10 MG/1
TABLET ORAL
Qty: 21 TABLET | Refills: 0 | Status: SHIPPED | OUTPATIENT
Start: 2024-01-31

## 2024-01-31 ASSESSMENT — PATIENT HEALTH QUESTIONNAIRE - PHQ9
1. LITTLE INTEREST OR PLEASURE IN DOING THINGS: 0
SUM OF ALL RESPONSES TO PHQ QUESTIONS 1-9: 0
2. FEELING DOWN, DEPRESSED OR HOPELESS: 0
SUM OF ALL RESPONSES TO PHQ QUESTIONS 1-9: 0
SUM OF ALL RESPONSES TO PHQ9 QUESTIONS 1 & 2: 0
SUM OF ALL RESPONSES TO PHQ QUESTIONS 1-9: 0
SUM OF ALL RESPONSES TO PHQ QUESTIONS 1-9: 0

## 2024-01-31 NOTE — PROGRESS NOTES
Subjective:     CC: ER follow up    Darrel Patterson is a 78 y.o. male with ESRD, RA, and CHF who presents today to follow up for an ER visit.     He is accompanied by his sister.     He went to the ER on 1-22-24 for a productive cough (yellow sputum) with wheezing x 7-8 days. No other symptoms. CXR showed subsegmental atelectasis but no infiltrate. He was given a DUONEB breathing tx, 40mg of oral prednisone, and discharged home with a tapered dose pack of steroids and a round of Cefdinir. Since finishing the medication he has noticed some improvement but is still coughing and wheezing. No chest pain, fever, or chills. He does have asthma. Has an Albuterol inhaler at home that he uses PRN.     Of note, he was hospitalized for proctocolitis back in August of 2023. He had a CT angiogram abdomen pelvis w contrast that incidentally showed an \"enhancing left renal mass, renal cell carcinoma is of concern.\" His sister had accompanied him to that appt and I had told both of them that he should first address this with his nephrologist as he has ESRD on HD. However he never mentioned it.    When he came back in October I stressed how important it was for him to see a specialist for the mass and told him it could be cancer. I advised him to get a renal ultrasound to check on the mass and gave him the phone number to [place] and schedule it over at the hospital but he never did this. He was referred to urologist Dr Ford and was given the office info and advised to schedule an appt but he never did this either.      When he came back on 12-6-23 he still had not contacted the urology office so again he was warned that he may have cancer and advised to make an appt and was given th office info again. The ultrasound had still not been scheduled.     Today 1-31-24 he still has not made an appt so I told him I will make one for him and we will call him with the appt info.  Again the ultrasound has not been scheduled.       Patient

## 2024-01-31 NOTE — PATIENT INSTRUCTIONS
Learning About Being Active as an Older Adult  Why is being active important as you get older?     Being active is one of the best things you can do for your health. And it's never too late to start. Being active--or getting active, if you aren't already--has definite benefits. It can:  Give you more energy,  Keep your mind sharp.  Improve balance to reduce your risk of falls.  Help you manage chronic illness with fewer medicines.  No matter how old you are, how fit you are, or what health problems you have, there is a form of activity that will work for you. And the more physical activity you can do, the better your overall health will be.  What kinds of activity can help you stay healthy?  Being more active will make your daily activities easier. Physical activity includes planned exercise and things you do in daily life. There are four types of activity:  Aerobic.  Doing aerobic activity makes your heart and lungs strong.  Includes walking, dancing, and gardening.  Aim for at least 2½ hours spread throughout the week.  It improves your energy and can help you sleep better.  Muscle-strengthening.  This type of activity can help maintain muscle and strengthen bones.  Includes climbing stairs, using resistance bands, and lifting or carrying heavy loads.  Aim for at least twice a week.  It can help protect the knees and other joints.  Stretching.  Stretching gives you better range of motion in joints and muscles.  Includes upper arm stretches, calf stretches, and gentle yoga.  Aim for at least twice a week, preferably after your muscles are warmed up from other activities.  It can help you function better in daily life.  Balancing.  This helps you stay coordinated and have good posture.  Includes heel-to-toe walking, sarah chi, and certain types of yoga.  Aim for at least 3 days a week.  It can reduce your risk of falling.  Even if you have a hard time meeting the recommendations, it's better to be more active

## 2024-02-01 ENCOUNTER — TELEPHONE (OUTPATIENT)
Age: 79
End: 2024-02-01

## 2024-02-01 ENCOUNTER — CLINICAL DOCUMENTATION (OUTPATIENT)
Age: 79
End: 2024-02-01

## 2024-02-01 DIAGNOSIS — G89.29 CHRONIC PAIN OF LEFT KNEE: ICD-10-CM

## 2024-02-01 DIAGNOSIS — R06.2 WHEEZING: ICD-10-CM

## 2024-02-01 DIAGNOSIS — M25.562 CHRONIC PAIN OF LEFT KNEE: ICD-10-CM

## 2024-02-01 NOTE — TELEPHONE ENCOUNTER
Patient requesting refill on     Requested Prescriptions     Pending Prescriptions Disp Refills    gabapentin (NEURONTIN) 600 MG tablet 180 tablet 2     Sig: Take 2 tablets every 8 hours        Last OV 1/31/2024

## 2024-02-01 NOTE — TELEPHONE ENCOUNTER
----- Message from Manisha Shah sent at 2/1/2024 11:38 AM EST -----  Subject: Refill Request    QUESTIONS  Name of Medication? gabapentin (NEURONTIN) 600 MG tablet  Patient-reported dosage and instructions? twice a day  How many days do you have left? 0  Preferred Pharmacy? [facility]/PH[facility]ACY #[id number]  Pharmacy phone number (if available)? [phone]  Additional Information for Provider? Darrel was seen 01/31/24 and needs a   refill of his gabapentin (NEURONTIN) 600 MG tablets. He takes them twice a   day and has none left he would like that sent to [facility] and he can   be reached at [phone] ok to leave a message  ---------------------------------------------------------------------------  --------------  CALL BACK INFO  What is the best way for the office to contact you? OK to leave message on   voicemail  Preferred Call Back Phone Number? [phone]  ---------------------------------------------------------------------------  --------------  SCRIPT ANSWERS  Relationship to Patient? Self

## 2024-02-01 NOTE — TELEPHONE ENCOUNTER
Requesting refill on:    albuterol sulfate HFA (PROVENTIL;VENTOLIN;PROAIR) 108 (90 Base) MCG/ACT inhaler [[id number]]     LOV 1/31/24    [facility]/PHARMACY #[id number] - [place] [address] - P [phone] - F [phone] [[id number]]

## 2024-02-02 RX ORDER — ALBUTEROL SULFATE 90 UG/1
AEROSOL, METERED RESPIRATORY (INHALATION)
Qty: 8.5 EACH | Refills: 1 | Status: SHIPPED | OUTPATIENT
Start: 2024-02-02

## 2024-02-02 RX ORDER — GABAPENTIN 600 MG/1
TABLET ORAL
Qty: 180 TABLET | Refills: 0 | Status: SHIPPED | OUTPATIENT
Start: 2024-02-02 | End: 2024-05-23

## 2024-02-08 ENCOUNTER — TELEPHONE (OUTPATIENT)
Age: 79
End: 2024-02-08

## 2024-02-08 NOTE — TELEPHONE ENCOUNTER
Rec'd patient's daughter's FMLA paperwork. Does she drive him to dialysis 3 times a week or does she only drive him to his other doctor's appts? If driving to dialysis she will likely need to reduce her work schedule on a weekly basis.

## 2024-02-11 DIAGNOSIS — L30.9 DERMATITIS DUE TO UNKNOWN CAUSE: ICD-10-CM

## 2024-02-12 ENCOUNTER — TRANSCRIBE ORDERS (OUTPATIENT)
Facility: HOSPITAL | Age: 79
End: 2024-02-12

## 2024-02-12 ENCOUNTER — TELEPHONE (OUTPATIENT)
Age: 79
End: 2024-02-12

## 2024-02-12 DIAGNOSIS — N28.89 URETERAL FISTULA: Primary | ICD-10-CM

## 2024-02-12 NOTE — TELEPHONE ENCOUNTER
Patient requesting refill on     Requested Prescriptions     Pending Prescriptions Disp Refills    mupirocin (BACTROBAN) 2 % ointment [Pharmacy Med Name: MUPIROCIN 2% OINTMENT] 66 g 0     Sig: APPLY TOPICALLY TO RASH 2 TIMES DAILY X 1 WEEK.        Last OV 1/31/2024

## 2024-02-12 NOTE — TELEPHONE ENCOUNTER
Can someone please call Dr Ford's office and ask if patient has an appt scheduled? He has a kidney mass and needs to be seen ASAP.

## 2024-02-12 NOTE — TELEPHONE ENCOUNTER
This is only for infection. He should not be using long term as it will lead to antibiotic resistance.

## 2024-02-13 ENCOUNTER — CLINICAL DOCUMENTATION (OUTPATIENT)
Age: 79
End: 2024-02-13

## 2024-02-13 NOTE — PROGRESS NOTES
Seen by urologist Dr David Campos on 2-12-24 for left renal mass. 80% are usually malignant. Note states biopsies are not able to discern between malignant and benign.  Tx options discussed including left nephrectomy, ablations, or close surveillance. Pt opted for close surveillance of growth pattern.

## 2024-02-19 ENCOUNTER — HOSPITAL ENCOUNTER (OUTPATIENT)
Facility: HOSPITAL | Age: 79
Discharge: HOME | End: 2024-02-22
Attending: UROLOGY
Payer: MEDICARE

## 2024-02-19 DIAGNOSIS — N28.89 URETERAL FISTULA: ICD-10-CM

## 2024-02-19 PROCEDURE — 74170 CT ABD WO CNTRST FLWD CNTRST: CPT

## 2024-02-19 PROCEDURE — 6360000004 HC RX CONTRAST MEDICATION: Performed by: UROLOGY

## 2024-02-19 RX ADMIN — IOPAMIDOL 100 ML: 612 INJECTION, SOLUTION INTRAVENOUS at 12:15

## 2024-02-21 ENCOUNTER — CLINICAL DOCUMENTATION (OUTPATIENT)
Age: 79
End: 2024-02-21

## 2024-02-29 ENCOUNTER — CLINICAL DOCUMENTATION (OUTPATIENT)
Age: 79
End: 2024-02-29

## 2024-02-29 NOTE — PROGRESS NOTES
Pt seen by urology 2/26/24 for follow up, he was following up after CT scan which showed 2.3 cm lesion smaller than prior. They discussed left nephrectomy , left renal ablative techniques or observation, he recommended the latter and they are in agreement, will reimagine in 9 months.

## 2024-03-29 DIAGNOSIS — R06.2 WHEEZING: ICD-10-CM

## 2024-03-29 RX ORDER — ALBUTEROL SULFATE 90 UG/1
AEROSOL, METERED RESPIRATORY (INHALATION)
Qty: 8.5 EACH | Refills: 0 | Status: SHIPPED | OUTPATIENT
Start: 2024-03-29

## 2024-04-01 ENCOUNTER — HOSPITAL ENCOUNTER (OUTPATIENT)
Facility: HOSPITAL | Age: 79
Discharge: HOME | End: 2024-04-04
Payer: MEDICARE

## 2024-04-01 ENCOUNTER — PATIENT MESSAGE (OUTPATIENT)
Age: 79
End: 2024-04-01

## 2024-04-01 ENCOUNTER — OFFICE VISIT (OUTPATIENT)
Age: 79
End: 2024-04-01
Payer: MEDICARE

## 2024-04-01 VITALS
HEART RATE: 85 BPM | HEIGHT: 72 IN | BODY MASS INDEX: 20.99 KG/M2 | WEIGHT: 155 LBS | RESPIRATION RATE: 18 BRPM | DIASTOLIC BLOOD PRESSURE: 86 MMHG | TEMPERATURE: 97.7 F | OXYGEN SATURATION: 96 % | SYSTOLIC BLOOD PRESSURE: 150 MMHG

## 2024-04-01 DIAGNOSIS — Z71.89 ACP (ADVANCE CARE PLANNING): ICD-10-CM

## 2024-04-01 DIAGNOSIS — R06.2 WHEEZING: ICD-10-CM

## 2024-04-01 DIAGNOSIS — R06.2 WHEEZING: Primary | ICD-10-CM

## 2024-04-01 PROCEDURE — 3077F SYST BP >= 140 MM HG: CPT | Performed by: NURSE PRACTITIONER

## 2024-04-01 PROCEDURE — 96372 THER/PROPH/DIAG INJ SC/IM: CPT | Performed by: NURSE PRACTITIONER

## 2024-04-01 PROCEDURE — 1123F ACP DISCUSS/DSCN MKR DOCD: CPT | Performed by: NURSE PRACTITIONER

## 2024-04-01 PROCEDURE — 3079F DIAST BP 80-89 MM HG: CPT | Performed by: NURSE PRACTITIONER

## 2024-04-01 PROCEDURE — 99213 OFFICE O/P EST LOW 20 MIN: CPT | Performed by: NURSE PRACTITIONER

## 2024-04-01 PROCEDURE — 71046 X-RAY EXAM CHEST 2 VIEWS: CPT

## 2024-04-01 RX ORDER — METHYLPREDNISOLONE ACETATE 80 MG/ML
80 INJECTION, SUSPENSION INTRA-ARTICULAR; INTRALESIONAL; INTRAMUSCULAR; SOFT TISSUE ONCE
Status: COMPLETED | OUTPATIENT
Start: 2024-04-01 | End: 2024-04-01

## 2024-04-01 RX ORDER — PRAVASTATIN SODIUM 10 MG
1 TABLET ORAL
COMMUNITY

## 2024-04-01 RX ORDER — COLCHICINE 0.6 MG/1
0.6 TABLET ORAL DAILY
COMMUNITY
Start: 2024-02-08

## 2024-04-01 RX ORDER — TRAVOPROST OPHTHALMIC SOLUTION 0.04 MG/ML
SOLUTION OPHTHALMIC
COMMUNITY
Start: 2024-03-20

## 2024-04-01 RX ORDER — BUDESONIDE AND FORMOTEROL FUMARATE DIHYDRATE 160; 4.5 UG/1; UG/1
2 AEROSOL RESPIRATORY (INHALATION) 2 TIMES DAILY
Qty: 10.2 G | Refills: 5 | Status: SHIPPED | OUTPATIENT
Start: 2024-04-01

## 2024-04-01 RX ORDER — SACUBITRIL AND VALSARTAN 24; 26 MG/1; MG/1
1 TABLET, FILM COATED ORAL 2 TIMES DAILY
COMMUNITY
Start: 2024-03-29

## 2024-04-01 RX ORDER — AZITHROMYCIN 250 MG/1
TABLET, FILM COATED ORAL
Qty: 6 TABLET | Refills: 0 | Status: SHIPPED | OUTPATIENT
Start: 2024-04-01 | End: 2024-04-11

## 2024-04-01 RX ORDER — PREDNISONE 10 MG/1
TABLET ORAL
Qty: 21 TABLET | Refills: 0 | Status: SHIPPED | OUTPATIENT
Start: 2024-04-01

## 2024-04-01 RX ADMIN — METHYLPREDNISOLONE ACETATE 80 MG: 80 INJECTION, SUSPENSION INTRA-ARTICULAR; INTRALESIONAL; INTRAMUSCULAR; SOFT TISSUE at 14:15

## 2024-04-01 SDOH — ECONOMIC STABILITY: FOOD INSECURITY: WITHIN THE PAST 12 MONTHS, THE FOOD YOU BOUGHT JUST DIDN'T LAST AND YOU DIDN'T HAVE MONEY TO GET MORE.: NEVER TRUE

## 2024-04-01 SDOH — ECONOMIC STABILITY: FOOD INSECURITY: WITHIN THE PAST 12 MONTHS, YOU WORRIED THAT YOUR FOOD WOULD RUN OUT BEFORE YOU GOT MONEY TO BUY MORE.: NEVER TRUE

## 2024-04-01 SDOH — ECONOMIC STABILITY: INCOME INSECURITY: HOW HARD IS IT FOR YOU TO PAY FOR THE VERY BASICS LIKE FOOD, HOUSING, MEDICAL CARE, AND HEATING?: NOT HARD AT ALL

## 2024-04-01 ASSESSMENT — PATIENT HEALTH QUESTIONNAIRE - PHQ9
SUM OF ALL RESPONSES TO PHQ QUESTIONS 1-9: 0
SUM OF ALL RESPONSES TO PHQ QUESTIONS 1-9: 0
2. FEELING DOWN, DEPRESSED OR HOPELESS: NOT AT ALL
SUM OF ALL RESPONSES TO PHQ QUESTIONS 1-9: 0
1. LITTLE INTEREST OR PLEASURE IN DOING THINGS: NOT AT ALL
SUM OF ALL RESPONSES TO PHQ QUESTIONS 1-9: 0
SUM OF ALL RESPONSES TO PHQ9 QUESTIONS 1 & 2: 0

## 2024-04-01 ASSESSMENT — ENCOUNTER SYMPTOMS
APNEA: 0
EYE DISCHARGE: 0
CHEST TIGHTNESS: 0
ABDOMINAL DISTENTION: 0

## 2024-04-01 NOTE — PROGRESS NOTES
After obtaining consent, and per orders of NP , injection of Methylprednisolone given in Right deltoid by Naga Hallman LPN. Patient instructed to remain in clinic for 20 minutes afterwards, and to report any adverse reaction to me immediately.

## 2024-04-01 NOTE — PROGRESS NOTES
Darrel Patterson is a 79 y.o. male who presents today with c/o   Chief Complaint   Patient presents with    Wheezing           Assessment/ Plan:       ASSESSMENT AND PLAN    Diagnoses:  1.  Asthma exacerbation  Admin methyl prednisone in the office  Start steroid taper tomorrow  Start azithromycin  Obtain chest xray at the hospital        Orders Placed This Encounter   Procedures    XR CHEST (2 VW)     Standing Status:   Future     Number of Occurrences:   1     Standing Expiration Date:   4/1/2025     Order Specific Question:   Reason for exam:     Answer:   wheezing     Return if symptoms worsen or fail to improve.       Subjective:  Darrel Patterson is a 79 y.o. male here today for wheezing. He has been wheezing for about five days. He denies fevers. Coughing up green sputum. Has a history of asthma. Uses his albuterol on occasion. Denies sick contacts. Feels short of breath when ambulating. Denies CP. Oxygen saturation is stable in the office today.    Patient Active Problem List   Diagnosis    History of GI bleed    Anemia due to chronic kidney disease    A-V fistula ([facility])    S/P cataract surgery    Habitual alcohol use    Mild intermittent asthma without complication    Essential hypertension, benign    Diverticula of colon    Gastroesophageal reflux disease without esophagitis    Rheumatoid arthritis with positive rheumatoid factor ([facility])    Glaucoma    ESRD on hemodialysis ([facility])    Impingement syndrome of both shoulders    Hypertensive retinopathy of both eyes    CHF (congestive heart failure) ([facility])    DDD (degenerative disc disease), lumbar    DDD (degenerative disc disease), cervical    Psychophysiological insomnia    Fatigue    Hyperkalemia    Seizure ([facility])    Long term (current) use of systemic steroids    Weakness generalized    Left renal mass       Past Medical History:   Diagnosis Date    Anemia due to chronic kidney disease     Asthma     Autoimmune disease ([facility])     Rheumatoid arthritis    Chronic

## 2024-04-01 NOTE — PROGRESS NOTES
As advised by NP normal results for X-ray given to patient.  Also medication reconciled with daughter over the phone, patient was unsure what he was taking when he was in the office.

## 2024-04-01 NOTE — PATIENT INSTRUCTIONS
effects:  bleeding or bruising  severe or persistent bone, joint or jaw pain  coughing up blood or vomit that looks like coffee grounds  depression or feeling sad  swelling of the legs, feet, and hands  fever or chills  hair growth  fast or irregular heart beats  signs of liver damage (such as yellowing of eye or skin, dark urine, or unusual tiredness)  menstruation changes (missed or fewer periods)  mood changes  muscle weakness  seizures  thinning of the skin  dark, tarry stool  excessive thirst  yeast infection of mouth  increased urinary frequency  vaginal itching or yeast infection  blurring or changes of vision  sudden or unexplained weight gain  slow wound healing  A few people may have an allergic reaction to this medicine. Symptoms can include difficulty breathing, skin rash, itching, swelling, or severe dizziness. If you notice any of these symptoms, seek medical help quickly.  Please speak with your doctor, nurse, or pharmacist if you have any questions about this medicine.  IMPORTANT NOTE: This document tells you briefly how to take your medicine, but it does not tell you all there is to know about it. Your doctor or pharmacist may give you other documents about your medicine. Please talk to them if you have any questions. Always follow their advice.  There is a more complete description of this medicine available in English. Scan this code on your smartphone or tablet or use the web address below. You can also ask your pharmacist for a printout. If you have any questions, please ask your pharmacist.  The display and use of this drug information is subject to Terms of Use.  More information about METHYLPREDNISOLONE ACETATE SUSPENSION - INJECTION      Copyright(c) 2023 [employer]  Selected from data included with permission and copyright by [employer]. This copyrighted material has been downloaded from a licensed data provider and is not for distribution, except as may be authorized by

## 2024-04-01 NOTE — PROGRESS NOTES
Chief Complaint   Patient presents with    Wheezing       Vitals:    04/01/24 1353   BP: (!) 150/86   Pulse: 85   Resp: 18   Temp: 97.7 °F (36.5 °C)   SpO2: 96%   \"Have you been to the ER, urgent care clinic since your last visit?  Hospitalized since your last visit?\"    NO    “Have you seen or consulted any other health care providers outside of [facility] since your last visit?”    NO          Click Here for Release of Records Request

## 2024-04-02 NOTE — TELEPHONE ENCOUNTER
From: Darrel Patterson  To: Roma Solitario  Sent: 4/1/2024 8:20 AM EDT  Subject: Medication change by Cardiologist Dr. Vincent Kaur D/jaylin Rojo's Losartan and started him on entresto 24/26 twice per day during his appt on Friday.

## 2024-04-11 ENCOUNTER — CLINICAL DOCUMENTATION (OUTPATIENT)
Age: 79
End: 2024-04-11

## 2024-04-11 NOTE — PROGRESS NOTES
Patient seen by cardiology Dr Uriostegui 3-29-24. Discussed ICD. Changed losartan to enstresto.24/26 bid. Now on metoprolol 12.5mg. will increase doses in the next couple of months if BP will allow, otherwise they will add Corlanor if needed for HR. May need MitraClip for mitral regurg.

## 2024-04-23 RX ORDER — METOPROLOL SUCCINATE 25 MG/1
25 TABLET, EXTENDED RELEASE ORAL DAILY
Qty: 30 TABLET | Refills: 3
Start: 2024-04-23

## 2024-05-06 RX ORDER — MONTELUKAST SODIUM 10 MG/1
10 TABLET ORAL DAILY
Qty: 90 TABLET | Refills: 1 | Status: SHIPPED | OUTPATIENT
Start: 2024-05-06

## 2024-05-16 RX ORDER — HYDROXYZINE PAMOATE 25 MG/1
CAPSULE ORAL
Qty: 270 CAPSULE | Refills: 2 | Status: SHIPPED | OUTPATIENT
Start: 2024-05-16

## 2024-05-16 NOTE — TELEPHONE ENCOUNTER
Patient requesting refill on     Requested Prescriptions     Pending Prescriptions Disp Refills    hydrOXYzine pamoate (VISTARIL) 25 MG capsule [Pharmacy Med Name: HYDROXYZINE BHARGAV 25 MG CAP] 270 capsule 2     Sig: TAKE 1 CAP BY MOUTH THREE (3) TIMES DAILY AS NEEDED FOR ITCHING.        Last OV 1/31/2024

## 2024-05-20 RX ORDER — OMEPRAZOLE 40 MG/1
40 CAPSULE, DELAYED RELEASE ORAL 2 TIMES DAILY
Qty: 180 CAPSULE | Refills: 0 | Status: SHIPPED | OUTPATIENT
Start: 2024-05-20

## 2024-05-20 NOTE — TELEPHONE ENCOUNTER
Patient requesting refill on     Requested Prescriptions     Pending Prescriptions Disp Refills    omeprazole (PRILOSEC) 40 MG delayed release capsule [Pharmacy Med Name: OMEPRAZOLE DR 40 MG CAPSULE] 180 capsule 0     Sig: TAKE 1 CAPSULE BY MOUTH TWICE A DAY        Last OV 1/31/2024

## 2024-05-21 ENCOUNTER — CLINICAL DOCUMENTATION (OUTPATIENT)
Age: 79
End: 2024-05-21

## 2024-05-21 NOTE — PROGRESS NOTES
Patient called in stating he has been having black stools for over a month and needed an appointment.  Patient did not want to go to hospital.  Patient set  up for tomorrow at 3:30

## 2024-05-22 ENCOUNTER — OFFICE VISIT (OUTPATIENT)
Age: 79
End: 2024-05-22
Payer: MEDICARE

## 2024-05-22 VITALS
BODY MASS INDEX: 20.86 KG/M2 | HEART RATE: 82 BPM | RESPIRATION RATE: 18 BRPM | DIASTOLIC BLOOD PRESSURE: 64 MMHG | WEIGHT: 154 LBS | TEMPERATURE: 97 F | OXYGEN SATURATION: 98 % | SYSTOLIC BLOOD PRESSURE: 139 MMHG | HEIGHT: 72 IN

## 2024-05-22 DIAGNOSIS — Z99.2 ANEMIA DUE TO CHRONIC KIDNEY DISEASE, ON CHRONIC DIALYSIS (HCC): ICD-10-CM

## 2024-05-22 DIAGNOSIS — M05.79 RHEUMATOID ARTHRITIS INVOLVING MULTIPLE SITES WITH POSITIVE RHEUMATOID FACTOR (HCC): ICD-10-CM

## 2024-05-22 DIAGNOSIS — D63.1 ANEMIA DUE TO CHRONIC KIDNEY DISEASE, ON CHRONIC DIALYSIS (HCC): ICD-10-CM

## 2024-05-22 DIAGNOSIS — R19.5 DARK STOOLS: Primary | ICD-10-CM

## 2024-05-22 DIAGNOSIS — N18.6 ANEMIA DUE TO CHRONIC KIDNEY DISEASE, ON CHRONIC DIALYSIS (HCC): ICD-10-CM

## 2024-05-22 LAB — HEMOGLOBIN, POC: 8.2 G/DL

## 2024-05-22 PROCEDURE — 82270 OCCULT BLOOD FECES: CPT | Performed by: NURSE PRACTITIONER

## 2024-05-22 PROCEDURE — 1123F ACP DISCUSS/DSCN MKR DOCD: CPT | Performed by: NURSE PRACTITIONER

## 2024-05-22 PROCEDURE — 99214 OFFICE O/P EST MOD 30 MIN: CPT | Performed by: NURSE PRACTITIONER

## 2024-05-22 PROCEDURE — 3078F DIAST BP <80 MM HG: CPT | Performed by: NURSE PRACTITIONER

## 2024-05-22 PROCEDURE — 3075F SYST BP GE 130 - 139MM HG: CPT | Performed by: NURSE PRACTITIONER

## 2024-05-22 PROCEDURE — 85018 HEMOGLOBIN: CPT | Performed by: NURSE PRACTITIONER

## 2024-05-22 RX ORDER — PREDNISONE 10 MG/1
TABLET ORAL
Qty: 21 TABLET | Refills: 0 | Status: SHIPPED | OUTPATIENT
Start: 2024-05-22

## 2024-05-22 NOTE — PROGRESS NOTES
\"Have you been to the ER, urgent care clinic since your last visit?  Hospitalized since your last visit?\"    NO    “Have you seen or consulted any other health care providers outside of [facility] since your last visit?”    NO          Click Here for Release of Records Request  
and unlabored. All lobes clear to auscultation bilaterally   Heart :RRR, S1 and S2 normal intensity, no extra heart sounds  Extremities: Non-edematous  Musculoskeletal: +edema to bilateral knees, ambulates with a cane  Neuro: Cranial nerves grossly normal.  Psych: Mood and thought content appropriate for situation. Dressed appropriately and with good hygiene.  Skin: Warm and dry and intact    Assessment/ Plan:     Dark stools  POC HGB 8.2 but he has chronic anemia r/t HD and I do not have access to his last CBC.   FOBT ordered- he was advised to complete and return ASAP. If pos will refer to [facility]  He is receiving iron at dialysis.  He is going to go home and make sure he has omprazole at home and he will let me know if he doesn't  Go to ER for worsening SOB, CP, or dizziness  Follow up in 1 week for HGB recheck    RA  Reports worsening joint pain   Cannot take NSAIDS  Taking max dose of gabapentin already  Cannot travel to pain management out of town  Start prednisone 10mg- take 6 pills today then take 1 less pill every day until gone (#21, 0R)  F/U 1 week        Verbal and written instructions (see AVS) provided.  Patient expresses understanding of diagnosis and treatment plan.    Health Maintenance Due   Topic Date Due    Lipids  04/12/2024         No follow-up provider specified.      Roma Solitario NP

## 2024-05-23 LAB
FECAL OCCULT BLOOD #2, POC: POSITIVE
FECAL OCCULT BLOOD #3, POC: POSITIVE
HEMOCCULT STL QL: POSITIVE
VALID INTERNAL CONTROL: YES

## 2024-06-10 ENCOUNTER — HOSPITAL ENCOUNTER (INPATIENT)
Facility: HOSPITAL | Age: 79
LOS: 2 days | Discharge: HOME | DRG: 377 | End: 2024-06-12
Attending: INTERNAL MEDICINE | Admitting: INTERNAL MEDICINE
Payer: MEDICARE

## 2024-06-10 ENCOUNTER — HOSPITAL ENCOUNTER (EMERGENCY)
Facility: HOSPITAL | Age: 79
Discharge: ANOTHER HOSPITAL | End: 2024-06-10
Attending: EMERGENCY MEDICINE
Payer: MEDICARE

## 2024-06-10 ENCOUNTER — APPOINTMENT (OUTPATIENT)
Facility: HOSPITAL | Age: 79
End: 2024-06-10
Payer: MEDICARE

## 2024-06-10 VITALS
BODY MASS INDEX: 20.32 KG/M2 | OXYGEN SATURATION: 97 % | HEIGHT: 72 IN | DIASTOLIC BLOOD PRESSURE: 88 MMHG | SYSTOLIC BLOOD PRESSURE: 154 MMHG | RESPIRATION RATE: 19 BRPM | TEMPERATURE: 98.1 F | HEART RATE: 79 BPM | WEIGHT: 150 LBS

## 2024-06-10 DIAGNOSIS — D64.9 CHRONIC ANEMIA: ICD-10-CM

## 2024-06-10 DIAGNOSIS — Z99.2 ESRD (END STAGE RENAL DISEASE) ON DIALYSIS (HCC): ICD-10-CM

## 2024-06-10 DIAGNOSIS — N18.6 ESRD (END STAGE RENAL DISEASE) ON DIALYSIS (HCC): ICD-10-CM

## 2024-06-10 DIAGNOSIS — K92.2 GASTROINTESTINAL HEMORRHAGE, UNSPECIFIED GASTROINTESTINAL HEMORRHAGE TYPE: Primary | ICD-10-CM

## 2024-06-10 LAB
ABO + RH BLD: NORMAL
ANION GAP SERPL CALC-SCNC: 13 MMOL/L (ref 5–15)
BASOPHILS # BLD: 0.1 K/UL (ref 0–0.1)
BASOPHILS NFR BLD: 1 % (ref 0–1)
BLOOD GROUP ANTIBODIES SERPL: NORMAL
BUN SERPL-MCNC: 51 MG/DL (ref 6–20)
BUN/CREAT SERPL: 5 (ref 12–20)
CALCIUM SERPL-MCNC: 7.8 MG/DL (ref 8.5–10.1)
CHLORIDE SERPL-SCNC: 97 MMOL/L (ref 97–108)
CO2 SERPL-SCNC: 30 MMOL/L (ref 21–32)
CREAT SERPL-MCNC: 9.37 MG/DL (ref 0.7–1.3)
DIFFERENTIAL METHOD BLD: ABNORMAL
EKG ATRIAL RATE: 80 BPM
EKG DIAGNOSIS: NORMAL
EKG P-R INTERVAL: 124 MS
EKG Q-T INTERVAL: 434 MS
EKG QRS DURATION: 110 MS
EKG QTC CALCULATION (BAZETT): 500 MS
EKG R AXIS: 211 DEGREES
EKG T AXIS: 74 DEGREES
EKG VENTRICULAR RATE: 80 BPM
EOSINOPHIL # BLD: 0.7 K/UL (ref 0–0.4)
EOSINOPHIL NFR BLD: 8 % (ref 0–7)
ERYTHROCYTE [DISTWIDTH] IN BLOOD BY AUTOMATED COUNT: 16.8 % (ref 11.5–14.5)
GLUCOSE SERPL-MCNC: 98 MG/DL (ref 65–100)
HCT VFR BLD AUTO: 22.5 % (ref 36.6–50.3)
HGB BLD-MCNC: 7.4 G/DL (ref 12.1–17)
IMM GRANULOCYTES # BLD AUTO: 0 K/UL (ref 0–0.04)
IMM GRANULOCYTES NFR BLD AUTO: 0 % (ref 0–0.5)
LYMPHOCYTES # BLD: 1.4 K/UL (ref 0.8–3.5)
LYMPHOCYTES NFR BLD: 15 % (ref 12–49)
MCH RBC QN AUTO: 33.5 PG (ref 26–34)
MCHC RBC AUTO-ENTMCNC: 32.9 G/DL (ref 30–36.5)
MCV RBC AUTO: 101.8 FL (ref 80–99)
MONOCYTES # BLD: 1.2 K/UL (ref 0–1)
MONOCYTES NFR BLD: 13 % (ref 5–13)
NEUTS SEG # BLD: 5.9 K/UL (ref 1.8–8)
NEUTS SEG NFR BLD: 63 % (ref 32–75)
NRBC # BLD: 0 K/UL (ref 0–0.01)
NRBC BLD-RTO: 0 PER 100 WBC
PLATELET # BLD AUTO: 246 K/UL (ref 150–400)
PMV BLD AUTO: 10.7 FL (ref 8.9–12.9)
POTASSIUM SERPL-SCNC: 3.4 MMOL/L (ref 3.5–5.1)
RBC # BLD AUTO: 2.21 M/UL (ref 4.1–5.7)
SODIUM SERPL-SCNC: 140 MMOL/L (ref 136–145)
SPECIMEN EXP DATE BLD: NORMAL
TROPONIN I SERPL HS-MCNC: 82 NG/L (ref 0–76)
WBC # BLD AUTO: 9.2 K/UL (ref 4.1–11.1)

## 2024-06-10 PROCEDURE — 86900 BLOOD TYPING SEROLOGIC ABO: CPT

## 2024-06-10 PROCEDURE — 6370000000 HC RX 637 (ALT 250 FOR IP): Performed by: INTERNAL MEDICINE

## 2024-06-10 PROCEDURE — 85025 COMPLETE CBC W/AUTO DIFF WBC: CPT

## 2024-06-10 PROCEDURE — 1100000003 HC PRIVATE W/ TELEMETRY

## 2024-06-10 PROCEDURE — 71045 X-RAY EXAM CHEST 1 VIEW: CPT

## 2024-06-10 PROCEDURE — 93005 ELECTROCARDIOGRAM TRACING: CPT | Performed by: EMERGENCY MEDICINE

## 2024-06-10 PROCEDURE — 84484 ASSAY OF TROPONIN QUANT: CPT

## 2024-06-10 PROCEDURE — A4216 STERILE WATER/SALINE, 10 ML: HCPCS | Performed by: EMERGENCY MEDICINE

## 2024-06-10 PROCEDURE — 80048 BASIC METABOLIC PNL TOTAL CA: CPT

## 2024-06-10 PROCEDURE — 2580000003 HC RX 258: Performed by: INTERNAL MEDICINE

## 2024-06-10 PROCEDURE — 6360000002 HC RX W HCPCS: Performed by: EMERGENCY MEDICINE

## 2024-06-10 PROCEDURE — 36415 COLL VENOUS BLD VENIPUNCTURE: CPT

## 2024-06-10 PROCEDURE — 99285 EMERGENCY DEPT VISIT HI MDM: CPT

## 2024-06-10 PROCEDURE — 86901 BLOOD TYPING SEROLOGIC RH(D): CPT

## 2024-06-10 PROCEDURE — C9113 INJ PANTOPRAZOLE SODIUM, VIA: HCPCS | Performed by: EMERGENCY MEDICINE

## 2024-06-10 PROCEDURE — 2580000003 HC RX 258: Performed by: EMERGENCY MEDICINE

## 2024-06-10 PROCEDURE — 2500000003 HC RX 250 WO HCPCS: Performed by: INTERNAL MEDICINE

## 2024-06-10 PROCEDURE — 96374 THER/PROPH/DIAG INJ IV PUSH: CPT

## 2024-06-10 PROCEDURE — 86850 RBC ANTIBODY SCREEN: CPT

## 2024-06-10 RX ORDER — SODIUM CHLORIDE 0.9 % (FLUSH) 0.9 %
5-40 SYRINGE (ML) INJECTION PRN
Status: DISCONTINUED | OUTPATIENT
Start: 2024-06-10 | End: 2024-06-12 | Stop reason: HOSPADM

## 2024-06-10 RX ORDER — HYDROXYZINE HYDROCHLORIDE 25 MG/1
25 TABLET, FILM COATED ORAL 3 TIMES DAILY PRN
Status: DISCONTINUED | OUTPATIENT
Start: 2024-06-10 | End: 2024-06-12 | Stop reason: HOSPADM

## 2024-06-10 RX ORDER — METOPROLOL SUCCINATE 25 MG/1
25 TABLET, EXTENDED RELEASE ORAL DAILY
Status: DISCONTINUED | OUTPATIENT
Start: 2024-06-11 | End: 2024-06-12 | Stop reason: HOSPADM

## 2024-06-10 RX ORDER — ONDANSETRON 2 MG/ML
4 INJECTION INTRAMUSCULAR; INTRAVENOUS EVERY 6 HOURS PRN
Status: DISCONTINUED | OUTPATIENT
Start: 2024-06-10 | End: 2024-06-12 | Stop reason: HOSPADM

## 2024-06-10 RX ORDER — MONTELUKAST SODIUM 10 MG/1
10 TABLET ORAL DAILY
Status: DISCONTINUED | OUTPATIENT
Start: 2024-06-10 | End: 2024-06-12 | Stop reason: HOSPADM

## 2024-06-10 RX ORDER — SODIUM CHLORIDE 0.9 % (FLUSH) 0.9 %
5-40 SYRINGE (ML) INJECTION EVERY 12 HOURS SCHEDULED
Status: DISCONTINUED | OUTPATIENT
Start: 2024-06-10 | End: 2024-06-12 | Stop reason: HOSPADM

## 2024-06-10 RX ORDER — SEVELAMER CARBONATE 800 MG/1
1600 TABLET, FILM COATED ORAL
Status: DISCONTINUED | OUTPATIENT
Start: 2024-06-10 | End: 2024-06-12 | Stop reason: HOSPADM

## 2024-06-10 RX ORDER — ACETAMINOPHEN 650 MG/1
650 SUPPOSITORY RECTAL EVERY 6 HOURS PRN
Status: DISCONTINUED | OUTPATIENT
Start: 2024-06-10 | End: 2024-06-12 | Stop reason: HOSPADM

## 2024-06-10 RX ORDER — TRAZODONE HYDROCHLORIDE 50 MG/1
50 TABLET ORAL NIGHTLY
Status: DISCONTINUED | OUTPATIENT
Start: 2024-06-10 | End: 2024-06-12 | Stop reason: HOSPADM

## 2024-06-10 RX ORDER — ACETAMINOPHEN 325 MG/1
650 TABLET ORAL EVERY 6 HOURS PRN
Status: DISCONTINUED | OUTPATIENT
Start: 2024-06-10 | End: 2024-06-12 | Stop reason: HOSPADM

## 2024-06-10 RX ORDER — GABAPENTIN 100 MG/1
300 CAPSULE ORAL DAILY
Status: DISCONTINUED | OUTPATIENT
Start: 2024-06-11 | End: 2024-06-12 | Stop reason: HOSPADM

## 2024-06-10 RX ORDER — SODIUM CHLORIDE 9 MG/ML
INJECTION, SOLUTION INTRAVENOUS PRN
Status: DISCONTINUED | OUTPATIENT
Start: 2024-06-10 | End: 2024-06-12 | Stop reason: HOSPADM

## 2024-06-10 RX ORDER — ALBUTEROL SULFATE 90 UG/1
2 AEROSOL, METERED RESPIRATORY (INHALATION) EVERY 4 HOURS PRN
Status: DISCONTINUED | OUTPATIENT
Start: 2024-06-10 | End: 2024-06-10 | Stop reason: CLARIF

## 2024-06-10 RX ORDER — PANTOPRAZOLE SODIUM 40 MG/1
40 TABLET, DELAYED RELEASE ORAL
Status: DISCONTINUED | OUTPATIENT
Start: 2024-06-11 | End: 2024-06-12 | Stop reason: HOSPADM

## 2024-06-10 RX ORDER — ONDANSETRON 4 MG/1
4 TABLET, ORALLY DISINTEGRATING ORAL EVERY 8 HOURS PRN
Status: DISCONTINUED | OUTPATIENT
Start: 2024-06-10 | End: 2024-06-12 | Stop reason: HOSPADM

## 2024-06-10 RX ORDER — PRAVASTATIN SODIUM 10 MG
10 TABLET ORAL NIGHTLY
Status: DISCONTINUED | OUTPATIENT
Start: 2024-06-10 | End: 2024-06-12 | Stop reason: HOSPADM

## 2024-06-10 RX ORDER — COLCHICINE 0.6 MG/1
0.6 TABLET ORAL DAILY
Status: DISCONTINUED | OUTPATIENT
Start: 2024-06-10 | End: 2024-06-12 | Stop reason: HOSPADM

## 2024-06-10 RX ORDER — LATANOPROST 50 UG/ML
1 SOLUTION/ DROPS OPHTHALMIC NIGHTLY
Status: DISCONTINUED | OUTPATIENT
Start: 2024-06-10 | End: 2024-06-12 | Stop reason: HOSPADM

## 2024-06-10 RX ORDER — POLYETHYLENE GLYCOL 3350 17 G/17G
17 POWDER, FOR SOLUTION ORAL DAILY PRN
Status: DISCONTINUED | OUTPATIENT
Start: 2024-06-10 | End: 2024-06-12 | Stop reason: HOSPADM

## 2024-06-10 RX ORDER — ALBUTEROL SULFATE 2.5 MG/3ML
2.5 SOLUTION RESPIRATORY (INHALATION) EVERY 4 HOURS PRN
Status: DISCONTINUED | OUTPATIENT
Start: 2024-06-10 | End: 2024-06-12 | Stop reason: HOSPADM

## 2024-06-10 RX ORDER — CALCIUM ACETATE 667 MG/1
1 CAPSULE ORAL
Status: DISCONTINUED | OUTPATIENT
Start: 2024-06-10 | End: 2024-06-12 | Stop reason: HOSPADM

## 2024-06-10 RX ORDER — FERROUS SULFATE 325(65) MG
325 TABLET ORAL
Status: DISCONTINUED | OUTPATIENT
Start: 2024-06-11 | End: 2024-06-12 | Stop reason: HOSPADM

## 2024-06-10 RX ADMIN — MONTELUKAST 10 MG: 10 TABLET, FILM COATED ORAL at 16:02

## 2024-06-10 RX ADMIN — PANTOPRAZOLE SODIUM 40 MG: 40 INJECTION, POWDER, FOR SOLUTION INTRAVENOUS at 09:44

## 2024-06-10 RX ADMIN — TRAZODONE HYDROCHLORIDE 50 MG: 50 TABLET ORAL at 22:15

## 2024-06-10 RX ADMIN — SACUBITRIL AND VALSARTAN 2 TABLET: 24; 26 TABLET, FILM COATED ORAL at 22:15

## 2024-06-10 RX ADMIN — COLCHICINE 0.6 MG: 0.6 TABLET, FILM COATED ORAL at 16:02

## 2024-06-10 RX ADMIN — PRAVASTATIN SODIUM 10 MG: 10 TABLET ORAL at 22:15

## 2024-06-10 RX ADMIN — CALCIUM ACETATE 667 MG: 667 CAPSULE ORAL at 16:02

## 2024-06-10 RX ADMIN — ACETAMINOPHEN 650 MG: 325 TABLET ORAL at 16:02

## 2024-06-10 RX ADMIN — SEVELAMER CARBONATE 1600 MG: 800 TABLET, FILM COATED ORAL at 16:01

## 2024-06-10 RX ADMIN — LATANOPROST 1 DROP: 50 SOLUTION OPHTHALMIC at 22:15

## 2024-06-10 RX ADMIN — SODIUM CHLORIDE, PRESERVATIVE FREE 10 ML: 5 INJECTION INTRAVENOUS at 22:15

## 2024-06-10 ASSESSMENT — ENCOUNTER SYMPTOMS
COUGH: 0
EYE REDNESS: 0
DIARRHEA: 0
SHORTNESS OF BREATH: 1
NAUSEA: 0
SORE THROAT: 0
ABDOMINAL PAIN: 0
VOMITING: 0

## 2024-06-10 ASSESSMENT — PAIN DESCRIPTION - LOCATION
LOCATION: SHOULDER
LOCATION: ABDOMEN
LOCATION: GENERALIZED
LOCATION: ABDOMEN

## 2024-06-10 ASSESSMENT — PAIN DESCRIPTION - DESCRIPTORS: DESCRIPTORS: ACHING

## 2024-06-10 ASSESSMENT — PAIN SCALES - WONG BAKER: WONGBAKER_NUMERICALRESPONSE: HURTS A LITTLE BIT

## 2024-06-10 ASSESSMENT — PAIN - FUNCTIONAL ASSESSMENT
PAIN_FUNCTIONAL_ASSESSMENT: 0-10
PAIN_FUNCTIONAL_ASSESSMENT: ACTIVITIES ARE NOT PREVENTED

## 2024-06-10 ASSESSMENT — PAIN SCALES - GENERAL
PAINLEVEL_OUTOF10: 5
PAINLEVEL_OUTOF10: 8
PAINLEVEL_OUTOF10: 10
PAINLEVEL_OUTOF10: 8
PAINLEVEL_OUTOF10: 5

## 2024-06-10 ASSESSMENT — PAIN DESCRIPTION - PAIN TYPE: TYPE: ACUTE PAIN

## 2024-06-10 ASSESSMENT — PAIN DESCRIPTION - ORIENTATION: ORIENTATION: LEFT

## 2024-06-10 NOTE — CONSULTS
Consult received  
for frequency, dysuria  Integument:  negative for rash and pruritus  Heme:  negative for easy bruising and gum/nose bleeding  Musculoskel: negative for myalgias,  back pain and muscle weakness  Neuro:  negative for headaches, dizziness, vertigo  Psych:  negative for feelings of anxiety, depression   Travel?: none    Objective:   VITALS:    Vitals:    06/10/24 1318   BP: (!) 156/95   Pulse: 76   Resp: 17   SpO2: 90%     PHYSICAL EXAM:  Gen:  []  WD []  WN  [] cachectic []  thin []  obese []  disheveled             []  ill apearing  []   Critical  [x]   Chronic    [x]  No acute distress    HEENT:   [] NC/AT/PERRL    [] pink conjunctivae      [] pale conjunctivae                  PERRL  [] yes  [] no      [] moist mucosa    [] dry mucosa    hearing intact to voice [] yes  [] No                 NECK:   supple [] yes  [] no        masses [] yes  [] No               thyroid  []  non tender  []  tender    RESP:   [x] CTA bilaterally/no wheezing/rhonchi/rales/crackles    [] rhonchi bilaterally - no dullness  [] wheezing   [] rhonchi   [] crackles     use of accessory muscles [] yes [] no    CARD:   [x]  regular rate and rhythm/No murmurs/rubs/gallops    murmur  [] yes ()  [] no      Rubs  [] yes  [] no       Gallops [] yes  [] no    Rate []  regular  []  irregular        carotid bruits  [] Right  []  Left                 LE edema [] yes  [x] no           JVP  []  yes   []  no    ABD:    [x] soft/non distended/non tender/+bowel sounds/no HSM    []  Rigid    tenderness [] yes [] no   Liver enlargement  []  yes []  no                Spleen enlargement  []  yes []  no     distended []  yes [] no     bowel sound  [] hypoactive   [] hyperactive    LYMPH:    Neck []  yes []  no       Axillae []  yes []  no    SKIN:   Rashes []  yes   []  no    Ulcers []  yes   []  no               [] tight to palpitation    skin turgor []  good  [] poor  [] decreased               Cyanosis/clubbing []  yes []  no    NEUR:   [] cranial nerves 
  Respiratory:  negative cough, negative dyspnea  Cards:  negative for chest pain, palpitations, lower extremity edema  GI:   See HPI  :  negative for frequency, dysuria  Integument:  negative for rash and pruritus  Heme:  negative for easy bruising and gum/nose bleeding  Musculoskel: negative for myalgias,  back pain and muscle weakness  Neuro: negative for headaches, dizziness, vertigo  Psych: negative for feelings of anxiety, depression   Objective:   VITALS:    Vitals:    06/10/24 1318   BP: (!) 156/95   Pulse: 76   Resp: 17   SpO2: 90%       PHYSICAL EXAM:   General:          Alert, WD, WN, cooperative, no distress, appears stated age.    Head:               Normocephalic, without obvious abnormality, atraumatic.  Eyes:               Conjunctivae clear and pale, anicteric sclerae.  Pupils are equal  Abdomen:        Soft, non-tender. Not distended.  Bowel sounds normal. No masses  Extremities:     Atraumatic, No cyanosis.  No edema. No clubbing  Skin:                Texture, turgor normal. No rashes/lesions/jaundice  Psych:             Good insight.  Not depressed.  Not anxious or agitated.  Neurologic:      EOMs intact. No facial asymmetry. No aphasia or slurred speech. Normal                        strength, A/O X 3.     LAB DATA REVIEWED:    Recent Results (from the past 24 hour(s))   TYPE AND SCREEN    Collection Time: 06/10/24  7:20 AM   Result Value Ref Range    Crossmatch expiration date 06/13/2024,2359     ABO/Rh A POSITIVE     Antibody Screen NEG    CBC with Auto Differential    Collection Time: 06/10/24  7:30 AM   Result Value Ref Range    WBC 9.2 4.1 - 11.1 K/uL    RBC 2.21 (L) 4.10 - 5.70 M/uL    Hemoglobin 7.4 (L) 12.1 - 17.0 g/dL    Hematocrit 22.5 (L) 36.6 - 50.3 %    .8 (H) 80.0 - 99.0 FL    MCH 33.5 26.0 - 34.0 PG    MCHC 32.9 30.0 - 36.5 g/dL    RDW 16.8 (H) 11.5 - 14.5 %    Platelets 246 150 - 400 K/uL    MPV 10.7 8.9 - 12.9 FL    Nucleated RBCs 0.0 0  WBC    nRBC 0.00 0.00 -

## 2024-06-10 NOTE — ED PROVIDER NOTES
Palpations: Abdomen is soft.      Tenderness: There is no abdominal tenderness.   Musculoskeletal:      Comments: AV fistula in left forearm with good palpable thrill.   Skin:     General: Skin is warm and dry.      Findings: No rash.   Neurological:      General: No focal deficit present.      Mental Status: He is alert and oriented to person, place, and time.   Psychiatric:         Behavior: Behavior normal.             Diagnostic Study Results     Labs -     Recent Results (from the past 12 hour(s))   CBC with Auto Differential    Collection Time: 06/10/24  7:30 AM   Result Value Ref Range    WBC 9.2 4.1 - 11.1 K/uL    RBC 2.21 (L) 4.10 - 5.70 M/uL    Hemoglobin 7.4 (L) 12.1 - 17.0 g/dL    Hematocrit 22.5 (L) 36.6 - 50.3 %    .8 (H) 80.0 - 99.0 FL    MCH 33.5 26.0 - 34.0 PG    MCHC 32.9 30.0 - 36.5 g/dL    RDW 16.8 (H) 11.5 - 14.5 %    Platelets 246 150 - 400 K/uL    MPV 10.7 8.9 - 12.9 FL    Nucleated RBCs 0.0 0  WBC    nRBC 0.00 0.00 - 0.01 K/uL    Neutrophils % 63 32 - 75 %    Lymphocytes % 15 12 - 49 %    Monocytes % 13 5 - 13 %    Eosinophils % 8 (H) 0 - 7 %    Basophils % 1 0 - 1 %    Immature Granulocytes % 0 0.0 - 0.5 %    Neutrophils Absolute 5.9 1.8 - 8.0 K/UL    Lymphocytes Absolute 1.4 0.8 - 3.5 K/UL    Monocytes Absolute 1.2 (H) 0.0 - 1.0 K/UL    Eosinophils Absolute 0.7 (H) 0.0 - 0.4 K/UL    Basophils Absolute 0.1 0.0 - 0.1 K/UL    Immature Granulocytes Absolute 0.0 0.00 - 0.04 K/UL    Differential Type AUTOMATED     Basic Metabolic Panel    Collection Time: 06/10/24  7:30 AM   Result Value Ref Range    Sodium 140 136 - 145 mmol/L    Potassium 3.4 (L) 3.5 - 5.1 mmol/L    Chloride 97 97 - 108 mmol/L    CO2 30 21 - 32 mmol/L    Anion Gap 13 5 - 15 mmol/L    Glucose 98 65 - 100 mg/dL    BUN 51 (H) 6 - 20 MG/DL    Creatinine 9.37 (H) 0.70 - 1.30 MG/DL    BUN/Creatinine Ratio 5 (L) 12 - 20      Est, Glom Filt Rate 5 (L) >60 ml/min/1.73m2    Calcium 7.8 (L) 8.5 - 10.1 MG/DL   Troponin

## 2024-06-10 NOTE — ED TRIAGE NOTES
Arrived ambulatory from er waiting room to ed room 6. C/o shortness of breath x 1 week and black dark stools for several months. Also c/o pain all over 10/10  
WDL

## 2024-06-10 NOTE — ED NOTES
Arrangements made with Dorota from [facility] dispatch for ALS transfer of Patient to [facility] bed 1141.  All questions answered and ER updated with 0930 ETA

## 2024-06-10 NOTE — CONSENT
Informed Consent for Blood Component Transfusion Note    I have discussed with the patient the rationale for blood component transfusion; its benefits in treating or preventing fatigue, organ damage, or death; and its risk which includes mild transfusion reactions, rare risk of blood borne infection, or more serious but rare reactions. I have discussed the alternatives to transfusion, including the risk and consequences of not receiving transfusion. The patient had an opportunity to ask questions and had agreed to proceed with transfusion of blood components.    Electronically signed by Susan Bolanos MD on 6/10/24 at 2:13 PM EDT

## 2024-06-10 NOTE — DISCHARGE INSTR - COC
Continuity of Care Form    Patient Name: Darrel Patterson   :  1945  MRN:  [id number]    Admit date:  6/10/2024  Discharge date:  ***    Code Status Order: Prior   Advance Directives:     Admitting Physician:  No admitting provider for patient encounter.  PCP: Roma Solitario APRN - NP    Discharging Nurse: ***  Discharging Hospital Unit/Room#: ED06/06  Discharging Unit Phone Number: ***    Emergency Contact:   Extended Emergency Contact Information  Primary Emergency Contact: Nadine Traore   [facility]  Home Phone: [phone]  Mobile Phone: [phone]  Relation: Child    Past Surgical History:  Past Surgical History:   Procedure Laterality Date    COLONOSCOPY N/A 2023    COLONOSCOPY performed by Sukhi Light MD at [facility] ENDOSCOPY    COLONOSCOPY,DIAGNOSTIC  2023    HEENT Bilateral     Cataract Surgery    ORTHOPEDIC SURGERY Left     aarm fx 30 years +    UPPER GASTROINTESTINAL ENDOSCOPY  2014    gastritis    UPPER GI ENDOSCOPY,BIOPSY  2023       Immunization History:   Immunization History   Administered Date(s) Administered    COVID-19, PFIZER PURPLE top, DILUTE for use, (age 12 y+), 30mcg/0.3mL 2021, 2021    Hepatitis B vaccine 2020, 2020, 2020    Influenza Virus Vaccine 2020    Influenza, High Dose (Fluzone 65 yrs and older) 10/24/2017, 2019    PPD Test 2014    Pneumococcal, PCV-13, PREVNAR 13, (age 6w+), IM, 0.5mL 2017, 2019, 2020    Pneumococcal, PPSV23, PNEUMOVAX 23, (age 2y+), SC/IM, 0.5mL 2015    TDaP, ADACEL (age 10y-64y), BOOSTRIX (age 10y+), IM, 0.5mL 2015       Active Problems:  Patient Active Problem List   Diagnosis Code    History of GI bleed Z87.19    Anemia due to chronic kidney disease N18.9, D63.1    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

## 2024-06-11 ENCOUNTER — ANESTHESIA (OUTPATIENT)
Facility: HOSPITAL | Age: 79
DRG: 377 | End: 2024-06-11
Payer: MEDICARE

## 2024-06-11 ENCOUNTER — ANESTHESIA EVENT (OUTPATIENT)
Facility: HOSPITAL | Age: 79
DRG: 377 | End: 2024-06-11
Payer: MEDICARE

## 2024-06-11 LAB
ANION GAP SERPL CALC-SCNC: 10 MMOL/L (ref 5–15)
BASOPHILS # BLD: 0.1 K/UL (ref 0–0.1)
BASOPHILS NFR BLD: 1 % (ref 0–1)
BUN SERPL-MCNC: 59 MG/DL (ref 6–20)
BUN/CREAT SERPL: 5 (ref 12–20)
CALCIUM SERPL-MCNC: 8.3 MG/DL (ref 8.5–10.1)
CHLORIDE SERPL-SCNC: 98 MMOL/L (ref 97–108)
CO2 SERPL-SCNC: 25 MMOL/L (ref 21–32)
CREAT SERPL-MCNC: 11.2 MG/DL (ref 0.7–1.3)
DIFFERENTIAL METHOD BLD: ABNORMAL
EOSINOPHIL # BLD: 0.9 K/UL (ref 0–0.4)
EOSINOPHIL NFR BLD: 10 % (ref 0–7)
ERYTHROCYTE [DISTWIDTH] IN BLOOD BY AUTOMATED COUNT: 16.4 % (ref 11.5–14.5)
FERRITIN SERPL-MCNC: 1824 NG/ML (ref 26–388)
GLUCOSE SERPL-MCNC: 102 MG/DL (ref 65–100)
HBV SURFACE AB SER QL: NONREACTIVE
HBV SURFACE AB SER-ACNC: <3.1 MIU/ML
HCT VFR BLD AUTO: 21.1 % (ref 36.6–50.3)
HGB BLD-MCNC: 6.9 G/DL (ref 12.1–17)
HISTORY CHECK: NORMAL
IMM GRANULOCYTES # BLD AUTO: 0 K/UL (ref 0–0.04)
IMM GRANULOCYTES NFR BLD AUTO: 0 % (ref 0–0.5)
IRON SATN MFR SERPL: 16 % (ref 20–50)
IRON SERPL-MCNC: 28 UG/DL (ref 35–150)
LYMPHOCYTES # BLD: 1 K/UL (ref 0.8–3.5)
LYMPHOCYTES NFR BLD: 11 % (ref 12–49)
MCH RBC QN AUTO: 33.2 PG (ref 26–34)
MCHC RBC AUTO-ENTMCNC: 32.7 G/DL (ref 30–36.5)
MCV RBC AUTO: 101.4 FL (ref 80–99)
MONOCYTES # BLD: 1.2 K/UL (ref 0–1)
MONOCYTES NFR BLD: 14 % (ref 5–13)
NEUTS SEG # BLD: 5.5 K/UL (ref 1.8–8)
NEUTS SEG NFR BLD: 64 % (ref 32–75)
NRBC # BLD: 0 K/UL (ref 0–0.01)
NRBC BLD-RTO: 0 PER 100 WBC
PLATELET # BLD AUTO: 232 K/UL (ref 150–400)
PMV BLD AUTO: 10.8 FL (ref 8.9–12.9)
POTASSIUM SERPL-SCNC: 3.9 MMOL/L (ref 3.5–5.1)
RBC # BLD AUTO: 2.08 M/UL (ref 4.1–5.7)
SODIUM SERPL-SCNC: 133 MMOL/L (ref 136–145)
TIBC SERPL-MCNC: 180 UG/DL (ref 250–450)
WBC # BLD AUTO: 8.6 K/UL (ref 4.1–11.1)

## 2024-06-11 PROCEDURE — 6360000002 HC RX W HCPCS: Performed by: INTERNAL MEDICINE

## 2024-06-11 PROCEDURE — 88305 TISSUE EXAM BY PATHOLOGIST: CPT

## 2024-06-11 PROCEDURE — 88342 IMHCHEM/IMCYTCHM 1ST ANTB: CPT

## 2024-06-11 PROCEDURE — 3600007512: Performed by: STUDENT IN AN ORGANIZED HEALTH CARE EDUCATION/TRAINING PROGRAM

## 2024-06-11 PROCEDURE — 86901 BLOOD TYPING SEROLOGIC RH(D): CPT

## 2024-06-11 PROCEDURE — 80048 BASIC METABOLIC PNL TOTAL CA: CPT

## 2024-06-11 PROCEDURE — 3600007502: Performed by: STUDENT IN AN ORGANIZED HEALTH CARE EDUCATION/TRAINING PROGRAM

## 2024-06-11 PROCEDURE — 82728 ASSAY OF FERRITIN: CPT

## 2024-06-11 PROCEDURE — 3700000001 HC ADD 15 MINUTES (ANESTHESIA): Performed by: STUDENT IN AN ORGANIZED HEALTH CARE EDUCATION/TRAINING PROGRAM

## 2024-06-11 PROCEDURE — 7100000011 HC PHASE II RECOVERY - ADDTL 15 MIN: Performed by: STUDENT IN AN ORGANIZED HEALTH CARE EDUCATION/TRAINING PROGRAM

## 2024-06-11 PROCEDURE — 7100000010 HC PHASE II RECOVERY - FIRST 15 MIN: Performed by: STUDENT IN AN ORGANIZED HEALTH CARE EDUCATION/TRAINING PROGRAM

## 2024-06-11 PROCEDURE — 2580000003 HC RX 258: Performed by: STUDENT IN AN ORGANIZED HEALTH CARE EDUCATION/TRAINING PROGRAM

## 2024-06-11 PROCEDURE — 2709999900 HC NON-CHARGEABLE SUPPLY: Performed by: STUDENT IN AN ORGANIZED HEALTH CARE EDUCATION/TRAINING PROGRAM

## 2024-06-11 PROCEDURE — 94640 AIRWAY INHALATION TREATMENT: CPT

## 2024-06-11 PROCEDURE — 1100000003 HC PRIVATE W/ TELEMETRY

## 2024-06-11 PROCEDURE — 86900 BLOOD TYPING SEROLOGIC ABO: CPT

## 2024-06-11 PROCEDURE — 0DB58ZX EXCISION OF ESOPHAGUS, VIA NATURAL OR ARTIFICIAL OPENING ENDOSCOPIC, DIAGNOSTIC: ICD-10-PCS | Performed by: STUDENT IN AN ORGANIZED HEALTH CARE EDUCATION/TRAINING PROGRAM

## 2024-06-11 PROCEDURE — 88313 SPECIAL STAINS GROUP 2: CPT

## 2024-06-11 PROCEDURE — 36415 COLL VENOUS BLD VENIPUNCTURE: CPT

## 2024-06-11 PROCEDURE — 0DB68ZX EXCISION OF STOMACH, VIA NATURAL OR ARTIFICIAL OPENING ENDOSCOPIC, DIAGNOSTIC: ICD-10-PCS | Performed by: STUDENT IN AN ORGANIZED HEALTH CARE EDUCATION/TRAINING PROGRAM

## 2024-06-11 PROCEDURE — 90935 HEMODIALYSIS ONE EVALUATION: CPT

## 2024-06-11 PROCEDURE — 94761 N-INVAS EAR/PLS OXIMETRY MLT: CPT

## 2024-06-11 PROCEDURE — 86923 COMPATIBILITY TEST ELECTRIC: CPT

## 2024-06-11 PROCEDURE — 83540 ASSAY OF IRON: CPT

## 2024-06-11 PROCEDURE — 86850 RBC ANTIBODY SCREEN: CPT

## 2024-06-11 PROCEDURE — 6360000002 HC RX W HCPCS

## 2024-06-11 PROCEDURE — 2580000003 HC RX 258: Performed by: INTERNAL MEDICINE

## 2024-06-11 PROCEDURE — 3700000000 HC ANESTHESIA ATTENDED CARE: Performed by: STUDENT IN AN ORGANIZED HEALTH CARE EDUCATION/TRAINING PROGRAM

## 2024-06-11 PROCEDURE — 85025 COMPLETE CBC W/AUTO DIFF WBC: CPT

## 2024-06-11 PROCEDURE — 5A1D70Z PERFORMANCE OF URINARY FILTRATION, INTERMITTENT, LESS THAN 6 HOURS PER DAY: ICD-10-PCS | Performed by: INTERNAL MEDICINE

## 2024-06-11 PROCEDURE — 83550 IRON BINDING TEST: CPT

## 2024-06-11 PROCEDURE — 6370000000 HC RX 637 (ALT 250 FOR IP): Performed by: INTERNAL MEDICINE

## 2024-06-11 PROCEDURE — 86706 HEP B SURFACE ANTIBODY: CPT

## 2024-06-11 PROCEDURE — 2500000003 HC RX 250 WO HCPCS

## 2024-06-11 RX ORDER — SODIUM CHLORIDE 0.9 % (FLUSH) 0.9 %
5-40 SYRINGE (ML) INJECTION PRN
Status: DISCONTINUED | OUTPATIENT
Start: 2024-06-11 | End: 2024-06-11 | Stop reason: HOSPADM

## 2024-06-11 RX ORDER — SODIUM CHLORIDE 9 MG/ML
INJECTION, SOLUTION INTRAVENOUS PRN
Status: DISCONTINUED | OUTPATIENT
Start: 2024-06-11 | End: 2024-06-12 | Stop reason: HOSPADM

## 2024-06-11 RX ORDER — SODIUM CHLORIDE 9 MG/ML
25 INJECTION, SOLUTION INTRAVENOUS PRN
Status: DISCONTINUED | OUTPATIENT
Start: 2024-06-11 | End: 2024-06-11 | Stop reason: HOSPADM

## 2024-06-11 RX ORDER — SODIUM CHLORIDE 0.9 % (FLUSH) 0.9 %
5-40 SYRINGE (ML) INJECTION EVERY 12 HOURS SCHEDULED
Status: DISCONTINUED | OUTPATIENT
Start: 2024-06-11 | End: 2024-06-11 | Stop reason: HOSPADM

## 2024-06-11 RX ORDER — GLYCOPYRROLATE 0.2 MG/ML
INJECTION INTRAMUSCULAR; INTRAVENOUS PRN
Status: DISCONTINUED | OUTPATIENT
Start: 2024-06-11 | End: 2024-06-11 | Stop reason: SDUPTHER

## 2024-06-11 RX ORDER — SODIUM CHLORIDE 9 MG/ML
INJECTION, SOLUTION INTRAVENOUS CONTINUOUS
Status: DISCONTINUED | OUTPATIENT
Start: 2024-06-11 | End: 2024-06-11 | Stop reason: HOSPADM

## 2024-06-11 RX ORDER — LIDOCAINE HYDROCHLORIDE 20 MG/ML
INJECTION, SOLUTION EPIDURAL; INFILTRATION; INTRACAUDAL; PERINEURAL PRN
Status: DISCONTINUED | OUTPATIENT
Start: 2024-06-11 | End: 2024-06-11 | Stop reason: SDUPTHER

## 2024-06-11 RX ADMIN — LIDOCAINE HYDROCHLORIDE 80 MG: 20 INJECTION, SOLUTION EPIDURAL; INFILTRATION; INTRACAUDAL; PERINEURAL at 13:47

## 2024-06-11 RX ADMIN — MONTELUKAST 10 MG: 10 TABLET, FILM COATED ORAL at 09:17

## 2024-06-11 RX ADMIN — PROPOFOL 40 MG: 10 INJECTION, EMULSION INTRAVENOUS at 13:48

## 2024-06-11 RX ADMIN — PANTOPRAZOLE SODIUM 40 MG: 40 TABLET, DELAYED RELEASE ORAL at 06:56

## 2024-06-11 RX ADMIN — SODIUM CHLORIDE, PRESERVATIVE FREE 10 ML: 5 INJECTION INTRAVENOUS at 09:18

## 2024-06-11 RX ADMIN — TRAZODONE HYDROCHLORIDE 50 MG: 50 TABLET ORAL at 23:11

## 2024-06-11 RX ADMIN — SACUBITRIL AND VALSARTAN 2 TABLET: 24; 26 TABLET, FILM COATED ORAL at 09:17

## 2024-06-11 RX ADMIN — PROPOFOL 30 MG: 10 INJECTION, EMULSION INTRAVENOUS at 13:49

## 2024-06-11 RX ADMIN — ACETAMINOPHEN 650 MG: 325 TABLET ORAL at 11:55

## 2024-06-11 RX ADMIN — SODIUM CHLORIDE, PRESERVATIVE FREE 10 ML: 5 INJECTION INTRAVENOUS at 23:12

## 2024-06-11 RX ADMIN — GABAPENTIN 300 MG: 100 CAPSULE ORAL at 09:17

## 2024-06-11 RX ADMIN — FERROUS SULFATE TAB 325 MG (65 MG ELEMENTAL FE) 325 MG: 325 (65 FE) TAB at 06:57

## 2024-06-11 RX ADMIN — SACUBITRIL AND VALSARTAN 2 TABLET: 24; 26 TABLET, FILM COATED ORAL at 23:12

## 2024-06-11 RX ADMIN — COLCHICINE 0.6 MG: 0.6 TABLET, FILM COATED ORAL at 09:17

## 2024-06-11 RX ADMIN — PROPOFOL 40 MG: 10 INJECTION, EMULSION INTRAVENOUS at 13:51

## 2024-06-11 RX ADMIN — METOPROLOL SUCCINATE 25 MG: 25 TABLET, EXTENDED RELEASE ORAL at 09:18

## 2024-06-11 RX ADMIN — PRAVASTATIN SODIUM 10 MG: 10 TABLET ORAL at 23:11

## 2024-06-11 RX ADMIN — ARFORMOTEROL TARTRATE: 15 SOLUTION RESPIRATORY (INHALATION) at 21:13

## 2024-06-11 RX ADMIN — PROPOFOL 40 MG: 10 INJECTION, EMULSION INTRAVENOUS at 13:55

## 2024-06-11 RX ADMIN — PROPOFOL 50 MG: 10 INJECTION, EMULSION INTRAVENOUS at 13:47

## 2024-06-11 RX ADMIN — PROPOFOL 40 MG: 10 INJECTION, EMULSION INTRAVENOUS at 13:53

## 2024-06-11 RX ADMIN — GLYCOPYRROLATE 0.1 MG: 0.2 INJECTION, SOLUTION INTRAMUSCULAR; INTRAVENOUS at 13:47

## 2024-06-11 RX ADMIN — ARFORMOTEROL TARTRATE: 15 SOLUTION RESPIRATORY (INHALATION) at 08:49

## 2024-06-11 RX ADMIN — SODIUM CHLORIDE: 9 INJECTION, SOLUTION INTRAVENOUS at 13:43

## 2024-06-11 ASSESSMENT — PAIN SCALES - GENERAL
PAINLEVEL_OUTOF10: 8
PAINLEVEL_OUTOF10: 8
PAINLEVEL_OUTOF10: 6

## 2024-06-11 ASSESSMENT — PAIN DESCRIPTION - LOCATION
LOCATION: BACK

## 2024-06-11 ASSESSMENT — PAIN - FUNCTIONAL ASSESSMENT: PAIN_FUNCTIONAL_ASSESSMENT: 0-10

## 2024-06-11 NOTE — OP NOTE
[facility]  VENTURA Sauceda D.O.  [phone]             Esophagogastroduodenoscopy (EGD) Procedure Note    NAME: Darrel Patterson  :  1945  MRN:  [id number]    Indications:  hx of gastritis, acute on chronic anemia      : Olga Lidia Sauceda DO    Referring Provider:  Roma Solitario, APRN - NP    Staff: Circulator: Marizol Carmona RN; Hina Wade RN    Prosthetic devices, grafts, tissues, transplant, or devices implanted: none    Medicine:  MAC anesthesia Propofol      Procedure Details:  After informed consent was obtained with all risks and benefits of the procedure explained and preprocedure exam completed, the patient was placed in the left lateral decubitus position.  Universal protocol for patient identification was performed and documented in the nursing notes.  Throughout the procedure, the patient's blood pressure was monitored at least every five minutes; pulse, and oxygen saturations were monitored continuously.  All vital signs were documented in the nursing notes.  The endoscope was inserted into the mouth and advanced under direct vision to third portion of the duodenum.  A careful inspection was made as the gastroscope was withdrawn, including a retroflexed view of the proximal stomach; findings and interventions are described below.      Findings:   Esophagus:  Mild mucosal changes consistent with esophagitis and poor esophageal motility.  Irregular Z-line, biopsied to r/o Barretts.   Stomach:   GE Flap Valve Grade III  Acute gastritis. Biopsies taken for H pylori from antrum and body. Erosions and erythema noted. Stigmata of bleeding noted.     Duodenum/jejunum:   normal    Interventions:    biopsy of esophagus  biopsy of stomach body, antrum    Specimens:   ID Type Source Tests Collected by Time Destination   1 : gatric biopsy Tissue Gastric SURGICAL PATHOLOGY Olga Lidia Sauceda DO 2024 [id number]    2 : GE junction biopsy Tissue

## 2024-06-11 NOTE — PERIOP NOTE
TRANSFER - OUT REPORT:    Verbal report given to Betsy RN on Darrel Patterson  being transferred to [facility] for routine progression of patient care       Report consisted of patient's Situation, Background, Assessment and   Recommendations(SBAR).     Information from the following report(s) Nurse Handoff Report was reviewed with the receiving nurse.           Lines:   Peripheral IV 06/11/24 Posterior;Right Hand (Active)        Opportunity for questions and clarification was provided.      Patient transported with:

## 2024-06-11 NOTE — FLOWSHEET NOTE
Disinfection   Rinseback Volume (ml) 300 ml   Blood Volume Processed (Liters) 66.7 L   Dialyzer Clearance Lightly streaked   Duration of Treatment (minutes) 180 minutes   Hemodialysis Intake (ml) 500 ml   Hemodialysis Output (ml) 2000 ml   NET Removed (ml) 1500   Tolerated Treatment Good   Patient Disposition   (bedside tx in room 1141)   Primary RN SBAR: Louann Alvarado RN - Primary RN aware of HGB of 6.9.

## 2024-06-11 NOTE — H&P
Patient scheduled 12/5/17  
See note from 6/10/24.  No interim changes.   
Time: 06/10/24  7:42 AM   Result Value Ref Range    Ventricular Rate 80 BPM    Atrial Rate 80 BPM    P-R Interval 124 ms    QRS Duration 110 ms    Q-T Interval 434 ms    QTc Calculation (Bazett) 500 ms    R Axis 211 degrees    T Axis 74 degrees    Diagnosis       Sinus rhythm with occasional premature ventricular complexes  Right superior axis deviation  Left ventricular hypertrophy with repolarization abnormality  Prolonged QT  Abnormal ECG  When compared with ECG of 13-NOV-2023 11:02,  premature ventricular complexes are now present  QRS axis shifted left  ST no longer elevated in Lateral leads  T wave inversion more evident in Lateral leads  Confirmed by Zak Aceves MD ([id number]) on 6/10/2024 9:10:06 AM           XR CHEST PORTABLE    Result Date: 6/10/2024  EXAM:  XR CHEST PORTABLE INDICATION: sob;hx esrd/chf COMPARISON: Chest x-ray 4/1/2024 TECHNIQUE: Upright portable chest AP view FINDINGS: Cardiac monitoring leads are noted. The cardiac silhouette is within normal limits. The pulmonary vasculature is within normal limits. The lungs and pleural spaces are clear. The visualized bones and upper abdomen are age-appropriate. Atherosclerotic calcifications affect the aortic arch.     No acute process on portable chest. Electronically signed by Jadon Garduno     _______________________________________________________________________    TOTAL TIME:  76 Minutes    Critical Care Provided     Minutes non procedure based    Signed: Susan Bolanos MD    Procedures: see electronic medical records for all procedures/Xrays and details which were not copied into this note but were reviewed prior to creation of Plan.
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
## 2024-06-11 NOTE — ANESTHESIA POSTPROCEDURE EVALUATION
Department of Anesthesiology  Postprocedure Note    Patient: Darrel Patterson  MRN: [id number]  YOB: 1945  Date of evaluation: 6/11/2024    Procedure Summary       Date: 06/11/24 Room / Location: [facility] 04 / [facility] ENDOSCOPY    Anesthesia Start: 1336 Anesthesia Stop: 1401    Procedure: ESOPHAGOGASTRODUODENOSCOPY Diagnosis:       Melena      (Melena [K92.1])    Surgeons: Olga Lidia Sauceda DO Responsible Provider: Mirian Guerra MD    Anesthesia Type: MAC ASA Status: 3            Anesthesia Type: MAC    Theodora Phase I: Theodora Score: 10    Theodora Phase II: Theodora Score: 10    Anesthesia Post Evaluation    Patient location during evaluation: bedside  Patient participation: complete - patient participated  Level of consciousness: awake and alert  Pain scale: Controlled per protocol.  Airway patency: patent  Nausea & Vomiting: no nausea and no vomiting  Cardiovascular status: hemodynamically stable  Respiratory status: acceptable  Hydration status: stable  Multimodal analgesia pain management approach  Pain management: adequate    No notable events documented.

## 2024-06-12 ENCOUNTER — TELEPHONE (OUTPATIENT)
Age: 79
End: 2024-06-12

## 2024-06-12 VITALS
DIASTOLIC BLOOD PRESSURE: 84 MMHG | TEMPERATURE: 98 F | SYSTOLIC BLOOD PRESSURE: 129 MMHG | HEART RATE: 70 BPM | OXYGEN SATURATION: 99 % | RESPIRATION RATE: 16 BRPM

## 2024-06-12 LAB
ABO + RH BLD: NORMAL
BLD PROD TYP BPU: NORMAL
BLOOD BANK BLOOD PRODUCT EXPIRATION DATE: NORMAL
BLOOD BANK DISPENSE STATUS: NORMAL
BLOOD BANK ISBT PRODUCT BLOOD TYPE: 6200
BLOOD BANK PRODUCT CODE: NORMAL
BLOOD BANK UNIT TYPE AND RH: NORMAL
BLOOD GROUP ANTIBODIES SERPL: NORMAL
BPU ID: NORMAL
CROSSMATCH RESULT: NORMAL
ERYTHROCYTE [DISTWIDTH] IN BLOOD BY AUTOMATED COUNT: 18.1 % (ref 11.5–14.5)
HCT VFR BLD AUTO: 24.9 % (ref 36.6–50.3)
HGB BLD-MCNC: 7.8 G/DL (ref 12.1–17)
MCH RBC QN AUTO: 31.2 PG (ref 26–34)
MCHC RBC AUTO-ENTMCNC: 31.3 G/DL (ref 30–36.5)
MCV RBC AUTO: 99.6 FL (ref 80–99)
NRBC # BLD: 0 K/UL (ref 0–0.01)
NRBC BLD-RTO: 0 PER 100 WBC
PLATELET # BLD AUTO: 195 K/UL (ref 150–400)
PMV BLD AUTO: 10.6 FL (ref 8.9–12.9)
RBC # BLD AUTO: 2.5 M/UL (ref 4.1–5.7)
SPECIMEN EXP DATE BLD: NORMAL
UNIT DIVISION: 0
UNIT ISSUE DATE/TIME: NORMAL
WBC # BLD AUTO: 6.2 K/UL (ref 4.1–11.1)

## 2024-06-12 PROCEDURE — 94761 N-INVAS EAR/PLS OXIMETRY MLT: CPT

## 2024-06-12 PROCEDURE — 6360000002 HC RX W HCPCS: Performed by: INTERNAL MEDICINE

## 2024-06-12 PROCEDURE — 30233N1 TRANSFUSION OF NONAUTOLOGOUS RED BLOOD CELLS INTO PERIPHERAL VEIN, PERCUTANEOUS APPROACH: ICD-10-PCS | Performed by: INTERNAL MEDICINE

## 2024-06-12 PROCEDURE — 36430 TRANSFUSION BLD/BLD COMPNT: CPT

## 2024-06-12 PROCEDURE — P9016 RBC LEUKOCYTES REDUCED: HCPCS

## 2024-06-12 PROCEDURE — 97161 PT EVAL LOW COMPLEX 20 MIN: CPT | Performed by: PHYSICAL THERAPIST

## 2024-06-12 PROCEDURE — 6370000000 HC RX 637 (ALT 250 FOR IP): Performed by: INTERNAL MEDICINE

## 2024-06-12 PROCEDURE — 94640 AIRWAY INHALATION TREATMENT: CPT

## 2024-06-12 PROCEDURE — 36415 COLL VENOUS BLD VENIPUNCTURE: CPT

## 2024-06-12 PROCEDURE — 2500000003 HC RX 250 WO HCPCS: Performed by: INTERNAL MEDICINE

## 2024-06-12 PROCEDURE — 97116 GAIT TRAINING THERAPY: CPT | Performed by: PHYSICAL THERAPIST

## 2024-06-12 PROCEDURE — 85027 COMPLETE CBC AUTOMATED: CPT

## 2024-06-12 PROCEDURE — 2580000003 HC RX 258: Performed by: INTERNAL MEDICINE

## 2024-06-12 RX ORDER — OMEPRAZOLE 40 MG/1
40 CAPSULE, DELAYED RELEASE ORAL DAILY
Qty: 30 CAPSULE | Refills: 1 | Status: SHIPPED | OUTPATIENT
Start: 2024-06-12

## 2024-06-12 RX ADMIN — IRON SUCROSE 200 MG: 20 INJECTION, SOLUTION INTRAVENOUS at 11:37

## 2024-06-12 RX ADMIN — SEVELAMER CARBONATE 1600 MG: 800 TABLET, FILM COATED ORAL at 11:37

## 2024-06-12 RX ADMIN — METOPROLOL SUCCINATE 25 MG: 25 TABLET, EXTENDED RELEASE ORAL at 08:47

## 2024-06-12 RX ADMIN — EPOETIN ALFA-EPBX 10000 UNITS: 10000 INJECTION, SOLUTION INTRAVENOUS; SUBCUTANEOUS at 03:42

## 2024-06-12 RX ADMIN — ACETAMINOPHEN 650 MG: 325 TABLET ORAL at 08:50

## 2024-06-12 RX ADMIN — MONTELUKAST 10 MG: 10 TABLET, FILM COATED ORAL at 08:47

## 2024-06-12 RX ADMIN — CALCIUM ACETATE 667 MG: 667 CAPSULE ORAL at 11:37

## 2024-06-12 RX ADMIN — PANTOPRAZOLE SODIUM 40 MG: 40 TABLET, DELAYED RELEASE ORAL at 08:46

## 2024-06-12 RX ADMIN — SODIUM CHLORIDE, PRESERVATIVE FREE 10 ML: 5 INJECTION INTRAVENOUS at 08:47

## 2024-06-12 RX ADMIN — COLCHICINE 0.6 MG: 0.6 TABLET, FILM COATED ORAL at 08:46

## 2024-06-12 RX ADMIN — SEVELAMER CARBONATE 1600 MG: 800 TABLET, FILM COATED ORAL at 08:46

## 2024-06-12 RX ADMIN — SACUBITRIL AND VALSARTAN 2 TABLET: 24; 26 TABLET, FILM COATED ORAL at 08:47

## 2024-06-12 RX ADMIN — CALCIUM ACETATE 667 MG: 667 CAPSULE ORAL at 08:46

## 2024-06-12 RX ADMIN — FERROUS SULFATE TAB 325 MG (65 MG ELEMENTAL FE) 325 MG: 325 (65 FE) TAB at 08:47

## 2024-06-12 RX ADMIN — ARFORMOTEROL TARTRATE: 15 SOLUTION RESPIRATORY (INHALATION) at 08:23

## 2024-06-12 RX ADMIN — GABAPENTIN 300 MG: 100 CAPSULE ORAL at 08:46

## 2024-06-12 ASSESSMENT — PAIN SCALES - GENERAL
PAINLEVEL_OUTOF10: 3
PAINLEVEL_OUTOF10: 5

## 2024-06-12 ASSESSMENT — PAIN DESCRIPTION - DESCRIPTORS: DESCRIPTORS: ACHING

## 2024-06-12 ASSESSMENT — PAIN DESCRIPTION - LOCATION: LOCATION: HEAD

## 2024-06-12 NOTE — PLAN OF CARE
Problem: Physical Therapy - Adult  Goal: By Discharge: Performs mobility at highest level of function for planned discharge setting.  See evaluation for individualized goals.  Description: FUNCTIONAL STATUS PRIOR TO ADMISSION: Patient was modified independent using a single point cane for functional mobility. Patient with chronic left knee pain, which makes ambulating longer distances difficult.    HOME SUPPORT PRIOR TO ADMISSION: The patient lived alone with son and daughter to provide assistance. They often call/check on patient.    Physical Therapy Goals  Initiated 6/12/2024  1.  Patient will move from supine to sit and sit to supine  in bed with independence within 7 day(s).    2.  Patient will transfer from bed to chair and chair to bed with independence using the least restrictive device within 7 day(s).  3.  Patient will perform sit to stand with independence within 7 day(s).  4.  Patient will ambulate with supervision/set-up for 200 feet with the least restrictive device within 7 day(s).   5.  Patient will ascend/descend 3 stairs with 1 handrail(s) with minimal assistance/contact guard assist within 7 day(s).      Outcome: Progressing   PHYSICAL THERAPY EVALUATION    Patient: Darrel Patterson (79 y.o. male)  Date: 6/12/2024  Primary Diagnosis: GI bleed [K92.2]  Procedure(s) (LRB):  ESOPHAGOGASTRODUODENOSCOPY (N/A) 1 Day Post-Op   Precautions:                        ASSESSMENT :   DEFICITS/IMPAIRMENTS:   The patient is limited by decreased high-level IADLs, activity tolerance, endurance, balance.  Patient supine upon arrival; agreeable to mobility.  Patient performs bed mobility without assistance and has good sitting balance EOB. Patient ambulates out in glover with straight cane and CGA.  Patient displays genu valgus left knee and endorses pain in that knee, especially with ambulating. Up in chair at end of session.  O2 sats 99% after ambulating once up in chair. Encouraged patient to stay out of bed as

## 2024-06-12 NOTE — PLAN OF CARE
Problem: Discharge Planning  Goal: Discharge to home or other facility with appropriate resources  Outcome: Adequate for Discharge     Problem: Safety - Adult  Goal: Free from fall injury  Outcome: Adequate for Discharge     Problem: ABCDS Injury Assessment  Goal: Absence of physical injury  Outcome: Adequate for Discharge     Problem: Pain  Goal: Verbalizes/displays adequate comfort level or baseline comfort level  Outcome: Adequate for Discharge     Problem: Chronic Conditions and Co-morbidities  Goal: Patient's chronic conditions and co-morbidity symptoms are monitored and maintained or improved  Outcome: Adequate for Discharge     Problem: Respiratory - Adult  Goal: Achieves optimal ventilation and oxygenation  Outcome: Adequate for Discharge     Problem: Skin/Tissue Integrity  Goal: Absence of new skin breakdown  Description: 1.  Monitor for areas of redness and/or skin breakdown  2.  Assess vascular access sites hourly  3.  Every 4-6 hours minimum:  Change oxygen saturation probe site  4.  Every 4-6 hours:  If on nasal continuous positive airway pressure, respiratory therapy assess nares and determine need for appliance change or resting period.  Outcome: Adequate for Discharge

## 2024-06-12 NOTE — CARE COORDINATION
Pt is clear from CM standpoint for d/c.    Pt's family will transport.    Transition of Care Plan:    RUR: 21%  Prior Level of Functioning: Independent   Disposition: Home  If SNF or IPR: Date FOC offered:   Date FOC received:   Accepting facility:   Date authorization started with reference number:   Date authorization received and expires:   Follow up appointments: PCP  DME needed: No DME needed   Transportation at discharge: Pt's family to transport   IM/IMM Medicare/ letter given: 6/12/24  Is patient a  and connected with [facility]? No   If yes, was  transfer form completed and VA notified? No  Caregiver Contact: pt's dtr   Discharge Caregiver contacted prior to discharge? Pt was contacted   Care Conference needed? No  Barriers to discharge: None         06/12/24 1126   Services At/After Discharge   Transition of Care Consult (CM Consult) N/A   Services At/After Discharge None    Resource Information Provided? No   Mode of Transport at Discharge Self   Confirm Follow Up Transport Self   Condition of Participation: Discharge Planning   The Plan for Transition of Care is related to the following treatment goals: Goal is to return home with follow up appointments and dialysis   The Patient and/or Patient Representative was provided with a Choice of Provider? Patient   The Patient and/Or Patient Representative agree with the Discharge Plan? Yes   Freedom of Choice list was provided with basic dialogue that supports the patient's individualized plan of care/goals, treatment preferences, and shares the quality data associated with the providers?  Yes     Katlin Nicole      
present: None    Healthcare Decision Maker:   Primary Decision Maker: Nadine Traore - Child - [phone]  Click here to complete Healthcare Decision Makers including selection of the Healthcare Decision Maker Relationship (ie \"Primary\").       Content/Action Overview:  DECLINED ACP Conversation - will revisit periodically  Reviewed DNR/DNI and patient elects Full Code (Attempt Resuscitation)        Length of Voluntary ACP Conversation in minutes:  <16 minutes (Non-Billable)    Katlin Nicole

## 2024-06-12 NOTE — ACP (ADVANCE CARE PLANNING)
Advance Care Planning     Advance Care Planning Inpatient Note  Spiritual Care Department    Today's Date: 6/12/2024  Unit: [facility] 1 MULTI-SPECIALTY TELEMETRY    Received request from HealthCare Provider.  Upon review of chart and communication with care team, patient's decision making abilities are not in question.. Patient was/were present in the room during visit.    Goals of ACP Conversation:  Discuss advance care planning documents  Facilitate a discussion related to patient's goals of care as they align with the patient's values and beliefs.    Health Care Decision Makers:       Primary Decision Maker: Nadine Traore - Child - [phone]  Summary:  No Decision Maker named by patient at this time      Advance Care Planning Documents (Patient Wishes):  Living Will/Advance Directive     Assessment:   received a consult order from Louann FLORENTINO in reference to an Advance Medical Directive.  greeted Mr. Patterson and engaged him in conversation. He was sitting in his chair near the window.  reviewed the forms with him and allowed time for questions/answers. Mr. Patterson stated his daughter handles his affairs and requested a copy of the form for her to review.  advised him to have the nurse page the  when he is ready.   Mr Patterson stated he is Protestant and shared his love for his ten children/grandchildren.  affirmed his thoughts and emotions as he shared in reference to his mother. He expressed gratitude for the visit.  provided a presence, encouragement and empathetic listening.     A Primary Agent wasn't named at this visit.     Spiritual Health Services are available 24 hours a day as requested.       Interventions:  Provided education on documents for clarity and greater understanding  Discussed and provided education on state decision maker hierarchy  Encouraged ongoing ACP conversation with future decision makers and loved ones  Reviewed but did not complete

## 2024-06-12 NOTE — PROGRESS NOTES
Hospitalist Progress Note    NAME:   Darrel Patterson   : 1945   MRN: [id number]     Date/Time: 2024 5:47 PM  Patient PCP: Roma Solitario APRN - NP    Estimated discharge date:  Barriers: GI clearance , HG stability       Assessment / Plan:      Acute symptomatic anemia:  - consult GI for further work up.  -HG today  6.9   -transfuse 1 unit PRBC   -plan for EGD today         Chronic systolic heart failure  Fluid management as per HD  Continue metoprolol, entresto     ESRD:  Continue sevelamer  Nephrology consult        PMR  Chronic back pain  Rheumatoid arthritis  PT/OT  Outpatient follow up                             Medical Decision Making:   I personally reviewed labs:CBC ,BMP  I personally reviewed imaging:CXR  I personally reviewed EKG:  Toxic drug monitoring:   Discussed case with: Nurse PETTY        Code Status: Full  DVT Prophylaxis: SCD  GI Prophylaxis:    Subjective:     Chief Complaint / Reason for Physician Visit  \"\".  Discussed with RN events overnight.       Objective:     VITALS:   Last 24hrs VS reviewed since prior progress note. Most recent are:  Patient Vitals for the past 24 hrs:   BP Temp Temp src Pulse Resp SpO2   24 1538 139/76 -- Oral 81 18 --   24 1445 -- -- -- 75 19 92 %   24 1440 -- -- -- 72 21 93 %   24 1414 128/69 -- -- 69 19 90 %   24 1411 120/68 -- -- 76 20 92 %   24 1407 (!) 131/52 98.7 °F (37.1 °C) Temporal 73 30 (!) 87 %   24 1400 (!) 128/53 -- -- 72 (!) 31 96 %   24 1317 (!) 153/78 -- -- 79 17 97 %   24 1200 (!) 146/93 98.9 °F (37.2 °C) -- 70 18 94 %   24 1145 (!) 155/94 -- -- 72 -- --   24 1130 (!) 170/93 -- -- 87 -- --   24 1115 (!) 130/96 -- -- 94 -- --   24 1100 (!) 176/101 -- -- 90 -- --   24 1045 (!) 183/86 -- -- 81 -- --   24 1030 (!) 161/102 -- -- 75 -- --   24 1015 (!) 161/101 -- -- 86 -- --   24 1000 (!) 156/96 -- -- 95 -- --   24 0945 (!) 
0745: Patient takes ivabradine at home for HF but this wasn't reordered when patient was admitted. Notified Dr. Copeland.    0845: Ivabradine reordered by Dr. Copeland. Also, daughter wanted an update from the doctor, so this nurse made Dr. Copeland aware.    1140: Scheduled meds given.    1300: Patient requested a copy of EGD result, this was given to the patient. Patient's daughter, Liset, wanted to speak with the attending physician, so this nurse left secure message to Dr. Copeland.     1310: I have reviewed discharge instructions with the patient. The patient verbalized understanding. Discharge medications reviewed with patient and appropriate educational materials and side effects teaching were provided. Follow-up appointments reviewed. Opportunity for questions and clarification was provided. Venous access removed without difficulty.     1315: Patient was taken to discharge area by JOCELYNE Tao.  
Bedside shift change report given to CHADD Valadez (oncoming nurse) by CHADD Young (offgoing nurse). Report included the following information Nurse Handoff Report, ED SBAR, Surgery Report, Intake/Output, MAR, and Recent Results.     
End of Shift Note    Bedside shift change report given to Meri FLORENTINO (oncoming nurse) by Louann Alvarado RN (offgoing nurse).  Report included the following information SBAR, MAR, Recent Results, and Med Rec Status    Shift worked:  7a-7p     Shift summary and any significant changes:      Pt transferred from [facility]. A&x4. C/O SOB, generalized weakness,  and black tarry stools. Hgb 8.2. V/S stable. A&O x 4.  Up with cane assistive device. Spo2 94% on room air. Pt has End stage Kidney disease. Has fistula  Lt forearm. Dialysis  T, TH, Sat. NPO after MN for endoscopy     Concerns for physician to address:  Endoscopy consent     Zone phone for oncoming shift:   none       Activity:     Number times ambulated in hallways past shift: 0  Number of times OOB to chair past shift: 0    Cardiac:   Cardiac Monitoring: Yes           Access:  Current line(s): PIV     Genitourinary:   Urinary status: voiding    Respiratory:      Chronic home O2 use?: YES  Incentive spirometer at bedside: NO       GI:     Current diet:  ADULT DIET; Regular; Low Potassium (Less than 3000 mg/day); Low Phosphorus (Less than 1000 mg)  Diet NPO  Passing flatus: YES  Tolerating current diet: YES       Pain Management:   Patient states pain is manageable on current regimen: YES    Skin:     Interventions: specialty bed    Patient Safety:  Fall Score:    Interventions: gripper socks       Length of Stay:  Expected LOS: 3  Actual LOS: 0      Louann Alvarado RN                           
Endoscopy recovery  Patient returned to baseline, vital signs stable (see vital sign flowsheet). Patient offered liquids and tolerated well. Respiratory status within defined limits. Abdomen soft not tender. Skin with in defined limits.   
Hospital follow-up PCP transitional care appointment has been scheduled with NP Roma Solitario on 6/19/24 0282. This is the first available appt due to limited provider availability and that coordinates with patient's HD schedule - TTS. [facility] placed [facility] information AVS for patient resource. Pending patient discharge. Rajani Mcrae, Care Management Assistant  
Informed Consent for Blood Component Transfusion Note    I have discussed with the patient the rationale for blood component transfusion; its benefits in treating or preventing fatigue, organ damage, or death; and its risk which includes mild transfusion reactions, rare risk of blood borne infection, or more serious but rare reactions. I have discussed the alternatives to transfusion, including the risk and consequences of not receiving transfusion. The patient had an opportunity to ask questions and had agreed to proceed with transfusion of blood components.    Electronically signed by Jim Bustamante MD on 6/11/24 at 3:28 PM EDT  
Occupational Therapy   Chart reviewed; Spoke with RN; not Hgb 7.4; patient currently with HD; on schedule for endo at 12:30; will retry later as able or tomorrow for OT eval. Chinyere Barrera OTR/L  
Occupational Therapy   Chart reviewed; cleared for tx; patient has participated with PT and is on schedule for discharge today; patient reports he is at his baseline for self care and functional mobility; declines participation in OT. Advised him to contact MD if he gets home and notes difficulty; he plans to have family stay 24/7 for a few days to have PRN IADL assist. Will complete order. Chinyere Barrera OTR/L  
Patient sent to endoscopy unit with a hemoglobin of 6.9 and has not received any blood products. Patient's BP is 153/78, HR is irregular at 79 and 02 on RA was 97%. Dr. Guerra with anesthesia made aware- labs reviewed- per Dr. Guerra, okay to proceed with today's procedure.     Patient's PIV in L-AC is leaking and infiltrated. New PIV placed at this time.     The risks and benefits of the bite block have been explained to patient.  Patient verbalizes understanding.      ARRIVAL INFORMATION:  Verified patient name and date of birth, scheduled procedure, and informed consent.     : Sasha mckee contact number:   Physician and staff can share information with the .     GI FOCUSED ASSESSMENT:  Neuro: Awake, alert, oriented x4  Respiratory: even and unlabored   GI: soft and non-distended  EKG Rhythm: sinus arrhythmia, PVC, PAC    Education:Reviewed general discharge instructions and  information.     
Physical Therapy   Chart reviewed; Spoke with RN; note Hgb 7.4; patient currently with HD; on schedule for endo at 12:30; will retry later as able or tomorrow for PT eval.   
Spiritual Care Assessment/Progress Note  [facility]    Name: Darrel Patterson MRN: [id number]    Age: 79 y.o.     Sex: male   Language: English     Date: 6/12/2024            Total Time Calculated: 41 min              Spiritual Assessment begun in [facility] MULTI-SPECIALTY TELEMETRY  Service Provided For: Patient  Referral/Consult From: Nurse  Encounter Overview/Reason: Initial Encounter    Spiritual beliefs:      [x] Involved in a ayanna tradition/spiritual practice:      [] Supported by a ayanna community:      [] Claims no spiritual orientation:      [] Seeking spiritual identity:           [] Adheres to an individual form of spirituality:      [] Not able to assess:                Identified resources for coping and support system:   Support System: Children, Family members       [] Prayer                  [] Devotional reading               [] Music                  [] Guided Imagery     [] Pet visits                                        [] Other: (COMMENT)     Specific area/focus of visit   Encounter:    Crisis:    Spiritual/Emotional needs: Type: Spiritual Support  Ritual, Rites and Sacraments:    Grief, Loss, and Adjustments:    Ethics/Mediation:    Behavioral Health:    Palliative Care:    Advance Care Planning: Type: ACP conversation    Plan/Referrals: Continue to visit, (comment), Continue Support (comment)    Narrative:  received a consult order from Louann FLORENTINO in reference to an Advance Medical Directive.  greeted Mr. Patterson and engaged him in conversation. He was sitting in his chair near the window.  reviewed the forms with him and allowed time for questions/answers. Mr. Patterson stated his daughter handles his affairs and requested a copy of the form for her to review.  advised him to have the nurse page the  when he is ready.   Mr Patterson stated he is Spiritism and shared his love for his ten children/grandchildren.  affirmed his 
98   CO2 30 25   BUN 51* 59*     No results for input(s): \"TP\", \"GLOB\", \"GGT\" in the last 72 hours.    Invalid input(s): \"SGOT\", \"GPT\", \"AP\", \"TBIL\", \"ALB\", \"AML\", \"AMYP\", \"LPSE\", \"HLPSE\"  No results for input(s): \"INR\", \"APTT\" in the last 72 hours.    Invalid input(s): \"PTP\"   No results for input(s): \"TIBC\" in the last 72 hours.    Invalid input(s): \"FE\", \"PSAT\", \"FERR\"   No results found for: \"RBCF\"   No results for input(s): \"PH\", \"PCO2\", \"PO2\" in the last 72 hours.  No results for input(s): \"CPK\", \"CKMB\", \"TROPONINI\" in the last 72 hours.  No components found for: \"GLPOC\"  @labua@    MEDICATIONS:  Current Facility-Administered Medications   Medication Dose Route Frequency    calcium acetate (PHOSLO) capsule 667 mg  1 capsule Oral TID WC    colchicine (COLCRYS) tablet 0.6 mg  0.6 mg Oral Daily    diclofenac sodium (VOLTAREN) 1 % gel 2 g  2 g Topical 4x Daily PRN    ferrous sulfate (IRON 325) tablet 325 mg  325 mg Oral QAM AC    gabapentin (NEURONTIN) capsule 300 mg  300 mg Oral Daily    hydrOXYzine HCl (ATARAX) tablet 25 mg  25 mg Oral TID PRN    metoprolol succinate (TOPROL XL) extended release tablet 25 mg  25 mg Oral Daily    montelukast (SINGULAIR) tablet 10 mg  10 mg Oral Daily    pantoprazole (PROTONIX) tablet 40 mg  40 mg Oral QAM AC    pravastatin (PRAVACHOL) tablet 10 mg  10 mg Oral Nightly    sevelamer (RENVELA) tablet 1,600 mg  1,600 mg Oral TID WC    latanoprost (XALATAN) 0.005 % ophthalmic solution 1 drop  1 drop Both Eyes Nightly    traZODone (DESYREL) tablet 50 mg  50 mg Oral Nightly    sodium chloride flush 0.9 % injection 5-40 mL  5-40 mL IntraVENous 2 times per day    sodium chloride flush 0.9 % injection 5-40 mL  5-40 mL IntraVENous PRN    0.9 % sodium chloride infusion   IntraVENous PRN    ondansetron (ZOFRAN-ODT) disintegrating tablet 4 mg  4 mg Oral Q8H PRN    Or    ondansetron (ZOFRAN) injection 4 mg  4 mg IntraVENous Q6H PRN    polyethylene glycol (GLYCOLAX) packet 17 g  17 g Oral Daily 
tablet 4 mg  4 mg Oral Q8H PRN    Or    ondansetron (ZOFRAN) injection 4 mg  4 mg IntraVENous Q6H PRN    polyethylene glycol (GLYCOLAX) packet 17 g  17 g Oral Daily PRN    acetaminophen (TYLENOL) tablet 650 mg  650 mg Oral Q6H PRN    Or    acetaminophen (TYLENOL) suppository 650 mg  650 mg Rectal Q6H PRN    albuterol (PROVENTIL) (2.5 MG/3ML) 0.083% nebulizer solution 2.5 mg  2.5 mg Nebulization Q4H PRN    arformoterol 15 mcg-budesonide 0.25 mg neb solution   Nebulization BID RT    sacubitril-valsartan (ENTRESTO) 24-26 MG per tablet 2 tablet  2 tablet Oral BID    epoetin nehemiah-epbx (RETACRIT) injection 10,000 Units  10,000 Units SubCUTAneous Once per day on Tue Thu Sat

## 2024-06-13 NOTE — TELEPHONE ENCOUNTER
Care Transitions Initial Follow Up Call    Outreach made within 2 business days of discharge: Yes    Patient: Darrel Patterson Patient : 1945   MRN: [id number]  Reason for Admission: There are no discharge diagnoses documented for the most recent discharge.  Discharge Date: 24       Spoke with: Lisa Traore (Daughter)    Discharge department/facility: [facility] Interactive Patient Contact:  Was patient able to fill all prescriptions: Yes  Was patient instructed to bring all medications to the follow-up visit: Yes  Is patient taking all medications as directed in the discharge summary? Yes  Does patient understand their discharge instructions: Yes  Does patient have questions or concerns that need addressed prior to 7-14 day follow up office visit: no    Scheduled appointment with PCP within 7-14 days    Follow Up  Future Appointments   Date Time Provider Department Center   2024  8:30 AM Roma Solitario, APRN - NP [facility] LPN

## 2024-06-14 NOTE — DISCHARGE SUMMARY
Discharge Summary    Name: Darrel Patterson  [id number]  YOB: 1945 (Age: 79 y.o.)   Date of Admission: 6/10/2024  Date of Discharge: 6/14/2024  Attending Physician: No att. providers found    Discharge Diagnosis:   Acute symptomatic anemia  Acute gastritis  Chronic systolic heart failure  ESRD  PMR    Consultations:  IP CONSULT TO NEPHROLOGY  IP CONSULT TO GI  IP CONSULT TO SPIRITUAL SERVICES      Brief Hospital Course by Main Problems:   Mr. Patterson presented to our hospital with acute on chronic anemia.  He had endoscopy done, showed gastritis and esophagitis, biopsy was sent.  He also received 1 unit of PRBC during hospitalization and hemoglobin has been stable.  Can be discharged and needs to follow-up with GI for biopsy results    Discharge Exam:  Patient seen and examined by me on discharge day.  Pertinent Findings:  No data found.    Gen:    Not in distress  Chest: Clear lungs  CVS:   Regular rhythm.  No edema  Abd:  Soft, not distended, not tender  Neuro: awake, moving all exts    Discharge/Recent Laboratory Results:  No results for input(s): \"NA\", \"K\", \"CL\", \"CO2\", \"BUN\", \"CREATININE\", \"GLUCOSE\", \"CALCIUM\", \"PHOS\", \"MG\" in the last 72 hours.  Recent Labs     06/12/24  0520   HGB 7.8*   HCT 24.9*   WBC 6.2          Discharge Medications:     Medication List        CHANGE how you take these medications      gabapentin 600 MG tablet  Commonly known as: NEURONTIN  Take 2 tablets every 8 hours  What changed:   how much to take  when to take this  additional instructions     lidocaine 5 %  Commonly known as: LIDODERM  What changed: Another medication with the same name was removed. Continue taking this medication, and follow the directions you see here.     omeprazole 40 MG delayed release capsule  Commonly known as: PRILOSEC  Take 1 capsule by mouth daily  What changed: when to take this            CONTINUE taking these medications      albuterol sulfate

## 2024-06-17 ENCOUNTER — TELEPHONE (OUTPATIENT)
Age: 79
End: 2024-06-17

## 2024-06-17 ENCOUNTER — OFFICE VISIT (OUTPATIENT)
Age: 79
End: 2024-06-17

## 2024-06-17 VITALS
HEIGHT: 72 IN | BODY MASS INDEX: 20.13 KG/M2 | SYSTOLIC BLOOD PRESSURE: 167 MMHG | HEART RATE: 84 BPM | DIASTOLIC BLOOD PRESSURE: 86 MMHG | TEMPERATURE: 97.3 F | WEIGHT: 148.6 LBS | RESPIRATION RATE: 22 BRPM | OXYGEN SATURATION: 93 %

## 2024-06-17 DIAGNOSIS — R06.02 SOB (SHORTNESS OF BREATH): ICD-10-CM

## 2024-06-17 DIAGNOSIS — Z13.1 SCREENING FOR DIABETES MELLITUS: ICD-10-CM

## 2024-06-17 DIAGNOSIS — D50.0 ANEMIA, BLOOD LOSS: ICD-10-CM

## 2024-06-17 DIAGNOSIS — K29.71 GASTRITIS WITH HEMORRHAGE, UNSPECIFIED CHRONICITY, UNSPECIFIED GASTRITIS TYPE: Primary | ICD-10-CM

## 2024-06-17 DIAGNOSIS — J44.9 CHRONIC OBSTRUCTIVE PULMONARY DISEASE, UNSPECIFIED COPD TYPE (HCC): ICD-10-CM

## 2024-06-17 DIAGNOSIS — K20.90 ESOPHAGITIS: ICD-10-CM

## 2024-06-17 DIAGNOSIS — Z79.52 LONG TERM SYSTEMIC STEROID USER: ICD-10-CM

## 2024-06-17 DIAGNOSIS — Z09 HOSPITAL DISCHARGE FOLLOW-UP: ICD-10-CM

## 2024-06-17 LAB — HBA1C MFR BLD: 4.9 %

## 2024-06-17 RX ORDER — FLUTICASONE FUROATE, UMECLIDINIUM BROMIDE AND VILANTEROL TRIFENATATE 100; 62.5; 25 UG/1; UG/1; UG/1
1 POWDER RESPIRATORY (INHALATION) DAILY
Qty: 1 EACH | Refills: 5 | Status: SHIPPED | OUTPATIENT
Start: 2024-06-17

## 2024-06-17 RX ORDER — MEGESTROL ACETATE 40 MG/1
40 TABLET ORAL DAILY
Qty: 90 TABLET | Refills: 0 | Status: SHIPPED | OUTPATIENT
Start: 2024-06-17

## 2024-06-17 RX ORDER — ALLOPURINOL 100 MG/1
100 TABLET ORAL DAILY
COMMUNITY
Start: 2024-05-05

## 2024-06-18 ENCOUNTER — APPOINTMENT (OUTPATIENT)
Facility: HOSPITAL | Age: 79
End: 2024-06-18
Payer: MEDICARE

## 2024-06-18 ENCOUNTER — CLINICAL DOCUMENTATION (OUTPATIENT)
Age: 79
End: 2024-06-18

## 2024-06-18 ENCOUNTER — HOSPITAL ENCOUNTER (EMERGENCY)
Facility: HOSPITAL | Age: 79
Discharge: HOME | End: 2024-06-18
Attending: EMERGENCY MEDICINE
Payer: MEDICARE

## 2024-06-18 VITALS
HEART RATE: 76 BPM | BODY MASS INDEX: 18.96 KG/M2 | RESPIRATION RATE: 16 BRPM | DIASTOLIC BLOOD PRESSURE: 112 MMHG | TEMPERATURE: 97.9 F | SYSTOLIC BLOOD PRESSURE: 166 MMHG | WEIGHT: 140 LBS | HEIGHT: 72 IN | OXYGEN SATURATION: 98 %

## 2024-06-18 DIAGNOSIS — K57.32 DIVERTICULITIS OF COLON: ICD-10-CM

## 2024-06-18 DIAGNOSIS — E87.70 HYPERVOLEMIA, UNSPECIFIED HYPERVOLEMIA TYPE: Primary | ICD-10-CM

## 2024-06-18 DIAGNOSIS — N18.6 ESRD (END STAGE RENAL DISEASE) (HCC): ICD-10-CM

## 2024-06-18 LAB
ALBUMIN SERPL-MCNC: 3.5 G/DL (ref 3.5–5)
ALBUMIN/GLOB SERPL: 1 (ref 1.1–2.2)
ALP SERPL-CCNC: 85 U/L (ref 45–117)
ALT SERPL-CCNC: 14 U/L (ref 12–78)
ANION GAP SERPL CALC-SCNC: 19 MMOL/L (ref 5–15)
AST SERPL-CCNC: 23 U/L (ref 15–37)
BASOPHILS # BLD: 0.1 K/UL (ref 0–0.1)
BASOPHILS NFR BLD: 1 % (ref 0–1)
BILIRUB SERPL-MCNC: 1.3 MG/DL (ref 0.2–1)
BUN SERPL-MCNC: 54 MG/DL (ref 6–20)
BUN/CREAT SERPL: 5 (ref 12–20)
CALCIUM SERPL-MCNC: 8.3 MG/DL (ref 8.5–10.1)
CHLORIDE SERPL-SCNC: 96 MMOL/L (ref 97–108)
CO2 SERPL-SCNC: 25 MMOL/L (ref 21–32)
CREAT SERPL-MCNC: 11.76 MG/DL (ref 0.7–1.3)
DIFFERENTIAL METHOD BLD: ABNORMAL
EOSINOPHIL # BLD: 1.1 K/UL (ref 0–0.4)
EOSINOPHIL NFR BLD: 12 % (ref 0–7)
ERYTHROCYTE [DISTWIDTH] IN BLOOD BY AUTOMATED COUNT: 17.4 % (ref 11.5–14.5)
GLOBULIN SER CALC-MCNC: 3.5 G/DL (ref 2–4)
GLUCOSE SERPL-MCNC: 107 MG/DL (ref 65–100)
HCT VFR BLD AUTO: 25.7 % (ref 36.6–50.3)
HEMOGLOBIN, POC: 8.2 G/DL
HGB BLD-MCNC: 8.6 G/DL (ref 12.1–17)
IMM GRANULOCYTES # BLD AUTO: 0 K/UL (ref 0–0.04)
IMM GRANULOCYTES NFR BLD AUTO: 0 % (ref 0–0.5)
LYMPHOCYTES # BLD: 1.5 K/UL (ref 0.8–3.5)
LYMPHOCYTES NFR BLD: 17 % (ref 12–49)
MAGNESIUM SERPL-MCNC: 2.1 MG/DL (ref 1.6–2.4)
MCH RBC QN AUTO: 32.3 PG (ref 26–34)
MCHC RBC AUTO-ENTMCNC: 33.5 G/DL (ref 30–36.5)
MCV RBC AUTO: 96.6 FL (ref 80–99)
MONOCYTES # BLD: 0.9 K/UL (ref 0–1)
MONOCYTES NFR BLD: 10 % (ref 5–13)
NEUTS SEG # BLD: 5.3 K/UL (ref 1.8–8)
NEUTS SEG NFR BLD: 60 % (ref 32–75)
NRBC # BLD: 0 K/UL (ref 0–0.01)
NRBC BLD-RTO: 0 PER 100 WBC
NT PRO BNP: ABNORMAL PG/ML
PLATELET # BLD AUTO: 288 K/UL (ref 150–400)
PMV BLD AUTO: 11.5 FL (ref 8.9–12.9)
POTASSIUM SERPL-SCNC: 4.4 MMOL/L (ref 3.5–5.1)
PROT SERPL-MCNC: 7 G/DL (ref 6.4–8.2)
RBC # BLD AUTO: 2.66 M/UL (ref 4.1–5.7)
SODIUM SERPL-SCNC: 140 MMOL/L (ref 136–145)
TROPONIN I SERPL HS-MCNC: 104 NG/L (ref 0–76)
TROPONIN I SERPL HS-MCNC: 107 NG/L (ref 0–76)
WBC # BLD AUTO: 8.8 K/UL (ref 4.1–11.1)

## 2024-06-18 PROCEDURE — 80053 COMPREHEN METABOLIC PANEL: CPT

## 2024-06-18 PROCEDURE — 6370000000 HC RX 637 (ALT 250 FOR IP): Performed by: EMERGENCY MEDICINE

## 2024-06-18 PROCEDURE — 6360000002 HC RX W HCPCS: Performed by: EMERGENCY MEDICINE

## 2024-06-18 PROCEDURE — 6360000004 HC RX CONTRAST MEDICATION: Performed by: EMERGENCY MEDICINE

## 2024-06-18 PROCEDURE — 83735 ASSAY OF MAGNESIUM: CPT

## 2024-06-18 PROCEDURE — 84484 ASSAY OF TROPONIN QUANT: CPT

## 2024-06-18 PROCEDURE — 71045 X-RAY EXAM CHEST 1 VIEW: CPT

## 2024-06-18 PROCEDURE — 36415 COLL VENOUS BLD VENIPUNCTURE: CPT

## 2024-06-18 PROCEDURE — 99285 EMERGENCY DEPT VISIT HI MDM: CPT

## 2024-06-18 PROCEDURE — 96374 THER/PROPH/DIAG INJ IV PUSH: CPT

## 2024-06-18 PROCEDURE — 85025 COMPLETE CBC W/AUTO DIFF WBC: CPT

## 2024-06-18 PROCEDURE — 93005 ELECTROCARDIOGRAM TRACING: CPT | Performed by: EMERGENCY MEDICINE

## 2024-06-18 PROCEDURE — 74177 CT ABD & PELVIS W/CONTRAST: CPT

## 2024-06-18 RX ORDER — OXYCODONE HYDROCHLORIDE 5 MG/1
5 TABLET ORAL ONCE
Status: COMPLETED | OUTPATIENT
Start: 2024-06-18 | End: 2024-06-18

## 2024-06-18 RX ORDER — OXYCODONE HYDROCHLORIDE 5 MG/1
5 TABLET ORAL EVERY 6 HOURS PRN
Qty: 10 TABLET | Refills: 0 | Status: SHIPPED | OUTPATIENT
Start: 2024-06-18 | End: 2024-06-21

## 2024-06-18 RX ORDER — FUROSEMIDE 10 MG/ML
80 INJECTION INTRAMUSCULAR; INTRAVENOUS ONCE
Status: COMPLETED | OUTPATIENT
Start: 2024-06-18 | End: 2024-06-18

## 2024-06-18 RX ORDER — HYDRALAZINE HYDROCHLORIDE 25 MG/1
50 TABLET, FILM COATED ORAL
Status: COMPLETED | OUTPATIENT
Start: 2024-06-18 | End: 2024-06-18

## 2024-06-18 RX ORDER — FUROSEMIDE 40 MG/1
40 TABLET ORAL 2 TIMES DAILY
Qty: 60 TABLET | Refills: 0 | Status: SHIPPED | OUTPATIENT
Start: 2024-06-18 | End: 2024-07-18

## 2024-06-18 RX ORDER — AMOXICILLIN AND CLAVULANATE POTASSIUM 500; 125 MG/1; MG/1
1 TABLET, FILM COATED ORAL DAILY
Qty: 10 TABLET | Refills: 0 | Status: SHIPPED | OUTPATIENT
Start: 2024-06-18 | End: 2024-06-28

## 2024-06-18 RX ADMIN — HYDRALAZINE HYDROCHLORIDE 50 MG: 25 TABLET ORAL at 13:23

## 2024-06-18 RX ADMIN — IOPAMIDOL 100 ML: 612 INJECTION, SOLUTION INTRAVENOUS at 12:25

## 2024-06-18 RX ADMIN — FUROSEMIDE 80 MG: 10 INJECTION, SOLUTION INTRAMUSCULAR; INTRAVENOUS at 13:24

## 2024-06-18 RX ADMIN — OXYCODONE 5 MG: 5 TABLET ORAL at 13:23

## 2024-06-18 ASSESSMENT — PAIN SCALES - GENERAL
PAINLEVEL_OUTOF10: 0
PAINLEVEL_OUTOF10: 8

## 2024-06-18 ASSESSMENT — PAIN - FUNCTIONAL ASSESSMENT: PAIN_FUNCTIONAL_ASSESSMENT: NONE - DENIES PAIN

## 2024-06-18 ASSESSMENT — PAIN DESCRIPTION - LOCATION: LOCATION: BACK

## 2024-06-18 NOTE — TELEPHONE ENCOUNTER
Spoke with daughter and updated her about the visit yesterday. She will try to come to the next visit 6-26.

## 2024-06-18 NOTE — PROGRESS NOTES
\"Have you been to the ER, urgent care clinic since your last visit?  Hospitalized since your last visit?\"    YES - When: approximately 5 days ago.  Where and Why: [facility].    “Have you seen or consulted any other health care providers outside of [facility] since your last visit?”    NO          Click Here for Release of Records Request  Chief Complaint   Patient presents with    Shortness of Breath     X 3 days, can't lay down flat, get short winded.        Vitals:    06/17/24 1041   BP: (!) 167/86   Pulse: 84   Resp: 22   Temp: 97.3 °F (36.3 °C)   SpO2: 93%             Click Here for Release of Records Request  
Late Entry:  Patient labs drawn in right arm per NP orders. Patient tolerated well.    
O2 sat testing completed in office:    O2 sat Room air at rest:  93%  O2 sat Room air with Exercise:  86 after 8 minutes on exertion.  Recovery Test, with O2 with Exercise:  94%  Recovery Test, with O2 at rest:  99%  
B, DO at [facility] ENDOSCOPY    UPPER GI ENDOSCOPY,BIOPSY  2023       Social History     Tobacco Use   Smoking Status Former    Current packs/day: 0.00    Average packs/day: 0.8 packs/day for 30.0 years (22.5 ttl pk-yrs)    Types: Cigarettes    Start date:     Quit date:     Years since quittin.4   Smokeless Tobacco Never   Tobacco Comments    Quit smoking: Quit 30 years ago       Social History     Socioeconomic History    Marital status: Single     Spouse name: None    Number of children: None    Years of education: None    Highest education level: None   Tobacco Use    Smoking status: Former     Current packs/day: 0.00     Average packs/day: 0.8 packs/day for 30.0 years (22.5 ttl pk-yrs)     Types: Cigarettes     Start date:      Quit date:      Years since quittin.4    Smokeless tobacco: Never    Tobacco comments:     Quit smoking: Quit 30 years ago   Vaping Use    Vaping Use: Never used   Substance and Sexual Activity    Alcohol use: No     Alcohol/week: 0.0 standard drinks of alcohol    Drug use: No    Sexual activity: Yes     Partners: Female     Social Determinants of Health     Financial Resource Strain: Low Risk  (2024)    Overall Financial Resource Strain (CARDIA)     Difficulty of Paying Living Expenses: Not hard at all   Food Insecurity: No Food Insecurity (2024)    Hunger Vital Sign     Worried About Running Out of Food in the Last Year: Never true     Ran Out of Food in the Last Year: Never true   Transportation Needs: No Transportation Needs (2024)    PRAPARE - Transportation     Lack of Transportation (Medical): No     Lack of Transportation (Non-Medical): No   Physical Activity: Inactive (2024)    Exercise Vital Sign     Days of Exercise per Week: 0 days     Minutes of Exercise per Session: 0 min    Social Connections ([facility] HRSN)   Housing Stability: Low Risk  (2024)    Housing Stability Vital Sign     Unable to Pay for Housing in the Last Year: No

## 2024-06-18 NOTE — ED TRIAGE NOTES
SOB, B[place] and body aches x 2 weeks with x7 loose stools yesterday . Pt reports some BRB noted in stool . Skin hot to touch , oral temp 97.9

## 2024-06-20 LAB
EKG ATRIAL RATE: 79 BPM
EKG DIAGNOSIS: NORMAL
EKG P AXIS: 50 DEGREES
EKG P-R INTERVAL: 136 MS
EKG Q-T INTERVAL: 442 MS
EKG QRS DURATION: 108 MS
EKG QTC CALCULATION (BAZETT): 506 MS
EKG R AXIS: -2 DEGREES
EKG T AXIS: 126 DEGREES
EKG VENTRICULAR RATE: 79 BPM

## 2024-06-24 NOTE — ED PROVIDER NOTES
EMERGENCY DEPARTMENT HISTORY AND PHYSICAL EXAM      Date: 6/18/2024  Patient Name: Darrel Patterson    History of Presenting Illness     Chief Complaint   Patient presents with    Shortness of Breath       History Provided By: Patient    HPI: Darrel Patterson, 79 y.o. male with PMHx as noted below presents the emergency department with complaints of swelling, shortness of breath as well as some diffuse abdominal pain.  Patient reports that symptoms are going on now for a while, worsening lays flat and with exertion.  Patient missed dialysis this morning.    PCP: Roma Solitario, ELVI - NP    No current facility-administered medications for this encounter.     Current Outpatient Medications   Medication Sig Dispense Refill    furosemide (LASIX) 40 MG tablet Take 1 tablet by mouth 2 times daily 60 tablet 0    amoxicillin-clavulanate (AUGMENTIN) 500-125 MG per tablet Take 1 tablet by mouth daily for 10 days 10 tablet 0    ivabradine (CORLANOR) 5 MG TABS tablet Take 1 tablet by mouth 2 times daily (with meals)      sacubitril-valsartan (ENTRESTO) 49-51 MG per tablet Take 1 tablet by mouth 2 times daily 60 tablet 3    allopurinol (ZYLOPRIM) 100 MG tablet Take 1 tablet by mouth daily      fluticasone-umeclidin-vilant (TRELEGY ELLIPTA) 100-62.5-25 MCG/ACT AEPB inhaler Inhale 1 puff into the lungs daily 1 each 5    megestrol (MEGACE) 40 MG tablet Take 1 tablet by mouth daily To increase appetite 90 tablet 0    omeprazole (PRILOSEC) 40 MG delayed release capsule Take 1 capsule by mouth daily 30 capsule 1    hydrOXYzine pamoate (VISTARIL) 25 MG capsule TAKE 1 CAP BY MOUTH THREE (3) TIMES DAILY AS NEEDED FOR ITCHING. 270 capsule 2    montelukast (SINGULAIR) 10 MG tablet TAKE 1 TABLET BY MOUTH EVERY DAY 90 tablet 1    metoprolol succinate (TOPROL XL) 25 MG extended release tablet Take 1 tablet by mouth daily 30 tablet 3    budesonide-formoterol (SYMBICORT) 160-4.5 MCG/ACT AERO Inhale 2 puffs into the lungs 2 times daily 10.2

## 2024-06-26 ENCOUNTER — OFFICE VISIT (OUTPATIENT)
Age: 79
End: 2024-06-26
Payer: MEDICARE

## 2024-06-26 VITALS
TEMPERATURE: 97 F | OXYGEN SATURATION: 97 % | WEIGHT: 146 LBS | BODY MASS INDEX: 19.77 KG/M2 | DIASTOLIC BLOOD PRESSURE: 82 MMHG | SYSTOLIC BLOOD PRESSURE: 151 MMHG | HEIGHT: 72 IN | HEART RATE: 70 BPM | RESPIRATION RATE: 18 BRPM

## 2024-06-26 DIAGNOSIS — D50.0 ANEMIA, BLOOD LOSS: Primary | ICD-10-CM

## 2024-06-26 DIAGNOSIS — N28.89 LEFT RENAL MASS: ICD-10-CM

## 2024-06-26 DIAGNOSIS — Z99.2 ESRD ON HEMODIALYSIS (HCC): ICD-10-CM

## 2024-06-26 DIAGNOSIS — M05.79 RHEUMATOID ARTHRITIS INVOLVING MULTIPLE SITES WITH POSITIVE RHEUMATOID FACTOR (HCC): ICD-10-CM

## 2024-06-26 DIAGNOSIS — J44.9 CHRONIC OBSTRUCTIVE PULMONARY DISEASE, UNSPECIFIED COPD TYPE (HCC): ICD-10-CM

## 2024-06-26 DIAGNOSIS — N18.6 ESRD ON HEMODIALYSIS (HCC): ICD-10-CM

## 2024-06-26 DIAGNOSIS — I50.9 CHRONIC CONGESTIVE HEART FAILURE, UNSPECIFIED HEART FAILURE TYPE (HCC): ICD-10-CM

## 2024-06-26 PROCEDURE — 99214 OFFICE O/P EST MOD 30 MIN: CPT | Performed by: NURSE PRACTITIONER

## 2024-06-26 PROCEDURE — 3079F DIAST BP 80-89 MM HG: CPT | Performed by: NURSE PRACTITIONER

## 2024-06-26 PROCEDURE — 3077F SYST BP >= 140 MM HG: CPT | Performed by: NURSE PRACTITIONER

## 2024-06-26 PROCEDURE — 1123F ACP DISCUSS/DSCN MKR DOCD: CPT | Performed by: NURSE PRACTITIONER

## 2024-06-26 RX ORDER — TRAVOPROST OPHTHALMIC SOLUTION 0.04 MG/ML
SOLUTION OPHTHALMIC
Qty: 2.5 ML | Refills: 0 | Status: SHIPPED | OUTPATIENT
Start: 2024-06-26

## 2024-06-26 NOTE — PROGRESS NOTES
\"Have you been to the ER, urgent care clinic since your last visit?  Hospitalized since your last visit?\"    NO    “Have you seen or consulted any other health care providers outside of [facility] since your last visit?”    NO          Click Here for Release of Records Request

## 2024-06-26 NOTE — PROGRESS NOTES
Subjective:     CC: SOB    Darrel Patterson is a 79 y.o. male with ESRD, COPD, and CHF who presents today for a 1 week follow up for SOB.     He is accompanied by his son Delon.      He also has a daughter Nadine who lives in [place] who was present via speakerphone.    He was admitted 6/10 through 6/14 at [facility] for a GI bleed.  Endoscopy was done.  Showed gastritis and esophagitis.  Biopsy was sent. (Neg for metaplasia or H pylori)  He received 1 PRBC.  He was advised to STOP HIS PREDNISONE that he takes for his RA.  Was discharged home and advised to follow-up with GI for biopsy results.  The last hemoglobin was 7.8 on 6/12/2024, up from 6.9 on 6/11/2024.    At his last appt a week ago he was taking omeprazole 40 mg daily. Denied any nausea, vomiting, or abdominal pain but had no appetite.  He was prescribed Megace but this has not really helped. Daughter is interested in CBD gummies which are primarily metabolized by the liver.     He was referred to GI Dr Sauceda, earliest appt available was August 21st.     Today he states he has seen a small amount of bright red blood in the stool.     Previous GI bleed in 8-2023  He has chronic underlying anemia related to ESRD.  Managed by nephrologist.    Takes ferrous sulfate 325mg daily.    He has been SOB- worse when he lays down.     He now has home oxygen if needed. Daughter bought him a pulse oximeter she just needs to take it over to his house.     He does have CHF, followed by cardiology. Was seen in April and his Metoprolol was increased to 25mg. They also increased his Enstresto to 49-51mg and added Colanor 5mg BID.    Last ECHO 4-2023 showed :   Severely reduced left ventricular systolic function with a visually estimated EF of 15 - 20%. Left ventricle is dilated. Normal wall thickness. Global hypokinesis present. Abnormal diastolic function.    Right Ventricle: Reduced systolic function.    Aortic Valve: Mild regurgitation.    Mitral Valve: Moderate to

## 2024-07-08 ENCOUNTER — APPOINTMENT (OUTPATIENT)
Facility: HOSPITAL | Age: 79
End: 2024-07-08
Payer: MEDICARE

## 2024-07-08 ENCOUNTER — HOSPITAL ENCOUNTER (EMERGENCY)
Facility: HOSPITAL | Age: 79
Discharge: ANOTHER HOSPITAL | End: 2024-07-09
Attending: FAMILY MEDICINE | Admitting: FAMILY MEDICINE
Payer: MEDICARE

## 2024-07-08 DIAGNOSIS — R79.89 ELEVATED TROPONIN: Primary | ICD-10-CM

## 2024-07-08 DIAGNOSIS — N18.6 ESRD NEEDING DIALYSIS (HCC): ICD-10-CM

## 2024-07-08 DIAGNOSIS — Z99.2 ESRD NEEDING DIALYSIS (HCC): ICD-10-CM

## 2024-07-08 LAB
ALBUMIN SERPL-MCNC: 3.1 G/DL (ref 3.5–5)
ALBUMIN/GLOB SERPL: 0.9 (ref 1.1–2.2)
ALP SERPL-CCNC: 68 U/L (ref 45–117)
ALT SERPL-CCNC: 31 U/L (ref 12–78)
ANION GAP SERPL CALC-SCNC: 16 MMOL/L (ref 5–15)
AST SERPL-CCNC: 55 U/L (ref 15–37)
BASOPHILS # BLD: 0.1 K/UL (ref 0–0.1)
BASOPHILS NFR BLD: 1 % (ref 0–1)
BILIRUB SERPL-MCNC: 1.6 MG/DL (ref 0.2–1)
BUN SERPL-MCNC: 98 MG/DL (ref 6–20)
BUN/CREAT SERPL: 7 (ref 12–20)
CALCIUM SERPL-MCNC: 8.1 MG/DL (ref 8.5–10.1)
CHLORIDE SERPL-SCNC: 98 MMOL/L (ref 97–108)
CO2 SERPL-SCNC: 26 MMOL/L (ref 21–32)
CREAT SERPL-MCNC: 13.58 MG/DL (ref 0.7–1.3)
D DIMER PPP FEU-MCNC: 17.32 MG/L FEU (ref 0–0.65)
DIFFERENTIAL METHOD BLD: ABNORMAL
EOSINOPHIL # BLD: 0.1 K/UL (ref 0–0.4)
EOSINOPHIL NFR BLD: 1 % (ref 0–7)
ERYTHROCYTE [DISTWIDTH] IN BLOOD BY AUTOMATED COUNT: 16.9 % (ref 11.5–14.5)
GLOBULIN SER CALC-MCNC: 3.6 G/DL (ref 2–4)
GLUCOSE SERPL-MCNC: 123 MG/DL (ref 65–100)
HCT VFR BLD AUTO: 32.6 % (ref 36.6–50.3)
HGB BLD-MCNC: 11.1 G/DL (ref 12.1–17)
IMM GRANULOCYTES # BLD AUTO: 0 K/UL (ref 0–0.04)
IMM GRANULOCYTES NFR BLD AUTO: 0 % (ref 0–0.5)
LYMPHOCYTES # BLD: 1 K/UL (ref 0.8–3.5)
LYMPHOCYTES NFR BLD: 16 % (ref 12–49)
MAGNESIUM SERPL-MCNC: 2.8 MG/DL (ref 1.6–2.4)
MCH RBC QN AUTO: 33.1 PG (ref 26–34)
MCHC RBC AUTO-ENTMCNC: 34 G/DL (ref 30–36.5)
MCV RBC AUTO: 97.3 FL (ref 80–99)
MONOCYTES # BLD: 0.9 K/UL (ref 0–1)
MONOCYTES NFR BLD: 15 % (ref 5–13)
NEUTS SEG # BLD: 3.8 K/UL (ref 1.8–8)
NEUTS SEG NFR BLD: 66 % (ref 32–75)
NRBC # BLD: 0.02 K/UL (ref 0–0.01)
NRBC BLD-RTO: 0.3 PER 100 WBC
PLATELET # BLD AUTO: 133 K/UL (ref 150–400)
PMV BLD AUTO: 12.9 FL (ref 8.9–12.9)
POTASSIUM SERPL-SCNC: 4.6 MMOL/L (ref 3.5–5.1)
PROT SERPL-MCNC: 6.7 G/DL (ref 6.4–8.2)
RBC # BLD AUTO: 3.35 M/UL (ref 4.1–5.7)
SODIUM SERPL-SCNC: 140 MMOL/L (ref 136–145)
TROPONIN I SERPL HS-MCNC: 915 NG/L (ref 0–76)
TROPONIN I SERPL HS-MCNC: 955 NG/L (ref 0–76)
WBC # BLD AUTO: 5.8 K/UL (ref 4.1–11.1)

## 2024-07-08 PROCEDURE — 6360000004 HC RX CONTRAST MEDICATION: Performed by: FAMILY MEDICINE

## 2024-07-08 PROCEDURE — 84484 ASSAY OF TROPONIN QUANT: CPT

## 2024-07-08 PROCEDURE — 36415 COLL VENOUS BLD VENIPUNCTURE: CPT

## 2024-07-08 PROCEDURE — 99285 EMERGENCY DEPT VISIT HI MDM: CPT

## 2024-07-08 PROCEDURE — 85025 COMPLETE CBC W/AUTO DIFF WBC: CPT

## 2024-07-08 PROCEDURE — 83735 ASSAY OF MAGNESIUM: CPT

## 2024-07-08 PROCEDURE — 93005 ELECTROCARDIOGRAM TRACING: CPT | Performed by: FAMILY MEDICINE

## 2024-07-08 PROCEDURE — 71045 X-RAY EXAM CHEST 1 VIEW: CPT

## 2024-07-08 PROCEDURE — 71275 CT ANGIOGRAPHY CHEST: CPT

## 2024-07-08 PROCEDURE — 85379 FIBRIN DEGRADATION QUANT: CPT

## 2024-07-08 PROCEDURE — 6370000000 HC RX 637 (ALT 250 FOR IP): Performed by: EMERGENCY MEDICINE

## 2024-07-08 PROCEDURE — 80053 COMPREHEN METABOLIC PANEL: CPT

## 2024-07-08 RX ORDER — ACETAMINOPHEN 325 MG/1
650 TABLET ORAL
Status: COMPLETED | OUTPATIENT
Start: 2024-07-08 | End: 2024-07-08

## 2024-07-08 RX ADMIN — IOPAMIDOL 100 ML: 755 INJECTION, SOLUTION INTRAVENOUS at 20:00

## 2024-07-08 RX ADMIN — ACETAMINOPHEN 650 MG: 325 TABLET ORAL at 21:39

## 2024-07-08 ASSESSMENT — PAIN - FUNCTIONAL ASSESSMENT: PAIN_FUNCTIONAL_ASSESSMENT: NONE - DENIES PAIN

## 2024-07-08 ASSESSMENT — PAIN SCALES - GENERAL: PAINLEVEL_OUTOF10: 8

## 2024-07-08 NOTE — ED PROVIDER NOTES
Ventricular Rate 70 BPM    Atrial Rate 72 BPM    P-R Interval 122 ms    QRS Duration 104 ms    Q-T Interval 460 ms    QTc Calculation (Bazett) 496 ms    P Axis -13 degrees    R Axis -4 degrees    T Axis 218 degrees    Diagnosis       Normal sinus rhythm with sinus arrhythmia  Left ventricular hypertrophy with repolarization abnormality  Prolonged QT  Abnormal ECG  When compared with ECG of 18-JUN-2024 11:23,  fusion complexes are no longer present  Nonspecific T wave abnormality now evident in Inferior leads  Nonspecific T wave abnormality has replaced inverted T waves in Anterior   leads     Troponin    Collection Time: 07/08/24  6:26 PM   Result Value Ref Range    Troponin, High Sensitivity 955 (HH) 0 - 76 ng/L   CBC with Auto Differential    Collection Time: 07/09/24  6:26 AM   Result Value Ref Range    WBC 6.4 4.1 - 11.1 K/uL    RBC 3.30 (L) 4.10 - 5.70 M/uL    Hemoglobin 11.1 (L) 12.1 - 17.0 g/dL    Hematocrit 32.4 (L) 36.6 - 50.3 %    MCV 98.2 80.0 - 99.0 FL    MCH 33.6 26.0 - 34.0 PG    MCHC 34.3 30.0 - 36.5 g/dL    RDW 17.2 (H) 11.5 - 14.5 %    Platelets 98 (L) 150 - 400 K/uL    Nucleated RBCs 0.5 (H) 0  WBC    nRBC 0.03 (H) 0.00 - 0.01 K/uL    Neutrophils % 67 32 - 75 %    Lymphocytes % 17 12 - 49 %    Monocytes % 13 5 - 13 %    Eosinophils % 2 0 - 7 %    Basophils % 1 0 - 1 %    Immature Granulocytes % 0 0.0 - 0.5 %    Neutrophils Absolute 4.3 1.8 - 8.0 K/UL    Lymphocytes Absolute 1.1 0.8 - 3.5 K/UL    Monocytes Absolute 0.8 0.0 - 1.0 K/UL    Eosinophils Absolute 0.1 0.0 - 0.4 K/UL    Basophils Absolute 0.1 0.0 - 0.1 K/UL    Immature Granulocytes Absolute 0.0 0.00 - 0.04 K/UL    Differential Type SMEAR SCANNED      RBC Comment ANISOCYTOSIS  1+       Comprehensive Metabolic Panel    Collection Time: 07/09/24  7:34 AM   Result Value Ref Range    Sodium 135 (L) 136 - 145 mmol/L    Potassium 4.8 3.5 - 5.1 mmol/L    Chloride 93 (L) 97 - 108 mmol/L    CO2 22 21 - 32 mmol/L    Anion Gap 20 (H) 5 - 15

## 2024-07-08 NOTE — ED TRIAGE NOTES
Pt presents with no appetite and generalized fatigue x 3 weeks. Pt also reports intermittent diarrhea x 1 month.

## 2024-07-09 ENCOUNTER — HOSPITAL ENCOUNTER (INPATIENT)
Facility: HOSPITAL | Age: 79
LOS: 29 days | Discharge: HOME | DRG: 280 | End: 2024-08-07
Attending: STUDENT IN AN ORGANIZED HEALTH CARE EDUCATION/TRAINING PROGRAM | Admitting: STUDENT IN AN ORGANIZED HEALTH CARE EDUCATION/TRAINING PROGRAM
Payer: MEDICARE

## 2024-07-09 VITALS
HEART RATE: 66 BPM | OXYGEN SATURATION: 98 % | SYSTOLIC BLOOD PRESSURE: 161 MMHG | BODY MASS INDEX: 19.64 KG/M2 | TEMPERATURE: 97.7 F | HEIGHT: 72 IN | RESPIRATION RATE: 14 BRPM | WEIGHT: 145 LBS | DIASTOLIC BLOOD PRESSURE: 98 MMHG

## 2024-07-09 DIAGNOSIS — I24.9 ACUTE CORONARY SYNDROME (HCC): Primary | ICD-10-CM

## 2024-07-09 DIAGNOSIS — M25.562 CHRONIC PAIN OF LEFT KNEE: ICD-10-CM

## 2024-07-09 DIAGNOSIS — I25.10 CAD (CORONARY ARTERY DISEASE): ICD-10-CM

## 2024-07-09 DIAGNOSIS — G89.29 CHRONIC PAIN OF LEFT KNEE: ICD-10-CM

## 2024-07-09 PROBLEM — I21.4 NSTEMI (NON-ST ELEVATED MYOCARDIAL INFARCTION) (HCC): Status: ACTIVE | Noted: 2024-07-09

## 2024-07-09 LAB
ALBUMIN SERPL-MCNC: 3.1 G/DL (ref 3.5–5)
ALBUMIN/GLOB SERPL: 0.8 (ref 1.1–2.2)
ALP SERPL-CCNC: 60 U/L (ref 45–117)
ALT SERPL-CCNC: 35 U/L (ref 12–78)
ANION GAP SERPL CALC-SCNC: 20 MMOL/L (ref 5–15)
AST SERPL-CCNC: 62 U/L (ref 15–37)
BASOPHILS # BLD: 0.1 K/UL (ref 0–0.1)
BASOPHILS NFR BLD: 1 % (ref 0–1)
BILIRUB SERPL-MCNC: 1.4 MG/DL (ref 0.2–1)
BUN SERPL-MCNC: 98 MG/DL (ref 6–20)
BUN/CREAT SERPL: 7 (ref 12–20)
CALCIUM SERPL-MCNC: 8.1 MG/DL (ref 8.5–10.1)
CHLORIDE SERPL-SCNC: 93 MMOL/L (ref 97–108)
CO2 SERPL-SCNC: 22 MMOL/L (ref 21–32)
COMMENT:: NORMAL
CREAT SERPL-MCNC: 14.2 MG/DL (ref 0.7–1.3)
DIFFERENTIAL METHOD BLD: ABNORMAL
EOSINOPHIL # BLD: 0.1 K/UL (ref 0–0.4)
EOSINOPHIL NFR BLD: 2 % (ref 0–7)
ERYTHROCYTE [DISTWIDTH] IN BLOOD BY AUTOMATED COUNT: 17.2 % (ref 11.5–14.5)
GLOBULIN SER CALC-MCNC: 3.7 G/DL (ref 2–4)
GLUCOSE SERPL-MCNC: 112 MG/DL (ref 65–100)
HCT VFR BLD AUTO: 32.4 % (ref 36.6–50.3)
HGB BLD-MCNC: 11.1 G/DL (ref 12.1–17)
IMM GRANULOCYTES # BLD AUTO: 0 K/UL (ref 0–0.04)
IMM GRANULOCYTES NFR BLD AUTO: 0 % (ref 0–0.5)
LYMPHOCYTES # BLD: 1.1 K/UL (ref 0.8–3.5)
LYMPHOCYTES NFR BLD: 17 % (ref 12–49)
MCH RBC QN AUTO: 33.6 PG (ref 26–34)
MCHC RBC AUTO-ENTMCNC: 34.3 G/DL (ref 30–36.5)
MCV RBC AUTO: 98.2 FL (ref 80–99)
MONOCYTES # BLD: 0.8 K/UL (ref 0–1)
MONOCYTES NFR BLD: 13 % (ref 5–13)
NEUTS SEG # BLD: 4.3 K/UL (ref 1.8–8)
NEUTS SEG NFR BLD: 67 % (ref 32–75)
NRBC # BLD: 0.03 K/UL (ref 0–0.01)
NRBC BLD-RTO: 0.5 PER 100 WBC
PLATELET # BLD AUTO: 98 K/UL (ref 150–400)
POTASSIUM SERPL-SCNC: 4.8 MMOL/L (ref 3.5–5.1)
PROT SERPL-MCNC: 6.8 G/DL (ref 6.4–8.2)
RBC # BLD AUTO: 3.3 M/UL (ref 4.1–5.7)
RBC MORPH BLD: ABNORMAL
SODIUM SERPL-SCNC: 135 MMOL/L (ref 136–145)
SPECIMEN HOLD: NORMAL
TROPONIN I SERPL HS-MCNC: 695 NG/L (ref 0–76)
WBC # BLD AUTO: 6.4 K/UL (ref 4.1–11.1)

## 2024-07-09 PROCEDURE — 2060000000 HC ICU INTERMEDIATE R&B

## 2024-07-09 PROCEDURE — 5A1D70Z PERFORMANCE OF URINARY FILTRATION, INTERMITTENT, LESS THAN 6 HOURS PER DAY: ICD-10-PCS | Performed by: INTERNAL MEDICINE

## 2024-07-09 PROCEDURE — 6360000002 HC RX W HCPCS: Performed by: STUDENT IN AN ORGANIZED HEALTH CARE EDUCATION/TRAINING PROGRAM

## 2024-07-09 PROCEDURE — 85025 COMPLETE CBC W/AUTO DIFF WBC: CPT

## 2024-07-09 PROCEDURE — 94640 AIRWAY INHALATION TREATMENT: CPT

## 2024-07-09 PROCEDURE — 84484 ASSAY OF TROPONIN QUANT: CPT

## 2024-07-09 PROCEDURE — 2580000003 HC RX 258: Performed by: STUDENT IN AN ORGANIZED HEALTH CARE EDUCATION/TRAINING PROGRAM

## 2024-07-09 PROCEDURE — 87340 HEPATITIS B SURFACE AG IA: CPT

## 2024-07-09 PROCEDURE — 6370000000 HC RX 637 (ALT 250 FOR IP): Performed by: STUDENT IN AN ORGANIZED HEALTH CARE EDUCATION/TRAINING PROGRAM

## 2024-07-09 PROCEDURE — 36415 COLL VENOUS BLD VENIPUNCTURE: CPT

## 2024-07-09 PROCEDURE — 86706 HEP B SURFACE ANTIBODY: CPT

## 2024-07-09 PROCEDURE — 80053 COMPREHEN METABOLIC PANEL: CPT

## 2024-07-09 PROCEDURE — 2500000003 HC RX 250 WO HCPCS: Performed by: STUDENT IN AN ORGANIZED HEALTH CARE EDUCATION/TRAINING PROGRAM

## 2024-07-09 RX ORDER — SEVELAMER CARBONATE 800 MG/1
1600 TABLET, FILM COATED ORAL
Status: DISCONTINUED | OUTPATIENT
Start: 2024-07-09 | End: 2024-07-29

## 2024-07-09 RX ORDER — SODIUM CHLORIDE 0.9 % (FLUSH) 0.9 %
5-40 SYRINGE (ML) INJECTION PRN
Status: DISCONTINUED | OUTPATIENT
Start: 2024-07-09 | End: 2024-07-19 | Stop reason: SDUPTHER

## 2024-07-09 RX ORDER — ACETAMINOPHEN 650 MG/1
650 SUPPOSITORY RECTAL EVERY 6 HOURS PRN
Status: DISCONTINUED | OUTPATIENT
Start: 2024-07-09 | End: 2024-07-12

## 2024-07-09 RX ORDER — CALCIUM ACETATE 667 MG/1
1 CAPSULE ORAL
Status: DISCONTINUED | OUTPATIENT
Start: 2024-07-09 | End: 2024-08-07 | Stop reason: HOSPADM

## 2024-07-09 RX ORDER — BUDESONIDE 0.5 MG/2ML
0.5 INHALANT ORAL 2 TIMES DAILY
Status: DISCONTINUED | OUTPATIENT
Start: 2024-07-09 | End: 2024-07-10

## 2024-07-09 RX ORDER — ALBUTEROL SULFATE 90 UG/1
2 AEROSOL, METERED RESPIRATORY (INHALATION) EVERY 6 HOURS PRN
Status: DISCONTINUED | OUTPATIENT
Start: 2024-07-09 | End: 2024-07-18 | Stop reason: CLARIF

## 2024-07-09 RX ORDER — ARFORMOTEROL TARTRATE 15 UG/2ML
15 SOLUTION RESPIRATORY (INHALATION)
Status: DISCONTINUED | OUTPATIENT
Start: 2024-07-09 | End: 2024-07-10

## 2024-07-09 RX ORDER — SODIUM CHLORIDE 0.9 % (FLUSH) 0.9 %
5-40 SYRINGE (ML) INJECTION EVERY 12 HOURS SCHEDULED
Status: DISCONTINUED | OUTPATIENT
Start: 2024-07-09 | End: 2024-07-19 | Stop reason: SDUPTHER

## 2024-07-09 RX ORDER — MEGESTROL ACETATE 40 MG/1
40 TABLET ORAL DAILY
Status: DISCONTINUED | OUTPATIENT
Start: 2024-07-09 | End: 2024-07-12

## 2024-07-09 RX ORDER — ERGOCALCIFEROL 1.25 MG/1
50000 CAPSULE ORAL WEEKLY
Status: DISCONTINUED | OUTPATIENT
Start: 2024-07-09 | End: 2024-08-07 | Stop reason: HOSPADM

## 2024-07-09 RX ORDER — HYDROXYZINE HYDROCHLORIDE 25 MG/1
25 TABLET, FILM COATED ORAL 3 TIMES DAILY PRN
Status: DISCONTINUED | OUTPATIENT
Start: 2024-07-09 | End: 2024-08-07 | Stop reason: HOSPADM

## 2024-07-09 RX ORDER — ACETAMINOPHEN 325 MG/1
650 TABLET ORAL EVERY 6 HOURS PRN
Status: DISCONTINUED | OUTPATIENT
Start: 2024-07-09 | End: 2024-07-12

## 2024-07-09 RX ORDER — SODIUM CHLORIDE 9 MG/ML
INJECTION, SOLUTION INTRAVENOUS PRN
Status: DISCONTINUED | OUTPATIENT
Start: 2024-07-09 | End: 2024-08-07 | Stop reason: HOSPADM

## 2024-07-09 RX ORDER — GABAPENTIN 300 MG/1
300 CAPSULE ORAL DAILY
Status: DISCONTINUED | OUTPATIENT
Start: 2024-07-09 | End: 2024-08-07 | Stop reason: HOSPADM

## 2024-07-09 RX ORDER — PRAVASTATIN SODIUM 10 MG
10 TABLET ORAL NIGHTLY
Status: DISCONTINUED | OUTPATIENT
Start: 2024-07-09 | End: 2024-08-07 | Stop reason: HOSPADM

## 2024-07-09 RX ORDER — PANTOPRAZOLE SODIUM 40 MG/1
40 TABLET, DELAYED RELEASE ORAL
Status: DISCONTINUED | OUTPATIENT
Start: 2024-07-09 | End: 2024-07-15

## 2024-07-09 RX ORDER — METOPROLOL SUCCINATE 25 MG/1
25 TABLET, EXTENDED RELEASE ORAL DAILY
Status: DISCONTINUED | OUTPATIENT
Start: 2024-07-09 | End: 2024-07-23

## 2024-07-09 RX ORDER — FERROUS SULFATE 325(65) MG
325 TABLET ORAL
Status: DISCONTINUED | OUTPATIENT
Start: 2024-07-09 | End: 2024-07-11

## 2024-07-09 RX ORDER — TRAZODONE HYDROCHLORIDE 50 MG/1
50 TABLET ORAL NIGHTLY
Status: DISCONTINUED | OUTPATIENT
Start: 2024-07-09 | End: 2024-07-12

## 2024-07-09 RX ORDER — HEPARIN SODIUM 5000 [USP'U]/ML
5000 INJECTION, SOLUTION INTRAVENOUS; SUBCUTANEOUS EVERY 8 HOURS SCHEDULED
Status: DISCONTINUED | OUTPATIENT
Start: 2024-07-09 | End: 2024-07-31

## 2024-07-09 RX ADMIN — ARFORMOTEROL TARTRATE 15 MCG: 15 SOLUTION RESPIRATORY (INHALATION) at 07:34

## 2024-07-09 RX ADMIN — HEPARIN SODIUM 5000 UNITS: 5000 INJECTION INTRAVENOUS; SUBCUTANEOUS at 05:21

## 2024-07-09 RX ADMIN — CALCIUM ACETATE 667 MG: 667 CAPSULE ORAL at 09:14

## 2024-07-09 RX ADMIN — SODIUM CHLORIDE, PRESERVATIVE FREE 10 ML: 5 INJECTION INTRAVENOUS at 09:18

## 2024-07-09 RX ADMIN — SACUBITRIL AND VALSARTAN 1 TABLET: 49; 51 TABLET, FILM COATED ORAL at 21:30

## 2024-07-09 RX ADMIN — ERGOCALCIFEROL 50000 UNITS: 1.25 CAPSULE ORAL at 09:14

## 2024-07-09 RX ADMIN — SEVELAMER CARBONATE 1600 MG: 800 TABLET, FILM COATED ORAL at 09:14

## 2024-07-09 RX ADMIN — ARFORMOTEROL TARTRATE 15 MCG: 15 SOLUTION RESPIRATORY (INHALATION) at 21:07

## 2024-07-09 RX ADMIN — IPRATROPIUM BROMIDE 0.5 MG: 0.5 SOLUTION RESPIRATORY (INHALATION) at 13:37

## 2024-07-09 RX ADMIN — IVABRADINE 5 MG: 5 TABLET, FILM COATED ORAL at 09:15

## 2024-07-09 RX ADMIN — SEVELAMER CARBONATE 1600 MG: 800 TABLET, FILM COATED ORAL at 17:38

## 2024-07-09 RX ADMIN — TRAZODONE HYDROCHLORIDE 50 MG: 50 TABLET ORAL at 21:30

## 2024-07-09 RX ADMIN — GABAPENTIN 300 MG: 300 CAPSULE ORAL at 09:14

## 2024-07-09 RX ADMIN — METOPROLOL SUCCINATE 25 MG: 25 TABLET, FILM COATED, EXTENDED RELEASE ORAL at 09:14

## 2024-07-09 RX ADMIN — BUDESONIDE 500 MCG: 0.5 INHALANT RESPIRATORY (INHALATION) at 21:07

## 2024-07-09 RX ADMIN — SEVELAMER CARBONATE 1600 MG: 800 TABLET, FILM COATED ORAL at 13:15

## 2024-07-09 RX ADMIN — ACETAMINOPHEN 650 MG: 325 TABLET ORAL at 05:19

## 2024-07-09 RX ADMIN — CALCIUM ACETATE 667 MG: 667 CAPSULE ORAL at 17:38

## 2024-07-09 RX ADMIN — SODIUM CHLORIDE, PRESERVATIVE FREE 10 ML: 5 INJECTION INTRAVENOUS at 21:31

## 2024-07-09 RX ADMIN — BUDESONIDE 500 MCG: 0.5 INHALANT RESPIRATORY (INHALATION) at 07:34

## 2024-07-09 RX ADMIN — PRAVASTATIN SODIUM 10 MG: 10 TABLET ORAL at 21:30

## 2024-07-09 RX ADMIN — MEGESTROL ACETATE 40 MG: 40 TABLET ORAL at 09:14

## 2024-07-09 RX ADMIN — SACUBITRIL AND VALSARTAN 1 TABLET: 49; 51 TABLET, FILM COATED ORAL at 09:14

## 2024-07-09 RX ADMIN — PANTOPRAZOLE SODIUM 40 MG: 40 TABLET, DELAYED RELEASE ORAL at 05:20

## 2024-07-09 RX ADMIN — IPRATROPIUM BROMIDE 0.5 MG: 0.5 SOLUTION RESPIRATORY (INHALATION) at 07:29

## 2024-07-09 RX ADMIN — HEPARIN SODIUM 5000 UNITS: 5000 INJECTION INTRAVENOUS; SUBCUTANEOUS at 21:30

## 2024-07-09 RX ADMIN — IPRATROPIUM BROMIDE 0.5 MG: 0.5 SOLUTION RESPIRATORY (INHALATION) at 21:02

## 2024-07-09 RX ADMIN — HEPARIN SODIUM 5000 UNITS: 5000 INJECTION INTRAVENOUS; SUBCUTANEOUS at 16:17

## 2024-07-09 RX ADMIN — CALCIUM ACETATE 667 MG: 667 CAPSULE ORAL at 13:15

## 2024-07-09 RX ADMIN — FERROUS SULFATE TAB 325 MG (65 MG ELEMENTAL FE) 325 MG: 325 (65 FE) TAB at 05:20

## 2024-07-09 RX ADMIN — IVABRADINE 5 MG: 5 TABLET, FILM COATED ORAL at 17:38

## 2024-07-09 ASSESSMENT — PAIN DESCRIPTION - ORIENTATION
ORIENTATION: RIGHT
ORIENTATION: RIGHT

## 2024-07-09 ASSESSMENT — PAIN DESCRIPTION - LOCATION
LOCATION: SHOULDER
LOCATION: SHOULDER

## 2024-07-09 ASSESSMENT — PAIN SCALES - GENERAL
PAINLEVEL_OUTOF10: 5
PAINLEVEL_OUTOF10: 8
PAINLEVEL_OUTOF10: 8

## 2024-07-09 ASSESSMENT — PAIN DESCRIPTION - DESCRIPTORS
DESCRIPTORS: DISCOMFORT
DESCRIPTORS: ACHING;DISCOMFORT

## 2024-07-09 NOTE — ED NOTES
Pt has requested hot chocolate. Non available in [facility] at this time. Daughter has left to procure pt

## 2024-07-09 NOTE — PROGRESS NOTES
arranged ALS transportation from [facility]#ed 4 to [facility] 2214  #.  Arranged ALS transport w/[facility] -  advised to bring appropiate equipment - ETA of    minutes given - updated ED staff w/ETA

## 2024-07-09 NOTE — ED NOTES
TRANSFER - OUT REPORT:    Verbal report given to Mariana Prince RN on Darrel Patterson  being transferred to [place] [address] for routine progression of patient care       Report consisted of patient's Situation, Background, Assessment and   Recommendations(SBAR).     Information from the following report(s) Nurse Handoff Report, ED SBAR, Adult Overview, MAR, Recent Results, Med Rec Status, Cardiac Rhythm NSR, and Quality Measures was reviewed with the receiving nurse.    [place] Fall Assessment:    Presents to emergency department  because of falls (Syncope, seizure, or loss of consciousness): No  Age > 70: Yes  Altered Mental Status, Intoxication with alcohol or substance confusion (Disorientation, impaired judgment, poor safety awaremess, or inability to follow instructions): No  Impaired Mobility: Ambulates or transfers with assistive devices or assistance; Unable to ambulate or transer.: Yes  Nursing Judgement: Yes          Lines:   Peripheral IV 07/08/24 Right Antecubital (Active)   Site Assessment Clean, dry & intact 07/08/24 1752   Line Status Blood return noted;Flushed;Heparin locked 07/08/24 1752   Phlebitis Assessment No symptoms 07/08/24 1752   Infiltration Assessment 0 07/08/24 1752   Dressing Status New dressing applied;Clean, dry & intact 07/08/24 1752   Dressing Type Transparent 07/08/24 1752        Opportunity for questions and clarification was provided.      Patient to be transported with:  [facility]

## 2024-07-09 NOTE — ED NOTES
Per [facility], pt bed assignment, [facility] 2214    [phone] - number for report. RN Supervisor aware.

## 2024-07-10 ENCOUNTER — APPOINTMENT (OUTPATIENT)
Facility: HOSPITAL | Age: 79
DRG: 280 | End: 2024-07-10
Attending: STUDENT IN AN ORGANIZED HEALTH CARE EDUCATION/TRAINING PROGRAM
Payer: MEDICARE

## 2024-07-10 LAB
ALBUMIN SERPL-MCNC: 3.2 G/DL (ref 3.5–5)
ALBUMIN/GLOB SERPL: 0.9 (ref 1.1–2.2)
ALP SERPL-CCNC: 62 U/L (ref 45–117)
ALT SERPL-CCNC: 37 U/L (ref 12–78)
ANION GAP SERPL CALC-SCNC: 12 MMOL/L (ref 5–15)
AST SERPL-CCNC: 63 U/L (ref 15–37)
BASOPHILS # BLD: 0.1 K/UL (ref 0–0.1)
BASOPHILS NFR BLD: 1 % (ref 0–1)
BILIRUB SERPL-MCNC: 2.2 MG/DL (ref 0.2–1)
BUN SERPL-MCNC: 41 MG/DL (ref 6–20)
BUN/CREAT SERPL: 5 (ref 12–20)
CALCIUM SERPL-MCNC: 8.6 MG/DL (ref 8.5–10.1)
CHLORIDE SERPL-SCNC: 96 MMOL/L (ref 97–108)
CO2 SERPL-SCNC: 28 MMOL/L (ref 21–32)
CREAT SERPL-MCNC: 8.11 MG/DL (ref 0.7–1.3)
DIFFERENTIAL METHOD BLD: ABNORMAL
ECHO AO ASC DIAM: 3.3 CM
ECHO AO ASCENDING AORTA INDEX: 1.82 CM/M2
ECHO AO ROOT DIAM: 3.8 CM
ECHO AO ROOT INDEX: 2.1 CM/M2
ECHO AV PEAK GRADIENT: 7 MMHG
ECHO AV PEAK VELOCITY: 1.3 M/S
ECHO BSA: 1.77 M2
ECHO BSA: 1.77 M2
ECHO LA DIAMETER INDEX: 2.49 CM/M2
ECHO LA DIAMETER: 4.5 CM
ECHO LA TO AORTIC ROOT RATIO: 1.18
ECHO LV FRACTIONAL SHORTENING: 2 % (ref 28–44)
ECHO LV INTERNAL DIMENSION DIASTOLE INDEX: 2.54 CM/M2
ECHO LV INTERNAL DIMENSION DIASTOLIC: 4.6 CM (ref 4.2–5.9)
ECHO LV INTERNAL DIMENSION SYSTOLIC INDEX: 2.49 CM/M2
ECHO LV INTERNAL DIMENSION SYSTOLIC: 4.5 CM
ECHO LV IVSD: 1.3 CM (ref 0.6–1)
ECHO LV MASS 2D: 230.2 G (ref 88–224)
ECHO LV MASS INDEX 2D: 127.2 G/M2 (ref 49–115)
ECHO LV POSTERIOR WALL DIASTOLIC: 1.3 CM (ref 0.6–1)
ECHO LV RELATIVE WALL THICKNESS RATIO: 0.57
ECHO LVOT AREA: 4.2 CM2
ECHO LVOT DIAM: 2.3 CM
ECHO MV REGURGITANT PEAK GRADIENT: 96 MMHG
ECHO MV REGURGITANT PEAK VELOCITY: 4.9 M/S
ECHO PV MAX VELOCITY: 1 M/S
ECHO PV PEAK GRADIENT: 4 MMHG
ECHO TV REGURGITANT MAX VELOCITY: 3.3 M/S
ECHO TV REGURGITANT PEAK GRADIENT: 44 MMHG
EKG ATRIAL RATE: 72 BPM
EKG DIAGNOSIS: NORMAL
EKG P AXIS: -13 DEGREES
EKG P-R INTERVAL: 122 MS
EKG Q-T INTERVAL: 460 MS
EKG QRS DURATION: 104 MS
EKG QTC CALCULATION (BAZETT): 496 MS
EKG R AXIS: -4 DEGREES
EKG T AXIS: 218 DEGREES
EKG VENTRICULAR RATE: 70 BPM
EOSINOPHIL # BLD: 0.1 K/UL (ref 0–0.4)
EOSINOPHIL NFR BLD: 2 % (ref 0–7)
ERYTHROCYTE [DISTWIDTH] IN BLOOD BY AUTOMATED COUNT: 17.2 % (ref 11.5–14.5)
GLOBULIN SER CALC-MCNC: 3.7 G/DL (ref 2–4)
GLUCOSE SERPL-MCNC: 83 MG/DL (ref 65–100)
HBV SURFACE AB SER QL: NONREACTIVE
HBV SURFACE AB SER QL: NONREACTIVE
HBV SURFACE AB SER-ACNC: <3.1 MIU/ML
HBV SURFACE AB SER-ACNC: <3.1 MIU/ML
HBV SURFACE AG SER QL: <0.1 INDEX
HBV SURFACE AG SER QL: <0.1 INDEX
HBV SURFACE AG SER QL: NEGATIVE
HBV SURFACE AG SER QL: NEGATIVE
HCT VFR BLD AUTO: 33.7 % (ref 36.6–50.3)
HGB BLD-MCNC: 11.4 G/DL (ref 12.1–17)
IMM GRANULOCYTES # BLD AUTO: 0.1 K/UL (ref 0–0.04)
IMM GRANULOCYTES NFR BLD AUTO: 1 % (ref 0–0.5)
LYMPHOCYTES # BLD: 0.9 K/UL (ref 0.8–3.5)
LYMPHOCYTES NFR BLD: 15 % (ref 12–49)
MAGNESIUM SERPL-MCNC: 2.4 MG/DL (ref 1.6–2.4)
MCH RBC QN AUTO: 33.3 PG (ref 26–34)
MCHC RBC AUTO-ENTMCNC: 33.8 G/DL (ref 30–36.5)
MCV RBC AUTO: 98.5 FL (ref 80–99)
MONOCYTES # BLD: 0.7 K/UL (ref 0–1)
MONOCYTES NFR BLD: 12 % (ref 5–13)
NEUTS SEG # BLD: 4.1 K/UL (ref 1.8–8)
NEUTS SEG NFR BLD: 69 % (ref 32–75)
NRBC # BLD: 0.03 K/UL (ref 0–0.01)
NRBC BLD-RTO: 0.5 PER 100 WBC
PLATELET # BLD AUTO: 92 K/UL (ref 150–400)
PLATELET COMMENT: ABNORMAL
POTASSIUM SERPL-SCNC: 3.4 MMOL/L (ref 3.5–5.1)
PROT SERPL-MCNC: 6.9 G/DL (ref 6.4–8.2)
RBC # BLD AUTO: 3.42 M/UL (ref 4.1–5.7)
RBC MORPH BLD: ABNORMAL
SODIUM SERPL-SCNC: 136 MMOL/L (ref 136–145)
WBC # BLD AUTO: 6 K/UL (ref 4.1–11.1)

## 2024-07-10 PROCEDURE — 36415 COLL VENOUS BLD VENIPUNCTURE: CPT

## 2024-07-10 PROCEDURE — 2709999900 HC NON-CHARGEABLE SUPPLY: Performed by: INTERNAL MEDICINE

## 2024-07-10 PROCEDURE — C1769 GUIDE WIRE: HCPCS | Performed by: INTERNAL MEDICINE

## 2024-07-10 PROCEDURE — C1894 INTRO/SHEATH, NON-LASER: HCPCS | Performed by: INTERNAL MEDICINE

## 2024-07-10 PROCEDURE — 80053 COMPREHEN METABOLIC PANEL: CPT

## 2024-07-10 PROCEDURE — 4A023N7 MEASUREMENT OF CARDIAC SAMPLING AND PRESSURE, LEFT HEART, PERCUTANEOUS APPROACH: ICD-10-PCS | Performed by: STUDENT IN AN ORGANIZED HEALTH CARE EDUCATION/TRAINING PROGRAM

## 2024-07-10 PROCEDURE — 93308 TTE F-UP OR LMTD: CPT

## 2024-07-10 PROCEDURE — 6370000000 HC RX 637 (ALT 250 FOR IP): Performed by: STUDENT IN AN ORGANIZED HEALTH CARE EDUCATION/TRAINING PROGRAM

## 2024-07-10 PROCEDURE — 94640 AIRWAY INHALATION TREATMENT: CPT

## 2024-07-10 PROCEDURE — 93458 L HRT ARTERY/VENTRICLE ANGIO: CPT | Performed by: INTERNAL MEDICINE

## 2024-07-10 PROCEDURE — 2580000003 HC RX 258: Performed by: STUDENT IN AN ORGANIZED HEALTH CARE EDUCATION/TRAINING PROGRAM

## 2024-07-10 PROCEDURE — B2151ZZ FLUOROSCOPY OF LEFT HEART USING LOW OSMOLAR CONTRAST: ICD-10-PCS | Performed by: STUDENT IN AN ORGANIZED HEALTH CARE EDUCATION/TRAINING PROGRAM

## 2024-07-10 PROCEDURE — 2500000003 HC RX 250 WO HCPCS: Performed by: INTERNAL MEDICINE

## 2024-07-10 PROCEDURE — 86706 HEP B SURFACE ANTIBODY: CPT

## 2024-07-10 PROCEDURE — 83735 ASSAY OF MAGNESIUM: CPT

## 2024-07-10 PROCEDURE — 2060000000 HC ICU INTERMEDIATE R&B

## 2024-07-10 PROCEDURE — 2500000003 HC RX 250 WO HCPCS: Performed by: STUDENT IN AN ORGANIZED HEALTH CARE EDUCATION/TRAINING PROGRAM

## 2024-07-10 PROCEDURE — 85025 COMPLETE CBC W/AUTO DIFF WBC: CPT

## 2024-07-10 PROCEDURE — 6360000002 HC RX W HCPCS: Performed by: INTERNAL MEDICINE

## 2024-07-10 PROCEDURE — 87340 HEPATITIS B SURFACE AG IA: CPT

## 2024-07-10 PROCEDURE — B2111ZZ FLUOROSCOPY OF MULTIPLE CORONARY ARTERIES USING LOW OSMOLAR CONTRAST: ICD-10-PCS | Performed by: STUDENT IN AN ORGANIZED HEALTH CARE EDUCATION/TRAINING PROGRAM

## 2024-07-10 PROCEDURE — 6360000002 HC RX W HCPCS: Performed by: STUDENT IN AN ORGANIZED HEALTH CARE EDUCATION/TRAINING PROGRAM

## 2024-07-10 PROCEDURE — 6360000004 HC RX CONTRAST MEDICATION: Performed by: INTERNAL MEDICINE

## 2024-07-10 RX ORDER — BUDESONIDE 0.5 MG/2ML
0.5 INHALANT ORAL 2 TIMES DAILY
Status: DISCONTINUED | OUTPATIENT
Start: 2024-07-11 | End: 2024-07-10

## 2024-07-10 RX ORDER — BUDESONIDE 0.5 MG/2ML
0.5 INHALANT ORAL
Status: DISCONTINUED | OUTPATIENT
Start: 2024-07-10 | End: 2024-07-10

## 2024-07-10 RX ORDER — ACETAMINOPHEN 325 MG/1
650 TABLET ORAL EVERY 4 HOURS PRN
Status: DISCONTINUED | OUTPATIENT
Start: 2024-07-10 | End: 2024-07-12

## 2024-07-10 RX ORDER — LIDOCAINE HYDROCHLORIDE 10 MG/ML
INJECTION, SOLUTION INFILTRATION; PERINEURAL PRN
Status: DISCONTINUED | OUTPATIENT
Start: 2024-07-10 | End: 2024-07-10 | Stop reason: HOSPADM

## 2024-07-10 RX ORDER — VERAPAMIL HYDROCHLORIDE 2.5 MG/ML
INJECTION, SOLUTION INTRAVENOUS PRN
Status: DISCONTINUED | OUTPATIENT
Start: 2024-07-10 | End: 2024-07-10 | Stop reason: HOSPADM

## 2024-07-10 RX ORDER — BUDESONIDE 0.5 MG/2ML
0.5 INHALANT ORAL
Status: DISCONTINUED | OUTPATIENT
Start: 2024-07-11 | End: 2024-07-13

## 2024-07-10 RX ORDER — ARFORMOTEROL TARTRATE 15 UG/2ML
15 SOLUTION RESPIRATORY (INHALATION)
Status: DISCONTINUED | OUTPATIENT
Start: 2024-07-11 | End: 2024-07-10

## 2024-07-10 RX ORDER — FENTANYL CITRATE 50 UG/ML
INJECTION, SOLUTION INTRAMUSCULAR; INTRAVENOUS PRN
Status: DISCONTINUED | OUTPATIENT
Start: 2024-07-10 | End: 2024-07-10 | Stop reason: HOSPADM

## 2024-07-10 RX ORDER — HEPARIN SODIUM 10000 [USP'U]/ML
INJECTION, SOLUTION INTRAVENOUS; SUBCUTANEOUS PRN
Status: DISCONTINUED | OUTPATIENT
Start: 2024-07-10 | End: 2024-07-10 | Stop reason: HOSPADM

## 2024-07-10 RX ORDER — ARFORMOTEROL TARTRATE 15 UG/2ML
15 SOLUTION RESPIRATORY (INHALATION)
Status: DISCONTINUED | OUTPATIENT
Start: 2024-07-11 | End: 2024-07-13

## 2024-07-10 RX ORDER — HEPARIN SODIUM 1000 [USP'U]/ML
INJECTION, SOLUTION INTRAVENOUS; SUBCUTANEOUS PRN
Status: DISCONTINUED | OUTPATIENT
Start: 2024-07-10 | End: 2024-07-10 | Stop reason: HOSPADM

## 2024-07-10 RX ADMIN — SODIUM CHLORIDE, PRESERVATIVE FREE 10 ML: 5 INJECTION INTRAVENOUS at 09:52

## 2024-07-10 RX ADMIN — TRAZODONE HYDROCHLORIDE 50 MG: 50 TABLET ORAL at 21:28

## 2024-07-10 RX ADMIN — IPRATROPIUM BROMIDE 0.5 MG: 0.5 SOLUTION RESPIRATORY (INHALATION) at 07:41

## 2024-07-10 RX ADMIN — BUDESONIDE 500 MCG: 0.5 INHALANT RESPIRATORY (INHALATION) at 07:41

## 2024-07-10 RX ADMIN — MEGESTROL ACETATE 40 MG: 40 TABLET ORAL at 09:51

## 2024-07-10 RX ADMIN — IPRATROPIUM BROMIDE 0.5 MG: 0.5 SOLUTION RESPIRATORY (INHALATION) at 20:07

## 2024-07-10 RX ADMIN — IVABRADINE 5 MG: 5 TABLET, FILM COATED ORAL at 09:50

## 2024-07-10 RX ADMIN — CALCIUM ACETATE 667 MG: 667 CAPSULE ORAL at 12:20

## 2024-07-10 RX ADMIN — FERROUS SULFATE TAB 325 MG (65 MG ELEMENTAL FE) 325 MG: 325 (65 FE) TAB at 06:37

## 2024-07-10 RX ADMIN — SODIUM CHLORIDE, PRESERVATIVE FREE 10 ML: 5 INJECTION INTRAVENOUS at 21:32

## 2024-07-10 RX ADMIN — GABAPENTIN 300 MG: 300 CAPSULE ORAL at 09:49

## 2024-07-10 RX ADMIN — SEVELAMER CARBONATE 1600 MG: 800 TABLET, FILM COATED ORAL at 09:49

## 2024-07-10 RX ADMIN — SEVELAMER CARBONATE 1600 MG: 800 TABLET, FILM COATED ORAL at 12:19

## 2024-07-10 RX ADMIN — CALCIUM ACETATE 667 MG: 667 CAPSULE ORAL at 09:49

## 2024-07-10 RX ADMIN — HEPARIN SODIUM 5000 UNITS: 5000 INJECTION INTRAVENOUS; SUBCUTANEOUS at 06:37

## 2024-07-10 RX ADMIN — SEVELAMER CARBONATE 1600 MG: 800 TABLET, FILM COATED ORAL at 17:31

## 2024-07-10 RX ADMIN — ARFORMOTEROL TARTRATE 15 MCG: 15 SOLUTION RESPIRATORY (INHALATION) at 07:41

## 2024-07-10 RX ADMIN — PRAVASTATIN SODIUM 10 MG: 10 TABLET ORAL at 21:28

## 2024-07-10 RX ADMIN — IVABRADINE 5 MG: 5 TABLET, FILM COATED ORAL at 17:31

## 2024-07-10 RX ADMIN — CALCIUM ACETATE 667 MG: 667 CAPSULE ORAL at 17:31

## 2024-07-10 RX ADMIN — BUDESONIDE 500 MCG: 0.5 INHALANT RESPIRATORY (INHALATION) at 20:07

## 2024-07-10 RX ADMIN — PANTOPRAZOLE SODIUM 40 MG: 40 TABLET, DELAYED RELEASE ORAL at 06:37

## 2024-07-10 RX ADMIN — ARFORMOTEROL TARTRATE 15 MCG: 15 SOLUTION RESPIRATORY (INHALATION) at 20:07

## 2024-07-10 RX ADMIN — SACUBITRIL AND VALSARTAN 1 TABLET: 49; 51 TABLET, FILM COATED ORAL at 09:50

## 2024-07-10 RX ADMIN — IPRATROPIUM BROMIDE 0.5 MG: 0.5 SOLUTION RESPIRATORY (INHALATION) at 13:46

## 2024-07-10 RX ADMIN — METOPROLOL SUCCINATE 25 MG: 25 TABLET, FILM COATED, EXTENDED RELEASE ORAL at 09:50

## 2024-07-10 RX ADMIN — HEPARIN SODIUM 5000 UNITS: 5000 INJECTION INTRAVENOUS; SUBCUTANEOUS at 21:29

## 2024-07-10 RX ADMIN — SACUBITRIL AND VALSARTAN 1 TABLET: 49; 51 TABLET, FILM COATED ORAL at 21:28

## 2024-07-10 NOTE — BRIEF OP NOTE
Brief Postoperative Note      Patient: Darrel Patterson  YOB: 1945  MRN: [id number]    Date of Procedure: 7/10/2024    Pre-Op Diagnosis Codes:     * CAD (coronary artery disease) [I25.10]    Post-Op Diagnosis:  Branch vessel disease, elevated LVEDP, mod-sev MR , severely reduced LVEF       Procedure(s):  Left heart cath / coronary angiography    Surgeon(s):  Lonnie Bean III, DO    Assistant:  * No surgical staff found *    Anesthesia: IV Sedation    Estimated Blood Loss (mL): Minimal    Complications: None    Specimens:   * No specimens in log *    Implants:  * No implants in log *      Drains: * No LDAs found *    Findings:  Infection Present At Time Of Surgery (PATOS) (choose all levels that have infection present):  No infection present  Other Findings: as above    Electronically signed by Lonnie Bean DO on 7/10/2024 at 11:13 AM

## 2024-07-11 ENCOUNTER — TELEPHONE (OUTPATIENT)
Age: 79
End: 2024-07-11

## 2024-07-11 DIAGNOSIS — I21.4 NSTEMI (NON-ST ELEVATION MYOCARDIAL INFARCTION) (HCC): Primary | ICD-10-CM

## 2024-07-11 LAB
ALBUMIN SERPL-MCNC: 2.3 G/DL (ref 3.5–5)
ALBUMIN/GLOB SERPL: 0.8 (ref 1.1–2.2)
ALP SERPL-CCNC: 48 U/L (ref 45–117)
ALT SERPL-CCNC: 31 U/L (ref 12–78)
ANION GAP SERPL CALC-SCNC: 15 MMOL/L (ref 5–15)
AST SERPL-CCNC: 54 U/L (ref 15–37)
BASOPHILS # BLD: 0.1 K/UL (ref 0–0.1)
BASOPHILS NFR BLD: 1 % (ref 0–1)
BILIRUB SERPL-MCNC: 1.4 MG/DL (ref 0.2–1)
BUN SERPL-MCNC: 46 MG/DL (ref 6–20)
BUN/CREAT SERPL: 6 (ref 12–20)
CALCIUM SERPL-MCNC: 6.6 MG/DL (ref 8.5–10.1)
CHLORIDE SERPL-SCNC: 108 MMOL/L (ref 97–108)
CO2 SERPL-SCNC: 19 MMOL/L (ref 21–32)
CREAT SERPL-MCNC: 8.15 MG/DL (ref 0.7–1.3)
DIFFERENTIAL METHOD BLD: ABNORMAL
EOSINOPHIL # BLD: 0.1 K/UL (ref 0–0.4)
EOSINOPHIL NFR BLD: 2 % (ref 0–7)
ERYTHROCYTE [DISTWIDTH] IN BLOOD BY AUTOMATED COUNT: 17.2 % (ref 11.5–14.5)
GLOBULIN SER CALC-MCNC: 3 G/DL (ref 2–4)
GLUCOSE SERPL-MCNC: 70 MG/DL (ref 65–100)
HCT VFR BLD AUTO: 32 % (ref 36.6–50.3)
HGB BLD-MCNC: 10.8 G/DL (ref 12.1–17)
IMM GRANULOCYTES # BLD AUTO: 0.1 K/UL (ref 0–0.04)
IMM GRANULOCYTES NFR BLD AUTO: 1 % (ref 0–0.5)
LYMPHOCYTES # BLD: 0.9 K/UL (ref 0.8–3.5)
LYMPHOCYTES NFR BLD: 14 % (ref 12–49)
MAGNESIUM SERPL-MCNC: 2.1 MG/DL (ref 1.6–2.4)
MCH RBC QN AUTO: 33.3 PG (ref 26–34)
MCHC RBC AUTO-ENTMCNC: 33.8 G/DL (ref 30–36.5)
MCV RBC AUTO: 98.8 FL (ref 80–99)
MONOCYTES # BLD: 0.8 K/UL (ref 0–1)
MONOCYTES NFR BLD: 13 % (ref 5–13)
NEUTS SEG # BLD: 4.2 K/UL (ref 1.8–8)
NEUTS SEG NFR BLD: 69 % (ref 32–75)
NRBC # BLD: 0.02 K/UL (ref 0–0.01)
NRBC BLD-RTO: 0.3 PER 100 WBC
PLATELET # BLD AUTO: 95 K/UL (ref 150–400)
PLATELET COMMENT: ABNORMAL
POTASSIUM SERPL-SCNC: 3.5 MMOL/L (ref 3.5–5.1)
PROT SERPL-MCNC: 5.3 G/DL (ref 6.4–8.2)
RBC # BLD AUTO: 3.24 M/UL (ref 4.1–5.7)
RBC MORPH BLD: ABNORMAL
SODIUM SERPL-SCNC: 142 MMOL/L (ref 136–145)
WBC # BLD AUTO: 6.2 K/UL (ref 4.1–11.1)

## 2024-07-11 PROCEDURE — 85025 COMPLETE CBC W/AUTO DIFF WBC: CPT

## 2024-07-11 PROCEDURE — 6360000002 HC RX W HCPCS: Performed by: STUDENT IN AN ORGANIZED HEALTH CARE EDUCATION/TRAINING PROGRAM

## 2024-07-11 PROCEDURE — 94640 AIRWAY INHALATION TREATMENT: CPT

## 2024-07-11 PROCEDURE — 2060000000 HC ICU INTERMEDIATE R&B

## 2024-07-11 PROCEDURE — 6360000002 HC RX W HCPCS: Performed by: INTERNAL MEDICINE

## 2024-07-11 PROCEDURE — 80053 COMPREHEN METABOLIC PANEL: CPT

## 2024-07-11 PROCEDURE — 90935 HEMODIALYSIS ONE EVALUATION: CPT

## 2024-07-11 PROCEDURE — 36415 COLL VENOUS BLD VENIPUNCTURE: CPT

## 2024-07-11 PROCEDURE — 83735 ASSAY OF MAGNESIUM: CPT

## 2024-07-11 PROCEDURE — 6370000000 HC RX 637 (ALT 250 FOR IP): Performed by: STUDENT IN AN ORGANIZED HEALTH CARE EDUCATION/TRAINING PROGRAM

## 2024-07-11 PROCEDURE — 2500000003 HC RX 250 WO HCPCS: Performed by: STUDENT IN AN ORGANIZED HEALTH CARE EDUCATION/TRAINING PROGRAM

## 2024-07-11 PROCEDURE — 2580000003 HC RX 258: Performed by: STUDENT IN AN ORGANIZED HEALTH CARE EDUCATION/TRAINING PROGRAM

## 2024-07-11 RX ADMIN — PANTOPRAZOLE SODIUM 40 MG: 40 TABLET, DELAYED RELEASE ORAL at 06:42

## 2024-07-11 RX ADMIN — SEVELAMER CARBONATE 1600 MG: 800 TABLET, FILM COATED ORAL at 18:38

## 2024-07-11 RX ADMIN — SACUBITRIL AND VALSARTAN 1 TABLET: 49; 51 TABLET, FILM COATED ORAL at 13:18

## 2024-07-11 RX ADMIN — TRAZODONE HYDROCHLORIDE 50 MG: 50 TABLET ORAL at 21:22

## 2024-07-11 RX ADMIN — GABAPENTIN 300 MG: 300 CAPSULE ORAL at 13:18

## 2024-07-11 RX ADMIN — CALCIUM ACETATE 667 MG: 667 CAPSULE ORAL at 18:38

## 2024-07-11 RX ADMIN — BUDESONIDE 500 MCG: 0.5 INHALANT RESPIRATORY (INHALATION) at 07:30

## 2024-07-11 RX ADMIN — HEPARIN SODIUM 5000 UNITS: 5000 INJECTION INTRAVENOUS; SUBCUTANEOUS at 06:41

## 2024-07-11 RX ADMIN — HEPARIN SODIUM 5000 UNITS: 5000 INJECTION INTRAVENOUS; SUBCUTANEOUS at 13:18

## 2024-07-11 RX ADMIN — CALCIUM ACETATE 667 MG: 667 CAPSULE ORAL at 13:23

## 2024-07-11 RX ADMIN — IPRATROPIUM BROMIDE 0.5 MG: 0.5 SOLUTION RESPIRATORY (INHALATION) at 07:25

## 2024-07-11 RX ADMIN — PRAVASTATIN SODIUM 10 MG: 10 TABLET ORAL at 21:20

## 2024-07-11 RX ADMIN — ACETAMINOPHEN 650 MG: 325 TABLET ORAL at 21:20

## 2024-07-11 RX ADMIN — HEPARIN SODIUM 5000 UNITS: 5000 INJECTION INTRAVENOUS; SUBCUTANEOUS at 21:20

## 2024-07-11 RX ADMIN — IVABRADINE 5 MG: 5 TABLET, FILM COATED ORAL at 18:38

## 2024-07-11 RX ADMIN — METOPROLOL SUCCINATE 25 MG: 25 TABLET, FILM COATED, EXTENDED RELEASE ORAL at 13:18

## 2024-07-11 RX ADMIN — SEVELAMER CARBONATE 1600 MG: 800 TABLET, FILM COATED ORAL at 13:18

## 2024-07-11 RX ADMIN — SODIUM CHLORIDE, PRESERVATIVE FREE 10 ML: 5 INJECTION INTRAVENOUS at 21:22

## 2024-07-11 RX ADMIN — ARFORMOTEROL TARTRATE 15 MCG: 15 SOLUTION RESPIRATORY (INHALATION) at 07:30

## 2024-07-11 RX ADMIN — SACUBITRIL AND VALSARTAN 1 TABLET: 49; 51 TABLET, FILM COATED ORAL at 21:21

## 2024-07-11 RX ADMIN — MEGESTROL ACETATE 40 MG: 40 TABLET ORAL at 13:18

## 2024-07-11 RX ADMIN — SODIUM CHLORIDE, PRESERVATIVE FREE 10 ML: 5 INJECTION INTRAVENOUS at 13:23

## 2024-07-11 ASSESSMENT — PAIN DESCRIPTION - DESCRIPTORS: DESCRIPTORS: ACHING

## 2024-07-11 ASSESSMENT — PAIN DESCRIPTION - ORIENTATION: ORIENTATION: RIGHT

## 2024-07-11 ASSESSMENT — PAIN DESCRIPTION - LOCATION: LOCATION: SHOULDER

## 2024-07-11 ASSESSMENT — PAIN SCALES - GENERAL: PAINLEVEL_OUTOF10: 2

## 2024-07-11 NOTE — CARDIO/PULMONARY
Chart Reviewed. Pt is a 79 y.o. male admitted with NSTEMI (non-ST elevated myocardial infarction) (HCC) [I21.4].    S/p cardiac cath on 7/10/24. \"Branch vessel disease, elevated LVEDP, mod-sev MR , severely reduced LVEF\" . No intervention. Medically manage. ECHO 7/10/24 indicated LVEF 15-20%.  On HD    Pt resides in [place], [place]    Pt visited in Dialysis suite. Pt alert and disoriented. Provided MI and CHF teaching packets for family review.

## 2024-07-11 NOTE — TELEPHONE ENCOUNTER
Pt is being discharged from [facility] today. He has a follow up Hospital appointment with Roma on 07/15/24.

## 2024-07-12 PROBLEM — N18.6 ESRD (END STAGE RENAL DISEASE) (HCC): Status: ACTIVE | Noted: 2024-07-12

## 2024-07-12 PROBLEM — R53.81 DEBILITY: Status: ACTIVE | Noted: 2024-07-12

## 2024-07-12 PROBLEM — R63.4 UNINTENTIONAL WEIGHT LOSS: Status: ACTIVE | Noted: 2024-07-12

## 2024-07-12 PROBLEM — M62.84 SARCOPENIA: Status: ACTIVE | Noted: 2024-07-12

## 2024-07-12 PROBLEM — Z71.89 GOALS OF CARE, COUNSELING/DISCUSSION: Status: ACTIVE | Noted: 2024-07-12

## 2024-07-12 PROBLEM — Z51.5 PALLIATIVE CARE BY SPECIALIST: Status: ACTIVE | Noted: 2024-07-12

## 2024-07-12 PROCEDURE — 2580000003 HC RX 258: Performed by: STUDENT IN AN ORGANIZED HEALTH CARE EDUCATION/TRAINING PROGRAM

## 2024-07-12 PROCEDURE — 6360000002 HC RX W HCPCS: Performed by: STUDENT IN AN ORGANIZED HEALTH CARE EDUCATION/TRAINING PROGRAM

## 2024-07-12 PROCEDURE — 97162 PT EVAL MOD COMPLEX 30 MIN: CPT

## 2024-07-12 PROCEDURE — G0316 PR PROLONG INPT EVAL ADD15 M: HCPCS | Performed by: INTERNAL MEDICINE

## 2024-07-12 PROCEDURE — 94640 AIRWAY INHALATION TREATMENT: CPT

## 2024-07-12 PROCEDURE — 99223 1ST HOSP IP/OBS HIGH 75: CPT | Performed by: INTERNAL MEDICINE

## 2024-07-12 PROCEDURE — 6370000000 HC RX 637 (ALT 250 FOR IP): Performed by: INTERNAL MEDICINE

## 2024-07-12 PROCEDURE — 97116 GAIT TRAINING THERAPY: CPT

## 2024-07-12 PROCEDURE — 6360000002 HC RX W HCPCS: Performed by: INTERNAL MEDICINE

## 2024-07-12 PROCEDURE — 2500000003 HC RX 250 WO HCPCS: Performed by: STUDENT IN AN ORGANIZED HEALTH CARE EDUCATION/TRAINING PROGRAM

## 2024-07-12 PROCEDURE — 97530 THERAPEUTIC ACTIVITIES: CPT

## 2024-07-12 PROCEDURE — 97166 OT EVAL MOD COMPLEX 45 MIN: CPT

## 2024-07-12 PROCEDURE — 2060000000 HC ICU INTERMEDIATE R&B

## 2024-07-12 PROCEDURE — 6370000000 HC RX 637 (ALT 250 FOR IP): Performed by: STUDENT IN AN ORGANIZED HEALTH CARE EDUCATION/TRAINING PROGRAM

## 2024-07-12 RX ORDER — ACETAMINOPHEN 500 MG
1000 TABLET ORAL EVERY 8 HOURS
Status: DISCONTINUED | OUTPATIENT
Start: 2024-07-12 | End: 2024-07-20

## 2024-07-12 RX ORDER — MIRTAZAPINE 15 MG/1
7.5 TABLET, FILM COATED ORAL NIGHTLY
Status: DISCONTINUED | OUTPATIENT
Start: 2024-07-12 | End: 2024-08-07 | Stop reason: HOSPADM

## 2024-07-12 RX ADMIN — CALCIUM ACETATE 667 MG: 667 CAPSULE ORAL at 17:43

## 2024-07-12 RX ADMIN — SEVELAMER CARBONATE 1600 MG: 800 TABLET, FILM COATED ORAL at 11:45

## 2024-07-12 RX ADMIN — ARFORMOTEROL TARTRATE 15 MCG: 15 SOLUTION RESPIRATORY (INHALATION) at 09:24

## 2024-07-12 RX ADMIN — CALCIUM ACETATE 667 MG: 667 CAPSULE ORAL at 09:13

## 2024-07-12 RX ADMIN — SODIUM CHLORIDE, PRESERVATIVE FREE 10 ML: 5 INJECTION INTRAVENOUS at 22:10

## 2024-07-12 RX ADMIN — IPRATROPIUM BROMIDE 0.5 MG: 0.5 SOLUTION RESPIRATORY (INHALATION) at 09:24

## 2024-07-12 RX ADMIN — BUDESONIDE 500 MCG: 0.5 INHALANT RESPIRATORY (INHALATION) at 09:24

## 2024-07-12 RX ADMIN — HEPARIN SODIUM 5000 UNITS: 5000 INJECTION INTRAVENOUS; SUBCUTANEOUS at 06:00

## 2024-07-12 RX ADMIN — MIRTAZAPINE 7.5 MG: 15 TABLET, FILM COATED ORAL at 22:22

## 2024-07-12 RX ADMIN — SACUBITRIL AND VALSARTAN 1 TABLET: 49; 51 TABLET, FILM COATED ORAL at 09:13

## 2024-07-12 RX ADMIN — HEPARIN SODIUM 5000 UNITS: 5000 INJECTION INTRAVENOUS; SUBCUTANEOUS at 22:22

## 2024-07-12 RX ADMIN — METOPROLOL SUCCINATE 25 MG: 25 TABLET, FILM COATED, EXTENDED RELEASE ORAL at 09:12

## 2024-07-12 RX ADMIN — GABAPENTIN 300 MG: 300 CAPSULE ORAL at 09:13

## 2024-07-12 RX ADMIN — SEVELAMER CARBONATE 1600 MG: 800 TABLET, FILM COATED ORAL at 17:43

## 2024-07-12 RX ADMIN — PANTOPRAZOLE SODIUM 40 MG: 40 TABLET, DELAYED RELEASE ORAL at 09:12

## 2024-07-12 RX ADMIN — SACUBITRIL AND VALSARTAN 1 TABLET: 49; 51 TABLET, FILM COATED ORAL at 22:21

## 2024-07-12 RX ADMIN — ACETAMINOPHEN 1000 MG: 500 TABLET ORAL at 11:45

## 2024-07-12 RX ADMIN — PRAVASTATIN SODIUM 10 MG: 10 TABLET ORAL at 22:22

## 2024-07-12 RX ADMIN — CALCIUM ACETATE 667 MG: 667 CAPSULE ORAL at 11:45

## 2024-07-12 RX ADMIN — MEGESTROL ACETATE 40 MG: 40 TABLET ORAL at 09:13

## 2024-07-12 RX ADMIN — HEPARIN SODIUM 5000 UNITS: 5000 INJECTION INTRAVENOUS; SUBCUTANEOUS at 14:06

## 2024-07-12 RX ADMIN — SEVELAMER CARBONATE 1600 MG: 800 TABLET, FILM COATED ORAL at 09:13

## 2024-07-12 RX ADMIN — IVABRADINE 5 MG: 5 TABLET, FILM COATED ORAL at 09:13

## 2024-07-12 RX ADMIN — ACETAMINOPHEN 1000 MG: 500 TABLET ORAL at 17:43

## 2024-07-12 RX ADMIN — SODIUM CHLORIDE, PRESERVATIVE FREE 10 ML: 5 INJECTION INTRAVENOUS at 09:20

## 2024-07-12 RX ADMIN — IVABRADINE 5 MG: 5 TABLET, FILM COATED ORAL at 17:43

## 2024-07-12 ASSESSMENT — PAIN SCALES - GENERAL
PAINLEVEL_OUTOF10: 6
PAINLEVEL_OUTOF10: 6

## 2024-07-12 NOTE — ACP (ADVANCE CARE PLANNING)
Advance Care Planning     General Advance Care Planning (ACP) Conversation    Date of Conversation: 7/12/2024  Conducted with: Patient with Decision Making Capacity  Other persons present: Daughter Nadine Traore, Dr. Grace Arboleda    Healthcare Decision Maker:   Primary Decision Maker: Nadine Traore - Child - [phone]    Secondary Decision Maker: Linda Traore - Child - [phone]  Click here to complete Healthcare Decision Makers including selection of the Healthcare Decision Maker Relationship (ie \"Primary\").     Content/Action Overview:  Has ACP document(s) on file - reflects the patient's care preferences  Did not review DNR/DNI. Patient is full code.    Length of Voluntary ACP Conversation in minutes:  <16 minutes (Non-Billable)    Today we met with Mr. Patterson and his daughter Nadine and he confirmed he wishes for her to be primary and his daughter Linda secondary HCDM. We also discussed his complex medical issues which are not expected to improve, such as his heart problems and his kidney disease. Encouraged him to continue to talk with his family about what is important to him and when he may determine that HD is more of a burden then benefit.   (See Dr. Arboleda's note for details.)    Thank you for including Palliative team in the care of Mr. Darrel Patterson.    Barbra Booth, [facility]  [phone] (COPE)

## 2024-07-13 LAB
ANION GAP SERPL CALC-SCNC: 10 MMOL/L (ref 5–15)
BUN SERPL-MCNC: 40 MG/DL (ref 6–20)
BUN/CREAT SERPL: 4 (ref 12–20)
CALCIUM SERPL-MCNC: 8.9 MG/DL (ref 8.5–10.1)
CHLORIDE SERPL-SCNC: 96 MMOL/L (ref 97–108)
CO2 SERPL-SCNC: 28 MMOL/L (ref 21–32)
CREAT SERPL-MCNC: 9.27 MG/DL (ref 0.7–1.3)
ERYTHROCYTE [DISTWIDTH] IN BLOOD BY AUTOMATED COUNT: 17 % (ref 11.5–14.5)
GLUCOSE SERPL-MCNC: 105 MG/DL (ref 65–100)
HCT VFR BLD AUTO: 34.4 % (ref 36.6–50.3)
HGB BLD-MCNC: 11.5 G/DL (ref 12.1–17)
MCH RBC QN AUTO: 33.5 PG (ref 26–34)
MCHC RBC AUTO-ENTMCNC: 33.4 G/DL (ref 30–36.5)
MCV RBC AUTO: 100.3 FL (ref 80–99)
NRBC # BLD: 0 K/UL (ref 0–0.01)
NRBC BLD-RTO: 0 PER 100 WBC
PLATELET # BLD AUTO: 88 K/UL (ref 150–400)
POTASSIUM SERPL-SCNC: 3.9 MMOL/L (ref 3.5–5.1)
RBC # BLD AUTO: 3.43 M/UL (ref 4.1–5.7)
SODIUM SERPL-SCNC: 134 MMOL/L (ref 136–145)
WBC # BLD AUTO: 7.1 K/UL (ref 4.1–11.1)

## 2024-07-13 PROCEDURE — 2500000003 HC RX 250 WO HCPCS: Performed by: STUDENT IN AN ORGANIZED HEALTH CARE EDUCATION/TRAINING PROGRAM

## 2024-07-13 PROCEDURE — 85027 COMPLETE CBC AUTOMATED: CPT

## 2024-07-13 PROCEDURE — 6370000000 HC RX 637 (ALT 250 FOR IP): Performed by: STUDENT IN AN ORGANIZED HEALTH CARE EDUCATION/TRAINING PROGRAM

## 2024-07-13 PROCEDURE — 2060000000 HC ICU INTERMEDIATE R&B

## 2024-07-13 PROCEDURE — 6370000000 HC RX 637 (ALT 250 FOR IP): Performed by: INTERNAL MEDICINE

## 2024-07-13 PROCEDURE — 6360000002 HC RX W HCPCS: Performed by: STUDENT IN AN ORGANIZED HEALTH CARE EDUCATION/TRAINING PROGRAM

## 2024-07-13 PROCEDURE — 90935 HEMODIALYSIS ONE EVALUATION: CPT

## 2024-07-13 PROCEDURE — 2580000003 HC RX 258: Performed by: STUDENT IN AN ORGANIZED HEALTH CARE EDUCATION/TRAINING PROGRAM

## 2024-07-13 PROCEDURE — 36415 COLL VENOUS BLD VENIPUNCTURE: CPT

## 2024-07-13 PROCEDURE — 80048 BASIC METABOLIC PNL TOTAL CA: CPT

## 2024-07-13 RX ORDER — ARFORMOTEROL TARTRATE 15 UG/2ML
15 SOLUTION RESPIRATORY (INHALATION) 2 TIMES DAILY PRN
Status: DISCONTINUED | OUTPATIENT
Start: 2024-07-13 | End: 2024-07-13

## 2024-07-13 RX ORDER — BUDESONIDE 0.5 MG/2ML
0.5 INHALANT ORAL
Status: DISCONTINUED | OUTPATIENT
Start: 2024-07-14 | End: 2024-07-13

## 2024-07-13 RX ORDER — BISACODYL 10 MG
10 SUPPOSITORY, RECTAL RECTAL DAILY PRN
Status: DISCONTINUED | OUTPATIENT
Start: 2024-07-13 | End: 2024-08-07 | Stop reason: HOSPADM

## 2024-07-13 RX ORDER — DOCUSATE SODIUM 100 MG/1
200 CAPSULE, LIQUID FILLED ORAL 2 TIMES DAILY
Status: DISCONTINUED | OUTPATIENT
Start: 2024-07-13 | End: 2024-08-07 | Stop reason: HOSPADM

## 2024-07-13 RX ORDER — POLYETHYLENE GLYCOL 3350 17 G/17G
17 POWDER, FOR SOLUTION ORAL DAILY
Status: DISCONTINUED | OUTPATIENT
Start: 2024-07-13 | End: 2024-08-07 | Stop reason: HOSPADM

## 2024-07-13 RX ORDER — BUDESONIDE 0.5 MG/2ML
0.5 INHALANT ORAL
Status: DISCONTINUED | OUTPATIENT
Start: 2024-07-14 | End: 2024-07-14

## 2024-07-13 RX ORDER — ARFORMOTEROL TARTRATE 15 UG/2ML
15 SOLUTION RESPIRATORY (INHALATION) 2 TIMES DAILY PRN
Status: DISCONTINUED | OUTPATIENT
Start: 2024-07-13 | End: 2024-07-14

## 2024-07-13 RX ADMIN — METOPROLOL SUCCINATE 25 MG: 25 TABLET, FILM COATED, EXTENDED RELEASE ORAL at 13:30

## 2024-07-13 RX ADMIN — ACETAMINOPHEN 1000 MG: 500 TABLET ORAL at 17:37

## 2024-07-13 RX ADMIN — HEPARIN SODIUM 5000 UNITS: 5000 INJECTION INTRAVENOUS; SUBCUTANEOUS at 13:30

## 2024-07-13 RX ADMIN — PRAVASTATIN SODIUM 10 MG: 10 TABLET ORAL at 21:39

## 2024-07-13 RX ADMIN — SACUBITRIL AND VALSARTAN 1 TABLET: 49; 51 TABLET, FILM COATED ORAL at 21:36

## 2024-07-13 RX ADMIN — HEPARIN SODIUM 5000 UNITS: 5000 INJECTION INTRAVENOUS; SUBCUTANEOUS at 06:28

## 2024-07-13 RX ADMIN — HYDROXYZINE HYDROCHLORIDE 25 MG: 25 TABLET ORAL at 13:38

## 2024-07-13 RX ADMIN — GABAPENTIN 300 MG: 300 CAPSULE ORAL at 13:30

## 2024-07-13 RX ADMIN — CALCIUM ACETATE 667 MG: 667 CAPSULE ORAL at 13:30

## 2024-07-13 RX ADMIN — ACETAMINOPHEN 1000 MG: 500 TABLET ORAL at 04:28

## 2024-07-13 RX ADMIN — ACETAMINOPHEN 1000 MG: 500 TABLET ORAL at 09:17

## 2024-07-13 RX ADMIN — DOCUSATE SODIUM 200 MG: 100 CAPSULE, LIQUID FILLED ORAL at 15:26

## 2024-07-13 RX ADMIN — PANTOPRAZOLE SODIUM 40 MG: 40 TABLET, DELAYED RELEASE ORAL at 06:28

## 2024-07-13 RX ADMIN — MIRTAZAPINE 7.5 MG: 15 TABLET, FILM COATED ORAL at 21:39

## 2024-07-13 RX ADMIN — SODIUM CHLORIDE, PRESERVATIVE FREE 5 ML: 5 INJECTION INTRAVENOUS at 08:12

## 2024-07-13 RX ADMIN — SEVELAMER CARBONATE 1600 MG: 800 TABLET, FILM COATED ORAL at 13:30

## 2024-07-13 RX ADMIN — SODIUM CHLORIDE, PRESERVATIVE FREE 10 ML: 5 INJECTION INTRAVENOUS at 21:41

## 2024-07-13 RX ADMIN — POLYETHYLENE GLYCOL 3350 17 G: 17 POWDER, FOR SOLUTION ORAL at 13:28

## 2024-07-13 RX ADMIN — SACUBITRIL AND VALSARTAN 1 TABLET: 49; 51 TABLET, FILM COATED ORAL at 13:28

## 2024-07-13 RX ADMIN — HEPARIN SODIUM 5000 UNITS: 5000 INJECTION INTRAVENOUS; SUBCUTANEOUS at 21:41

## 2024-07-13 ASSESSMENT — PAIN DESCRIPTION - ORIENTATION
ORIENTATION: RIGHT
ORIENTATION: RIGHT

## 2024-07-13 ASSESSMENT — PAIN DESCRIPTION - LOCATION
LOCATION: ARM;SHOULDER
LOCATION: SHOULDER

## 2024-07-13 ASSESSMENT — PAIN SCALES - GENERAL
PAINLEVEL_OUTOF10: 5
PAINLEVEL_OUTOF10: 10
PAINLEVEL_OUTOF10: 10
PAINLEVEL_OUTOF10: 8
PAINLEVEL_OUTOF10: 5
PAINLEVEL_OUTOF10: 3
PAINLEVEL_OUTOF10: 3

## 2024-07-13 ASSESSMENT — PAIN DESCRIPTION - DESCRIPTORS: DESCRIPTORS: DISCOMFORT

## 2024-07-14 PROCEDURE — 94640 AIRWAY INHALATION TREATMENT: CPT

## 2024-07-14 PROCEDURE — 6360000002 HC RX W HCPCS: Performed by: STUDENT IN AN ORGANIZED HEALTH CARE EDUCATION/TRAINING PROGRAM

## 2024-07-14 PROCEDURE — 6370000000 HC RX 637 (ALT 250 FOR IP): Performed by: STUDENT IN AN ORGANIZED HEALTH CARE EDUCATION/TRAINING PROGRAM

## 2024-07-14 PROCEDURE — 6360000002 HC RX W HCPCS: Performed by: INTERNAL MEDICINE

## 2024-07-14 PROCEDURE — 6370000000 HC RX 637 (ALT 250 FOR IP): Performed by: INTERNAL MEDICINE

## 2024-07-14 PROCEDURE — 2500000003 HC RX 250 WO HCPCS: Performed by: STUDENT IN AN ORGANIZED HEALTH CARE EDUCATION/TRAINING PROGRAM

## 2024-07-14 PROCEDURE — 2060000000 HC ICU INTERMEDIATE R&B

## 2024-07-14 PROCEDURE — 2580000003 HC RX 258: Performed by: STUDENT IN AN ORGANIZED HEALTH CARE EDUCATION/TRAINING PROGRAM

## 2024-07-14 RX ORDER — BUDESONIDE 0.5 MG/2ML
0.5 INHALANT ORAL
Status: DISCONTINUED | OUTPATIENT
Start: 2024-07-14 | End: 2024-07-17

## 2024-07-14 RX ORDER — ARFORMOTEROL TARTRATE 15 UG/2ML
15 SOLUTION RESPIRATORY (INHALATION)
Status: DISCONTINUED | OUTPATIENT
Start: 2024-07-14 | End: 2024-07-17

## 2024-07-14 RX ADMIN — HEPARIN SODIUM 5000 UNITS: 5000 INJECTION INTRAVENOUS; SUBCUTANEOUS at 14:10

## 2024-07-14 RX ADMIN — SACUBITRIL AND VALSARTAN 1 TABLET: 49; 51 TABLET, FILM COATED ORAL at 22:12

## 2024-07-14 RX ADMIN — SODIUM CHLORIDE, PRESERVATIVE FREE 10 ML: 5 INJECTION INTRAVENOUS at 10:16

## 2024-07-14 RX ADMIN — PRAVASTATIN SODIUM 10 MG: 10 TABLET ORAL at 22:12

## 2024-07-14 RX ADMIN — METOPROLOL SUCCINATE 25 MG: 25 TABLET, FILM COATED, EXTENDED RELEASE ORAL at 10:14

## 2024-07-14 RX ADMIN — MIRTAZAPINE 7.5 MG: 15 TABLET, FILM COATED ORAL at 22:12

## 2024-07-14 RX ADMIN — SEVELAMER CARBONATE 1600 MG: 800 TABLET, FILM COATED ORAL at 12:48

## 2024-07-14 RX ADMIN — SEVELAMER CARBONATE 1600 MG: 800 TABLET, FILM COATED ORAL at 10:13

## 2024-07-14 RX ADMIN — SODIUM CHLORIDE, PRESERVATIVE FREE 10 ML: 5 INJECTION INTRAVENOUS at 22:20

## 2024-07-14 RX ADMIN — ACETAMINOPHEN 1000 MG: 500 TABLET ORAL at 17:51

## 2024-07-14 RX ADMIN — SACUBITRIL AND VALSARTAN 1 TABLET: 49; 51 TABLET, FILM COATED ORAL at 10:14

## 2024-07-14 RX ADMIN — IPRATROPIUM BROMIDE 0.5 MG: 0.5 SOLUTION RESPIRATORY (INHALATION) at 08:27

## 2024-07-14 RX ADMIN — CALCIUM ACETATE 667 MG: 667 CAPSULE ORAL at 12:48

## 2024-07-14 RX ADMIN — IVABRADINE 5 MG: 5 TABLET, FILM COATED ORAL at 10:14

## 2024-07-14 RX ADMIN — ARFORMOTEROL TARTRATE 15 MCG: 15 SOLUTION RESPIRATORY (INHALATION) at 08:27

## 2024-07-14 RX ADMIN — HEPARIN SODIUM 5000 UNITS: 5000 INJECTION INTRAVENOUS; SUBCUTANEOUS at 06:12

## 2024-07-14 RX ADMIN — ACETAMINOPHEN 1000 MG: 500 TABLET ORAL at 10:14

## 2024-07-14 RX ADMIN — HEPARIN SODIUM 5000 UNITS: 5000 INJECTION INTRAVENOUS; SUBCUTANEOUS at 22:12

## 2024-07-14 RX ADMIN — PANTOPRAZOLE SODIUM 40 MG: 40 TABLET, DELAYED RELEASE ORAL at 10:14

## 2024-07-14 RX ADMIN — BUDESONIDE 500 MCG: 0.5 INHALANT RESPIRATORY (INHALATION) at 08:27

## 2024-07-14 RX ADMIN — IVABRADINE 5 MG: 5 TABLET, FILM COATED ORAL at 17:51

## 2024-07-14 RX ADMIN — GABAPENTIN 300 MG: 300 CAPSULE ORAL at 10:14

## 2024-07-14 RX ADMIN — CALCIUM ACETATE 667 MG: 667 CAPSULE ORAL at 10:13

## 2024-07-14 RX ADMIN — SEVELAMER CARBONATE 1600 MG: 800 TABLET, FILM COATED ORAL at 17:51

## 2024-07-14 RX ADMIN — ACETAMINOPHEN 1000 MG: 500 TABLET ORAL at 03:56

## 2024-07-14 RX ADMIN — CALCIUM ACETATE 667 MG: 667 CAPSULE ORAL at 17:51

## 2024-07-14 NOTE — RT PROTOCOL NOTE
ADULT PROTOCOL: JET AEROSOL ASSESSMENT    Patient  Darrel Patterson     79 y.o.   male     7/14/2024  8:29 AM    Breath Sounds Pre Procedure: Breath Sounds Pre-Tx SARAHI: Diminished, Clear                                  Breath Sounds Pre-Tx LLL: Diminished        Breath Sounds Pre-Tx RUL: Diminished, Clear        Breath Sounds Pre-Tx RML: Diminished, Clear        Breath Sounds Pre-Tx RLL: Diminished  Breath Sounds Post Procedure: Breath Sounds Post-Tx SARAHI: Diminished          Breath Sounds Post-Tx LLL: Diminished          Breath Sounds Post-Tx RUL: Diminished          Breath Sounds Post-Tx RML: Diminished          Breath Sounds Post-Tx RLL: Diminished                                       Heart Rate: Pre procedure Pre-Tx Pulse: 67           Post procedure Post-Tx Pulse: 62    Resp Rate: Pre procedure Pre-Tx Resps: 14           Post procedure Post-Tx Resps: 18      Oxygen: O2 Therapy: Room air        Changed: No    SpO2:  SpO2: 100 %   without Oxygen                Nebulizer Therapy: Current medications Medications: Ipratropium Bromide      Changed: Yes    Comments: Added brovana/pulmicort back to pt treatment schedule to sub for home Trelegy inhaler.     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[facility])    S/P cataract surgery    Habitual alcohol use    Mild intermittent asthma without complication    Essential hypertension, benign    Diverticula of colon    Gastroesophageal reflux disease without esophagitis    Rheumatoid arthritis with positive rheumatoid factor ([facility])    Glaucoma    ESRD on hemodialysis ([facility])    Impingement syndrome of both shoulders    Hypertensive retinopathy of both eyes    CHF (congestive heart failure) ([facility])    DDD (degenerative disc disease), lumbar    DDD (degenerative disc disease), cervical    Psychophysiological insomnia    Fatigue    Hyperkalemia    Seizure ([facility])    Long term (current) use of

## 2024-07-15 PROCEDURE — 6360000002 HC RX W HCPCS: Performed by: INTERNAL MEDICINE

## 2024-07-15 PROCEDURE — 97116 GAIT TRAINING THERAPY: CPT

## 2024-07-15 PROCEDURE — 6370000000 HC RX 637 (ALT 250 FOR IP): Performed by: STUDENT IN AN ORGANIZED HEALTH CARE EDUCATION/TRAINING PROGRAM

## 2024-07-15 PROCEDURE — 6370000000 HC RX 637 (ALT 250 FOR IP): Performed by: INTERNAL MEDICINE

## 2024-07-15 PROCEDURE — 6360000002 HC RX W HCPCS: Performed by: STUDENT IN AN ORGANIZED HEALTH CARE EDUCATION/TRAINING PROGRAM

## 2024-07-15 PROCEDURE — 97535 SELF CARE MNGMENT TRAINING: CPT

## 2024-07-15 PROCEDURE — 2060000000 HC ICU INTERMEDIATE R&B

## 2024-07-15 PROCEDURE — 2580000003 HC RX 258: Performed by: STUDENT IN AN ORGANIZED HEALTH CARE EDUCATION/TRAINING PROGRAM

## 2024-07-15 PROCEDURE — 2500000003 HC RX 250 WO HCPCS: Performed by: STUDENT IN AN ORGANIZED HEALTH CARE EDUCATION/TRAINING PROGRAM

## 2024-07-15 PROCEDURE — 92610 EVALUATE SWALLOWING FUNCTION: CPT

## 2024-07-15 PROCEDURE — 97530 THERAPEUTIC ACTIVITIES: CPT

## 2024-07-15 PROCEDURE — 94640 AIRWAY INHALATION TREATMENT: CPT

## 2024-07-15 RX ORDER — PANTOPRAZOLE SODIUM 40 MG/1
40 TABLET, DELAYED RELEASE ORAL
Status: DISCONTINUED | OUTPATIENT
Start: 2024-07-16 | End: 2024-07-16

## 2024-07-15 RX ADMIN — PANTOPRAZOLE SODIUM 40 MG: 40 TABLET, DELAYED RELEASE ORAL at 08:34

## 2024-07-15 RX ADMIN — SACUBITRIL AND VALSARTAN 1 TABLET: 49; 51 TABLET, FILM COATED ORAL at 21:45

## 2024-07-15 RX ADMIN — BUDESONIDE 500 MCG: 0.5 INHALANT RESPIRATORY (INHALATION) at 22:17

## 2024-07-15 RX ADMIN — ACETAMINOPHEN 1000 MG: 500 TABLET ORAL at 11:02

## 2024-07-15 RX ADMIN — HEPARIN SODIUM 5000 UNITS: 5000 INJECTION INTRAVENOUS; SUBCUTANEOUS at 06:41

## 2024-07-15 RX ADMIN — SEVELAMER CARBONATE 1600 MG: 800 TABLET, FILM COATED ORAL at 17:58

## 2024-07-15 RX ADMIN — BUDESONIDE 500 MCG: 0.5 INHALANT RESPIRATORY (INHALATION) at 09:48

## 2024-07-15 RX ADMIN — ACETAMINOPHEN 1000 MG: 500 TABLET ORAL at 17:58

## 2024-07-15 RX ADMIN — METOPROLOL SUCCINATE 25 MG: 25 TABLET, FILM COATED, EXTENDED RELEASE ORAL at 08:35

## 2024-07-15 RX ADMIN — ARFORMOTEROL TARTRATE 15 MCG: 15 SOLUTION RESPIRATORY (INHALATION) at 22:17

## 2024-07-15 RX ADMIN — PRAVASTATIN SODIUM 10 MG: 10 TABLET ORAL at 21:44

## 2024-07-15 RX ADMIN — ARFORMOTEROL TARTRATE 15 MCG: 15 SOLUTION RESPIRATORY (INHALATION) at 09:48

## 2024-07-15 RX ADMIN — IVABRADINE 5 MG: 5 TABLET, FILM COATED ORAL at 08:34

## 2024-07-15 RX ADMIN — SODIUM CHLORIDE, PRESERVATIVE FREE 10 ML: 5 INJECTION INTRAVENOUS at 08:43

## 2024-07-15 RX ADMIN — ACETAMINOPHEN 1000 MG: 500 TABLET ORAL at 04:44

## 2024-07-15 RX ADMIN — SEVELAMER CARBONATE 1600 MG: 800 TABLET, FILM COATED ORAL at 08:35

## 2024-07-15 RX ADMIN — CALCIUM ACETATE 667 MG: 667 CAPSULE ORAL at 08:34

## 2024-07-15 RX ADMIN — IPRATROPIUM BROMIDE 0.5 MG: 0.5 SOLUTION RESPIRATORY (INHALATION) at 09:44

## 2024-07-15 RX ADMIN — HEPARIN SODIUM 5000 UNITS: 5000 INJECTION INTRAVENOUS; SUBCUTANEOUS at 21:42

## 2024-07-15 RX ADMIN — CALCIUM ACETATE 667 MG: 667 CAPSULE ORAL at 11:02

## 2024-07-15 RX ADMIN — DOCUSATE SODIUM 200 MG: 100 CAPSULE, LIQUID FILLED ORAL at 08:34

## 2024-07-15 RX ADMIN — IVABRADINE 5 MG: 5 TABLET, FILM COATED ORAL at 17:57

## 2024-07-15 RX ADMIN — SACUBITRIL AND VALSARTAN 1 TABLET: 49; 51 TABLET, FILM COATED ORAL at 08:34

## 2024-07-15 RX ADMIN — SODIUM CHLORIDE, PRESERVATIVE FREE 10 ML: 5 INJECTION INTRAVENOUS at 21:45

## 2024-07-15 RX ADMIN — MIRTAZAPINE 7.5 MG: 15 TABLET, FILM COATED ORAL at 21:43

## 2024-07-15 RX ADMIN — POLYETHYLENE GLYCOL 3350 17 G: 17 POWDER, FOR SOLUTION ORAL at 08:34

## 2024-07-15 RX ADMIN — GABAPENTIN 300 MG: 300 CAPSULE ORAL at 08:34

## 2024-07-15 RX ADMIN — HEPARIN SODIUM 5000 UNITS: 5000 INJECTION INTRAVENOUS; SUBCUTANEOUS at 14:24

## 2024-07-15 RX ADMIN — CALCIUM ACETATE 667 MG: 667 CAPSULE ORAL at 17:58

## 2024-07-15 RX ADMIN — IPRATROPIUM BROMIDE 0.5 MG: 0.5 SOLUTION RESPIRATORY (INHALATION) at 22:12

## 2024-07-15 RX ADMIN — SEVELAMER CARBONATE 1600 MG: 800 TABLET, FILM COATED ORAL at 11:02

## 2024-07-15 NOTE — DISCHARGE INSTRUCTIONS
Smoking Cessation Program: This is a free, phone/web based, smoking cessation program. The program is individualized to meet each patient's needs. To enroll use the link - www.quitnow[place].org or call 8-637-GWJHWST ([phone]) .

## 2024-07-15 NOTE — CARDIO/PULMONARY
Chart reviewed: Patient is 79 y.o. male admitted with NSTEMI (non-ST elevated myocardial infarction) (HCC) [I21.4]    [facility] 15-20.patient is dialysis patient and lives in [place].    Education: HF folder provided to son as patient was working with PT.     Reviewed importance of daily weight monitoring and Low Sodium diet (4138-9623 mg. daily).  Encouraged activity and rest periods within symptom limitations and as ordered by physician.  Discussed importance of reporting signs and symptoms of exacerbation, and when to report them to the doctor, to prevent re-hospitalization.     Quit smoking information provided in discharge instructions.    Amanda Milton RN

## 2024-07-16 ENCOUNTER — APPOINTMENT (OUTPATIENT)
Facility: HOSPITAL | Age: 79
DRG: 280 | End: 2024-07-16
Attending: STUDENT IN AN ORGANIZED HEALTH CARE EDUCATION/TRAINING PROGRAM
Payer: MEDICARE

## 2024-07-16 LAB
ANION GAP SERPL CALC-SCNC: 11 MMOL/L (ref 5–15)
ANION GAP SERPL CALC-SCNC: 12 MMOL/L (ref 5–15)
BUN SERPL-MCNC: 42 MG/DL (ref 6–20)
BUN SERPL-MCNC: 46 MG/DL (ref 6–20)
BUN/CREAT SERPL: 4 (ref 12–20)
BUN/CREAT SERPL: 4 (ref 12–20)
CALCIUM SERPL-MCNC: 9.2 MG/DL (ref 8.5–10.1)
CALCIUM SERPL-MCNC: 9.3 MG/DL (ref 8.5–10.1)
CHLORIDE SERPL-SCNC: 97 MMOL/L (ref 97–108)
CHLORIDE SERPL-SCNC: 99 MMOL/L (ref 97–108)
CO2 SERPL-SCNC: 21 MMOL/L (ref 21–32)
CO2 SERPL-SCNC: 27 MMOL/L (ref 21–32)
CREAT SERPL-MCNC: 10.1 MG/DL (ref 0.7–1.3)
CREAT SERPL-MCNC: 10.6 MG/DL (ref 0.7–1.3)
EKG ATRIAL RATE: 78 BPM
EKG DIAGNOSIS: NORMAL
EKG P AXIS: 48 DEGREES
EKG P-R INTERVAL: 158 MS
EKG Q-T INTERVAL: 388 MS
EKG QRS DURATION: 100 MS
EKG QTC CALCULATION (BAZETT): 442 MS
EKG R AXIS: -15 DEGREES
EKG T AXIS: 123 DEGREES
EKG VENTRICULAR RATE: 78 BPM
ERYTHROCYTE [DISTWIDTH] IN BLOOD BY AUTOMATED COUNT: 16.3 % (ref 11.5–14.5)
GLUCOSE BLD STRIP.AUTO-MCNC: 96 MG/DL (ref 65–117)
GLUCOSE SERPL-MCNC: 107 MG/DL (ref 65–100)
GLUCOSE SERPL-MCNC: 112 MG/DL (ref 65–100)
HCT VFR BLD AUTO: 39.8 % (ref 36.6–50.3)
HCT VFR BLD AUTO: 39.8 % (ref 36.6–50.3)
HCT VFR BLD AUTO: 41.5 % (ref 36.6–50.3)
HGB BLD-MCNC: 13.1 G/DL (ref 12.1–17)
HGB BLD-MCNC: 13.3 G/DL (ref 12.1–17)
HGB BLD-MCNC: 13.7 G/DL (ref 12.1–17)
MCH RBC QN AUTO: 32.9 PG (ref 26–34)
MCHC RBC AUTO-ENTMCNC: 33 G/DL (ref 30–36.5)
MCV RBC AUTO: 99.5 FL (ref 80–99)
NRBC # BLD: 0 K/UL (ref 0–0.01)
NRBC BLD-RTO: 0 PER 100 WBC
PLATELET # BLD AUTO: 114 K/UL (ref 150–400)
POTASSIUM SERPL-SCNC: 3.8 MMOL/L (ref 3.5–5.1)
POTASSIUM SERPL-SCNC: 4.2 MMOL/L (ref 3.5–5.1)
RBC # BLD AUTO: 4.17 M/UL (ref 4.1–5.7)
SERVICE CMNT-IMP: NORMAL
SODIUM SERPL-SCNC: 132 MMOL/L (ref 136–145)
SODIUM SERPL-SCNC: 135 MMOL/L (ref 136–145)
TROPONIN I SERPL HS-MCNC: 145 NG/L (ref 0–76)
TROPONIN I SERPL HS-MCNC: 149 NG/L (ref 0–76)
WBC # BLD AUTO: 5.2 K/UL (ref 4.1–11.1)

## 2024-07-16 PROCEDURE — 85014 HEMATOCRIT: CPT

## 2024-07-16 PROCEDURE — P9047 ALBUMIN (HUMAN), 25%, 50ML: HCPCS | Performed by: INTERNAL MEDICINE

## 2024-07-16 PROCEDURE — 86900 BLOOD TYPING SEROLOGIC ABO: CPT

## 2024-07-16 PROCEDURE — 85018 HEMOGLOBIN: CPT

## 2024-07-16 PROCEDURE — 80048 BASIC METABOLIC PNL TOTAL CA: CPT

## 2024-07-16 PROCEDURE — 6360000002 HC RX W HCPCS: Performed by: INTERNAL MEDICINE

## 2024-07-16 PROCEDURE — 6370000000 HC RX 637 (ALT 250 FOR IP): Performed by: NURSE PRACTITIONER

## 2024-07-16 PROCEDURE — 74178 CT ABD&PLV WO CNTR FLWD CNTR: CPT

## 2024-07-16 PROCEDURE — 82962 GLUCOSE BLOOD TEST: CPT

## 2024-07-16 PROCEDURE — 86901 BLOOD TYPING SEROLOGIC RH(D): CPT

## 2024-07-16 PROCEDURE — 86850 RBC ANTIBODY SCREEN: CPT

## 2024-07-16 PROCEDURE — 2580000003 HC RX 258: Performed by: INTERNAL MEDICINE

## 2024-07-16 PROCEDURE — 6370000000 HC RX 637 (ALT 250 FOR IP): Performed by: INTERNAL MEDICINE

## 2024-07-16 PROCEDURE — 2500000003 HC RX 250 WO HCPCS: Performed by: STUDENT IN AN ORGANIZED HEALTH CARE EDUCATION/TRAINING PROGRAM

## 2024-07-16 PROCEDURE — 93005 ELECTROCARDIOGRAM TRACING: CPT | Performed by: INTERNAL MEDICINE

## 2024-07-16 PROCEDURE — 94640 AIRWAY INHALATION TREATMENT: CPT

## 2024-07-16 PROCEDURE — 2580000003 HC RX 258: Performed by: STUDENT IN AN ORGANIZED HEALTH CARE EDUCATION/TRAINING PROGRAM

## 2024-07-16 PROCEDURE — 6370000000 HC RX 637 (ALT 250 FOR IP): Performed by: STUDENT IN AN ORGANIZED HEALTH CARE EDUCATION/TRAINING PROGRAM

## 2024-07-16 PROCEDURE — 85027 COMPLETE CBC AUTOMATED: CPT

## 2024-07-16 PROCEDURE — 6360000004 HC RX CONTRAST MEDICATION: Performed by: INTERNAL MEDICINE

## 2024-07-16 PROCEDURE — 36415 COLL VENOUS BLD VENIPUNCTURE: CPT

## 2024-07-16 PROCEDURE — 2060000000 HC ICU INTERMEDIATE R&B

## 2024-07-16 PROCEDURE — 84484 ASSAY OF TROPONIN QUANT: CPT

## 2024-07-16 RX ORDER — LIDOCAINE 4 G/G
1 PATCH TOPICAL DAILY
Status: DISCONTINUED | OUTPATIENT
Start: 2024-07-16 | End: 2024-08-07 | Stop reason: HOSPADM

## 2024-07-16 RX ORDER — SUCRALFATE 1 G/1
1 TABLET ORAL
Status: DISPENSED | OUTPATIENT
Start: 2024-07-16 | End: 2024-07-23

## 2024-07-16 RX ORDER — ALBUMIN (HUMAN) 12.5 G/50ML
25 SOLUTION INTRAVENOUS
Status: COMPLETED | OUTPATIENT
Start: 2024-07-16 | End: 2024-07-16

## 2024-07-16 RX ORDER — ALBUMIN (HUMAN) 12.5 G/50ML
25 SOLUTION INTRAVENOUS PRN
Status: DISCONTINUED | OUTPATIENT
Start: 2024-07-16 | End: 2024-08-07 | Stop reason: HOSPADM

## 2024-07-16 RX ORDER — 0.9 % SODIUM CHLORIDE 0.9 %
250 INTRAVENOUS SOLUTION INTRAVENOUS ONCE
Status: COMPLETED | OUTPATIENT
Start: 2024-07-16 | End: 2024-07-16

## 2024-07-16 RX ORDER — MIDODRINE HYDROCHLORIDE 5 MG/1
5 TABLET ORAL 3 TIMES DAILY PRN
Status: DISCONTINUED | OUTPATIENT
Start: 2024-07-16 | End: 2024-07-18 | Stop reason: DRUGHIGH

## 2024-07-16 RX ORDER — ALBUMIN (HUMAN) 12.5 G/50ML
25 SOLUTION INTRAVENOUS ONCE
Status: DISCONTINUED | OUTPATIENT
Start: 2024-07-16 | End: 2024-07-16

## 2024-07-16 RX ORDER — 0.9 % SODIUM CHLORIDE 0.9 %
250 INTRAVENOUS SOLUTION INTRAVENOUS ONCE
Status: COMPLETED | OUTPATIENT
Start: 2024-07-16 | End: 2024-07-18

## 2024-07-16 RX ADMIN — SODIUM CHLORIDE, PRESERVATIVE FREE 10 ML: 5 INJECTION INTRAVENOUS at 09:30

## 2024-07-16 RX ADMIN — ERGOCALCIFEROL 50000 UNITS: 1.25 CAPSULE ORAL at 14:29

## 2024-07-16 RX ADMIN — SUCRALFATE 1 G: 1 TABLET ORAL at 16:37

## 2024-07-16 RX ADMIN — ARFORMOTEROL TARTRATE 15 MCG: 15 SOLUTION RESPIRATORY (INHALATION) at 19:42

## 2024-07-16 RX ADMIN — SODIUM CHLORIDE, PRESERVATIVE FREE 40 MG: 5 INJECTION INTRAVENOUS at 09:30

## 2024-07-16 RX ADMIN — IOPAMIDOL 100 ML: 755 INJECTION, SOLUTION INTRAVENOUS at 12:15

## 2024-07-16 RX ADMIN — SODIUM CHLORIDE, PRESERVATIVE FREE 40 MG: 5 INJECTION INTRAVENOUS at 20:36

## 2024-07-16 RX ADMIN — SEVELAMER CARBONATE 1600 MG: 800 TABLET, FILM COATED ORAL at 16:39

## 2024-07-16 RX ADMIN — MIRTAZAPINE 7.5 MG: 15 TABLET, FILM COATED ORAL at 20:36

## 2024-07-16 RX ADMIN — ALBUMIN (HUMAN) 25 G: 0.25 INJECTION, SOLUTION INTRAVENOUS at 09:46

## 2024-07-16 RX ADMIN — SODIUM CHLORIDE: 9 INJECTION, SOLUTION INTRAVENOUS at 09:43

## 2024-07-16 RX ADMIN — IPRATROPIUM BROMIDE 0.5 MG: 0.5 SOLUTION RESPIRATORY (INHALATION) at 19:35

## 2024-07-16 RX ADMIN — PANTOPRAZOLE SODIUM 40 MG: 40 TABLET, DELAYED RELEASE ORAL at 05:38

## 2024-07-16 RX ADMIN — CALCIUM ACETATE 667 MG: 667 CAPSULE ORAL at 16:39

## 2024-07-16 RX ADMIN — ARFORMOTEROL TARTRATE 15 MCG: 15 SOLUTION RESPIRATORY (INHALATION) at 07:39

## 2024-07-16 RX ADMIN — DOCUSATE SODIUM 200 MG: 100 CAPSULE, LIQUID FILLED ORAL at 20:36

## 2024-07-16 RX ADMIN — CALCIUM ACETATE 667 MG: 667 CAPSULE ORAL at 14:29

## 2024-07-16 RX ADMIN — ACETAMINOPHEN 1000 MG: 500 TABLET ORAL at 02:45

## 2024-07-16 RX ADMIN — SEVELAMER CARBONATE 1600 MG: 800 TABLET, FILM COATED ORAL at 14:28

## 2024-07-16 RX ADMIN — ACETAMINOPHEN 1000 MG: 500 TABLET ORAL at 17:27

## 2024-07-16 RX ADMIN — BUDESONIDE 500 MCG: 0.5 INHALANT RESPIRATORY (INHALATION) at 07:39

## 2024-07-16 RX ADMIN — IPRATROPIUM BROMIDE 0.5 MG: 0.5 SOLUTION RESPIRATORY (INHALATION) at 07:32

## 2024-07-16 RX ADMIN — SUCRALFATE 1 G: 1 TABLET ORAL at 20:36

## 2024-07-16 RX ADMIN — SODIUM CHLORIDE, PRESERVATIVE FREE 10 ML: 5 INJECTION INTRAVENOUS at 20:36

## 2024-07-16 RX ADMIN — GABAPENTIN 300 MG: 300 CAPSULE ORAL at 14:29

## 2024-07-16 RX ADMIN — PIPERACILLIN AND TAZOBACTAM 3375 MG: 3; .375 INJECTION, POWDER, FOR SOLUTION INTRAVENOUS; PARENTERAL at 17:25

## 2024-07-16 RX ADMIN — ACETAMINOPHEN 1000 MG: 500 TABLET ORAL at 10:56

## 2024-07-16 RX ADMIN — PRAVASTATIN SODIUM 10 MG: 10 TABLET ORAL at 20:36

## 2024-07-16 RX ADMIN — SODIUM CHLORIDE 250 ML: 9 INJECTION, SOLUTION INTRAVENOUS at 08:49

## 2024-07-16 RX ADMIN — BUDESONIDE 500 MCG: 0.5 INHALANT RESPIRATORY (INHALATION) at 19:42

## 2024-07-16 ASSESSMENT — PAIN DESCRIPTION - DESCRIPTORS: DESCRIPTORS: ACHING

## 2024-07-16 ASSESSMENT — PAIN SCALES - GENERAL
PAINLEVEL_OUTOF10: 0

## 2024-07-16 ASSESSMENT — PAIN DESCRIPTION - LOCATION: LOCATION: BACK;NECK;SHOULDER

## 2024-07-16 ASSESSMENT — PAIN - FUNCTIONAL ASSESSMENT: PAIN_FUNCTIONAL_ASSESSMENT: ACTIVITIES ARE NOT PREVENTED

## 2024-07-16 ASSESSMENT — PAIN DESCRIPTION - ORIENTATION: ORIENTATION: RIGHT

## 2024-07-16 NOTE — SIGNIFICANT EVENT
RAPID RESPONSE TEAM    Overhead rapid response paged to room #2214 at 0829     Reason for rapid response:  Syncope/Hypotension    Background:  Admitted 7/9 w/ NSTEMI. Cath 7/10 w/o intervention. Echo 7/10 w/ EF 15-20%. PMHx CHF, ESRD on HD, asthma.     Assessment/Interventions:  Patient being transferred to bed from bathroom upon RRT arrival. Patient lethargic, breathing normally. Patient easily awoken, can maintain conversation, states he feels tired, weak and his stomach is hurting him. Skin is cool, dry, radial pulses are weak bilaterally. Intermittent hypotension noted. Per RN, patient on toilet when he went unresponsive and was then carried to bed. Blood noted in stool in toilet.     Dr. Schultz at bedside, orders received for the following Interventions: STAT 250ml bolus, H&H, trop, EKG. CTA Abd/Pelvis ordered routine.     All interventions carried out - BP improved. Midodrine added per Antoine. Plan for CT of abdomen later in day.     Outcome:  pt to remain in room #2214     Please call with any questions or concerns    Tirso Cuellar RN  Rapid Response Team  Ext [phone]    Recent Results (from the past 8 hour(s))   EKG 12 Lead    Collection Time: 07/16/24  8:12 AM   Result Value Ref Range    Ventricular Rate 78 BPM    Atrial Rate 78 BPM    P-R Interval 158 ms    QRS Duration 100 ms    Q-T Interval 388 ms    QTc Calculation (Bazett) 442 ms    P Axis 48 degrees    R Axis -15 degrees    T Axis 123 degrees    Diagnosis       Sinus rhythm with occasional premature ventricular complexes and premature   atrial complexes  Left ventricular hypertrophy with repolarization abnormality ( Sokolow-Burgos )  Abnormal ECG  When compared with ECG of 08-JUL-2024 17:21,  premature ventricular complexes are now present  premature atrial complexes are now present  ST no longer elevated in Anterior leads  Nonspecific T wave abnormality no longer evident in Inferior leads  T wave inversion now evident in Anterior leads  QT has

## 2024-07-16 NOTE — CONSENT
Informed Consent for Blood Component Transfusion Note    I have discussed with the patient the rationale for blood component transfusion; its benefits in treating or preventing fatigue, organ damage, or death; and its risk which includes mild transfusion reactions, rare risk of blood borne infection, or more serious but rare reactions. I have discussed the alternatives to transfusion, including the risk and consequences of not receiving transfusion. The patient had an opportunity to ask questions and had agreed to proceed with transfusion of blood components. A+ox4 on exam.    Electronically signed by ELVI Jiménez CNP on 7/16/24 at 12:38 AM EDT

## 2024-07-16 NOTE — CONSENT
Nursing contacted Nocturnist/cross cover provider via non-urgent messaging system [employer] and notified patient had large bm with bright red blood, states pt denies h/o hemorrhoids, no c/o abd pain, vss, asymptomatic. No other concerns reported. No acute distress reported. No other information provided by nurse. VSS. Ordered h/h q8h start now, type and cross, not blood consented as of yet, a+ox4. D/w nurse and pt plan, will hold off on CTA a/p for now, if has another brbpr will do cta, has HD due this am and would need run early to clear contrast should cta become necessary, h/h q8h ordered, and type and cross, pt is in agreement with plan as d/w him, see blood consent in addition to this note, pt a+ox4. Nurse wcm and notify for further concerns overnight. Will defer further evaluation/management to the day shift primary attending care team. Patient denies any further complaints or concerns. Nursing to notify Hospitalist for further/continued concerns. Will remain available overnight for further concerns if nursing/patient needs. Please note, there are RRT systems in this hospital in place that if nursing has acute or critical patient condition change or concern, this is to help facilitate and notify that patient needs immediate bedside evaluation by a provider.     Non-billable note.

## 2024-07-17 PROBLEM — Z51.5 PALLIATIVE CARE ENCOUNTER: Status: ACTIVE | Noted: 2024-07-12

## 2024-07-17 PROBLEM — F43.21 GRIEF: Status: ACTIVE | Noted: 2024-07-17

## 2024-07-17 LAB
ALBUMIN SERPL-MCNC: 3.6 G/DL (ref 3.5–5)
ANION GAP SERPL CALC-SCNC: 12 MMOL/L (ref 5–15)
BUN SERPL-MCNC: 55 MG/DL (ref 6–20)
BUN/CREAT SERPL: 5 (ref 12–20)
CALCIUM SERPL-MCNC: 9.6 MG/DL (ref 8.5–10.1)
CHLORIDE SERPL-SCNC: 96 MMOL/L (ref 97–108)
CO2 SERPL-SCNC: 22 MMOL/L (ref 21–32)
CORTIS AM PEAK SERPL-MCNC: 16.9 UG/DL (ref 4.3–22.45)
CREAT SERPL-MCNC: 11.8 MG/DL (ref 0.7–1.3)
GLUCOSE SERPL-MCNC: 90 MG/DL (ref 65–100)
HCT VFR BLD AUTO: 37.3 % (ref 36.6–50.3)
HCT VFR BLD AUTO: 38 % (ref 36.6–50.3)
HCT VFR BLD AUTO: 41.4 % (ref 36.6–50.3)
HGB BLD-MCNC: 12.6 G/DL (ref 12.1–17)
HGB BLD-MCNC: 12.7 G/DL (ref 12.1–17)
HGB BLD-MCNC: 13.7 G/DL (ref 12.1–17)
PHOSPHATE SERPL-MCNC: 3.4 MG/DL (ref 2.6–4.7)
POTASSIUM SERPL-SCNC: 4.4 MMOL/L (ref 3.5–5.1)
SODIUM SERPL-SCNC: 130 MMOL/L (ref 136–145)

## 2024-07-17 PROCEDURE — 2580000003 HC RX 258: Performed by: STUDENT IN AN ORGANIZED HEALTH CARE EDUCATION/TRAINING PROGRAM

## 2024-07-17 PROCEDURE — 6370000000 HC RX 637 (ALT 250 FOR IP): Performed by: NURSE PRACTITIONER

## 2024-07-17 PROCEDURE — 90935 HEMODIALYSIS ONE EVALUATION: CPT

## 2024-07-17 PROCEDURE — 99233 SBSQ HOSP IP/OBS HIGH 50: CPT | Performed by: INTERNAL MEDICINE

## 2024-07-17 PROCEDURE — 2580000003 HC RX 258: Performed by: INTERNAL MEDICINE

## 2024-07-17 PROCEDURE — 85014 HEMATOCRIT: CPT

## 2024-07-17 PROCEDURE — 6370000000 HC RX 637 (ALT 250 FOR IP): Performed by: INTERNAL MEDICINE

## 2024-07-17 PROCEDURE — 6360000002 HC RX W HCPCS: Performed by: INTERNAL MEDICINE

## 2024-07-17 PROCEDURE — 6360000002 HC RX W HCPCS: Performed by: NURSE PRACTITIONER

## 2024-07-17 PROCEDURE — P9047 ALBUMIN (HUMAN), 25%, 50ML: HCPCS | Performed by: NURSE PRACTITIONER

## 2024-07-17 PROCEDURE — 36415 COLL VENOUS BLD VENIPUNCTURE: CPT

## 2024-07-17 PROCEDURE — 2500000003 HC RX 250 WO HCPCS: Performed by: STUDENT IN AN ORGANIZED HEALTH CARE EDUCATION/TRAINING PROGRAM

## 2024-07-17 PROCEDURE — 6370000000 HC RX 637 (ALT 250 FOR IP): Performed by: STUDENT IN AN ORGANIZED HEALTH CARE EDUCATION/TRAINING PROGRAM

## 2024-07-17 PROCEDURE — 1100000003 HC PRIVATE W/ TELEMETRY

## 2024-07-17 PROCEDURE — 94640 AIRWAY INHALATION TREATMENT: CPT

## 2024-07-17 PROCEDURE — 80069 RENAL FUNCTION PANEL: CPT

## 2024-07-17 PROCEDURE — 97535 SELF CARE MNGMENT TRAINING: CPT

## 2024-07-17 PROCEDURE — 85018 HEMOGLOBIN: CPT

## 2024-07-17 PROCEDURE — 97116 GAIT TRAINING THERAPY: CPT

## 2024-07-17 PROCEDURE — 82533 TOTAL CORTISOL: CPT

## 2024-07-17 RX ORDER — LIDOCAINE 4 G/G
1 PATCH TOPICAL DAILY
Status: DISCONTINUED | OUTPATIENT
Start: 2024-07-17 | End: 2024-08-07 | Stop reason: HOSPADM

## 2024-07-17 RX ORDER — ARFORMOTEROL TARTRATE 15 UG/2ML
15 SOLUTION RESPIRATORY (INHALATION)
Status: DISCONTINUED | OUTPATIENT
Start: 2024-07-18 | End: 2024-07-17

## 2024-07-17 RX ORDER — ARFORMOTEROL TARTRATE 15 UG/2ML
15 SOLUTION RESPIRATORY (INHALATION) 2 TIMES DAILY PRN
Status: DISCONTINUED | OUTPATIENT
Start: 2024-07-17 | End: 2024-08-07 | Stop reason: HOSPADM

## 2024-07-17 RX ORDER — BUDESONIDE 0.5 MG/2ML
0.5 INHALANT ORAL 2 TIMES DAILY PRN
Status: DISCONTINUED | OUTPATIENT
Start: 2024-07-17 | End: 2024-08-07 | Stop reason: HOSPADM

## 2024-07-17 RX ORDER — BUDESONIDE 0.5 MG/2ML
0.5 INHALANT ORAL
Status: DISCONTINUED | OUTPATIENT
Start: 2024-07-18 | End: 2024-07-17

## 2024-07-17 RX ORDER — ALBUMIN (HUMAN) 12.5 G/50ML
25 SOLUTION INTRAVENOUS ONCE
Status: COMPLETED | OUTPATIENT
Start: 2024-07-17 | End: 2024-07-18

## 2024-07-17 RX ORDER — BUDESONIDE 0.5 MG/2ML
0.5 INHALANT ORAL 2 TIMES DAILY PRN
Status: DISCONTINUED | OUTPATIENT
Start: 2024-07-17 | End: 2024-07-17

## 2024-07-17 RX ORDER — MIDODRINE HYDROCHLORIDE 5 MG/1
10 TABLET ORAL ONCE
Status: COMPLETED | OUTPATIENT
Start: 2024-07-17 | End: 2024-07-17

## 2024-07-17 RX ADMIN — SEVELAMER CARBONATE 1600 MG: 800 TABLET, FILM COATED ORAL at 12:21

## 2024-07-17 RX ADMIN — SODIUM CHLORIDE: 9 INJECTION, SOLUTION INTRAVENOUS at 17:44

## 2024-07-17 RX ADMIN — ARFORMOTEROL TARTRATE 15 MCG: 15 SOLUTION RESPIRATORY (INHALATION) at 13:29

## 2024-07-17 RX ADMIN — SUCRALFATE 1 G: 1 TABLET ORAL at 05:15

## 2024-07-17 RX ADMIN — DOCUSATE SODIUM 200 MG: 100 CAPSULE, LIQUID FILLED ORAL at 22:53

## 2024-07-17 RX ADMIN — PRAVASTATIN SODIUM 10 MG: 10 TABLET ORAL at 22:54

## 2024-07-17 RX ADMIN — BUDESONIDE 500 MCG: 0.5 INHALANT RESPIRATORY (INHALATION) at 13:29

## 2024-07-17 RX ADMIN — ACETAMINOPHEN 1000 MG: 500 TABLET ORAL at 19:24

## 2024-07-17 RX ADMIN — MIRTAZAPINE 7.5 MG: 15 TABLET, FILM COATED ORAL at 22:54

## 2024-07-17 RX ADMIN — SUCRALFATE 1 G: 1 TABLET ORAL at 17:39

## 2024-07-17 RX ADMIN — SODIUM CHLORIDE, PRESERVATIVE FREE 40 MG: 5 INJECTION INTRAVENOUS at 22:54

## 2024-07-17 RX ADMIN — CALCIUM ACETATE 667 MG: 667 CAPSULE ORAL at 12:21

## 2024-07-17 RX ADMIN — SUCRALFATE 1 G: 1 TABLET ORAL at 12:10

## 2024-07-17 RX ADMIN — PIPERACILLIN AND TAZOBACTAM 3375 MG: 3; .375 INJECTION, POWDER, FOR SOLUTION INTRAVENOUS; PARENTERAL at 17:45

## 2024-07-17 RX ADMIN — ALBUMIN (HUMAN) 25 G: 0.25 INJECTION, SOLUTION INTRAVENOUS at 23:46

## 2024-07-17 RX ADMIN — ACETAMINOPHEN 1000 MG: 500 TABLET ORAL at 12:10

## 2024-07-17 RX ADMIN — PIPERACILLIN AND TAZOBACTAM 3375 MG: 3; .375 INJECTION, POWDER, FOR SOLUTION INTRAVENOUS; PARENTERAL at 05:14

## 2024-07-17 RX ADMIN — SEVELAMER CARBONATE 1600 MG: 800 TABLET, FILM COATED ORAL at 17:39

## 2024-07-17 RX ADMIN — MIDODRINE HYDROCHLORIDE 10 MG: 5 TABLET ORAL at 23:38

## 2024-07-17 RX ADMIN — SODIUM CHLORIDE, PRESERVATIVE FREE 10 ML: 5 INJECTION INTRAVENOUS at 12:10

## 2024-07-17 RX ADMIN — SODIUM CHLORIDE, PRESERVATIVE FREE 10 ML: 5 INJECTION INTRAVENOUS at 22:52

## 2024-07-17 RX ADMIN — SUCRALFATE 1 G: 1 TABLET ORAL at 22:54

## 2024-07-17 RX ADMIN — IPRATROPIUM BROMIDE 0.5 MG: 0.5 SOLUTION RESPIRATORY (INHALATION) at 13:29

## 2024-07-17 RX ADMIN — CALCIUM ACETATE 667 MG: 667 CAPSULE ORAL at 17:39

## 2024-07-17 ASSESSMENT — PAIN - FUNCTIONAL ASSESSMENT
PAIN_FUNCTIONAL_ASSESSMENT: ACTIVITIES ARE NOT PREVENTED
PAIN_FUNCTIONAL_ASSESSMENT: PREVENTS OR INTERFERES SOME ACTIVE ACTIVITIES AND ADLS

## 2024-07-17 ASSESSMENT — PAIN DESCRIPTION - LOCATION
LOCATION: NECK
LOCATION: NECK
LOCATION: HEAD

## 2024-07-17 ASSESSMENT — PAIN DESCRIPTION - DESCRIPTORS
DESCRIPTORS: ACHING

## 2024-07-17 ASSESSMENT — PAIN DESCRIPTION - ONSET
ONSET: GRADUAL
ONSET: GRADUAL

## 2024-07-17 ASSESSMENT — PAIN DESCRIPTION - ORIENTATION
ORIENTATION: POSTERIOR
ORIENTATION: MID
ORIENTATION: POSTERIOR

## 2024-07-17 ASSESSMENT — PAIN SCALES - GENERAL
PAINLEVEL_OUTOF10: 0
PAINLEVEL_OUTOF10: 8
PAINLEVEL_OUTOF10: 5
PAINLEVEL_OUTOF10: 7
PAINLEVEL_OUTOF10: 9

## 2024-07-17 ASSESSMENT — PAIN DESCRIPTION - FREQUENCY: FREQUENCY: INTERMITTENT

## 2024-07-17 ASSESSMENT — PAIN DESCRIPTION - PAIN TYPE: TYPE: ACUTE PAIN

## 2024-07-18 ENCOUNTER — APPOINTMENT (OUTPATIENT)
Facility: HOSPITAL | Age: 79
DRG: 280 | End: 2024-07-18
Attending: STUDENT IN AN ORGANIZED HEALTH CARE EDUCATION/TRAINING PROGRAM
Payer: MEDICARE

## 2024-07-18 LAB
ABO + RH BLD: NORMAL
ABO + RH BLD: NORMAL
ALBUMIN SERPL-MCNC: 3.2 G/DL (ref 3.5–5)
ANION GAP SERPL CALC-SCNC: 10 MMOL/L (ref 5–15)
ANION GAP SERPL CALC-SCNC: 6 MMOL/L (ref 5–15)
APTT PPP: 30.5 SEC (ref 22.1–31)
ARTERIAL PATENCY WRIST A: ABNORMAL
BASE DEFICIT BLD-SCNC: 0.3 MMOL/L
BASOPHILS # BLD: 0.1 K/UL (ref 0–0.1)
BASOPHILS NFR BLD: 1 % (ref 0–1)
BDY SITE: ABNORMAL
BLOOD GROUP ANTIBODIES SERPL: NORMAL
BLOOD GROUP ANTIBODIES SERPL: NORMAL
BUN SERPL-MCNC: 29 MG/DL (ref 6–20)
BUN SERPL-MCNC: 33 MG/DL (ref 6–20)
BUN/CREAT SERPL: 4 (ref 12–20)
BUN/CREAT SERPL: 4 (ref 12–20)
CALCIUM SERPL-MCNC: 8.1 MG/DL (ref 8.5–10.1)
CALCIUM SERPL-MCNC: 8.9 MG/DL (ref 8.5–10.1)
CHLORIDE SERPL-SCNC: 101 MMOL/L (ref 97–108)
CHLORIDE SERPL-SCNC: 103 MMOL/L (ref 97–108)
CO2 SERPL-SCNC: 23 MMOL/L (ref 21–32)
CO2 SERPL-SCNC: 29 MMOL/L (ref 21–32)
CREAT SERPL-MCNC: 7.24 MG/DL (ref 0.7–1.3)
CREAT SERPL-MCNC: 8.49 MG/DL (ref 0.7–1.3)
DIFFERENTIAL METHOD BLD: ABNORMAL
EOSINOPHIL # BLD: 0.5 K/UL (ref 0–0.4)
EOSINOPHIL NFR BLD: 9 % (ref 0–7)
ERYTHROCYTE [DISTWIDTH] IN BLOOD BY AUTOMATED COUNT: 15.9 % (ref 11.5–14.5)
ERYTHROCYTE [DISTWIDTH] IN BLOOD BY AUTOMATED COUNT: 16 % (ref 11.5–14.5)
FIBRINOGEN PPP-MCNC: 289 MG/DL (ref 200–475)
GAS FLOW.O2 O2 DELIVERY SYS: ABNORMAL
GLUCOSE SERPL-MCNC: 129 MG/DL (ref 65–100)
GLUCOSE SERPL-MCNC: 88 MG/DL (ref 65–100)
HCO3 BLD-SCNC: 23.9 MMOL/L (ref 22–26)
HCT VFR BLD AUTO: 26.4 % (ref 36.6–50.3)
HCT VFR BLD AUTO: 30.5 % (ref 36.6–50.3)
HCT VFR BLD AUTO: 31.3 % (ref 36.6–50.3)
HCT VFR BLD AUTO: 32.5 % (ref 36.6–50.3)
HCT VFR BLD AUTO: 35.7 % (ref 36.6–50.3)
HGB BLD-MCNC: 10 G/DL (ref 12.1–17)
HGB BLD-MCNC: 10.2 G/DL (ref 12.1–17)
HGB BLD-MCNC: 10.5 G/DL (ref 12.1–17)
HGB BLD-MCNC: 11.6 G/DL (ref 12.1–17)
HGB BLD-MCNC: 8.6 G/DL (ref 12.1–17)
IMM GRANULOCYTES # BLD AUTO: 0 K/UL (ref 0–0.04)
IMM GRANULOCYTES NFR BLD AUTO: 0 % (ref 0–0.5)
INR PPP: 1.1 (ref 0.9–1.1)
LACTATE SERPL-SCNC: 2.4 MMOL/L (ref 0.4–2)
LACTATE SERPL-SCNC: 2.4 MMOL/L (ref 0.4–2)
LACTATE SERPL-SCNC: 2.9 MMOL/L (ref 0.4–2)
LYMPHOCYTES # BLD: 1 K/UL (ref 0.8–3.5)
LYMPHOCYTES NFR BLD: 19 % (ref 12–49)
MAGNESIUM SERPL-MCNC: 2.3 MG/DL (ref 1.6–2.4)
MCH RBC QN AUTO: 32.1 PG (ref 26–34)
MCH RBC QN AUTO: 32.4 PG (ref 26–34)
MCHC RBC AUTO-ENTMCNC: 32.5 G/DL (ref 30–36.5)
MCHC RBC AUTO-ENTMCNC: 32.6 G/DL (ref 30–36.5)
MCV RBC AUTO: 98.5 FL (ref 80–99)
MCV RBC AUTO: 99.7 FL (ref 80–99)
MONOCYTES # BLD: 0.7 K/UL (ref 0–1)
MONOCYTES NFR BLD: 12 % (ref 5–13)
NEUTS SEG # BLD: 3.2 K/UL (ref 1.8–8)
NEUTS SEG NFR BLD: 59 % (ref 32–75)
NRBC # BLD: 0 K/UL (ref 0–0.01)
NRBC # BLD: 0 K/UL (ref 0–0.01)
NRBC BLD-RTO: 0 PER 100 WBC
NRBC BLD-RTO: 0 PER 100 WBC
O2/TOTAL GAS SETTING VFR VENT: 4 %
PCO2 BLD: 36.3 MMHG (ref 35–45)
PH BLD: 7.43 (ref 7.35–7.45)
PHOSPHATE SERPL-MCNC: 2.1 MG/DL (ref 2.6–4.7)
PHOSPHATE SERPL-MCNC: 2.2 MG/DL (ref 2.6–4.7)
PLATELET # BLD AUTO: 105 K/UL (ref 150–400)
PLATELET # BLD AUTO: 94 K/UL (ref 150–400)
PO2 BLD: 161 MMHG (ref 80–100)
POTASSIUM SERPL-SCNC: 3.8 MMOL/L (ref 3.5–5.1)
POTASSIUM SERPL-SCNC: 4.4 MMOL/L (ref 3.5–5.1)
PROTHROMBIN TIME: 11.9 SEC (ref 9–11.1)
RBC # BLD AUTO: 2.68 M/UL (ref 4.1–5.7)
RBC # BLD AUTO: 3.58 M/UL (ref 4.1–5.7)
RBC MORPH BLD: ABNORMAL
RBC MORPH BLD: ABNORMAL
SAO2 % BLD: 99.5 % (ref 92–97)
SERVICE CMNT-IMP: ABNORMAL
SODIUM SERPL-SCNC: 136 MMOL/L (ref 136–145)
SODIUM SERPL-SCNC: 136 MMOL/L (ref 136–145)
SPECIMEN EXP DATE BLD: NORMAL
SPECIMEN EXP DATE BLD: NORMAL
SPECIMEN TYPE: ABNORMAL
THERAPEUTIC RANGE: NORMAL SECS (ref 58–77)
WBC # BLD AUTO: 5.5 K/UL (ref 4.1–11.1)
WBC # BLD AUTO: 8 K/UL (ref 4.1–11.1)

## 2024-07-18 PROCEDURE — 84100 ASSAY OF PHOSPHORUS: CPT

## 2024-07-18 PROCEDURE — 36600 WITHDRAWAL OF ARTERIAL BLOOD: CPT

## 2024-07-18 PROCEDURE — 86850 RBC ANTIBODY SCREEN: CPT

## 2024-07-18 PROCEDURE — 2580000003 HC RX 258: Performed by: INTERNAL MEDICINE

## 2024-07-18 PROCEDURE — 82803 BLOOD GASES ANY COMBINATION: CPT

## 2024-07-18 PROCEDURE — 2580000003 HC RX 258: Performed by: STUDENT IN AN ORGANIZED HEALTH CARE EDUCATION/TRAINING PROGRAM

## 2024-07-18 PROCEDURE — 6370000000 HC RX 637 (ALT 250 FOR IP): Performed by: NURSE PRACTITIONER

## 2024-07-18 PROCEDURE — 2580000003 HC RX 258: Performed by: NURSE PRACTITIONER

## 2024-07-18 PROCEDURE — 2500000003 HC RX 250 WO HCPCS: Performed by: STUDENT IN AN ORGANIZED HEALTH CARE EDUCATION/TRAINING PROGRAM

## 2024-07-18 PROCEDURE — 6360000002 HC RX W HCPCS: Performed by: STUDENT IN AN ORGANIZED HEALTH CARE EDUCATION/TRAINING PROGRAM

## 2024-07-18 PROCEDURE — 86901 BLOOD TYPING SEROLOGIC RH(D): CPT

## 2024-07-18 PROCEDURE — 2000000000 HC ICU R&B

## 2024-07-18 PROCEDURE — 6370000000 HC RX 637 (ALT 250 FOR IP): Performed by: INTERNAL MEDICINE

## 2024-07-18 PROCEDURE — 36415 COLL VENOUS BLD VENIPUNCTURE: CPT

## 2024-07-18 PROCEDURE — 83735 ASSAY OF MAGNESIUM: CPT

## 2024-07-18 PROCEDURE — P9047 ALBUMIN (HUMAN), 25%, 50ML: HCPCS | Performed by: INTERNAL MEDICINE

## 2024-07-18 PROCEDURE — 74178 CT ABD&PLV WO CNTR FLWD CNTR: CPT

## 2024-07-18 PROCEDURE — 85018 HEMOGLOBIN: CPT

## 2024-07-18 PROCEDURE — 6370000000 HC RX 637 (ALT 250 FOR IP): Performed by: STUDENT IN AN ORGANIZED HEALTH CARE EDUCATION/TRAINING PROGRAM

## 2024-07-18 PROCEDURE — 85027 COMPLETE CBC AUTOMATED: CPT

## 2024-07-18 PROCEDURE — 86900 BLOOD TYPING SEROLOGIC ABO: CPT

## 2024-07-18 PROCEDURE — 80048 BASIC METABOLIC PNL TOTAL CA: CPT

## 2024-07-18 PROCEDURE — 85610 PROTHROMBIN TIME: CPT

## 2024-07-18 PROCEDURE — 85014 HEMATOCRIT: CPT

## 2024-07-18 PROCEDURE — 6360000004 HC RX CONTRAST MEDICATION: Performed by: RADIOLOGY

## 2024-07-18 PROCEDURE — 85384 FIBRINOGEN ACTIVITY: CPT

## 2024-07-18 PROCEDURE — 83605 ASSAY OF LACTIC ACID: CPT

## 2024-07-18 PROCEDURE — 85025 COMPLETE CBC W/AUTO DIFF WBC: CPT

## 2024-07-18 PROCEDURE — 85730 THROMBOPLASTIN TIME PARTIAL: CPT

## 2024-07-18 PROCEDURE — 2500000003 HC RX 250 WO HCPCS: Performed by: NURSE PRACTITIONER

## 2024-07-18 PROCEDURE — 82962 GLUCOSE BLOOD TEST: CPT

## 2024-07-18 PROCEDURE — 80069 RENAL FUNCTION PANEL: CPT

## 2024-07-18 PROCEDURE — 6360000002 HC RX W HCPCS: Performed by: INTERNAL MEDICINE

## 2024-07-18 PROCEDURE — P9045 ALBUMIN (HUMAN), 5%, 250 ML: HCPCS | Performed by: STUDENT IN AN ORGANIZED HEALTH CARE EDUCATION/TRAINING PROGRAM

## 2024-07-18 RX ORDER — PANTOPRAZOLE SODIUM 40 MG/1
40 TABLET, DELAYED RELEASE ORAL
Status: DISCONTINUED | OUTPATIENT
Start: 2024-07-18 | End: 2024-08-07 | Stop reason: HOSPADM

## 2024-07-18 RX ORDER — ALBUMIN, HUMAN INJ 5% 5 %
25 SOLUTION INTRAVENOUS ONCE
Status: COMPLETED | OUTPATIENT
Start: 2024-07-18 | End: 2024-07-18

## 2024-07-18 RX ORDER — MIDODRINE HYDROCHLORIDE 5 MG/1
10 TABLET ORAL 3 TIMES DAILY PRN
Status: DISCONTINUED | OUTPATIENT
Start: 2024-07-18 | End: 2024-08-07 | Stop reason: HOSPADM

## 2024-07-18 RX ORDER — PHENYLEPHRINE HCL IN 0.9% NACL 50MG/250ML
10-300 PLASTIC BAG, INJECTION (ML) INTRAVENOUS CONTINUOUS
Status: DISCONTINUED | OUTPATIENT
Start: 2024-07-18 | End: 2024-07-18

## 2024-07-18 RX ORDER — 0.9 % SODIUM CHLORIDE 0.9 %
250 INTRAVENOUS SOLUTION INTRAVENOUS ONCE
Status: COMPLETED | OUTPATIENT
Start: 2024-07-18 | End: 2024-07-18

## 2024-07-18 RX ORDER — NOREPINEPHRINE BITARTRATE 0.06 MG/ML
.5-2 INJECTION, SOLUTION INTRAVENOUS CONTINUOUS
Status: DISCONTINUED | OUTPATIENT
Start: 2024-07-18 | End: 2024-07-19

## 2024-07-18 RX ORDER — ALBUMIN (HUMAN) 12.5 G/50ML
25 SOLUTION INTRAVENOUS ONCE
Status: COMPLETED | OUTPATIENT
Start: 2024-07-18 | End: 2024-07-18

## 2024-07-18 RX ORDER — MIDODRINE HYDROCHLORIDE 5 MG/1
10 TABLET ORAL EVERY 8 HOURS
Status: DISCONTINUED | OUTPATIENT
Start: 2024-07-18 | End: 2024-07-31

## 2024-07-18 RX ORDER — ALBUTEROL SULFATE 2.5 MG/3ML
2.5 SOLUTION RESPIRATORY (INHALATION) EVERY 6 HOURS PRN
Status: DISCONTINUED | OUTPATIENT
Start: 2024-07-18 | End: 2024-08-07 | Stop reason: HOSPADM

## 2024-07-18 RX ADMIN — SODIUM CHLORIDE, PRESERVATIVE FREE 10 ML: 5 INJECTION INTRAVENOUS at 09:32

## 2024-07-18 RX ADMIN — SODIUM CHLORIDE 250 ML: 9 INJECTION, SOLUTION INTRAVENOUS at 00:18

## 2024-07-18 RX ADMIN — SODIUM CHLORIDE 250 ML: 9 INJECTION, SOLUTION INTRAVENOUS at 04:08

## 2024-07-18 RX ADMIN — MIDODRINE HYDROCHLORIDE 10 MG: 5 TABLET ORAL at 21:15

## 2024-07-18 RX ADMIN — SEVELAMER CARBONATE 1600 MG: 800 TABLET, FILM COATED ORAL at 12:58

## 2024-07-18 RX ADMIN — SODIUM CHLORIDE 250 ML: 9 INJECTION, SOLUTION INTRAVENOUS at 11:56

## 2024-07-18 RX ADMIN — MIRTAZAPINE 7.5 MG: 15 TABLET, FILM COATED ORAL at 21:14

## 2024-07-18 RX ADMIN — IOPAMIDOL 100 ML: 755 INJECTION, SOLUTION INTRAVENOUS at 05:11

## 2024-07-18 RX ADMIN — CALCIUM ACETATE 667 MG: 667 CAPSULE ORAL at 18:15

## 2024-07-18 RX ADMIN — CALCIUM ACETATE 667 MG: 667 CAPSULE ORAL at 12:58

## 2024-07-18 RX ADMIN — PRAVASTATIN SODIUM 10 MG: 10 TABLET ORAL at 21:14

## 2024-07-18 RX ADMIN — PIPERACILLIN AND TAZOBACTAM 3375 MG: 3; .375 INJECTION, POWDER, FOR SOLUTION INTRAVENOUS; PARENTERAL at 08:15

## 2024-07-18 RX ADMIN — PANTOPRAZOLE SODIUM 40 MG: 40 TABLET, DELAYED RELEASE ORAL at 18:15

## 2024-07-18 RX ADMIN — SUCRALFATE 1 G: 1 TABLET ORAL at 21:14

## 2024-07-18 RX ADMIN — SUCRALFATE 1 G: 1 TABLET ORAL at 18:15

## 2024-07-18 RX ADMIN — SODIUM CHLORIDE 4 MCG/MIN: 9 INJECTION, SOLUTION INTRAVENOUS at 20:13

## 2024-07-18 RX ADMIN — PIPERACILLIN AND TAZOBACTAM 3375 MG: 3; .375 INJECTION, POWDER, FOR SOLUTION INTRAVENOUS; PARENTERAL at 18:28

## 2024-07-18 RX ADMIN — ACETAMINOPHEN 1000 MG: 500 TABLET ORAL at 18:15

## 2024-07-18 RX ADMIN — ALBUMIN (HUMAN) 25 G: 12.5 INJECTION, SOLUTION INTRAVENOUS at 19:17

## 2024-07-18 RX ADMIN — ALBUMIN (HUMAN) 25 G: 0.25 INJECTION, SOLUTION INTRAVENOUS at 12:11

## 2024-07-18 RX ADMIN — MIDODRINE HYDROCHLORIDE 10 MG: 5 TABLET ORAL at 12:58

## 2024-07-18 RX ADMIN — SODIUM CHLORIDE, PRESERVATIVE FREE 20 ML: 5 INJECTION INTRAVENOUS at 21:17

## 2024-07-18 RX ADMIN — ACETAMINOPHEN 1000 MG: 500 TABLET ORAL at 00:06

## 2024-07-18 RX ADMIN — SEVELAMER CARBONATE 1600 MG: 800 TABLET, FILM COATED ORAL at 18:15

## 2024-07-18 ASSESSMENT — PAIN SCALES - GENERAL
PAINLEVEL_OUTOF10: 7
PAINLEVEL_OUTOF10: 0

## 2024-07-18 ASSESSMENT — PAIN DESCRIPTION - ORIENTATION: ORIENTATION: LEFT;POSTERIOR

## 2024-07-18 ASSESSMENT — PAIN DESCRIPTION - DESCRIPTORS: DESCRIPTORS: ACHING

## 2024-07-18 ASSESSMENT — PAIN DESCRIPTION - LOCATION: LOCATION: SHOULDER

## 2024-07-18 NOTE — SIGNIFICANT EVENT
Rapid Response call:    Writer responded for a RRT call @ 1136 due to patient being lethargic, hypotensive, and hypoxic. VS @ 79/41 (MAP 54), 88 HR, 17 RR, and 92%. Pt. does arouse to voice and answers questions appropriately-oriented x4 and following commands. Dr. Schultz at the bedside and gave verbal orders for a STAT ABG, H&H, and lactic acid level, NS 250mL IVF bolus, and Albumin x1 dose. Dr. Johnson arrived at the bedside and in agreement with the above orders-in the process of calling the pt's family to discuss pt's current status and GOC.

## 2024-07-18 NOTE — SIGNIFICANT EVENT
RAPID RESPONSE TEAM NOTE:    2330: Called to bedside by primary RN to assess patient for concern of hypotension. Patient is admitted with NSTEMI and FTT. Patient had syncopal episode in the bathroom during admission on 7/16 for which he received IVF.    Assessment:  Upon arrival to bedside, patient is awake, alert, and oriented x 4, following all commands. Patient denies lightheadedness/dizziness, but endorses some left shoulder pain.    Vital signs as follows: HR: 95, BP: 65/53 (59), RR: 16, SpO2: 94% on RA.    Manual BPs: 82/36 (51) & 76/46 (56)    Interventions:  YUNIEL Dee called to bedside, received orders for:    - Midodrine 10mg PO  - Albumin 25% 25g  - 250mL NS bolus.  - Tx SDU    Outcome:  Patient transferred to IVCU room 2205. BP improved to 88/63 (71) post-albumin, NS bolus still infusing.    ADDENDUM:  0355: Patient with large bloody bowel movement, dark red in color. Received orders from YUNIEL Dee for STAT CT Abd/Pelv. BP stable at 97/51 (64).    0600: Notified by primary RN that patient has had another large bloody BM with large clots. Assessed patient at bedside, BP stable at 118/55 (75). Patient has no complaints of dizziness of lightheadedness. Spoke with YUNIEL Dee, received orders for STAT H&H.    Patient to remain in room as patient remains stepdown appropriate.    Please call with any questions or concerns  Columba Russ RN  RRT [phone]
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
## 2024-07-19 PROBLEM — E43 SEVERE PROTEIN-CALORIE MALNUTRITION (HCC): Status: ACTIVE | Noted: 2024-07-19

## 2024-07-19 LAB
ALBUMIN SERPL-MCNC: 3 G/DL (ref 3.5–5)
ANION GAP SERPL CALC-SCNC: 8 MMOL/L (ref 5–15)
APTT PPP: 25.5 SEC (ref 22.1–31)
BUN SERPL-MCNC: 33 MG/DL (ref 6–20)
BUN/CREAT SERPL: 4 (ref 12–20)
CALCIUM SERPL-MCNC: 8.4 MG/DL (ref 8.5–10.1)
CHLORIDE SERPL-SCNC: 105 MMOL/L (ref 97–108)
CO2 SERPL-SCNC: 23 MMOL/L (ref 21–32)
CREAT SERPL-MCNC: 8.52 MG/DL (ref 0.7–1.3)
EKG ATRIAL RATE: 82 BPM
EKG DIAGNOSIS: NORMAL
EKG P AXIS: 64 DEGREES
EKG P-R INTERVAL: 154 MS
EKG Q-T INTERVAL: 418 MS
EKG QRS DURATION: 112 MS
EKG QTC CALCULATION (BAZETT): 488 MS
EKG R AXIS: -28 DEGREES
EKG T AXIS: 184 DEGREES
EKG VENTRICULAR RATE: 82 BPM
ERYTHROCYTE [DISTWIDTH] IN BLOOD BY AUTOMATED COUNT: 16.1 % (ref 11.5–14.5)
GLUCOSE SERPL-MCNC: 98 MG/DL (ref 65–100)
HCT VFR BLD AUTO: 29.9 % (ref 36.6–50.3)
HGB BLD-MCNC: 9.9 G/DL (ref 12.1–17)
INR PPP: 1.1 (ref 0.9–1.1)
LACTATE SERPL-SCNC: 1.8 MMOL/L (ref 0.4–2)
MAGNESIUM SERPL-MCNC: 2.4 MG/DL (ref 1.6–2.4)
MCH RBC QN AUTO: 32.8 PG (ref 26–34)
MCHC RBC AUTO-ENTMCNC: 33.1 G/DL (ref 30–36.5)
MCV RBC AUTO: 99 FL (ref 80–99)
NRBC # BLD: 0 K/UL (ref 0–0.01)
NRBC BLD-RTO: 0 PER 100 WBC
PHOSPHATE SERPL-MCNC: 2.5 MG/DL (ref 2.6–4.7)
PLATELET # BLD AUTO: ABNORMAL K/UL (ref 150–400)
POTASSIUM SERPL-SCNC: 4.3 MMOL/L (ref 3.5–5.1)
PROTHROMBIN TIME: 11.8 SEC (ref 9–11.1)
RBC # BLD AUTO: 3.02 M/UL (ref 4.1–5.7)
SODIUM SERPL-SCNC: 136 MMOL/L (ref 136–145)
THERAPEUTIC RANGE: NORMAL SECS (ref 58–77)
WBC # BLD AUTO: 5.5 K/UL (ref 4.1–11.1)

## 2024-07-19 PROCEDURE — 6370000000 HC RX 637 (ALT 250 FOR IP): Performed by: STUDENT IN AN ORGANIZED HEALTH CARE EDUCATION/TRAINING PROGRAM

## 2024-07-19 PROCEDURE — 2580000003 HC RX 258: Performed by: INTERNAL MEDICINE

## 2024-07-19 PROCEDURE — 99233 SBSQ HOSP IP/OBS HIGH 50: CPT | Performed by: NURSE PRACTITIONER

## 2024-07-19 PROCEDURE — 6370000000 HC RX 637 (ALT 250 FOR IP): Performed by: NURSE PRACTITIONER

## 2024-07-19 PROCEDURE — 97116 GAIT TRAINING THERAPY: CPT

## 2024-07-19 PROCEDURE — 36415 COLL VENOUS BLD VENIPUNCTURE: CPT

## 2024-07-19 PROCEDURE — 80069 RENAL FUNCTION PANEL: CPT

## 2024-07-19 PROCEDURE — 6370000000 HC RX 637 (ALT 250 FOR IP): Performed by: INTERNAL MEDICINE

## 2024-07-19 PROCEDURE — 2500000003 HC RX 250 WO HCPCS: Performed by: STUDENT IN AN ORGANIZED HEALTH CARE EDUCATION/TRAINING PROGRAM

## 2024-07-19 PROCEDURE — 2060000000 HC ICU INTERMEDIATE R&B

## 2024-07-19 PROCEDURE — 83605 ASSAY OF LACTIC ACID: CPT

## 2024-07-19 PROCEDURE — 97530 THERAPEUTIC ACTIVITIES: CPT

## 2024-07-19 PROCEDURE — 6370000000 HC RX 637 (ALT 250 FOR IP): Performed by: HOSPITALIST

## 2024-07-19 PROCEDURE — 85610 PROTHROMBIN TIME: CPT

## 2024-07-19 PROCEDURE — 6360000002 HC RX W HCPCS: Performed by: INTERNAL MEDICINE

## 2024-07-19 PROCEDURE — 97535 SELF CARE MNGMENT TRAINING: CPT

## 2024-07-19 PROCEDURE — 83735 ASSAY OF MAGNESIUM: CPT

## 2024-07-19 PROCEDURE — 85730 THROMBOPLASTIN TIME PARTIAL: CPT

## 2024-07-19 PROCEDURE — 85027 COMPLETE CBC AUTOMATED: CPT

## 2024-07-19 RX ADMIN — SEVELAMER CARBONATE 1600 MG: 800 TABLET, FILM COATED ORAL at 13:30

## 2024-07-19 RX ADMIN — MIRTAZAPINE 7.5 MG: 15 TABLET, FILM COATED ORAL at 21:03

## 2024-07-19 RX ADMIN — SUCRALFATE 1 G: 1 TABLET ORAL at 21:03

## 2024-07-19 RX ADMIN — SUCRALFATE 1 G: 1 TABLET ORAL at 05:29

## 2024-07-19 RX ADMIN — PIPERACILLIN AND TAZOBACTAM 3375 MG: 3; .375 INJECTION, POWDER, FOR SOLUTION INTRAVENOUS; PARENTERAL at 17:45

## 2024-07-19 RX ADMIN — ACETAMINOPHEN 1000 MG: 500 TABLET ORAL at 17:34

## 2024-07-19 RX ADMIN — CALCIUM ACETATE 667 MG: 667 CAPSULE ORAL at 13:30

## 2024-07-19 RX ADMIN — ACETAMINOPHEN 1000 MG: 500 TABLET ORAL at 09:13

## 2024-07-19 RX ADMIN — PRAVASTATIN SODIUM 10 MG: 10 TABLET ORAL at 21:02

## 2024-07-19 RX ADMIN — PANTOPRAZOLE SODIUM 40 MG: 40 TABLET, DELAYED RELEASE ORAL at 17:34

## 2024-07-19 RX ADMIN — MIDODRINE HYDROCHLORIDE 10 MG: 5 TABLET ORAL at 05:29

## 2024-07-19 RX ADMIN — DOCUSATE SODIUM 200 MG: 100 CAPSULE, LIQUID FILLED ORAL at 21:03

## 2024-07-19 RX ADMIN — Medication 1 AMPULE: at 09:13

## 2024-07-19 RX ADMIN — CALCIUM ACETATE 667 MG: 667 CAPSULE ORAL at 17:34

## 2024-07-19 RX ADMIN — CALCIUM ACETATE 667 MG: 667 CAPSULE ORAL at 09:13

## 2024-07-19 RX ADMIN — SEVELAMER CARBONATE 1600 MG: 800 TABLET, FILM COATED ORAL at 09:12

## 2024-07-19 RX ADMIN — PIPERACILLIN AND TAZOBACTAM 3375 MG: 3; .375 INJECTION, POWDER, FOR SOLUTION INTRAVENOUS; PARENTERAL at 05:49

## 2024-07-19 RX ADMIN — Medication 1 AMPULE: at 21:08

## 2024-07-19 RX ADMIN — MIDODRINE HYDROCHLORIDE 10 MG: 5 TABLET ORAL at 13:29

## 2024-07-19 RX ADMIN — SUCRALFATE 1 G: 1 TABLET ORAL at 17:34

## 2024-07-19 RX ADMIN — SUCRALFATE 1 G: 1 TABLET ORAL at 13:30

## 2024-07-19 RX ADMIN — SEVELAMER CARBONATE 1600 MG: 800 TABLET, FILM COATED ORAL at 17:34

## 2024-07-19 RX ADMIN — PANTOPRAZOLE SODIUM 40 MG: 40 TABLET, DELAYED RELEASE ORAL at 05:29

## 2024-07-19 RX ADMIN — GABAPENTIN 300 MG: 300 CAPSULE ORAL at 09:13

## 2024-07-19 RX ADMIN — MIDODRINE HYDROCHLORIDE 10 MG: 5 TABLET ORAL at 21:02

## 2024-07-19 ASSESSMENT — PAIN SCALES - GENERAL
PAINLEVEL_OUTOF10: 0
PAINLEVEL_OUTOF10: 0

## 2024-07-19 NOTE — INTERDISCIPLINARY ROUNDS
Critical care interdisciplinary rounds today.  Following members present: Case Management, , Nursing, Nutrition, Pharmacy, and Physician.   Plan of care discussed.  See clinical pathway for plan of care and interventions and desired outcomes.

## 2024-07-19 NOTE — SIGNIFICANT EVENT
RAPID RESPONSE TEAM    Overhead rapid response paged to room # 2304 at 1911    Reason for rapid response: Persistent Hypotension & new GI bleed    Initial assessment:     , Joe NP and Leila NP at bedside, orders received for the following I    Interventions:   25g of Albumin, peripheral pressor (if not fluid responsive) and repeat lab work ordered.     Outcome:   Pt ultimately transferred to CCU 2502 for titration of vasopressors and more frequent hemodynamic monitoring.     Please call with any questions or concerns    Farooq Dwyer RN  Rapid Response Team  Ext [phone]

## 2024-07-20 PROBLEM — R54 FRAILTY: Status: ACTIVE | Noted: 2024-07-20

## 2024-07-20 PROBLEM — Z71.89 DNR (DO NOT RESUSCITATE) DISCUSSION: Status: ACTIVE | Noted: 2024-07-20

## 2024-07-20 PROBLEM — Z71.89 GOALS OF CARE, COUNSELING/DISCUSSION: Status: ACTIVE | Noted: 2024-07-20

## 2024-07-20 LAB
ANION GAP SERPL CALC-SCNC: 12 MMOL/L (ref 5–15)
BUN SERPL-MCNC: 39 MG/DL (ref 6–20)
BUN/CREAT SERPL: 4 (ref 12–20)
CALCIUM SERPL-MCNC: 9.2 MG/DL (ref 8.5–10.1)
CHLORIDE SERPL-SCNC: 103 MMOL/L (ref 97–108)
CO2 SERPL-SCNC: 21 MMOL/L (ref 21–32)
CREAT SERPL-MCNC: 10.4 MG/DL (ref 0.7–1.3)
ERYTHROCYTE [DISTWIDTH] IN BLOOD BY AUTOMATED COUNT: 15.5 % (ref 11.5–14.5)
ERYTHROCYTE [DISTWIDTH] IN BLOOD BY AUTOMATED COUNT: 15.9 % (ref 11.5–14.5)
GLUCOSE SERPL-MCNC: 92 MG/DL (ref 65–100)
HCT VFR BLD AUTO: 24.1 % (ref 36.6–50.3)
HCT VFR BLD AUTO: 26.2 % (ref 36.6–50.3)
HCT VFR BLD AUTO: 26.4 % (ref 36.6–50.3)
HGB BLD-MCNC: 7.9 G/DL (ref 12.1–17)
HGB BLD-MCNC: 8.5 G/DL (ref 12.1–17)
HGB BLD-MCNC: 8.7 G/DL (ref 12.1–17)
MCH RBC QN AUTO: 32.2 PG (ref 26–34)
MCH RBC QN AUTO: 32.4 PG (ref 26–34)
MCHC RBC AUTO-ENTMCNC: 32.4 G/DL (ref 30–36.5)
MCHC RBC AUTO-ENTMCNC: 33 G/DL (ref 30–36.5)
MCV RBC AUTO: 100 FL (ref 80–99)
MCV RBC AUTO: 97.8 FL (ref 80–99)
NRBC # BLD: 0 K/UL (ref 0–0.01)
NRBC # BLD: 0 K/UL (ref 0–0.01)
NRBC BLD-RTO: 0 PER 100 WBC
NRBC BLD-RTO: 0 PER 100 WBC
PLATELET # BLD AUTO: 120 K/UL (ref 150–400)
PLATELET # BLD AUTO: 136 K/UL (ref 150–400)
PMV BLD AUTO: 12 FL (ref 8.9–12.9)
PMV BLD AUTO: 12.9 FL (ref 8.9–12.9)
POTASSIUM SERPL-SCNC: 4.4 MMOL/L (ref 3.5–5.1)
RBC # BLD AUTO: 2.62 M/UL (ref 4.1–5.7)
RBC # BLD AUTO: 2.7 M/UL (ref 4.1–5.7)
SODIUM SERPL-SCNC: 136 MMOL/L (ref 136–145)
WBC # BLD AUTO: 5.9 K/UL (ref 4.1–11.1)
WBC # BLD AUTO: 6.2 K/UL (ref 4.1–11.1)

## 2024-07-20 PROCEDURE — 36415 COLL VENOUS BLD VENIPUNCTURE: CPT

## 2024-07-20 PROCEDURE — 85027 COMPLETE CBC AUTOMATED: CPT

## 2024-07-20 PROCEDURE — 2580000003 HC RX 258: Performed by: INTERNAL MEDICINE

## 2024-07-20 PROCEDURE — 6370000000 HC RX 637 (ALT 250 FOR IP): Performed by: NURSE PRACTITIONER

## 2024-07-20 PROCEDURE — 6360000002 HC RX W HCPCS: Performed by: INTERNAL MEDICINE

## 2024-07-20 PROCEDURE — 90935 HEMODIALYSIS ONE EVALUATION: CPT

## 2024-07-20 PROCEDURE — 2500000003 HC RX 250 WO HCPCS: Performed by: STUDENT IN AN ORGANIZED HEALTH CARE EDUCATION/TRAINING PROGRAM

## 2024-07-20 PROCEDURE — 6370000000 HC RX 637 (ALT 250 FOR IP): Performed by: INTERNAL MEDICINE

## 2024-07-20 PROCEDURE — 6370000000 HC RX 637 (ALT 250 FOR IP): Performed by: STUDENT IN AN ORGANIZED HEALTH CARE EDUCATION/TRAINING PROGRAM

## 2024-07-20 PROCEDURE — 85018 HEMOGLOBIN: CPT

## 2024-07-20 PROCEDURE — 2060000000 HC ICU INTERMEDIATE R&B

## 2024-07-20 PROCEDURE — 80048 BASIC METABOLIC PNL TOTAL CA: CPT

## 2024-07-20 PROCEDURE — 85014 HEMATOCRIT: CPT

## 2024-07-20 RX ORDER — ACETAMINOPHEN 325 MG/1
650 TABLET ORAL EVERY 6 HOURS PRN
Status: DISCONTINUED | OUTPATIENT
Start: 2024-07-20 | End: 2024-08-07 | Stop reason: HOSPADM

## 2024-07-20 RX ADMIN — PRAVASTATIN SODIUM 10 MG: 10 TABLET ORAL at 21:37

## 2024-07-20 RX ADMIN — MIDODRINE HYDROCHLORIDE 10 MG: 5 TABLET ORAL at 13:40

## 2024-07-20 RX ADMIN — PANTOPRAZOLE SODIUM 40 MG: 40 TABLET, DELAYED RELEASE ORAL at 06:49

## 2024-07-20 RX ADMIN — SUCRALFATE 1 G: 1 TABLET ORAL at 06:49

## 2024-07-20 RX ADMIN — MIDODRINE HYDROCHLORIDE 10 MG: 5 TABLET ORAL at 21:36

## 2024-07-20 RX ADMIN — PANTOPRAZOLE SODIUM 40 MG: 40 TABLET, DELAYED RELEASE ORAL at 16:48

## 2024-07-20 RX ADMIN — DOCUSATE SODIUM 200 MG: 100 CAPSULE, LIQUID FILLED ORAL at 21:37

## 2024-07-20 RX ADMIN — SEVELAMER CARBONATE 1600 MG: 800 TABLET, FILM COATED ORAL at 16:48

## 2024-07-20 RX ADMIN — SUCRALFATE 1 G: 1 TABLET ORAL at 21:37

## 2024-07-20 RX ADMIN — PIPERACILLIN AND TAZOBACTAM 3375 MG: 3; .375 INJECTION, POWDER, FOR SOLUTION INTRAVENOUS; PARENTERAL at 16:48

## 2024-07-20 RX ADMIN — MIRTAZAPINE 7.5 MG: 15 TABLET, FILM COATED ORAL at 21:37

## 2024-07-20 RX ADMIN — CALCIUM ACETATE 667 MG: 667 CAPSULE ORAL at 08:24

## 2024-07-20 RX ADMIN — POLYETHYLENE GLYCOL 3350 17 G: 17 POWDER, FOR SOLUTION ORAL at 08:24

## 2024-07-20 RX ADMIN — SEVELAMER CARBONATE 1600 MG: 800 TABLET, FILM COATED ORAL at 08:24

## 2024-07-20 RX ADMIN — GABAPENTIN 300 MG: 300 CAPSULE ORAL at 08:24

## 2024-07-20 RX ADMIN — SUCRALFATE 1 G: 1 TABLET ORAL at 16:49

## 2024-07-20 RX ADMIN — PIPERACILLIN AND TAZOBACTAM 3375 MG: 3; .375 INJECTION, POWDER, FOR SOLUTION INTRAVENOUS; PARENTERAL at 05:31

## 2024-07-20 RX ADMIN — CALCIUM ACETATE 667 MG: 667 CAPSULE ORAL at 16:48

## 2024-07-20 RX ADMIN — ACETAMINOPHEN 1000 MG: 500 TABLET ORAL at 01:29

## 2024-07-20 RX ADMIN — DOCUSATE SODIUM 200 MG: 100 CAPSULE, LIQUID FILLED ORAL at 08:24

## 2024-07-20 RX ADMIN — ACETAMINOPHEN 1000 MG: 500 TABLET ORAL at 08:24

## 2024-07-20 RX ADMIN — MIDODRINE HYDROCHLORIDE 10 MG: 5 TABLET ORAL at 05:29

## 2024-07-20 ASSESSMENT — PAIN - FUNCTIONAL ASSESSMENT: PAIN_FUNCTIONAL_ASSESSMENT: ACTIVITIES ARE NOT PREVENTED

## 2024-07-20 ASSESSMENT — PAIN SCALES - GENERAL
PAINLEVEL_OUTOF10: 0
PAINLEVEL_OUTOF10: 2
PAINLEVEL_OUTOF10: 0
PAINLEVEL_OUTOF10: 0

## 2024-07-20 ASSESSMENT — PAIN DESCRIPTION - ORIENTATION: ORIENTATION: ANTERIOR

## 2024-07-20 ASSESSMENT — PAIN SCALES - WONG BAKER: WONGBAKER_NUMERICALRESPONSE: HURTS A LITTLE BIT

## 2024-07-20 ASSESSMENT — PAIN DESCRIPTION - DESCRIPTORS: DESCRIPTORS: ACHING

## 2024-07-20 ASSESSMENT — PAIN DESCRIPTION - LOCATION: LOCATION: ABDOMEN

## 2024-07-21 LAB
ERYTHROCYTE [DISTWIDTH] IN BLOOD BY AUTOMATED COUNT: 15.7 % (ref 11.5–14.5)
ERYTHROCYTE [DISTWIDTH] IN BLOOD BY AUTOMATED COUNT: 15.8 % (ref 11.5–14.5)
ERYTHROCYTE [DISTWIDTH] IN BLOOD BY AUTOMATED COUNT: 15.9 % (ref 11.5–14.5)
HCT VFR BLD AUTO: 23.2 % (ref 36.6–50.3)
HCT VFR BLD AUTO: 25.6 % (ref 36.6–50.3)
HGB BLD-MCNC: 7.9 G/DL (ref 12.1–17)
HGB BLD-MCNC: 8.2 G/DL (ref 12.1–17)
HGB BLD-MCNC: 8.3 G/DL (ref 12.1–17)
MCH RBC QN AUTO: 32.7 PG (ref 26–34)
MCH RBC QN AUTO: 32.7 PG (ref 26–34)
MCH RBC QN AUTO: 33.5 PG (ref 26–34)
MCHC RBC AUTO-ENTMCNC: 32.4 G/DL (ref 30–36.5)
MCHC RBC AUTO-ENTMCNC: 33.5 G/DL (ref 30–36.5)
MCHC RBC AUTO-ENTMCNC: 34.1 G/DL (ref 30–36.5)
MCV RBC AUTO: 100.8 FL (ref 80–99)
MCV RBC AUTO: 97.6 FL (ref 80–99)
MCV RBC AUTO: 98.3 FL (ref 80–99)
NRBC # BLD: 0 K/UL (ref 0–0.01)
NRBC BLD-RTO: 0 PER 100 WBC
PLATELET # BLD AUTO: 134 K/UL (ref 150–400)
PLATELET # BLD AUTO: 152 K/UL (ref 150–400)
PLATELET # BLD AUTO: 165 K/UL (ref 150–400)
PMV BLD AUTO: 12.1 FL (ref 8.9–12.9)
PMV BLD AUTO: 12.2 FL (ref 8.9–12.9)
PMV BLD AUTO: 12.7 FL (ref 8.9–12.9)
RBC # BLD AUTO: 2.36 M/UL (ref 4.1–5.7)
RBC # BLD AUTO: 2.51 M/UL (ref 4.1–5.7)
RBC # BLD AUTO: 2.54 M/UL (ref 4.1–5.7)
WBC # BLD AUTO: 5.1 K/UL (ref 4.1–11.1)
WBC # BLD AUTO: 5.4 K/UL (ref 4.1–11.1)
WBC # BLD AUTO: 6.2 K/UL (ref 4.1–11.1)

## 2024-07-21 PROCEDURE — 36415 COLL VENOUS BLD VENIPUNCTURE: CPT

## 2024-07-21 PROCEDURE — 6370000000 HC RX 637 (ALT 250 FOR IP): Performed by: GENERAL ACUTE CARE HOSPITAL

## 2024-07-21 PROCEDURE — 6370000000 HC RX 637 (ALT 250 FOR IP): Performed by: NURSE PRACTITIONER

## 2024-07-21 PROCEDURE — 6370000000 HC RX 637 (ALT 250 FOR IP): Performed by: STUDENT IN AN ORGANIZED HEALTH CARE EDUCATION/TRAINING PROGRAM

## 2024-07-21 PROCEDURE — 6370000000 HC RX 637 (ALT 250 FOR IP): Performed by: INTERNAL MEDICINE

## 2024-07-21 PROCEDURE — 6360000002 HC RX W HCPCS: Performed by: INTERNAL MEDICINE

## 2024-07-21 PROCEDURE — 85027 COMPLETE CBC AUTOMATED: CPT

## 2024-07-21 PROCEDURE — 2060000000 HC ICU INTERMEDIATE R&B

## 2024-07-21 PROCEDURE — 2580000003 HC RX 258: Performed by: INTERNAL MEDICINE

## 2024-07-21 PROCEDURE — 2500000003 HC RX 250 WO HCPCS: Performed by: STUDENT IN AN ORGANIZED HEALTH CARE EDUCATION/TRAINING PROGRAM

## 2024-07-21 RX ORDER — GABAPENTIN 300 MG/1
300 CAPSULE ORAL
COMMUNITY

## 2024-07-21 RX ORDER — TRAMADOL HYDROCHLORIDE 50 MG/1
50 TABLET ORAL EVERY 6 HOURS PRN
Status: DISCONTINUED | OUTPATIENT
Start: 2024-07-21 | End: 2024-08-07 | Stop reason: HOSPADM

## 2024-07-21 RX ADMIN — GABAPENTIN 300 MG: 300 CAPSULE ORAL at 10:11

## 2024-07-21 RX ADMIN — MIRTAZAPINE 7.5 MG: 15 TABLET, FILM COATED ORAL at 20:45

## 2024-07-21 RX ADMIN — MIDODRINE HYDROCHLORIDE 10 MG: 5 TABLET ORAL at 04:42

## 2024-07-21 RX ADMIN — CALCIUM ACETATE 667 MG: 667 CAPSULE ORAL at 17:12

## 2024-07-21 RX ADMIN — SUCRALFATE 1 G: 1 TABLET ORAL at 20:45

## 2024-07-21 RX ADMIN — MIDODRINE HYDROCHLORIDE 10 MG: 5 TABLET ORAL at 20:45

## 2024-07-21 RX ADMIN — PANTOPRAZOLE SODIUM 40 MG: 40 TABLET, DELAYED RELEASE ORAL at 06:26

## 2024-07-21 RX ADMIN — SEVELAMER CARBONATE 1600 MG: 800 TABLET, FILM COATED ORAL at 10:11

## 2024-07-21 RX ADMIN — SUCRALFATE 1 G: 1 TABLET ORAL at 06:26

## 2024-07-21 RX ADMIN — SEVELAMER CARBONATE 1600 MG: 800 TABLET, FILM COATED ORAL at 12:19

## 2024-07-21 RX ADMIN — CALCIUM ACETATE 667 MG: 667 CAPSULE ORAL at 10:11

## 2024-07-21 RX ADMIN — MIDODRINE HYDROCHLORIDE 10 MG: 5 TABLET ORAL at 12:19

## 2024-07-21 RX ADMIN — SUCRALFATE 1 G: 1 TABLET ORAL at 17:12

## 2024-07-21 RX ADMIN — PIPERACILLIN AND TAZOBACTAM 3375 MG: 3; .375 INJECTION, POWDER, FOR SOLUTION INTRAVENOUS; PARENTERAL at 05:36

## 2024-07-21 RX ADMIN — TRAMADOL HYDROCHLORIDE 50 MG: 50 TABLET ORAL at 17:20

## 2024-07-21 RX ADMIN — DOCUSATE SODIUM 200 MG: 100 CAPSULE, LIQUID FILLED ORAL at 20:45

## 2024-07-21 RX ADMIN — PIPERACILLIN AND TAZOBACTAM 3375 MG: 3; .375 INJECTION, POWDER, FOR SOLUTION INTRAVENOUS; PARENTERAL at 17:14

## 2024-07-21 RX ADMIN — SEVELAMER CARBONATE 1600 MG: 800 TABLET, FILM COATED ORAL at 17:12

## 2024-07-21 RX ADMIN — SUCRALFATE 1 G: 1 TABLET ORAL at 10:11

## 2024-07-21 RX ADMIN — TRAMADOL HYDROCHLORIDE 50 MG: 50 TABLET ORAL at 23:27

## 2024-07-21 RX ADMIN — CALCIUM ACETATE 667 MG: 667 CAPSULE ORAL at 12:19

## 2024-07-21 RX ADMIN — PANTOPRAZOLE SODIUM 40 MG: 40 TABLET, DELAYED RELEASE ORAL at 17:12

## 2024-07-21 RX ADMIN — ACETAMINOPHEN 650 MG: 325 TABLET ORAL at 14:07

## 2024-07-21 RX ADMIN — PRAVASTATIN SODIUM 10 MG: 10 TABLET ORAL at 20:45

## 2024-07-21 ASSESSMENT — PAIN SCALES - GENERAL
PAINLEVEL_OUTOF10: 9
PAINLEVEL_OUTOF10: 8
PAINLEVEL_OUTOF10: 0
PAINLEVEL_OUTOF10: 4
PAINLEVEL_OUTOF10: 0
PAINLEVEL_OUTOF10: 0

## 2024-07-21 ASSESSMENT — PAIN DESCRIPTION - PAIN TYPE: TYPE: ACUTE PAIN

## 2024-07-21 ASSESSMENT — PAIN DESCRIPTION - DESCRIPTORS
DESCRIPTORS: ACHING

## 2024-07-21 ASSESSMENT — PAIN DESCRIPTION - ONSET: ONSET: ON-GOING

## 2024-07-21 ASSESSMENT — PAIN DESCRIPTION - LOCATION
LOCATION: ABDOMEN

## 2024-07-21 ASSESSMENT — PAIN DESCRIPTION - ORIENTATION
ORIENTATION: ANTERIOR;LOWER
ORIENTATION: ANTERIOR
ORIENTATION: MID

## 2024-07-22 LAB
ERYTHROCYTE [DISTWIDTH] IN BLOOD BY AUTOMATED COUNT: 15.7 % (ref 11.5–14.5)
ERYTHROCYTE [DISTWIDTH] IN BLOOD BY AUTOMATED COUNT: 15.7 % (ref 11.5–14.5)
HCT VFR BLD AUTO: 23.8 % (ref 36.6–50.3)
HCT VFR BLD AUTO: 26.9 % (ref 36.6–50.3)
HGB BLD-MCNC: 7.8 G/DL (ref 12.1–17)
HGB BLD-MCNC: 8.8 G/DL (ref 12.1–17)
MCH RBC QN AUTO: 32.6 PG (ref 26–34)
MCH RBC QN AUTO: 32.6 PG (ref 26–34)
MCHC RBC AUTO-ENTMCNC: 32.7 G/DL (ref 30–36.5)
MCHC RBC AUTO-ENTMCNC: 32.8 G/DL (ref 30–36.5)
MCV RBC AUTO: 99.6 FL (ref 80–99)
MCV RBC AUTO: 99.6 FL (ref 80–99)
NRBC # BLD: 0 K/UL (ref 0–0.01)
NRBC # BLD: 0 K/UL (ref 0–0.01)
NRBC BLD-RTO: 0 PER 100 WBC
NRBC BLD-RTO: 0 PER 100 WBC
PLATELET # BLD AUTO: 170 K/UL (ref 150–400)
PLATELET # BLD AUTO: 171 K/UL (ref 150–400)
PMV BLD AUTO: 11.8 FL (ref 8.9–12.9)
PMV BLD AUTO: 12 FL (ref 8.9–12.9)
RBC # BLD AUTO: 2.39 M/UL (ref 4.1–5.7)
RBC # BLD AUTO: 2.7 M/UL (ref 4.1–5.7)
WBC # BLD AUTO: 5.4 K/UL (ref 4.1–11.1)
WBC # BLD AUTO: 5.8 K/UL (ref 4.1–11.1)

## 2024-07-22 PROCEDURE — 6360000002 HC RX W HCPCS: Performed by: INTERNAL MEDICINE

## 2024-07-22 PROCEDURE — 36415 COLL VENOUS BLD VENIPUNCTURE: CPT

## 2024-07-22 PROCEDURE — 6370000000 HC RX 637 (ALT 250 FOR IP): Performed by: STUDENT IN AN ORGANIZED HEALTH CARE EDUCATION/TRAINING PROGRAM

## 2024-07-22 PROCEDURE — 2500000003 HC RX 250 WO HCPCS: Performed by: STUDENT IN AN ORGANIZED HEALTH CARE EDUCATION/TRAINING PROGRAM

## 2024-07-22 PROCEDURE — 2060000000 HC ICU INTERMEDIATE R&B

## 2024-07-22 PROCEDURE — 6370000000 HC RX 637 (ALT 250 FOR IP): Performed by: NURSE PRACTITIONER

## 2024-07-22 PROCEDURE — 6370000000 HC RX 637 (ALT 250 FOR IP): Performed by: INTERNAL MEDICINE

## 2024-07-22 PROCEDURE — 85027 COMPLETE CBC AUTOMATED: CPT

## 2024-07-22 PROCEDURE — 2580000003 HC RX 258: Performed by: INTERNAL MEDICINE

## 2024-07-22 RX ADMIN — SEVELAMER CARBONATE 1600 MG: 800 TABLET, FILM COATED ORAL at 18:02

## 2024-07-22 RX ADMIN — CALCIUM ACETATE 667 MG: 667 CAPSULE ORAL at 12:14

## 2024-07-22 RX ADMIN — MIDODRINE HYDROCHLORIDE 10 MG: 5 TABLET ORAL at 05:07

## 2024-07-22 RX ADMIN — PIPERACILLIN AND TAZOBACTAM 3375 MG: 3; .375 INJECTION, POWDER, FOR SOLUTION INTRAVENOUS; PARENTERAL at 05:10

## 2024-07-22 RX ADMIN — CALCIUM ACETATE 667 MG: 667 CAPSULE ORAL at 18:04

## 2024-07-22 RX ADMIN — MIDODRINE HYDROCHLORIDE 10 MG: 5 TABLET ORAL at 12:13

## 2024-07-22 RX ADMIN — EPOETIN ALFA-EPBX 10000 UNITS: 10000 INJECTION, SOLUTION INTRAVENOUS; SUBCUTANEOUS at 20:40

## 2024-07-22 RX ADMIN — SEVELAMER CARBONATE 1600 MG: 800 TABLET, FILM COATED ORAL at 12:15

## 2024-07-22 RX ADMIN — PANTOPRAZOLE SODIUM 40 MG: 40 TABLET, DELAYED RELEASE ORAL at 06:41

## 2024-07-22 RX ADMIN — SUCRALFATE 1 G: 1 TABLET ORAL at 18:02

## 2024-07-22 RX ADMIN — PRAVASTATIN SODIUM 10 MG: 10 TABLET ORAL at 20:40

## 2024-07-22 RX ADMIN — GABAPENTIN 300 MG: 300 CAPSULE ORAL at 12:15

## 2024-07-22 RX ADMIN — SUCRALFATE 1 G: 1 TABLET ORAL at 12:16

## 2024-07-22 RX ADMIN — SUCRALFATE 1 G: 1 TABLET ORAL at 20:41

## 2024-07-22 RX ADMIN — SUCRALFATE 1 G: 1 TABLET ORAL at 05:45

## 2024-07-22 RX ADMIN — MIRTAZAPINE 7.5 MG: 15 TABLET, FILM COATED ORAL at 20:41

## 2024-07-22 RX ADMIN — MIDODRINE HYDROCHLORIDE 10 MG: 5 TABLET ORAL at 20:40

## 2024-07-22 RX ADMIN — PANTOPRAZOLE SODIUM 40 MG: 40 TABLET, DELAYED RELEASE ORAL at 18:03

## 2024-07-22 RX ADMIN — PIPERACILLIN AND TAZOBACTAM 3375 MG: 3; .375 INJECTION, POWDER, FOR SOLUTION INTRAVENOUS; PARENTERAL at 18:07

## 2024-07-22 RX ADMIN — HYDROXYZINE HYDROCHLORIDE 25 MG: 25 TABLET ORAL at 03:18

## 2024-07-22 ASSESSMENT — PAIN DESCRIPTION - ORIENTATION
ORIENTATION: MID
ORIENTATION: MID

## 2024-07-22 ASSESSMENT — PAIN DESCRIPTION - LOCATION
LOCATION: ABDOMEN
LOCATION: ABDOMEN

## 2024-07-22 ASSESSMENT — PAIN SCALES - GENERAL
PAINLEVEL_OUTOF10: 4
PAINLEVEL_OUTOF10: 4

## 2024-07-23 ENCOUNTER — APPOINTMENT (OUTPATIENT)
Facility: HOSPITAL | Age: 79
DRG: 280 | End: 2024-07-23
Attending: STUDENT IN AN ORGANIZED HEALTH CARE EDUCATION/TRAINING PROGRAM
Payer: MEDICARE

## 2024-07-23 LAB
ANION GAP SERPL CALC-SCNC: 5 MMOL/L (ref 5–15)
BUN SERPL-MCNC: 13 MG/DL (ref 6–20)
BUN/CREAT SERPL: 2 (ref 12–20)
CALCIUM SERPL-MCNC: 8.6 MG/DL (ref 8.5–10.1)
CHLORIDE SERPL-SCNC: 104 MMOL/L (ref 97–108)
CO2 SERPL-SCNC: 31 MMOL/L (ref 21–32)
CREAT SERPL-MCNC: 6.19 MG/DL (ref 0.7–1.3)
ERYTHROCYTE [DISTWIDTH] IN BLOOD BY AUTOMATED COUNT: 15.5 % (ref 11.5–14.5)
ERYTHROCYTE [DISTWIDTH] IN BLOOD BY AUTOMATED COUNT: 15.7 % (ref 11.5–14.5)
ERYTHROCYTE [DISTWIDTH] IN BLOOD BY AUTOMATED COUNT: 15.7 % (ref 11.5–14.5)
ERYTHROCYTE [DISTWIDTH] IN BLOOD BY AUTOMATED COUNT: 15.8 % (ref 11.5–14.5)
GLUCOSE SERPL-MCNC: 107 MG/DL (ref 65–100)
HCT VFR BLD AUTO: 22.6 % (ref 36.6–50.3)
HCT VFR BLD AUTO: 23.3 % (ref 36.6–50.3)
HCT VFR BLD AUTO: 23.4 % (ref 36.6–50.3)
HCT VFR BLD AUTO: 23.5 % (ref 36.6–50.3)
HGB BLD-MCNC: 7.4 G/DL (ref 12.1–17)
HGB BLD-MCNC: 7.6 G/DL (ref 12.1–17)
MCH RBC QN AUTO: 32.1 PG (ref 26–34)
MCH RBC QN AUTO: 32.1 PG (ref 26–34)
MCH RBC QN AUTO: 32.2 PG (ref 26–34)
MCH RBC QN AUTO: 32.2 PG (ref 26–34)
MCHC RBC AUTO-ENTMCNC: 32.3 G/DL (ref 30–36.5)
MCHC RBC AUTO-ENTMCNC: 32.5 G/DL (ref 30–36.5)
MCHC RBC AUTO-ENTMCNC: 32.6 G/DL (ref 30–36.5)
MCHC RBC AUTO-ENTMCNC: 32.7 G/DL (ref 30–36.5)
MCV RBC AUTO: 98.3 FL (ref 80–99)
MCV RBC AUTO: 98.3 FL (ref 80–99)
MCV RBC AUTO: 99.2 FL (ref 80–99)
MCV RBC AUTO: 99.2 FL (ref 80–99)
NRBC # BLD: 0 K/UL (ref 0–0.01)
NRBC BLD-RTO: 0 PER 100 WBC
PLATELET # BLD AUTO: 173 K/UL (ref 150–400)
PLATELET # BLD AUTO: 175 K/UL (ref 150–400)
PLATELET # BLD AUTO: 179 K/UL (ref 150–400)
PLATELET # BLD AUTO: 198 K/UL (ref 150–400)
PMV BLD AUTO: 11.6 FL (ref 8.9–12.9)
PMV BLD AUTO: 11.8 FL (ref 8.9–12.9)
PMV BLD AUTO: 11.9 FL (ref 8.9–12.9)
PMV BLD AUTO: 12 FL (ref 8.9–12.9)
POTASSIUM SERPL-SCNC: 3.9 MMOL/L (ref 3.5–5.1)
RBC # BLD AUTO: 2.3 M/UL (ref 4.1–5.7)
RBC # BLD AUTO: 2.36 M/UL (ref 4.1–5.7)
RBC # BLD AUTO: 2.37 M/UL (ref 4.1–5.7)
RBC # BLD AUTO: 2.37 M/UL (ref 4.1–5.7)
SODIUM SERPL-SCNC: 140 MMOL/L (ref 136–145)
WBC # BLD AUTO: 5.9 K/UL (ref 4.1–11.1)
WBC # BLD AUTO: 6.1 K/UL (ref 4.1–11.1)
WBC # BLD AUTO: 6.3 K/UL (ref 4.1–11.1)
WBC # BLD AUTO: 6.4 K/UL (ref 4.1–11.1)

## 2024-07-23 PROCEDURE — 6370000000 HC RX 637 (ALT 250 FOR IP): Performed by: NURSE PRACTITIONER

## 2024-07-23 PROCEDURE — 6370000000 HC RX 637 (ALT 250 FOR IP): Performed by: INTERNAL MEDICINE

## 2024-07-23 PROCEDURE — 2060000000 HC ICU INTERMEDIATE R&B

## 2024-07-23 PROCEDURE — 2580000003 HC RX 258: Performed by: INTERNAL MEDICINE

## 2024-07-23 PROCEDURE — 2500000003 HC RX 250 WO HCPCS: Performed by: STUDENT IN AN ORGANIZED HEALTH CARE EDUCATION/TRAINING PROGRAM

## 2024-07-23 PROCEDURE — 6370000000 HC RX 637 (ALT 250 FOR IP): Performed by: STUDENT IN AN ORGANIZED HEALTH CARE EDUCATION/TRAINING PROGRAM

## 2024-07-23 PROCEDURE — 80048 BASIC METABOLIC PNL TOTAL CA: CPT

## 2024-07-23 PROCEDURE — 6360000004 HC RX CONTRAST MEDICATION: Performed by: GENERAL ACUTE CARE HOSPITAL

## 2024-07-23 PROCEDURE — 36415 COLL VENOUS BLD VENIPUNCTURE: CPT

## 2024-07-23 PROCEDURE — 74178 CT ABD&PLV WO CNTR FLWD CNTR: CPT

## 2024-07-23 PROCEDURE — 85027 COMPLETE CBC AUTOMATED: CPT

## 2024-07-23 PROCEDURE — 6370000000 HC RX 637 (ALT 250 FOR IP): Performed by: GENERAL ACUTE CARE HOSPITAL

## 2024-07-23 PROCEDURE — 6360000002 HC RX W HCPCS: Performed by: INTERNAL MEDICINE

## 2024-07-23 RX ORDER — SUCRALFATE 1 G/1
1 TABLET ORAL EVERY 8 HOURS SCHEDULED
Status: DISPENSED | OUTPATIENT
Start: 2024-07-23 | End: 2024-07-30

## 2024-07-23 RX ADMIN — SEVELAMER CARBONATE 1600 MG: 800 TABLET, FILM COATED ORAL at 17:02

## 2024-07-23 RX ADMIN — GABAPENTIN 300 MG: 300 CAPSULE ORAL at 08:28

## 2024-07-23 RX ADMIN — MIDODRINE HYDROCHLORIDE 10 MG: 5 TABLET ORAL at 11:55

## 2024-07-23 RX ADMIN — IOPAMIDOL 100 ML: 755 INJECTION, SOLUTION INTRAVENOUS at 18:38

## 2024-07-23 RX ADMIN — SEVELAMER CARBONATE 1600 MG: 800 TABLET, FILM COATED ORAL at 11:55

## 2024-07-23 RX ADMIN — CALCIUM ACETATE 667 MG: 667 CAPSULE ORAL at 17:01

## 2024-07-23 RX ADMIN — PANTOPRAZOLE SODIUM 40 MG: 40 TABLET, DELAYED RELEASE ORAL at 17:02

## 2024-07-23 RX ADMIN — PRAVASTATIN SODIUM 10 MG: 10 TABLET ORAL at 20:45

## 2024-07-23 RX ADMIN — MIDODRINE HYDROCHLORIDE 10 MG: 5 TABLET ORAL at 20:44

## 2024-07-23 RX ADMIN — SEVELAMER CARBONATE 1600 MG: 800 TABLET, FILM COATED ORAL at 08:27

## 2024-07-23 RX ADMIN — PIPERACILLIN AND TAZOBACTAM 3375 MG: 3; .375 INJECTION, POWDER, FOR SOLUTION INTRAVENOUS; PARENTERAL at 17:06

## 2024-07-23 RX ADMIN — PIPERACILLIN AND TAZOBACTAM 3375 MG: 3; .375 INJECTION, POWDER, FOR SOLUTION INTRAVENOUS; PARENTERAL at 05:41

## 2024-07-23 RX ADMIN — CALCIUM ACETATE 667 MG: 667 CAPSULE ORAL at 08:29

## 2024-07-23 RX ADMIN — ERGOCALCIFEROL 50000 UNITS: 1.25 CAPSULE ORAL at 11:06

## 2024-07-23 RX ADMIN — PANTOPRAZOLE SODIUM 40 MG: 40 TABLET, DELAYED RELEASE ORAL at 08:28

## 2024-07-23 RX ADMIN — MIRTAZAPINE 7.5 MG: 15 TABLET, FILM COATED ORAL at 20:44

## 2024-07-23 RX ADMIN — DOCUSATE SODIUM 200 MG: 100 CAPSULE, LIQUID FILLED ORAL at 20:44

## 2024-07-23 RX ADMIN — SUCRALFATE 1 G: 1 TABLET ORAL at 11:55

## 2024-07-23 RX ADMIN — MIDODRINE HYDROCHLORIDE 10 MG: 5 TABLET ORAL at 05:41

## 2024-07-23 RX ADMIN — SUCRALFATE 1 G: 1 TABLET ORAL at 08:27

## 2024-07-23 RX ADMIN — ACETAMINOPHEN 650 MG: 325 TABLET ORAL at 10:37

## 2024-07-23 RX ADMIN — CALCIUM ACETATE 667 MG: 667 CAPSULE ORAL at 11:55

## 2024-07-23 RX ADMIN — SUCRALFATE 1 G: 1 TABLET ORAL at 22:15

## 2024-07-23 ASSESSMENT — PAIN DESCRIPTION - LOCATION: LOCATION: HEAD

## 2024-07-23 ASSESSMENT — PAIN SCALES - GENERAL: PAINLEVEL_OUTOF10: 7

## 2024-07-24 ENCOUNTER — APPOINTMENT (OUTPATIENT)
Facility: HOSPITAL | Age: 79
DRG: 280 | End: 2024-07-24
Attending: STUDENT IN AN ORGANIZED HEALTH CARE EDUCATION/TRAINING PROGRAM
Payer: MEDICARE

## 2024-07-24 LAB
ERYTHROCYTE [DISTWIDTH] IN BLOOD BY AUTOMATED COUNT: 15.8 % (ref 11.5–14.5)
ERYTHROCYTE [DISTWIDTH] IN BLOOD BY AUTOMATED COUNT: 16.5 % (ref 11.5–14.5)
HCT VFR BLD AUTO: 18.9 % (ref 36.6–50.3)
HCT VFR BLD AUTO: 21.5 % (ref 36.6–50.3)
HCT VFR BLD AUTO: 22.5 % (ref 36.6–50.3)
HCT VFR BLD AUTO: 25.9 % (ref 36.6–50.3)
HGB BLD-MCNC: 6.2 G/DL (ref 12.1–17)
HGB BLD-MCNC: 7 G/DL (ref 12.1–17)
HGB BLD-MCNC: 7.3 G/DL (ref 12.1–17)
HGB BLD-MCNC: 8.5 G/DL (ref 12.1–17)
HISTORY CHECK: NORMAL
MCH RBC QN AUTO: 31.8 PG (ref 26–34)
MCH RBC QN AUTO: 32.3 PG (ref 26–34)
MCHC RBC AUTO-ENTMCNC: 32.4 G/DL (ref 30–36.5)
MCHC RBC AUTO-ENTMCNC: 32.8 G/DL (ref 30–36.5)
MCV RBC AUTO: 97 FL (ref 80–99)
MCV RBC AUTO: 99.6 FL (ref 80–99)
NRBC # BLD: 0 K/UL (ref 0–0.01)
NRBC # BLD: 0 K/UL (ref 0–0.01)
NRBC BLD-RTO: 0 PER 100 WBC
NRBC BLD-RTO: 0 PER 100 WBC
PLATELET # BLD AUTO: 211 K/UL (ref 150–400)
PLATELET # BLD AUTO: 215 K/UL (ref 150–400)
PMV BLD AUTO: 11 FL (ref 8.9–12.9)
PMV BLD AUTO: 11 FL (ref 8.9–12.9)
RBC # BLD AUTO: 2.26 M/UL (ref 4.1–5.7)
RBC # BLD AUTO: 2.67 M/UL (ref 4.1–5.7)
WBC # BLD AUTO: 7.1 K/UL (ref 4.1–11.1)
WBC # BLD AUTO: 7.5 K/UL (ref 4.1–11.1)

## 2024-07-24 PROCEDURE — 30233N1 TRANSFUSION OF NONAUTOLOGOUS RED BLOOD CELLS INTO PERIPHERAL VEIN, PERCUTANEOUS APPROACH: ICD-10-PCS | Performed by: INTERNAL MEDICINE

## 2024-07-24 PROCEDURE — 2500000003 HC RX 250 WO HCPCS: Performed by: STUDENT IN AN ORGANIZED HEALTH CARE EDUCATION/TRAINING PROGRAM

## 2024-07-24 PROCEDURE — 36415 COLL VENOUS BLD VENIPUNCTURE: CPT

## 2024-07-24 PROCEDURE — 6360000002 HC RX W HCPCS: Performed by: INTERNAL MEDICINE

## 2024-07-24 PROCEDURE — 74178 CT ABD&PLV WO CNTR FLWD CNTR: CPT

## 2024-07-24 PROCEDURE — 36430 TRANSFUSION BLD/BLD COMPNT: CPT

## 2024-07-24 PROCEDURE — P9016 RBC LEUKOCYTES REDUCED: HCPCS

## 2024-07-24 PROCEDURE — 86923 COMPATIBILITY TEST ELECTRIC: CPT

## 2024-07-24 PROCEDURE — 6370000000 HC RX 637 (ALT 250 FOR IP): Performed by: STUDENT IN AN ORGANIZED HEALTH CARE EDUCATION/TRAINING PROGRAM

## 2024-07-24 PROCEDURE — 85014 HEMATOCRIT: CPT

## 2024-07-24 PROCEDURE — 6370000000 HC RX 637 (ALT 250 FOR IP): Performed by: INTERNAL MEDICINE

## 2024-07-24 PROCEDURE — 86901 BLOOD TYPING SEROLOGIC RH(D): CPT

## 2024-07-24 PROCEDURE — 2580000003 HC RX 258: Performed by: INTERNAL MEDICINE

## 2024-07-24 PROCEDURE — 6360000004 HC RX CONTRAST MEDICATION

## 2024-07-24 PROCEDURE — 2060000000 HC ICU INTERMEDIATE R&B

## 2024-07-24 PROCEDURE — 86900 BLOOD TYPING SEROLOGIC ABO: CPT

## 2024-07-24 PROCEDURE — 6370000000 HC RX 637 (ALT 250 FOR IP): Performed by: NURSE PRACTITIONER

## 2024-07-24 PROCEDURE — 85027 COMPLETE CBC AUTOMATED: CPT

## 2024-07-24 PROCEDURE — 85018 HEMOGLOBIN: CPT

## 2024-07-24 PROCEDURE — 6370000000 HC RX 637 (ALT 250 FOR IP): Performed by: GENERAL ACUTE CARE HOSPITAL

## 2024-07-24 PROCEDURE — 86850 RBC ANTIBODY SCREEN: CPT

## 2024-07-24 PROCEDURE — P9047 ALBUMIN (HUMAN), 25%, 50ML: HCPCS | Performed by: INTERNAL MEDICINE

## 2024-07-24 RX ORDER — SODIUM CHLORIDE 9 MG/ML
INJECTION, SOLUTION INTRAVENOUS PRN
Status: DISCONTINUED | OUTPATIENT
Start: 2024-07-24 | End: 2024-07-29

## 2024-07-24 RX ORDER — ALBUMIN (HUMAN) 12.5 G/50ML
25 SOLUTION INTRAVENOUS ONCE
Status: COMPLETED | OUTPATIENT
Start: 2024-07-24 | End: 2024-07-24

## 2024-07-24 RX ADMIN — CALCIUM ACETATE 667 MG: 667 CAPSULE ORAL at 12:33

## 2024-07-24 RX ADMIN — SUCRALFATE 1 G: 1 TABLET ORAL at 04:46

## 2024-07-24 RX ADMIN — EPOETIN ALFA-EPBX 10000 UNITS: 10000 INJECTION, SOLUTION INTRAVENOUS; SUBCUTANEOUS at 22:10

## 2024-07-24 RX ADMIN — SUCRALFATE 1 G: 1 TABLET ORAL at 14:46

## 2024-07-24 RX ADMIN — PANTOPRAZOLE SODIUM 40 MG: 40 TABLET, DELAYED RELEASE ORAL at 17:21

## 2024-07-24 RX ADMIN — SEVELAMER CARBONATE 1600 MG: 800 TABLET, FILM COATED ORAL at 17:21

## 2024-07-24 RX ADMIN — MIDODRINE HYDROCHLORIDE 10 MG: 5 TABLET ORAL at 11:59

## 2024-07-24 RX ADMIN — ALBUMIN (HUMAN) 25 G: 0.25 INJECTION, SOLUTION INTRAVENOUS at 12:44

## 2024-07-24 RX ADMIN — PIPERACILLIN AND TAZOBACTAM 3375 MG: 3; .375 INJECTION, POWDER, FOR SOLUTION INTRAVENOUS; PARENTERAL at 05:07

## 2024-07-24 RX ADMIN — IOPAMIDOL 100 ML: 755 INJECTION, SOLUTION INTRAVENOUS at 13:20

## 2024-07-24 RX ADMIN — PANTOPRAZOLE SODIUM 40 MG: 40 TABLET, DELAYED RELEASE ORAL at 06:48

## 2024-07-24 RX ADMIN — CALCIUM ACETATE 667 MG: 667 CAPSULE ORAL at 17:21

## 2024-07-24 RX ADMIN — MIDODRINE HYDROCHLORIDE 10 MG: 5 TABLET ORAL at 22:07

## 2024-07-24 RX ADMIN — MIDODRINE HYDROCHLORIDE 10 MG: 5 TABLET ORAL at 04:46

## 2024-07-24 RX ADMIN — SEVELAMER CARBONATE 1600 MG: 800 TABLET, FILM COATED ORAL at 12:33

## 2024-07-24 RX ADMIN — SUCRALFATE 1 G: 1 TABLET ORAL at 22:08

## 2024-07-24 RX ADMIN — MIDODRINE HYDROCHLORIDE 10 MG: 5 TABLET ORAL at 17:21

## 2024-07-24 RX ADMIN — PRAVASTATIN SODIUM 10 MG: 10 TABLET ORAL at 22:08

## 2024-07-24 RX ADMIN — MIRTAZAPINE 7.5 MG: 15 TABLET, FILM COATED ORAL at 22:08

## 2024-07-24 RX ADMIN — ACETAMINOPHEN 650 MG: 325 TABLET ORAL at 17:22

## 2024-07-24 RX ADMIN — PIPERACILLIN AND TAZOBACTAM 3375 MG: 3; .375 INJECTION, POWDER, FOR SOLUTION INTRAVENOUS; PARENTERAL at 17:31

## 2024-07-24 ASSESSMENT — PAIN DESCRIPTION - DESCRIPTORS: DESCRIPTORS: ACHING

## 2024-07-24 ASSESSMENT — PAIN SCALES - GENERAL
PAINLEVEL_OUTOF10: 8
PAINLEVEL_OUTOF10: 0

## 2024-07-24 ASSESSMENT — PAIN DESCRIPTION - ORIENTATION: ORIENTATION: RIGHT

## 2024-07-24 ASSESSMENT — PAIN DESCRIPTION - LOCATION: LOCATION: SHOULDER

## 2024-07-25 LAB
ANION GAP SERPL CALC-SCNC: 8 MMOL/L (ref 5–15)
BUN SERPL-MCNC: 20 MG/DL (ref 6–20)
BUN/CREAT SERPL: 2 (ref 12–20)
CALCIUM SERPL-MCNC: 8.7 MG/DL (ref 8.5–10.1)
CHLORIDE SERPL-SCNC: 103 MMOL/L (ref 97–108)
CO2 SERPL-SCNC: 25 MMOL/L (ref 21–32)
CREAT SERPL-MCNC: 9.25 MG/DL (ref 0.7–1.3)
ERYTHROCYTE [DISTWIDTH] IN BLOOD BY AUTOMATED COUNT: 16.8 % (ref 11.5–14.5)
GLUCOSE SERPL-MCNC: 90 MG/DL (ref 65–100)
HCT VFR BLD AUTO: 20.9 % (ref 36.6–50.3)
HCT VFR BLD AUTO: 21.7 % (ref 36.6–50.3)
HCT VFR BLD AUTO: 24.9 % (ref 36.6–50.3)
HGB BLD-MCNC: 7 G/DL (ref 12.1–17)
HGB BLD-MCNC: 7.3 G/DL (ref 12.1–17)
HGB BLD-MCNC: 8.2 G/DL (ref 12.1–17)
MCH RBC QN AUTO: 31.5 PG (ref 26–34)
MCHC RBC AUTO-ENTMCNC: 32.9 G/DL (ref 30–36.5)
MCV RBC AUTO: 95.8 FL (ref 80–99)
NRBC # BLD: 0 K/UL (ref 0–0.01)
NRBC BLD-RTO: 0 PER 100 WBC
PLATELET # BLD AUTO: 219 K/UL (ref 150–400)
PMV BLD AUTO: 10.8 FL (ref 8.9–12.9)
POTASSIUM SERPL-SCNC: 4.2 MMOL/L (ref 3.5–5.1)
RBC # BLD AUTO: 2.6 M/UL (ref 4.1–5.7)
SODIUM SERPL-SCNC: 136 MMOL/L (ref 136–145)
WBC # BLD AUTO: 7.6 K/UL (ref 4.1–11.1)

## 2024-07-25 PROCEDURE — 80048 BASIC METABOLIC PNL TOTAL CA: CPT

## 2024-07-25 PROCEDURE — 6370000000 HC RX 637 (ALT 250 FOR IP): Performed by: GENERAL ACUTE CARE HOSPITAL

## 2024-07-25 PROCEDURE — 6370000000 HC RX 637 (ALT 250 FOR IP): Performed by: STUDENT IN AN ORGANIZED HEALTH CARE EDUCATION/TRAINING PROGRAM

## 2024-07-25 PROCEDURE — 6360000002 HC RX W HCPCS: Performed by: INTERNAL MEDICINE

## 2024-07-25 PROCEDURE — 6370000000 HC RX 637 (ALT 250 FOR IP): Performed by: INTERNAL MEDICINE

## 2024-07-25 PROCEDURE — 85027 COMPLETE CBC AUTOMATED: CPT

## 2024-07-25 PROCEDURE — 2060000000 HC ICU INTERMEDIATE R&B

## 2024-07-25 PROCEDURE — 36415 COLL VENOUS BLD VENIPUNCTURE: CPT

## 2024-07-25 PROCEDURE — 6370000000 HC RX 637 (ALT 250 FOR IP): Performed by: NURSE PRACTITIONER

## 2024-07-25 PROCEDURE — 2500000003 HC RX 250 WO HCPCS: Performed by: STUDENT IN AN ORGANIZED HEALTH CARE EDUCATION/TRAINING PROGRAM

## 2024-07-25 PROCEDURE — 90935 HEMODIALYSIS ONE EVALUATION: CPT

## 2024-07-25 PROCEDURE — 2580000003 HC RX 258: Performed by: INTERNAL MEDICINE

## 2024-07-25 PROCEDURE — 85018 HEMOGLOBIN: CPT

## 2024-07-25 PROCEDURE — 85014 HEMATOCRIT: CPT

## 2024-07-25 PROCEDURE — P9047 ALBUMIN (HUMAN), 25%, 50ML: HCPCS | Performed by: INTERNAL MEDICINE

## 2024-07-25 RX ADMIN — PIPERACILLIN AND TAZOBACTAM 3375 MG: 3; .375 INJECTION, POWDER, FOR SOLUTION INTRAVENOUS; PARENTERAL at 05:43

## 2024-07-25 RX ADMIN — SEVELAMER CARBONATE 1600 MG: 800 TABLET, FILM COATED ORAL at 17:33

## 2024-07-25 RX ADMIN — PRAVASTATIN SODIUM 10 MG: 10 TABLET ORAL at 20:48

## 2024-07-25 RX ADMIN — SUCRALFATE 1 G: 1 TABLET ORAL at 20:48

## 2024-07-25 RX ADMIN — SUCRALFATE 1 G: 1 TABLET ORAL at 05:39

## 2024-07-25 RX ADMIN — PIPERACILLIN AND TAZOBACTAM 3375 MG: 3; .375 INJECTION, POWDER, FOR SOLUTION INTRAVENOUS; PARENTERAL at 17:35

## 2024-07-25 RX ADMIN — MIDODRINE HYDROCHLORIDE 10 MG: 5 TABLET ORAL at 05:39

## 2024-07-25 RX ADMIN — CALCIUM ACETATE 667 MG: 667 CAPSULE ORAL at 17:34

## 2024-07-25 RX ADMIN — ALBUMIN (HUMAN) 25 G: 0.25 INJECTION, SOLUTION INTRAVENOUS at 12:32

## 2024-07-25 RX ADMIN — MIRTAZAPINE 7.5 MG: 15 TABLET, FILM COATED ORAL at 20:48

## 2024-07-25 RX ADMIN — TRAMADOL HYDROCHLORIDE 50 MG: 50 TABLET ORAL at 22:15

## 2024-07-25 RX ADMIN — DOCUSATE SODIUM 200 MG: 100 CAPSULE, LIQUID FILLED ORAL at 20:50

## 2024-07-25 RX ADMIN — MIDODRINE HYDROCHLORIDE 10 MG: 5 TABLET ORAL at 20:47

## 2024-07-25 RX ADMIN — ACETAMINOPHEN 650 MG: 325 TABLET ORAL at 18:55

## 2024-07-25 RX ADMIN — PANTOPRAZOLE SODIUM 40 MG: 40 TABLET, DELAYED RELEASE ORAL at 05:55

## 2024-07-25 RX ADMIN — MIDODRINE HYDROCHLORIDE 10 MG: 5 TABLET ORAL at 13:48

## 2024-07-25 RX ADMIN — PANTOPRAZOLE SODIUM 40 MG: 40 TABLET, DELAYED RELEASE ORAL at 17:34

## 2024-07-25 RX ADMIN — TRAMADOL HYDROCHLORIDE 50 MG: 50 TABLET ORAL at 15:03

## 2024-07-25 ASSESSMENT — PAIN DESCRIPTION - DESCRIPTORS
DESCRIPTORS: ACHING;THROBBING
DESCRIPTORS: ACHING
DESCRIPTORS: ACHING

## 2024-07-25 ASSESSMENT — PAIN SCALES - GENERAL
PAINLEVEL_OUTOF10: 0
PAINLEVEL_OUTOF10: 2
PAINLEVEL_OUTOF10: 6
PAINLEVEL_OUTOF10: 6
PAINLEVEL_OUTOF10: 0
PAINLEVEL_OUTOF10: 0
PAINLEVEL_OUTOF10: 3
PAINLEVEL_OUTOF10: 5
PAINLEVEL_OUTOF10: 0

## 2024-07-25 ASSESSMENT — PAIN DESCRIPTION - ORIENTATION
ORIENTATION: ANTERIOR
ORIENTATION: MID
ORIENTATION: MID

## 2024-07-25 ASSESSMENT — PAIN DESCRIPTION - LOCATION
LOCATION: HEAD
LOCATION: NECK;BACK
LOCATION: HEAD;NECK

## 2024-07-25 ASSESSMENT — PAIN SCALES - WONG BAKER
WONGBAKER_NUMERICALRESPONSE: HURTS A LITTLE BIT
WONGBAKER_NUMERICALRESPONSE: NO HURT
WONGBAKER_NUMERICALRESPONSE: NO HURT
WONGBAKER_NUMERICALRESPONSE: HURTS A LITTLE BIT
WONGBAKER_NUMERICALRESPONSE: NO HURT
WONGBAKER_NUMERICALRESPONSE: NO HURT

## 2024-07-25 ASSESSMENT — PAIN - FUNCTIONAL ASSESSMENT: PAIN_FUNCTIONAL_ASSESSMENT: ACTIVITIES ARE NOT PREVENTED

## 2024-07-26 LAB
ERYTHROCYTE [DISTWIDTH] IN BLOOD BY AUTOMATED COUNT: 17.6 % (ref 11.5–14.5)
HCT VFR BLD AUTO: 21.2 % (ref 36.6–50.3)
HCT VFR BLD AUTO: 22.9 % (ref 36.6–50.3)
HCT VFR BLD AUTO: 24.9 % (ref 36.6–50.3)
HGB BLD-MCNC: 6.8 G/DL (ref 12.1–17)
HGB BLD-MCNC: 7.7 G/DL (ref 12.1–17)
HGB BLD-MCNC: 8.1 G/DL (ref 12.1–17)
HISTORY CHECK: NORMAL
HISTORY CHECK: NORMAL
MCH RBC QN AUTO: 31.8 PG (ref 26–34)
MCHC RBC AUTO-ENTMCNC: 33.6 G/DL (ref 30–36.5)
MCV RBC AUTO: 94.6 FL (ref 80–99)
NRBC # BLD: 0.02 K/UL (ref 0–0.01)
NRBC BLD-RTO: 0.3 PER 100 WBC
PLATELET # BLD AUTO: 217 K/UL (ref 150–400)
PMV BLD AUTO: 11.1 FL (ref 8.9–12.9)
RBC # BLD AUTO: 2.42 M/UL (ref 4.1–5.7)
WBC # BLD AUTO: 7 K/UL (ref 4.1–11.1)

## 2024-07-26 PROCEDURE — 97530 THERAPEUTIC ACTIVITIES: CPT

## 2024-07-26 PROCEDURE — 2500000003 HC RX 250 WO HCPCS: Performed by: STUDENT IN AN ORGANIZED HEALTH CARE EDUCATION/TRAINING PROGRAM

## 2024-07-26 PROCEDURE — 85018 HEMOGLOBIN: CPT

## 2024-07-26 PROCEDURE — 2580000003 HC RX 258: Performed by: INTERNAL MEDICINE

## 2024-07-26 PROCEDURE — 85014 HEMATOCRIT: CPT

## 2024-07-26 PROCEDURE — 36430 TRANSFUSION BLD/BLD COMPNT: CPT

## 2024-07-26 PROCEDURE — 6370000000 HC RX 637 (ALT 250 FOR IP): Performed by: STUDENT IN AN ORGANIZED HEALTH CARE EDUCATION/TRAINING PROGRAM

## 2024-07-26 PROCEDURE — 85027 COMPLETE CBC AUTOMATED: CPT

## 2024-07-26 PROCEDURE — 6370000000 HC RX 637 (ALT 250 FOR IP): Performed by: GENERAL ACUTE CARE HOSPITAL

## 2024-07-26 PROCEDURE — 6370000000 HC RX 637 (ALT 250 FOR IP): Performed by: INTERNAL MEDICINE

## 2024-07-26 PROCEDURE — 6360000002 HC RX W HCPCS: Performed by: INTERNAL MEDICINE

## 2024-07-26 PROCEDURE — 2060000000 HC ICU INTERMEDIATE R&B

## 2024-07-26 PROCEDURE — 97535 SELF CARE MNGMENT TRAINING: CPT

## 2024-07-26 PROCEDURE — P9016 RBC LEUKOCYTES REDUCED: HCPCS

## 2024-07-26 PROCEDURE — 36415 COLL VENOUS BLD VENIPUNCTURE: CPT

## 2024-07-26 PROCEDURE — 97116 GAIT TRAINING THERAPY: CPT

## 2024-07-26 PROCEDURE — 6370000000 HC RX 637 (ALT 250 FOR IP): Performed by: NURSE PRACTITIONER

## 2024-07-26 RX ORDER — SODIUM CHLORIDE 9 MG/ML
INJECTION, SOLUTION INTRAVENOUS PRN
Status: DISCONTINUED | OUTPATIENT
Start: 2024-07-26 | End: 2024-07-29

## 2024-07-26 RX ADMIN — MIDODRINE HYDROCHLORIDE 10 MG: 5 TABLET ORAL at 11:39

## 2024-07-26 RX ADMIN — SEVELAMER CARBONATE 1600 MG: 800 TABLET, FILM COATED ORAL at 08:52

## 2024-07-26 RX ADMIN — EPOETIN ALFA-EPBX 10000 UNITS: 10000 INJECTION, SOLUTION INTRAVENOUS; SUBCUTANEOUS at 20:35

## 2024-07-26 RX ADMIN — CALCIUM ACETATE 667 MG: 667 CAPSULE ORAL at 17:05

## 2024-07-26 RX ADMIN — MIDODRINE HYDROCHLORIDE 10 MG: 5 TABLET ORAL at 04:19

## 2024-07-26 RX ADMIN — SUCRALFATE 1 G: 1 TABLET ORAL at 06:13

## 2024-07-26 RX ADMIN — SEVELAMER CARBONATE 1600 MG: 800 TABLET, FILM COATED ORAL at 17:05

## 2024-07-26 RX ADMIN — CALCIUM ACETATE 667 MG: 667 CAPSULE ORAL at 08:53

## 2024-07-26 RX ADMIN — DOCUSATE SODIUM 200 MG: 100 CAPSULE, LIQUID FILLED ORAL at 20:34

## 2024-07-26 RX ADMIN — PIPERACILLIN AND TAZOBACTAM 3375 MG: 3; .375 INJECTION, POWDER, FOR SOLUTION INTRAVENOUS; PARENTERAL at 17:09

## 2024-07-26 RX ADMIN — SUCRALFATE 1 G: 1 TABLET ORAL at 20:33

## 2024-07-26 RX ADMIN — PANTOPRAZOLE SODIUM 40 MG: 40 TABLET, DELAYED RELEASE ORAL at 14:28

## 2024-07-26 RX ADMIN — PRAVASTATIN SODIUM 10 MG: 10 TABLET ORAL at 20:34

## 2024-07-26 RX ADMIN — CALCIUM ACETATE 667 MG: 667 CAPSULE ORAL at 11:39

## 2024-07-26 RX ADMIN — MIRTAZAPINE 7.5 MG: 15 TABLET, FILM COATED ORAL at 20:34

## 2024-07-26 RX ADMIN — SEVELAMER CARBONATE 1600 MG: 800 TABLET, FILM COATED ORAL at 11:39

## 2024-07-26 RX ADMIN — SUCRALFATE 1 G: 1 TABLET ORAL at 14:28

## 2024-07-26 RX ADMIN — PIPERACILLIN AND TAZOBACTAM 3375 MG: 3; .375 INJECTION, POWDER, FOR SOLUTION INTRAVENOUS; PARENTERAL at 04:18

## 2024-07-26 RX ADMIN — MIDODRINE HYDROCHLORIDE 10 MG: 5 TABLET ORAL at 20:34

## 2024-07-26 RX ADMIN — PANTOPRAZOLE SODIUM 40 MG: 40 TABLET, DELAYED RELEASE ORAL at 06:13

## 2024-07-26 RX ADMIN — GABAPENTIN 300 MG: 300 CAPSULE ORAL at 08:52

## 2024-07-26 ASSESSMENT — PAIN SCALES - WONG BAKER
WONGBAKER_NUMERICALRESPONSE: NO HURT

## 2024-07-26 ASSESSMENT — PAIN SCALES - GENERAL
PAINLEVEL_OUTOF10: 0

## 2024-07-27 LAB
ANION GAP SERPL CALC-SCNC: 8 MMOL/L (ref 5–15)
ANION GAP SERPL CALC-SCNC: 9 MMOL/L (ref 5–15)
BUN SERPL-MCNC: 10 MG/DL (ref 6–20)
BUN SERPL-MCNC: 10 MG/DL (ref 6–20)
BUN/CREAT SERPL: 1 (ref 12–20)
BUN/CREAT SERPL: 1 (ref 12–20)
CALCIUM SERPL-MCNC: 8.5 MG/DL (ref 8.5–10.1)
CALCIUM SERPL-MCNC: 8.7 MG/DL (ref 8.5–10.1)
CHLORIDE SERPL-SCNC: 103 MMOL/L (ref 97–108)
CHLORIDE SERPL-SCNC: 104 MMOL/L (ref 97–108)
CO2 SERPL-SCNC: 27 MMOL/L (ref 21–32)
CO2 SERPL-SCNC: 27 MMOL/L (ref 21–32)
CREAT SERPL-MCNC: 7.35 MG/DL (ref 0.7–1.3)
CREAT SERPL-MCNC: 7.86 MG/DL (ref 0.7–1.3)
GLUCOSE SERPL-MCNC: 94 MG/DL (ref 65–100)
GLUCOSE SERPL-MCNC: 97 MG/DL (ref 65–100)
HCT VFR BLD AUTO: 21.2 % (ref 36.6–50.3)
HCT VFR BLD AUTO: 23.4 % (ref 36.6–50.3)
HCT VFR BLD AUTO: 24.5 % (ref 36.6–50.3)
HGB BLD-MCNC: 7 G/DL (ref 12.1–17)
HGB BLD-MCNC: 7.7 G/DL (ref 12.1–17)
HGB BLD-MCNC: 8.4 G/DL (ref 12.1–17)
HISTORY CHECK: NORMAL
POTASSIUM SERPL-SCNC: 3.7 MMOL/L (ref 3.5–5.1)
POTASSIUM SERPL-SCNC: 4 MMOL/L (ref 3.5–5.1)
SODIUM SERPL-SCNC: 138 MMOL/L (ref 136–145)
SODIUM SERPL-SCNC: 140 MMOL/L (ref 136–145)

## 2024-07-27 PROCEDURE — 6360000002 HC RX W HCPCS: Performed by: INTERNAL MEDICINE

## 2024-07-27 PROCEDURE — 80048 BASIC METABOLIC PNL TOTAL CA: CPT

## 2024-07-27 PROCEDURE — 6370000000 HC RX 637 (ALT 250 FOR IP): Performed by: INTERNAL MEDICINE

## 2024-07-27 PROCEDURE — 85018 HEMOGLOBIN: CPT

## 2024-07-27 PROCEDURE — 85014 HEMATOCRIT: CPT

## 2024-07-27 PROCEDURE — 2580000003 HC RX 258: Performed by: INTERNAL MEDICINE

## 2024-07-27 PROCEDURE — 2060000000 HC ICU INTERMEDIATE R&B

## 2024-07-27 PROCEDURE — 36430 TRANSFUSION BLD/BLD COMPNT: CPT

## 2024-07-27 PROCEDURE — 2500000003 HC RX 250 WO HCPCS: Performed by: STUDENT IN AN ORGANIZED HEALTH CARE EDUCATION/TRAINING PROGRAM

## 2024-07-27 PROCEDURE — 6370000000 HC RX 637 (ALT 250 FOR IP): Performed by: NURSE PRACTITIONER

## 2024-07-27 PROCEDURE — 90935 HEMODIALYSIS ONE EVALUATION: CPT

## 2024-07-27 PROCEDURE — 6370000000 HC RX 637 (ALT 250 FOR IP): Performed by: STUDENT IN AN ORGANIZED HEALTH CARE EDUCATION/TRAINING PROGRAM

## 2024-07-27 PROCEDURE — 36415 COLL VENOUS BLD VENIPUNCTURE: CPT

## 2024-07-27 PROCEDURE — P9016 RBC LEUKOCYTES REDUCED: HCPCS

## 2024-07-27 PROCEDURE — 6370000000 HC RX 637 (ALT 250 FOR IP): Performed by: GENERAL ACUTE CARE HOSPITAL

## 2024-07-27 RX ORDER — SODIUM CHLORIDE 9 MG/ML
INJECTION, SOLUTION INTRAVENOUS PRN
Status: DISCONTINUED | OUTPATIENT
Start: 2024-07-27 | End: 2024-07-29

## 2024-07-27 RX ADMIN — SEVELAMER CARBONATE 1600 MG: 800 TABLET, FILM COATED ORAL at 08:02

## 2024-07-27 RX ADMIN — CALCIUM ACETATE 667 MG: 667 CAPSULE ORAL at 17:17

## 2024-07-27 RX ADMIN — SUCRALFATE 1 G: 1 TABLET ORAL at 14:18

## 2024-07-27 RX ADMIN — SUCRALFATE 1 G: 1 TABLET ORAL at 21:10

## 2024-07-27 RX ADMIN — SEVELAMER CARBONATE 1600 MG: 800 TABLET, FILM COATED ORAL at 14:18

## 2024-07-27 RX ADMIN — DOCUSATE SODIUM 200 MG: 100 CAPSULE, LIQUID FILLED ORAL at 21:10

## 2024-07-27 RX ADMIN — MIRTAZAPINE 7.5 MG: 15 TABLET, FILM COATED ORAL at 21:10

## 2024-07-27 RX ADMIN — SEVELAMER CARBONATE 1600 MG: 800 TABLET, FILM COATED ORAL at 17:17

## 2024-07-27 RX ADMIN — CALCIUM ACETATE 667 MG: 667 CAPSULE ORAL at 08:02

## 2024-07-27 RX ADMIN — PIPERACILLIN AND TAZOBACTAM 3375 MG: 3; .375 INJECTION, POWDER, FOR SOLUTION INTRAVENOUS; PARENTERAL at 05:11

## 2024-07-27 RX ADMIN — PIPERACILLIN AND TAZOBACTAM 3375 MG: 3; .375 INJECTION, POWDER, FOR SOLUTION INTRAVENOUS; PARENTERAL at 17:20

## 2024-07-27 RX ADMIN — PRAVASTATIN SODIUM 10 MG: 10 TABLET ORAL at 21:10

## 2024-07-27 RX ADMIN — MIDODRINE HYDROCHLORIDE 10 MG: 5 TABLET ORAL at 21:10

## 2024-07-27 RX ADMIN — MIDODRINE HYDROCHLORIDE 10 MG: 5 TABLET ORAL at 14:18

## 2024-07-27 RX ADMIN — PANTOPRAZOLE SODIUM 40 MG: 40 TABLET, DELAYED RELEASE ORAL at 14:18

## 2024-07-27 RX ADMIN — ACETAMINOPHEN 650 MG: 325 TABLET ORAL at 22:58

## 2024-07-27 RX ADMIN — PANTOPRAZOLE SODIUM 40 MG: 40 TABLET, DELAYED RELEASE ORAL at 06:17

## 2024-07-27 RX ADMIN — SUCRALFATE 1 G: 1 TABLET ORAL at 05:08

## 2024-07-27 RX ADMIN — MIDODRINE HYDROCHLORIDE 10 MG: 5 TABLET ORAL at 05:08

## 2024-07-27 RX ADMIN — GABAPENTIN 300 MG: 300 CAPSULE ORAL at 08:02

## 2024-07-27 ASSESSMENT — PAIN DESCRIPTION - PAIN TYPE: TYPE: ACUTE PAIN

## 2024-07-27 ASSESSMENT — PAIN DESCRIPTION - ONSET: ONSET: ON-GOING

## 2024-07-27 ASSESSMENT — PAIN SCALES - GENERAL
PAINLEVEL_OUTOF10: 0
PAINLEVEL_OUTOF10: 3
PAINLEVEL_OUTOF10: 0
PAINLEVEL_OUTOF10: 0

## 2024-07-27 ASSESSMENT — PAIN SCALES - WONG BAKER
WONGBAKER_NUMERICALRESPONSE: NO HURT
WONGBAKER_NUMERICALRESPONSE: NO HURT

## 2024-07-27 ASSESSMENT — PAIN DESCRIPTION - ORIENTATION: ORIENTATION: ANTERIOR

## 2024-07-27 ASSESSMENT — PAIN DESCRIPTION - FREQUENCY: FREQUENCY: INTERMITTENT

## 2024-07-27 ASSESSMENT — PAIN DESCRIPTION - LOCATION: LOCATION: HEAD

## 2024-07-27 ASSESSMENT — PAIN DESCRIPTION - DESCRIPTORS: DESCRIPTORS: ACHING

## 2024-07-27 ASSESSMENT — PAIN - FUNCTIONAL ASSESSMENT: PAIN_FUNCTIONAL_ASSESSMENT: ACTIVITIES ARE NOT PREVENTED

## 2024-07-28 ENCOUNTER — APPOINTMENT (OUTPATIENT)
Facility: HOSPITAL | Age: 79
DRG: 280 | End: 2024-07-28
Attending: STUDENT IN AN ORGANIZED HEALTH CARE EDUCATION/TRAINING PROGRAM
Payer: MEDICARE

## 2024-07-28 LAB
ABO + RH BLD: NORMAL
ANION GAP SERPL CALC-SCNC: 7 MMOL/L (ref 5–15)
BLD PROD TYP BPU: NORMAL
BLOOD BANK BLOOD PRODUCT EXPIRATION DATE: NORMAL
BLOOD BANK DISPENSE STATUS: NORMAL
BLOOD BANK ISBT PRODUCT BLOOD TYPE: 6200
BLOOD BANK PRODUCT CODE: NORMAL
BLOOD BANK UNIT TYPE AND RH: NORMAL
BLOOD GROUP ANTIBODIES SERPL: NORMAL
BPU ID: NORMAL
BUN SERPL-MCNC: 5 MG/DL (ref 6–20)
BUN/CREAT SERPL: 1 (ref 12–20)
CALCIUM SERPL-MCNC: 8.4 MG/DL (ref 8.5–10.1)
CHLORIDE SERPL-SCNC: 104 MMOL/L (ref 97–108)
CO2 SERPL-SCNC: 29 MMOL/L (ref 21–32)
CREAT SERPL-MCNC: 5.34 MG/DL (ref 0.7–1.3)
CROSSMATCH RESULT: NORMAL
GLUCOSE SERPL-MCNC: 93 MG/DL (ref 65–100)
HCT VFR BLD AUTO: 21.1 % (ref 36.6–50.3)
HCT VFR BLD AUTO: 23.8 % (ref 36.6–50.3)
HCT VFR BLD AUTO: 24.1 % (ref 36.6–50.3)
HCT VFR BLD AUTO: 25.6 % (ref 36.6–50.3)
HGB BLD-MCNC: 7 G/DL (ref 12.1–17)
HGB BLD-MCNC: 8.1 G/DL (ref 12.1–17)
HGB BLD-MCNC: 8.3 G/DL (ref 12.1–17)
HGB BLD-MCNC: 8.6 G/DL (ref 12.1–17)
HISTORY CHECK: NORMAL
HISTORY CHECK: NORMAL
POTASSIUM SERPL-SCNC: 3.3 MMOL/L (ref 3.5–5.1)
SODIUM SERPL-SCNC: 140 MMOL/L (ref 136–145)
SPECIMEN EXP DATE BLD: NORMAL
UNIT DIVISION: 0
UNIT ISSUE DATE/TIME: NORMAL

## 2024-07-28 PROCEDURE — 80048 BASIC METABOLIC PNL TOTAL CA: CPT

## 2024-07-28 PROCEDURE — 6370000000 HC RX 637 (ALT 250 FOR IP): Performed by: GENERAL ACUTE CARE HOSPITAL

## 2024-07-28 PROCEDURE — 85014 HEMATOCRIT: CPT

## 2024-07-28 PROCEDURE — 2580000003 HC RX 258: Performed by: INTERNAL MEDICINE

## 2024-07-28 PROCEDURE — 6360000002 HC RX W HCPCS: Performed by: INTERNAL MEDICINE

## 2024-07-28 PROCEDURE — 86850 RBC ANTIBODY SCREEN: CPT

## 2024-07-28 PROCEDURE — 6370000000 HC RX 637 (ALT 250 FOR IP): Performed by: STUDENT IN AN ORGANIZED HEALTH CARE EDUCATION/TRAINING PROGRAM

## 2024-07-28 PROCEDURE — 85018 HEMOGLOBIN: CPT

## 2024-07-28 PROCEDURE — 36415 COLL VENOUS BLD VENIPUNCTURE: CPT

## 2024-07-28 PROCEDURE — 6360000004 HC RX CONTRAST MEDICATION

## 2024-07-28 PROCEDURE — 2500000003 HC RX 250 WO HCPCS: Performed by: STUDENT IN AN ORGANIZED HEALTH CARE EDUCATION/TRAINING PROGRAM

## 2024-07-28 PROCEDURE — P9016 RBC LEUKOCYTES REDUCED: HCPCS

## 2024-07-28 PROCEDURE — 74178 CT ABD&PLV WO CNTR FLWD CNTR: CPT

## 2024-07-28 PROCEDURE — 2060000000 HC ICU INTERMEDIATE R&B

## 2024-07-28 PROCEDURE — 6370000000 HC RX 637 (ALT 250 FOR IP): Performed by: INTERNAL MEDICINE

## 2024-07-28 PROCEDURE — 36430 TRANSFUSION BLD/BLD COMPNT: CPT

## 2024-07-28 PROCEDURE — 6370000000 HC RX 637 (ALT 250 FOR IP): Performed by: NURSE PRACTITIONER

## 2024-07-28 PROCEDURE — 86900 BLOOD TYPING SEROLOGIC ABO: CPT

## 2024-07-28 PROCEDURE — 86923 COMPATIBILITY TEST ELECTRIC: CPT

## 2024-07-28 PROCEDURE — P9047 ALBUMIN (HUMAN), 25%, 50ML: HCPCS | Performed by: INTERNAL MEDICINE

## 2024-07-28 PROCEDURE — 86901 BLOOD TYPING SEROLOGIC RH(D): CPT

## 2024-07-28 RX ORDER — SODIUM CHLORIDE 9 MG/ML
INJECTION, SOLUTION INTRAVENOUS PRN
Status: DISCONTINUED | OUTPATIENT
Start: 2024-07-28 | End: 2024-07-28

## 2024-07-28 RX ORDER — ALBUMIN (HUMAN) 12.5 G/50ML
25 SOLUTION INTRAVENOUS ONCE
Status: COMPLETED | OUTPATIENT
Start: 2024-07-28 | End: 2024-07-28

## 2024-07-28 RX ORDER — POTASSIUM CHLORIDE 20 MEQ/1
40 TABLET, EXTENDED RELEASE ORAL ONCE
Status: DISCONTINUED | OUTPATIENT
Start: 2024-07-28 | End: 2024-07-28

## 2024-07-28 RX ORDER — SODIUM CHLORIDE 9 MG/ML
INJECTION, SOLUTION INTRAVENOUS PRN
Status: DISCONTINUED | OUTPATIENT
Start: 2024-07-28 | End: 2024-07-29

## 2024-07-28 RX ORDER — POTASSIUM CHLORIDE 20 MEQ/1
20 TABLET, EXTENDED RELEASE ORAL ONCE
Status: COMPLETED | OUTPATIENT
Start: 2024-07-28 | End: 2024-07-28

## 2024-07-28 RX ADMIN — IOPAMIDOL 100 ML: 755 INJECTION, SOLUTION INTRAVENOUS at 10:10

## 2024-07-28 RX ADMIN — PANTOPRAZOLE SODIUM 40 MG: 40 TABLET, DELAYED RELEASE ORAL at 17:29

## 2024-07-28 RX ADMIN — MIRTAZAPINE 7.5 MG: 15 TABLET, FILM COATED ORAL at 21:27

## 2024-07-28 RX ADMIN — PIPERACILLIN AND TAZOBACTAM 3375 MG: 3; .375 INJECTION, POWDER, FOR SOLUTION INTRAVENOUS; PARENTERAL at 17:54

## 2024-07-28 RX ADMIN — PANTOPRAZOLE SODIUM 40 MG: 40 TABLET, DELAYED RELEASE ORAL at 05:54

## 2024-07-28 RX ADMIN — SEVELAMER CARBONATE 1600 MG: 800 TABLET, FILM COATED ORAL at 11:04

## 2024-07-28 RX ADMIN — SUCRALFATE 1 G: 1 TABLET ORAL at 13:58

## 2024-07-28 RX ADMIN — ALBUMIN (HUMAN) 25 G: 0.25 INJECTION, SOLUTION INTRAVENOUS at 08:09

## 2024-07-28 RX ADMIN — CALCIUM ACETATE 667 MG: 667 CAPSULE ORAL at 11:04

## 2024-07-28 RX ADMIN — ALBUMIN (HUMAN) 25 G: 0.25 INJECTION, SOLUTION INTRAVENOUS at 09:28

## 2024-07-28 RX ADMIN — PIPERACILLIN AND TAZOBACTAM 3375 MG: 3; .375 INJECTION, POWDER, FOR SOLUTION INTRAVENOUS; PARENTERAL at 05:45

## 2024-07-28 RX ADMIN — CALCIUM ACETATE 667 MG: 667 CAPSULE ORAL at 17:30

## 2024-07-28 RX ADMIN — MIDODRINE HYDROCHLORIDE 10 MG: 5 TABLET ORAL at 21:27

## 2024-07-28 RX ADMIN — PRAVASTATIN SODIUM 10 MG: 10 TABLET ORAL at 21:27

## 2024-07-28 RX ADMIN — ACETAMINOPHEN 650 MG: 325 TABLET ORAL at 14:51

## 2024-07-28 RX ADMIN — SUCRALFATE 1 G: 1 TABLET ORAL at 21:28

## 2024-07-28 RX ADMIN — SEVELAMER CARBONATE 1600 MG: 800 TABLET, FILM COATED ORAL at 17:29

## 2024-07-28 RX ADMIN — MIDODRINE HYDROCHLORIDE 10 MG: 5 TABLET ORAL at 13:58

## 2024-07-28 RX ADMIN — GABAPENTIN 300 MG: 300 CAPSULE ORAL at 11:04

## 2024-07-28 RX ADMIN — SUCRALFATE 1 G: 1 TABLET ORAL at 05:43

## 2024-07-28 RX ADMIN — POTASSIUM CHLORIDE 20 MEQ: 1500 TABLET, EXTENDED RELEASE ORAL at 11:04

## 2024-07-28 RX ADMIN — MIDODRINE HYDROCHLORIDE 10 MG: 5 TABLET ORAL at 05:43

## 2024-07-28 ASSESSMENT — PAIN DESCRIPTION - LOCATION: LOCATION: NECK

## 2024-07-28 ASSESSMENT — PAIN SCALES - GENERAL
PAINLEVEL_OUTOF10: 0
PAINLEVEL_OUTOF10: 3

## 2024-07-28 ASSESSMENT — PAIN SCALES - WONG BAKER: WONGBAKER_NUMERICALRESPONSE: NO HURT

## 2024-07-29 ENCOUNTER — ANESTHESIA EVENT (OUTPATIENT)
Facility: HOSPITAL | Age: 79
End: 2024-07-29
Payer: MEDICARE

## 2024-07-29 LAB
HCT VFR BLD AUTO: 21.9 % (ref 36.6–50.3)
HCT VFR BLD AUTO: 24.6 % (ref 36.6–50.3)
HCT VFR BLD AUTO: 26.3 % (ref 36.6–50.3)
HCT VFR BLD AUTO: 26.5 % (ref 36.6–50.3)
HGB BLD-MCNC: 7.1 G/DL (ref 12.1–17)
HGB BLD-MCNC: 8.1 G/DL (ref 12.1–17)
HGB BLD-MCNC: 8.6 G/DL (ref 12.1–17)
HGB BLD-MCNC: 8.8 G/DL (ref 12.1–17)
HISTORY CHECK: NORMAL

## 2024-07-29 PROCEDURE — 6370000000 HC RX 637 (ALT 250 FOR IP): Performed by: GENERAL ACUTE CARE HOSPITAL

## 2024-07-29 PROCEDURE — 6360000002 HC RX W HCPCS: Performed by: INTERNAL MEDICINE

## 2024-07-29 PROCEDURE — 6370000000 HC RX 637 (ALT 250 FOR IP): Performed by: INTERNAL MEDICINE

## 2024-07-29 PROCEDURE — 36415 COLL VENOUS BLD VENIPUNCTURE: CPT

## 2024-07-29 PROCEDURE — 6370000000 HC RX 637 (ALT 250 FOR IP): Performed by: STUDENT IN AN ORGANIZED HEALTH CARE EDUCATION/TRAINING PROGRAM

## 2024-07-29 PROCEDURE — 85018 HEMOGLOBIN: CPT

## 2024-07-29 PROCEDURE — P9045 ALBUMIN (HUMAN), 5%, 250 ML: HCPCS | Performed by: INTERNAL MEDICINE

## 2024-07-29 PROCEDURE — 2580000003 HC RX 258: Performed by: INTERNAL MEDICINE

## 2024-07-29 PROCEDURE — 6370000000 HC RX 637 (ALT 250 FOR IP)

## 2024-07-29 PROCEDURE — 6370000000 HC RX 637 (ALT 250 FOR IP): Performed by: NURSE PRACTITIONER

## 2024-07-29 PROCEDURE — 2500000003 HC RX 250 WO HCPCS: Performed by: STUDENT IN AN ORGANIZED HEALTH CARE EDUCATION/TRAINING PROGRAM

## 2024-07-29 PROCEDURE — 2060000000 HC ICU INTERMEDIATE R&B

## 2024-07-29 PROCEDURE — 85014 HEMATOCRIT: CPT

## 2024-07-29 PROCEDURE — 97535 SELF CARE MNGMENT TRAINING: CPT

## 2024-07-29 RX ORDER — SODIUM CHLORIDE 9 MG/ML
25 INJECTION, SOLUTION INTRAVENOUS PRN
Status: DISCONTINUED | OUTPATIENT
Start: 2024-07-29 | End: 2024-07-30 | Stop reason: HOSPADM

## 2024-07-29 RX ORDER — MIDODRINE HYDROCHLORIDE 5 MG/1
10 TABLET ORAL ONCE
Status: COMPLETED | OUTPATIENT
Start: 2024-07-29 | End: 2024-07-29

## 2024-07-29 RX ORDER — BISACODYL 5 MG/1
20 TABLET, DELAYED RELEASE ORAL ONCE
Status: COMPLETED | OUTPATIENT
Start: 2024-07-29 | End: 2024-07-29

## 2024-07-29 RX ORDER — ALBUMIN, HUMAN INJ 5% 5 %
25 SOLUTION INTRAVENOUS ONCE
Status: COMPLETED | OUTPATIENT
Start: 2024-07-29 | End: 2024-07-29

## 2024-07-29 RX ORDER — SODIUM CHLORIDE 0.9 % (FLUSH) 0.9 %
5-40 SYRINGE (ML) INJECTION PRN
Status: DISCONTINUED | OUTPATIENT
Start: 2024-07-29 | End: 2024-07-30 | Stop reason: HOSPADM

## 2024-07-29 RX ORDER — SODIUM CHLORIDE 0.9 % (FLUSH) 0.9 %
5-40 SYRINGE (ML) INJECTION EVERY 12 HOURS SCHEDULED
Status: DISCONTINUED | OUTPATIENT
Start: 2024-07-29 | End: 2024-07-30 | Stop reason: HOSPADM

## 2024-07-29 RX ORDER — SODIUM CHLORIDE 9 MG/ML
INJECTION, SOLUTION INTRAVENOUS PRN
Status: DISCONTINUED | OUTPATIENT
Start: 2024-07-29 | End: 2024-07-29

## 2024-07-29 RX ADMIN — PIPERACILLIN AND TAZOBACTAM 3375 MG: 3; .375 INJECTION, POWDER, FOR SOLUTION INTRAVENOUS; PARENTERAL at 17:22

## 2024-07-29 RX ADMIN — CALCIUM ACETATE 667 MG: 667 CAPSULE ORAL at 09:41

## 2024-07-29 RX ADMIN — SUCRALFATE 1 G: 1 TABLET ORAL at 06:10

## 2024-07-29 RX ADMIN — MIDODRINE HYDROCHLORIDE 10 MG: 5 TABLET ORAL at 11:56

## 2024-07-29 RX ADMIN — POLYETHYLENE GLYCOL-3350 AND ELECTROLYTES 4000 ML: 236; 6.74; 5.86; 2.97; 22.74 POWDER, FOR SOLUTION ORAL at 15:39

## 2024-07-29 RX ADMIN — MIDODRINE HYDROCHLORIDE 10 MG: 5 TABLET ORAL at 10:02

## 2024-07-29 RX ADMIN — EPOETIN ALFA-EPBX 10000 UNITS: 10000 INJECTION, SOLUTION INTRAVENOUS; SUBCUTANEOUS at 21:41

## 2024-07-29 RX ADMIN — SUCRALFATE 1 G: 1 TABLET ORAL at 13:55

## 2024-07-29 RX ADMIN — ACETAMINOPHEN 650 MG: 325 TABLET ORAL at 09:55

## 2024-07-29 RX ADMIN — PANTOPRAZOLE SODIUM 40 MG: 40 TABLET, DELAYED RELEASE ORAL at 06:10

## 2024-07-29 RX ADMIN — MIDODRINE HYDROCHLORIDE 10 MG: 5 TABLET ORAL at 21:38

## 2024-07-29 RX ADMIN — SUCRALFATE 1 G: 1 TABLET ORAL at 21:38

## 2024-07-29 RX ADMIN — GABAPENTIN 300 MG: 300 CAPSULE ORAL at 09:42

## 2024-07-29 RX ADMIN — ALBUMIN (HUMAN) 25 G: 12.5 INJECTION, SOLUTION INTRAVENOUS at 11:18

## 2024-07-29 RX ADMIN — CALCIUM ACETATE 667 MG: 667 CAPSULE ORAL at 16:58

## 2024-07-29 RX ADMIN — PANTOPRAZOLE SODIUM 40 MG: 40 TABLET, DELAYED RELEASE ORAL at 15:32

## 2024-07-29 RX ADMIN — MIRTAZAPINE 7.5 MG: 15 TABLET, FILM COATED ORAL at 21:38

## 2024-07-29 RX ADMIN — MIDODRINE HYDROCHLORIDE 10 MG: 5 TABLET ORAL at 06:10

## 2024-07-29 RX ADMIN — CALCIUM ACETATE 667 MG: 667 CAPSULE ORAL at 11:56

## 2024-07-29 RX ADMIN — PIPERACILLIN AND TAZOBACTAM 3375 MG: 3; .375 INJECTION, POWDER, FOR SOLUTION INTRAVENOUS; PARENTERAL at 06:08

## 2024-07-29 RX ADMIN — MIDODRINE HYDROCHLORIDE 10 MG: 5 TABLET ORAL at 13:51

## 2024-07-29 RX ADMIN — DOCUSATE SODIUM 200 MG: 100 CAPSULE, LIQUID FILLED ORAL at 21:38

## 2024-07-29 RX ADMIN — BISACODYL 20 MG: 5 TABLET, COATED ORAL at 13:51

## 2024-07-29 RX ADMIN — PRAVASTATIN SODIUM 10 MG: 10 TABLET ORAL at 21:38

## 2024-07-29 ASSESSMENT — PAIN SCALES - GENERAL
PAINLEVEL_OUTOF10: 0
PAINLEVEL_OUTOF10: 3

## 2024-07-29 ASSESSMENT — PAIN DESCRIPTION - DESCRIPTORS: DESCRIPTORS: DULL

## 2024-07-29 ASSESSMENT — PAIN DESCRIPTION - ORIENTATION: ORIENTATION: ANTERIOR;POSTERIOR

## 2024-07-29 ASSESSMENT — PAIN DESCRIPTION - LOCATION: LOCATION: HEAD

## 2024-07-30 ENCOUNTER — ANESTHESIA (OUTPATIENT)
Facility: HOSPITAL | Age: 79
End: 2024-07-30
Payer: MEDICARE

## 2024-07-30 LAB
ANION GAP SERPL CALC-SCNC: 11 MMOL/L (ref 5–15)
BUN SERPL-MCNC: 10 MG/DL (ref 6–20)
BUN/CREAT SERPL: 1 (ref 12–20)
CALCIUM SERPL-MCNC: 8.7 MG/DL (ref 8.5–10.1)
CHLORIDE SERPL-SCNC: 104 MMOL/L (ref 97–108)
CO2 SERPL-SCNC: 23 MMOL/L (ref 21–32)
CREAT SERPL-MCNC: 8.59 MG/DL (ref 0.7–1.3)
ERYTHROCYTE [DISTWIDTH] IN BLOOD BY AUTOMATED COUNT: 17 % (ref 11.5–14.5)
GLUCOSE SERPL-MCNC: 82 MG/DL (ref 65–100)
HCT VFR BLD AUTO: 21.9 % (ref 36.6–50.3)
HCT VFR BLD AUTO: 22 % (ref 36.6–50.3)
HCT VFR BLD AUTO: 23.7 % (ref 36.6–50.3)
HGB BLD-MCNC: 7.1 G/DL (ref 12.1–17)
HGB BLD-MCNC: 7.3 G/DL (ref 12.1–17)
HGB BLD-MCNC: 7.7 G/DL (ref 12.1–17)
MAGNESIUM SERPL-MCNC: 2.1 MG/DL (ref 1.6–2.4)
MCH RBC QN AUTO: 30.4 PG (ref 26–34)
MCHC RBC AUTO-ENTMCNC: 33.2 G/DL (ref 30–36.5)
MCV RBC AUTO: 91.7 FL (ref 80–99)
NRBC # BLD: 0 K/UL (ref 0–0.01)
NRBC BLD-RTO: 0 PER 100 WBC
PLATELET # BLD AUTO: 195 K/UL (ref 150–400)
PMV BLD AUTO: 10.2 FL (ref 8.9–12.9)
POTASSIUM SERPL-SCNC: 4.1 MMOL/L (ref 3.5–5.1)
RBC # BLD AUTO: 2.4 M/UL (ref 4.1–5.7)
SODIUM SERPL-SCNC: 138 MMOL/L (ref 136–145)
WBC # BLD AUTO: 6.6 K/UL (ref 4.1–11.1)

## 2024-07-30 PROCEDURE — 6370000000 HC RX 637 (ALT 250 FOR IP): Performed by: NURSE PRACTITIONER

## 2024-07-30 PROCEDURE — 6360000002 HC RX W HCPCS: Performed by: INTERNAL MEDICINE

## 2024-07-30 PROCEDURE — 6370000000 HC RX 637 (ALT 250 FOR IP): Performed by: STUDENT IN AN ORGANIZED HEALTH CARE EDUCATION/TRAINING PROGRAM

## 2024-07-30 PROCEDURE — 3700000000 HC ANESTHESIA ATTENDED CARE: Performed by: INTERNAL MEDICINE

## 2024-07-30 PROCEDURE — 2580000003 HC RX 258: Performed by: INTERNAL MEDICINE

## 2024-07-30 PROCEDURE — 6360000002 HC RX W HCPCS: Performed by: NURSE ANESTHETIST, CERTIFIED REGISTERED

## 2024-07-30 PROCEDURE — 3600000002 HC SURGERY LEVEL 2 BASE: Performed by: INTERNAL MEDICINE

## 2024-07-30 PROCEDURE — 85027 COMPLETE CBC AUTOMATED: CPT

## 2024-07-30 PROCEDURE — 6370000000 HC RX 637 (ALT 250 FOR IP): Performed by: INTERNAL MEDICINE

## 2024-07-30 PROCEDURE — 7100000001 HC PACU RECOVERY - ADDTL 15 MIN: Performed by: INTERNAL MEDICINE

## 2024-07-30 PROCEDURE — 7100000000 HC PACU RECOVERY - FIRST 15 MIN: Performed by: INTERNAL MEDICINE

## 2024-07-30 PROCEDURE — 2500000003 HC RX 250 WO HCPCS: Performed by: STUDENT IN AN ORGANIZED HEALTH CARE EDUCATION/TRAINING PROGRAM

## 2024-07-30 PROCEDURE — P9047 ALBUMIN (HUMAN), 25%, 50ML: HCPCS | Performed by: INTERNAL MEDICINE

## 2024-07-30 PROCEDURE — 6370000000 HC RX 637 (ALT 250 FOR IP): Performed by: GENERAL ACUTE CARE HOSPITAL

## 2024-07-30 PROCEDURE — 0DJD8ZZ INSPECTION OF LOWER INTESTINAL TRACT, VIA NATURAL OR ARTIFICIAL OPENING ENDOSCOPIC: ICD-10-PCS | Performed by: INTERNAL MEDICINE

## 2024-07-30 PROCEDURE — 3600000012 HC SURGERY LEVEL 2 ADDTL 15MIN: Performed by: INTERNAL MEDICINE

## 2024-07-30 PROCEDURE — 2580000003 HC RX 258: Performed by: NURSE ANESTHETIST, CERTIFIED REGISTERED

## 2024-07-30 PROCEDURE — 3700000001 HC ADD 15 MINUTES (ANESTHESIA): Performed by: INTERNAL MEDICINE

## 2024-07-30 PROCEDURE — 2060000000 HC ICU INTERMEDIATE R&B

## 2024-07-30 PROCEDURE — 2500000003 HC RX 250 WO HCPCS: Performed by: NURSE ANESTHETIST, CERTIFIED REGISTERED

## 2024-07-30 PROCEDURE — 85018 HEMOGLOBIN: CPT

## 2024-07-30 PROCEDURE — 85014 HEMATOCRIT: CPT

## 2024-07-30 PROCEDURE — 36415 COLL VENOUS BLD VENIPUNCTURE: CPT

## 2024-07-30 PROCEDURE — 83735 ASSAY OF MAGNESIUM: CPT

## 2024-07-30 PROCEDURE — 80048 BASIC METABOLIC PNL TOTAL CA: CPT

## 2024-07-30 PROCEDURE — 2709999900 HC NON-CHARGEABLE SUPPLY: Performed by: INTERNAL MEDICINE

## 2024-07-30 RX ORDER — ALBUMIN (HUMAN) 12.5 G/50ML
25 SOLUTION INTRAVENOUS ONCE
Status: COMPLETED | OUTPATIENT
Start: 2024-07-30 | End: 2024-07-30

## 2024-07-30 RX ORDER — LIDOCAINE HYDROCHLORIDE 20 MG/ML
INJECTION, SOLUTION EPIDURAL; INFILTRATION; INTRACAUDAL; PERINEURAL PRN
Status: DISCONTINUED | OUTPATIENT
Start: 2024-07-30 | End: 2024-07-30 | Stop reason: SDUPTHER

## 2024-07-30 RX ORDER — MIDODRINE HYDROCHLORIDE 5 MG/1
5 TABLET ORAL ONCE
Status: COMPLETED | OUTPATIENT
Start: 2024-07-30 | End: 2024-07-30

## 2024-07-30 RX ORDER — SODIUM CHLORIDE 9 MG/ML
INJECTION, SOLUTION INTRAVENOUS CONTINUOUS
Status: DISCONTINUED | OUTPATIENT
Start: 2024-07-30 | End: 2024-07-30

## 2024-07-30 RX ORDER — PHENYLEPHRINE HCL IN 0.9% NACL 0.4MG/10ML
SYRINGE (ML) INTRAVENOUS PRN
Status: DISCONTINUED | OUTPATIENT
Start: 2024-07-30 | End: 2024-07-30 | Stop reason: SDUPTHER

## 2024-07-30 RX ORDER — SODIUM CHLORIDE 9 MG/ML
INJECTION, SOLUTION INTRAVENOUS CONTINUOUS PRN
Status: DISCONTINUED | OUTPATIENT
Start: 2024-07-30 | End: 2024-07-30 | Stop reason: SDUPTHER

## 2024-07-30 RX ORDER — ONDANSETRON 2 MG/ML
INJECTION INTRAMUSCULAR; INTRAVENOUS PRN
Status: DISCONTINUED | OUTPATIENT
Start: 2024-07-30 | End: 2024-07-30 | Stop reason: SDUPTHER

## 2024-07-30 RX ORDER — PROPOFOL 10 MG/ML
INJECTION, EMULSION INTRAVENOUS PRN
Status: DISCONTINUED | OUTPATIENT
Start: 2024-07-30 | End: 2024-07-30 | Stop reason: SDUPTHER

## 2024-07-30 RX ORDER — DEXAMETHASONE SODIUM PHOSPHATE 4 MG/ML
INJECTION, SOLUTION INTRA-ARTICULAR; INTRALESIONAL; INTRAMUSCULAR; INTRAVENOUS; SOFT TISSUE PRN
Status: DISCONTINUED | OUTPATIENT
Start: 2024-07-30 | End: 2024-07-30 | Stop reason: SDUPTHER

## 2024-07-30 RX ORDER — GLYCOPYRROLATE 0.2 MG/ML
INJECTION INTRAMUSCULAR; INTRAVENOUS PRN
Status: DISCONTINUED | OUTPATIENT
Start: 2024-07-30 | End: 2024-07-30 | Stop reason: SDUPTHER

## 2024-07-30 RX ADMIN — Medication 120 MCG: at 12:56

## 2024-07-30 RX ADMIN — PANTOPRAZOLE SODIUM 40 MG: 40 TABLET, DELAYED RELEASE ORAL at 06:46

## 2024-07-30 RX ADMIN — MIDODRINE HYDROCHLORIDE 10 MG: 5 TABLET ORAL at 21:03

## 2024-07-30 RX ADMIN — PROPOFOL 10 MG: 10 INJECTION, EMULSION INTRAVENOUS at 12:59

## 2024-07-30 RX ADMIN — PROPOFOL 10 MG: 10 INJECTION, EMULSION INTRAVENOUS at 13:00

## 2024-07-30 RX ADMIN — LIDOCAINE HYDROCHLORIDE 40 MG: 20 INJECTION, SOLUTION EPIDURAL; INFILTRATION; INTRACAUDAL; PERINEURAL at 12:55

## 2024-07-30 RX ADMIN — Medication 80 MCG: at 13:01

## 2024-07-30 RX ADMIN — ALBUMIN (HUMAN) 25 G: 0.25 INJECTION, SOLUTION INTRAVENOUS at 17:32

## 2024-07-30 RX ADMIN — GLYCOPYRROLATE 0.1 MG: 0.2 INJECTION, SOLUTION INTRAMUSCULAR; INTRAVENOUS at 13:02

## 2024-07-30 RX ADMIN — PROPOFOL 10 MG: 10 INJECTION, EMULSION INTRAVENOUS at 13:05

## 2024-07-30 RX ADMIN — PANTOPRAZOLE SODIUM 40 MG: 40 TABLET, DELAYED RELEASE ORAL at 16:02

## 2024-07-30 RX ADMIN — MIDODRINE HYDROCHLORIDE 10 MG: 5 TABLET ORAL at 13:54

## 2024-07-30 RX ADMIN — MIDODRINE HYDROCHLORIDE 10 MG: 5 TABLET ORAL at 09:34

## 2024-07-30 RX ADMIN — PIPERACILLIN AND TAZOBACTAM 3375 MG: 3; .375 INJECTION, POWDER, FOR SOLUTION INTRAVENOUS; PARENTERAL at 05:25

## 2024-07-30 RX ADMIN — SODIUM CHLORIDE: 9 INJECTION, SOLUTION INTRAVENOUS at 12:49

## 2024-07-30 RX ADMIN — PRAVASTATIN SODIUM 10 MG: 10 TABLET ORAL at 21:03

## 2024-07-30 RX ADMIN — SUCRALFATE 1 G: 1 TABLET ORAL at 05:27

## 2024-07-30 RX ADMIN — CALCIUM ACETATE 667 MG: 667 CAPSULE ORAL at 17:27

## 2024-07-30 RX ADMIN — ACETAMINOPHEN 650 MG: 325 TABLET ORAL at 06:49

## 2024-07-30 RX ADMIN — Medication 80 MCG: at 13:15

## 2024-07-30 RX ADMIN — SODIUM CHLORIDE: 9 INJECTION, SOLUTION INTRAVENOUS at 11:28

## 2024-07-30 RX ADMIN — ONDANSETRON HYDROCHLORIDE 4 MG: 2 INJECTION, SOLUTION INTRAMUSCULAR; INTRAVENOUS at 12:56

## 2024-07-30 RX ADMIN — MIDODRINE HYDROCHLORIDE 10 MG: 5 TABLET ORAL at 16:02

## 2024-07-30 RX ADMIN — Medication 40 MCG: at 12:59

## 2024-07-30 RX ADMIN — GLYCOPYRROLATE 0.1 MG: 0.2 INJECTION, SOLUTION INTRAMUSCULAR; INTRAVENOUS at 13:06

## 2024-07-30 RX ADMIN — SODIUM CHLORIDE, PRESERVATIVE FREE 10 ML: 5 INJECTION INTRAVENOUS at 09:41

## 2024-07-30 RX ADMIN — ACETAMINOPHEN 650 MG: 325 TABLET ORAL at 17:39

## 2024-07-30 RX ADMIN — PROPOFOL 20 MG: 10 INJECTION, EMULSION INTRAVENOUS at 12:56

## 2024-07-30 RX ADMIN — Medication 80 MCG: at 13:08

## 2024-07-30 RX ADMIN — Medication 3 AMPULE: at 11:28

## 2024-07-30 RX ADMIN — MIDODRINE HYDROCHLORIDE 5 MG: 5 TABLET ORAL at 17:43

## 2024-07-30 RX ADMIN — MIRTAZAPINE 7.5 MG: 15 TABLET, FILM COATED ORAL at 21:03

## 2024-07-30 RX ADMIN — DEXAMETHASONE SODIUM PHOSPHATE 4 MG: 4 INJECTION, SOLUTION INTRAMUSCULAR; INTRAVENOUS at 13:01

## 2024-07-30 RX ADMIN — MIDODRINE HYDROCHLORIDE 10 MG: 5 TABLET ORAL at 05:27

## 2024-07-30 ASSESSMENT — PAIN DESCRIPTION - LOCATION
LOCATION: NECK;SHOULDER
LOCATION: NECK

## 2024-07-30 ASSESSMENT — PAIN DESCRIPTION - DESCRIPTORS
DESCRIPTORS: ACHING
DESCRIPTORS: ACHING

## 2024-07-30 ASSESSMENT — PAIN SCALES - GENERAL
PAINLEVEL_OUTOF10: 3
PAINLEVEL_OUTOF10: 3

## 2024-07-30 ASSESSMENT — PAIN - FUNCTIONAL ASSESSMENT: PAIN_FUNCTIONAL_ASSESSMENT: NONE - DENIES PAIN

## 2024-07-30 ASSESSMENT — PAIN DESCRIPTION - ORIENTATION
ORIENTATION: POSTERIOR
ORIENTATION: POSTERIOR;MID;UPPER

## 2024-07-30 NOTE — ANESTHESIA PRE PROCEDURE
Department of Anesthesiology  Preprocedure Note       Name:  Darrel Patterson   Age:  79 y.o.  :  1945                                          MRN:  [id number]         Date:  2024      Surgeon: Surgeon(s):  Sukhi Light MD    Procedure: Procedure(s):  COLONOSCOPY DIAGNOSTIC    Medications prior to admission:   Prior to Admission medications    Medication Sig Start Date End Date Taking? Authorizing Provider   vitamin D (CHOLECALCIFEROL) 25 MCG (1000 UT) TABS tablet Take 1 tablet by mouth daily   Yes Moe Valentine MD   gabapentin (NEURONTIN) 300 MG capsule Take 1 capsule by mouth nightly.   Yes ProviderMoe MD   Travoprost, BAK Free, (TRAVATAN Z) 0.004 % SOLN ophthalmic solution INSTILL 1 DROP IN EACH EYE AT BEDTIME 24   Roma Solitario APRN - NP   ivabradine (CORLANOR) 5 MG TABS tablet Take 1 tablet by mouth 2 times daily (with meals) 24   Moe Valentine MD   sacubitril-valsartan (ENTRESTO) 49-51 MG per tablet Take 1 tablet by mouth 2 times daily 24   Roma Solitario APRN - YUNIEL   fluticasone-umeclidin-vilant (TRELEGY ELLIPTA) 100-62.5-25 MCG/ACT AEPB inhaler Inhale 1 puff into the lungs daily 24   Roma Solitario APRN - NP   megestrol (MEGACE) 40 MG tablet Take 1 tablet by mouth daily To increase appetite 24   Roma Solitario APRN - NP   omeprazole (PRILOSEC) 40 MG delayed release capsule Take 1 capsule by mouth daily 24   Deep Copeland MD   hydrOXYzine pamoate (VISTARIL) 25 MG capsule TAKE 1 CAP BY MOUTH THREE (3) TIMES DAILY AS NEEDED FOR ITCHING.  Patient taking differently: Take 1 capsule by mouth 3 times daily as needed TAKE 1 CAP BY MOUTH THREE (3) TIMES DAILY AS NEEDED FOR ITCHING. 24   Roma Solitario APRN - NP   montelukast (SINGULAIR) 10 MG tablet TAKE 1 TABLET BY MOUTH EVERY DAY 24   Stone, Roma D, APRN - NP   metoprolol succinate (TOPROL XL) 25 MG extended release tablet Take 1 tablet by mouth daily 24

## 2024-07-30 NOTE — SIGNIFICANT EVENT
Rapid response for hypotension with systolic blood pressure in the 70s, patient was not symptomatic  Per RN, just had a small bleeding when he got back from colonoscopy  Will repeat H&H  Patient was placed on Trendelenburg, will order albumin 25 g  If persistent hypotension despite albumin, will give additional 5 mg of midodrine  Patient seen and examined  Patient is alert oriented x 4  No acute complaints    Assessment and plan  Acute on chronic hypertension, multifactorial  Albumin, continue midodrine      Nonbillable

## 2024-07-30 NOTE — PERIOP NOTE
0957 - TRANSFER - IN REPORT:    Verbal report received from BERTHA FLORENTINO on Darrel Patterson  being received from 2318 for ordered procedure      Report consisted of patient's Situation, Background, Assessment and   Recommendations(SBAR).     Information from the following report(s) Procedure Verification was reviewed with the receiving nurse.    Opportunity for questions and clarification was provided.      Assessment completed upon patient's arrival to unit and care assumed.   1056 - PT INTO PRE-OP HOLDING 16.  A&OX4, QUEVEDO SPON, WEAKLY AND TO COMMAND.  RESP EVEN AND UNLABORED.  POX ON RA > 96%.  BSB  - DIMINISHED IN BASES.  PT DENIES DISCOMFORT 0/10 ON PAIN SCALE.  + BM - BROWN MUCOUS STOOL.   PRE-OP TCHING DONE - PT VERBALIZES UNDERSTANDING.   SR UP X3, CB IN PLACE.  WAITING SURGERY.

## 2024-07-30 NOTE — OP NOTE
NAME:  Darrel Patterson   :   1945   MRN:   [id number]     Date/Time:  2024 1:33 PM    Colonoscopy Operative Report    Procedure Type:   Colonoscopy --diagnostic     Indications:     Hematochezia, BRBPR, acute anemia  Pre-operative Diagnosis: see indication above  Post-operative Diagnosis:  See findings below  :  Sukhi Light MD  Referring Provider: -MD Carlos; -Roma Solitario, APRN - NP    Exam:  Airway: clear, no airway problems anticipated  Heart: RRR, without gallops or rubs  Lungs: clear bilaterally without wheezes, crackles, or rhonchi  Abdomen: soft, nontender, nondistended, bowel sounds present  Mental Status: awake, alert and oriented to person, place and time    Sedation:  MAC anesthesia Propofol 50mg IV  Procedure Details:  After informed consent was obtained with all risks and benefits of procedure explained and preoperative exam completed, the patient was taken to the endoscopy suite and placed in the left lateral decubitus position.  Upon sequential sedation as per above, a digital rectal exam was performed demonstrating internal hemorrhoids.  The Olympus videocolonoscope  was inserted in the rectum and carefully advanced to the cecum, which was identified by the ileocecal valve and appendiceal orifice. The distal terminal ileum was evaluated.   The quality of preparation was adequate.  The colonoscope was slowly withdrawn with careful evaluation between folds. Retroflexion in the rectum was completed demonstrating internal hemorrhoids.     Findings:     -Normal terminal ileum  -Diverticulosis noted throughout the colon  -Grade 2 internal hemorrhoids  -No masses, polyps, or actively bleeding lesions, or hematin noted    Specimen Removed:  None.  Complications: None.   EBL:  None.    Impression:    -Normal terminal ileum  -Diverticulosis noted throughout the colon  -Grade 2 internal hemorrhoids  -No masses, polyps, or actively bleeding lesions, or hematin

## 2024-07-30 NOTE — ANESTHESIA POSTPROCEDURE EVALUATION
Department of Anesthesiology  Postprocedure Note    Patient: Darrel Patterson  MRN: [id number]  YOB: 1945  Date of evaluation: 7/30/2024    Procedure Summary       Date: 07/30/24 Room / Location: [facility] MAIN OR  / [facility] MAIN OR    Anesthesia Start: 1249 Anesthesia Stop: 1318    Procedure: COLONOSCOPY DIAGNOSTIC (Rectum) Diagnosis:       Hematochezia      (Hematochezia [K92.1])    Providers: Sukhi Light MD Responsible Provider: Melyssa Piper DO    Anesthesia Type: MAC ASA Status: 4            Anesthesia Type: MAC    Theodora Phase I: Theodora Score: 10    Theodora Phase II:      Anesthesia Post Evaluation    Patient location during evaluation: PACU  Patient participation: complete - patient participated  Level of consciousness: awake and alert  Pain score: 0  Airway patency: patent  Nausea & Vomiting: no nausea and no vomiting  Cardiovascular status: blood pressure returned to baseline  Respiratory status: acceptable  Hydration status: euvolemic  Comments: BP at baseline. Floor midodrine to be given prior to transfer to the floor.   Pain management: adequate    No notable events documented.

## 2024-07-30 NOTE — SIGNIFICANT EVENT
RAPID RESPONSE TEAM    Overhead rapid response paged to room #2318 at 1720    Reason for rapid response:  hypotension        Assessment/Interventions:  On arrival patient was in trendelenburg position. MAP in low 50s. Patient is asymptomatic.      at bedside, orders received for the following Interventions: albumin, redraw an H&H, midodrine    Outcome:  pt to remain in room # 2318    Please call with any questions or concerns    Corinne Arroyo RN  Rapid Response Team  Ext [phone]    Recent Results (from the past 8 hour(s))   Hemoglobin and Hematocrit    Collection Time: 07/30/24  9:44 AM   Result Value Ref Range    Hemoglobin 7.7 (L) 12.1 - 17.0 g/dL    Hematocrit 23.7 (L) 36.6 - 50.3 %      Redraw HGB was 7.1    3212 RN called with despite interventions still hypotensive. Mentioned patient to Dr. Brown and he will go evaluate them.

## 2024-07-31 LAB
BASOPHILS # BLD: 0.2 K/UL (ref 0–0.1)
BASOPHILS NFR BLD: 2 % (ref 0–1)
D DIMER PPP FEU-MCNC: 1.15 MG/L FEU (ref 0–0.65)
DIFFERENTIAL METHOD BLD: ABNORMAL
EOSINOPHIL # BLD: 0.6 K/UL (ref 0–0.4)
EOSINOPHIL NFR BLD: 9 % (ref 0–7)
ERYTHROCYTE [DISTWIDTH] IN BLOOD BY AUTOMATED COUNT: 17.1 % (ref 11.5–14.5)
ERYTHROCYTE [DISTWIDTH] IN BLOOD BY AUTOMATED COUNT: 19.1 % (ref 11.5–14.5)
GLUCOSE BLD STRIP.AUTO-MCNC: 151 MG/DL (ref 65–117)
HCT VFR BLD AUTO: 20.9 % (ref 36.6–50.3)
HCT VFR BLD AUTO: 23.5 % (ref 36.6–50.3)
HCT VFR BLD AUTO: 26.4 % (ref 36.6–50.3)
HGB BLD-MCNC: 7 G/DL (ref 12.1–17)
HGB BLD-MCNC: 7.7 G/DL (ref 12.1–17)
HGB BLD-MCNC: 8.4 G/DL (ref 12.1–17)
HISTORY CHECK: NORMAL
IMM GRANULOCYTES # BLD AUTO: 0 K/UL (ref 0–0.04)
IMM GRANULOCYTES NFR BLD AUTO: 0 % (ref 0–0.5)
LYMPHOCYTES # BLD: 2.1 K/UL (ref 0.8–3.5)
LYMPHOCYTES NFR BLD: 28 % (ref 12–49)
MCH RBC QN AUTO: 28.7 PG (ref 26–34)
MCH RBC QN AUTO: 30.6 PG (ref 26–34)
MCHC RBC AUTO-ENTMCNC: 31.8 G/DL (ref 30–36.5)
MCHC RBC AUTO-ENTMCNC: 33.5 G/DL (ref 30–36.5)
MCV RBC AUTO: 90.1 FL (ref 80–99)
MCV RBC AUTO: 91.3 FL (ref 80–99)
MONOCYTES # BLD: 1.2 K/UL (ref 0–1)
MONOCYTES NFR BLD: 16 % (ref 5–13)
NEUTS SEG # BLD: 3.3 K/UL (ref 1.8–8)
NEUTS SEG NFR BLD: 45 % (ref 32–75)
NRBC # BLD: 0.05 K/UL (ref 0–0.01)
NRBC # BLD: 0.06 K/UL (ref 0–0.01)
NRBC BLD-RTO: 0.8 PER 100 WBC
NRBC BLD-RTO: 1.5 PER 100 WBC
PHOSPHATE SERPL-MCNC: 3.3 MG/DL (ref 2.6–4.7)
PLATELET # BLD AUTO: 210 K/UL (ref 150–400)
PLATELET # BLD AUTO: 217 K/UL (ref 150–400)
PMV BLD AUTO: 10.4 FL (ref 8.9–12.9)
PMV BLD AUTO: 10.6 FL (ref 8.9–12.9)
PROCALCITONIN SERPL-MCNC: 0.5 NG/ML
RBC # BLD AUTO: 2.29 M/UL (ref 4.1–5.7)
RBC # BLD AUTO: 2.93 M/UL (ref 4.1–5.7)
SERVICE CMNT-IMP: ABNORMAL
WBC # BLD AUTO: 3.4 K/UL (ref 4.1–11.1)
WBC # BLD AUTO: 7.4 K/UL (ref 4.1–11.1)

## 2024-07-31 PROCEDURE — 85025 COMPLETE CBC W/AUTO DIFF WBC: CPT

## 2024-07-31 PROCEDURE — 85379 FIBRIN DEGRADATION QUANT: CPT

## 2024-07-31 PROCEDURE — 6360000002 HC RX W HCPCS: Performed by: INTERNAL MEDICINE

## 2024-07-31 PROCEDURE — 84145 PROCALCITONIN (PCT): CPT

## 2024-07-31 PROCEDURE — 36430 TRANSFUSION BLD/BLD COMPNT: CPT

## 2024-07-31 PROCEDURE — 36415 COLL VENOUS BLD VENIPUNCTURE: CPT

## 2024-07-31 PROCEDURE — 6370000000 HC RX 637 (ALT 250 FOR IP): Performed by: STUDENT IN AN ORGANIZED HEALTH CARE EDUCATION/TRAINING PROGRAM

## 2024-07-31 PROCEDURE — 6370000000 HC RX 637 (ALT 250 FOR IP): Performed by: INTERNAL MEDICINE

## 2024-07-31 PROCEDURE — 85027 COMPLETE CBC AUTOMATED: CPT

## 2024-07-31 PROCEDURE — 82533 TOTAL CORTISOL: CPT

## 2024-07-31 PROCEDURE — 2500000003 HC RX 250 WO HCPCS: Performed by: INTERNAL MEDICINE

## 2024-07-31 PROCEDURE — 84100 ASSAY OF PHOSPHORUS: CPT

## 2024-07-31 PROCEDURE — 2060000000 HC ICU INTERMEDIATE R&B

## 2024-07-31 PROCEDURE — 6370000000 HC RX 637 (ALT 250 FOR IP): Performed by: GENERAL ACUTE CARE HOSPITAL

## 2024-07-31 PROCEDURE — P9047 ALBUMIN (HUMAN), 25%, 50ML: HCPCS | Performed by: INTERNAL MEDICINE

## 2024-07-31 PROCEDURE — 6370000000 HC RX 637 (ALT 250 FOR IP): Performed by: NURSE PRACTITIONER

## 2024-07-31 PROCEDURE — 82962 GLUCOSE BLOOD TEST: CPT

## 2024-07-31 PROCEDURE — 2580000003 HC RX 258: Performed by: STUDENT IN AN ORGANIZED HEALTH CARE EDUCATION/TRAINING PROGRAM

## 2024-07-31 PROCEDURE — P9016 RBC LEUKOCYTES REDUCED: HCPCS

## 2024-07-31 PROCEDURE — 85014 HEMATOCRIT: CPT

## 2024-07-31 PROCEDURE — 83605 ASSAY OF LACTIC ACID: CPT

## 2024-07-31 PROCEDURE — 87040 BLOOD CULTURE FOR BACTERIA: CPT

## 2024-07-31 PROCEDURE — 85018 HEMOGLOBIN: CPT

## 2024-07-31 RX ORDER — SODIUM CHLORIDE 9 MG/ML
INJECTION, SOLUTION INTRAVENOUS PRN
Status: DISCONTINUED | OUTPATIENT
Start: 2024-07-31 | End: 2024-08-03

## 2024-07-31 RX ORDER — MIDODRINE HYDROCHLORIDE 5 MG/1
15 TABLET ORAL EVERY 8 HOURS
Status: DISCONTINUED | OUTPATIENT
Start: 2024-07-31 | End: 2024-08-07 | Stop reason: HOSPADM

## 2024-07-31 RX ORDER — ALBUMIN (HUMAN) 12.5 G/50ML
25 SOLUTION INTRAVENOUS ONCE
Status: COMPLETED | OUTPATIENT
Start: 2024-07-31 | End: 2024-07-31

## 2024-07-31 RX ORDER — HEPARIN SODIUM 5000 [USP'U]/ML
5000 INJECTION, SOLUTION INTRAVENOUS; SUBCUTANEOUS EVERY 8 HOURS SCHEDULED
Status: DISCONTINUED | OUTPATIENT
Start: 2024-07-31 | End: 2024-08-05

## 2024-07-31 RX ORDER — 0.9 % SODIUM CHLORIDE 0.9 %
500 INTRAVENOUS SOLUTION INTRAVENOUS ONCE
Status: COMPLETED | OUTPATIENT
Start: 2024-07-31 | End: 2024-07-31

## 2024-07-31 RX ADMIN — TRAMADOL HYDROCHLORIDE 50 MG: 50 TABLET ORAL at 20:30

## 2024-07-31 RX ADMIN — MIRTAZAPINE 7.5 MG: 15 TABLET, FILM COATED ORAL at 20:31

## 2024-07-31 RX ADMIN — MIDODRINE HYDROCHLORIDE 10 MG: 5 TABLET ORAL at 05:34

## 2024-07-31 RX ADMIN — ALBUMIN (HUMAN) 25 G: 0.25 INJECTION, SOLUTION INTRAVENOUS at 21:48

## 2024-07-31 RX ADMIN — ACETAMINOPHEN 650 MG: 325 TABLET ORAL at 04:44

## 2024-07-31 RX ADMIN — TRAMADOL HYDROCHLORIDE 50 MG: 50 TABLET ORAL at 08:52

## 2024-07-31 RX ADMIN — GABAPENTIN 300 MG: 300 CAPSULE ORAL at 08:23

## 2024-07-31 RX ADMIN — DOCUSATE SODIUM 200 MG: 100 CAPSULE, LIQUID FILLED ORAL at 20:30

## 2024-07-31 RX ADMIN — HEPARIN SODIUM 5000 UNITS: 5000 INJECTION INTRAVENOUS; SUBCUTANEOUS at 15:49

## 2024-07-31 RX ADMIN — SODIUM CHLORIDE 500 ML: 9 INJECTION, SOLUTION INTRAVENOUS at 04:35

## 2024-07-31 RX ADMIN — MIDODRINE HYDROCHLORIDE 10 MG: 5 TABLET ORAL at 15:49

## 2024-07-31 RX ADMIN — MIDODRINE HYDROCHLORIDE 15 MG: 5 TABLET ORAL at 20:31

## 2024-07-31 RX ADMIN — HEPARIN SODIUM 5000 UNITS: 5000 INJECTION INTRAVENOUS; SUBCUTANEOUS at 20:31

## 2024-07-31 RX ADMIN — EPOETIN ALFA-EPBX 10000 UNITS: 10000 INJECTION, SOLUTION INTRAVENOUS; SUBCUTANEOUS at 20:32

## 2024-07-31 RX ADMIN — PANTOPRAZOLE SODIUM 40 MG: 40 TABLET, DELAYED RELEASE ORAL at 05:34

## 2024-07-31 RX ADMIN — MIDODRINE HYDROCHLORIDE 10 MG: 5 TABLET ORAL at 00:55

## 2024-07-31 RX ADMIN — PANTOPRAZOLE SODIUM 40 MG: 40 TABLET, DELAYED RELEASE ORAL at 15:49

## 2024-07-31 RX ADMIN — PRAVASTATIN SODIUM 10 MG: 10 TABLET ORAL at 20:30

## 2024-07-31 RX ADMIN — CALCIUM ACETATE 667 MG: 667 CAPSULE ORAL at 08:23

## 2024-07-31 ASSESSMENT — PAIN DESCRIPTION - ORIENTATION: ORIENTATION: RIGHT

## 2024-07-31 ASSESSMENT — PAIN SCALES - GENERAL
PAINLEVEL_OUTOF10: 4
PAINLEVEL_OUTOF10: 6
PAINLEVEL_OUTOF10: 1
PAINLEVEL_OUTOF10: 0
PAINLEVEL_OUTOF10: 6

## 2024-07-31 ASSESSMENT — PAIN DESCRIPTION - DESCRIPTORS
DESCRIPTORS: ACHING
DESCRIPTORS: DISCOMFORT

## 2024-07-31 ASSESSMENT — PAIN DESCRIPTION - LOCATION
LOCATION: NECK
LOCATION: BACK
LOCATION: SHOULDER

## 2024-07-31 NOTE — H&P
Hospitalist Admission Note    NAME:Darrel Patterson   : 1945   MRN: [id number]     Date/Time: 2024 5:13 AM    Patient PCP: Roma Solitario APRN - NP    *Please be aware this note is formulated with assistance from voice-recognition dictation software. Please excuse any errors that may be present*    ______________________________________________________________________  Given the patient's current clinical presentation, I have a high level of concern for decompensation if discharged from the emergency department.  Complex decision making was performed, which includes reviewing the patient's available past medical records, laboratory results, and x-ray films.       My assessment of this patient's clinical condition and my plan of care is as follows.    Problem List:  Patient Active Problem List   Diagnosis    History of GI bleed    Anemia due to chronic kidney disease    A-V fistula ([facility])    S/P cataract surgery    Habitual alcohol use    Mild intermittent asthma without complication    Essential hypertension, benign    Diverticula of colon    Gastroesophageal reflux disease without esophagitis    Rheumatoid arthritis with positive rheumatoid factor ([facility])    Glaucoma    ESRD on hemodialysis ([facility])    Impingement syndrome of both shoulders    Hypertensive retinopathy of both eyes    CHF (congestive heart failure) ([facility])    DDD (degenerative disc disease), lumbar    DDD (degenerative disc disease), cervical    Psychophysiological insomnia    Fatigue    Hyperkalemia    Seizure ([facility])    Long term (current) use of systemic steroids    Weakness generalized    Left renal mass    GI bleed    NSTEMI (non-ST elevated myocardial infarction) ([facility])         Assessment / Plan:    NSTEMI   Hx of chronic systolic HF - EF 15-20% 2023  Presented to [facility] ED with ongoing lethargy, malaise, decreased PO intake. Family also notes has had ongoing SOB, worse with stairs and with exertion even after HD sessions. Noted to have 
See prior Consultation Note. The patient was reexamined. No interval change in the last 36hrs. Proceed with procedure(s) with deep sedation or conscious sedation as clinically indicated.  No bleeding reported overnight.  Reviewed with pt that high risk and risks benefits noted.  He indicates he wants to proceed with colonoscopy.  
assigned provider on-call via [employer] system with questions or concerns.     I have spent  35 minutes of critical care time involved in lab review, consultations with specialist, family decision- making, bedside attention and documentation. During this entire length of time I was immediately available to the patient .     Critical Care:  The reason for providing this level of medical care for this critically ill patient was due to a critical illness that impaired one or more vital organ systems, such that there was a high probability of imminent or life threatening deterioration in the patient's condition. This care involved high complexity decision making to assess, manipulate, and support vital system functions, to treat this degree of vital organ system failure, and to prevent further life threatening deterioration of the patient’s condition.
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
## 2024-07-31 NOTE — SIGNIFICANT EVENT
RAPID RESPONSE TEAM    Overhead rapid response paged to room # 2318/01 at 0234     Reason for rapid response: Hypotension    Initial assessment:   Pt was in trendelenburg position with staff at bedside. Pt in no acute distress, able to state his name and that he is in the hospital, but not the correct month or year.       at bedside, orders received for 500ml NS and 1U Prbc. Map goal > 60.     Outcome:   pt to remain in room # 2318/01     Please call with any questions or concerns    Farooq Dwyer RN  Rapid Response Team  Ext [phone]

## 2024-08-01 LAB
ABO + RH BLD: NORMAL
BLD PROD TYP BPU: NORMAL
BLOOD BANK BLOOD PRODUCT EXPIRATION DATE: NORMAL
BLOOD BANK BLOOD PRODUCT EXPIRATION DATE: NORMAL
BLOOD BANK DISPENSE STATUS: NORMAL
BLOOD BANK ISBT PRODUCT BLOOD TYPE: 6200
BLOOD BANK ISBT PRODUCT BLOOD TYPE: 6200
BLOOD BANK PRODUCT CODE: NORMAL
BLOOD BANK PRODUCT CODE: NORMAL
BLOOD BANK UNIT TYPE AND RH: NORMAL
BLOOD BANK UNIT TYPE AND RH: NORMAL
BLOOD GROUP ANTIBODIES SERPL: NORMAL
BPU ID: NORMAL
CORTIS AM PEAK SERPL-MCNC: 7 UG/DL (ref 4.3–22.45)
CROSSMATCH RESULT: NORMAL
ERYTHROCYTE [DISTWIDTH] IN BLOOD BY AUTOMATED COUNT: 18.8 % (ref 11.5–14.5)
HCT VFR BLD AUTO: 24 % (ref 36.6–50.3)
HGB BLD-MCNC: 7.7 G/DL (ref 12.1–17)
LACTATE SERPL-SCNC: 2.6 MMOL/L (ref 0.4–2)
MCH RBC QN AUTO: 29.4 PG (ref 26–34)
MCHC RBC AUTO-ENTMCNC: 32.1 G/DL (ref 30–36.5)
MCV RBC AUTO: 91.6 FL (ref 80–99)
NRBC # BLD: 0.04 K/UL (ref 0–0.01)
NRBC BLD-RTO: 0.5 PER 100 WBC
PLATELET # BLD AUTO: 181 K/UL (ref 150–400)
PMV BLD AUTO: 11 FL (ref 8.9–12.9)
RBC # BLD AUTO: 2.62 M/UL (ref 4.1–5.7)
SPECIMEN EXP DATE BLD: NORMAL
UNIT DIVISION: 0
UNIT ISSUE DATE/TIME: NORMAL
UNIT ISSUE DATE/TIME: NORMAL
WBC # BLD AUTO: 7.4 K/UL (ref 4.1–11.1)

## 2024-08-01 PROCEDURE — 6370000000 HC RX 637 (ALT 250 FOR IP): Performed by: INTERNAL MEDICINE

## 2024-08-01 PROCEDURE — 90935 HEMODIALYSIS ONE EVALUATION: CPT

## 2024-08-01 PROCEDURE — 36415 COLL VENOUS BLD VENIPUNCTURE: CPT

## 2024-08-01 PROCEDURE — 6360000002 HC RX W HCPCS: Performed by: INTERNAL MEDICINE

## 2024-08-01 PROCEDURE — 2500000003 HC RX 250 WO HCPCS: Performed by: INTERNAL MEDICINE

## 2024-08-01 PROCEDURE — 85027 COMPLETE CBC AUTOMATED: CPT

## 2024-08-01 PROCEDURE — 2060000000 HC ICU INTERMEDIATE R&B

## 2024-08-01 RX ADMIN — MIDODRINE HYDROCHLORIDE 15 MG: 5 TABLET ORAL at 05:51

## 2024-08-01 RX ADMIN — PRAVASTATIN SODIUM 10 MG: 10 TABLET ORAL at 21:25

## 2024-08-01 RX ADMIN — HEPARIN SODIUM 5000 UNITS: 5000 INJECTION INTRAVENOUS; SUBCUTANEOUS at 21:24

## 2024-08-01 RX ADMIN — GABAPENTIN 300 MG: 300 CAPSULE ORAL at 08:38

## 2024-08-01 RX ADMIN — CALCIUM ACETATE 667 MG: 667 CAPSULE ORAL at 08:38

## 2024-08-01 RX ADMIN — HEPARIN SODIUM 5000 UNITS: 5000 INJECTION INTRAVENOUS; SUBCUTANEOUS at 05:51

## 2024-08-01 RX ADMIN — CALCIUM ACETATE 667 MG: 667 CAPSULE ORAL at 14:00

## 2024-08-01 RX ADMIN — PANTOPRAZOLE SODIUM 40 MG: 40 TABLET, DELAYED RELEASE ORAL at 18:35

## 2024-08-01 RX ADMIN — MIDODRINE HYDROCHLORIDE 15 MG: 5 TABLET ORAL at 21:24

## 2024-08-01 RX ADMIN — PANTOPRAZOLE SODIUM 40 MG: 40 TABLET, DELAYED RELEASE ORAL at 08:38

## 2024-08-01 RX ADMIN — MIRTAZAPINE 7.5 MG: 15 TABLET, FILM COATED ORAL at 21:24

## 2024-08-01 RX ADMIN — MIDODRINE HYDROCHLORIDE 15 MG: 5 TABLET ORAL at 14:00

## 2024-08-01 RX ADMIN — CALCIUM ACETATE 667 MG: 667 CAPSULE ORAL at 18:34

## 2024-08-01 RX ADMIN — DOCUSATE SODIUM 200 MG: 100 CAPSULE, LIQUID FILLED ORAL at 21:24

## 2024-08-01 RX ADMIN — HEPARIN SODIUM 5000 UNITS: 5000 INJECTION INTRAVENOUS; SUBCUTANEOUS at 14:00

## 2024-08-01 ASSESSMENT — PAIN SCALES - GENERAL
PAINLEVEL_OUTOF10: 0

## 2024-08-02 LAB
ANION GAP SERPL CALC-SCNC: 6 MMOL/L (ref 5–15)
BASOPHILS # BLD: 0.1 K/UL (ref 0–0.1)
BASOPHILS NFR BLD: 2 % (ref 0–1)
BUN SERPL-MCNC: 11 MG/DL (ref 6–20)
BUN/CREAT SERPL: 1 (ref 12–20)
CALCIUM SERPL-MCNC: 8.6 MG/DL (ref 8.5–10.1)
CHLORIDE SERPL-SCNC: 105 MMOL/L (ref 97–108)
CO2 SERPL-SCNC: 29 MMOL/L (ref 21–32)
CORTIS AM PEAK SERPL-MCNC: 8.6 UG/DL (ref 4.3–22.45)
CREAT SERPL-MCNC: 7.39 MG/DL (ref 0.7–1.3)
DIFFERENTIAL METHOD BLD: ABNORMAL
EOSINOPHIL # BLD: 1.3 K/UL (ref 0–0.4)
EOSINOPHIL NFR BLD: 19 % (ref 0–7)
ERYTHROCYTE [DISTWIDTH] IN BLOOD BY AUTOMATED COUNT: 18.4 % (ref 11.5–14.5)
GLUCOSE SERPL-MCNC: 100 MG/DL (ref 65–100)
HCT VFR BLD AUTO: 25.3 % (ref 36.6–50.3)
HGB BLD-MCNC: 8.1 G/DL (ref 12.1–17)
IMM GRANULOCYTES # BLD AUTO: 0 K/UL (ref 0–0.04)
IMM GRANULOCYTES NFR BLD AUTO: 0 % (ref 0–0.5)
LYMPHOCYTES # BLD: 1.7 K/UL (ref 0.8–3.5)
LYMPHOCYTES NFR BLD: 26 % (ref 12–49)
MAGNESIUM SERPL-MCNC: 2.2 MG/DL (ref 1.6–2.4)
MCH RBC QN AUTO: 28.5 PG (ref 26–34)
MCHC RBC AUTO-ENTMCNC: 32 G/DL (ref 30–36.5)
MCV RBC AUTO: 89.1 FL (ref 80–99)
MONOCYTES # BLD: 1 K/UL (ref 0–1)
MONOCYTES NFR BLD: 15 % (ref 5–13)
NEUTS SEG # BLD: 2.5 K/UL (ref 1.8–8)
NEUTS SEG NFR BLD: 38 % (ref 32–75)
NRBC # BLD: 0.02 K/UL (ref 0–0.01)
NRBC BLD-RTO: 0.3 PER 100 WBC
PLATELET # BLD AUTO: 178 K/UL (ref 150–400)
PMV BLD AUTO: 10.8 FL (ref 8.9–12.9)
POTASSIUM SERPL-SCNC: 4.2 MMOL/L (ref 3.5–5.1)
RBC # BLD AUTO: 2.84 M/UL (ref 4.1–5.7)
RBC MORPH BLD: ABNORMAL
RBC MORPH BLD: ABNORMAL
SODIUM SERPL-SCNC: 140 MMOL/L (ref 136–145)
WBC # BLD AUTO: 6.6 K/UL (ref 4.1–11.1)

## 2024-08-02 PROCEDURE — 82533 TOTAL CORTISOL: CPT

## 2024-08-02 PROCEDURE — 6360000002 HC RX W HCPCS: Performed by: INTERNAL MEDICINE

## 2024-08-02 PROCEDURE — 80048 BASIC METABOLIC PNL TOTAL CA: CPT

## 2024-08-02 PROCEDURE — 85025 COMPLETE CBC W/AUTO DIFF WBC: CPT

## 2024-08-02 PROCEDURE — 2500000003 HC RX 250 WO HCPCS: Performed by: INTERNAL MEDICINE

## 2024-08-02 PROCEDURE — 6370000000 HC RX 637 (ALT 250 FOR IP): Performed by: INTERNAL MEDICINE

## 2024-08-02 PROCEDURE — 2060000000 HC ICU INTERMEDIATE R&B

## 2024-08-02 PROCEDURE — 83735 ASSAY OF MAGNESIUM: CPT

## 2024-08-02 PROCEDURE — 36415 COLL VENOUS BLD VENIPUNCTURE: CPT

## 2024-08-02 RX ORDER — CASTOR OIL AND BALSAM, PERU 788; 87 MG/G; MG/G
OINTMENT TOPICAL 2 TIMES DAILY
Status: DISCONTINUED | OUTPATIENT
Start: 2024-08-02 | End: 2024-08-07 | Stop reason: HOSPADM

## 2024-08-02 RX ADMIN — MIDODRINE HYDROCHLORIDE 15 MG: 5 TABLET ORAL at 20:45

## 2024-08-02 RX ADMIN — GABAPENTIN 300 MG: 300 CAPSULE ORAL at 09:10

## 2024-08-02 RX ADMIN — Medication: at 20:44

## 2024-08-02 RX ADMIN — HEPARIN SODIUM 5000 UNITS: 5000 INJECTION INTRAVENOUS; SUBCUTANEOUS at 14:07

## 2024-08-02 RX ADMIN — HEPARIN SODIUM 5000 UNITS: 5000 INJECTION INTRAVENOUS; SUBCUTANEOUS at 06:25

## 2024-08-02 RX ADMIN — PANTOPRAZOLE SODIUM 40 MG: 40 TABLET, DELAYED RELEASE ORAL at 06:25

## 2024-08-02 RX ADMIN — HEPARIN SODIUM 5000 UNITS: 5000 INJECTION INTRAVENOUS; SUBCUTANEOUS at 20:48

## 2024-08-02 RX ADMIN — EPOETIN ALFA-EPBX 10000 UNITS: 10000 INJECTION, SOLUTION INTRAVENOUS; SUBCUTANEOUS at 20:47

## 2024-08-02 RX ADMIN — MIDODRINE HYDROCHLORIDE 15 MG: 5 TABLET ORAL at 14:07

## 2024-08-02 RX ADMIN — MIDODRINE HYDROCHLORIDE 15 MG: 5 TABLET ORAL at 04:19

## 2024-08-02 RX ADMIN — CALCIUM ACETATE 667 MG: 667 CAPSULE ORAL at 18:05

## 2024-08-02 RX ADMIN — PRAVASTATIN SODIUM 10 MG: 10 TABLET ORAL at 20:47

## 2024-08-02 RX ADMIN — Medication: at 09:20

## 2024-08-02 RX ADMIN — CALCIUM ACETATE 667 MG: 667 CAPSULE ORAL at 09:10

## 2024-08-02 RX ADMIN — PANTOPRAZOLE SODIUM 40 MG: 40 TABLET, DELAYED RELEASE ORAL at 14:07

## 2024-08-02 RX ADMIN — DOCUSATE SODIUM 200 MG: 100 CAPSULE, LIQUID FILLED ORAL at 20:45

## 2024-08-02 RX ADMIN — CALCIUM ACETATE 667 MG: 667 CAPSULE ORAL at 14:14

## 2024-08-02 RX ADMIN — MIRTAZAPINE 7.5 MG: 15 TABLET, FILM COATED ORAL at 20:46

## 2024-08-02 ASSESSMENT — PAIN SCALES - GENERAL
PAINLEVEL_OUTOF10: 0

## 2024-08-03 LAB
ALBUMIN SERPL-MCNC: 2.7 G/DL (ref 3.5–5)
ALBUMIN/GLOB SERPL: 0.9 (ref 1.1–2.2)
ALP SERPL-CCNC: 70 U/L (ref 45–117)
ALT SERPL-CCNC: 17 U/L (ref 12–78)
ANION GAP SERPL CALC-SCNC: 8 MMOL/L (ref 5–15)
AST SERPL-CCNC: 27 U/L (ref 15–37)
BASOPHILS # BLD: 0.2 K/UL (ref 0–0.1)
BASOPHILS NFR BLD: 3 % (ref 0–1)
BILIRUB SERPL-MCNC: 1.5 MG/DL (ref 0.2–1)
BUN SERPL-MCNC: 16 MG/DL (ref 6–20)
BUN/CREAT SERPL: 2 (ref 12–20)
CALCIUM SERPL-MCNC: 8.6 MG/DL (ref 8.5–10.1)
CHLORIDE SERPL-SCNC: 104 MMOL/L (ref 97–108)
CO2 SERPL-SCNC: 26 MMOL/L (ref 21–32)
CREAT SERPL-MCNC: 8.61 MG/DL (ref 0.7–1.3)
DIFFERENTIAL METHOD BLD: ABNORMAL
EOSINOPHIL # BLD: 1.3 K/UL (ref 0–0.4)
EOSINOPHIL NFR BLD: 25 % (ref 0–7)
ERYTHROCYTE [DISTWIDTH] IN BLOOD BY AUTOMATED COUNT: 19.6 % (ref 11.5–14.5)
GLOBULIN SER CALC-MCNC: 3 G/DL (ref 2–4)
GLUCOSE SERPL-MCNC: 89 MG/DL (ref 65–100)
HCT VFR BLD AUTO: 25.1 % (ref 36.6–50.3)
HGB BLD-MCNC: 7.9 G/DL (ref 12.1–17)
IMM GRANULOCYTES # BLD AUTO: 0 K/UL (ref 0–0.04)
IMM GRANULOCYTES NFR BLD AUTO: 0 % (ref 0–0.5)
LYMPHOCYTES # BLD: 1.1 K/UL (ref 0.8–3.5)
LYMPHOCYTES NFR BLD: 22 % (ref 12–49)
MCH RBC QN AUTO: 28.8 PG (ref 26–34)
MCHC RBC AUTO-ENTMCNC: 31.5 G/DL (ref 30–36.5)
MCV RBC AUTO: 91.6 FL (ref 80–99)
MONOCYTES # BLD: 0.9 K/UL (ref 0–1)
MONOCYTES NFR BLD: 17 % (ref 5–13)
NEUTS SEG # BLD: 1.7 K/UL (ref 1.8–8)
NEUTS SEG NFR BLD: 33 % (ref 32–75)
NRBC # BLD: 0 K/UL (ref 0–0.01)
NRBC BLD-RTO: 0 PER 100 WBC
PLATELET # BLD AUTO: 194 K/UL (ref 150–400)
PMV BLD AUTO: 11.3 FL (ref 8.9–12.9)
POTASSIUM SERPL-SCNC: 4.3 MMOL/L (ref 3.5–5.1)
PROT SERPL-MCNC: 5.7 G/DL (ref 6.4–8.2)
RBC # BLD AUTO: 2.74 M/UL (ref 4.1–5.7)
RBC MORPH BLD: ABNORMAL
SODIUM SERPL-SCNC: 138 MMOL/L (ref 136–145)
WBC # BLD AUTO: 5.2 K/UL (ref 4.1–11.1)

## 2024-08-03 PROCEDURE — 6370000000 HC RX 637 (ALT 250 FOR IP): Performed by: INTERNAL MEDICINE

## 2024-08-03 PROCEDURE — 2500000003 HC RX 250 WO HCPCS: Performed by: INTERNAL MEDICINE

## 2024-08-03 PROCEDURE — 6360000002 HC RX W HCPCS: Performed by: INTERNAL MEDICINE

## 2024-08-03 PROCEDURE — 36415 COLL VENOUS BLD VENIPUNCTURE: CPT

## 2024-08-03 PROCEDURE — 80053 COMPREHEN METABOLIC PANEL: CPT

## 2024-08-03 PROCEDURE — 85025 COMPLETE CBC W/AUTO DIFF WBC: CPT

## 2024-08-03 PROCEDURE — 2060000000 HC ICU INTERMEDIATE R&B

## 2024-08-03 PROCEDURE — 90935 HEMODIALYSIS ONE EVALUATION: CPT

## 2024-08-03 RX ORDER — DEXAMETHASONE SODIUM PHOSPHATE 4 MG/ML
4 INJECTION, SOLUTION INTRA-ARTICULAR; INTRALESIONAL; INTRAMUSCULAR; INTRAVENOUS; SOFT TISSUE EVERY 6 HOURS
Status: DISCONTINUED | OUTPATIENT
Start: 2024-08-03 | End: 2024-08-05

## 2024-08-03 RX ADMIN — DEXAMETHASONE SODIUM PHOSPHATE 4 MG: 4 INJECTION INTRA-ARTICULAR; INTRALESIONAL; INTRAMUSCULAR; INTRAVENOUS; SOFT TISSUE at 06:38

## 2024-08-03 RX ADMIN — DEXAMETHASONE SODIUM PHOSPHATE 4 MG: 4 INJECTION INTRA-ARTICULAR; INTRALESIONAL; INTRAMUSCULAR; INTRAVENOUS; SOFT TISSUE at 16:36

## 2024-08-03 RX ADMIN — Medication: at 22:02

## 2024-08-03 RX ADMIN — PANTOPRAZOLE SODIUM 40 MG: 40 TABLET, DELAYED RELEASE ORAL at 06:40

## 2024-08-03 RX ADMIN — HEPARIN SODIUM 5000 UNITS: 5000 INJECTION INTRAVENOUS; SUBCUTANEOUS at 22:02

## 2024-08-03 RX ADMIN — HEPARIN SODIUM 5000 UNITS: 5000 INJECTION INTRAVENOUS; SUBCUTANEOUS at 16:36

## 2024-08-03 RX ADMIN — MIRTAZAPINE 7.5 MG: 15 TABLET, FILM COATED ORAL at 20:13

## 2024-08-03 RX ADMIN — DEXAMETHASONE SODIUM PHOSPHATE 4 MG: 4 INJECTION INTRA-ARTICULAR; INTRALESIONAL; INTRAMUSCULAR; INTRAVENOUS; SOFT TISSUE at 01:39

## 2024-08-03 RX ADMIN — MIDODRINE HYDROCHLORIDE 15 MG: 5 TABLET ORAL at 11:36

## 2024-08-03 RX ADMIN — CALCIUM ACETATE 667 MG: 667 CAPSULE ORAL at 09:03

## 2024-08-03 RX ADMIN — MIDODRINE HYDROCHLORIDE 15 MG: 5 TABLET ORAL at 20:13

## 2024-08-03 RX ADMIN — MIDODRINE HYDROCHLORIDE 15 MG: 5 TABLET ORAL at 06:40

## 2024-08-03 RX ADMIN — PRAVASTATIN SODIUM 10 MG: 10 TABLET ORAL at 20:13

## 2024-08-03 RX ADMIN — PANTOPRAZOLE SODIUM 40 MG: 40 TABLET, DELAYED RELEASE ORAL at 16:36

## 2024-08-03 RX ADMIN — DOCUSATE SODIUM 200 MG: 100 CAPSULE, LIQUID FILLED ORAL at 20:13

## 2024-08-03 RX ADMIN — Medication: at 09:03

## 2024-08-03 RX ADMIN — CALCIUM ACETATE 667 MG: 667 CAPSULE ORAL at 11:36

## 2024-08-03 RX ADMIN — ACETAMINOPHEN 650 MG: 325 TABLET ORAL at 16:46

## 2024-08-03 RX ADMIN — GABAPENTIN 300 MG: 300 CAPSULE ORAL at 09:02

## 2024-08-03 RX ADMIN — CALCIUM ACETATE 667 MG: 667 CAPSULE ORAL at 16:36

## 2024-08-03 RX ADMIN — HEPARIN SODIUM 5000 UNITS: 5000 INJECTION INTRAVENOUS; SUBCUTANEOUS at 06:40

## 2024-08-03 RX ADMIN — DEXAMETHASONE SODIUM PHOSPHATE 4 MG: 4 INJECTION INTRA-ARTICULAR; INTRALESIONAL; INTRAMUSCULAR; INTRAVENOUS; SOFT TISSUE at 11:36

## 2024-08-03 ASSESSMENT — PAIN DESCRIPTION - ORIENTATION: ORIENTATION: POSTERIOR

## 2024-08-03 ASSESSMENT — PAIN DESCRIPTION - LOCATION: LOCATION: HEAD

## 2024-08-03 ASSESSMENT — PAIN SCALES - GENERAL
PAINLEVEL_OUTOF10: 0
PAINLEVEL_OUTOF10: 5
PAINLEVEL_OUTOF10: 0

## 2024-08-03 ASSESSMENT — PAIN SCALES - WONG BAKER: WONGBAKER_NUMERICALRESPONSE: NO HURT

## 2024-08-03 ASSESSMENT — PAIN DESCRIPTION - DESCRIPTORS: DESCRIPTORS: ACHING

## 2024-08-04 LAB
ANION GAP SERPL CALC-SCNC: 7 MMOL/L (ref 5–15)
BASOPHILS # BLD: 0 K/UL (ref 0–0.1)
BASOPHILS NFR BLD: 1 % (ref 0–1)
BUN SERPL-MCNC: 13 MG/DL (ref 6–20)
BUN/CREAT SERPL: 2 (ref 12–20)
CALCIUM SERPL-MCNC: 8.4 MG/DL (ref 8.5–10.1)
CHLORIDE SERPL-SCNC: 103 MMOL/L (ref 97–108)
CO2 SERPL-SCNC: 28 MMOL/L (ref 21–32)
CREAT SERPL-MCNC: 5.45 MG/DL (ref 0.7–1.3)
DIFFERENTIAL METHOD BLD: ABNORMAL
EOSINOPHIL # BLD: 0 K/UL (ref 0–0.4)
EOSINOPHIL NFR BLD: 0 % (ref 0–7)
ERYTHROCYTE [DISTWIDTH] IN BLOOD BY AUTOMATED COUNT: 20.1 % (ref 11.5–14.5)
GLUCOSE SERPL-MCNC: 137 MG/DL (ref 65–100)
HCT VFR BLD AUTO: 22.1 % (ref 36.6–50.3)
HGB BLD-MCNC: 7 G/DL (ref 12.1–17)
IMM GRANULOCYTES # BLD AUTO: 0 K/UL (ref 0–0.04)
IMM GRANULOCYTES NFR BLD AUTO: 0 % (ref 0–0.5)
LYMPHOCYTES # BLD: 0.8 K/UL (ref 0.8–3.5)
LYMPHOCYTES NFR BLD: 27 % (ref 12–49)
MCH RBC QN AUTO: 29.3 PG (ref 26–34)
MCHC RBC AUTO-ENTMCNC: 31.7 G/DL (ref 30–36.5)
MCV RBC AUTO: 92.5 FL (ref 80–99)
MONOCYTES # BLD: 0.6 K/UL (ref 0–1)
MONOCYTES NFR BLD: 19 % (ref 5–13)
NEUTS SEG # BLD: 1.6 K/UL (ref 1.8–8)
NEUTS SEG NFR BLD: 53 % (ref 32–75)
NRBC # BLD: 0.02 K/UL (ref 0–0.01)
NRBC BLD-RTO: 0.7 PER 100 WBC
PLATELET # BLD AUTO: 187 K/UL (ref 150–400)
PMV BLD AUTO: 11.6 FL (ref 8.9–12.9)
POTASSIUM SERPL-SCNC: 4.3 MMOL/L (ref 3.5–5.1)
RBC # BLD AUTO: 2.39 M/UL (ref 4.1–5.7)
SODIUM SERPL-SCNC: 138 MMOL/L (ref 136–145)
WBC # BLD AUTO: 2.9 K/UL (ref 4.1–11.1)

## 2024-08-04 PROCEDURE — 6360000002 HC RX W HCPCS: Performed by: INTERNAL MEDICINE

## 2024-08-04 PROCEDURE — 6370000000 HC RX 637 (ALT 250 FOR IP): Performed by: INTERNAL MEDICINE

## 2024-08-04 PROCEDURE — 85025 COMPLETE CBC W/AUTO DIFF WBC: CPT

## 2024-08-04 PROCEDURE — 80048 BASIC METABOLIC PNL TOTAL CA: CPT

## 2024-08-04 PROCEDURE — 2500000003 HC RX 250 WO HCPCS: Performed by: INTERNAL MEDICINE

## 2024-08-04 PROCEDURE — 36415 COLL VENOUS BLD VENIPUNCTURE: CPT

## 2024-08-04 PROCEDURE — 2060000000 HC ICU INTERMEDIATE R&B

## 2024-08-04 RX ADMIN — MIRTAZAPINE 7.5 MG: 15 TABLET, FILM COATED ORAL at 20:57

## 2024-08-04 RX ADMIN — ACETAMINOPHEN 650 MG: 325 TABLET ORAL at 20:58

## 2024-08-04 RX ADMIN — MIDODRINE HYDROCHLORIDE 15 MG: 5 TABLET ORAL at 13:45

## 2024-08-04 RX ADMIN — CALCIUM ACETATE 667 MG: 667 CAPSULE ORAL at 13:45

## 2024-08-04 RX ADMIN — MIDODRINE HYDROCHLORIDE 15 MG: 5 TABLET ORAL at 06:22

## 2024-08-04 RX ADMIN — Medication: at 21:00

## 2024-08-04 RX ADMIN — CALCIUM ACETATE 667 MG: 667 CAPSULE ORAL at 17:40

## 2024-08-04 RX ADMIN — HEPARIN SODIUM 5000 UNITS: 5000 INJECTION INTRAVENOUS; SUBCUTANEOUS at 06:24

## 2024-08-04 RX ADMIN — ACETAMINOPHEN 650 MG: 325 TABLET ORAL at 06:22

## 2024-08-04 RX ADMIN — DEXAMETHASONE SODIUM PHOSPHATE 4 MG: 4 INJECTION INTRA-ARTICULAR; INTRALESIONAL; INTRAMUSCULAR; INTRAVENOUS; SOFT TISSUE at 17:40

## 2024-08-04 RX ADMIN — HEPARIN SODIUM 5000 UNITS: 5000 INJECTION INTRAVENOUS; SUBCUTANEOUS at 20:57

## 2024-08-04 RX ADMIN — DOCUSATE SODIUM 200 MG: 100 CAPSULE, LIQUID FILLED ORAL at 20:57

## 2024-08-04 RX ADMIN — PANTOPRAZOLE SODIUM 40 MG: 40 TABLET, DELAYED RELEASE ORAL at 17:40

## 2024-08-04 RX ADMIN — DEXAMETHASONE SODIUM PHOSPHATE 4 MG: 4 INJECTION INTRA-ARTICULAR; INTRALESIONAL; INTRAMUSCULAR; INTRAVENOUS; SOFT TISSUE at 00:50

## 2024-08-04 RX ADMIN — GABAPENTIN 300 MG: 300 CAPSULE ORAL at 09:07

## 2024-08-04 RX ADMIN — DEXAMETHASONE SODIUM PHOSPHATE 4 MG: 4 INJECTION INTRA-ARTICULAR; INTRALESIONAL; INTRAMUSCULAR; INTRAVENOUS; SOFT TISSUE at 06:24

## 2024-08-04 RX ADMIN — Medication: at 09:07

## 2024-08-04 RX ADMIN — HEPARIN SODIUM 5000 UNITS: 5000 INJECTION INTRAVENOUS; SUBCUTANEOUS at 13:45

## 2024-08-04 RX ADMIN — CALCIUM ACETATE 667 MG: 667 CAPSULE ORAL at 09:07

## 2024-08-04 RX ADMIN — DEXAMETHASONE SODIUM PHOSPHATE 4 MG: 4 INJECTION INTRA-ARTICULAR; INTRALESIONAL; INTRAMUSCULAR; INTRAVENOUS; SOFT TISSUE at 13:45

## 2024-08-04 RX ADMIN — MIDODRINE HYDROCHLORIDE 15 MG: 5 TABLET ORAL at 20:59

## 2024-08-04 RX ADMIN — PANTOPRAZOLE SODIUM 40 MG: 40 TABLET, DELAYED RELEASE ORAL at 06:22

## 2024-08-04 RX ADMIN — PRAVASTATIN SODIUM 10 MG: 10 TABLET ORAL at 20:57

## 2024-08-04 ASSESSMENT — PAIN SCALES - GENERAL
PAINLEVEL_OUTOF10: 0
PAINLEVEL_OUTOF10: 6

## 2024-08-04 ASSESSMENT — PAIN DESCRIPTION - ORIENTATION
ORIENTATION: POSTERIOR
ORIENTATION: POSTERIOR

## 2024-08-04 ASSESSMENT — PAIN SCALES - WONG BAKER
WONGBAKER_NUMERICALRESPONSE: NO HURT
WONGBAKER_NUMERICALRESPONSE: NO HURT

## 2024-08-04 ASSESSMENT — PAIN DESCRIPTION - DESCRIPTORS
DESCRIPTORS: ACHING
DESCRIPTORS: ACHING

## 2024-08-04 ASSESSMENT — PAIN - FUNCTIONAL ASSESSMENT: PAIN_FUNCTIONAL_ASSESSMENT: ACTIVITIES ARE NOT PREVENTED

## 2024-08-04 ASSESSMENT — PAIN DESCRIPTION - LOCATION
LOCATION: HEAD
LOCATION: HEAD

## 2024-08-05 LAB
HBV SURFACE AB SER QL: NONREACTIVE
HBV SURFACE AB SER-ACNC: 3.57 MIU/ML
HBV SURFACE AG SER QL: <0.1 INDEX
HBV SURFACE AG SER QL: NEGATIVE

## 2024-08-05 PROCEDURE — 97116 GAIT TRAINING THERAPY: CPT

## 2024-08-05 PROCEDURE — 87340 HEPATITIS B SURFACE AG IA: CPT

## 2024-08-05 PROCEDURE — 6370000000 HC RX 637 (ALT 250 FOR IP): Performed by: NURSE PRACTITIONER

## 2024-08-05 PROCEDURE — 86706 HEP B SURFACE ANTIBODY: CPT

## 2024-08-05 PROCEDURE — 6370000000 HC RX 637 (ALT 250 FOR IP): Performed by: INTERNAL MEDICINE

## 2024-08-05 PROCEDURE — 97535 SELF CARE MNGMENT TRAINING: CPT

## 2024-08-05 PROCEDURE — 97530 THERAPEUTIC ACTIVITIES: CPT

## 2024-08-05 PROCEDURE — 36415 COLL VENOUS BLD VENIPUNCTURE: CPT

## 2024-08-05 PROCEDURE — 6360000002 HC RX W HCPCS: Performed by: INTERNAL MEDICINE

## 2024-08-05 PROCEDURE — 2060000000 HC ICU INTERMEDIATE R&B

## 2024-08-05 PROCEDURE — 2500000003 HC RX 250 WO HCPCS: Performed by: INTERNAL MEDICINE

## 2024-08-05 RX ORDER — PREDNISONE 20 MG/1
20 TABLET ORAL DAILY
Status: DISCONTINUED | OUTPATIENT
Start: 2024-08-05 | End: 2024-08-07 | Stop reason: HOSPADM

## 2024-08-05 RX ORDER — HEPARIN SODIUM 5000 [USP'U]/ML
5000 INJECTION, SOLUTION INTRAVENOUS; SUBCUTANEOUS 2 TIMES DAILY
Status: DISCONTINUED | OUTPATIENT
Start: 2024-08-05 | End: 2024-08-07 | Stop reason: HOSPADM

## 2024-08-05 RX ORDER — FAMOTIDINE 20 MG/1
20 TABLET, FILM COATED ORAL ONCE
Status: COMPLETED | OUTPATIENT
Start: 2024-08-05 | End: 2024-08-05

## 2024-08-05 RX ADMIN — ACETAMINOPHEN 650 MG: 325 TABLET ORAL at 21:19

## 2024-08-05 RX ADMIN — MIDODRINE HYDROCHLORIDE 15 MG: 5 TABLET ORAL at 06:57

## 2024-08-05 RX ADMIN — ACETAMINOPHEN 650 MG: 325 TABLET ORAL at 10:55

## 2024-08-05 RX ADMIN — DOCUSATE SODIUM 200 MG: 100 CAPSULE, LIQUID FILLED ORAL at 21:12

## 2024-08-05 RX ADMIN — CALCIUM ACETATE 667 MG: 667 CAPSULE ORAL at 18:20

## 2024-08-05 RX ADMIN — DEXAMETHASONE SODIUM PHOSPHATE 4 MG: 4 INJECTION INTRA-ARTICULAR; INTRALESIONAL; INTRAMUSCULAR; INTRAVENOUS; SOFT TISSUE at 01:33

## 2024-08-05 RX ADMIN — CALCIUM ACETATE 667 MG: 667 CAPSULE ORAL at 09:43

## 2024-08-05 RX ADMIN — MIDODRINE HYDROCHLORIDE 15 MG: 5 TABLET ORAL at 21:11

## 2024-08-05 RX ADMIN — GABAPENTIN 300 MG: 300 CAPSULE ORAL at 09:44

## 2024-08-05 RX ADMIN — Medication: at 21:13

## 2024-08-05 RX ADMIN — MIDODRINE HYDROCHLORIDE 15 MG: 5 TABLET ORAL at 12:38

## 2024-08-05 RX ADMIN — FAMOTIDINE 20 MG: 20 TABLET, FILM COATED ORAL at 01:33

## 2024-08-05 RX ADMIN — Medication: at 09:46

## 2024-08-05 RX ADMIN — PANTOPRAZOLE SODIUM 40 MG: 40 TABLET, DELAYED RELEASE ORAL at 18:20

## 2024-08-05 RX ADMIN — PREDNISONE 20 MG: 20 TABLET ORAL at 10:20

## 2024-08-05 RX ADMIN — HEPARIN SODIUM 5000 UNITS: 5000 INJECTION INTRAVENOUS; SUBCUTANEOUS at 21:11

## 2024-08-05 RX ADMIN — DEXAMETHASONE SODIUM PHOSPHATE 4 MG: 4 INJECTION INTRA-ARTICULAR; INTRALESIONAL; INTRAMUSCULAR; INTRAVENOUS; SOFT TISSUE at 06:57

## 2024-08-05 RX ADMIN — EPOETIN ALFA-EPBX 10000 UNITS: 10000 INJECTION, SOLUTION INTRAVENOUS; SUBCUTANEOUS at 21:19

## 2024-08-05 RX ADMIN — MIRTAZAPINE 7.5 MG: 15 TABLET, FILM COATED ORAL at 21:11

## 2024-08-05 RX ADMIN — CALCIUM ACETATE 667 MG: 667 CAPSULE ORAL at 12:38

## 2024-08-05 RX ADMIN — PRAVASTATIN SODIUM 10 MG: 10 TABLET ORAL at 21:14

## 2024-08-05 RX ADMIN — PANTOPRAZOLE SODIUM 40 MG: 40 TABLET, DELAYED RELEASE ORAL at 06:58

## 2024-08-05 RX ADMIN — HEPARIN SODIUM 5000 UNITS: 5000 INJECTION INTRAVENOUS; SUBCUTANEOUS at 06:57

## 2024-08-05 ASSESSMENT — PAIN DESCRIPTION - DESCRIPTORS
DESCRIPTORS: ACHING
DESCRIPTORS: ACHING

## 2024-08-05 ASSESSMENT — PAIN SCALES - GENERAL
PAINLEVEL_OUTOF10: 0
PAINLEVEL_OUTOF10: 0
PAINLEVEL_OUTOF10: 2
PAINLEVEL_OUTOF10: 0
PAINLEVEL_OUTOF10: 5
PAINLEVEL_OUTOF10: 0
PAINLEVEL_OUTOF10: 5
PAINLEVEL_OUTOF10: 0

## 2024-08-05 ASSESSMENT — PAIN - FUNCTIONAL ASSESSMENT: PAIN_FUNCTIONAL_ASSESSMENT: ACTIVITIES ARE NOT PREVENTED

## 2024-08-05 ASSESSMENT — PAIN DESCRIPTION - ONSET: ONSET: ON-GOING

## 2024-08-05 ASSESSMENT — PAIN DESCRIPTION - LOCATION
LOCATION: HEAD
LOCATION: HEAD

## 2024-08-05 ASSESSMENT — PAIN DESCRIPTION - ORIENTATION
ORIENTATION: MID
ORIENTATION: POSTERIOR

## 2024-08-05 ASSESSMENT — PAIN DESCRIPTION - FREQUENCY: FREQUENCY: INTERMITTENT

## 2024-08-06 LAB
ALBUMIN SERPL-MCNC: 2.7 G/DL (ref 3.5–5)
ALBUMIN/GLOB SERPL: 1 (ref 1.1–2.2)
ALP SERPL-CCNC: 83 U/L (ref 45–117)
ALT SERPL-CCNC: 22 U/L (ref 12–78)
ANION GAP SERPL CALC-SCNC: 8 MMOL/L (ref 5–15)
AST SERPL-CCNC: 21 U/L (ref 15–37)
BACTERIA SPEC CULT: NORMAL
BASOPHILS # BLD: 0.1 K/UL (ref 0–0.1)
BASOPHILS NFR BLD: 1 % (ref 0–1)
BILIRUB SERPL-MCNC: 0.7 MG/DL (ref 0.2–1)
BUN SERPL-MCNC: 48 MG/DL (ref 6–20)
BUN/CREAT SERPL: 6 (ref 12–20)
CALCIUM SERPL-MCNC: 8.4 MG/DL (ref 8.5–10.1)
CHLORIDE SERPL-SCNC: 100 MMOL/L (ref 97–108)
CO2 SERPL-SCNC: 27 MMOL/L (ref 21–32)
CREAT SERPL-MCNC: 8.57 MG/DL (ref 0.7–1.3)
DIFFERENTIAL METHOD BLD: ABNORMAL
EOSINOPHIL # BLD: 0.1 K/UL (ref 0–0.4)
EOSINOPHIL NFR BLD: 1 % (ref 0–7)
ERYTHROCYTE [DISTWIDTH] IN BLOOD BY AUTOMATED COUNT: 20.9 % (ref 11.5–14.5)
GLOBULIN SER CALC-MCNC: 2.8 G/DL (ref 2–4)
GLUCOSE SERPL-MCNC: 87 MG/DL (ref 65–100)
HCT VFR BLD AUTO: 22.7 % (ref 36.6–50.3)
HGB BLD-MCNC: 7.3 G/DL (ref 12.1–17)
IMM GRANULOCYTES # BLD AUTO: 0 K/UL (ref 0–0.04)
IMM GRANULOCYTES NFR BLD AUTO: 0 % (ref 0–0.5)
LYMPHOCYTES # BLD: 1.5 K/UL (ref 0.8–3.5)
LYMPHOCYTES NFR BLD: 29 % (ref 12–49)
MCH RBC QN AUTO: 30.3 PG (ref 26–34)
MCHC RBC AUTO-ENTMCNC: 32.2 G/DL (ref 30–36.5)
MCV RBC AUTO: 94.2 FL (ref 80–99)
MONOCYTES # BLD: 0.8 K/UL (ref 0–1)
MONOCYTES NFR BLD: 16 % (ref 5–13)
NEUTS SEG # BLD: 2.6 K/UL (ref 1.8–8)
NEUTS SEG NFR BLD: 53 % (ref 32–75)
NRBC # BLD: 0.04 K/UL (ref 0–0.01)
NRBC BLD-RTO: 0.8 PER 100 WBC
PLATELET # BLD AUTO: 229 K/UL (ref 150–400)
PMV BLD AUTO: 11.1 FL (ref 8.9–12.9)
POTASSIUM SERPL-SCNC: 4.9 MMOL/L (ref 3.5–5.1)
PROT SERPL-MCNC: 5.5 G/DL (ref 6.4–8.2)
RBC # BLD AUTO: 2.41 M/UL (ref 4.1–5.7)
RBC MORPH BLD: ABNORMAL
RBC MORPH BLD: ABNORMAL
SERVICE CMNT-IMP: NORMAL
SODIUM SERPL-SCNC: 135 MMOL/L (ref 136–145)
WBC # BLD AUTO: 5.1 K/UL (ref 4.1–11.1)

## 2024-08-06 PROCEDURE — 6370000000 HC RX 637 (ALT 250 FOR IP): Performed by: INTERNAL MEDICINE

## 2024-08-06 PROCEDURE — 1100000003 HC PRIVATE W/ TELEMETRY

## 2024-08-06 PROCEDURE — 97535 SELF CARE MNGMENT TRAINING: CPT

## 2024-08-06 PROCEDURE — 90935 HEMODIALYSIS ONE EVALUATION: CPT

## 2024-08-06 PROCEDURE — 80053 COMPREHEN METABOLIC PANEL: CPT

## 2024-08-06 PROCEDURE — 36415 COLL VENOUS BLD VENIPUNCTURE: CPT

## 2024-08-06 PROCEDURE — 85025 COMPLETE CBC W/AUTO DIFF WBC: CPT

## 2024-08-06 PROCEDURE — 6360000002 HC RX W HCPCS: Performed by: INTERNAL MEDICINE

## 2024-08-06 PROCEDURE — 2500000003 HC RX 250 WO HCPCS: Performed by: INTERNAL MEDICINE

## 2024-08-06 PROCEDURE — 97116 GAIT TRAINING THERAPY: CPT

## 2024-08-06 RX ADMIN — DOCUSATE SODIUM 200 MG: 100 CAPSULE, LIQUID FILLED ORAL at 22:23

## 2024-08-06 RX ADMIN — HEPARIN SODIUM 5000 UNITS: 5000 INJECTION INTRAVENOUS; SUBCUTANEOUS at 22:24

## 2024-08-06 RX ADMIN — CALCIUM ACETATE 667 MG: 667 CAPSULE ORAL at 08:36

## 2024-08-06 RX ADMIN — MIDODRINE HYDROCHLORIDE 15 MG: 5 TABLET ORAL at 22:25

## 2024-08-06 RX ADMIN — PRAVASTATIN SODIUM 10 MG: 10 TABLET ORAL at 22:24

## 2024-08-06 RX ADMIN — MIDODRINE HYDROCHLORIDE 15 MG: 5 TABLET ORAL at 11:16

## 2024-08-06 RX ADMIN — ACETAMINOPHEN 650 MG: 325 TABLET ORAL at 18:06

## 2024-08-06 RX ADMIN — HEPARIN SODIUM 5000 UNITS: 5000 INJECTION INTRAVENOUS; SUBCUTANEOUS at 08:36

## 2024-08-06 RX ADMIN — ERGOCALCIFEROL 50000 UNITS: 1.25 CAPSULE ORAL at 08:36

## 2024-08-06 RX ADMIN — CALCIUM ACETATE 667 MG: 667 CAPSULE ORAL at 11:18

## 2024-08-06 RX ADMIN — CALCIUM ACETATE 667 MG: 667 CAPSULE ORAL at 18:06

## 2024-08-06 RX ADMIN — MIDODRINE HYDROCHLORIDE 15 MG: 5 TABLET ORAL at 05:18

## 2024-08-06 RX ADMIN — Medication: at 08:36

## 2024-08-06 RX ADMIN — Medication: at 22:25

## 2024-08-06 RX ADMIN — GABAPENTIN 300 MG: 300 CAPSULE ORAL at 08:36

## 2024-08-06 RX ADMIN — MIRTAZAPINE 7.5 MG: 15 TABLET, FILM COATED ORAL at 22:24

## 2024-08-06 RX ADMIN — PREDNISONE 20 MG: 20 TABLET ORAL at 08:35

## 2024-08-06 RX ADMIN — PANTOPRAZOLE SODIUM 40 MG: 40 TABLET, DELAYED RELEASE ORAL at 18:06

## 2024-08-06 RX ADMIN — PANTOPRAZOLE SODIUM 40 MG: 40 TABLET, DELAYED RELEASE ORAL at 05:18

## 2024-08-06 ASSESSMENT — PAIN DESCRIPTION - LOCATION: LOCATION: HEAD

## 2024-08-06 ASSESSMENT — PAIN SCALES - GENERAL
PAINLEVEL_OUTOF10: 3
PAINLEVEL_OUTOF10: 0
PAINLEVEL_OUTOF10: 0

## 2024-08-06 NOTE — FLOWSHEET NOTE
07/09/24 0414   Nursing Delirium Screening Scale (Nu-DESC)   Disorientation 0   Innappropriate Behavior 0   Innappropriate Communication 0   Illusions/Hallucinations 0   Psychomotor Retardation 0   Nu-DESC Score (calculated) 0       
   07/17/24 1138   Treatment   Time Off 1138   Observations & Evaluations   Level of Consciousness 0   Oriented X 4   Heart Rhythm Regular   Respiratory Quality/Effort Unlabored   O2 Device Nasal cannula  (2L)   Bilateral Breath Sounds Diminished   Skin Condition/Temp Dry;Warm   Appetite Fair   Abdomen Inspection Soft   Bowel Sounds (All Quadrants) Active   Edema Right lower extremity;Left lower extremity   RLE Edema Trace;+1   LLE Edema Trace;+1   Vital Signs   /67   Temp 97.6 °F (36.4 °C)   Pulse 80   Respirations 18   Pain Assessment   Pain Assessment None - Denies Pain   Hemodialysis Fistula/Graft Arteriovenous fistula Left Forearm   No placement date or time found.   Present on Admission/Arrival: Yes  Access Type: Arteriovenous fistula  Orientation: Left  Access Location: Forearm   Site Assessment Clean, dry & intact   Thrill Present   Bruit Present   Status Deaccessed   Dressing Status New dressing applied   Post-Hemodialysis Assessment   Post-Treatment Procedures Blood returned;Access bleeding time < 10 minutes   Machine Disinfection Process Exterior Machine Disinfection   Rinseback Volume (ml) 300 ml   Blood Volume Processed (Liters) 70.6 L   Dialyzer Clearance Lightly streaked   Duration of Treatment (minutes) 210 minutes   Hemodialysis Intake (ml) 500 ml   Hemodialysis Output (ml) 500 ml   NET Removed (ml) 0   Tolerated Treatment Good   Patient Disposition Return to room     Primary RN SBAR: Jessica Jessica RN  Comments: Pt tolerated treatment fair. BP soft. No fluid removal per Dr. Suazo. Pt moves his arms frequently causing arterial pressures to spike. BFR reduced to accomade arterial pressures. Dr. Suazo made aware.     
   07/25/24 1110   Vital Signs   BP (!) 97/90   Temp 98.2 °F (36.8 °C)   Pulse 76   Respirations 15   SpO2 97 %   Pain Assessment   Pain Assessment None - Denies Pain   Treatment   Time On 1115   Time Off 1415   Treatment Goal 0   Observations & Evaluations   Level of Consciousness 0   Oriented X 3   Heart Rhythm Regular   Respiratory Quality/Effort Unlabored   O2 Device None (Room air)   Bilateral Breath Sounds Clear   Skin Color Ashen   Skin Condition/Temp Dry;Warm   Abdomen Inspection Soft   Bowel Sounds (All Quadrants) Active   Edema None   Technical Checks   Dialysis Machine No. 09   RO Machine Number [id number]   Dialyzer Lot No. [id number]   Tubing Lot Number [id number]   All Connections Secure Yes   NS Bag Yes   Saline Line Double Clamped Yes   Dialyzer Revaclear 300   Prime Volume (mL) 200 mL   ICEBOAT I;C;E;B;O;A;T   RO Machine Log Sheet Completed Yes   Machine Alarm Self Test Completed;Passed   Air Foam Detector Tested;Proper Function;pH Reading   Extracorporeal Circuit Tested for Integrity Yes   Machine Conductivity 13.9   Manual Ph 7.4   Bleach Test (Neg) Yes   Bath Temperature 98.6 °F (37 °C)   Dialysis Bath   K+ (Potassium) 3   Ca+ (Calcium) 2.5   Na+ (Sodium) 140   HCO3 (Bicarb) 35   Bicarbonate Concentrate Lot No. [id number]   Acid Concentrate Lot No. [id number]   Treatment Initiation   Dialyze Hours 3   Treatment  Initiation Universal Precautions maintained;Lines secured to patient;Connections secured;Prime given;Venous Parameters set;Arterial Parameters set;Air foam detector engaged   Hemodialysis Fistula/Graft Arteriovenous fistula Left Forearm   No placement date or time found.   Present on Admission/Arrival: Yes  Access Type: Arteriovenous fistula  Orientation: Left  Access Location: Forearm   Site Assessment Clean, dry & intact   Thrill Present   Bruit Present   Status Accessed   Venous Needle Size 15 G   Arterial Needle Size 15 G   Accessed By: Yajaira   Access Attempts  1   Access Interventions 
   07/25/24 1415   Vital Signs   BP (!) 104/57   Temp 97.9 °F (36.6 °C)   Pulse 88   Respirations 17   SpO2 98 %   Pain Assessment   Pain Assessment None - Denies Pain   Post-Hemodialysis Assessment   Post-Treatment Procedures Blood returned;Access bleeding time < 10 minutes   Machine Disinfection Process Exterior Machine Disinfection   Rinseback Volume (ml) 300 ml   Blood Volume Processed (Liters) 64.8 L   Dialyzer Clearance Clear   Duration of Treatment (minutes) 174 minutes   Heparin Amount Administered During Treatment (mL) 0 mL   Hemodialysis Intake (ml) 600 ml   Hemodialysis Output (ml) 485 ml   NET Removed (ml) -115   Tolerated Treatment Good   Interventions Taken Medication   Patient Response to Treatment tolerated   Bilateral Breath Sounds Clear   Edema None   Observations & Evaluations   Level of Consciousness 0   Oriented X 3   Heart Rhythm Regular   Respiratory Quality/Effort Unlabored   O2 Device None (Room air)   Skin Color Ashen   Skin Condition/Temp Dry;Warm     Primary RN SBAR:Drea Diamond RN  Comments: Treatment initiated via left lower AV-Fistula. Albumin given for BP support. Blood returned 6 minutes prior to termination r/t patient having BM and wanted to get cleaned up. All possible blood returned. Fistula needles removed. Hemostasis achieved post hand held pressure. Clean dressing applied. Removal=[id number]= fluid gain of 115 ml fluid. Endorsed to primary RN Drea HASTINGS  
   07/29/24 1100   Vitals   BP (!) 91/55   MAP (Calculated) 67   MAP (mmHg) (!) 58     1100:Patient had a large red bloody stool.Patient stated:\" I am dizzy\"Patient was assisted to bed. Dr Ryley STERLING. At bedside    Per MD Hedrick,O give Albumin 25 g once.  1114 Patient had another large bright red  BM  with clots in it.  Will notify MD  
   07/29/24 1113 07/29/24 1117 07/29/24 1118   Vitals   Pulse (!) (S)  110 (!) (S)  104 (!) (S)  103   Respirations (S)  17 (S)  21 (S)  23   BP (!) (S)  77/49 (!) (S)  74/43 (!) (S)  77/50   MAP (Calculated) 58 53 59   MAP (mmHg) (!) (S)  55 (!) (S)  49 (!) (S)  55      07/29/24 1119 07/29/24 1123   Vitals   Pulse (S)  99 (S)  94   Respirations (S)  22 (S)  21   BP (!) (S)  65/47 (!) (S)  86/58   MAP (Calculated) 53 67   MAP (mmHg) (!) (S)  51 (!) (S)  64     Patient became hypotensive after having larger bright red BM. Patient assisted to bed.  Albumin infusion started.  MD notified via perfect serve.  1155 Labs collected for H&H  
   07/30/24 1315   Handoff   Communication Given Transfer Handoff   Handoff phase Phase I receiving   Handoff Given To Stephany RN   Handoff Received From OR RN and HYUN Ramírez CRNA   Handoff Communication Face to Face;At bedside       
   07/30/24 1425   Handoff   Communication Given Transfer Handoff   Handoff phase Phase I transferring   Handoff Given To Carmela FLORENTINO   Handoff Received From Stephany FLORENTINO   Handoff Communication Telephone       
   08/01/24 1320   Treatment   Time Off 1320   Observations & Evaluations   Level of Consciousness 0   Oriented X 4   Heart Rhythm Regular   Respiratory Quality/Effort Unlabored   O2 Device None (Room air)   Bilateral Breath Sounds Clear   Skin Condition/Temp Dry   Abdomen Inspection Flat   Edema None   RLE Edema None   LLE Edema None   Vital Signs   BP (!) 94/58   Temp 98 °F (36.7 °C)   Pulse 84   Respirations 13   Pain Assessment   Pain Assessment None - Denies Pain   Hemodialysis Fistula/Graft Arteriovenous fistula Left Forearm   No placement date or time found.   Present on Admission/Arrival: Yes  Access Type: Arteriovenous fistula  Orientation: Left  Access Location: Forearm   Site Assessment Clean, dry & intact   Thrill Present   Bruit Present   Status Deaccessed   Date of Last Dressing Change 08/01/24   Post-Hemodialysis Assessment   Post-Treatment Procedures Blood returned;Access bleeding time < 10 minutes   Machine Disinfection Process Exterior Machine Disinfection   Rinseback Volume (ml) 300 ml   Blood Volume Processed (Liters) 65 L   Dialyzer Clearance Clear   Duration of Treatment (minutes) 180 minutes   Hemodialysis Intake (ml) 500 ml   Hemodialysis Output (ml) 500 ml   NET Removed (ml) 0   Tolerated Treatment Good     Primary RN SBAR: Radha Grewal RN  Comments: tolerated tx well    
  Pre Treatment Note:      07/09/24 2200   Treatment   Time On 2200   Treatment Goal 3.5 hours, 3L UF   Observations & Evaluations   Level of Consciousness 0   Oriented X 4   Heart Rhythm Regular   Respiratory Quality/Effort Dyspnea with exertion   O2 Device None (Room air)   Skin Color Pink   Skin Condition/Temp Warm;Dry   Abdomen Inspection Soft;Flat   Edema Left lower extremity;Right lower extremity   RLE Edema +1   LLE Edema +1   Vital Signs   BP (!) 145/67   Temp 97.7 °F (36.5 °C)   Pulse 59   Respirations 18   Pain Assessment   Pain Assessment None - Denies Pain   Technical Checks   Dialysis Machine No. [facility] 03   RO Machine Number [id number]   Dialyzer Lot No. [id number]   Tubing Lot Number [id number]   All Connections Secure Yes   NS Bag Yes   Saline Line Double Clamped Yes   Dialyzer Revaclear 300   Prime Volume (mL) 200 mL   ICEBOAT I;C;E;B;O;A;T   RO Machine Log Sheet Completed Yes   Machine Alarm Self Test Completed;Passed   Air Foam Detector Tested;Proper Function   Extracorporeal Circuit Tested for Integrity Yes   Machine Conductivity 13.6   Manual Ph 7.4   Bleach Test (Neg) Yes   Bath Temperature 98.6 °F (37 °C)   Treatment Initiation   Dialyze Hours 3.5   Treatment  Initiation Universal Precautions maintained;Lines secured to patient;Connections secured;Prime given;Venous Parameters set;Arterial Parameters set;Air foam detector engaged;Saline line double clamped;REV-300   During Hemodialysis Assessment   Blood Flow Rate (ml/min) 400 ml/min   Arterial Pressure (mmHg) -210 mmHg   Venous Pressure (mmHg) 200   TMP 60      Access Visible Yes   Ultrafiltration Rate (ml/hr) 1000 ml/hr   Ultrafiltration Removed (ml) 0 ml   Hemodialysis Fistula/Graft Arteriovenous fistula Left Forearm   No placement date or time found.   Present on Admission/Arrival: Yes  Access Type: Arteriovenous fistula  Orientation: Left  Access Location: Forearm   Site Assessment Clean, dry & intact   Thrill Present   Bruit Present   Status 
Pre HD note   07/22/24 0815   Vital Signs   BP 95/61   Temp 97.1 °F (36.2 °C)   Pulse 80   Respirations 16   Pain Assessment   Pain Assessment 0-10   Pain Level 4   Pain Location Abdomen   Pain Orientation Mid   Treatment   Time On 0815   Treatment Goal 0   Observations & Evaluations   Level of Consciousness 0   Oriented X 3   Heart Rhythm Regular   Respiratory Quality/Effort Unlabored   O2 Device None (Room air)   Bilateral Breath Sounds Clear   Abdomen Inspection Distended   RLE Edema Trace   LLE Edema Trace   Technical Checks   Dialysis Machine No. 07   RO Machine Number [id number]   Dialyzer Lot No. [id number]   [place]ub[place] Lot Number [id number]   All Connections Secure Yes   NS Bag Yes   Saline Line Double Clamped Yes   Dialyzer Revaclear 300   Prime Volume (mL) 300 mL   ICEBOAT I;C;E;B;O;A;T   RO Machine Log Sheet Completed Yes   Machine Alarm Self Test Completed;Passed   Air Foam Detector Tested;Proper Function;pH Reading   Extracorporeal Circuit Tested for Integrity Yes   Machine Conductivity 14.2   Machine Ph 7.4   Manual Ph 7.4   Bleach Test (Neg) Yes   Bath Temperature 98.2 °F (36.8 °C)   Dialysis Bath   K+ (Potassium) 3   Ca+ (Calcium) 2.5   Na+ (Sodium) 140   HCO3 (Bicarb) 35   Bicarbonate Concentrate Lot No. [id number]   Acid Concentrate Lot No. [id number]   Treatment Initiation   Dialyze Hours 3   Treatment  Initiation Universal Precautions maintained;Lines secured to patient;Connections secured;Prime given;Venous Parameters set;Arterial Parameters set;Air foam detector engaged;Dialysate;Saline line double clamped;Hemo-Safe Applied;Revaclear Dialyzer;Dialyzer;Kidney;REV-300   Hemodialysis Fistula/Graft Arteriovenous fistula Left Forearm   No placement date or time found.   Present on Admission/Arrival: Yes  Access Type: Arteriovenous fistula  Orientation: Left  Access Location: Forearm   Site Assessment Clean, dry & intact   Thrill Present   Bruit Present   Status Deaccessed   Venous Needle Size 15 G   Arterial 
Primary RN SBAR: Evelia Saeed RN  Patient Education provided: HD treatment  Incapacitated Nurse keith. provided: 2nd RN in suite  Preferred Education method and Primary language: Verbal, English  Hospital associated wait time; reason: 30 min wait for transport  Hepatitis B Surface Ag   Date/Time Value Ref Range Status   07/10/2024 03:36 AM <0.10 Index Final     Hep B S Ag Interp   Date/Time Value Ref Range Status   07/10/2024 03:36 AM Negative NEG   Final     Hep B S Ab   Date/Time Value Ref Range Status   07/10/2024 03:36 AM <3.10 mIU/mL Final     Hep B S Ab Interp   Date/Time Value Ref Range Status   07/10/2024 03:36 AM NONREACTIVE NR   Final     Comment:     (NOTE)  The [employer] Centaur Anti-HBs2 assay is traceable to the World Health   Organization (WHO) Hepatitis B Immunoglobulin 1st International   Reference Preparation (1977). Samples with a calculated value of 10   mIU/mL or greater are considered reactive (protective) in accordance   with the CDC guidelines. The accepted criteria for immunity to HBV is   anti-HBs activity greater than or equal to 10 mIU/mL, as defined by   the WHO International Reference Preparation.  Assay performance has not been established in pregnant women,   patients who are immunosuppressed or immunocompromised, nor have   performance characteristics been established in conjunction with   other 's assays for specific HBV serologic markers. This   assay does not differentiate between vaccine induced immune response   and a response due to infection with HBV. Passively acquired anti-HBs   may be identified following patient transfusion, receipt of   immunoglobulin products, etc.          07/17/24 0804   Vital Signs   BP (!) 100/49   Temp 97.8 °F (36.6 °C)   Pulse 65   Respirations 18   Pain Assessment   Pain Assessment None - Denies Pain   Treatment   Time On 0804   Treatment Goal 0ml   Observations & Evaluations   Level of Consciousness 0   Oriented X 4   Heart Rhythm 
Primary RN SBAR: Kenia De La Torre RN  Patient Education provided: procedural  Incapacitated Nurse edu. provided: yes  Preferred Education method and Primary language: verbal/ English  Hospital associated wait time; reason: 15min transport  Hepatitis B Surface Ag   Date/Time Value Ref Range Status   08/05/2024 04:40 AM <0.10 Index Final     Hep B S Ag Interp   Date/Time Value Ref Range Status   08/05/2024 04:40 AM Negative NEG   Final     HEP B SURF AB   Date/Time Value Ref Range Status   04/11/2023 09:23 AM <3.10 mIU/mL Final     Hep B S Ab   Date/Time Value Ref Range Status   08/05/2024 04:40 AM 3.57 mIU/mL Final     Hep B S Ab Interp   Date/Time Value Ref Range Status   08/05/2024 04:40 AM NONREACTIVE NR   Final     Comment:     (NOTE)  The [employer]VIA Centaur Anti-HBs2 assay is traceable to the World Health   Organization (WHO) Hepatitis B Immunoglobulin 1st International   Reference Preparation (1977). Samples with a calculated value of 10   mIU/mL or greater are considered reactive (protective) in accordance   with the CDC guidelines. The accepted criteria for immunity to HBV is   anti-HBs activity greater than or equal to 10 mIU/mL, as defined by   the WHO International Reference Preparation.  Assay performance has not been established in pregnant women,   patients who are immunosuppressed or immunocompromised, nor have   performance characteristics been established in conjunction with   other 's assays for specific HBV serologic markers. This   assay does not differentiate between vaccine induced immune response   and a response due to infection with HBV. Passively acquired anti-HBs   may be identified following patient transfusion, receipt of   immunoglobulin products, etc.       Pre-HD   08/06/24 1315   Observations & Evaluations   Level of Consciousness 0   Oriented X 4   Heart Rhythm Regular   Respiratory Quality/Effort Unlabored   O2 Device None (Room air)   Bilateral Breath Sounds Clear   Skin 
Primary RN SBAR: Lang, Merlin   Comments: tolerated treatment well      07/20/24 1300   Vital Signs   /87   Temp 98.3 °F (36.8 °C)   Pulse 89   Respirations 14   Pain Assessment   Pain Assessment None - Denies Pain   Pain Level 0   Post-Hemodialysis Assessment   Post-Treatment Procedures Blood returned;Access bleeding time < 10 minutes   Machine Disinfection Process Exterior Machine Disinfection   Rinseback Volume (ml)   (300)   Blood Volume Processed (Liters) 65.9 L   Dialyzer Clearance Clear   Duration of Treatment (minutes) 3.5 minutes   Hemodialysis Intake (ml) 500 ml   Hemodialysis Output (ml) 28 ml   NET Removed (ml) -472   Tolerated Treatment Good   Patient Response to Treatment tolerated treatment well   Bilateral Breath Sounds Clear   Edema None   Time Off 1248   Patient Disposition Other (Comment)  (remain in room)   Observations & Evaluations   Level of Consciousness 0   Oriented X x4   Heart Rhythm Regular   Respiratory Quality/Effort Unlabored   O2 Device None (Room air)   Skin Color Other (comment)  (appropriate for ethnicity)   Skin Condition/Temp Dry;Warm   Appetite Good   Abdomen Inspection Soft   Bowel Sounds (All Quadrants) Active        07/20/24 1320   Hemodialysis Fistula/Graft Arteriovenous fistula Left Forearm   No placement date or time found.   Present on Admission/Arrival: Yes  Access Type: Arteriovenous fistula  Orientation: Left  Access Location: Forearm   Site Assessment Clean, dry & intact   Thrill Present   Bruit Present   Status Deaccessed   Date of Last Dressing Change 07/20/24   Dressing Intervention New   Dressing Status New dressing applied     
Primary RN SBAR: Lang, Merlin RN  Patient Education provided: Ordered Dialysis Treatment  Incapacitated Nurse [facility]. provided: Yes  Preferred Education method and Primary language: Verbal, English  Hospital associated wait time; reason: N/A  Hepatitis B Surface Ag   Date/Time Value Ref Range Status   07/10/2024 03:36 AM <0.10 Index Final     Hep B S Ag Interp   Date/Time Value Ref Range Status   07/10/2024 03:36 AM Negative NEG   Final     HEP B SURF AB   Date/Time Value Ref Range Status   04/11/2023 09:23 AM <3.10 mIU/mL Final     Hep B S Ab   Date/Time Value Ref Range Status   07/10/2024 03:36 AM <3.10 mIU/mL Final     Hep B S Ab Interp   Date/Time Value Ref Range Status   07/10/2024 03:36 AM NONREACTIVE NR   Final     Comment:     (NOTE)  The [employer]VIA Centaur Anti-HBs2 assay is traceable to the World Health   Organization (WHO) Hepatitis B Immunoglobulin 1st International   Reference Preparation (1977). Samples with a calculated value of 10   mIU/mL or greater are considered reactive (protective) in accordance   with the CDC guidelines. The accepted criteria for immunity to HBV is   anti-HBs activity greater than or equal to 10 mIU/mL, as defined by   the WHO International Reference Preparation.  Assay performance has not been established in pregnant women,   patients who are immunosuppressed or immunocompromised, nor have   performance characteristics been established in conjunction with   other 's assays for specific HBV serologic markers. This   assay does not differentiate between vaccine induced immune response   and a response due to infection with HBV. Passively acquired anti-HBs   may be identified following patient transfusion, receipt of   immunoglobulin products, etc.          07/20/24 0930   Vital Signs   /60   Temp 98.4 °F (36.9 °C)   Pulse 78   Respirations 16   Pain Assessment   Pain Assessment None - Denies Pain   Pain Level 0   Treatment   Time On 0943   Treatment Goal 0 
Primary RN SBAR: Sarahi Peter RN  Patient Education provided: Access care  Incapacitated Nurse keith. provided: Yes  Preferred Education method and Primary language: English/Verbal  Hospital associated wait time; reason: NA  Hepatitis B Surface Ag   Date/Time Value Ref Range Status   07/10/2024 03:36 AM <0.10 Index Final     Hep B S Ag Interp   Date/Time Value Ref Range Status   07/10/2024 03:36 AM Negative NEG   Final     HEP B SURF AB   Date/Time Value Ref Range Status   04/11/2023 09:23 AM <3.10 mIU/mL Final     Hep B S Ab   Date/Time Value Ref Range Status   07/10/2024 03:36 AM <3.10 mIU/mL Final     Hep B S Ab Interp   Date/Time Value Ref Range Status   07/10/2024 03:36 AM NONREACTIVE NR   Final     Comment:     (NOTE)  The [employer] Centaur Anti-HBs2 assay is traceable to the World Health   Organization (WHO) Hepatitis B Immunoglobulin 1st International   Reference Preparation (1977). Samples with a calculated value of 10   mIU/mL or greater are considered reactive (protective) in accordance   with the CDC guidelines. The accepted criteria for immunity to HBV is   anti-HBs activity greater than or equal to 10 mIU/mL, as defined by   the WHO International Reference Preparation.  Assay performance has not been established in pregnant women,   patients who are immunosuppressed or immunocompromised, nor have   performance characteristics been established in conjunction with   other 's assays for specific HBV serologic markers. This   assay does not differentiate between vaccine induced immune response   and a response due to infection with HBV. Passively acquired anti-HBs   may be identified following patient transfusion, receipt of   immunoglobulin products, etc.           07/13/24 0800   Vital Signs   /67   Temp 97.5 °F (36.4 °C)   Pulse 64   Pain Assessment   Pain Assessment None - Denies Pain   Treatment   Time On 0829   Time Off 1159   Observations & Evaluations   Level of Consciousness 0 
Education provided: yes, r/t dialysis process  Incapacitated Nurse edu. provided: yes  Preferred Education method and Primary language: verbal/English  Hospital associated wait time; reason: no wait time  Hepatitis B Surface Ag   Date/Time Value Ref Range Status   07/10/2024 03:36 AM <0.10 Index Final     Hep B S Ag Interp   Date/Time Value Ref Range Status   07/10/2024 03:36 AM Negative NEG   Final     HEP B SURF AB   Date/Time Value Ref Range Status   04/11/2023 09:23 AM <3.10 mIU/mL Final     Hep B S Ab   Date/Time Value Ref Range Status   07/10/2024 03:36 AM <3.10 mIU/mL Final     Hep B S Ab Interp   Date/Time Value Ref Range Status   07/10/2024 03:36 AM NONREACTIVE NR   Final     Comment:     (NOTE)  The [employer] Centaur Anti-HBs2 assay is traceable to the World Health   Organization (WHO) Hepatitis B Immunoglobulin 1st International   Reference Preparation (1977). Samples with a calculated value of 10   mIU/mL or greater are considered reactive (protective) in accordance   with the CDC guidelines. The accepted criteria for immunity to HBV is   anti-HBs activity greater than or equal to 10 mIU/mL, as defined by   the WHO International Reference Preparation.  Assay performance has not been established in pregnant women,   patients who are immunosuppressed or immunocompromised, nor have   performance characteristics been established in conjunction with   other 's assays for specific HBV serologic markers. This   assay does not differentiate between vaccine induced immune response   and a response due to infection with HBV. Passively acquired anti-HBs   may be identified following patient transfusion, receipt of   immunoglobulin products, etc.         
HBV. Passively acquired anti-HBs   may be identified following patient transfusion, receipt of   immunoglobulin products, etc.          08/03/24 1256   Treatment   Time On 1253   Treatment Goal 0   Observations & Evaluations   Level of Consciousness 0   Oriented X 4   Heart Rhythm Regular   Respiratory Quality/Effort Unlabored   O2 Device None (Room air)   Skin Condition/Temp Warm   Edema None   Vital Signs   /62   Temp 98.6 °F (37 °C)   Pulse 88   Respirations 20   SpO2 98 %   Pain Assessment   Pain Assessment None - Denies Pain   Technical Checks   Dialysis Machine No. 6   RO Machine Number 6   Dialyzer Lot No. [id number]   Tub[place] Lot Number [id number]   All Connections Secure Yes   NS Bag Yes   Saline Line Double Clamped Yes   Dialyzer Revaclear 300   Prime Volume (mL) 200 mL   ICEBOAT I;C;E;B;O;A;T   RO Machine Log Sheet Completed Yes   Machine Alarm Self Test Completed;Passed   Air Foam Detector Tested;Proper Function;pH Reading   Extracorporeal Circuit Tested for Integrity Yes   Machine Conductivity 14   Machine Ph 7.2   Bleach Test (Neg) Yes   [place] Temperature 98.6 °F (37 °C)   Treatment Initiation   Dialyze Hours 3   Treatment  Initiation Universal Precautions maintained;Lines secured to patient;Connections secured;Prime given;Venous Parameters set;Arterial Parameters set;Air foam detector engaged;Dialysate;Saline line double clamped   During Hemodialysis Assessment   Blood Flow Rate (ml/min) 400 ml/min   Arterial Pressure (mmHg) -200 mmHg   Venous Pressure (mmHg) 120   TMP 60      Comments tx started   Access Visible Yes   Ultrafiltration Rate (ml/hr) 170 ml/hr   Ultrafiltration Removed (ml) 0 ml   Hemodialysis Fistula/Graft Arteriovenous fistula Left Forearm   No placement date or time found.   Present on Admission/Arrival: Yes  Access Type: Arteriovenous fistula  Orientation: Left  Access Location: Forearm   Site Assessment Clean, dry & intact   Thrill Present   Bruit Present   Status Accessed 
  Access Attempts  1   Date of Last Dressing Change 07/11/24   Access Interventions Chlorhexidine;Needles taped to patient   Dressing Intervention New;Dressing changed   Dressing Status New dressing applied;Clean, dry & intact   Dressing Change Due 07/11/24     Post-HD     07/11/24 1200   Treatment   Time Off 1200   Treatment Goal 3kg in 3.5hr   Observations & Evaluations   Level of Consciousness 0   Oriented X 3   Heart Rhythm Regular   Respiratory Quality/Effort Unlabored   O2 Device None (Room air)   Bilateral Breath Sounds Clear;Diminished   Edema None   Vital Signs   /66   Temp 97.7 °F (36.5 °C)   Pulse 63   Respirations 15   Pain Assessment   Pain Assessment None - Denies Pain   Treatment Initiation   Dialyze Hours 3.5   During Hemodialysis Assessment   Ultrafiltration Removed (ml) 3500 ml   Hemodialysis Fistula/Graft Arteriovenous fistula Left Forearm   No placement date or time found.   Present on Admission/Arrival: Yes  Access Type: Arteriovenous fistula  Orientation: Left  Access Location: Forearm   Site Assessment Clean, dry & intact   Thrill Present   Bruit Present   Status Deaccessed   Date of Last Dressing Change 07/11/24   Dressing Intervention New;Dressing changed   Dressing Status New dressing applied;Clean, dry & intact   Dressing Change Due 07/12/24   Post-Hemodialysis Assessment   Post-Treatment Procedures Blood returned;Access bleeding time < 10 minutes   Machine Disinfection Process Exterior Machine Disinfection;Acid/Vinegar Clean;Heat Disinfect   Rinseback Volume (ml) 300 ml   Blood Volume Processed (Liters) 82.6 L   Dialyzer Clearance Lightly streaked   Duration of Treatment (minutes) 210 minutes   Hemodialysis Intake (ml) 500 ml   Hemodialysis Output (ml) 3500 ml   NET Removed (ml) 3000   Tolerated Treatment Good   Patient Disposition Return to room     Primary RN SBAR: Kiah Patterson RN  Comments: no issues with Treatment.      
9.7 oz)   Percent Weight Change 0   Pain Assessment   Pain Assessment None - Denies Pain   Post-Hemodialysis Assessment   Post-Treatment Procedures Blood returned;Access bleeding time < 10 minutes   Machine Disinfection Process Acid/Vinegar Clean;Heat Disinfect;Exterior Machine Disinfection   Rinseback Volume (ml) 300 ml   Blood Volume Processed (Liters) 66 L   Dialyzer Clearance Lightly streaked   Duration of Treatment (minutes) 180 minutes   Heparin Amount Administered During Treatment (mL) 0 mL   Hemodialysis Intake (ml) 500 ml   Hemodialysis Output (ml) 500 ml   NET Removed (ml) 0   Tolerated Treatment Good   Interventions Taken Other (Comment)  (blood given)   Patient Response to Treatment Tolerated tx well   Edema None   Physician Notified No   Time Off 1340   Patient Disposition Return to room   Observations & Evaluations   Level of Consciousness 0   Oriented X 4   Heart Rhythm Regular   Respiratory Quality/Effort Unlabored   O2 Device None (Room air)   Skin Color Ashen   Skin Condition/Temp Dry;Flaky;Warm   Abdomen Inspection Soft      07/27/24 1345   Hemodialysis Fistula/Graft Arteriovenous fistula Left Forearm   No placement date or time found.   Present on Admission/Arrival: Yes  Access Type: Arteriovenous fistula  Orientation: Left  Access Location: Forearm   Site Assessment Clean, dry & intact   Thrill Present   Bruit Present   Status Deaccessed   Date of Last Dressing Change 07/27/24   Dressing Intervention New;Dressing changed   Dressing Status New dressing applied;Clean, dry & intact

## 2024-08-06 NOTE — CARE COORDINATION
Transition of Care Plan:    RUR: 23% \"high risk\"  Prior Level of Functioning: Independent with ADL's   Disposition: [facility] + continuation of HD services at [facility]   If SNF or IPR: Date FOC offered: 7/12  Date FOC received: 7/15  Accepting facility: [facility]  Date authorization started with reference number: 7/17, reference number pending from facility  Date authorization received and expires: N/A  Follow up appointments: PCP/Specialists as indicated  DME needed: None  Transportation at discharge: Pt family to transport   IM/IMM Medicare/ letter given: 2nd IM needed prior to d/c  Is patient a  and connected with VA? No  Caregiver Contact: Nadine Traore (daughter), [phone]  Discharge Caregiver contacted prior to discharge? To be contacted   Care Conference needed? Not at this time  Barriers to discharge: Cardiology clearance, insurance auth, palliative meeting     1458 PM: CM received message from [facility] requesting updated PT/OT notes. CM made [facility] aware that updated notes are in for insurance auth to be initiated.    1159 AM: Chart reviewed. CM attempted to discuss d/c plan with pt; pt was ALICIA for HD. CM contacted pt daughter to discuss direction of pt d/c plan. CM received SNF acceptance from [facility] with Medicaid transport to be arranged for OP HD services. Insurance auth initiated by facility today. CM to continue to follow.    PALMER Pike  Care Management  [facility]   [phone]  
Transition of Care Plan:    RUR: 24% \"high risk\"  Prior Level of Functioning: Independent with ADL's   Disposition: SNF (referrals pending) + continuation of HD services at [facility]   If SNF or IPR: Date FOC offered: 7/12  Date FOC received: 7/15  Accepting facility: N/A  Date authorization started with reference number: N/A  Date authorization received and expires: N/A  Follow up appointments: PCP/Specialists as indicated  DME needed: None  Transportation at discharge: Pt family to transport   IM/IMM Medicare/ letter given: 2nd IM needed prior to d/c  Is patient a  and connected with VA? No  Caregiver Contact: Nadine Traore (daughter), [phone]  Discharge Caregiver contacted prior to discharge? To be contacted   Care Conference needed? Not at this time  Barriers to discharge: Cardiology clearance, SNF placement     1104 AM: Chart reviewed. CM contacted pt daughter to discuss direction of pt d/c plan. Pt daughter requesting SNF referrals be sent in the [place] area as pt family lives nearby. CM sent multiple SNF referrals; referrals pending at this time.    PALMER Pike  Care Management  [facility]   [phone]  
Transition of Care Plan:    RUR: 25%  Prior Level of Functioning: Independent   Disposition: [facility] + HD [facility] - will need auth  If SNF or IPR: Date FOC offered: 7/12  Date FOC received: 7/15  Accepting facility: [facility]  Date authorization started with reference number:  7/17= ath pending  Date authorization received and expires: pending  Follow up appointments:   DME needed: PCP/Specialist   Transportation at discharge:  CM to arrange transportation   IM/IMM Medicare/ letter given:  To be given   Is patient a [place] and connected with VA? N/A   If yes, was  transfer form completed and VA notified?   Caregiver Contact: Nadine Traore (daughter), [phone]   Discharge Caregiver contacted prior to discharge? To be contacted  Care Conference needed? none  Barriers to discharge: Medical clearance, cardiac clearance.      9:54am  Jody has insurance auth 7/19/24- 7/22/24.  Pt has plan to transfer to stepdown.    Initial Note   CM reviewed pt chart. Pt not medically stable for d/c at this time.    CM will continue to follow pt for D/C planning and arrange services as deemed neccessary      TAJ Rodriguez,RN  Care   [facility]  Phone: [phone]    
Transition of Care Plan:    RUR: 25% (high RUR) READMISSION   Prior Level of Functioning: Independent  Disposition: SNF - [facility] with OLAMIDE HD at [facility]  If SNF or IPR: Date FOC offered: 07/12  Date FOC received: 07/15  Accepting facility: [facility] Address: [address] [place], [place] [address] Phone: [phone]  Date authorization started with reference number:   Date authorization received and expires:   Follow up appointments: pcp/specialist   DME needed: defer to SNF  Transportation at discharge: BLS anticipated  IM/IMM Medicare/ letter given: 2nd IM to be given   Is patient a [place] and connected with VA? N/a   If yes, was  transfer form completed and VA notified? N/a  Caregiver Contact: Nadine Traore (daughter), [phone]   Discharge Caregiver contacted prior to discharge? To be contacted  Care Conference needed? None  Barriers to discharge: Medical clearance, authorization     CM still not able to start authorization because he has not been able to tolerate PT at this time. However, he was able to receive OT session.  [facility] Address: [address] [place] [address] Phone: [phone] admission coordinator,  Roma Alvarez at [phone] will start the authorization once the patient is medically ready and notes to be faxed to [phone].      BLS need is anticipated at d/c. Pending medical clearance.     Shawna Clemons, RN  Case Management  [phone]    
Transition of Care Plan:    RUR: 25% (high RUR) READMISSION   Prior Level of Functioning: Independent  Disposition: SNF - [facility] with OLAMIDE HD at [facility]  If SNF or IPR: Date FOC offered: 07/12  Date FOC received: 07/15  Accepting facility: [facility] Address: [address] [place] [address] Phone: [phone]  Date authorization started with reference number:   Date authorization received and expires:   Follow up appointments: pcp/specialist   DME needed: defer to SNF  Transportation at discharge: BLS anticipated  IM/IMM Medicare/ letter given: 2nd IM to be given   Is patient a [place] and connected with VA? N/a   If yes, was  transfer form completed and VA notified? N/a  Caregiver Contact: Nadine Traore (daughter), [phone]   Discharge Caregiver contacted prior to discharge? To be contacted  Care Conference needed? None  Barriers to discharge: Medical clearance, authorization     CM faxed Roma Alvarez who can be reached at [phone] clinicals including PT/OT notes to  [phone] for authorization to be started.  [facility] Address: [address] [place] [address] Phone: [phone].    BLS need is anticipated at d/c. Pending medical clearance.     Shawna Clemons, RN  Case Management  [phone]    
Transition of Care Plan:    RUR: 25% \"high risk\"  Prior Level of Functioning: Independent with ADL's   Disposition: Home with HH (referrals pending) and continuation of HD services at [facility] vs. SNF placement (pending discussion with palliative care)  If SNF or IPR: Date FOC offered: 7/12  Date FOC received: Pt daughter wanting to look into SNF prior to making a decision  Accepting facility: N/A  Date authorization started with reference number: N/A  Date authorization received and expires: N/A  Follow up appointments: PCP/Specialists as indicated  DME needed: None  Transportation at discharge: Pt family to transport   IM/IMM Medicare/ letter given: 2nd IM given by CM on 7/12  Is patient a  and connected with VA? No  Caregiver Contact: Nadine Traore (daughter), [phone]  Discharge Caregiver contacted prior to discharge? To be contacted   Care Conference needed? Not at this time  Barriers to discharge: Cardiology clearance, SNF vs. HH, palliative discussion     1613 PM: CM received a call from pt daughter inquiring about any SNF in [place] that have in house HD services. CM contacted [facility] who transferred CM to admissions coordinator at [phone]. Admissions coordinator noted that [facility]od[facility] for HD services at their facility. CM notified pt daughter; pt daughter noted that she does not want a referral sent to this facility. CM to continue to follow.    1515 PM: Chart reviewed. CM met with pt daughterNadine at the bedside to discuss d/c planning. CM discussed PT/OT recommendation for HH vs. SNF placement. CM noted that CM will send HH referral for PT/OT/SN/personal aide if possible. CM explained to pt daughter that [facility] can accommodate for pt HD services if pt daughter was to choose this facility for a referral to be sent to. Pt will need [facility] Medicare auth if agreeable to SNF. CM to continue to follow.     Sonya Gallagher MSW  Care 
Transition of Care Plan:    RUR: 25% \"high risk\"  Prior Level of Functioning: Independent with ADL's   Disposition: Home with family support and continuation of HD services at [facility]  If SNF or IPR: Date FOC offered: N/A  Follow up appointments: PCP/Specialists as indicated  DME needed: None  Transportation at discharge: Pt family to transport   IM/IMM Medicare/ letter given: 1st IM given by Patient Access on 7/9  Is patient a [place] and connected with VA? No  Caregiver Contact: Nadine Traore (daughter), [phone]  Discharge Caregiver contacted prior to discharge? To be contacted   Care Conference needed? Not at this time  Barriers to discharge: Cardiology clearance     0925 AM: Chart reviewed. CM following for d/c planning. Pt to return home with family support; pt family to transport pt home. CM to fax updated clinicals to [facility].      07/11/24 1124   Services At/After Discharge   Transition of Care Consult (CM Consult) Discharge Planning   Services At/After Discharge None    Resource Information Provided? No   Mode of Transport at Discharge Other (see comment)  (Family to transport)   Hospital Transport Time of Discharge 1500   Confirm Follow Up Transport Family   Condition of Participation: Discharge Planning   The Plan for Transition of Care is related to the following treatment goals: Return home with family   The Patient and/or Patient Representative was provided with a Choice of Provider? Patient   The Patient and/Or Patient Representative agree with the Discharge Plan? Yes     PALMER Pike  Care Management  [facility]   [phone]  
Transition of Care Plan:    RUR: 26% (high RUR) READMISSION   Prior Level of Functioning: Independent  Disposition: SNF - [facility] with OLAMIDE HD at [facility]  If SNF or IPR: Date FOC offered: 07/12  Date FOC received: 07/15  Accepting facility: [facility] Address: [address] [place] [address] Phone: [phone]  Date authorization started with reference number: authorization 07/27  Date authorization received and expires:   Follow up appointments: pcp/specialist   DME needed: defer to SNF  Transportation at discharge: BLS anticipated  IM/IMM Medicare/ letter given: 2nd IM to be given   Is patient a [place] and connected with VA? N/a   If yes, was [place] transfer form completed and VA notified? N/a  Caregiver Contact: Nadine Traore (daughter), [phone]   Discharge Caregiver contacted prior to discharge? To be contacted  Care Conference needed? None  Barriers to discharge: Medical clearance, authorization     CM contacted [facility] Address: [address] [place] [address] Phone: [phone] to start authorization. However, no answer but left a voicemail to admissions to initiate authorization. Moreover, attempted to leave a message in CC link but not able. CM verified with CMA that the facility must start authorization.  BLS anticipated at d/c. Pending medical clearance.     Shawna Clemons, RN  Case Management  [phone]    
Transition of Care Plan:    RUR: 26% (high RUR) READMISSION   Prior Level of Functioning: Independent  Disposition: SNF - [facility] with OLAMIDE HD at [facility]  If SNF or IPR: Date FOC offered: 07/12  Date FOC received: 07/15  Accepting facility: [facility] Address: [address] [place], [place] [address] Phone: [phone]  Date authorization started with reference number:   Date authorization received and expires:   Follow up appointments: pcp/specialist   DME needed: defer to SNF  Transportation at discharge: BLS anticipated  IM/IMM Medicare/ letter given: 2nd IM to be given   Is patient a  and connected with VA? N/a   If yes, was  transfer form completed and VA notified? N/a  Caregiver Contact: Nadine Traore (daughter), [phone]   Discharge Caregiver contacted prior to discharge? To be contacted  Care Conference needed? None  Barriers to discharge: Medical clearance, authorization     CM contacted [facility] Address: [address] [place] [address] Phone: [phone] who was referred to Roma Alvarez at [phone] and was requested to start authorization  but stated that the portal is down but will initiate authorization once able. Moreover, requested new notes that were faxed to [phone].  CM will follow up with Roma Alvarez at [phone] and fax new PT/OT notes to [phone]      BLS need is anticipated at d/c. Pending medical clearance.     Shawna Clemons RN  Case Management  [phone]    
Transition of Care Plan:    RUR: 26% (high RUR) READMISSION   Prior Level of Functioning: Independent  Disposition: SNF - [facility] with OLAMIDE HD at [facility]  If SNF or IPR: Date FOC offered: 07/12  Date FOC received: 07/15  Accepting facility: [facility] Address: [address] [place] [address] Phone: [phone]  Date authorization started with reference number:   Date authorization received and expires:   Follow up appointments: pcp/specialist   DME needed: defer to SNF  Transportation at discharge: BLS anticipated  IM/IMM Medicare/ letter given: 2nd IM to be given   Is patient a [place] and connected with VA? N/a   If yes, was  transfer form completed and VA notified? N/a  Caregiver Contact: Nadine Traore (daughter), [phone]   Discharge Caregiver contacted prior to discharge? To be contacted  Care Conference needed? None  Barriers to discharge: Medical clearance, authorization     The patient will need authorization prior to discharging to [facility] Address: [address] [place] [address] Phone: [phone]. However, given his current medical condition, no authorization will be started until close to being medically stable.     BLS need is anticipated at d/c. Pending medical clearance.     Shawna Clemons RN  Case Management  [phone]    
Transition of Care Plan:    RUR: 26% (high RUR) READMISSION   Prior Level of Functioning: Independent  Disposition: SNF - [facility] with OLAMIDE HD at [facility]  If SNF or IPR: Date FOC offered: 07/12  Date FOC received: 07/15  Accepting facility: [facility] Address: [address] [place] [address] Phone: [phone]  Date authorization started with reference number:   Date authorization received and expires:   Follow up appointments: pcp/specialist   DME needed: defer to SNF  Transportation at discharge: BLS anticipated  IM/IMM Medicare/ letter given: 2nd IM to be given   Is patient a [place] and connected with VA? N/a   If yes, was  transfer form completed and VA notified? N/a  Caregiver Contact: Nadine Traore (daughter), [phone]   Discharge Caregiver contacted prior to discharge? To be contacted  Care Conference needed? None  Barriers to discharge: Medical clearance, authorization     The patient is no longer close to being medically ready for skilled nursing rehab. Moreover, he has not been able to tolerate PT/OT. Therefore, authorization start has been delayed.    CM contacted [facility] Address: [address] [place] [address] Phone: [phone] who was referred to Roma Alvarez at [phone] and was requested to start authorization  but stated that the portal is down but will initiate authorization once able. Moreover, requested new notes that were faxed to [phone].  CM will follow up with Roma Alvarez at [phone] and fax new PT/OT notes to [phone]      BLS need is anticipated at d/c. Pending medical clearance.     Shawna Clemons RN  Case Management  [phone]    
Transition of Care Plan:    RUR: 26% (high RUR) READMISSION   Prior Level of Functioning: Independent  Disposition: SNF - [facility] with OLAMIDE HD at [facility]  If SNF or IPR: Date FOC offered: 07/12  Date FOC received: 07/15  Accepting facility: [facility] Address: [address] [place], [place] [address] Phone: [phone]  Date authorization started with reference number:   Date authorization received and expires:   Follow up appointments: pcp/specialist   DME needed: defer to SNF  Transportation at discharge: BLS anticipated  IM/IMM Medicare/ letter given: 2nd IM to be given   Is patient a [place] and connected with VA? N/a   If yes, was  transfer form completed and VA notified? N/a  Caregiver Contact: Nadine Traore (daughter), [phone]   Discharge Caregiver contacted prior to discharge? To be contacted  Care Conference needed? None  Barriers to discharge: Medical clearance, authorization     CM unable to start authorization because he has not been able to tolerate PT/O at this time.  [facility] Address: [address] [place] [address] Phone: [phone] admission coordinator,  Roma Alvarez at [phone] will start the authorization once the patient is medically ready and notes to be faxed to [phone].      BLS need is anticipated at d/c. Pending medical clearance.     Shawna Clemons, RN  Case Management  [phone]    
Transition of Care Plan:    RUR: 26% (high RUR) READMISSION  Prior Level of Functioning: Independent  Disposition: SNF - [facility] with OLAMIDE HD at [facility]  If SNF or IPR: Date FOC offered: 07/12  Date FOC received: 07/15  Accepting facility: [facility]  Date authorization started with reference number: 07/17  Date authorization received and expires: 07/19-07/22  Follow up appointments: defer to SNF  DME needed: defer to SNF  Transportation at discharge: BLS anticipated  IM/IMM Medicare/ letter given: 07/12 by CM  Is patient a  and connected with VA? N/a   If yes, was  transfer form completed and VA notified? N/a  Caregiver Contact: Nadine Traore (daughter), [phone]   Discharge Caregiver contacted prior to discharge? To be contacted  Care Conference needed? None  Barriers to discharge: Medical clearance    1612 - Weekend CM completed chart review. Plan for SNF - [facility]. Auth approved through 07/22 per previous CM notes. BLS anticipated at d/c. Pending medical clearance. If pt cannot admit tomorrow, facility will need to request extension on auth.     PALMER Talamantes  Care Management  [facility]  [phone]  
Transition of Care Plan:    RUR: 26% (high RUR) READMISSION  Prior Level of Functioning: Independent  Disposition: SNF - [facility] with [facility] HD at [facility]  If SNF or IPR: Date FOC offered:   Date FOC received: 07/15  Accepting facility: [facility]  Date authorization started with reference number:   Date authorization received and expires: -  Follow up appointments: defer to SNF  DME needed: defer to SNF  Transportation at discharge: BLS anticipated  IM/IMM Medicare/ letter given:  by TAISHA  Is patient a  and connected with VA? N/a   If yes, was  transfer form completed and VA notified? N/a  Caregiver Contact: Nadine Traore (daughter), [phone]   Discharge Caregiver contacted prior to discharge? To be contacted  Care Conference needed? None  Barriers to discharge: Medical clearance    Plan for [facility]. Auth has  and the facility will start it once again. However, will wait until closer to the discharge date to avoid rejection . BLS anticipated at d/c. Pending medical clearance.     Shawna Clemons, RN  Case Management  [phone]    
Transition of Care Plan:    RUR: 26% (high RUR) READMISSION  Prior Level of Functioning: Independent  Disposition: SNF - [facility] with OLAMIDE HD at [facility]  If SNF or IPR: Date FOC offered:   Date FOC received: 07/15  Accepting facility: [facility]  Date authorization started with reference number:   Date authorization received and expires: -  Follow up appointments: defer to SNF  DME needed: defer to SNF  Transportation at discharge: BLS anticipated  IM/IMM Medicare/ letter given:  by TAISHA  Is patient a  and connected with VA? N/a   If yes, was  transfer form completed and VA notified? N/a  Caregiver Contact: Nadine Traore (daughter), [phone]   Discharge Caregiver contacted prior to discharge? To be contacted  Care Conference needed? None  Barriers to discharge: Medical clearance    CM is not able to start authorization due to patient's inability to participate in therapy.     Plan for SNF - [facility]. Auth has  and the facility will start it once again. However, will wait until closer to the discharge date to avoid rejection . BLS anticipated at d/c. Pending medical clearance.     Shawna Clemons, RN  Case Management  [phone]    
[place].    Healthcare Decision Maker:   Primary Decision Maker: Nadine Traore - Child - [phone]  Click here to complete Healthcare Decision Makers including selection of the Healthcare Decision Maker Relationship (ie \"Primary\").   Today we documented Decision Maker(s) consistent with Legal Next of Kin hierarchy.    Content/Action Overview:  Has NO ACP documents-Information provided  Reviewed DNR/DNI and patient elects Full Code (Attempt Resuscitation)    Length of Voluntary ACP Conversation in minutes:  <16 minutes (Non-Billable)    AMIRA ANDRADE   [phone]

## 2024-08-07 VITALS
DIASTOLIC BLOOD PRESSURE: 61 MMHG | SYSTOLIC BLOOD PRESSURE: 105 MMHG | RESPIRATION RATE: 23 BRPM | HEART RATE: 88 BPM | OXYGEN SATURATION: 98 % | HEIGHT: 72 IN | TEMPERATURE: 97.4 F | WEIGHT: 109.35 LBS | BODY MASS INDEX: 14.81 KG/M2

## 2024-08-07 LAB
ANION GAP SERPL CALC-SCNC: 4 MMOL/L (ref 5–15)
BASOPHILS # BLD: 0 K/UL (ref 0–0.1)
BASOPHILS NFR BLD: 1 % (ref 0–1)
BUN SERPL-MCNC: 30 MG/DL (ref 6–20)
BUN/CREAT SERPL: 6 (ref 12–20)
CALCIUM SERPL-MCNC: 8.1 MG/DL (ref 8.5–10.1)
CHLORIDE SERPL-SCNC: 103 MMOL/L (ref 97–108)
CO2 SERPL-SCNC: 31 MMOL/L (ref 21–32)
CREAT SERPL-MCNC: 5.08 MG/DL (ref 0.7–1.3)
DIFFERENTIAL METHOD BLD: ABNORMAL
EOSINOPHIL # BLD: 0.1 K/UL (ref 0–0.4)
EOSINOPHIL NFR BLD: 2 % (ref 0–7)
ERYTHROCYTE [DISTWIDTH] IN BLOOD BY AUTOMATED COUNT: 21.6 % (ref 11.5–14.5)
GLUCOSE SERPL-MCNC: 79 MG/DL (ref 65–100)
HCT VFR BLD AUTO: 23.4 % (ref 36.6–50.3)
HGB BLD-MCNC: 7.3 G/DL (ref 12.1–17)
IMM GRANULOCYTES # BLD AUTO: 0 K/UL (ref 0–0.04)
IMM GRANULOCYTES NFR BLD AUTO: 0 % (ref 0–0.5)
LYMPHOCYTES # BLD: 1.3 K/UL (ref 0.8–3.5)
LYMPHOCYTES NFR BLD: 29 % (ref 12–49)
MCH RBC QN AUTO: 29.6 PG (ref 26–34)
MCHC RBC AUTO-ENTMCNC: 31.2 G/DL (ref 30–36.5)
MCV RBC AUTO: 94.7 FL (ref 80–99)
MONOCYTES # BLD: 0.8 K/UL (ref 0–1)
MONOCYTES NFR BLD: 17 % (ref 5–13)
NEUTS SEG # BLD: 2.4 K/UL (ref 1.8–8)
NEUTS SEG NFR BLD: 51 % (ref 32–75)
NRBC # BLD: 0.02 K/UL (ref 0–0.01)
NRBC BLD-RTO: 0.4 PER 100 WBC
PLATELET # BLD AUTO: 220 K/UL (ref 150–400)
PMV BLD AUTO: 11 FL (ref 8.9–12.9)
POTASSIUM SERPL-SCNC: 4 MMOL/L (ref 3.5–5.1)
RBC # BLD AUTO: 2.47 M/UL (ref 4.1–5.7)
RBC MORPH BLD: ABNORMAL
RBC MORPH BLD: ABNORMAL
SODIUM SERPL-SCNC: 138 MMOL/L (ref 136–145)
WBC # BLD AUTO: 4.6 K/UL (ref 4.1–11.1)

## 2024-08-07 PROCEDURE — 6370000000 HC RX 637 (ALT 250 FOR IP): Performed by: INTERNAL MEDICINE

## 2024-08-07 PROCEDURE — 2500000003 HC RX 250 WO HCPCS: Performed by: INTERNAL MEDICINE

## 2024-08-07 PROCEDURE — 36415 COLL VENOUS BLD VENIPUNCTURE: CPT

## 2024-08-07 PROCEDURE — 85025 COMPLETE CBC W/AUTO DIFF WBC: CPT

## 2024-08-07 PROCEDURE — 80048 BASIC METABOLIC PNL TOTAL CA: CPT

## 2024-08-07 PROCEDURE — 6360000002 HC RX W HCPCS: Performed by: INTERNAL MEDICINE

## 2024-08-07 RX ORDER — OMEPRAZOLE 40 MG/1
40 CAPSULE, DELAYED RELEASE ORAL 2 TIMES DAILY
Qty: 180 CAPSULE | OUTPATIENT
Start: 2024-08-07

## 2024-08-07 RX ORDER — ONDANSETRON 4 MG/1
2 TABLET, ORALLY DISINTEGRATING ORAL EVERY 8 HOURS PRN
Status: DISCONTINUED | OUTPATIENT
Start: 2024-08-07 | End: 2024-08-07 | Stop reason: HOSPADM

## 2024-08-07 RX ORDER — OMEPRAZOLE 40 MG/1
40 CAPSULE, DELAYED RELEASE ORAL 2 TIMES DAILY
Qty: 30 CAPSULE | Refills: 1 | Status: SHIPPED | OUTPATIENT
Start: 2024-08-07

## 2024-08-07 RX ORDER — PREDNISONE 20 MG/1
20 TABLET ORAL DAILY
Qty: 30 TABLET | Refills: 0 | Status: SHIPPED | OUTPATIENT
Start: 2024-08-08 | End: 2024-09-07

## 2024-08-07 RX ORDER — MIRTAZAPINE 7.5 MG/1
7.5 TABLET, FILM COATED ORAL NIGHTLY
Qty: 30 TABLET | Refills: 3 | Status: SHIPPED | OUTPATIENT
Start: 2024-08-07

## 2024-08-07 RX ORDER — ERGOCALCIFEROL 1.25 MG/1
50000 CAPSULE ORAL WEEKLY
Qty: 5 CAPSULE | Refills: 2 | Status: SHIPPED | OUTPATIENT
Start: 2024-08-13

## 2024-08-07 RX ORDER — MIDODRINE HYDROCHLORIDE 5 MG/1
15 TABLET ORAL EVERY 8 HOURS
Qty: 90 TABLET | Refills: 3 | Status: SHIPPED | OUTPATIENT
Start: 2024-08-07

## 2024-08-07 RX ADMIN — HEPARIN SODIUM 5000 UNITS: 5000 INJECTION INTRAVENOUS; SUBCUTANEOUS at 09:06

## 2024-08-07 RX ADMIN — CALCIUM ACETATE 667 MG: 667 CAPSULE ORAL at 13:02

## 2024-08-07 RX ADMIN — DOCUSATE SODIUM 200 MG: 100 CAPSULE, LIQUID FILLED ORAL at 09:06

## 2024-08-07 RX ADMIN — PANTOPRAZOLE SODIUM 40 MG: 40 TABLET, DELAYED RELEASE ORAL at 06:58

## 2024-08-07 RX ADMIN — CALCIUM ACETATE 667 MG: 667 CAPSULE ORAL at 09:06

## 2024-08-07 RX ADMIN — ACETAMINOPHEN 650 MG: 325 TABLET ORAL at 06:58

## 2024-08-07 RX ADMIN — MIDODRINE HYDROCHLORIDE 15 MG: 5 TABLET ORAL at 13:06

## 2024-08-07 RX ADMIN — GABAPENTIN 300 MG: 300 CAPSULE ORAL at 09:07

## 2024-08-07 RX ADMIN — PREDNISONE 20 MG: 20 TABLET ORAL at 09:07

## 2024-08-07 RX ADMIN — Medication: at 09:09

## 2024-08-07 RX ADMIN — MIDODRINE HYDROCHLORIDE 15 MG: 5 TABLET ORAL at 06:58

## 2024-08-07 ASSESSMENT — PAIN - FUNCTIONAL ASSESSMENT: PAIN_FUNCTIONAL_ASSESSMENT: ACTIVITIES ARE NOT PREVENTED

## 2024-08-07 ASSESSMENT — PAIN SCALES - GENERAL
PAINLEVEL_OUTOF10: 0
PAINLEVEL_OUTOF10: 0

## 2024-08-07 NOTE — DISCHARGE SUMMARY
Physician Discharge Summary           Pt Name  Darrel Patterson   Admit date:  7/9/2024   Discharge date and time:  8/7/24 2:31 PM     Room Number  2318/01    Medical Record Number  [id number] @ [facility]   Age  79 y.o.   Date of Birth 1945   PCP Roma Solitario, ELVI - NP     Admission Diagnoses:                        NSTEMI (non-ST elevated myocardial infarction) (Formerly [facility])   Present on Admission:   NSTEMI (non-ST elevated myocardial infarction) (Formerly [facility])   Palliative care by specialist   Sarcopenia   Unintentional weight loss   Palliative care encounter   ESRD (end stage renal disease) (HCC)   Debility   Grief   Severe protein-calorie malnutrition (HCC)   Frailty   Goals of care, counseling/discussion   DNR (do not resuscitate) discussion       No Known Allergies     Excerpt from HPI : Darrel Patterson is a 79 y.o.  male with PMHx significant for listed PMH below who presents with the above chief complaint.   We were asked to admit for work up and evaluation of the above problems.      Patient with initial complaint of poor p.o. intake and lethargy and malaise.  States that this has been ongoing \"for some time\".  Notes that he has also had ongoing loose stool and diarrhea which she states is chronic for him.  Came to the emergency department after being brought by family due to poor appetite and general malaise.  In the emergency department, found to have an elevated troponin and referred for transfer for cardiology evaluation.  He explicitly denies any chest pain, chest pressure, nausea, lightheadedness.  Does state that he has shortness of breath however he thought that maybe this was coming from his respiratory disease or from his dialysis.     On discussion with family, they state they noticed that he is often short of breath including times when he exerts himself and both in between dialysis sessions as well as immediately after a dialysis session.  Daughter notes that after dialysis

## 2024-08-07 NOTE — PLAN OF CARE
Problem: Chronic Conditions and Co-morbidities  Goal: Patient's chronic conditions and co-morbidity symptoms are monitored and maintained or improved  Outcome: Progressing   Receiving dialysis today  
  Problem: Discharge Planning  Goal: Discharge to home or other facility with appropriate resources  7/12/2024 1025 by Sarahi Peter RN  Outcome: Progressing  7/11/2024 2039 by Aleksandra Mendoza RN  Outcome: Progressing  Flowsheets (Taken 7/11/2024 2013)  Discharge to home or other facility with appropriate resources: Identify barriers to discharge with patient and caregiver     Problem: Safety - Adult  Goal: Free from fall injury  7/12/2024 1025 by Sarahi Peter RN  Outcome: Progressing  7/11/2024 2039 by Aleksandra Mendoza RN  Outcome: Progressing  Flowsheets (Taken 7/11/2024 2013)  Free From Fall Injury: Instruct family/caregiver on patient safety     Problem: Pain  Goal: Verbalizes/displays adequate comfort level or baseline comfort level  7/12/2024 1025 by Sarahi Peter RN  Outcome: Progressing  7/11/2024 2039 by Aleksandra Mendoza RN  Outcome: Progressing     Problem: ABCDS Injury Assessment  Goal: Absence of physical injury  7/12/2024 1025 by Sarahi Peter RN  Outcome: Progressing  7/11/2024 2039 by Aleksandra Mendoza RN  Outcome: Progressing  Flowsheets (Taken 7/11/2024 2013)  Absence of Physical Injury: Implement safety measures based on patient assessment     Problem: Skin/Tissue Integrity  Goal: Absence of new skin breakdown  Description: 1.  Monitor for areas of redness and/or skin breakdown  2.  Assess vascular access sites hourly  3.  Every 4-6 hours minimum:  Change oxygen saturation probe site  4.  Every 4-6 hours:  If on nasal continuous positive airway pressure, respiratory therapy assess nares and determine need for appliance change or resting period.  7/12/2024 1025 by Sarahi Peter RN  Outcome: Progressing  7/11/2024 2039 by Aleksandra Mendoza RN  Outcome: Progressing     Problem: Respiratory - Adult  Goal: Achieves optimal ventilation and oxygenation  7/12/2024 1025 by Sarahi Peter RN  Outcome: Progressing  7/12/2024 0926 by Kiran Carter, RT  Outcome: Progressing  7/11/2024 2039 by 
  Problem: Discharge Planning  Goal: Discharge to home or other facility with appropriate resources  7/12/2024 2349 by Angelica Johnson RN  Outcome: Progressing  7/12/2024 1025 by Sarahi Peter RN  Outcome: Progressing     Problem: Safety - Adult  Goal: Free from fall injury  7/12/2024 2349 by Angelica Johnson RN  Outcome: Progressing  7/12/2024 1025 by Sarahi Peter RN  Outcome: Progressing     Problem: Pain  Goal: Verbalizes/displays adequate comfort level or baseline comfort level  7/12/2024 2349 by Angelica Johnson RN  Outcome: Progressing  7/12/2024 1025 by Sarahi Peter RN  Outcome: Progressing     Problem: ABCDS Injury Assessment  Goal: Absence of physical injury  7/12/2024 2349 by Angelica Johnson RN  Outcome: Progressing  7/12/2024 1025 by Sarahi Peter RN  Outcome: Progressing     Problem: Skin/Tissue Integrity  Goal: Absence of new skin breakdown  Description: 1.  Monitor for areas of redness and/or skin breakdown  2.  Assess vascular access sites hourly  3.  Every 4-6 hours minimum:  Change oxygen saturation probe site  4.  Every 4-6 hours:  If on nasal continuous positive airway pressure, respiratory therapy assess nares and determine need for appliance change or resting period.  7/12/2024 2349 by Angelica Johnson RN  Outcome: Progressing  7/12/2024 1025 by Sarahi Peter RN  Outcome: Progressing     Problem: Respiratory - Adult  Goal: Achieves optimal ventilation and oxygenation  7/12/2024 2349 by Angelica Johnson RN  Outcome: Progressing  7/12/2024 1025 by Sarahi Peter RN  Outcome: Progressing     Problem: Nutrition Deficit:  Goal: Optimize nutritional status  7/12/2024 2349 by Angelica Johnson RN  Outcome: Progressing  Flowsheets (Taken 7/12/2024 1426 by Coral Pacheco RD)  Nutrient intake appropriate for improving, restoring, or maintaining nutritional needs:   Assess nutritional status and recommend course of action   Monitor oral intake, labs, and treatment plans   
  Problem: Discharge Planning  Goal: Discharge to home or other facility with appropriate resources  7/13/2024 1020 by Sarahi Peter RN  Outcome: Progressing  7/12/2024 2349 by Angelica Johnson RN  Outcome: Progressing     Problem: Safety - Adult  Goal: Free from fall injury  7/13/2024 1020 by Sarahi Peter RN  Outcome: Progressing  7/12/2024 2349 by Angelica Johnson RN  Outcome: Progressing     Problem: Pain  Goal: Verbalizes/displays adequate comfort level or baseline comfort level  7/13/2024 1020 by Sarahi Peter RN  Outcome: Progressing  7/12/2024 2349 by Angelica Johnson RN  Outcome: Progressing     Problem: ABCDS Injury Assessment  Goal: Absence of physical injury  7/13/2024 1020 by Sarahi Peter RN  Outcome: Progressing  7/12/2024 2349 by Angelica Johnson RN  Outcome: Progressing     Problem: Skin/Tissue Integrity  Goal: Absence of new skin breakdown  Description: 1.  Monitor for areas of redness and/or skin breakdown  2.  Assess vascular access sites hourly  3.  Every 4-6 hours minimum:  Change oxygen saturation probe site  4.  Every 4-6 hours:  If on nasal continuous positive airway pressure, respiratory therapy assess nares and determine need for appliance change or resting period.  7/13/2024 1020 by Sarahi Peter RN  Outcome: Progressing  7/12/2024 2349 by Angelica Johnson RN  Outcome: Progressing     Problem: Respiratory - Adult  Goal: Achieves optimal ventilation and oxygenation  7/13/2024 1020 by Sarahi Peter RN  Outcome: Progressing  7/12/2024 2349 by Angelica Johnson RN  Outcome: Progressing     Problem: Nutrition Deficit:  Goal: Optimize nutritional status  7/13/2024 1020 by Sarahi Peter RN  Outcome: Progressing  7/12/2024 2349 by Angelica Johnson RN  Outcome: Progressing  Flowsheets (Taken 7/12/2024 1426 by Coral Pacheco RD)  Nutrient intake appropriate for improving, restoring, or maintaining nutritional needs:   Assess nutritional status and recommend course of 
  Problem: Discharge Planning  Goal: Discharge to home or other facility with appropriate resources  7/13/2024 1949 by Aleksandra Mendoza, RN  Outcome: Progressing  Flowsheets (Taken 7/13/2024 1922)  Discharge to home or other facility with appropriate resources:   Identify barriers to discharge with patient and caregiver   Arrange for needed discharge resources and transportation as appropriate   Identify discharge learning needs (meds, wound care, etc)  7/13/2024 1020 by Sarahi Peter RN  Outcome: Progressing     Problem: Safety - Adult  Goal: Free from fall injury  7/13/2024 1949 by Aleksandra Mendoza, RN  Outcome: Progressing  Flowsheets (Taken 7/13/2024 1931)  Free From Fall Injury: Instruct family/caregiver on patient safety  7/13/2024 1020 by Sarahi Peter RN  Outcome: Progressing     Problem: Pain  Goal: Verbalizes/displays adequate comfort level or baseline comfort level  7/13/2024 1949 by Aleksandra Mendoza RN  Outcome: Progressing  7/13/2024 1020 by Sarahi Peter RN  Outcome: Progressing     Problem: ABCDS Injury Assessment  Goal: Absence of physical injury  7/13/2024 1949 by Aleksandra Mendoza, RN  Outcome: Progressing  Flowsheets (Taken 7/13/2024 1931)  Absence of Physical Injury: Implement safety measures based on patient assessment  7/13/2024 1020 by Sarahi Peter RN  Outcome: Progressing     Problem: Skin/Tissue Integrity  Goal: Absence of new skin breakdown  Description: 1.  Monitor for areas of redness and/or skin breakdown  2.  Assess vascular access sites hourly  3.  Every 4-6 hours minimum:  Change oxygen saturation probe site  4.  Every 4-6 hours:  If on nasal continuous positive airway pressure, respiratory therapy assess nares and determine need for appliance change or resting period.  7/13/2024 1949 by Aleksandra Mendoza RN  Outcome: Progressing  7/13/2024 1020 by Sarahi Peter RN  Outcome: Progressing     Problem: Respiratory - Adult  Goal: Achieves optimal ventilation and 
  Problem: Discharge Planning  Goal: Discharge to home or other facility with appropriate resources  7/23/2024 1238 by Meri Ramirez RN  Outcome: Progressing  7/23/2024 1237 by Meri Ramirez RN  Outcome: Progressing  7/22/2024 2309 by Roma Ridley RN  Outcome: Progressing     Problem: Safety - Adult  Goal: Free from fall injury  7/23/2024 1238 by Meri Ramirez RN  Outcome: Progressing  7/23/2024 1237 by Meri Ramirez RN  Outcome: Progressing  7/22/2024 2309 by Roma Ridley RN  Outcome: Progressing     Problem: Pain  Goal: Verbalizes/displays adequate comfort level or baseline comfort level  7/23/2024 1238 by Meri Ramirez RN  Outcome: Progressing  7/23/2024 1237 by Meri Ramirez RN  Outcome: Progressing     Problem: ABCDS Injury Assessment  Goal: Absence of physical injury  7/23/2024 1238 by Meri Ramirez RN  Outcome: Progressing  7/23/2024 1237 by Meri Ramirez RN  Outcome: Progressing     Problem: Skin/Tissue Integrity  Goal: Absence of new skin breakdown  Description: 1.  Monitor for areas of redness and/or skin breakdown  2.  Assess vascular access sites hourly  3.  Every 4-6 hours minimum:  Change oxygen saturation probe site  4.  Every 4-6 hours:  If on nasal continuous positive airway pressure, respiratory therapy assess nares and determine need for appliance change or resting period.  7/23/2024 1238 by Meri Ramirez RN  Outcome: Progressing  7/23/2024 1237 by Meri Ramirez RN  Outcome: Progressing     Problem: Respiratory - Adult  Goal: Achieves optimal ventilation and oxygenation  7/23/2024 1238 by Meri Ramirez RN  Outcome: Progressing  7/23/2024 1237 by Meri Ramirez RN  Outcome: Progressing     Problem: Nutrition Deficit:  Goal: Optimize nutritional status  7/23/2024 1238 by Meri Ramirez RN  Outcome: Progressing  7/23/2024 1237 by Meri Ramirez RN  Outcome: Progressing  Flowsheets (Taken 7/23/2024 1031 by Tasneem Levi, JOHN)  Nutrient intake appropriate for improving, restoring, or 
  Problem: Discharge Planning  Goal: Discharge to home or other facility with appropriate resources  7/23/2024 2141 by Christy Aragon RN  Outcome: Progressing  7/23/2024 1238 by Meri Ramirez RN  Outcome: Progressing  7/23/2024 1237 by Meri Ramirez RN  Outcome: Progressing     Problem: Safety - Adult  Goal: Free from fall injury  7/23/2024 2141 by Christy Aragon RN  Outcome: Progressing  7/23/2024 1238 by Meri Ramirez RN  Outcome: Progressing  7/23/2024 1237 by Meri Ramirez RN  Outcome: Progressing     Problem: Pain  Goal: Verbalizes/displays adequate comfort level or baseline comfort level  7/23/2024 2141 by Christy Aragon RN  Outcome: Progressing  7/23/2024 1238 by Meri Ramirez RN  Outcome: Progressing  7/23/2024 1237 by Meri Ramirez RN  Outcome: Progressing     Problem: Skin/Tissue Integrity  Goal: Absence of new skin breakdown  Description: 1.  Monitor for areas of redness and/or skin breakdown  2.  Assess vascular access sites hourly  3.  Every 4-6 hours minimum:  Change oxygen saturation probe site  4.  Every 4-6 hours:  If on nasal continuous positive airway pressure, respiratory therapy assess nares and determine need for appliance change or resting period.  7/23/2024 2141 by Christy Aragon RN  Outcome: Progressing  7/23/2024 1238 by Meri Ramirez RN  Outcome: Progressing  7/23/2024 1237 by Meri Ramirez RN  Outcome: Progressing     
  Problem: Discharge Planning  Goal: Discharge to home or other facility with appropriate resources  8/7/2024 0046 by Gopal Clemons RN  Outcome: Progressing  8/6/2024 1235 by Kenia De La Torre RN  Outcome: Progressing     Problem: Safety - Adult  Goal: Free from fall injury  8/7/2024 0046 by Gopal Clemons RN  Outcome: Progressing  8/6/2024 1235 by Kenia De La Torre RN  Outcome: Progressing     Problem: Pain  Goal: Verbalizes/displays adequate comfort level or baseline comfort level  8/7/2024 0046 by Gopal Clemons RN  Outcome: Progressing  8/6/2024 1235 by Kenia De La Torre RN  Outcome: Progressing     Problem: ABCDS Injury Assessment  Goal: Absence of physical injury  8/7/2024 0046 by Gopal Clemons RN  Outcome: Progressing  8/6/2024 1235 by Kenia De La Torre RN  Outcome: Progressing     Problem: Skin/Tissue Integrity  Goal: Absence of new skin breakdown  Description: 1.  Monitor for areas of redness and/or skin breakdown  2.  Assess vascular access sites hourly  3.  Every 4-6 hours minimum:  Change oxygen saturation probe site  4.  Every 4-6 hours:  If on nasal continuous positive airway pressure, respiratory therapy assess nares and determine need for appliance change or resting period.  8/6/2024 1235 by Kenia De La Torre RN  Outcome: Progressing     Problem: Respiratory - Adult  Goal: Achieves optimal ventilation and oxygenation  8/6/2024 1235 by Kenia De La Torre RN  Outcome: Progressing     Problem: Nutrition Deficit:  Goal: Optimize nutritional status  Recent Flowsheet Documentation  Taken 8/6/2024 1343 by Marizol Hanson RD  Nutrient intake appropriate for improving, restoring, or maintaining nutritional needs: Monitor oral intake, labs, and treatment plans  8/6/2024 1235 by Kenia De La Torre RN  Outcome: Progressing     Problem: Chronic Conditions and Co-morbidities  Goal: Patient's chronic conditions and co-morbidity symptoms are monitored and maintained or improved  8/6/2024 1235 by Kenia De La Torre RN  Outcome: 
  Problem: Discharge Planning  Goal: Discharge to home or other facility with appropriate resources  Outcome: Adequate for Discharge     Problem: Safety - Adult  Goal: Free from fall injury  Outcome: Adequate for Discharge     Problem: Pain  Goal: Verbalizes/displays adequate comfort level or baseline comfort level  Outcome: Adequate for Discharge     Problem: ABCDS Injury Assessment  Goal: Absence of physical injury  Outcome: Adequate for Discharge     Problem: Skin/Tissue Integrity  Goal: Absence of new skin breakdown  Description: 1.  Monitor for areas of redness and/or skin breakdown  2.  Assess vascular access sites hourly  3.  Every 4-6 hours minimum:  Change oxygen saturation probe site  4.  Every 4-6 hours:  If on nasal continuous positive airway pressure, respiratory therapy assess nares and determine need for appliance change or resting period.  Outcome: Adequate for Discharge     Problem: Respiratory - Adult  Goal: Achieves optimal ventilation and oxygenation  Outcome: Adequate for Discharge     Problem: Nutrition Deficit:  Goal: Optimize nutritional status  Outcome: Adequate for Discharge     Problem: Chronic Conditions and Co-morbidities  Goal: Patient's chronic conditions and co-morbidity symptoms are monitored and maintained or improved  Outcome: Adequate for Discharge     Problem: Neurosensory - Adult  Goal: Achieves stable or improved neurological status  Outcome: Adequate for Discharge     Problem: Cardiovascular - Adult  Goal: Maintains optimal cardiac output and hemodynamic stability  Outcome: Adequate for Discharge  Goal: Absence of cardiac dysrhythmias or at baseline  Outcome: Adequate for Discharge     Problem: Skin/Tissue Integrity - Adult  Goal: Skin integrity remains intact  Outcome: Adequate for Discharge     Problem: Gastrointestinal - Adult  Goal: Maintains or returns to baseline bowel function  Outcome: Adequate for Discharge  Goal: Minimal or absence of nausea and vomiting  Outcome: 
  Problem: Discharge Planning  Goal: Discharge to home or other facility with appropriate resources  Outcome: Progressing     Problem: Safety - Adult  Goal: Free from fall injury  8/2/2024 0334 by Gopal Clemons RN  Outcome: Progressing  8/1/2024 2008 by Uri Garcia RN  Outcome: Progressing     Problem: Pain  Goal: Verbalizes/displays adequate comfort level or baseline comfort level  8/1/2024 2008 by Uri Garcia RN  Outcome: Progressing     Problem: ABCDS Injury Assessment  Goal: Absence of physical injury  8/2/2024 0334 by Gopal Clemons RN  Outcome: Progressing  8/1/2024 2008 by Uri Garcia RN  Outcome: Progressing     Problem: Skin/Tissue Integrity  Goal: Absence of new skin breakdown  Description: 1.  Monitor for areas of redness and/or skin breakdown  2.  Assess vascular access sites hourly  3.  Every 4-6 hours minimum:  Change oxygen saturation probe site  4.  Every 4-6 hours:  If on nasal continuous positive airway pressure, respiratory therapy assess nares and determine need for appliance change or resting period.  8/2/2024 0334 by Gopal Clemons RN  Outcome: Progressing  8/1/2024 2008 by Uri Garcia RN  Outcome: Progressing     Problem: Respiratory - Adult  Goal: Achieves optimal ventilation and oxygenation  8/2/2024 0334 by Gopal Clemons RN  Outcome: Progressing  8/1/2024 2008 by Uri Garcia RN  Outcome: Progressing     
  Problem: Discharge Planning  Goal: Discharge to home or other facility with appropriate resources  Outcome: Progressing     Problem: Safety - Adult  Goal: Free from fall injury  Outcome: Progressing     
  Problem: Discharge Planning  Goal: Discharge to home or other facility with appropriate resources  Outcome: Progressing     Problem: Safety - Adult  Goal: Free from fall injury  Outcome: Progressing     Problem: Pain  Goal: Verbalizes/displays adequate comfort level or baseline comfort level  Outcome: Progressing     Problem: ABCDS Injury Assessment  Goal: Absence of physical injury  Outcome: Progressing     
  Problem: Discharge Planning  Goal: Discharge to home or other facility with appropriate resources  Outcome: Progressing     Problem: Safety - Adult  Goal: Free from fall injury  Outcome: Progressing     Problem: Pain  Goal: Verbalizes/displays adequate comfort level or baseline comfort level  Outcome: Progressing     Problem: ABCDS Injury Assessment  Goal: Absence of physical injury  Outcome: Progressing     Problem: Skin/Tissue Integrity  Goal: Absence of new skin breakdown  Description: 1.  Monitor for areas of redness and/or skin breakdown  2.  Assess vascular access sites hourly  3.  Every 4-6 hours minimum:  Change oxygen saturation probe site  4.  Every 4-6 hours:  If on nasal continuous positive airway pressure, respiratory therapy assess nares and determine need for appliance change or resting period.  7/20/2024 1744 by Lang, Merlin, RN  Outcome: Progressing  7/20/2024 0510 by Tae Estrada RN  Outcome: Progressing     Problem: Respiratory - Adult  Goal: Achieves optimal ventilation and oxygenation  7/20/2024 1744 by Lang, Merlin, RN  Outcome: Progressing  7/20/2024 0510 by Tae Estrada RN  Outcome: Progressing  Flowsheets (Taken 7/19/2024 2000)  Achieves optimal ventilation and oxygenation: Assess for changes in respiratory status     Problem: Nutrition Deficit:  Goal: Optimize nutritional status  Outcome: Progressing     Problem: Chronic Conditions and Co-morbidities  Goal: Patient's chronic conditions and co-morbidity symptoms are monitored and maintained or improved  Outcome: Progressing     
  Problem: Discharge Planning  Goal: Discharge to home or other facility with appropriate resources  Outcome: Progressing     Problem: Safety - Adult  Goal: Free from fall injury  Outcome: Progressing     Problem: Pain  Goal: Verbalizes/displays adequate comfort level or baseline comfort level  Outcome: Progressing     Problem: ABCDS Injury Assessment  Goal: Absence of physical injury  Outcome: Progressing     Problem: Skin/Tissue Integrity  Goal: Absence of new skin breakdown  Description: 1.  Monitor for areas of redness and/or skin breakdown  2.  Assess vascular access sites hourly  3.  Every 4-6 hours minimum:  Change oxygen saturation probe site  4.  Every 4-6 hours:  If on nasal continuous positive airway pressure, respiratory therapy assess nares and determine need for appliance change or resting period.  Outcome: Progressing     
  Problem: Discharge Planning  Goal: Discharge to home or other facility with appropriate resources  Outcome: Progressing     Problem: Safety - Adult  Goal: Free from fall injury  Outcome: Progressing     Problem: Pain  Goal: Verbalizes/displays adequate comfort level or baseline comfort level  Outcome: Progressing     Problem: ABCDS Injury Assessment  Goal: Absence of physical injury  Outcome: Progressing     Problem: Skin/Tissue Integrity  Goal: Absence of new skin breakdown  Description: 1.  Monitor for areas of redness and/or skin breakdown  2.  Assess vascular access sites hourly  3.  Every 4-6 hours minimum:  Change oxygen saturation probe site  4.  Every 4-6 hours:  If on nasal continuous positive airway pressure, respiratory therapy assess nares and determine need for appliance change or resting period.  Outcome: Progressing     
  Problem: Discharge Planning  Goal: Discharge to home or other facility with appropriate resources  Outcome: Progressing     Problem: Safety - Adult  Goal: Free from fall injury  Outcome: Progressing     Problem: Pain  Goal: Verbalizes/displays adequate comfort level or baseline comfort level  Outcome: Progressing     Problem: ABCDS Injury Assessment  Goal: Absence of physical injury  Outcome: Progressing     Problem: Skin/Tissue Integrity  Goal: Absence of new skin breakdown  Description: 1.  Monitor for areas of redness and/or skin breakdown  2.  Assess vascular access sites hourly  3.  Every 4-6 hours minimum:  Change oxygen saturation probe site  4.  Every 4-6 hours:  If on nasal continuous positive airway pressure, respiratory therapy assess nares and determine need for appliance change or resting period.  Outcome: Progressing     Problem: Respiratory - Adult  Goal: Achieves optimal ventilation and oxygenation  Outcome: Progressing     Problem: Nutrition Deficit:  Goal: Optimize nutritional status  Outcome: Progressing     Problem: Chronic Conditions and Co-morbidities  Goal: Patient's chronic conditions and co-morbidity symptoms are monitored and maintained or improved  Outcome: Progressing     
  Problem: Discharge Planning  Goal: Discharge to home or other facility with appropriate resources  Outcome: Progressing     Problem: Safety - Adult  Goal: Free from fall injury  Outcome: Progressing     Problem: Pain  Goal: Verbalizes/displays adequate comfort level or baseline comfort level  Outcome: Progressing     Problem: ABCDS Injury Assessment  Goal: Absence of physical injury  Outcome: Progressing     Problem: Skin/Tissue Integrity  Goal: Absence of new skin breakdown  Description: 1.  Monitor for areas of redness and/or skin breakdown  2.  Assess vascular access sites hourly  3.  Every 4-6 hours minimum:  Change oxygen saturation probe site  4.  Every 4-6 hours:  If on nasal continuous positive airway pressure, respiratory therapy assess nares and determine need for appliance change or resting period.  Outcome: Progressing     Problem: Respiratory - Adult  Goal: Achieves optimal ventilation and oxygenation  Outcome: Progressing     Problem: Nutrition Deficit:  Goal: Optimize nutritional status  Outcome: Progressing  Flowsheets (Taken 7/30/2024 1121 by Tasneem Levi RD)  Nutrient intake appropriate for improving, restoring, or maintaining nutritional needs: Monitor oral intake, labs, and treatment plans     Problem: Chronic Conditions and Co-morbidities  Goal: Patient's chronic conditions and co-morbidity symptoms are monitored and maintained or improved  Outcome: Progressing     Problem: Neurosensory - Adult  Goal: Achieves stable or improved neurological status  Outcome: Progressing     Problem: Cardiovascular - Adult  Goal: Maintains optimal cardiac output and hemodynamic stability  Outcome: Progressing  Goal: Absence of cardiac dysrhythmias or at baseline  Outcome: Progressing     Problem: Skin/Tissue Integrity - Adult  Goal: Skin integrity remains intact  Outcome: Progressing     Problem: Gastrointestinal - Adult  Goal: Maintains or returns to baseline bowel function  Outcome: Progressing  Goal: 
  Problem: Discharge Planning  Goal: Discharge to home or other facility with appropriate resources  Outcome: Progressing     Problem: Safety - Adult  Goal: Free from fall injury  Outcome: Progressing     Problem: Pain  Goal: Verbalizes/displays adequate comfort level or baseline comfort level  Outcome: Progressing     Problem: Skin/Tissue Integrity  Goal: Absence of new skin breakdown  Description: 1.  Monitor for areas of redness and/or skin breakdown  2.  Assess vascular access sites hourly  3.  Every 4-6 hours minimum:  Change oxygen saturation probe site  4.  Every 4-6 hours:  If on nasal continuous positive airway pressure, respiratory therapy assess nares and determine need for appliance change or resting period.  Outcome: Progressing     
  Problem: Discharge Planning  Goal: Discharge to home or other facility with appropriate resources  Outcome: Progressing     Problem: Safety - Adult  Goal: Free from fall injury  Outcome: Progressing     Problem: Pain  Goal: Verbalizes/displays adequate comfort level or baseline comfort level  Outcome: Progressing     Problem: Skin/Tissue Integrity  Goal: Absence of new skin breakdown  Description: 1.  Monitor for areas of redness and/or skin breakdown  2.  Assess vascular access sites hourly  3.  Every 4-6 hours minimum:  Change oxygen saturation probe site  4.  Every 4-6 hours:  If on nasal continuous positive airway pressure, respiratory therapy assess nares and determine need for appliance change or resting period.  Outcome: Progressing     Problem: Respiratory - Adult  Goal: Achieves optimal ventilation and oxygenation  Outcome: Progressing     
  Problem: Discharge Planning  Goal: Discharge to home or other facility with appropriate resources  Outcome: Progressing  Flowsheets  Taken 7/16/2024 2000 by Evelia Saeed RN  Discharge to home or other facility with appropriate resources: Identify barriers to discharge with patient and caregiver  Taken 7/16/2024 0759 by Laura Palmer RN  Discharge to home or other facility with appropriate resources: Identify barriers to discharge with patient and caregiver     Problem: Safety - Adult  Goal: Free from fall injury  7/16/2024 2012 by Evelia Saeed RN  Outcome: Progressing  7/16/2024 1052 by Laura Palmer RN  Outcome: Progressing  Flowsheets (Taken 7/16/2024 0759)  Free From Fall Injury: Instruct family/caregiver on patient safety     Problem: Pain  Goal: Verbalizes/displays adequate comfort level or baseline comfort level  Outcome: Progressing  Flowsheets  Taken 7/16/2024 1940 by Evelia Saeed RN  Verbalizes/displays adequate comfort level or baseline comfort level: Encourage patient to monitor pain and request assistance  Taken 7/16/2024 0806 by Laura Palmer RN  Verbalizes/displays adequate comfort level or baseline comfort level: Encourage patient to monitor pain and request assistance     Problem: ABCDS Injury Assessment  Goal: Absence of physical injury  Outcome: Progressing     Problem: Skin/Tissue Integrity  Goal: Absence of new skin breakdown  Description: 1.  Monitor for areas of redness and/or skin breakdown  2.  Assess vascular access sites hourly  3.  Every 4-6 hours minimum:  Change oxygen saturation probe site  4.  Every 4-6 hours:  If on nasal continuous positive airway pressure, respiratory therapy assess nares and determine need for appliance change or resting period.  7/16/2024 2012 by Evelia Saeed RN  Outcome: Progressing  7/16/2024 1052 by Laura Palmer RN  Outcome: Progressing     Problem: Respiratory - Adult  Goal: Achieves optimal ventilation and 
  Problem: Discharge Planning  Goal: Discharge to home or other facility with appropriate resources  Outcome: Progressing  Flowsheets  Taken 7/29/2024 1640  Discharge to home or other facility with appropriate resources:   Identify barriers to discharge with patient and caregiver   Arrange for needed discharge resources and transportation as appropriate  Taken 7/29/2024 0832  Discharge to home or other facility with appropriate resources:   Identify barriers to discharge with patient and caregiver   Arrange for needed discharge resources and transportation as appropriate   Identify discharge learning needs (meds, wound care, etc)     Problem: Safety - Adult  Goal: Free from fall injury  Outcome: Progressing  Flowsheets  Taken 7/29/2024 1640  Free From Fall Injury: Instruct family/caregiver on patient safety  Taken 7/29/2024 0800  Free From Fall Injury:   Based on caregiver fall risk screen, instruct family/caregiver to ask for assistance with transferring infant if caregiver noted to have fall risk factors   Instruct family/caregiver on patient safety     Problem: Pain  Goal: Verbalizes/displays adequate comfort level or baseline comfort level  Outcome: Progressing  Flowsheets (Taken 7/29/2024 1640)  Verbalizes/displays adequate comfort level or baseline comfort level:   Encourage patient to monitor pain and request assistance   Assess pain using appropriate pain scale   Administer analgesics based on type and severity of pain and evaluate response     Problem: ABCDS Injury Assessment  Goal: Absence of physical injury  Outcome: Progressing  Flowsheets (Taken 7/29/2024 1640)  Absence of Physical Injury: Implement safety measures based on patient assessment     Problem: Respiratory - Adult  Goal: Achieves optimal ventilation and oxygenation  Outcome: Progressing  Flowsheets  Taken 7/29/2024 1640  Achieves optimal ventilation and oxygenation:   Assess for changes in respiratory status   Assess for changes in mentation 
  Problem: Discharge Planning  Goal: Discharge to home or other facility with appropriate resources  Outcome: Progressing  Flowsheets (Taken 7/15/2024 2024)  Discharge to home or other facility with appropriate resources: Identify barriers to discharge with patient and caregiver     Problem: Safety - Adult  Goal: Free from fall injury  Outcome: Progressing     Problem: Pain  Goal: Verbalizes/displays adequate comfort level or baseline comfort level  Outcome: Progressing  Flowsheets  Taken 7/16/2024 0017  Verbalizes/displays adequate comfort level or baseline comfort level:   Encourage patient to monitor pain and request assistance   Assess pain using appropriate pain scale   Administer analgesics based on type and severity of pain and evaluate response   Implement non-pharmacological measures as appropriate and evaluate response   Consider cultural and social influences on pain and pain management  Taken 7/15/2024 2024  Verbalizes/displays adequate comfort level or baseline comfort level:   Encourage patient to monitor pain and request assistance   Assess pain using appropriate pain scale   Administer analgesics based on type and severity of pain and evaluate response   Implement non-pharmacological measures as appropriate and evaluate response   Consider cultural and social influences on pain and pain management     Problem: ABCDS Injury Assessment  Goal: Absence of physical injury  Outcome: Progressing     Problem: Skin/Tissue Integrity  Goal: Absence of new skin breakdown  Description: 1.  Monitor for areas of redness and/or skin breakdown  2.  Assess vascular access sites hourly  3.  Every 4-6 hours minimum:  Change oxygen saturation probe site  4.  Every 4-6 hours:  If on nasal continuous positive airway pressure, respiratory therapy assess nares and determine need for appliance change or resting period.  Outcome: Progressing     Problem: Respiratory - Adult  Goal: Achieves optimal ventilation and 
  Problem: Discharge Planning  Goal: Discharge to home or other facility with appropriate resources  Recent Flowsheet Documentation  Taken 7/16/2024 0759 by Laura Palmer RN  Discharge to home or other facility with appropriate resources: Identify barriers to discharge with patient and caregiver  7/16/2024 0052 by Matilda Bob RN  Outcome: Progressing  Flowsheets (Taken 7/15/2024 2024)  Discharge to home or other facility with appropriate resources: Identify barriers to discharge with patient and caregiver     Problem: Safety - Adult  Goal: Free from fall injury  7/16/2024 1052 by Laura Palmer RN  Outcome: Progressing  Flowsheets (Taken 7/16/2024 0759)  Free From Fall Injury: Instruct family/caregiver on patient safety  7/16/2024 0052 by Matilda Bob RN  Outcome: Progressing     Problem: Pain  Goal: Verbalizes/displays adequate comfort level or baseline comfort level  Recent Flowsheet Documentation  Taken 7/16/2024 0806 by Laura Palmer RN  Verbalizes/displays adequate comfort level or baseline comfort level: Encourage patient to monitor pain and request assistance  7/16/2024 0052 by Matilda Bob RN  Outcome: Progressing  Flowsheets  Taken 7/16/2024 0017  Verbalizes/displays adequate comfort level or baseline comfort level:   Encourage patient to monitor pain and request assistance   Assess pain using appropriate pain scale   Administer analgesics based on type and severity of pain and evaluate response   Implement non-pharmacological measures as appropriate and evaluate response   Consider cultural and social influences on pain and pain management  Taken 7/15/2024 2024  Verbalizes/displays adequate comfort level or baseline comfort level:   Encourage patient to monitor pain and request assistance   Assess pain using appropriate pain scale   Administer analgesics based on type and severity of pain and evaluate response   Implement non-pharmacological measures as 
  Problem: Discharge Planning  Goal: Discharge to home or other facility with appropriate resources  Recent Flowsheet Documentation  Taken 7/20/2024 1920 by Tae Estrada RN  Discharge to home or other facility with appropriate resources: Identify barriers to discharge with patient and caregiver  7/20/2024 1744 by Lang, Merlin, RN  Outcome: Progressing     Problem: Safety - Adult  Goal: Free from fall injury  7/20/2024 1744 by Lang, Merlin, RN  Outcome: Progressing     Problem: Pain  Goal: Verbalizes/displays adequate comfort level or baseline comfort level  Recent Flowsheet Documentation  Taken 7/20/2024 2300 by Tae Estrada RN  Verbalizes/displays adequate comfort level or baseline comfort level: Encourage patient to monitor pain and request assistance  Taken 7/20/2024 1920 by Tae Estrada RN  Verbalizes/displays adequate comfort level or baseline comfort level: Encourage patient to monitor pain and request assistance  7/20/2024 1744 by Lang, Merlin, RN  Outcome: Progressing     Problem: ABCDS Injury Assessment  Goal: Absence of physical injury  7/20/2024 1744 by Lang, Merlin, RN  Outcome: Progressing     Problem: Skin/Tissue Integrity  Goal: Absence of new skin breakdown  7/20/2024 2332 by Tae Estrada RN  Outcome: Progressing  7/20/2024 1744 by Lang, Merlin, RN  Outcome: Progressing     Problem: Respiratory - Adult  Goal: Achieves optimal ventilation and oxygenation  7/20/2024 2332 by Tae Estrada RN  Outcome: Progressing  Flowsheets (Taken 7/20/2024 1920)  Achieves optimal ventilation and oxygenation: Assess for changes in respiratory status  7/20/2024 1744 by Lang, Merlin, RN  Outcome: Progressing     Problem: Nutrition Deficit:  Goal: Optimize nutritional status  7/20/2024 1744 by Lang, Merlin, RN  Outcome: Progressing     Problem: Chronic Conditions and Co-morbidities  Goal: Patient's chronic conditions and co-morbidity symptoms are monitored and 
  Problem: Discharge Planning  Goal: Discharge to home or other facility with appropriate resources  Recent Flowsheet Documentation  Taken 7/27/2024 1945 by Tae Estrada RN  Discharge to home or other facility with appropriate resources: Identify barriers to discharge with patient and caregiver     Problem: Pain  Goal: Verbalizes/displays adequate comfort level or baseline comfort level  Recent Flowsheet Documentation  Taken 7/27/2024 1940 by Tae Estrada RN  Verbalizes/displays adequate comfort level or baseline comfort level: Encourage patient to monitor pain and request assistance     Problem: Respiratory - Adult  Goal: Achieves optimal ventilation and oxygenation  Recent Flowsheet Documentation  Taken 7/27/2024 1945 by Tae Estrada RN  Achieves optimal ventilation and oxygenation: Assess for changes in respiratory status     Problem: Chronic Conditions and Co-morbidities  Goal: Patient's chronic conditions and co-morbidity symptoms are monitored and maintained or improved  Recent Flowsheet Documentation  Taken 7/27/2024 1945 by Tae Estrada RN  Care Plan - Patient's Chronic Conditions and Co-Morbidity Symptoms are Monitored and Maintained or Improved: Monitor and assess patient's chronic conditions and comorbid symptoms for stability, deterioration, or improvement     
  Problem: Discharge Planning  Goal: Discharge to home or other facility with appropriate resources  Recent Flowsheet Documentation  Taken 7/27/2024 1945 by Tae Estrada RN  Discharge to home or other facility with appropriate resources: Identify barriers to discharge with patient and caregiver     Problem: Pain  Goal: Verbalizes/displays adequate comfort level or baseline comfort level  Recent Flowsheet Documentation  Taken 7/27/2024 1940 by Tae Estrada RN  Verbalizes/displays adequate comfort level or baseline comfort level: Encourage patient to monitor pain and request assistance     Problem: Respiratory - Adult  Goal: Achieves optimal ventilation and oxygenation  Recent Flowsheet Documentation  Taken 7/27/2024 1945 by Tae Estrada RN  Achieves optimal ventilation and oxygenation: Assess for changes in respiratory status     Problem: Chronic Conditions and Co-morbidities  Goal: Patient's chronic conditions and co-morbidity symptoms are monitored and maintained or improved  Recent Flowsheet Documentation  Taken 7/27/2024 1945 by Tae Estrada RN  Care Plan - Patient's Chronic Conditions and Co-Morbidity Symptoms are Monitored and Maintained or Improved: Monitor and assess patient's chronic conditions and comorbid symptoms for stability, deterioration, or improvement     
  Problem: Occupational Therapy - Adult  Goal: By Discharge: Performs self-care activities at highest level of function for planned discharge setting.  See evaluation for individualized goals.  Description: FUNCTIONAL STATUS PRIOR TO ADMISSION:  Patient poor historian and unable to provide prior functional status. Per chart review, patient was independent for ADLs and mod I for functional mobility using SPC.  Receives Help From: Family, ADL Assistance: Independent,  ,  ,  ,  ,  , Homemaking Assistance: Independent, Ambulation Assistance: Independent, Transfer Assistance: Independent, Active : Yes (Pt drives sometimes.)     HOME SUPPORT: Patient lived alone.    Occupational Therapy Goals:  Initiated 7/12/2024  1.  Patient will perform grooming standing at sink with Supervision within 7 day(s).  2.  Patient will perform upper body dressing with Supervision within 7 day(s).  3.  Patient will perform lower body dressing with Minimal Assist within 7 day(s).  4.  Patient will perform toilet transfers with Supervision  within 7 day(s).  5.  Patient will perform all aspects of toileting with Minimal Assist within 7 day(s).  6.  Patient will participate in upper extremity therapeutic exercise/activities with Supervision for 5 minutes within 7 day(s).    Outcome: Progressing   OCCUPATIONAL THERAPY TREATMENT  Patient: Darrel Patterson (79 y.o. male)  Date: 7/15/2024  Primary Diagnosis: NSTEMI (non-ST elevated myocardial infarction) (HCC) [I21.4]  Procedure(s) (LRB):  Left heart cath / coronary angiography (N/A) 5 Days Post-Op   Precautions: Fall Risk, Bed Alarm                Chart, occupational therapy assessment, plan of care, and goals were reviewed.    ASSESSMENT  Patient continues to benefit from skilled OT services and is progressing towards goals. Patient received semisupine in bed with daughter present in room and agreeable for therapy. Patient appeared more alert and talkative this session. He completed bed 
  Problem: Occupational Therapy - Adult  Goal: By Discharge: Performs self-care activities at highest level of function for planned discharge setting.  See evaluation for individualized goals.  Description: FUNCTIONAL STATUS PRIOR TO ADMISSION:  Patient poor historian and unable to provide prior functional status. Per chart review, patient was independent for ADLs and mod I for functional mobility using SPC.  Receives Help From: Family, ADL Assistance: Independent,  ,  ,  ,  ,  , Homemaking Assistance: Independent, Ambulation Assistance: Independent, Transfer Assistance: Independent, Active : Yes (Pt drives sometimes.)     HOME SUPPORT: Patient lived alone.    Occupational Therapy Goals:  Initiated 7/12/2024. Weekly re-assessment performed on 7/19/24, goals have been adjusted as shown; weekly re-assessment 7/26/2024, goals remain appropriate  1.  Patient will perform grooming standing at sink with Supervision within 7 day(s).  2.  Patient will perform upper body dressing with Supervision within 7 day(s).  3.  Patient will perform lower body dressing with Minimal Assist within 7 day(s). UPGRADE: supervision  4.  Patient will perform toilet transfers with Supervision  within 7 day(s).  5.  Patient will perform all aspects of toileting with Minimal Assist within 7 day(s). UPGRADE: supervision  6.  Patient will participate in upper extremity therapeutic exercise/activities with Supervision for 5 minutes within 7 day(s).    Outcome: Progressing     OCCUPATIONAL THERAPY TREATMENT  Patient: Darrel Patterson (79 y.o. male)  Date: 7/29/2024  Primary Diagnosis: NSTEMI (non-ST elevated myocardial infarction) (HCC) [I21.4]  Procedure(s) (LRB):  Left heart cath / coronary angiography (N/A) 19 Days Post-Op   Precautions: Fall Risk, Bed Alarm                Chart, occupational therapy assessment, plan of care, and goals were reviewed.    ASSESSMENT  Patient continues to benefit from skilled OT services and is not 
  Problem: Occupational Therapy - Adult  Goal: By Discharge: Performs self-care activities at highest level of function for planned discharge setting.  See evaluation for individualized goals.  Description: FUNCTIONAL STATUS PRIOR TO ADMISSION:  Patient poor historian and unable to provide prior functional status. Per chart review, patient was independent for ADLs and mod I for functional mobility using SPC.  Receives Help From: Family, ADL Assistance: Independent,  ,  ,  ,  ,  , Homemaking Assistance: Independent, Ambulation Assistance: Independent, Transfer Assistance: Independent, Active : Yes (Pt drives sometimes.)     HOME SUPPORT: Patient lived alone.    Occupational Therapy Goals:  Initiated 7/12/2024. Weekly re-assessment performed on 7/19/24, goals have been adjusted as shown; weekly re-assessment 7/26/2024, goals remain appropriate  1.  Patient will perform grooming standing at sink with Supervision within 7 day(s).  2.  Patient will perform upper body dressing with Supervision within 7 day(s).  3.  Patient will perform lower body dressing with Minimal Assist within 7 day(s). UPGRADE: supervision  4.  Patient will perform toilet transfers with Supervision  within 7 day(s).  5.  Patient will perform all aspects of toileting with Minimal Assist within 7 day(s). UPGRADE: supervision  6.  Patient will participate in upper extremity therapeutic exercise/activities with Supervision for 5 minutes within 7 day(s).    Outcome: Progressing     OCCUPATIONAL THERAPY TREATMENT: WEEKLY REASSESSMENT    Patient: Darrel Patterson (79 y.o. male)  Date: 7/26/2024  Primary Diagnosis: NSTEMI (non-ST elevated myocardial infarction) (HCC) [I21.4]  Procedure(s) (LRB):  Left heart cath / coronary angiography (N/A) 16 Days Post-Op   Precautions: Fall Risk, Bed Alarm                Chart, occupational therapy assessment, plan of care, and goals were reviewed.    ASSESSMENT  Patient continues to benefit from skilled OT 
  Problem: Occupational Therapy - Adult  Goal: By Discharge: Performs self-care activities at highest level of function for planned discharge setting.  See evaluation for individualized goals.  Description: FUNCTIONAL STATUS PRIOR TO ADMISSION:  Patient poor historian and unable to provide prior functional status. Per chart review, patient was independent for ADLs and mod I for functional mobility using SPC.  Receives Help From: Family, ADL Assistance: Independent,  ,  ,  ,  ,  , Homemaking Assistance: Independent, Ambulation Assistance: Independent, Transfer Assistance: Independent, Active : Yes (Pt drives sometimes.)     HOME SUPPORT: Patient lived alone.    Occupational Therapy Goals:  Initiated 7/12/2024. Weekly re-assessment performed on 7/19/24, goals have been adjusted as shown; weekly re-assessment 7/26/2024, goals remain appropriate  Weekly re-assessment performed on 8/5/24. All goals remain appropriate   1.  Patient will perform grooming standing at sink with Supervision within 7 day(s).  2.  Patient will perform upper body dressing with Supervision within 7 day(s).  3.  Patient will perform lower body dressing with Minimal Assist within 7 day(s). UPGRADE: supervision  4.  Patient will perform toilet transfers with Supervision  within 7 day(s).  5.  Patient will perform all aspects of toileting with Minimal Assist within 7 day(s). UPGRADE: supervision  6.  Patient will participate in upper extremity therapeutic exercise/activities with Supervision for 5 minutes within 7 day(s).    Outcome: Progressing    OCCUPATIONAL THERAPY TREATMENT  Patient: Darrel Patterson (79 y.o. male)  Date: 8/6/2024  Primary Diagnosis: NSTEMI (non-ST elevated myocardial infarction) (HCC) [I21.4]  Procedure(s) (LRB):  COLONOSCOPY DIAGNOSTIC (N/A) 7 Days Post-Op   Precautions: Fall Risk, Bed Alarm                Chart, occupational therapy assessment, plan of care, and goals were reviewed.    ASSESSMENT  Patient continues to 
  Problem: Occupational Therapy - Adult  Goal: By Discharge: Performs self-care activities at highest level of function for planned discharge setting.  See evaluation for individualized goals.  Description: FUNCTIONAL STATUS PRIOR TO ADMISSION:  Patient poor historian and unable to provide prior functional status. Per chart review, patient was independent for ADLs and mod I for functional mobility using SPC.  Receives Help From: Family, ADL Assistance: Independent,  ,  ,  ,  ,  , Homemaking Assistance: Independent, Ambulation Assistance: Independent, Transfer Assistance: Independent, Active : Yes (Pt drives sometimes.)     HOME SUPPORT: Patient lived alone.    Occupational Therapy Goals:  Initiated 7/12/2024. Weekly re-assessment performed on 7/19/24, goals have been adjusted as shown; weekly re-assessment 7/26/2024, goals remain appropriate  Weekly re-assessment performed on 8/5/24. All goals remain appropriate   1.  Patient will perform grooming standing at sink with Supervision within 7 day(s).  2.  Patient will perform upper body dressing with Supervision within 7 day(s).  3.  Patient will perform lower body dressing with Minimal Assist within 7 day(s). UPGRADE: supervision  4.  Patient will perform toilet transfers with Supervision  within 7 day(s).  5.  Patient will perform all aspects of toileting with Minimal Assist within 7 day(s). UPGRADE: supervision  6.  Patient will participate in upper extremity therapeutic exercise/activities with Supervision for 5 minutes within 7 day(s).    Outcome: Progressing    OCCUPATIONAL THERAPY TREATMENT: WEEKLY REASSESSMENT    Patient: Darrel Patterson (79 y.o. male)  Date: 8/5/2024  Primary Diagnosis: NSTEMI (non-ST elevated myocardial infarction) (HCC) [I21.4]  Procedure(s) (LRB):  COLONOSCOPY DIAGNOSTIC (N/A) 6 Days Post-Op   Precautions: Fall Risk, Bed Alarm                Chart, occupational therapy assessment, plan of care, and goals were 
  Problem: Physical Therapy - Adult  Goal: By Discharge: Performs mobility at highest level of function for planned discharge setting.  See evaluation for individualized goals.  Description: FUNCTIONAL STATUS PRIOR TO ADMISSION: per chart pt was mod I with SPC prior to admission. Pt unable to provide any history.    HOME SUPPORT PRIOR TO ADMISSION: The patient lived with family.    Physical Therapy Goals    Revised 8/5/2024  1.  Patient will move from supine to sit and sit to supine in bed with independence within 7 day(s).    2.  Patient will perform sit to stand with modified independence within 7 day(s).  3.  Patient will transfer from bed to chair and chair to bed with modified independence using the least restrictive device within 7 day(s).  4.  Patient will ambulate with modified independence for 100 feet with the least restrictive device within 7 day(s).     Reviewed 7/26/2024, remain appropriate, increased gait distance goal to 150 feet  Reviewed 7/19/2024 remain appropriate   Initiated 7/12/2024  1.  Patient will move from supine to sit and sit to supine in bed with modified independence within 7 day(s).    2.  Patient will perform sit to stand with modified independence within 7 day(s).  3.  Patient will transfer from bed to chair and chair to bed with modified independence using the least restrictive device within 7 day(s).  4.  Patient will ambulate with modified independence for 75 feet with the least restrictive device within 7 day(s). Distance goal met, increase distance to 150 feet, discontinue goal  4.  Patient will ambulate with modified independence for 150 feet with the least restrictive device within 7 day(s).      Outcome: Progressing   PHYSICAL THERAPY TREATMENT    Patient: Darrel Patterson (79 y.o. male)  Date: 8/6/2024  Diagnosis: NSTEMI (non-ST elevated myocardial infarction) ([facility]) [I21.4] NSTEMI (non-ST elevated myocardial infarction) ([facility])  Procedure(s) (LRB):  COLONOSCOPY DIAGNOSTIC 
  Problem: Physical Therapy - Adult  Goal: By Discharge: Performs mobility at highest level of function for planned discharge setting.  See evaluation for individualized goals.  Description: FUNCTIONAL STATUS PRIOR TO ADMISSION: per chart pt was mod I with SPC prior to admission. Pt unable to provide any history.    HOME SUPPORT PRIOR TO ADMISSION: The patient lived with family.    Physical Therapy Goals  Initiated 7/12/2024  1.  Patient will move from supine to sit and sit to supine in bed with modified independence within 7 day(s).    2.  Patient will perform sit to stand with modified independence within 7 day(s).  3.  Patient will transfer from bed to chair and chair to bed with modified independence using the least restrictive device within 7 day(s).  4.  Patient will ambulate with modified independence for 75 feet with the least restrictive device within 7 day(s).         Outcome: Progressing   PHYSICAL THERAPY EVALUATION    Patient: Darrel Patterson (79 y.o. male)  Date: 7/12/2024  Primary Diagnosis: NSTEMI (non-ST elevated myocardial infarction) ([facility]) [I21.4]  Procedure(s) (LRB):  Left heart cath / coronary angiography (N/A) 2 Days Post-Op   Precautions: Restrictions/Precautions: Fall Risk, Bed Alarm                      ASSESSMENT :   DEFICITS/IMPAIRMENTS:   The patient is limited by decreased functional mobility, ROM, strength, activity tolerance, endurance, cognition, balance     Based on the impairments listed above pt is likely below functional baseline. Pt was received in supine with gown covering his head. He needed significant cues to begin mobility. Noted impaired cognition and minimal verbalization. He was eventually able to mobilize OOB and ambulate around the room with RW, fairly steady gait. He was left sitting up. Concern with pts cognition and returning home without adequate support. If he returns home he will need 24/7 intimally if this is not his cognitive baseline.     Patient will benefit 
  Problem: Physical Therapy - Adult  Goal: By Discharge: Performs mobility at highest level of function for planned discharge setting.  See evaluation for individualized goals.  Description: FUNCTIONAL STATUS PRIOR TO ADMISSION: per chart pt was mod I with SPC prior to admission. Pt unable to provide any history.    HOME SUPPORT PRIOR TO ADMISSION: The patient lived with family.    Physical Therapy Goals  Initiated 7/12/2024  1.  Patient will move from supine to sit and sit to supine in bed with modified independence within 7 day(s).    2.  Patient will perform sit to stand with modified independence within 7 day(s).  3.  Patient will transfer from bed to chair and chair to bed with modified independence using the least restrictive device within 7 day(s).  4.  Patient will ambulate with modified independence for 75 feet with the least restrictive device within 7 day(s).         Outcome: Progressing   PHYSICAL THERAPY TREATMENT    Patient: Darrel Patterson (79 y.o. male)  Date: 7/15/2024  Diagnosis: NSTEMI (non-ST elevated myocardial infarction) ([facility]) [I21.4] NSTEMI (non-ST elevated myocardial infarction) ([facility])  Procedure(s) (LRB):  Left heart cath / coronary angiography (N/A) 5 Days Post-Op  Precautions: Fall Risk, Bed Alarm                      ASSESSMENT:  Patient continues to benefit from skilled PT services and is slowly progressing towards goals. Pt received for PT session seated EOB, RN present, agreeable to therapy with max encouragement. Pt performed sit <> stands with SPC and SBA-CGA, fair balance in standing with cane. Gait initiated with SPC  but pt noted to have poor balance especially in L stance when using cane. Transitioned to RW. Pt walked 40ft with RW and CGA for safety, short steps and antalgic L stance phase from chronic L knee injury per pt report. Pt returned to room and was left up in chair, all needs met, chair alarm on, son at bedside, RN aware. Pt adamantly refusing SNF placement this date, 
  Problem: Physical Therapy - Adult  Goal: By Discharge: Performs mobility at highest level of function for planned discharge setting.  See evaluation for individualized goals.  Description: FUNCTIONAL STATUS PRIOR TO ADMISSION: per chart pt was mod I with SPC prior to admission. Pt unable to provide any history.    HOME SUPPORT PRIOR TO ADMISSION: The patient lived with family.    Physical Therapy Goals  Initiated 7/12/2024  1.  Patient will move from supine to sit and sit to supine in bed with modified independence within 7 day(s).    2.  Patient will perform sit to stand with modified independence within 7 day(s).  3.  Patient will transfer from bed to chair and chair to bed with modified independence using the least restrictive device within 7 day(s).  4.  Patient will ambulate with modified independence for 75 feet with the least restrictive device within 7 day(s).         Outcome: Progressing   PHYSICAL THERAPY TREATMENT    Patient: Darrel Patterson (79 y.o. male)  Date: 7/17/2024  Diagnosis: NSTEMI (non-ST elevated myocardial infarction) ([facility]) [I21.4] NSTEMI (non-ST elevated myocardial infarction) ([facility])  Procedure(s) (LRB):  Left heart cath / coronary angiography (N/A) 7 Days Post-Op  Precautions: Fall Risk, Bed Alarm                      ASSESSMENT:  Patient continues to benefit from skilled PT services and is slowly progressing towards goals. Pt received for PT session supine in bed, agreeable to therapy with encouragement. He performed bed mobility with SBA and rail, extra time. BP 93/80 sitting EOB. Sit <> stand EOB with RW and CGA. Pt walked 25ft with RW and CGA, short shuffling steps but no buckling or major LOB noted. He fatigued quickly, required seated rest in chair. BP stable sitting after gait, 102/71, O2 91% on room air. Pt declined additional gait, left end of session up in chair, all needs met, call bell in reach, chair alarm on. He remains well below his mod I baseline, will need SNF rehab at 
  Problem: Physical Therapy - Adult  Goal: By Discharge: Performs mobility at highest level of function for planned discharge setting.  See evaluation for individualized goals.  Description: FUNCTIONAL STATUS PRIOR TO ADMISSION: per chart pt was mod I with SPC prior to admission. Pt unable to provide any history.    HOME SUPPORT PRIOR TO ADMISSION: The patient lived with family.    Physical Therapy Goals  Reviewed 7/26/2024, remain appropriate, increased gait distance goal to 150 feet  Reviewed 7/19/2024 remain appropriate   Initiated 7/12/2024  1.  Patient will move from supine to sit and sit to supine in bed with modified independence within 7 day(s).    2.  Patient will perform sit to stand with modified independence within 7 day(s).  3.  Patient will transfer from bed to chair and chair to bed with modified independence using the least restrictive device within 7 day(s).  4.  Patient will ambulate with modified independence for 75 feet with the least restrictive device within 7 day(s). Distance goal met, increase distance to 150 feet, discontinue goal  4.  Patient will ambulate with modified independence for 150 feet with the least restrictive device within 7 day(s).      Outcome: Progressing   PHYSICAL THERAPY TREATMENT: WEEKLY REASSESSMENT    Patient: Darrel Patterson (79 y.o. male)  Date: 7/26/2024  Primary Diagnosis: NSTEMI (non-ST elevated myocardial infarction) ([facility]) [I21.4]  Procedure(s) (LRB):  Left heart cath / coronary angiography (N/A) 16 Days Post-Op   Precautions: Fall Risk, Bed Alarm                      ASSESSMENT :  Patient continues to benefit from skilled PT services and is progressing towards goals. Pt received in bed, agreeable to PT session.  Pt hadn't been seen by PT since 7/19/2024 due to episodes of melana stools, being off the floor for HD, and episodes of bright blood in stool.  Pt cleared by RN, who reports pt has been getting up to the bedside commode easily.    This date pt is 
  Problem: Respiratory - Adult  Goal: Achieves optimal ventilation and oxygenation  Outcome: Progressing  Flowsheets (Taken 7/27/2024 1945)  Achieves optimal ventilation and oxygenation: Assess for changes in respiratory status     Problem: Nutrition Deficit:  Goal: Optimize nutritional status  Outcome: Progressing     Problem: Chronic Conditions and Co-morbidities  Goal: Patient's chronic conditions and co-morbidity symptoms are monitored and maintained or improved  Outcome: Progressing  Flowsheets (Taken 7/27/2024 1945)  Care Plan - Patient's Chronic Conditions and Co-Morbidity Symptoms are Monitored and Maintained or Improved: Monitor and assess patient's chronic conditions and comorbid symptoms for stability, deterioration, or improvement     
  Problem: Safety - Adult  Goal: Free from fall injury  7/29/2024 2238 by Christy Aragon RN  Outcome: Progressing  7/29/2024 1640 by Ayde Islas RN  Outcome: Progressing  Flowsheets  Taken 7/29/2024 1640  Free From Fall Injury: Instruct family/caregiver on patient safety  Taken 7/29/2024 0800  Free From Fall Injury:   Based on caregiver fall risk screen, instruct family/caregiver to ask for assistance with transferring infant if caregiver noted to have fall risk factors   Instruct family/caregiver on patient safety     Problem: Pain  Goal: Verbalizes/displays adequate comfort level or baseline comfort level  7/29/2024 2238 by Christy Aragon RN  Outcome: Progressing  7/29/2024 1640 by Ayde Islas RN  Outcome: Progressing  Flowsheets (Taken 7/29/2024 1640)  Verbalizes/displays adequate comfort level or baseline comfort level:   Encourage patient to monitor pain and request assistance   Assess pain using appropriate pain scale   Administer analgesics based on type and severity of pain and evaluate response     Problem: Skin/Tissue Integrity  Goal: Absence of new skin breakdown  Description: 1.  Monitor for areas of redness and/or skin breakdown  2.  Assess vascular access sites hourly  3.  Every 4-6 hours minimum:  Change oxygen saturation probe site  4.  Every 4-6 hours:  If on nasal continuous positive airway pressure, respiratory therapy assess nares and determine need for appliance change or resting period.  7/29/2024 1640 by Ayde Islas RN  Outcome: Progressing     
  Problem: Safety - Adult  Goal: Free from fall injury  Outcome: Progressing     Problem: ABCDS Injury Assessment  Goal: Absence of physical injury  Outcome: Progressing     Problem: Skin/Tissue Integrity  Goal: Absence of new skin breakdown  Description: 1.  Monitor for areas of redness and/or skin breakdown  2.  Assess vascular access sites hourly  3.  Every 4-6 hours minimum:  Change oxygen saturation probe site  4.  Every 4-6 hours:  If on nasal continuous positive airway pressure, respiratory therapy assess nares and determine need for appliance change or resting period.  Outcome: Progressing     
  Problem: Safety - Adult  Goal: Free from fall injury  Outcome: Progressing     Problem: Pain  Goal: Verbalizes/displays adequate comfort level or baseline comfort level  Outcome: Progressing     Problem: ABCDS Injury Assessment  Goal: Absence of physical injury  Outcome: Progressing     Problem: Skin/Tissue Integrity  Goal: Absence of new skin breakdown  Description: 1.  Monitor for areas of redness and/or skin breakdown  2.  Assess vascular access sites hourly  3.  Every 4-6 hours minimum:  Change oxygen saturation probe site  4.  Every 4-6 hours:  If on nasal continuous positive airway pressure, respiratory therapy assess nares and determine need for appliance change or resting period.  Outcome: Progressing     Problem: Respiratory - Adult  Goal: Achieves optimal ventilation and oxygenation  Outcome: Progressing     
  Problem: Safety - Adult  Goal: Free from fall injury  Outcome: Progressing  Flowsheets (Taken 7/18/2024 1039)  Free From Fall Injury: Instruct family/caregiver on patient safety     Problem: ABCDS Injury Assessment  Goal: Absence of physical injury  Outcome: Progressing     Problem: Respiratory - Adult  Goal: Achieves optimal ventilation and oxygenation  Outcome: Progressing  Flowsheets (Taken 7/18/2024 1039)  Achieves optimal ventilation and oxygenation:   Assess for changes in respiratory status   Assess for changes in mentation and behavior     
  Problem: Safety - Adult  Goal: Free from fall injury  Outcome: Progressing  Flowsheets (Taken 7/28/2024 1931)  Free From Fall Injury: Instruct family/caregiver on patient safety     Problem: Pain  Goal: Verbalizes/displays adequate comfort level or baseline comfort level  Outcome: Progressing  Flowsheets (Taken 7/28/2024 1931)  Verbalizes/displays adequate comfort level or baseline comfort level:   Encourage patient to monitor pain and request assistance   Assess pain using appropriate pain scale   Administer analgesics based on type and severity of pain and evaluate response   Implement non-pharmacological measures as appropriate and evaluate response   Notify Licensed Independent Practitioner if interventions unsuccessful or patient reports new pain     Problem: ABCDS Injury Assessment  Goal: Absence of physical injury  Outcome: Progressing  Flowsheets (Taken 7/28/2024 1931)  Absence of Physical Injury: Implement safety measures based on patient assessment     Problem: Skin/Tissue Integrity  Goal: Absence of new skin breakdown  Description: 1.  Monitor for areas of redness and/or skin breakdown  2.  Assess vascular access sites hourly  3.  Every 4-6 hours minimum:  Change oxygen saturation probe site  4.  Every 4-6 hours:  If on nasal continuous positive airway pressure, respiratory therapy assess nares and determine need for appliance change or resting period.  Outcome: Progressing     Problem: Respiratory - Adult  Goal: Achieves optimal ventilation and oxygenation  Outcome: Progressing  Flowsheets (Taken 7/28/2024 1931)  Achieves optimal ventilation and oxygenation:   Assess for changes in respiratory status   Assess for changes in mentation and behavior   Position to facilitate oxygenation and minimize respiratory effort   Oxygen supplementation based on oxygen saturation or arterial blood gases   Respiratory therapy support as indicated     Problem: Nutrition Deficit:  Goal: Optimize nutritional 
  Problem: Safety - Adult  Goal: Free from fall injury  Recent Flowsheet Documentation  Taken 7/25/2024 2150 by Tae Estrada RN  Free From Fall Injury: Based on caregiver fall risk screen, instruct family/caregiver to ask for assistance with transferring infant if caregiver noted to have fall risk factors  7/25/2024 1650 by Drea Diamond RN  Outcome: Progressing     Problem: ABCDS Injury Assessment  Goal: Absence of physical injury  Recent Flowsheet Documentation  Taken 7/25/2024 2150 by Tae Estrada RN  Absence of Physical Injury: Implement safety measures based on patient assessment  7/25/2024 1650 by Drea Diamond RN  Outcome: Progressing     Problem: Skin/Tissue Integrity  Goal: Absence of new skin breakdown  7/25/2024 1650 by Drea Diamond RN  Outcome: Progressing     Problem: Respiratory - Adult  Goal: Achieves optimal ventilation and oxygenation  Outcome: Progressing  Flowsheets (Taken 7/25/2024 1915)  Achieves optimal ventilation and oxygenation: Assess for changes in respiratory status     Problem: Nutrition Deficit:  Goal: Optimize nutritional status  Outcome: Progressing     
  Problem: Skin/Tissue Integrity  Goal: Absence of new skin breakdown  Outcome: Progressing     Problem: Respiratory - Adult  Goal: Achieves optimal ventilation and oxygenation  Outcome: Progressing  Flowsheets (Taken 7/19/2024 2000)  Achieves optimal ventilation and oxygenation: Assess for changes in respiratory status     
  Problem: Skin/Tissue Integrity  Goal: Absence of new skin breakdown  Outcome: Progressing     Problem: Respiratory - Adult  Goal: Achieves optimal ventilation and oxygenation  Outcome: Progressing  Flowsheets (Taken 7/26/2024 1930)  Achieves optimal ventilation and oxygenation: Assess for changes in respiratory status     Problem: Physical Therapy - Adult  Goal: By Discharge: Performs mobility at highest level of function for planned discharge setting.  See evaluation for individualized goals.  7/26/2024 1321 by Unique Campos, PT  Outcome: Progressing     Problem: Occupational Therapy - Adult  Goal: By Discharge: Performs self-care activities at highest level of function for planned discharge setting.  See evaluation for individualized goals.  7/26/2024 1507 by Adali Badillo, OT  Outcome: Progressing     
1900: Bedside and Verbal shift change report given to CHADD Warner (oncoming nurse) by CHADD Dupont (offgoing nurse). Report included the following information Nurse Handoff Report.     0700: Bedside and Verbal shift change report given to  (oncoming nurse) by CHADD Warner (offgoing nurse). Report included the following information Nurse Handoff Report.     Problem: Discharge Planning  Goal: Discharge to home or other facility with appropriate resources  Outcome: Progressing     Problem: Safety - Adult  Goal: Free from fall injury  Outcome: Progressing     Problem: Pain  Goal: Verbalizes/displays adequate comfort level or baseline comfort level  Outcome: Progressing     
Predictive Model Details          19 (Normal)  Factor Value    Calculated 7/15/2024 08:30 77% Age 79 years old    Deterioration Index Model 11% Sodium abnormal (134 mmol/L)     6% BUN abnormal (40 MG/DL)     3% Platelet count abnormal (88 K/uL)     2% Hematocrit abnormal (34.4 %)     1% Pulse oximetry 97 %     0% Temperature 97.8 °F (36.6 °C)     0% Potassium 3.9 mmol/L     0% WBC count 7.1 K/uL     0% Respiratory rate 16     0% Systolic 111     0% Pulse 73        Problem: Discharge Planning  Goal: Discharge to home or other facility with appropriate resources  Outcome: Progressing  Flowsheets (Taken 7/14/2024 2100 by Aleksandra Mendoza, RN)  Discharge to home or other facility with appropriate resources: Identify barriers to discharge with patient and caregiver     Problem: Safety - Adult  Goal: Free from fall injury  Outcome: Progressing  Flowsheets (Taken 7/14/2024 2100 by Aleksandra Mendoza, RN)  Free From Fall Injury: Instruct family/caregiver on patient safety     Problem: Pain  Goal: Verbalizes/displays adequate comfort level or baseline comfort level  Outcome: Progressing     Problem: ABCDS Injury Assessment  Goal: Absence of physical injury  Outcome: Progressing  Flowsheets (Taken 7/14/2024 2100 by Aleksandra Mendoza, RN)  Absence of Physical Injury: Implement safety measures based on patient assessment     Problem: Skin/Tissue Integrity  Goal: Absence of new skin breakdown  Description: 1.  Monitor for areas of redness and/or skin breakdown  2.  Assess vascular access sites hourly  3.  Every 4-6 hours minimum:  Change oxygen saturation probe site  4.  Every 4-6 hours:  If on nasal continuous positive airway pressure, respiratory therapy assess nares and determine need for appliance change or resting period.  Outcome: Progressing     
Predictive Model Details          27 (Normal)  Factor Value    Calculated 7/14/2024 08:39 47% Age 79 years old    Deterioration Index Model 35% Neurological exam X     7% Sodium abnormal (134 mmol/L)     5% Pulse oximetry 100 %     4% BUN abnormal (40 MG/DL)     2% Platelet count abnormal (88 K/uL)     1% Hematocrit abnormal (34.4 %)     0% Systolic 114     0% Potassium 3.9 mmol/L     0% WBC count 7.1 K/uL     0% Respiratory rate 16     0% Pulse 75     0% Temperature 98.2 °F (36.8 °C)        Problem: Discharge Planning  Goal: Discharge to home or other facility with appropriate resources  7/14/2024 0839 by Ирина Simon GN  Outcome: Progressing  7/13/2024 1949 by Aleksandra Mendoza, RN  Outcome: Progressing  Flowsheets (Taken 7/13/2024 1922)  Discharge to home or other facility with appropriate resources:   Identify barriers to discharge with patient and caregiver   Arrange for needed discharge resources and transportation as appropriate   Identify discharge learning needs (meds, wound care, etc)     Problem: Safety - Adult  Goal: Free from fall injury  7/14/2024 0839 by Ирина Simon GN  Outcome: Progressing  7/13/2024 1949 by Aleksandra Mendoza, RN  Outcome: Progressing  Flowsheets (Taken 7/13/2024 1931)  Free From Fall Injury: Instruct family/caregiver on patient safety     Problem: Pain  Goal: Verbalizes/displays adequate comfort level or baseline comfort level  7/14/2024 0839 by Ирина Simon GN  Outcome: Progressing  7/13/2024 1949 by Aleksandra Mendoza, RN  Outcome: Progressing     Problem: ABCDS Injury Assessment  Goal: Absence of physical injury  7/14/2024 0839 by Ирина Simon GN  Outcome: Progressing  7/13/2024 1949 by Aleksandra Mendoza, RN  Outcome: Progressing  Flowsheets (Taken 7/13/2024 1931)  Absence of Physical Injury: Implement safety measures based on patient assessment     Problem: Skin/Tissue Integrity  Goal: Absence of new skin breakdown  Description: 1.  Monitor for areas of redness 
Speech LAnguage Pathology EVALUATION/DISCHARGE    Patient: Darrel Patterson (79 y.o. male)  Date: 7/15/2024  Primary Diagnosis: NSTEMI (non-ST elevated myocardial infarction) ([facility]) [I21.4]  Procedure(s) (LRB):  Left heart cath / coronary angiography (N/A) 5 Days Post-Op   Precautions:  Fall Risk, Bed Alarm                  ASSESSMENT :  Patient presents with seemingly functional oropharyngeal swallow function. Patient fed himself sips of thin via cup and bites of solid. Mastication mildly prolonged but effective. No overt clinical s/s aspiration noted. Patient and patient's daughter report patient with occasional globus sensation at mid sternum area with pills, food, and drinks. Patient with h/o GERD and takes PPI daily. SLP educated patient on aspiration and reflux precautions. No further acute SLP service warranted at this time. SLP will sign off.     Patient will be discharged from skilled speech-language pathology services at this time.     PLAN :  Recommendations and Planned Interventions:  Diet: Regular and thin liquids  --Medications as tolerated (one at a time, whole). If difficulty persists, consider pills crushed in puree, if approved by pharmacy   --Upright all PO intake   --Oral hygiene 2-3x/day    Reflux precautions:  - Sit fully upright at 90 for meals (prefer that meals are eating while sitting in a chair)  - Remain up for 1-hour after meals  - Consider 6-smaller meals throughout the day (eat about every 2-3 hours) instead of large meals  - Avoid acidic foods (tomato based, citrus fruits, alcohol, high-fat dairy, caffeine, fried foods)  - Trial warm, de-caffeinated beverages with meals to improve esophageal clearance  - Sleep w/ HOB elevated (30 degrees)  - Stop eating/drinking anything except water 2-hours before bedtime  - Avoid strenuous activity after meals (walking is OK)       Acute SLP Services: No, patient will be discharged from acute skilled speech-language pathology at this 
Progressing  Goal: Minimal or absence of nausea and vomiting  Outcome: Progressing     Problem: Hematologic - Adult  Goal: Maintains hematologic stability  Outcome: Progressing     Problem: Musculoskeletal - Adult  Goal: Return mobility to safest level of function  Outcome: Progressing     Problem: Genitourinary - Adult  Goal: Absence of urinary retention  Outcome: Progressing     Problem: Infection - Adult  Goal: Absence of infection at discharge  Outcome: Progressing     Problem: Metabolic/Fluid and Electrolytes - Adult  Goal: Electrolytes maintained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

## 2024-08-07 NOTE — PROGRESS NOTES
NAME: Darrel Patterson        :  1945        MRN:  [id number]                    Assessment   :                                               Plan:  ESKD  AVF  Anemia  Depression-pseudodementia  Mild hyponatremia  Anemia  NSTEMI  HFrEF  Hypotension  LGIB    [facility] [place]; signs off early most outpatient treatments.  SBP 80-90's,   Dialysis today and if no complications ok for dc from renal standpoint  No UF on  and 8 /3    On midodrine. Holding cardiac/BP meds    Prn prbc's for Hgb <7; continue SIENA. GI following     [facility] 7/10 -  Branch vessel disease, elevated LVEDP, mod-sev MR , severely reduced LVEF      On phoslo    Colonoscopy with diverticuli throughout         Subjective:   awake.  no complaints to me.             Objective:     VITALS:   Last 24hrs VS reviewed since prior progress note. Most recent are:  Vitals:    24 1150   BP: 93/64   Pulse: (!) 112   Resp:    Temp:    SpO2:        Intake/Output Summary (Last 24 hours) at 2024 1406  Last data filed at 2024 1430  Gross per 24 hour   Intake 240 ml   Output --   Net 240 ml      Telemetry Reviewed:     PHYSICAL EXAM:  General:NAD  Thin frail  L AVF  No edema    Lab Data Reviewed: (see below)    Medications Reviewed: (see below)    PMH/SH reviewed - no change compared to H&P  ________________________________________________________________________  Care Plan discussed with:  Patient Y    Family      HD RN     Care Manager                    Consultant:          Comments   >50% of visit spent in counseling and coordination of care       ________________________________________________________________________  Aysha Suazo MD     Procedures: see electronic medical records for all procedures/Xrays and details which  were not copied into this note but were reviewed prior to creation of Plan.      LABS:  Recent Labs     24  0632 24  0513   WBC 2.9* 5.1 
                                                                         NAME: Darrel Patterson        :  1945        MRN:  [id number]                    Assessment   :                                               Plan:  ESRD  AVF  Anemia  ?  Depression-pseudodementia  Mild hyponatremia  Anemia  NSTEMI  HFrEF    [facility] [place]; signs off early most outpatient treatments.  No HD today; 55 kg after HD/UF yesterday   events from RRT noted.  Hypotensive.  Large bloody bowel movements yesterday.  On midodrine.  Had dialysis yesterday.  Will defer treatment today.  Continue monitor.      [facility] 7/10 -  Branch vessel disease, elevated LVEDP, mod-sev MR , severely reduced LVEF      Hgb > 10, no carey    Fluid restrict       Subjective:   Seen and examined in his room.  Withdrawn as usual       Objective:     VITALS:   Last 24hrs VS reviewed since prior progress note. Most recent are:  Vitals:    24 1532   BP: (!) 80/52   Pulse: 82   Resp: 17   Temp: 97.6 °F (36.4 °C)   SpO2: 100%       Intake/Output Summary (Last 24 hours) at 2024 1645  Last data filed at 2024 0224  Gross per 24 hour   Intake 687.6 ml   Output --   Net 687.6 ml      Telemetry Reviewed:     PHYSICAL EXAM:  General: NAD  Thin frail  Chronically ill-appearing  Withdrawn  No asterixis    Lab Data Reviewed: (see below)    Medications Reviewed: (see below)    PMH/SH reviewed - no change compared to H&P  ________________________________________________________________________  Care Plan discussed with:  Patient     Family      RN     Care Manager                    Consultant:          Comments   >50% of visit spent in counseling and coordination of care       ________________________________________________________________________  Aysha Suazo MD     Procedures: see electronic medical records for all procedures/Xrays and details which  were not copied into this note but were reviewed prior to creation of Plan.      LABS:  Recent Labs 
                                                                         NAME: Darrel Patterson        :  1945        MRN:  [id number]                    Assessment   :                                               Plan:  ESRD  AVF  Anemia  Mild hyponatremia  Anemia  NSTEMI  HFrEF    [facility] [place] signs off early most outpatient treatments.  No HD today     For LHC today     Hgb > 10, no carey       Subjective:     Chief Complaint:  s/p uneventful HD late last night. 3 liters off. Not talkative.       Review of Systems:    Symptom Y/N Comments  Symptom Y/N Comments   Fever/Chills    Chest Pain     Poor Appetite    Edema     Cough    Abdominal Pain     Sputum    Joint Pain     SOB/BECKFORD    Pruritis/Rash     Nausea/vomit    Tolerating PT/OT     Diarrhea    Tolerating Diet     Constipation    Other       Could not obtain due to:      Objective:     VITALS:   Last 24hrs VS reviewed since prior progress note. Most recent are:  Vitals:    07/10/24 0354   BP: (!) 140/99   Pulse: 68   Resp: 16   Temp: 98.1 °F (36.7 °C)   SpO2:        Intake/Output Summary (Last 24 hours) at 7/10/2024 0614  Last data filed at 7/10/2024 0130  Gross per 24 hour   Intake 500 ml   Output 3500 ml   Net -3000 ml      Telemetry Reviewed:     PHYSICAL EXAM:  General: NAD      Lab Data Reviewed: (see below)    Medications Reviewed: (see below)    PMH/SH reviewed - no change compared to H&P  ________________________________________________________________________  Care Plan discussed with:  Patient     Family      RN     Care Manager                    Consultant:          Comments   >50% of visit spent in counseling and coordination of care       ________________________________________________________________________  Bea Licea MD     Procedures: see electronic medical records for all procedures/Xrays and details which  were not copied into this note but were reviewed prior to creation of Plan.      LABS:  Recent Labs     24  0619 
                                                                         NAME: Darrel Patterson        :  1945        MRN:  [id number]                    Assessment   :                                               Plan:  ESRD  AVF  Anemia  Mild hyponatremia  Anemia  NSTEMI  HFrEF    [facility] [place]; signs off early most outpatient treatments.  No HD today; 55 kg after HD/UF yesterday     [facility] 7/10 -  Branch vessel disease, elevated LVEDP, mod-sev MR , severely reduced LVEF      Hgb > 10, no carey    Fluid restrict       Subjective:     Chief Complaint:  Not talkative.   No complaint. We discussed the above. We discussed trying to limit salt and fluid intake b/w HD and to try to stay on machine for whole treatment.  Review of Systems:    Symptom Y/N Comments  Symptom Y/N Comments   Fever/Chills    Chest Pain     Poor Appetite    Edema     Cough    Abdominal Pain     Sputum    Joint Pain     SOB/BECKFORD    Pruritis/Rash     Nausea/vomit    Tolerating PT/OT     Diarrhea    Tolerating Diet     Constipation    Other       Could not obtain due to:      Objective:     VITALS:   Last 24hrs VS reviewed since prior progress note. Most recent are:  Vitals:    24 0354   BP: 121/72   Pulse: 69   Resp: 16   Temp: 97.1 °F (36.2 °C)   SpO2: 93%       Intake/Output Summary (Last 24 hours) at 2024 0617  Last data filed at 2024 0405  Gross per 24 hour   Intake 660 ml   Output 3500 ml   Net -2840 ml        Telemetry Reviewed:     PHYSICAL EXAM:  General: NAD      Lab Data Reviewed: (see below)    Medications Reviewed: (see below)    PMH/SH reviewed - no change compared to H&P  ________________________________________________________________________  Care Plan discussed with:  Patient     Family      RN     Care Manager                    Consultant:          Comments   >50% of visit spent in counseling and coordination of care       ________________________________________________________________________  Bea Licea, 
                                                                         NAME: Darrel Patterson        :  1945        MRN:  [id number]                    Assessment   :                                               Plan:  ESKD  AVF  Anemia  Depression-pseudodementia  Mild hyponatremia  Anemia  NSTEMI  HFrEF  Hypotension  LGIB    [facility] [place]; signs off early most outpatient treatments.  Hemodialysis -500 mL fluid removed  No UF on  and 8 /3    On midodrine. Holding cardiac/BP meds    Prn prbc's for Hgb <7; continue SIENA. GI following     [facility] 7/10 -  Branch vessel disease, elevated LVEDP, mod-sev MR , severely reduced LVEF      On phoslo    Colonoscopy with diverticuli throughout         Subjective:   awake.  no complaints to me.             Objective:     VITALS:   Last 24hrs VS reviewed since prior progress note. Most recent are:  Vitals:    24 1103   BP: 105/61   Pulse: 88   Resp: 23   Temp: 97.4 °F (36.3 °C)   SpO2: 98%       Intake/Output Summary (Last 24 hours) at 2024 1343  Last data filed at 2024 1058  Gross per 24 hour   Intake 860 ml   Output 500 ml   Net 360 ml      Telemetry Reviewed:     PHYSICAL EXAM:  General:NAD  Thin frail  L AVF  No edema    Lab Data Reviewed: (see below)    Medications Reviewed: (see below)    PMH/SH reviewed - no change compared to H&P  ________________________________________________________________________  Care Plan discussed with:  Patient Y    Family      HD RN     Care Manager                    Consultant:          Comments   >50% of visit spent in counseling and coordination of care       ________________________________________________________________________  Aysha Suazo MD     Procedures: see electronic medical records for all procedures/Xrays and details which  were not copied into this note but were reviewed prior to creation of Plan.      LABS:  Recent Labs     24  0513 24  0353   WBC 5.1 4.6   HGB 7.3* 7.3*   HCT 
                                                                         NAME: Darrel Patterson        :  1945        MRN:  [id number]                    Assessment   :                                               Plan:  ESKD  AVF  Anemia  Depression-pseudodementia  Mild hyponatremia  Anemia  NSTEMI  HFrEF  Hypotension  LGIB    [place] [place]; signs off early most outpatient treatments.  HD held the last 2 days due to low BP; SBP 90's to low 100's, will try HD with albumin/midodrine support and likely limited UF    On midodrine. Holding cardiac/BP meds    Prn prbc's for Hgb <7; continue SIENA. GI following     [facility] 7/10 -  Branch vessel disease, elevated LVEDP, mod-sev MR , severely reduced LVEF      On phoslo    Colonoscopy with diverticuli throughout, now active bleeding       Subjective:   awake. improved vision issues. We discussed the above.        Objective:     VITALS:   Last 24hrs VS reviewed since prior progress note. Most recent are:  Vitals:    24 0600   BP: (!) 90/55   Pulse: 86   Resp: 12   Temp:    SpO2: 100%       Intake/Output Summary (Last 24 hours) at 2024 0631  Last data filed at 2024 0951  Gross per 24 hour   Intake 531.25 ml   Output --   Net 531.25 ml        Telemetry Reviewed:     PHYSICAL EXAM:  General:NAD  Thin frail  L AVF  No edema    Lab Data Reviewed: (see below)    Medications Reviewed: (see below)    PMH/SH reviewed - no change compared to H&P  ________________________________________________________________________  Care Plan discussed with:  Patient Y    Family      HD RN     Care Manager                    Consultant:          Comments   >50% of visit spent in counseling and coordination of care       ________________________________________________________________________  Bea Licea MD     Procedures: see electronic medical records for all procedures/Xrays and details which  were not copied into this note but were reviewed prior to creation of Plan.  
                                                                         NAME: Darrel Patterson        :  1945        MRN:  [id number]                    Assessment   :                                               Plan:  ESKD  AVF  Anemia  Depression-pseudodementia  Mild hyponatremia  Anemia  NSTEMI  HFrEF  Hypotension  LGIB    [facility] [place]; signs off early most outpatient treatments.  HD today as able (soft to low BP's)    On midodrine. Holding cardiac/BP meds    Prn prbc's for Hgb <7; continue SIENA. GI following     [facility] 7/10 -  Branch vessel disease, elevated LVEDP, mod-sev MR , severely reduced LVEF      On phoslo    For colonoscope today       Subjective:   awake. Family at bedside. We discussed the above.       Objective:     VITALS:   Last 24hrs VS reviewed since prior progress note. Most recent are:  Vitals:    24 0330   BP: (!) 114/41   Pulse: 93   Resp: 19   Temp: 98.1 °F (36.7 °C)   SpO2: 97%       Intake/Output Summary (Last 24 hours) at 2024 0612  Last data filed at 2024 1724  Gross per 24 hour   Intake 916.03 ml   Output 500 ml   Net 416.03 ml        Telemetry Reviewed:     PHYSICAL EXAM:  General:NAD  Thin frail  L AVF  No edema    Lab Data Reviewed: (see below)    Medications Reviewed: (see below)    PMH/SH reviewed - no change compared to H&P  ________________________________________________________________________  Care Plan discussed with:  Patient Y    Family      HD RN     Care Manager                    Consultant:          Comments   >50% of visit spent in counseling and coordination of care       ________________________________________________________________________  Bea Licea MD     Procedures: see electronic medical records for all procedures/Xrays and details which  were not copied into this note but were reviewed prior to creation of Plan.      LABS:  Recent Labs     24  1719 24  0138   WBC  --  6.6   HGB 7.1* 7.3*   HCT 21.9* 22.0*   PLT  --  195 
                                                                         NAME: Darrel Patterson        :  1945        MRN:  [id number]                    Assessment   :                                               Plan:  ESRD  AVF  Anemia  ?  Depression-pseudodementia  Mild hyponatremia  Anemia  NSTEMI  HFrEF    [facility] [place]; signs off early most outpatient treatments.  No HD today; 55 kg after HD/UF yesterday   events from RRT noted.  Hypotensive. And moved to ICU  Was on pressor in AM, but weaned off  Will run HD today .. No fluid removal. Just clearance. 3 hrs  -3 K ; will give albumin if his BP drops    [facility] 7/10 -  Branch vessel disease, elevated LVEDP, mod-sev MR , severely reduced LVEF      Hgb > 10, no carey    Fluid restrict       Subjective:   Seen and examined in his room.  Withdrawn as usual       Objective:     VITALS:   Last 24hrs VS reviewed since prior progress note. Most recent are:  Vitals:    24 1200   BP: 102/65   Pulse: 73   Resp: 15   Temp: 98 °F (36.7 °C)   SpO2: 100%       Intake/Output Summary (Last 24 hours) at 2024 1428  Last data filed at 2024 0700  Gross per 24 hour   Intake 747.1 ml   Output 10 ml   Net 737.1 ml      Telemetry Reviewed:     PHYSICAL EXAM:  General: NAD  Thin frail  Chronically ill-appearing  Withdrawn  No asterixis    Lab Data Reviewed: (see below)    Medications Reviewed: (see below)    PMH/SH reviewed - no change compared to H&P  ________________________________________________________________________  Care Plan discussed with:  Patient     Family      RN     Care Manager                    Consultant:          Comments   >50% of visit spent in counseling and coordination of care       ________________________________________________________________________  Aysha Suazo MD     Procedures: see electronic medical records for all procedures/Xrays and details which  were not copied into this note but were reviewed prior to creation of Plan. 
                                                                         NAME: Darrel Patterson        :  1945        MRN:  [id number]                    Assessment   :                                               Plan:  ESKD  AVF  Anemia  Depression-pseudodementia  Mild hyponatremia  Anemia  NSTEMI  HFrEF  Hypotension  LGIB    [place] [place]; signs off early most outpatient treatments.  HD held yesterday due to low BP; hold HD pending improved BP (SBP 70 to low 90's and symptomatic - vision issues)    On midodrine. Holding cardiac/BP meds    Prn prbc's for Hgb <7; continue SIENA. GI following     [facility] 7/10 -  Branch vessel disease, elevated LVEDP, mod-sev MR , severely reduced LVEF      On phoslo    Colonoscopy with diverticuli throughout, now active bleeding       Subjective:   awake. C/o vision issues. Just finished a unit prbc's.        Objective:     VITALS:   Last 24hrs VS reviewed since prior progress note. Most recent are:  Vitals:    24 0535   BP: (!) 90/55   Pulse: 80   Resp: 17   Temp:    SpO2: 100%       Intake/Output Summary (Last 24 hours) at 2024 0608  Last data filed at 2024 1307  Gross per 24 hour   Intake 30 ml   Output --   Net 30 ml        Telemetry Reviewed:     PHYSICAL EXAM:  General:NAD  Thin frail  L AVF  No edema    Lab Data Reviewed: (see below)    Medications Reviewed: (see below)    PMH/SH reviewed - no change compared to H&P  ________________________________________________________________________  Care Plan discussed with:  Patient Y    Family      HD RN     Care Manager                    Consultant:          Comments   >50% of visit spent in counseling and coordination of care       ________________________________________________________________________  Bea Licea MD     Procedures: see electronic medical records for all procedures/Xrays and details which  were not copied into this note but were reviewed prior to creation of Plan.      LABS:  Recent Labs     
                                                                         NAME: Darrel Patterson        :  1945        MRN:  [id number]                    Assessment   :                                               Plan:  ESRD  AVF  Anemia  ?  Depression-pseudodementia  Mild hyponatremia  Anemia  NSTEMI  HFrEF    [facility] [place]; signs off early most outpatient treatments.  HD today to get him back on schedule.     Hypotension noted-> On midodrine.Holding cardiac/BP meds    Hgb slightly better s/p PRBC yesterday-> On SIENA. Active LGIB. GI following     [facility] 7/10 -  Branch vessel disease, elevated LVEDP, mod-sev MR , severely reduced LVEF        Am labs       Subjective:   On the commode. BRBPR still.  No CP. No SOB. No abd pain       Objective:     VITALS:   Last 24hrs VS reviewed since prior progress note. Most recent are:  Vitals:    24 1030   BP: 101/64   Pulse: 83   Resp: 15   Temp: 98.1 °F (36.7 °C)   SpO2: 100%       Intake/Output Summary (Last 24 hours) at 2024 1053  Last data filed at 2024 0902  Gross per 24 hour   Intake 360 ml   Output 0 ml   Net 360 ml        Telemetry Reviewed:     PHYSICAL EXAM:  General:NAD  Thin frail  L AVF  No edema    Lab Data Reviewed: (see below)    Medications Reviewed: (see below)    PMH/SH reviewed - no change compared to H&P  ________________________________________________________________________  Care Plan discussed with:  Patient Y    Family      HD RN     Care Manager                    Consultant:          Comments   >50% of visit spent in counseling and coordination of care       ________________________________________________________________________  Rosendo Guerra MD     Procedures: see electronic medical records for all procedures/Xrays and details which  were not copied into this note but were reviewed prior to creation of Plan.      LABS:  Recent Labs     24  2228 24  0557   WBC 7.1 7.6   HGB 8.5* 8.2*   HCT 25.9* 24.9*    219 
                                                                         NAME: Darrel Patterson        :  1945        MRN:  [id number]                    Assessment   :                                               Plan:  ESKD  AVF  Anemia  Depression-pseudodementia  Mild hyponatremia  Anemia  NSTEMI  HFrEF  Hypotension  LGIB    [facility] [place] [place]; signs off early most outpatient treatments.  SBP 80-90's, tolerated HD , but with no UF; HD today    On midodrine. Holding cardiac/BP meds    Prn prbc's for Hgb <7; continue SIENA. GI following     [facility] 7/10 -  Branch vessel disease, elevated LVEDP, mod-sev MR , severely reduced LVEF      On phoslo    Colonoscopy with diverticuli throughout    Will see again on Monday, but please call with questions or changes in the meantime.        Subjective:   awake. On room air. Not talkative, but no complaints. We discussed the above.        Objective:     VITALS:   Last 24hrs VS reviewed since prior progress note. Most recent are:  Vitals:    24 0140   BP: 98/62   Pulse: 87   Resp: 18   Temp:    SpO2:      No intake or output data in the 24 hours ending 24 0630     Telemetry Reviewed:     PHYSICAL EXAM:  General:NAD  Thin frail  L AVF  No edema    Lab Data Reviewed: (see below)    Medications Reviewed: (see below)    PMH/SH reviewed - no change compared to H&P  ________________________________________________________________________  Care Plan discussed with:  Patient Y    Family      HD RN     Care Manager                    Consultant:          Comments   >50% of visit spent in counseling and coordination of care       ________________________________________________________________________  Bea Licea MD     Procedures: see electronic medical records for all procedures/Xrays and details which  were not copied into this note but were reviewed prior to creation of Plan.      LABS:  Recent Labs     24  0553 24  0425   WBC 7.4 6.6   HGB 7.7* 8.1*   HCT 
                                                                         NAME: Darrel Patterson        :  1945        MRN:  [id number]                    Assessment   :                                               Plan:  ESRD  AVF  Anemia  Mild hyponatremia  Anemia  NSTEMI  HFrEF    [facility] [place]; signs off early most outpatient treatments.  HD today with UF as able     [facility] 7/10 -  Branch vessel disease, elevated LVEDP, mod-sev MR , severely reduced LVEF      Hgb > 10, no carey    Fluid restrict    Will see again on Monday, but please call with questions or changes in the meantime.        Subjective:     Chief Complaint:  Not talkative.   No complaint. The patient was seen on dialysis at 11:17 AM .  BP is stable. Access is working well. D/W HD nurse.   Review of Systems:    Symptom Y/N Comments  Symptom Y/N Comments   Fever/Chills    Chest Pain     Poor Appetite    Edema     Cough    Abdominal Pain     Sputum    Joint Pain     SOB/BECKFORD    Pruritis/Rash     Nausea/vomit    Tolerating PT/OT     Diarrhea    Tolerating Diet     Constipation    Other       Could not obtain due to:      Objective:     VITALS:   Last 24hrs VS reviewed since prior progress note. Most recent are:  Vitals:    24 0430   BP: 118/71   Pulse: 64   Resp: 16   Temp: 98.7 °F (37.1 °C)   SpO2: 95%     No intake or output data in the 24 hours ending 24 0631     Telemetry Reviewed:     PHYSICAL EXAM:  General: NAD      Lab Data Reviewed: (see below)    Medications Reviewed: (see below)    PMH/SH reviewed - no change compared to H&P  ________________________________________________________________________  Care Plan discussed with:  Patient     Family      RN     Care Manager                    Consultant:          Comments   >50% of visit spent in counseling and coordination of care       ________________________________________________________________________  Bea Licea MD     Procedures: see electronic medical records for all 
                                                                         NAME: Darrel Patterson        :  1945        MRN:  [id number]                    Assessment   :                                               Plan:  ESRD  AVF  Anemia  ?  Depression-pseudodementia  Mild hyponatremia  Anemia  NSTEMI  HFrEF    [facility] [place]; signs off early most outpatient treatments.  No HD today; 55 kg after HD/UF yesterday  At dialysis today.  Hardly any fluid removal  Cortisol level was normal  Discussed with Dr. Cabrera  Appears depressed; recently wife passed away      [facility] 7/10 -  Branch vessel disease, elevated LVEDP, mod-sev MR , severely reduced LVEF      Hgb > 10, no carey    Fluid restrict       Subjective:   Seen and examined in his room.  Accompanied by daughter.       Objective:     VITALS:   Last 24hrs VS reviewed since prior progress note. Most recent are:  Vitals:    24 1451   BP: (!) 166/151   Pulse: 85   Resp:    Temp: 97.3 °F (36.3 °C)   SpO2: 96%       Intake/Output Summary (Last 24 hours) at 2024 1500  Last data filed at 2024 1329  Gross per 24 hour   Intake 740 ml   Output 500 ml   Net 240 ml      Telemetry Reviewed:     PHYSICAL EXAM:  General: NAD  Thin frail  Chronically ill-appearing  Withdrawn  No asterixis    Lab Data Reviewed: (see below)    Medications Reviewed: (see below)    PMH/SH reviewed - no change compared to H&P  ________________________________________________________________________  Care Plan discussed with:  Patient     Family      RN     Care Manager                    Consultant:          Comments   >50% of visit spent in counseling and coordination of care       ________________________________________________________________________  Aysha Suazo MD     Procedures: see electronic medical records for all procedures/Xrays and details which  were not copied into this note but were reviewed prior to creation of Plan.      LABS:  Recent Labs     
                                                                         NAME: Darrel Patterson        :  1945        MRN:  [id number]                    Assessment   :                                               Plan:  ESRD  AVF  Anemia  ?  Depression-pseudodementia  Mild hyponatremia  Anemia  NSTEMI  HFrEF    [facility] [place]; signs off early most outpatient treatments.  No HD today; 55 kg after HD/UF yesterday   appears withdrawn-with poor oral intake.  Hypotension plus syncopal episode earlier in the morning.  IV fluids plus albumin given.  At dialysis he did not want his treatment and monitor his treatment cut short after 1 hour.  Dialysis treatment deferred to tomorrow.  Will obtain cortisol level in a.m.  Discussed with family members and patient      [facility] 7/10 -  Branch vessel disease, elevated LVEDP, mod-sev MR , severely reduced LVEF      Hgb > 10, no carey    Fluid restrict       Subjective:   Seen and examined in his room.  Accompanied by daughter.   denied dialysis earlier.  Blood pressure was low before.  Awake and alert    Objective:     VITALS:   Last 24hrs VS reviewed since prior progress note. Most recent are:  Vitals:    24 1425   BP: 107/66   Pulse: 71   Resp: 14   Temp: 97.3 °F (36.3 °C)   SpO2: 97%       Intake/Output Summary (Last 24 hours) at 2024 1615  Last data filed at 2024 1000  Gross per 24 hour   Intake 390 ml   Output --   Net 390 ml      Telemetry Reviewed:     PHYSICAL EXAM:  General: NAD  Thin frail  Chronically ill-appearing  Withdrawn  No asterixis    Lab Data Reviewed: (see below)    Medications Reviewed: (see below)    PMH/SH reviewed - no change compared to H&P  ________________________________________________________________________  Care Plan discussed with:  Patient     Family      RN     Care Manager                    Consultant:          Comments   >50% of visit spent in counseling and coordination of care   
                                                                         NAME: Darrel Patterson        :  1945        MRN:  [id number]                    Assessment   :                                               Plan:  ESKD  AVF  Anemia  Depression-pseudodementia  Mild hyponatremia  Anemia  NSTEMI  HFrEF  Hypotension  LGIB    [facility] [place]; signs off early most outpatient treatments.  HD tomorrow    On midodrine.Holding cardiac/BP meds    Prn prbc's for Hgb <7; continue SIENA. GI following     [facility] 7/10 -  Branch vessel disease, elevated LVEDP, mod-sev MR , severely reduced LVEF      On phoslo and renvela; phos low on ; stop renvela       Subjective:   Sleeping. Family at bedside. Still bloody stools. We discussed the above.       Objective:     VITALS:   Last 24hrs VS reviewed since prior progress note. Most recent are:  Vitals:    24 0400   BP: (!) 91/49   Pulse: 92   Resp: 26   Temp: 98.3 °F (36.8 °C)   SpO2: 100%       Intake/Output Summary (Last 24 hours) at 2024 0602  Last data filed at 2024 0400  Gross per 24 hour   Intake 612.75 ml   Output --   Net 612.75 ml        Telemetry Reviewed:     PHYSICAL EXAM:  General:NAD  Thin frail  L AVF  No edema    Lab Data Reviewed: (see below)    Medications Reviewed: (see below)    PMH/SH reviewed - no change compared to H&P  ________________________________________________________________________  Care Plan discussed with:  Patient Y    Family      HD RN     Care Manager                    Consultant:          Comments   >50% of visit spent in counseling and coordination of care       ________________________________________________________________________  Bea Licea MD     Procedures: see electronic medical records for all procedures/Xrays and details which  were not copied into this note but were reviewed prior to creation of Plan.      LABS:  Recent Labs     240 24  0019   WBC 7.0  --   --  
                                                                         NAME: Darrel Patterson        :  1945        MRN:  [id number]                    Assessment   :                                               Plan:  ESRD  AVF  Anemia  ?  Depression-pseudodementia  Mild hyponatremia  Anemia  NSTEMI  HFrEF    [facility] [place]; signs off early most outpatient treatments.  HD today-> off schedule. Will need to get him back on schedule prior to discharge.    Hypotension better-> On midodrine     [facility] 7/10 -  Branch vessel disease, elevated LVEDP, mod-sev MR , severely reduced LVEF      Hgb<10-> SIENA    Am labs       Subjective:   Seen on HD at approx 9:15am. VSS. C/o hospital food       Objective:     VITALS:   Last 24hrs VS reviewed since prior progress note. Most recent are:  Vitals:    24 0945   BP: 120/66   Pulse: 85   Resp:    Temp:    SpO2:      No intake or output data in the 24 hours ending 24 0958     Telemetry Reviewed:     PHYSICAL EXAM:  General: NAD  Thin frail  Chronically ill-appearing  L AVF  No asterixis    Lab Data Reviewed: (see below)    Medications Reviewed: (see below)    PMH/SH reviewed - no change compared to H&P  ________________________________________________________________________  Care Plan discussed with:  Patient Y    Family      HD RN Y    Care Manager                    Consultant:          Comments   >50% of visit spent in counseling and coordination of care       ________________________________________________________________________  Rosendo Guerra MD     Procedures: see electronic medical records for all procedures/Xrays and details which  were not copied into this note but were reviewed prior to creation of Plan.      LABS:  Recent Labs     246 24  0506   WBC 5.1 5.4   HGB 7.9* 8.8*   HCT 23.2* 26.9*    171       Recent Labs     24  0305      K 4.4      CO2 21   BUN 39*       No results for input(s): \"TP\", \"GLOB\", \"GGT\" 
                                                                         NAME: Darrel Patterson        :  1945        MRN:  [id number]                    Assessment   :                                               Plan:  ESKD  AVF  Anemia  Depression-pseudodementia  Mild hyponatremia  Anemia  NSTEMI  HFrEF  Hypotension  LGIB    TTS-[facility] [place]; signs off early most outpatient treatments.  SBP 80-90's, tolerated HD yesterday, but with no UF; no HD today    On midodrine. Holding cardiac/BP meds    Prn prbc's for Hgb <7; continue SIENA. GI following     [facility] 7/10 -  Branch vessel disease, elevated LVEDP, mod-sev MR , severely reduced LVEF      On phoslo    Colonoscopy with diverticuli throughout, now active bleeding       Subjective:   awake. On room air. Not talkative, but no complaints. We discussed the above.        Objective:     VITALS:   Last 24hrs VS reviewed since prior progress note. Most recent are:  Vitals:    24 0420   BP: (!) 81/59   Pulse: 89   Resp: 12   Temp: 99 °F (37.2 °C)   SpO2:        Intake/Output Summary (Last 24 hours) at 2024 0627  Last data filed at 2024 1320  Gross per 24 hour   Intake 500 ml   Output 500 ml   Net 0 ml        Telemetry Reviewed:     PHYSICAL EXAM:  General:NAD  Thin frail  L AVF  No edema    Lab Data Reviewed: (see below)    Medications Reviewed: (see below)    PMH/SH reviewed - no change compared to H&P  ________________________________________________________________________  Care Plan discussed with:  Patient Y    Family      HD RN     Care Manager                    Consultant:          Comments   >50% of visit spent in counseling and coordination of care       ________________________________________________________________________  Bea Licea MD     Procedures: see electronic medical records for all procedures/Xrays and details which  were not copied into this note but were reviewed prior to creation of Plan.      LABS:  Recent Labs     
                                                                         NAME: Darrel Patterson        :  1945        MRN:  [id number]                    Assessment   :                                               Plan:  ESRD  AVF  Anemia  Mild hyponatremia  Anemia  NSTEMI  HFrEF    [place] [place]; signs off early most outpatient treatments.  No HD today; 55 kg after HD/UF yesterday  With poor oral intake-describes odynophagia.  On Protonix.  Hospitalist team following.  Next dialysis treatment tomorrow  [facility] 7/10 -  Branch vessel disease, elevated LVEDP, mod-sev MR , severely reduced LVEF      Hgb > 10, no carey    Fluid restrict       Subjective:   Seen and examined in his room.  Accompanied by daughter.  RN present.  Eating sparsely.  Appetite is poor.  Describes odynophagia.  Evaluated with speech therapy before    Objective:     VITALS:   Last 24hrs VS reviewed since prior progress note. Most recent are:  Vitals:    07/15/24 1100   BP: (!) 100/57   Pulse: 73   Resp: 16   Temp: 97.8 °F (36.6 °C)   SpO2: 98%       Intake/Output Summary (Last 24 hours) at 7/15/2024 1415  Last data filed at 7/15/2024 0340  Gross per 24 hour   Intake 300 ml   Output --   Net 300 ml      Telemetry Reviewed:     PHYSICAL EXAM:  General: NAD  Thin frail  Chronically ill-appearing  Withdrawn  No asterixis    Lab Data Reviewed: (see below)    Medications Reviewed: (see below)    PMH/SH reviewed - no change compared to H&P  ________________________________________________________________________  Care Plan discussed with:  Patient     Family      RN     Care Manager                    Consultant:          Comments   >50% of visit spent in counseling and coordination of care       ________________________________________________________________________  Aysha Suazo MD     Procedures: see electronic medical records for all procedures/Xrays and details which  were not copied into this note but were reviewed prior to creation of 
                                                                         NAME: Darrel Patterson        :  1945        MRN:  [id number]                    Assessment   :                                               Plan:  ESRD  AVF  Anemia  ?  Depression-pseudodementia  Mild hyponatremia  Anemia  NSTEMI  HFrEF    [facility] [place]; signs off early most outpatient treatments.  HD yesterday-> off schedule. Will need to get him back on schedule prior to discharge.    Hypotension better-> On midodrine.Holding cardiac/BP meds     [facility] 7/10 -  Branch vessel disease, elevated LVEDP, mod-sev MR , severely reduced LVEF      Hgb remains <10-> On SIENA    Am labs       Subjective:   Laying in bed. No new complaints.       Objective:     VITALS:   Last 24hrs VS reviewed since prior progress note. Most recent are:  Vitals:    24 0815   BP: (!) 101/58   Pulse: 93   Resp: 17   Temp: 98.5 °F (36.9 °C)   SpO2:        Intake/Output Summary (Last 24 hours) at 2024 1202  Last data filed at 2024 1810  Gross per 24 hour   Intake 240 ml   Output --   Net 240 ml        Telemetry Reviewed:     PHYSICAL EXAM:  General:NAD  Thin frail  L AVF  No edema    Lab Data Reviewed: (see below)    Medications Reviewed: (see below)    PMH/SH reviewed - no change compared to H&P  ________________________________________________________________________  Care Plan discussed with:  Patient Y    Family      HD RN     Care Manager                    Consultant:          Comments   >50% of visit spent in counseling and coordination of care       ________________________________________________________________________  Rosendo Guerra MD     Procedures: see electronic medical records for all procedures/Xrays and details which  were not copied into this note but were reviewed prior to creation of Plan.      LABS:  Recent Labs     24  0002 24  0547   WBC 6.4 5.9   HGB 7.6* 7.6*   HCT 23.5* 23.4*    175       No results for 
                       7/14/2024     Admit Date: 7/9/2024  Admit Diagnosis: NSTEMI (non-ST elevated myocardial infarction) (HCC) [I21.4]    Cardiologist:  Dr Uriostegui.   Cardiac Assessment/Plan:    1) Nonischemic CM 4/2023: EF 15-20%; No CAD          *Nl TSH;  High ferritin (2100) but normal iron % sat (29) 4/2023; No ARB at first d/t low BPs (added as outpt)         *LifeVest, prior compliance then stopped.              *EF 30% 2/2024: ICD rec; EP OV 4/2024: pt undecided  2) Brief PAT, NOT afib on initial ECG 4/2023; cont sinus.  3) MR: Mod-severe 4/2023; mod-severe visually & severe by ERO 2/2024.  4) HTN: low BP limiting Rx 4/2023: higher BPs 2024  5) Dyslipidemia, labs per PCP     6) ESRD; (in [place])  7) Marked anemia (Hg 6.7) & heme + 4/2023  8) RA; ?PMR  9) Asthma, tobacco use.  10) Seizure 7/2023.     Admitted w/ FTT; chronic BECKFORD; no angina; elevated trop.  Current cardiac meds; corlanor 5 bid; toprol XL 25; pravachol 10; entresto 49/51 bid;     I have recommended diagnostic cath for definitive evaluation of the patient's coronary anatomy and PCI if appropriate;   All risks, benefits, and alternatives were discussed and patient wishes to proceed.   Vol Rx per renal.  Still should have ICD.    Please see ELECTROPHYSIOLOGY/Arrhythmia Recs below.      7/10: No CP/resting dyspnea  BP: 120-160; Hr 60s; sinus; 99% RA  K 3.4; Cr 8; Hg 11.4; Plt 92.  Current cardiac meds; corlanor 5 bid; toprol XL 25; pravachol 10; entresto 49/51 bid;   No new recs; cath today.  I will not be here tomorrow nor the rest of this week. Dr Bean will see the patient in my absence.      7/11:  Cath with branch vessel disease.  Low EF.  Mod-severe MR.  Continue cardiac meds.  Volume per renal.  Can see Dr. Uriostegui in the office in a few weeks.  ICD discussion as an outpt.    7/13 ELECTROPHYSIOLOGY/Arrhythmia:    The patient is here with \"failure to thrive\" which was manifested by worsening weakness, shortness of breath.  He has a severe 
                       7/17/2024     Admit Date: 7/9/2024  Admit Diagnosis: NSTEMI (non-ST elevated myocardial infarction) ([facility]) [I21.4]    Cardiologist:  Dr Uriostegui.   Cardiac Assessment/Plan:    1) Nonischemic CM 4/2023: EF 15-20%; No CAD          *Nl TSH;  High ferritin (2100) but normal iron % sat (29) 4/2023; No ARB at first d/t low BPs (added as outpt)         *LifeVest, prior compliance then stopped.              *EF 30% 2/2024: ICD rec; EP OV 4/2024: pt undecided  2) Brief PAT, NOT afib on initial ECG 4/2023; cont sinus.  3) MR: Mod-severe 4/2023; mod-severe visually & severe by ERO 2/2024.  4) HTN: low BP limiting Rx 4/2023: higher BPs 2024  5) Dyslipidemia, labs per PCP     6) ESRD; (in [place])  7) Marked anemia (Hg 6.7) & heme + 4/2023  8) RA; ?PMR  9) Asthma, tobacco use.  10) Seizure 7/2023.     Admitted w/ FTT; chronic BECKFORD; no angina; elevated trop.  Current cardiac meds; corlanor 5 bid; toprol XL 25; pravachol 10; entresto 49/51 bid;     Please see ELECTROPHYSIOLOGY/Arrhythmia Recs below.      7/10: No CP/resting dyspnea  BP: 120-160; Hr 60s; sinus; 99% RA  K 3.4; Cr 8; Hg 11.4; Plt 92.  Current cardiac meds; corlanor 5 bid; toprol XL 25; pravachol 10; entresto 49/51 bid;   No new recs; cath today.  I will not be here tomorrow nor the rest of this week. Dr Bean will see the patient in my absence.      Echo 7/10/24:    Left Ventricle: Reduced left ventricular systolic function with a visually estimated EF of 15 - 20%. Left ventricle size is normal. Increased wall thickness. Global hypokinesis present.    Right Ventricle: Right ventricle size is normal. Low normal systolic function.    Mitral Valve: Moderate to severe, probably severe mitral regurgitation.    Tricuspid Valve: Moderate regurgitation. Mild to moderately elevated RVSP, 50-55 mm Hg.    Left Atrium: Left atrium is dilated.    Image quality is adequate.    Cath 7/10/24  1. Branch vessel CAD  2. Elevated LVEDP  3. Severely reduced LVEF  4. 
                       7/18/2024     Admit Date: 7/9/2024  Admit Diagnosis: NSTEMI (non-ST elevated myocardial infarction) ([facility]) [I21.4]    Cardiologist:  Dr Uriostegui.   Cardiac Assessment/Plan:    1) Nonischemic CM 4/2023: EF 15-20%; No CAD          *Nl TSH;  High ferritin (2100) but normal iron % sat (29) 4/2023; No ARB at first d/t low BPs (added as outpt)         *LifeVest, prior compliance then stopped.              *EF 30% 2/2024: ICD rec; EP OV 4/2024: pt undecided  2) Brief PAT, NOT afib on initial ECG 4/2023; cont sinus.  3) MR: Mod-severe 4/2023; mod-severe visually & severe by ERO 2/2024.  4) HTN: low BP limiting Rx 4/2023: higher BPs 2024  5) Dyslipidemia, labs per PCP     6) ESRD; (in [place])  7) Marked anemia (Hg 6.7) & heme + 4/2023  8) RA; ?PMR  9) Asthma, tobacco use.  10) Seizure 7/2023.     Admitted w/ FTT; chronic BECKFORD; no angina; elevated trop.  Current cardiac meds; corlanor 5 bid; toprol XL 25; pravachol 10; entresto 49/51 bid;     Please see ELECTROPHYSIOLOGY/Arrhythmia Recs below.      7/10: No CP/resting dyspnea  BP: 120-160; Hr 60s; sinus; 99% RA  K 3.4; Cr 8; Hg 11.4; Plt 92.  Current cardiac meds; corlanor 5 bid; toprol XL 25; pravachol 10; entresto 49/51 bid;   No new recs; cath today.  I will not be here tomorrow nor the rest of this week. Dr Bean will see the patient in my absence.      Echo 7/10/24:    Left Ventricle: Reduced left ventricular systolic function with a visually estimated EF of 15 - 20%. Left ventricle size is normal. Increased wall thickness. Global hypokinesis present.    Right Ventricle: Right ventricle size is normal. Low normal systolic function.    Mitral Valve: Moderate to severe, probably severe mitral regurgitation.    Tricuspid Valve: Moderate regurgitation. Mild to moderately elevated RVSP, 50-55 mm Hg.    Left Atrium: Left atrium is dilated.    Image quality is adequate.    Cath 7/10/24  1. Branch vessel CAD  2. Elevated LVEDP  3. Severely reduced LVEF  4. 
                       7/19/2024     Admit Date: 7/9/2024  Admit Diagnosis: NSTEMI (non-ST elevated myocardial infarction) ([facility]) [I21.4]    Cardiologist:  Dr Uriostegui.   Cardiac Assessment/Plan:    1) Nonischemic CM 4/2023: EF 15-20%; No CAD          *Nl TSH;  High ferritin (2100) but normal iron % sat (29) 4/2023; No ARB at first d/t low BPs (added as outpt)         *LifeVest, prior compliance then stopped.              *EF 30% 2/2024: ICD rec; EP OV 4/2024: pt undecided  2) Brief PAT, NOT afib on initial ECG 4/2023; cont sinus.  3) MR: Mod-severe 4/2023; mod-severe visually & severe by ERO 2/2024.  4) HTN: low BP limiting Rx 4/2023: higher BPs 2024  5) Dyslipidemia, labs per PCP     6) ESRD; (in [place])  7) Marked anemia (Hg 6.7) & heme + 4/2023  8) RA; ?PMR  9) Asthma, tobacco use.  10) Seizure 7/2023.     Admitted w/ FTT; chronic BECKFORD; no angina; elevated trop.  Current cardiac meds; corlanor 5 bid; toprol XL 25; pravachol 10; entresto 49/51 bid;     Please see ELECTROPHYSIOLOGY/Arrhythmia Recs below.      7/10: No CP/resting dyspnea  BP: 120-160; Hr 60s; sinus; 99% RA  K 3.4; Cr 8; Hg 11.4; Plt 92.  Current cardiac meds; corlanor 5 bid; toprol XL 25; pravachol 10; entresto 49/51 bid;   No new recs; cath today.  I will not be here tomorrow nor the rest of this week. Dr Bean will see the patient in my absence.      Echo 7/10/24:    Left Ventricle: Reduced left ventricular systolic function with a visually estimated EF of 15 - 20%. Left ventricle size is normal. Increased wall thickness. Global hypokinesis present.    Right Ventricle: Right ventricle size is normal. Low normal systolic function.    Mitral Valve: Moderate to severe, probably severe mitral regurgitation.    Tricuspid Valve: Moderate regurgitation. Mild to moderately elevated RVSP, 50-55 mm Hg.    Left Atrium: Left atrium is dilated.    Image quality is adequate.    Cath 7/10/24  1. Branch vessel CAD  2. Elevated LVEDP  3. Severely reduced LVEF  4. 
                       7/24/2024     Admit Date: 7/9/2024  Admit Diagnosis: NSTEMI (non-ST elevated myocardial infarction) ([facility]) [I21.4]    Cardiologist:  Dr Uriostegui.   Cardiac Assessment/Plan:    1) Nonischemic CM 4/2023: EF 15-20%; No CAD          *Nl TSH;  High ferritin (2100) but normal iron % sat (29) 4/2023; No ARB at first d/t low BPs (added as outpt)         *LifeVest, prior compliance then stopped.              *EF 30% 2/2024: ICD rec; EP OV 4/2024: pt undecided         *EF 15-20% 7/2024.  2) Brief PAT, NOT afib on initial ECG 4/2023; cont sinus.  3) MR: Mod-severe 4/2023; mod-severe visually & severe by ERO 2/2024.  4) HTN: low BP limiting Rx 4/2023: higher BPs 2024  5) Dyslipidemia, labs per PCP     6) ESRD; (in [place])  7) Marked anemia (Hg 6.7) & heme + 4/2023  8) RA; ?PMR  9) Asthma, tobacco use.  10) Seizure 7/2023.     Admitted w/ FTT; chronic BECKFORD; no angina; elevated trop.  Previous cardiac meds; corlanor 5 bid; toprol XL 25; pravachol 10; entresto 49/51 bid;     Please see ELECTROPHYSIOLOGY/Arrhythmia Recs below.      7/10: No CP/resting dyspnea  BP: 120-160; Hr 60s; sinus; 99% RA  K 3.4; Cr 8; Hg 11.4; Plt 92.  Current cardiac meds; corlanor 5 bid; toprol XL 25; pravachol 10; entresto 49/51 bid;   No new recs; cath today.  I will not be here tomorrow nor the rest of this week. Dr Bean will see the patient in my absence.      Echo 7/10/24:    Left Ventricle: Reduced left ventricular systolic function with a visually estimated EF of 15 - 20%. Left ventricle size is normal. Increased wall thickness. Global hypokinesis present.    Right Ventricle: Right ventricle size is normal. Low normal systolic function.    Mitral Valve: Moderate to severe, probably severe mitral regurgitation.    Tricuspid Valve: Moderate regurgitation. Mild to moderately elevated RVSP, 50-55 mm Hg.    Left Atrium: Left atrium is dilated.    Image quality is adequate.    Cath 7/10/24  1. Branch vessel CAD (D4 only, small 
                       8/1/2024     Admit Date: 7/9/2024  Admit Diagnosis: NSTEMI (non-ST elevated myocardial infarction) ([facility]) [I21.4]    Cardiologist:  Dr Uriostegui.   Cardiac Assessment/Plan:    1) Nonischemic CM 4/2023: EF 15-20%; No CAD          *Nl TSH;  High ferritin (2100) but normal iron % sat (29) 4/2023; No ARB at first d/t low BPs (added as outpt)         *LifeVest, prior compliance then stopped.              *EF 30% 2/2024: ICD rec; EP OV 4/2024: pt undecided         *EF 15-20% 7/2024.  2) Brief PAT, NOT afib on initial ECG 4/2023; cont sinus.  3) MR: Mod-severe 4/2023; mod-severe visually & severe by ERO 2/2024.  4) HTN: low BP limiting Rx 4/2023: higher BPs 2024  5) Dyslipidemia, labs per PCP     6) ESRD; (in [place])  7) Marked anemia (Hg 6.7) & heme + 4/2023  8) RA; ?PMR  9) Asthma, tobacco use.  10) Seizure 7/2023.     Admitted w/ FTT; chronic BECKFORD; no angina; elevated trop.  Previous cardiac meds; corlanor 5 bid; toprol XL 25; pravachol 10; entresto 49/51 bid;     Please see ELECTROPHYSIOLOGY/Arrhythmia Recs below.      7/10: No CP/resting dyspnea  BP: 120-160; Hr 60s; sinus; 99% RA  K 3.4; Cr 8; Hg 11.4; Plt 92.  Current cardiac meds; corlanor 5 bid; toprol XL 25; pravachol 10; entresto 49/51 bid;   No new recs; cath today.  I will not be here tomorrow nor the rest of this week. Dr Bean will see the patient in my absence.      Echo 7/10/24:    Left Ventricle: Reduced left ventricular systolic function with a visually estimated EF of 15 - 20%. Left ventricle size is normal. Increased wall thickness. Global hypokinesis present.    Right Ventricle: Right ventricle size is normal. Low normal systolic function.    Mitral Valve: Moderate to severe, probably severe mitral regurgitation.    Tricuspid Valve: Moderate regurgitation. Mild to moderately elevated RVSP, 50-55 mm Hg.    Left Atrium: Left atrium is dilated.    Image quality is adequate.    Cath 7/10/24  1. Branch vessel CAD (D4 only, small 
                     Cardiology Progress Note  7/10/2024     Admit Date: 7/9/2024  Admit Diagnosis: NSTEMI (non-ST elevated myocardial infarction) (HCC) [I21.4]  CC: none currently  Cardiologist:  Dr Uriostegui.   Cardiac Assessment/Plan:    1) Nonischemic CM 4/2023: EF 15-20%; No CAD          *Nl TSH;  High ferritin (2100) but normal iron % sat (29) 4/2023; No ARB at first d/t low BPs (added as outpt)         *LifeVest, prior compliance then stopped.              *EF 30% 2/2024: ICD rec; EP OV 4/2024: pt undecided  2) Brief PAT, NOT afib on initial ECG 4/2023; cont sinus.  3) MR: Mod-severe 4/2023; mod-severe visually & severe by  ERO 2/2024.  4) HTN: low BP limiting Rx 4/2023: higher BPs 2024  5) Dyslipidemia, labs per PCP     6) ESRD; (in [place])  7) Marked anemia (Hg 6.7) & heme + 4/2023  8) RA; ?PMR  9) Asthma, tobacco use.  10) Seizure 7/2023.     Admitted w/ FTT; chronic BECKFORD; no angina; elevated trop.  Current cardiac meds; corlanor 5 bid; toprol XL 25; pravachol 10; entresto 49/51 bid;     I have recommended diagnostic cath for definitive evaluation of the patient's coronary anatomy and PCI if appropriate;   All risks, benefits, and alternatives were discussed and patient wishes to proceed.   Vol Rx per renal.  Still should have ICD      7/10: No CP/resting dyspnea  BP: 120-160; Hr 60s; sinus; 99% RA  K 3.4; Cr 8; Hg 11.4; Plt 92.    Current cardiac meds; corlanor 5 bid; toprol XL 25; pravachol 10; entresto 49/51 bid;     No new recs; cath today.    I will not be here tomorrow nor the rest of this week. Dr Bean will see the patient in my absence.      ____________________________________________________________________  Darrel Patterson is a 79 y.o. male presents with NSTEMI (non-ST elevated myocardial infarction) (HCC) [I21.4].     As noted in H&P: \"79 y.o.  male with PMHx significant for listed PMH below who presents with the above chief complaint.   We were asked to admit for work up and evaluation of the 
              GI PROGRESS NOTE        NAME:   Darrel Patterson       :    1945       MRN:    [id number]     Assessment/Plan     Hematochezia: We were re-consulted by Dr Hedrick for BRBPR. He has had 4 CTAP since 24, none with active bleeding. CTA planned for today . Patient seems to have 'changed his mind' about colonoscopy. He is at very high risk for sedation ( severely reduced cardiac  EF 15%, Mod/severe MR, ESRD), BP has been low. I spoke w/ Dr Hedrick in person.Please see cardiology note from 24. Hypotension has been ongoing since 7.15.24.Dr. Schmid et al to re-evaluate tomorrow to assess for colonoscopy. Please see their note/s.   Acute blood loss anemia: Hgb is stable and actually better.   CKD/ESRD on dialysis       Patient Active Problem List   Diagnosis    History of GI bleed    Anemia due to chronic kidney disease    A-V fistula ([facility])    S/P cataract surgery    Habitual alcohol use    Mild intermittent asthma without complication    Essential hypertension, benign    Diverticula of colon    Gastroesophageal reflux disease without esophagitis    Rheumatoid arthritis with positive rheumatoid factor ([facility])    Glaucoma    ESRD on hemodialysis ([facility])    Impingement syndrome of both shoulders    Hypertensive retinopathy of both eyes    CHF (congestive heart failure) ([facility])    DDD (degenerative disc disease), lumbar    DDD (degenerative disc disease), cervical    Psychophysiological insomnia    Fatigue    Hyperkalemia    Seizure ([facility])    Long term (current) use of systemic steroids    Weakness generalized    Left renal mass    GI bleed    NSTEMI (non-ST elevated myocardial infarction) ([facility])    Palliative care by specialist    Sarcopenia    Unintentional weight loss    Palliative care encounter    ESRD (end stage renal disease) ([facility])    Debility    Grief    Severe protein-calorie malnutrition ([facility])    Frailty    Goals of care, counseling/discussion    DNR (do not resuscitate) discussion       Subjective: 
      Hospitalist Progress Note    NAME:   Darrel Patterson   : 1945   MRN: [id number]     Date/Time: 2024    Patient PCP: Roma Solitario APRN - NP    Estimated discharge date:   Barriers : Hb stability and no further bleeding and SNF placement    Assessment / Plan:      Recurrent hypotension POA since 7/15, likely multifactorial  Recurrent lower GI bleed likely diverticular POA , ,   Acute blood loss anemia 5 units packed RBCs since 2024  Anemia of chronic disease due to ESRD POA  Recent issues with lethargy, dyspnea on exertion, rectal bleeding and melena  Admitted 6/10 to 2024, hemoglobin dropped 7.4-6.9   Required 1 unit packed red blood cells   EGD with esophagitis (mild), acute gastritis, normal duodenum   PPI   Discharged to home  Readmitted 2024 with lethargy malaise, decreased p.o. intake, shortness of breath   Troponin elevated 915 to 955   Underwent cardiac workup, see below  Initially hemodynamically stable first part of the admission, maintained on Toprol-XL and Entresto  7/15/2024 had recurrent rectal bleeding and recurrent hypotension   CT scan abdomen pelvis showed diverticulitis versus ischemic colitis   GI followed, felt unstable for colonoscopy, then signed off   Course of IV Zosyn  Began to have bloody bowel movements and recurrent hypotension to 70s  2029 required pressors and transferred to ICU   ?  Septic versus hemorrhagic   Bleeding seem to resolve, pressors weaned off   GI have been consulted again but then signed off   Midodrine was added for low blood pressure   to 2024 with recurrent rectal bleeding and hypotension  IV fluids, IV Protonix  CTA abdomen pelvis with no active bleeding  Hypotension limited hemodialysis  GI reconsulted  Colonoscopy 2024 Impression:    -Normal terminal ileum  -Diverticulosis noted throughout the colon  -Grade 2 internal hemorrhoids  -No masses, polyps, or actively bleeding lesions, or hematin 
      Hospitalist Progress Note    NAME:   Darrel Patterson   : 1945   MRN: [id number]     Date/Time: 2024 4:38 PM  Patient PCP: Roma Solitario APRN - YUNIEL    Estimated discharge date:  Barriers: Palliative care consultation, PT OT evaluation, patient may need placement      Assessment / Plan:    NSTEMI   Hx of chronic systolic HF - EF 15-20% 2023  Presented to [facility] ED with ongoing lethargy, malaise, decreased PO intake. Family also notes has had ongoing SOB, worse with stairs and with exertion even after HD sessions. Noted to have troponin elevation 915 -> 955. Denies any chest pain or other symptoms recently.  CTPA was negative for pulmonary embolism.  - Last echo 2023 - EF 15-20% with dilated left ventricle and global hypokinesis with abnormal diastolic function, mild aortic regurgitation, moderate to severe mitral valve regurgitation, mild to moderate tricuspid regurgitation with severely elevated RVSP  -Last left heart cath 2023 with no focal coronary artery disease, no AS, EDP 15  -Repeat troponin  -Continue home aspirin, statin  -Continue metoprolol, Entresto  -S/p cardiac cath show Branch vessel disease, elevated LVEDP, mod-sev MR , severely reduced LVEF   -PT OT evaluation  -Patient will need to follow-up with the cardiology as outpatient    Weight loss possibly secondary to end-stage CHF end-stage renal disease  Dietitian consultation  Patient will need referral to oncology as outpatient for evaluation of possible cancer       ESRD on Tuesday, Thursday, Saturday hemodialysis  -Continue home medications  -Nephrology consult     History of asthma  -No evidence of exacerbation at this time, no wheezing on exam  -Continue home inhaler equivalents     Poor appetite  -Continue home Megace  -Nutrition consult  -Palliative care consult to further delineate goals of care     Rheumatoid arthritis  Polymyalgia rheumatica  -No longer on prednisone  -Continue to monitor     GERD  -Continue 
      Hospitalist Progress Note    NAME:   Darrel Patterson   : 1945   MRN: [id number]     Date/Time: 7/15/2024 4:39 PM  Patient PCP: Roma Solitario APRN - NP    Estimated discharge date: Medically clear will need placement  Patient with poor oral intake    Cleared from cardiology standpoint 40  Assessment / Plan:    NSTEMI   Hx of chronic systolic HF - EF 15-20% 2023  Presented to [facility] ED with ongoing lethargy, malaise, decreased PO intake. Family also notes has had ongoing SOB, worse with stairs and with exertion even after HD sessions. Noted to have troponin elevation 915 -> 955. Denies any chest pain or other symptoms recently.  CTPA was negative for pulmonary embolism.  - Last echo 2023 - EF 15-20% with dilated left ventricle and global hypokinesis with abnormal diastolic function, mild aortic regurgitation, moderate to severe mitral valve regurgitation, mild to moderate tricuspid regurgitation with severely elevated RVSP  -Last left heart cath 2023 with no focal coronary artery disease, no AS, EDP 15  -Repeat troponin  -Continue home aspirin, statin  -Continue metoprolol, Entresto  -S/p cardiac cath show Branch vessel disease, elevated LVEDP, mod-sev MR , severely reduced LVEF   -PT OT evaluation-SNF  -Patient will need to follow-up with the cardiology as outpatient    Weight loss possibly secondary to end-stage CHF end-stage renal disease  Dietitian consultation  Patient will need referral to oncology as outpatient for evaluation of possible cancer    Recent EGD with biopsy showing severe gastritis and esophagitis.pill induced  Increase pantoprazole to 40 twice daily    Declined ICD placement     ESRD on Tuesday, Thursday, Saturday hemodialysis  -Continue home medications  -Nephrology consult     History of asthma  -No evidence of exacerbation at this time, no wheezing on exam  -Continue home inhaler equivalents     Poor appetite  -Continue home Megace  -Nutrition consult  -Palliative care 
      Hospitalist Progress Note    NAME:   Darrel Patterson   : 1945   MRN: [id number]     Date/Time: 7/10/2024 3:17 PM  Patient PCP: Roma Solitario APRN - NP    Estimated discharge date:  Barriers: Cardiology clearance      Assessment / Plan:    NSTEMI   Hx of chronic systolic HF - EF 15-20% 2023  Presented to [facility] ED with ongoing lethargy, malaise, decreased PO intake. Family also notes has had ongoing SOB, worse with stairs and with exertion even after HD sessions. Noted to have troponin elevation 915 -> 955. Denies any chest pain or other symptoms recently.  CTPA was negative for pulmonary embolism.  - Last echo 2023 - EF 15-20% with dilated left ventricle and global hypokinesis with abnormal diastolic function, mild aortic regurgitation, moderate to severe mitral valve regurgitation, mild to moderate tricuspid regurgitation with severely elevated RVSP  -Last left heart cath 2023 with no focal coronary artery disease, no AS, EDP 15  -Repeat troponin  -Continue home aspirin, statin  -Continue metoprolol, Entresto  -S/p cardiac cath show Branch vessel disease, elevated LVEDP, mod-sev MR , severely reduced LVEF      ESRD on Tuesday, Thursday, Saturday hemodialysis  -Continue home medications  -Nephrology consult     History of asthma  -No evidence of exacerbation at this time, no wheezing on exam  -Continue home inhaler equivalents     Poor appetite  -Continue home Megace  -Nutrition consult  -Palliative care consult to further delineate goals of care     Rheumatoid arthritis  Polymyalgia rheumatica  -No longer on prednisone  -Continue to monitor     GERD  -Continue Protonix     Insomnia  -Continue trazodone             Medical Decision Making:   I personally reviewed labs: CBC, BMP  I personally reviewed imaging: Chest x-ray  I personally reviewed EKG: Yes  Toxic drug monitoring:   Discussed case with: Nurse, IDR        Code Status: Full  DVT Prophylaxis: Heparin  GI Prophylaxis:    Subjective: 
      Hospitalist Progress Note    NAME:   Darrel Patterson   : 1945   MRN: [id number]     Date/Time: 2024    Patient PCP: Roma Solitario APRN - NP    Estimated discharge date: 2024  Barriers : Hb stability and no further bleeding and SNF placement    Assessment / Plan:      Recurrent hypotension POA since 7/15, likely multifactorial  Recurrent lower GI bleed likely diverticular POA , ,   Acute blood loss anemia 5 units packed RBCs since 2024  Anemia of chronic disease due to ESRD POA  Recent issues with lethargy, dyspnea on exertion, rectal bleeding and melena  Admitted 6/10 to 2024, hemoglobin dropped 7.4-6.9   Required 1 unit packed red blood cells   EGD with esophagitis (mild), acute gastritis, normal duodenum   PPI   Discharged to home  Readmitted 2024 with lethargy malaise, decreased p.o. intake, shortness of breath   Troponin elevated 915 to 955   Underwent cardiac workup, see below  Initially hemodynamically stable first part of the admission, maintained on Toprol-XL and Entresto  7/15/2024 had recurrent rectal bleeding and recurrent hypotension   CT scan abdomen pelvis showed diverticulitis versus ischemic colitis   GI followed, felt unstable for colonoscopy, then signed off   Course of IV Zosyn  Began to have bloody bowel movements and recurrent hypotension to 70s  2029 required pressors and transferred to ICU   ?  Septic versus hemorrhagic   Bleeding seem to resolve, pressors weaned off   GI have been consulted again but then signed off   Midodrine was added for low blood pressure   to 2024 with recurrent rectal bleeding and hypotension  IV fluids, IV Protonix  CTA abdomen pelvis with no active bleeding  Hypotension limited hemodialysis  GI reconsulted  Colonoscopy 2024 Impression:    -Normal terminal ileum  -Diverticulosis noted throughout the colon  -Grade 2 internal hemorrhoids  -No masses, polyps, or actively bleeding lesions, or hematin 
      Hospitalist Progress Note    NAME:   Darrel Patterson   : 1945   MRN: [id number]     Date/Time: 2024    Patient PCP: Roma Solitario APRN - NP      Assessment / Plan:      Lower GI bleed-possible from diverticulitis, recurrent   active lower GI bleed  Acute Blood loss anemia  [id number]/24  - EGD on 2024-mild esophagitis.  Erosive gastritis with stigmata of bleeding-- bx w/ active gastritis w/ intestinal metaplasia. Surface hemorrhage and refractile foreign material suggesting pill-related gastritis   - CLN 2023 - Nl TI, sigmoid tics, medium G2 hemms, no f/u recommended   - On  overnight patient had Bright red and dark-colored clots per rectum. Patient was hypotensive received 1 dose of IV albumin-With improvement in blood pressure.  Hemoglobin dropped by 2 units.  CT abdomen with contrast no active bleeding noted. Discussed with GI NP-patient is not suitable candidate for any procedure due to significant comorbidities and active diverticulitis which is contraindication.  Possible chance of ischemic colitis due to persistent hypertension-however patient is not suitable surgical candidate.  Patient had a recurrent GI bleed, multiple bright red blood per rectum today, BP soft  Stat CT abdomen pelvis ordered, which showed no acute bleeding  Albumin ordered  Unit of PRBC ordered  GI reconsulted, patient is high risk for further deterioration, low threshold for icu upgrade   Continue with Protonix and sucralfate  : Hemoglobin stable around 8, patient continues to have bloody stools.  Vital signs stable  Discussed with GI, advised to reconsult palliative care, can consider colonoscopy if patient really wants it  : Hemoglobin dropped down again to 6.8, unit of PRBC ordered, patient continues to have bloody bowel movement  GI input reviewed, patient is high risk for colonoscopy, basically risk of colonoscopy may benefit  Palliative care consulted  : hb 7, patient had bloody 
      Hospitalist Progress Note    NAME:   Darrel Patterson   : 1945   MRN: [id number]     Date/Time: 2024 4:13 PM  Patient PCP: Roma Solitario APRN - NP    > 48 hours  Blood pressure stability  Hemoglobin stability    Cleared from cardiology standpoint  Assessment / Plan:      Rapid response on   Syncope  -Patient had a syncopal episode, while in the commode-passed out some blood clots  -Responded to RRT  Blood pressure 67/34> patient was transferred to bed with improvement in blood pressure.   IV fluid to 250 stat ordered  Initially blood pressure improved and dropped again  IV albumin given  Stat EKG done and reviewed  Stat labs troponin of 149 which has been decreased from 695.  Will trend troponins  Denies any chest pain  Complaining of abdominal pain  CTA abdomen pelvis done  Discussed with cardiology Dr. Uriostegui  Of note patient had a recent cardiac cath as below, not having any ischemic symptoms and patient has bloody bm- not a candidate for any heparin drip  Declined ICD per previous discussion with cardiology    Acute diverticulitis  -Patient had bloody bowel movements    GI consulted, appreciate recs  Secondary to acute diverticulitis  Started on Zosyn  Continue PPI and added sucralfate  Hemoglobin stable      NSTEMI   Hx of chronic systolic HF - EF 15-20% 2023  P CTPA was negative for pulmonary embolism.  - Echo 7/10/24:    Left Ventricle: Reduced left ventricular systolic function with a visually estimated EF of 15 - 20%. Left ventricle size is normal. Increased wall thickness. Global hypokinesis present.    Right Ventricle: Right ventricle size is normal. Low normal systolic function.    Mitral Valve: Moderate to severe, probably severe mitral regurgitation.    Tricuspid Valve: Moderate regurgitation. Mild to moderately elevated RVSP, 50-55 mm Hg.    Left Atrium: Left atrium is dilated.    Image quality is adequate.  -Continue home aspirin, statin  -S/p cardiac cath 7/10 show Branch 
      Hospitalist Progress Note    NAME:   Darrel Patterson   : 1945   MRN: [id number]     Date/Time: 2024 5:50 PM  Patient PCP: Roma Solitario APRN - NP    Estimated discharge date:  Barriers: Cardiology clearance      Assessment / Plan:    NSTEMI   Hx of chronic systolic HF - EF 15-20% 2023  Presented to [facility] ED with ongoing lethargy, malaise, decreased PO intake. Family also notes has had ongoing SOB, worse with stairs and with exertion even after HD sessions. Noted to have troponin elevation 915 -> 955. Denies any chest pain or other symptoms recently.  CTPA was negative for pulmonary embolism.  - Last echo 2023 - EF 15-20% with dilated left ventricle and global hypokinesis with abnormal diastolic function, mild aortic regurgitation, moderate to severe mitral valve regurgitation, mild to moderate tricuspid regurgitation with severely elevated RVSP  -Last left heart cath 2023 with no focal coronary artery disease, no AS, EDP 15  -Repeat troponin  -Continue home aspirin, statin  -Continue metoprolol, Entresto  -S/p cardiac cath show Branch vessel disease, elevated LVEDP, mod-sev MR , severely reduced LVEF   -PT OT evaluation  -Patient will need to follow-up with the cardiology as outpatient     ESRD on Tuesday, Thursday, Saturday hemodialysis  -Continue home medications  -Nephrology consult     History of asthma  -No evidence of exacerbation at this time, no wheezing on exam  -Continue home inhaler equivalents     Poor appetite  -Continue home Megace  -Nutrition consult  -Palliative care consult to further delineate goals of care     Rheumatoid arthritis  Polymyalgia rheumatica  -No longer on prednisone  -Continue to monitor     GERD  -Continue Protonix     Insomnia  -Continue trazodone             Medical Decision Making:   I personally reviewed labs: CBC, BMP  I personally reviewed imaging: Chest x-ray  I personally reviewed EKG: Yes  Toxic drug monitoring:   Discussed case with: Nurse 
      Hospitalist Progress Note    NAME:   Darrel Patterson   : 1945   MRN: [id number]     Date/Time: 2024 2:42 PM  Patient PCP: Roma Solitario APRN - NP      Assessment / Plan:      Syncope likely related to vasovagal phenomenon  -Patient had a syncopal episode, while in the commode-passed out some blood clots  Blood pressure 67/34> patient was transferred to bed with improvement in blood pressure.   S/p IV fluids with improvement of blood pressure  Troponin elevation remains flat  Cardiology recommendations noted and they are adjusting medications.  Of note patient had a recent cardiac cath as below, not having any ischemic symptoms  Declined ICD per previous discussion with cardiology  Continue Toprol 12.5 for now, consider adding low-dose ARB and Entresto at a later time    Acute diverticulitis  -Continue Zosyn.  Will need colonoscopy in 4 weeks.  Hemoglobin is 13.7      NSTEMI   Hx of chronic systolic HF - EF 15-20% 2023  P CTPA was negative for pulmonary embolism.  - Echo 7/10/24:    Left Ventricle: Reduced left ventricular systolic function with a visually estimated EF of 15 - 20%. Left ventricle size is normal. Increased wall thickness. Global hypokinesis present.    Right Ventricle: Right ventricle size is normal. Low normal systolic function.    Mitral Valve: Moderate to severe, probably severe mitral regurgitation.    Tricuspid Valve: Moderate regurgitation. Mild to moderately elevated RVSP, 50-55 mm Hg.    Left Atrium: Left atrium is dilated.    Image quality is adequate.  -Continue home aspirin, statin  -S/p cardiac cath 7/10 show Branch vessel disease, elevated LVEDP, mod-sev MR , severely reduced LVEF   -PT OT evaluation-SNF  -Patient will need to follow-up with the cardiology as outpatient  Declined ICD  -Continue added low-dose Toprol.  Cardiology is adjusting medications    Weight loss possibly secondary to end-stage CHF end-stage renal disease/FTT  Dietitian consultation  Patient 
      Hospitalist Progress Note    NAME:   Darrel Patterson   : 1945   MRN: [id number]     Date/Time: 2024    Patient PCP: Roma Solitario APRN - NP      Assessment / Plan:      Lower GI bleed-possible from diverticulitis  - EGD on 2024-mild esophagitis.  Erosive gastritis with stigmata of bleeding-- bx w/ active gastritis w/ intestinal metaplasia. Surface hemorrhage and refractile foreign material suggesting pill-related gastritis   - CLN 2023 - Nl TI, sigmoid tics, medium G2 hemms, no f/u recommended   - On  overnight patient had Bright red and dark-colored clots per rectum. Patient was hypotensive received 1 dose of IV albumin-With improvement in blood pressure.  Hemoglobin dropped by 2 units.  CT abdomen with contrast no active bleeding noted. Discussed with GI NP-patient is not suitable candidate for any procedure due to significant comorbidities and active diverticulitis which is contraindication.  Possible chance of ischemic colitis due to persistent hypertension-however patient is not suitable surgical candidate.  Plan:   -Trend H&H, goal hemoglobin more than 7, transfuse as needed  -Continue Zosyn  -Cont PPI and carafate   -Hold cardiac medications due to low blood pressure. On midodrine   -remains full code, palliative on board  -PT OT recs SNF  -Today with another bright red bleeding episodes, small, will repeat CTA     Syncope likely related to vasovagal phenomenon on   -Patient had a syncopal episode, while in the commode-passed out some blood clots  Blood pressure 67/34> patient was transferred to bed with improvement in blood pressure.   -Troponin elevation remains flat  -Cardiology on board  -Of note patient had a recent cardiac cath not having any ischemic symptoms  -Declined ICD per previous discussion with cardiology     NSTEMI   Hx of chronic systolic HF - EF 15-20% 2023  -P CTPA was negative for pulmonary embolism.  -Echo 7/10/24: EF of 15 - 20%. Increased wall 
      Hospitalist Progress Note    NAME:   Darrel Patterson   : 1945   MRN: [id number]     Date/Time: 8/3/2024    Patient PCP: Roma Solitario APRN - NP    Estimated discharge date: 2024    Barriers : Hb stability and no further bleeding and SNF placement    Assessment / Plan:      Recurrent hypotension POA since 7/15, likely multifactorial  ?  Adrenal insufficiency POA chronically on prednisone, recently stopped without taper  Recurrent lower GI bleed likely diverticular POA , , , seems resolved  Acute blood loss anemia 5 units packed RBCs since 2024  Anemia of chronic disease due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 bedside: NO       GI:     Current diet:  ADULT DIET; Regular; Low Fat/Low Chol/High Fiber/2 gm Na; 1200 ml  ADULT ORAL NUTRITION SUPPLEMENT; Breakfast, Lunch, Dinner; Renal Oral Supplement  Passing flatus: YES  Tolerating current diet: YES       Pain Management:   Patient states pain is manageable on current regimen: YES    Skin:     Interventions: float heels, increase time out of bed, PT/OT consult, and nutritional support    Patient Safety:  Fall Score:    Interventions: bed/chair alarm, assistive device (walker, cane. etc), gripper socks, pt to call before getting OOB, and stay with me (per policy)       Length of Stay:  Expected LOS: 5  Actual LOS: 4      Sarahi Peter RN                               
0700. Bedside shift change report given to Kurtis FLORENTINO (oncoming nurse) by Tae RN (offgoing nurse). Report included the following information Nurse Handoff Report, Index, Intake/Output, MAR, Recent Results, and Cardiac Rhythm NSR .     0800. Bloody BM x3 as of start of shift    1900. End of Shift Note    Bedside shift change report given to Keena FLORENTINO (oncoming nurse) by Jerman Downs RN (offgoing nurse).  Report included the following information SBAR, Kardex, Intake/Output, MAR, Recent Results, and Cardiac Rhythm NSR    Shift worked:  7a-7p     Shift summary and any significant changes:     Lower GIB with significant hemoglobin loss today. 1U PRBC given recheck 8.1 hypotension resolved with PRBC and albumins     Concerns for physician to address:       Zone phone for oncoming shift:          Activity:     Number times ambulated in hallways past shift: 0  Number of times OOB to chair past shift: 5    Cardiac:   Cardiac Monitoring: Yes           Access:  Current line(s): PIV     Genitourinary:   Urinary status: voiding    Respiratory:      Chronic home O2 use?: NO  Incentive spirometer at bedside: NO       GI:     Current diet:  ADULT ORAL NUTRITION SUPPLEMENT; Breakfast, Lunch, Dinner; Clear Liquid Oral Supplement  ADULT DIET; Full Liquid; Low Potassium (Less than 3000 mg/day)  Passing flatus: YES  Tolerating current diet: YES       Pain Management:   Patient states pain is manageable on current regimen: YES    Skin:     Interventions: Turns self increase time OOB    Patient Safety:  Fall Score:    Interventions: bed/chair alarm, gripper socks, and pt to call before getting OOB       Length of Stay:  Expected LOS: 21  Actual LOS: 19      Jerman Downs RN                            
0700. Bedside shift change report given to Kurtis Leyva (oncoming nurse) by Tae RN (offgoing nurse). Report included the following information Nurse Handoff Report, Index, Intake/Output, MAR, Recent Results, and Cardiac Rhythm NSR .     1900. End of Shift Note    Bedside shift change report given to Tae RN (oncoming nurse) by Jerman Downs RN (offgoing nurse).  Report included the following information SBAR, Kardex, Intake/Output, MAR, Accordion, Recent Results, and Cardiac Rhythm NSR    Shift worked:  7a-7p     Shift summary and any significant changes:     1PRBC given dialysis completed no issues     Concerns for physician to address:       Zone phone for oncoming shift:          Activity:     Number times ambulated in hallways past shift: 0  Number of times OOB to chair past shift: 5    Cardiac:   Cardiac Monitoring: Yes           Access:  Current line(s): PIV and HD access     Genitourinary:   Urinary status: voiding    Respiratory:      Chronic home O2 use?: NO  Incentive spirometer at bedside: NO       GI:     Current diet:  ADULT DIET; Clear Liquid  ADULT ORAL NUTRITION SUPPLEMENT; Breakfast, Lunch, Dinner; Clear Liquid Oral Supplement  Passing flatus: YES  Tolerating current diet: YES       Pain Management:   Patient states pain is manageable on current regimen: YES    Skin:     Interventions: increase time out of bed    Patient Safety:  Fall Score:    Interventions: bed/chair alarm, gripper socks, and pt to call before getting OOB       Length of Stay:  Expected LOS: 21  Actual LOS: 18      Jerman Downs RN                            
0700. Bedside shift change report given to Kurtis Leyva (oncoming nurse) by Tae RN (offgoing nurse). Report included the following information Nurse Handoff Report, Index, Intake/Output, MAR, Recent Results, and Cardiac Rhythm NSR .     1900. End of Shift Note    Bedside shift change report given to Tae RN (oncoming nurse) by Jerman Downs RN (offgoing nurse).  Report included the following information SBAR, Kardex, Intake/Output, MAR, Recent Results, and Cardiac Rhythm NSR    Shift worked:  7a-7p     Shift summary and any significant changes:     1prbc received HBG improved. PT/OT saw patient up in hallway     Concerns for physician to address:       Zone phone for oncoming shift:          Activity:     Number times ambulated in hallways past shift: 1  Number of times OOB to chair past shift: 1    Cardiac:   Cardiac Monitoring: Yes           Access:  Current line(s): PIV and HD access     Genitourinary:   Urinary status: voiding    Respiratory:      Chronic home O2 use?: NO  Incentive spirometer at bedside: NO       GI:     Current diet:  ADULT DIET; Clear Liquid  ADULT ORAL NUTRITION SUPPLEMENT; Breakfast, Lunch, Dinner; Clear Liquid Oral Supplement  Passing flatus: YES  Tolerating current diet: YES       Pain Management:   Patient states pain is manageable on current regimen: YES    Skin:     Interventions: turn team, increase time out of bed, internal/external urinary devices, and nutritional support    Patient Safety:  Fall Score:    Interventions: bed/chair alarm, gripper socks, and pt to call before getting OOB       Length of Stay:  Expected LOS: 21  Actual LOS: 17      Jerman Downs RN                            
0700: Bedside shift change report given to Renny RN (oncoming nurse) by Imelda RN (offgoing nurse). Report included the following information Nurse Handoff Report.     0930: Pt up to toilet with x1 assist for small brown BM after breakfast (ate 50% and drank supplement).          
0836: Nephro consult placed to MD Licea.   0849: Cardiology consult placed to [facility] for MD Uriostegui.    
0900: GI consult called. Stated Sohail is on call.     0915: attempted to give pt his scheduled morning meds, pt then stated he did not want to take anything, pt stated he has been taking his meds but just wishes to not take them now. Pt asked what he would like and asked to be covered with extra blankets.     0950: TRANSFER - OUT REPORT:    Verbal report given to Suzy RN on Darrel Patterson  being transferred to PCU 2304 for change in patient condition (multiple bloody stools w/clots, hypotensive.)       Report consisted of patient's Situation, Background, Assessment and   Recommendations(SBAR).     Information from the following report(s) Nurse Handoff Report, Index, and Cardiac Rhythm NSR  was reviewed with the receiving nurse.           Lines:   Peripheral IV Right Basilic (Active)       Peripheral IV 07/18/24 Right;Upper Arm (Active)        Opportunity for questions and clarification was provided.      Patient transported with:  Monitor and Registered Nurse       
1415 - Attempted to schedule CARDIOLOGY hospital follow up appointment. Unable to reach anyone, unable to leave voicemail. Please note that patient has a prev scheduled appt with Dr. Uriostegui per Dr. Uriostegui's note 7/9/24 1026. CANDE attempted to confirm appt time/date/location. CANDE placed [facility] information AVS for patient resource.  Pending patient discharge. Rajani Mcrae Care Management Assistant    1124 - Attempted to schedule CARDIOLOGY hospital follow up appointment. Unable to reach anyone, unable to leave voicemail. CANDE placed [facility] information AVS for patient resource.  Pending patient discharge. Rajani Mcrae Care Management Assistant    PCP hospital follow-up transitional care appointment has been scheduled with NP Roma Solitario on 7/15/24 1430. This is the first available able appt that coordinates with patient's HD scheduled. CANDE placed [facility] information AVS for patient resource.   Pending patient discharge.  Hussain Horner Management Assistant   
1830- Received patient from CCU    End of Shift Note    Bedside shift change report given to CHADD Strong (oncoming nurse) by Marie Turner RN (offgoing nurse).  Report included the following information SBAR, MAR, and Cardiac Rhythm NSR with PVCs    Shift worked:  8322-5102     Shift summary and any significant changes:     Patient on room air, telesitter at bedside      Concerns for physician to address:  None     Zone phone for oncoming shift:   1152       Activity:     Number times ambulated in hallways past shift: 0  Number of times OOB to chair past shift: 0    Cardiac:   Cardiac Monitoring: Yes           Access:  Current line(s): PIV     Genitourinary:   Urinary status: oliguric    Respiratory:      Chronic home O2 use?: NO  Incentive spirometer at bedside: NO       GI:     Current diet:  ADULT DIET; Regular; Low Fat/Low Chol/High Fiber/2 gm Na; 1200 ml  ADULT ORAL NUTRITION SUPPLEMENT; Breakfast, Dinner; Standard High Calorie/High Protein Oral Supplement  Passing flatus: YES  Tolerating current diet: YES       Pain Management:   Patient states pain is manageable on current regimen: YES    Skin:     Interventions: float heels, internal/external urinary devices, and nutritional support    Patient Safety:  Fall Score:    Interventions: bed/chair alarm, gripper socks, pt to call before getting OOB, and stay with me (per policy)       Length of Stay:  Expected LOS: 10  Actual LOS: 10      Marie Turner RN                            
1900 Bedside and Verbal shift change report given to CHADD Brink (oncoming nurse) by CHADD Hull (offgoing nurse). Report included the following information Nurse Handoff Report, Index, Intake/Output, MAR, Recent Results, and Cardiac Rhythm NSR .     End of Shift Note    Bedside shift change report given to CHADD Messer (oncoming nurse) by Keena Peralta RN (offgoing nurse).  Report included the following information SBAR, Kardex, Intake/Output, MAR, Recent Results, and Cardiac Rhythm NSR/ST    Shift worked:  1900 - 0700     Shift summary and any significant changes:     Pt continues to have bloody stools overnight. HGB stable - next recheck @ 1216.      Concerns for physician to address:  See above.      Zone phone for oncoming shift:          Activity:     Number times ambulated in hallways past shift: 0  Number of times OOB to chair past shift: 0 (to BSC multiple times)    Cardiac:   Cardiac Monitoring: Yes           Access:  Current line(s): PIV and HD access     Genitourinary:   Urinary status: anuric    Respiratory:      Chronic home O2 use?: NO  Incentive spirometer at bedside: NO       GI:     Current diet:  ADULT ORAL NUTRITION SUPPLEMENT; Breakfast, Lunch, Dinner; Clear Liquid Oral Supplement  ADULT DIET; Full Liquid; Low Potassium (Less than 3000 mg/day)  Passing flatus: YES  Tolerating current diet: YES       Pain Management:   Patient states pain is manageable on current regimen: YES    Skin:     Interventions: float heels, increase time out of bed, limit briefs, internal/external urinary devices, and nutritional support    Patient Safety:  Fall Score:    Interventions: bed/chair alarm, gripper socks, pt to call before getting OOB, and stay with me (per policy)       Length of Stay:  Expected LOS: 21  Actual LOS: 20      Keena Peralta RN                            
1900: Bedside shift change report given to Yue FLORENTINO (oncoming nurse) by Josi FLORENTINO (offgoing nurse). Report included the following information Nurse Handoff Report, Intake/Output, MAR, Recent Results, and Cardiac Rhythm NSR .     2008: Blood transfusion completed. No suspected reactions noted.     End of Shift Note    Bedside shift change report given to Allyson Baugh (oncoming nurse) by Yue Bauman RN (offgoing nurse).  Report included the following information SBAR, Kardex, ED Summary, Intake/Output, MAR, Recent Results, and Cardiac Rhythm NSR    Shift worked:  1540-5948     Shift summary and any significant changes:     Patient post-transfusion HGB is 8.5. Continues to have watery red bloody stools, x5 overnight.      Concerns for physician to address:  Bloody stools, plan of care?    Palliative consult?     Zone phone for oncoming shift:          Activity:     Number times ambulated in hallways past shift: 0  Number of times OOB to chair past shift: 0    Cardiac:   Cardiac Monitoring: Yes           Access:  Current line(s): PIV and HD access     Genitourinary:   Urinary status: anuric    Respiratory:      Chronic home O2 use?: NO  Incentive spirometer at bedside: NO       GI:     Current diet:  ADULT DIET; Clear Liquid  Passing flatus: YES  Tolerating current diet: YES       Pain Management:   Patient states pain is manageable on current regimen: YES    Skin:     Interventions: float heels, increase time out of bed, and PT/OT consult    Patient Safety:  Fall Score:    Interventions: bed/chair alarm, assistive device (walker, cane. etc), gripper socks, pt to call before getting OOB, and stay with me (per policy)       Length of Stay:  Expected LOS: 17  Actual LOS: 16      Yue Bauman RN                            
1900H: Bedside shift change report given to MIKE FLORENTINO (oncoming nurse) by BERTHA FLORENTINO (offgoing nurse). Report included the following information Nurse Handoff Report, Intake/Output, MAR, Recent Results, Med Rec Status, and Cardiac Rhythm SINUS RHYTHM    0230h: Informed hospitalist regarding CBC result  via perfectserve.    End of Shift Note    Bedside shift change report given to  BERTHA FLORENTINO (oncoming nurse) by Mike Aragon RN (offgoing nurse).  Report included the following information SBAR, Intake/Output, MAR, Recent Results, Med Rec Status, and Cardiac Rhythm SINUS RHYTHM    Shift worked:  1900H-0730H     Shift summary and any significant changes:     Bowel urgency noted overnight with no to minimal output, small amount of blood noted at times. Patient has less appetite.     Concerns for physician to address:       Zone phone for oncoming shift:          Activity:     Number times ambulated in hallways past shift: 0  Number of times OOB to chair past shift: 4    Cardiac:   Cardiac Monitoring: Yes           Access:  Current line(s): PIV and HD access     Genitourinary:   Urinary status: anuric    Respiratory:      Chronic home O2 use?: NO  Incentive spirometer at bedside: N/A       GI:     Current diet:  ADULT ORAL NUTRITION SUPPLEMENT; Breakfast, Dinner; Standard High Calorie/High Protein Oral Supplement  ADULT DIET; Regular; No Added Salt (3-4 gm); 1200 ml; Strawberry Ensure  Passing flatus: YES  Tolerating current diet: YES       Pain Management:   Patient states pain is manageable on current regimen: YES    Skin:     Interventions: increase time out of bed and nutritional support    Patient Safety:  Fall Score:    Interventions: bed/chair alarm, assistive device (walker, cane. etc), gripper socks, and pt to call before getting OOB       Length of Stay:  Expected LOS: 16  Actual LOS: 15      Mike Aragon, RN                           
1900H: Bedside shift change report given to MIKE FLORENTINO (oncoming nurse) by CAROLINA FLORENTINO (offgoing nurse). Report included the following information Nurse Handoff Report, Intake/Output, MAR, Recent Results, Med Rec Status, and Cardiac Rhythm SINUS RHYTHM    End of Shift Note    Bedside shift change report given to BERTHA FLORENTINO (oncoming nurse) by Mike Aragon RN (offgoing nurse).  Report included the following information SBAR, Intake/Output, MAR, Recent Results, Med Rec Status, and Cardiac Rhythm SINUS RHYTHM    Shift worked:  1900H-0730H     Shift summary and any significant changes:     Encouraged to drink Golyte as much as he can. Patient able to drink more than half of it. Instructed NPO midnight. Had multiple bowel movement overnight, no blood noted.     Concerns for physician to address:       Zone phone for oncoming shift:          Activity:     Number times ambulated in hallways past shift: 0  Number of times OOB to chair past shift: 0    Cardiac:   Cardiac Monitoring: Yes           Access:  Current line(s): PIV     Genitourinary:   Urinary status: anuric    Respiratory:      Chronic home O2 use?: NO  Incentive spirometer at bedside: N/A       GI:     Current diet:  Diet NPO Exceptions are: Sips of Water with Meds  Passing flatus: YES  Tolerating current diet: YES       Pain Management:   Patient states pain is manageable on current regimen: YES    Skin:     Interventions: increase time out of bed, internal/external urinary devices, and nutritional support    Patient Safety:  Fall Score:    Interventions: bed/chair alarm, assistive device (walker, cane. etc), gripper socks, and pt to call before getting OOB       Length of Stay:  Expected LOS: 24  Actual LOS: 21      Mike Aragon RN                         .    
1900h- Bedside and Verbal shift change report given  by Uri FLORENTINO (offgoing nurse).   - pt. Alert oriented x 4, on room air.; no dyspnea noted.    2000h- shift assessment done.    0000h- reassessment done; no changed.    0400h- reassessment done; no changed.    0600h- blood drawn for routine labs.    - no issues overnight, no episode of bloody stool.    0700h- Bedside and Verbal shift change report given to Uri FLORENTINO. Report included the following information Nurse Handoff Report, Adult Overview, Cardiac Rhythm Sinus rhythm, Alarm Parameters, Quality Measures, and Neuro Assessment.       
1900h: Bedside shift change report given to CHRISTY RN (oncoming nurse) by NATALIIA RN (offgoing nurse). Report included the following information Nurse Handoff Report, Intake/Output, MAR, Recent Results, Med Rec Status, and Cardiac Rhythm SINUS RHYTHM    End of Shift Note    Bedside shift change report given to SUKUMAR RN (oncoming nurse) by Christy Aragon RN (offgoing nurse).  Report included the following information SBAR, Intake/Output, MAR, Recent Results, Med Rec Status, and Cardiac Rhythm SR    Shift worked:  1900H-0730H     Shift summary and any significant changes:     Frequent bowel urges overnight noted with minimal to no output; Uses call button but impulsive to use the commode, on [employer]. Complains of gastric pain, tramadol seems not much of a help.      Concerns for physician to address:  Additional laxative?     Zone phone for oncoming shift:          Activity:     Number times ambulated in hallways past shift: 0  Number of times OOB to chair past shift: 0    Cardiac:   Cardiac Monitoring: Yes           Access:  Current line(s): PIV and HD access     Genitourinary:   Urinary status: anuric    Respiratory:      Chronic home O2 use?: NO  Incentive spirometer at bedside: N/A       GI:     Current diet:  ADULT ORAL NUTRITION SUPPLEMENT; Breakfast, Dinner; Standard High Calorie/High Protein Oral Supplement  ADULT DIET; Regular; Low Fat/Low Chol/High Fiber/2 gm Na; 1200 ml; Boiled eggs  Passing flatus: YES  Tolerating current diet: YES       Pain Management:   Patient states pain is manageable on current regimen: YES    Skin:     Interventions: increase time out of bed, foam dressing, PT/OT consult, internal/external urinary devices, and nutritional support    Patient Safety:  Fall Score:    Interventions: bed/chair alarm, assistive device (walker, cane. etc), gripper socks, and pt to call before getting OOB       Length of Stay:  Expected LOS: 13  Actual LOS: 13      Christy Lambert 
1900h: Bedside shift change report given to MIKE FLORENTINO (oncoming nurse) by BERTHA FLORENTINO (offgoing nurse). Report included the following information Nurse Handoff Report, Intake/Output, MAR, Recent Results, Med Rec Status, and Cardiac Rhythm SINUS RHYTHM  .      0210h: As per charge nurse Meri, patient found sitting almost on the side of the bed, complains of blurry vision. Assisted to lay back on the bed.  BP taken 94/59 (MAP 68).    0400h: BP reading 76/47 mmhg (MAP 58), assessed patient, no complain of dizziness, instructed to lay on his back and keep his right arm straight to check accurate BP. Readings  taken 83/48mmhg (MAP 56); 66/52mmhg (MAP 55) and 75/44mmhg (MAP 51), with 5 minutes interval. Placed patient on trendelenburg position.    0415H: sent urgent message to hospitalist Britta via [employer] securemessage regarding patient consistent low blood pressure.    0418H: called Rapid Response overhead for persistent low blood pressure.    0423H: Charge nurse Meri, Rapid response nurse Tirso at bedside. Set blood pressure reading to every 5 mins. Jade Shah at bedside.    -NS 500ml bolus administered as ordered. Called blood bank to prepare blood for transfusion.    0551h: Sent message to DO Pablo via [employer]  regarding blood pressure update 91/51 mmhg (MAP 61), with ongoing blood transfusion. Also clarified suspected blood pressure exacerbated by hydroxizine recent administration in his notes, informed him medication wasn't given overnight and was last given 07/22/2024. Message read at 0551H. No response from provider.    End of Shift Note    Bedside shift change report given to BARRY FLORENTINO (oncoming nurse) by Mike Aragon RN (offgoing nurse).  Report included the following information SBAR, Intake/Output, MAR, Recent Results, Med Rec Status, and Cardiac Rhythm SINUS RHYTHM    Shift worked:  1900H-0730H     Shift summary and any significant changes:     SEE ABOVE 
1920: Transfer report received from Tessie FLORENTINO (PCU).     1945: Patient in room 2502. Transported from PCU by RNs and PCT. Belongings at bedside. On telemetry. Family in WR.     2000: Assessment completed. See flowsheets    2013: MAP < 65. Peripheral Levophed started @ 4mcg.     2300: Patient having frequent PVCs. Notified Joe NP. No new orders. Recheck electrolytes with morning labs.    2330: Reassessment completed. See flowsheets    0300: MAPs > 65. Levophed off. See MAR    0400: Reassessment completed. See flowsheets    0700: Report given to Renny FLORENTINO  
2206 TRANSFER - IN REPORT:    Verbal report received from Chey orosco Darrel Patterson  being received from [facility] for routine progression of patient care      Report consisted of patient's Situation, Background, Assessment and   Recommendations(SBAR).     Information from the following report(s) ED Encounter Summary was reviewed with the receiving nurse.    Opportunity for questions and clarification was provided.      Assessment completed upon patient's arrival to unit and care assumed.      0315 Pt arrived from [facility] via stretcher to room 2214. Pt's daughters at bedside.       Predictive Model Details          34 (Caution)  Factor Value    Calculated 7/9/2024 03:34 36% Age 79 years old    Deterioration Index Model 27% Neurological exam X     11% Respiratory rate 14     10% Systolic 161     9% Potassium 4.6 mmol/L     3% BUN abnormal (98 MG/DL)     2% Hematocrit abnormal (32.6 %)     1% Platelet count abnormal (133 K/uL)     0% Pulse 66     0% Pulse oximetry 98 %     0% Sodium 140 mmol/L     0% Temperature 97.7 °F (36.5 °C)     0% WBC count 5.8 K/uL       
2250 - During HS medication administration, patient complained of a headache, reporting he always has a headache after dialysis.  Patient requested BP check.  BP and MAP low, verified with two dynamaps.  Patient denied dizziness or light headedness.  Notified on call provider, Mimi, new orders received.  Call placed to rapid nurseShawna, whom arrived to assess patient. Albumin started and midodrine was administered per order.  Manual BP check by this nurse, 74/38 in right arm.  On call provider arrived to assess patient. BP and MAP remains low, NS bolus initiated and orders received to transfer patient to IVCU.  0039 - Report called to IVCU nurseToshia.  Patient transferred to room 2205 via bed, accompanied by this writer and rapid nurseShawna.  Patient tolerated well, remains A&O X4.  
4 Eyes Skin Assessment     NAME:  Darrel Patterson  YOB: 1945  MEDICAL RECORD NUMBER:  [id number]    The patient is being assessed for  Transfer to New Unit    I agree that at least one RN has performed a thorough Head to Toe Skin Assessment on the patient. ALL assessment sites listed below have been assessed.      Areas assessed by both nurses:    Head, Face, Ears, Shoulders, Back, Chest, Arms, Elbows, Hands, Sacrum. Buttock, Coccyx, Ischium, and Legs. Feet and Heels        Does the Patient have a Wound? No noted wound(s)       Shane Prevention initiated by RN: No  Wound Care Orders initiated by RN: No    Pressure Injury (Stage 3,4, Unstageable, DTI, NWPT, and Complex wounds) if present, place Wound referral order by RN under : No    New Ostomies, if present place, Ostomy referral order under : No     Nurse 1 eSignature: Electronically signed by Marie Turner RN on 7/19/24 at 8:10 PM EDT    **SHARE this note so that the co-signing nurse can place an eSignature**    Nurse 2 eSignature: Electronically signed by Tae Estrada RN on 7/20/24 at 1:17 AM EDT   
4 Eyes Skin Assessment     NAME:  Darrel Patterson  YOB: 1945  MEDICAL RECORD NUMBER:  [id number]    The patient is being assessed for  Transfer to New Unit    I agree that at least one RN has performed a thorough Head to Toe Skin Assessment on the patient. ALL assessment sites listed below have been assessed.      Areas assessed by both nurses:    Head, Face, Ears, Shoulders, Back, Chest, Arms, Elbows, Hands, Sacrum. Buttock, Coccyx, Ischium, and Legs. Feet and Heels        Does the Patient have a Wound? No noted wound(s)       Shane Prevention initiated by RN: No  Wound Care Orders initiated by RN: No    Pressure Injury (Stage 3,4, Unstageable, DTI, NWPT, and Complex wounds) if present, place Wound referral order by RN under : No    New Ostomies, if present place, Ostomy referral order under : No     Nurse 1 eSignature: Electronically signed by Alo Hollins RN on 7/18/24 at 4:00 PM EDT    **SHARE this note so that the co-signing nurse can place an eSignature**    Nurse 2 eSignature: Electronically signed by Marie Turner RN on 7/18/24 at 8:16 PM EDT   
@ [phone]-Rapid called overhead.  RT responded and wasn't needed @ this time.  Rapid was Due to Low Blood Pressure.  
Attempted to schedule CARDIOLOGY hospital follow up. Sent CARDIOLOGY office a message, awaiting return call from CARDIOLOGY office with appt information. [facility] placed [facility] information AVS for patient resource.  Pending patient discharge. Rajani Mcrae, Care Management Assistant  
Attempted to see pt for Pttx. Currently off the floor. Will defer and continue to follow.  
Attended interdisciplinary rounds in the CCU with the IDT. Patient's  plan of care was discussed.     Visited by: Chaplain Roberto León M.Div., [facility].  Chaplain [place] Service: [phone]  
Bedside and Verbal shift change report given to Drea (oncoming nurse) by CHADD Turner (offgoing nurse). Report included the following information Nurse Handoff Report and Index.     End of Shift Note    Bedside shift change report given to CHADD Strong (oncoming nurse) by DREA SEQUEIRA RN (offgoing nurse).  Report included the following information SBAR and Kardex    Shift worked:  7a-7p     Shift summary and any significant changes:     Persistent loose bloody stools. Had dialysis today. Hgb 7,  notified.      Concerns for physician to address:       Zone phone for oncoming shift:          Activity:     Number times ambulated in hallways past shift:   Number of times OOB to chair past shift:     Cardiac:   Cardiac Monitoring:            Access:  Current line(s):      Genitourinary:   Urinary status:     Respiratory:      Chronic home O2 use?:   Incentive spirometer at bedside:        GI:     Current diet:  ADULT DIET; Clear Liquid  Passing flatus:   Tolerating current diet:        Pain Management:   Patient states pain is manageable on current regimen: YES    Skin:     Interventions: nutritional support    Patient Safety:  Fall Score:    Interventions: gripper socks and pt to call before getting OOB       Length of Stay:  Expected LOS: 18  Actual LOS: 16      DREA SEQUEIRA RN                            
Bedside shift change report given to CHADD Gambino (oncoming nurse) by CHADD Alfaro (offgoing nurse). Report included the following information Nurse Handoff Report.     
Bedside shift change report given to CHADD Gambino (oncoming nurse) by CHADD Herron (offgoing nurse). Report included the following information Nurse Handoff Report.     
Bedside shift change report given to CHADD Gambino (oncoming nurse) by CHADD Herron (offgoing nurse). Report included the following information Nurse Handoff Report.     
Bedside shift change report given to CHADD Gambino (oncoming nurse) by CHADD Smith (offgoing nurse). Report included the following information Nurse Handoff Report.     
Bedside shift change report given to Gopal FLORENTINO (oncoming nurse) by Kenia FLORENTINO (offgoing nurse). Report included the following information Nurse Handoff Report, Index, Intake/Output, MAR, Recent Results, Med Rec Status, Neuro Assessment, and Event Log.         End of Shift Note    Bedside shift change report given to Merlin RN (oncoming nurse) by Gopal Clemons RN (offgoing nurse).  Report included the following information SBAR, Kardex, Intake/Output, MAR, and Recent Results    Shift worked:  7pm-7am     Shift summary and any significant changes:     Lab orders done, BP remained stable, observation and care continues     Concerns for physician to address:  xxx     Zone phone for oncoming shift:   xxx       Activity:     Number times ambulated in hallways past shift: 0  Number of times OOB to chair past shift: 0    Cardiac:   Cardiac Monitoring: Yes           Access:  Current line(s): PIV and HD access     Genitourinary:   Urinary status: anuric    Respiratory:      Chronic home O2 use?: NO  Incentive spirometer at bedside: NO       GI:     Current diet:  ADULT DIET; Regular; No Added Salt (3-4 gm)  ADULT ORAL NUTRITION SUPPLEMENT; Breakfast, Lunch, Dinner; Standard High Calorie/High Protein Oral Supplement  DIET ONE TIME MESSAGE;  DIET ONE TIME MESSAGE;  Passing flatus: YES  Tolerating current diet: YES       Pain Management:   Patient states pain is manageable on current regimen: YES    Skin:     Interventions: turn team, float heels, increase time out of bed, PT/OT consult, limit briefs, and nutritional support    Patient Safety:  Fall Score:    Interventions: bed/chair alarm, assistive device (walker, cane. etc), gripper socks, pt to call before getting OOB, and stay with me (per policy)       Length of Stay:  Expected LOS: 30  Actual LOS: 29      Gopal Clemons RN                            
Bedside shift change report given to Gopal FLORENTINO (oncoming nurse) by Tino FLORENTINO (offgoing nurse). Report included the following information Nurse Handoff Report, Index, Intake/Output, MAR, Recent Results, Cardiac Rhythm  , Neuro Assessment, and Event Log.              End of Shift Note    Bedside shift change report given to Kenia FLORENTINO (oncoming nurse) by Gopal Clemons RN (offgoing nurse).  Report included the following information SBAR, Kardex, Intake/Output, MAR, Accordion, Recent Results, Med Rec Status, and Cardiac Rhythm      Shift worked:  7pm-7am     Shift summary and any significant changes:     BP remained stable for the shift, no rectal bleeding noted, lab works done, observation and care continues     Concerns for physician to address:  xxx     Zone phone for oncoming shift:   xxx       Activity:     Number times ambulated in hallways past shift: 0  Number of times OOB to chair past shift: 0    Cardiac:   Cardiac Monitoring: Yes           Access:  Current line(s): PIV and HD access     Genitourinary:   Urinary status: voiding    Respiratory:      Chronic home O2 use?: NO  Incentive spirometer at bedside: NO       GI:     Current diet:  ADULT DIET; Regular; No Added Salt (3-4 gm)  ADULT ORAL NUTRITION SUPPLEMENT; Breakfast, Lunch, Dinner; Standard High Calorie/High Protein Oral Supplement  DIET ONE TIME MESSAGE;  DIET ONE TIME MESSAGE;  Passing flatus: YES  Tolerating current diet: YES       Pain Management:   Patient states pain is manageable on current regimen: YES    Skin:     Interventions: specialty bed, float heels, increase time out of bed, PT/OT consult, limit briefs, and nutritional support    Patient Safety:  Fall Score:    Interventions: bed/chair alarm, assistive device (walker, cane. etc), gripper socks, pt to call before getting OOB, and stay with me (per policy)       Length of Stay:  Expected LOS: 30  Actual LOS: 28      Gopal Clemons RN                           
Bedside shift change report given to Gopal FLORENTINO (oncoming nurse) by Uri FLORENTINO (offgoing nurse). Report included the following information Nurse Handoff Report, Index, Intake/Output, MAR, Cardiac Rhythm  , Neuro Assessment, and Event Log.          End of Shift Note    Bedside shift change report given to Uri FLORENTINO (oncoming nurse) by Gopal Clemons RN (offgoing nurse).  Report included the following information SBAR, Intake/Output, MAR, Recent Results, Med Rec Status, and Cardiac Rhythm      Shift worked:  7pm-7am     Shift summary and any significant changes:     Lab works done, had Bmx1 (bright red , small amount),v/s stable, observation and care continues     Concerns for physician to address:  xxx     Zone phone for oncoming shift:   xxx       Activity:     Number times ambulated in hallways past shift: 0  Number of times OOB to chair past shift: 0    Cardiac:   Cardiac Monitoring: Yes           Access:  Current line(s): PIV and HD access     Genitourinary:   Urinary status: anuric    Respiratory:      Chronic home O2 use?: NO  Incentive spirometer at bedside: NO       GI:     Current diet:  ADULT DIET; Regular; No Added Salt (3-4 gm)  ADULT ORAL NUTRITION SUPPLEMENT; Breakfast, Lunch, Dinner; Standard High Calorie/High Protein Oral Supplement  Passing flatus: YES  Tolerating current diet: YES       Pain Management:   Patient states pain is manageable on current regimen: YES    Skin:     Interventions: specialty bed, float heels, increase time out of bed, PT/OT consult, limit briefs, and nutritional support    Patient Safety:  Fall Score:    Interventions: bed/chair alarm, assistive device (walker, cane. etc), gripper socks, pt to call before getting OOB, and stay with me (per policy)       Length of Stay:  Expected LOS: 27  Actual LOS: 24      Gopal Clemons RN                           
Bedside shift change report given to Gopal FLORENTINO (oncoming nurse) by Uri FLORENTINO (offgoing nurse). Report included the following information Nurse Handoff Report, Index, Intake/Output, MAR, Recent Results, Med Rec Status, Cardiac Rhythm  , Alarm Parameters, and Event Log.          End of Shift Note    Bedside shift change report given to Tino FLORENTINO (oncoming nurse) by Gopal Clemons RN (offgoing nurse).  Report included the following information SBAR, Kardex, Intake/Output, MAR, Recent Results, Med Rec Status, Cardiac Rhythm  , and Alarm Parameters     Shift worked:  7pm-7am     Shift summary and any significant changes:     Requested medsfor heart burn, provider notified. Lab works done, BP remains stable. Observation and care continues     Concerns for physician to address:  xxx     Zone phone for oncoming shift:   xxx       Activity:     Number times ambulated in hallways past shift: 0  Number of times OOB to chair past shift: 0    Cardiac:   Cardiac Monitoring: Yes           Access:  Current line(s): PIV and HD access     Genitourinary:   Urinary status: anuric    Respiratory:      Chronic home O2 use?: NO  Incentive spirometer at bedside: NO       GI:     Current diet:  ADULT DIET; Regular; No Added Salt (3-4 gm)  ADULT ORAL NUTRITION SUPPLEMENT; Breakfast, Lunch, Dinner; Standard High Calorie/High Protein Oral Supplement  DIET ONE TIME MESSAGE;  Passing flatus: YES  Tolerating current diet: YES       Pain Management:   Patient states pain is manageable on current regimen: YES    Skin:     Interventions: specialty bed, float heels, increase time out of bed, PT/OT consult, limit briefs, and nutritional support    Patient Safety:  Fall Score:    Interventions: bed/chair alarm, assistive device (walker, cane. etc), gripper socks, pt to call before getting OOB, and stay with me (per policy)       Length of Stay:  Expected LOS: 28  Actual LOS: 27      Gopal Clemons RN                           
Bedside shift change report given to Gopal FLORENTINO (oncoming nurse) by Uri FLORENTINO (offgoing nurse). Report included the following information Nurse Handoff Report, Index, Intake/Output, MAR, Recent Results, Neuro Assessment, and Event Log.          End of Shift Note    Bedside shift change report given to Uri FLORENTINO (oncoming nurse) by Gopal Clemons RN (offgoing nurse).  Report included the following information SBAR, Kardex, Intake/Output, MAR, Recent Results, Cardiac Rhythm  , and Alarm Parameters     Shift worked:  7pm-7am     Shift summary and any significant changes:     BP remains soft for the shift. RENU re sited, condition stable, observation and care continues     Concerns for physician to address:  xxx     Zone phone for oncoming shift:   xxx       Activity:     Number times ambulated in hallways past shift: 0  Number of times OOB to chair past shift: 0    Cardiac:   Cardiac Monitoring: Yes           Access:  Current line(s): PIV and HD access     Genitourinary:   Urinary status: anuric    Respiratory:      Chronic home O2 use?: NO  Incentive spirometer at bedside: YES       GI:     Current diet:  ADULT DIET; Regular; No Added Salt (3-4 gm)  ADULT ORAL NUTRITION SUPPLEMENT; Breakfast, Lunch, Dinner; Standard High Calorie/High Protein Oral Supplement  Passing flatus: YES  Tolerating current diet: YES       Pain Management:   Patient states pain is manageable on current regimen: YES    Skin:     Interventions: specialty bed, float heels, increase time out of bed, PT/OT consult, limit briefs, and nutritional support    Patient Safety:  Fall Score:    Interventions: bed/chair alarm, assistive device (walker, cane. etc), gripper socks, pt to call before getting OOB, and stay with me (per policy)       Length of Stay:  Expected LOS: 28  Actual LOS: 25      Gopal Clemons RN                           
Bedside shift change report given to MIKE RN (oncoming nurse) by BERTHA RN (offgoing nurse). Report included the following information Nurse Handoff Report, Intake/Output, MAR, Recent Results, Med Rec Status, and Cardiac Rhythm SINUS RHYTHM    0411  
Chart reviewed pt not appropriate for PT at this time due to low blood pressure and continued bloody stools. Went by the room and noted pt in Trendelenburg with nursing and OT.    
Chart reviewed, spoke to RN. Pt currently off the floor for dialysis. Will defer however continue to follow. Thank you    Darline Young, PT, DPT  
Chart reviewed. Noted pt BP at rest 81/46 (MAP 54) per tele monitor at nursing station. Per MD note from rapid response yesterday, \"Blood pressure goals: systolic greater than 90 and MAP greater than 60\". Pt not hemodynamically stable for prolonged OOB ADLs; pt is getting to and from BSC with nursing at this time. Will defer and continue to follow once medically stable.      Roma Cohen   
Comprehensive Nutrition Assessment    Type and Reason for Visit:  Initial, Consult    Nutrition Recommendations/Plan:   Resume diet as able after procedure  Nepclive londonkes BID  Please document % meals and supplements consumed in flowsheet I/O's under intake      Malnutrition Assessment:  Malnutrition Status:  Severe malnutrition (07/09/24 1438)    Context:  Chronic Illness     Findings of the 6 clinical characteristics of malnutrition:  Energy Intake:  75% or less estimated energy requirements for 1 month or longer  Weight Loss:  Greater than 10% over 6 months     Body Fat Loss:  Unable to assess     Muscle Mass Loss:  Unable to assess    Fluid Accumulation:  Mild Extremities   Strength:  Not Performed    Nutrition Assessment:     Chart reviewed for consult regarding poor PO intake, MST triggered score of 2 and BMI is low. Pt medically noted for NSTEMI, CHF, ESRD on HD, RA, poor appetite with fatigue x3 weeks and intermittent diarrhea x1 month. He has been NPO today for heart cath. Pt seen resting in bed, he woke initially but fell asleep during our conversation. He did tell me he hasn't been eating well for 2 months and his usual weight is around 150#. Current weight is 135#. Pt seen 1 year ago and met ASPEN criteria for chronic severe malnutrition per RD note. This remains applicable as pt has lost 15% over the last 5 months, 12% over 2-3 months and 9% over the last month, all severe for the time frame. Will add supplements and continue monitoring.     Wt Readings from Last 10 Encounters:   07/09/24 61.6 kg (135 lb 12.9 oz)   07/08/24 65.8 kg (145 lb)   06/26/24 66.2 kg (146 lb)   06/18/24 63.5 kg (140 lb)   06/17/24 67.4 kg (148 lb 9.6 oz)   06/10/24 68 kg (150 lb)   05/22/24 69.9 kg (154 lb)   04/01/24 70.3 kg (155 lb)   01/31/24 72.6 kg (160 lb)   01/22/24 65.8 kg (145 lb)   ]    Nutrition Related Findings:    Labs: Na 135, Cr 14.20.   Meds: Phoslo, Iron, Megace, Protonix, Renvela, Vit D.   Edema: 1+ pitting 
Comprehensive Nutrition Assessment    Type and Reason for Visit:  Reassess    Nutrition Recommendations/Plan:   Advance diet as medically feasible  Continue ensure clear for now, advance to high kcal/high protein ONS once diet advanced     Malnutrition Assessment:  Malnutrition Status:  Severe malnutrition (07/23/24 1030)    Context:  Chronic Illness     Findings of the 6 clinical characteristics of malnutrition:  Energy Intake:  75% or less estimated energy requirements for 1 month or longer  Weight Loss:  Greater than 10% over 6 months     Body Fat Loss:  Unable to assess     Muscle Mass Loss:  Severe muscle mass loss Temples (temporalis)  Fluid Accumulation:  No significant fluid accumulation Extremities   Strength:  Not Performed    Nutrition Assessment:    Chart reviewed and patient discussed during PCU rounds. Medically noted for recurrent diverticular bleed. Has been receiving a clear liquid diet x 2 days. Palliative care reconsulted for goals of care discussion. Appreciate RN adding ensure clear supplements this morning. Patient unavailable at time of attempted visits with staff at bedside (GI, Tech). Advance diet as medically feasible and encourage intake of meals.     Nutrition Related Findings:    Hemoglobin 6.8   BM 7/26   Phoslo, Colace, Midodrine, Remeron, Protonix, Zosyn, Glycolax, Pravastatin, Renvela, Vitamin D, Carafate   Wound Type: None       Current Nutrition Intake & Therapies:    Average Meal Intake: 26-50%  Average Supplements Intake: Unable to assess  ADULT DIET; Clear Liquid  ADULT ORAL NUTRITION SUPPLEMENT; Breakfast, Lunch, Dinner; Clear Liquid Oral Supplement    Anthropometric Measures:  Height: 182.9 cm (6' 0.01\")  Ideal Body Weight (IBW): 178 lbs (81 kg)       Current Body Weight: 53.8 kg (118 lb 9.7 oz), 64.7 % IBW. Weight Source: Standing Scale  Current BMI (kg/m2): 16.1        Weight Adjustment For: No Adjustment                 BMI Categories: Underweight (BMI less than 
Comprehensive Nutrition Assessment    Type and Reason for Visit:  Reassess    Nutrition Recommendations/Plan:   Advance diet as medically feasible  Resume ensure plus high protein (Strawberry) when diet allows      Malnutrition Assessment:  Malnutrition Status:  Severe malnutrition (07/23/24 1030)    Context:  Chronic Illness     Findings of the 6 clinical characteristics of malnutrition:  Energy Intake:  75% or less estimated energy requirements for 1 month or longer  Weight Loss:  Greater than 10% over 6 months     Body Fat Loss:  Unable to assess     Muscle Mass Loss:  Severe muscle mass loss Temples (temporalis)  Fluid Accumulation:  No significant fluid accumulation Extremities   Strength:  Not Performed    Nutrition Assessment:    Chart reviewed; patient NPO and off the floor for colonoscopy today. Hopefully can advance diet back to solids soon; patient has been on liquid diets since 7/24. If unable to advance beyond liquids and patient continues to desire aggressive care, will need to start nutrition support. If diet advanced to fulls or above, resume strawberry ensure plus high protein TID per patient preference. Will continue to follow.     Patient Vitals for the past 120 hrs:   PO Meals Eaten (%)   07/28/24 0300 1 - 25%   07/26/24 2322 1 - 25%   07/26/24 1930 26 - 50%   07/25/24 2330 26 - 50%   07/25/24 1556 1 - 25%     Nutrition Related Findings:    Na 137, K+ 4.1, BG 82   BM 7/30   Phoslo, Colace, Midodrine, Remeron, Protonix, Glycolax, Pravastatin, Carafate, Vitamin D   Wound Type: None       Current Nutrition Intake & Therapies:    Average Meal Intake: NPO  Average Supplements Intake: NPO  Diet NPO Exceptions are: Sips of Water with Meds    Anthropometric Measures:  Height: 182.9 cm (6') (PER CHART)  Ideal Body Weight (IBW): 178 lbs (81 kg)       Current Body Weight: 53.8 kg (118 lb 9.7 oz), 64.7 % IBW. Weight Source: Standing Scale  Current BMI (kg/m2): 16.1        Weight Adjustment For: No 
Comprehensive Nutrition Assessment    Type and Reason for Visit:  Reassess    Nutrition Recommendations/Plan:   Continue current diet   Encourage PO intakes, honor preferences as able, provide assistance PRN  Adjust ONS to ensure plus HP 2x/day strawberry   Monitor and record PO intakes, supplement acceptance, and Bms in I/Os     Malnutrition Assessment:  Malnutrition Status:  Severe malnutrition (07/09/24 1438)    Context:  Chronic Illness     Findings of the 6 clinical characteristics of malnutrition:  Energy Intake:  75% or less estimated energy requirements for 1 month or longer  Weight Loss:  Greater than 10% over 6 months     Body Fat Loss:  Unable to assess     Muscle Mass Loss:  Unable to assess    Fluid Accumulation:  Mild Extremities   Strength:  Not Performed    Nutrition Assessment:    Seen for follow-up. Pt with blood per rectum overnight, plan for CTA abd pelvis. Pt with ongoing poor appetite/intake, documented <25%. He reports consuming some of the ONS - he reports maybe x1 bottle nepro/day. Pt with flavor preference for strawberry ONS (not available in nepro), plan to adjust ONS in setting of very poor intakes, last K WNL. Reports GERD contributing to poor intakes. Plan to continue diet, adjust ONS. Labs: Na 132, K 4.2, BUN 46, Creat 10.6, Gluc 112. Meds: calcium acetate, docusate sodium, mirtazapine, pantoprazole, polyethylene glycol, pravastatin, sevelamer, vitamin D    Nutrition Related Findings:    NFPE deferred. No n/v, d/c, or problems chewing/swallowing. Trace b/l LE edema. BM 7/16. Wound Type: None       Current Nutrition Intake & Therapies:    Average Meal Intake: 1-25%  Average Supplements Intake: 26-50%  ADULT DIET; Regular; Low Fat/Low Chol/High Fiber/2 gm Na; 1200 ml  ADULT ORAL NUTRITION SUPPLEMENT; Breakfast, Lunch, Dinner; Renal Oral Supplement  DIET ONE TIME MESSAGE;    Anthropometric Measures:  Height: 182.9 cm (6' 0.01\")  Ideal Body Weight (IBW): 178 lbs (81 kg)    Current Body 
Comprehensive Nutrition Assessment    Type and Reason for Visit:  Reassess    Nutrition Recommendations/Plan:   Continue current diet  Ensure plus HP strawberry 3x/day  Monitor and record PO intakes, supplement acceptance, and Bms in I/Os     Malnutrition Assessment:  Malnutrition Status:  Severe malnutrition (07/23/24 1030)    Context:  Chronic Illness     Findings of the 6 clinical characteristics of malnutrition:  Energy Intake:  75% or less estimated energy requirements for 1 month or longer  Weight Loss:  Greater than 10% over 6 months     Body Fat Loss:  Unable to assess     Muscle Mass Loss:  Severe muscle mass loss Temples (temporalis)  Fluid Accumulation:  No significant fluid accumulation Extremities   Strength:  Not Performed    Nutrition Assessment:    Follow up. Plan for HD today. Appetite/intakes good, mostly >50% recently. Plan to continue diet, ONS. Labs: Na 135, K 4.9, BUN 48, Creat 8.57, Gluc 87. Meds: calcium acetate, docusate sodium, mirtazapine, pantoprazole, pravastatin, prednisone, vitamin D    Nutrition Related Findings:    No n/v, d/c, or problems chewing/swallowing. No edema. BM 8/5. Wound Type: None       Current Nutrition Intake & Therapies:    Average Meal Intake: 51-75%  Average Supplements Intake: 51-75%  ADULT DIET; Regular; No Added Salt (3-4 gm)  ADULT ORAL NUTRITION SUPPLEMENT; Breakfast, Lunch, Dinner; Standard High Calorie/High Protein Oral Supplement  DIET ONE TIME MESSAGE;  DIET ONE TIME MESSAGE;    Anthropometric Measures:  Height: 182.9 cm (6' 0.01\")  Ideal Body Weight (IBW): 178 lbs (81 kg)    Current Body Weight: 49.6 kg (109 lb 5.6 oz), 61.4 % IBW. Weight Source: Bed Scale  Current BMI (kg/m2): 14.8  Weight Adjustment For: No Adjustment  BMI Categories: Underweight (BMI less than 22) age over 65    Estimated Daily Nutrient Needs:  Energy Requirements Based On: Kcal/kg  Weight Used for Energy Requirements: Current  Energy (kcal/day): 1736 kcals (35 kcals/kg bw)  Weight 
Comprehensive Nutrition Assessment    Type and Reason for Visit:  Reassess    Nutrition Recommendations/Plan:   Continue current diet and supplements      Malnutrition Assessment:  Malnutrition Status:  Severe malnutrition (07/23/24 1030)    Context:  Chronic Illness     Findings of the 6 clinical characteristics of malnutrition:  Energy Intake:  75% or less estimated energy requirements for 1 month or longer  Weight Loss:  Greater than 10% over 6 months     Body Fat Loss:  Unable to assess     Muscle Mass Loss:  Severe muscle mass loss Temples (temporalis)  Fluid Accumulation:  No significant fluid accumulation Extremities   Strength:  Not Performed    Nutrition Assessment:    Chart reviewed; patient on dialysis at time of visit. He reports his appetite is doing better now that he's receiving solid foods. Continues to drink strawberry ensure shakes. Patient was sleepy so allowed him to rest. Encouraged intake of meals/supplements. Poor prognosis noted per cardiology.     Nutrition Related Findings:    Labs reviewed   BM 7/31   Phoslo, Colace, Midodrine, Remeron, PRotonix, Glycolax, Pravastatin, Vitamin D   Wound Type: None       Current Nutrition Intake & Therapies:    Average Meal Intake: 26-50%, 51-75%  Average Supplements Intake: 51-75%, %  ADULT DIET; Regular; No Added Salt (3-4 gm)  ADULT ORAL NUTRITION SUPPLEMENT; Breakfast, Lunch, Dinner; Standard High Calorie/High Protein Oral Supplement    Anthropometric Measures:  Height: 182.9 cm (6') (PER CHART)  Ideal Body Weight (IBW): 178 lbs (81 kg)       Current Body Weight: 49.6 kg (109 lb 5.6 oz), 64.7 % IBW. Weight Source: Standing Scale  Current BMI (kg/m2): 14.8        Weight Adjustment For: No Adjustment                 BMI Categories: Underweight (BMI less than 18.5)    Estimated Daily Nutrient Needs:  Energy Requirements Based On: Kcal/kg  Weight Used for Energy Requirements: Current  Energy (kcal/day): 1736 kcals (35 kcals/kg bw)  Weight Used for 
Comprehensive Nutrition Assessment    Type and Reason for Visit:  Reassess    Nutrition Recommendations/Plan:   Continue diet as tolerated and supplements  Please document % meals and supplements consumed in flowsheet I/O's under intake   Please DC phosphate binders (phoslo and renvela) pt has been HYPOphosphatemic      Malnutrition Assessment:  Malnutrition Status:  Severe malnutrition (07/09/24 1438)    Context:  Chronic Illness     Findings of the 6 clinical characteristics of malnutrition:  Energy Intake:  75% or less estimated energy requirements for 1 month or longer  Weight Loss:  Greater than 10% over 6 months     Body Fat Loss:  Unable to assess     Muscle Mass Loss:  Unable to assess    Fluid Accumulation:  Mild Extremities   Strength:  Not Performed    Nutrition Assessment:  Chart reviewed, case discussed during CCU rounds.  Pt sleeping with head covered with blankets at time of visit.  He had multiple bloody BM's yesterday (likely diverticular bleed per provider notes).  GI consulted, no plans for procedure so signed off today.  Palliative follows, plans for family meeting this afternoon.  Pt last had HD Wednesday.  Phos has been persistently low (2.5 today, 2.2 yesterday) but he remains on 2 phosphate binder.  K 4.3 (hemolyzed so likely lower than this).  Per RN he consumed 50% bfast tray and 100% ONS.  Continue to encourage PO intake and supplements.  Would liberalize diet as much as possible to encourage intake given severe malnutrition.   Patient Vitals for the past 120 hrs:   PO Meals Eaten (%)   07/18/24 1532 0%   07/18/24 1402 0%   07/17/24 1329 26 - 50%   07/16/24 1000 1 - 25%   07/15/24 1800 1 - 25%   07/15/24 1100 1 - 25%   07/15/24 0815 1 - 25%         Nutrition Related Findings:    Meds: phoslo, colace, remeron, zosyn, protonix, glycolax, renvela, carafate, Vitamin D.    BM: 7/19   Wound Type: None       Current Nutrition Intake & Therapies:    Average Meal Intake: 51-75%, 26-50%  Average 
Comprehensive Nutrition Assessment    Type and Reason for Visit:  Reassess    Nutrition Recommendations/Plan:   Regular/MALLORIE diet  Continue ensure plus high protein BID     Malnutrition Assessment:  Malnutrition Status:  Severe malnutrition (07/23/24 1030)    Context:  Chronic Illness     Findings of the 6 clinical characteristics of malnutrition:  Energy Intake:  75% or less estimated energy requirements for 1 month or longer  Weight Loss:  Greater than 10% over 6 months     Body Fat Loss:  Unable to assess     Muscle Mass Loss:  Severe muscle mass loss Temples (temporalis)  Fluid Accumulation:  No significant fluid accumulation Extremities   Strength:  Not Performed    Nutrition Assessment:    Chart reviewed; patient in bed at time of visit. Reports an okay appetite but really doesn't like the way the food is seasoned. He's trying to eat more and is drinking supplements; prefers strawberry but had vanilla at bedside. Given severe malnutrition and ongoing poor PO, will liberalize diet to help encourage PO and allow more options. Flavor preference updated with kitchen. Encourage intake of meals/supplements.     Nutrition Related Findings:    No new labs   BM 7/22   Trace edema BLE   Phoslo, Colace, Midodrine, Remeron, Protonix, Zosyn, Glycolax, Pravastatin, Renvela, Carafate, Vitamin D   Wound Type: None       Current Nutrition Intake & Therapies:    Average Meal Intake:  (varying PO)  Average Supplements Intake: 51-75%, %  ADULT ORAL NUTRITION SUPPLEMENT; Breakfast, Dinner; Standard High Calorie/High Protein Oral Supplement  ADULT DIET; Regular; Low Fat/Low Chol/High Fiber/2 gm Na; 1200 ml; Boiled eggs    Anthropometric Measures:  Height: 182.9 cm (6' 0.01\")  Ideal Body Weight (IBW): 178 lbs (81 kg)       Current Body Weight: 54.4 kg (119 lb 14.9 oz), 64.7 % IBW. Weight Source: Standing Scale  Current BMI (kg/m2): 16.3        Weight Adjustment For: No Adjustment                 BMI Categories: Underweight 
Due to case back ups in the CCL will let pt eat now.  NPO p MN  Cath tomorrow morning.  Pt ok with this.  Dr. Uriostegui aware.  
End of Shift Note    Bedside shift change report given to  (oncoming nurse) by Josi Hull RN (offgoing nurse).  Report included the following information SBAR, Intake/Output, MAR, and Cardiac Rhythm      Shift worked:  7a-7p     Shift summary and any significant changes:     Patient had several bloody stools.  Hgb gaetano to 6.2. One unit PRBC started.  Hypotensive, administered prn midodrine x's 1     Concerns for physician to address:       Zone phone for oncoming shift:          Activity:     Number times ambulated in hallways past shift: 0  Number of times OOB to chair past shift: 0    Cardiac:   Cardiac Monitoring: Yes           Access:  Current line(s): PIV     Genitourinary:   Urinary status: voiding    Respiratory:      Chronic home O2 use?: NO  Incentive spirometer at bedside: NO       GI:     Current diet:  ADULT DIET; Clear Liquid  Passing flatus: YES  Tolerating current diet: YES       Pain Management:   Patient states pain is manageable on current regimen: YES    Skin:     Interventions: float heels, limit briefs, and internal/external urinary devices    Patient Safety:  Fall Score:    Interventions: bed/chair alarm, gripper socks, and pt to call before getting OOB       Length of Stay:  Expected LOS: 17  Actual LOS: 15      Josi Hull RN                            
End of Shift Note    Bedside shift change report given to  (oncoming nurse) by Meri Ramirez RN (offgoing nurse).  Report included the following information SBAR, Kardex, Procedure Summary, Intake/Output, and MAR    Shift worked:  7a-7p       Shift summary and any significant changes:    Pt received PRN midodrine x2. Colonoscopy today. At 1745 a rapid response was called for extreme hypotension. A one time dose of midodrine and albumin was ordered and given. H&H was redrawn and hgb was 7.1.      Concerns for physician to address:       Zone phone for oncoming shift:          Activity:     Number times ambulated in hallways past shift: 0  Number of times OOB to chair past shift: 0      Cardiac:   Cardiac Monitoring: Yes           Access:  Current line(s): PIV     Genitourinary:   Urinary status: anuric    Respiratory:      Chronic home O2 use?: NO  Incentive spirometer at bedside: NO       GI:     Current diet:  ADULT DIET; Regular; No Added Salt (3-4 gm)  ADULT ORAL NUTRITION SUPPLEMENT; Breakfast, Lunch, Dinner; Standard High Calorie/High Protein Oral Supplement  Passing flatus: YES  Tolerating current diet: YES       Pain Management:   Patient states pain is manageable on current regimen: YES    Skin:     Interventions: float heels    Patient Safety:  Fall Score:    Interventions: bed/chair alarm       Length of Stay:  Expected LOS: 24  Actual LOS: 21      Meri Ramirez RN                           
End of Shift Note    Bedside shift change report given to  Pablo FLORENTINO(oncoming nurse) by Tae Estrada, CHADD (offgoing nurse).  Report included the following information SBAR, Intake/Output, MAR, Recent Results, Cardiac Rhythm  , and Quality Measures    Shift worked:  5773-6706     Shift summary and any significant changes:     Looks calm.bowel remains to be hyperactive. NO any bleeding episode noticed today     Concerns for physician to address:       Zone phone for oncoming shift:          Activity:     Number times ambulated in hallways past shift: 3  Number of times OOB to chair past shift: 0    Cardiac:   Cardiac Monitoring: Yes           Access:  Current line(s): PIV     Genitourinary:   Urinary status: oliguric    Respiratory:      Chronic home O2 use?: NO  Incentive spirometer at bedside: YES       GI:     Current diet:  ADULT ORAL NUTRITION SUPPLEMENT; Breakfast, Dinner; Standard High Calorie/High Protein Oral Supplement  ADULT DIET; Regular; Low Fat/Low Chol/High Fiber/2 gm Na; 1200 ml; Boiled eggs  Passing flatus: YES  Tolerating current diet: YES       Pain Management:   Patient states pain is manageable on current regimen: YES    Skin:     Interventions: turn team    Patient Safety:  Fall Score:    Interventions: gripper socks       Length of Stay:  Expected LOS: 10  Actual LOS: 12      Tae Estrada, RN                            
End of Shift Note    Bedside shift change report given to Aleksandra FLORENTINO (oncoming nurse) by Kiah Jiang RN (offgoing nurse).  Report included the following information SBAR, Intake/Output, Cardiac Rhythm  , Quality Measures, and Dual Neuro Assessment    Shift worked:  7a-7p     Shift summary and any significant changes:     0815: Patient left IVCU to Dialysis suite.   1300: Patient returned to IVCU from Dialysis; patient is alert and oriented to self, time, place, but disoriented to situation.  Patient weight 55kg on standing scale post dialysis treatment.   1542: Patient is weak ambulating to the bathroom; MD Bustamante notified via [employer] requesting PT/OT evaluation.       Concerns for physician to address:  Very poor oral intake      Zone phone for oncoming shift:   [phone]       Activity:     Number times ambulated in hallways past shift: 0  Number of times OOB to chair past shift: 2    Cardiac:   Cardiac Monitoring: Yes           Access:  Current line(s): PIV     Genitourinary:   Urinary status: oliguric    Respiratory:      Chronic home O2 use?: NO  Incentive spirometer at bedside: NO       GI:     Current diet:  ADULT DIET; Regular; Low Fat/Low Chol/High Fiber/2 gm Na; 1200 ml  Passing flatus: NO  Tolerating current diet: NO       Pain Management:   Patient states pain is manageable on current regimen: YES    Skin:     Interventions: float heels, increase time out of bed, and PT/OT consult    Patient Safety:  Fall Score:    Interventions: bed/chair alarm, gripper socks, and pt to call before getting OOB       Length of Stay:  Expected LOS: 2  Actual LOS: 2      Kiah Jiang RN                            
End of Shift Note    Bedside shift change report given to CHADD Giang (oncoming nurse) by Maki Garcia RN (offgoing nurse).  Report included the following information SBAR    Shift worked:  7a-7p     Shift summary and any significant changes:     Added IV steroids today. Patient tolerated dialysis today.      Concerns for physician to address:       Zone phone for oncoming shift:          Activity:     Number times ambulated in hallways past shift: 0  Number of times OOB to chair past shift: 0    Cardiac:   Cardiac Monitoring: Yes           Access:  Current line(s): PIV and HD access     Genitourinary:   Urinary status: anuric    Respiratory:      Chronic home O2 use?: NO  Incentive spirometer at bedside: YES       GI:     Current diet:  ADULT DIET; Regular; No Added Salt (3-4 gm)  ADULT ORAL NUTRITION SUPPLEMENT; Breakfast, Lunch, Dinner; Standard High Calorie/High Protein Oral Supplement  Passing flatus: YES  Tolerating current diet: YES       Pain Management:   Patient states pain is manageable on current regimen: YES    Skin:     Interventions: float heels and increase time out of bed    Patient Safety:  Fall Score:    Interventions: bed/chair alarm, gripper socks, pt to call before getting OOB, and stay with me (per policy)       Length of Stay:  Expected LOS: 28  Actual LOS: 25      Maki Garcia RN                              
End of Shift Note    Bedside shift change report given to CHADD Herron (oncoming nurse) by Maki Garcia RN (offgoing nurse).  Report included the following information SBAR    Shift worked:  7a-7p     Shift summary and any significant changes:          Concerns for physician to address:       Zone phone for oncoming shift:          Activity:     Number times ambulated in hallways past shift: 0  Number of times OOB to chair past shift: 0    Cardiac:   Cardiac Monitoring: Yes           Access:  Current line(s): PIV and HD access     Genitourinary:   Urinary status: anuric    Respiratory:      Chronic home O2 use?: NO  Incentive spirometer at bedside: NO       GI:     Current diet:  ADULT DIET; Regular; No Added Salt (3-4 gm)  ADULT ORAL NUTRITION SUPPLEMENT; Breakfast, Lunch, Dinner; Standard High Calorie/High Protein Oral Supplement  DIET ONE TIME MESSAGE;  Passing flatus: YES  Tolerating current diet: YES       Pain Management:   Patient states pain is manageable on current regimen: YES    Skin:     Interventions: increase time out of bed    Patient Safety:  Fall Score:    Interventions: gripper socks, pt to call before getting OOB, and stay with me (per policy)       Length of Stay:  Expected LOS: 28  Actual LOS: 26      Maki Garcia, RN                            
End of Shift Note    Bedside shift change report given to CHADD Herron (oncoming nurse) by Maki Garcia RN (offgoing nurse).  Report included the following information SBAR    Shift worked:  7a-7p     Shift summary and any significant changes:     Patient continues to asymptomatic hypotension.     Concerns for physician to address:       Zone phone for oncoming shift:          Activity:     Number times ambulated in hallways past shift: 0  Number of times OOB to chair past shift: 0    Cardiac:   Cardiac Monitoring: Yes           Access:  Current line(s): PIV     Genitourinary:   Urinary status: anuric    Respiratory:      Chronic home O2 use?: NO  Incentive spirometer at bedside: NO       GI:     Current diet:  ADULT DIET; Regular; No Added Salt (3-4 gm)  ADULT ORAL NUTRITION SUPPLEMENT; Breakfast, Lunch, Dinner; Standard High Calorie/High Protein Oral Supplement  Passing flatus: YES  Tolerating current diet: YES       Pain Management:   Patient states pain is manageable on current regimen: YES    Skin:     Interventions: increase time out of bed, limit briefs, and nutritional support    Patient Safety:  Fall Score:    Interventions: bed/chair alarm, gripper socks, pt to call before getting OOB, and stay with me (per policy)       Length of Stay:  Expected LOS: 28  Actual LOS: 24      Maki Garcia RN                            
End of Shift Note    Bedside shift change report given to Cath RN (oncoming nurse) by Belén Holcomb RN (offgoing nurse).  Report included the following information SBAR    Shift worked:  1909-7106     Shift summary and any significant changes:     Speech therapy signed off on patient. Per nephrology increase oral intake. Pt to sit at 90 degrees during meal time and remain up for 1 hour after meals. Pt refusing SNF. Pt requesting to go home with in home therapy.      Concerns for physician to address:  PT to assess pt ability to climb stairs. 10 stairs in home. Increase oral intake.      Zone phone for oncoming shift:   [phone]       Activity:     Number times ambulated in hallways past shift: 1  Number of times OOB to chair past shift: 3    Cardiac:   Cardiac Monitoring: Yes           Access:  Current line(s): PIV     Genitourinary:   Urinary status: voiding    Respiratory:      Chronic home O2 use?: NO  Incentive spirometer at bedside: NO       GI:     Current diet:  ADULT DIET; Regular; Low Fat/Low Chol/High Fiber/2 gm Na; 1200 ml  ADULT ORAL NUTRITION SUPPLEMENT; Breakfast, Lunch, Dinner; Renal Oral Supplement  DIET ONE TIME MESSAGE;  Passing flatus: YES  Tolerating current diet: YES       Pain Management:   Patient states pain is manageable on current regimen: YES    Skin:     Interventions: increase time out of bed and PT/OT consult    Patient Safety:  Fall Score:    Interventions: bed/chair alarm, assistive device (walker, cane. etc), gripper socks, pt to call before getting OOB, and stay with me (per policy)       Length of Stay:  Expected LOS: 7  Actual LOS: 6      Belén Holcomb RN                            
End of Shift Note    Bedside shift change report given to Imelda (oncoming nurse) by Alo Hollins RN (offgoing nurse).  Report included the following information SBAR    Shift worked:  day     Shift summary and any significant changes:    Pt transferred to PCU from IVCU today. Two Rapid responses were called due to hypotension and bloody stools. Pt transferred to CCU after second rapid event. First rapid event pt responded to bolus and midodrine but declined as the day went one. Pt had a total of five stools that were bloody. He didn't complain of pain, shortness of breath, fatigue or chills. He didn't want to eat today but was able to drink fluids.      Concerns for physician to address:  Blood pressure, bowel movements     Zone phone for oncoming shift:          Activity:     Number times ambulated in hallways past shift: 0  Number of times OOB to chair past shift: 1    Cardiac:   Cardiac Monitoring: Yes           Access:  Current line(s): PIV and HD access     Genitourinary:   Urinary status: oliguric    Respiratory:      Chronic home O2 use?: NO  Incentive spirometer at bedside: NO       GI:     Current diet:  ADULT DIET; Regular; Low Fat/Low Chol/High Fiber/2 gm Na; 1200 ml  DIET ONE TIME MESSAGE;  ADULT ORAL NUTRITION SUPPLEMENT; Breakfast, Dinner; Standard High Calorie/High Protein Oral Supplement  Passing flatus: YES  Tolerating current diet: NO       Pain Management:   Patient states pain is manageable on current regimen: YES    Skin:     Interventions: specialty bed, float heels, and nutritional support    Patient Safety:  Fall Score:    Interventions: bed/chair alarm, gripper socks, and pt to call before getting OOB       Length of Stay:  Expected LOS: 10  Actual LOS: 9      Alo Hollins, RN                            
End of Shift Note    Bedside shift change report given to Kurtis FLORENTINO (oncoming nurse) by Tae Estrada RN (offgoing nurse).  Report included the following information SBAR, Intake/Output, MAR, Recent Results, Alarm Parameters , Procedure Verification, and Quality Measures    Shift worked:  1900-0700hrs     Shift summary and any significant changes:     Looks calm overnight,minimal episodes of bleeding noticed overnight.blood pressures okay     Concerns for physician to address:       Zone phone for oncoming shift:          Activity:     Number times ambulated in hallways past shift: 3  Number of times OOB to chair past shift: 0    Cardiac:   Cardiac Monitoring: Yes           Access:  Current line(s): PIV     Genitourinary:   Urinary status: anuric    Respiratory:      Chronic home O2 use?: NO  Incentive spirometer at bedside: YES       GI:     Current diet:  ADULT DIET; Clear Liquid  ADULT ORAL NUTRITION SUPPLEMENT; Breakfast, Lunch, Dinner; Clear Liquid Oral Supplement  Passing flatus: YES  Tolerating current diet: YES       Pain Management:   Patient states pain is manageable on current regimen: YES    Skin:     Interventions: float heels, PT/OT consult, and limit briefs    Patient Safety:  Fall Score:    Interventions: bed/chair alarm and gripper socks       Length of Stay:  Expected LOS: 21  Actual LOS: 18      Tae Estrada, RN                            
End of Shift Note    Bedside shift change report given to Kurtis FLORENTINO (oncoming nurse) by Tae Estrada RN (offgoing nurse).  Report included the following information SBAR, Intake/Output, MAR, Recent Results, Cardiac Rhythm NSR, Alarm Parameters , and Quality Measures    Shift worked:  6759-7151     Shift summary and any significant changes:    Looks calm, his blood pressures still on the borderline. Still has got episodes of melena stools.     Concerns for physician to address:  Bloody stools     Zone phone for oncoming shift:         Activity:     Number times ambulated in hallways past shift: 3  Number of times OOB to chair past shift: 0    Cardiac:   Cardiac Monitoring: Yes           Access:  Current line(s): PIV     Genitourinary:   Urinary status: voiding    Respiratory:      Chronic home O2 use?: NO  Incentive spirometer at bedside: NO       GI:     Current diet:  ADULT DIET; Clear Liquid  Passing flatus: YES  Tolerating current diet: YES       Pain Management:   Patient states pain is manageable on current regimen: YES    Skin:     Interventions: turn team    Patient Safety:  Fall Score:    Interventions: bed/chair alarm, gripper socks, and sitter at bedside       Length of Stay:  Expected LOS: 18  Actual LOS: 17      Tae Estrada, RN                            
End of Shift Note    Bedside shift change report given to Merlin RN (oncoming nurse) by Tae Estrada RN (offgoing nurse).  Report included the following information SBAR, Intake/Output, MAR, Med Rec Status, and Alarm Parameters     Shift worked:  1800-0700hrs     Shift summary and any significant changes:    Patient had two episodes or rectal bleeding where big clots could be seen coming out via rectum. The episodes lasted of like 10 minutes then later patient settled in bed. Patient remained to be vitally stable by then. Patient was alert and oriented to time place and person,contacted the nocturnalist on the issue but advised to check if there was active bleeding which had ceased and there was nothing to do by then      Concerns for physician to address:  Rectal bleeding     Zone phone for oncoming shift:          Activity:     Number times ambulated in hallways past shift: 4  Number of times OOB to chair past shift: 2    Cardiac:   Cardiac Monitoring: Yes           Access:  Current line(s): PIV     Genitourinary:   Urinary status: voiding    Respiratory:      Chronic home O2 use?: NO  Incentive spirometer at bedside: YES       GI:     Current diet:  ADULT DIET; Regular; Low Fat/Low Chol/High Fiber/2 gm Na; 1200 ml  ADULT ORAL NUTRITION SUPPLEMENT; Breakfast, Dinner; Standard High Calorie/High Protein Oral Supplement  Passing flatus: YES  Tolerating current diet: NO       Pain Management:   Patient states pain is manageable on current regimen: YES    Skin:     Interventions: turn team, PT/OT consult, and internal/external urinary devices    Patient Safety:  Fall Score:    Interventions: bed/chair alarm, gripper socks, and tele-sitter       Length of Stay:  Expected LOS: 10  Actual LOS: 11      Tae Estrada RN                            
End of Shift Note    Bedside shift change report given to Sarahi FLORENTINO (oncoming nurse) by BENSON CABRERA RN (offgoing nurse).  Report included the following information SBAR, Kardex, Intake/Output, MAR, Recent Results, and Cardiac Rhythm SR    Shift worked:  7l-040a     Shift summary and any significant changes:     none     Concerns for physician to address:  none     Zone phone for oncoming shift:          Activity:     Number times ambulated in hallways past shift: 0  Number of times OOB to chair past shift: 1    Cardiac:   Cardiac Monitoring: Yes           Access:  Current line(s): PIV     Genitourinary:   Urinary status: anuric    Respiratory:      Chronic home O2 use?: NO  Incentive spirometer at bedside: NO       GI:     Current diet:  ADULT DIET; Regular; Low Fat/Low Chol/High Fiber/2 gm Na; 1200 ml  ADULT ORAL NUTRITION SUPPLEMENT; Breakfast, Lunch, Dinner; Renal Oral Supplement  Passing flatus: YES  Tolerating current diet: YES       Pain Management:   Patient states pain is manageable on current regimen: YES    Skin:     Interventions: increase time out of bed    Patient Safety:  Fall Score:    Interventions: assistive device (walker, cane. etc), gripper socks, and pt to call before getting OOB       Length of Stay:  Expected LOS: 5  Actual LOS: 4      BENSON CABRERA, RN                           
End of Shift Note    Bedside shift change report given to Tae (oncoming nurse) by Merlin Lang, RN (offgoing nurse).  Report included the following information SBAR, Procedure Summary, Intake/Output, MAR, Recent Results, and Cardiac Rhythm NSR    Shift worked:  7a-7p     Shift summary and any significant changes:    Pt received HD treatment with no complications. No fluid removed.    Pt no bloody stools during shift, however increasingly frequent episodes of diarrhea.      Hg trending down       Concerns for physician to address:       Zone phone for oncoming shift:          Activity:     Number times ambulated in hallways past shift: 0  Number of times OOB to chair past shift: 2    Cardiac:   Cardiac Monitoring: Yes           Access:  Current line(s): PIV and HD access     Genitourinary:   Urinary status: anuric    Respiratory:      Chronic home O2 use?: NO  Incentive spirometer at bedside: N/A       GI:     Current diet:  ADULT DIET; Regular; Low Fat/Low Chol/High Fiber/2 gm Na; 1200 ml  ADULT ORAL NUTRITION SUPPLEMENT; Breakfast, Dinner; Standard High Calorie/High Protein Oral Supplement  Passing flatus: YES  Tolerating current diet: YES       Pain Management:   Patient states pain is manageable on current regimen: YES    Skin:     Interventions: increase time out of bed    Patient Safety:  Fall Score:    Interventions: bed/chair alarm, gripper socks, and pt to call before getting OOB       Length of Stay:  Expected LOS: 10  Actual LOS: 11      Merlin Lang, RN                            
End of Shift Note    Bedside shift change report given to Ysabel (oncoming nurse) by LISA BROWN RN (offgoing nurse).  Report included the following information SBAR, Kardex, Procedure Summary, Intake/Output, Accordion, and Recent Results    Shift worked:  7a-7p     Shift summary and any significant changes:     Patient worked with therapy today, up in chair several times. Blood pressure tolerated activity  Dialysis scheduled for tomorrow       Concerns for physician to address:       Zone phone for oncoming shift:          Activity:     Number times ambulated in hallways past shift: 0  Number of times OOB to chair past shift: 2    Cardiac:   Cardiac Monitoring: Yes           Access:  Current line(s): PIV     Genitourinary:   Urinary status: anuric    Respiratory:      Chronic home O2 use?: NO  Incentive spirometer at bedside: YES       GI:     Current diet:  ADULT DIET; Regular; No Added Salt (3-4 gm)  ADULT ORAL NUTRITION SUPPLEMENT; Breakfast, Lunch, Dinner; Standard High Calorie/High Protein Oral Supplement  DIET ONE TIME MESSAGE;  DIET ONE TIME MESSAGE;  Passing flatus: YES  Tolerating current diet: YES       Pain Management:   Patient states pain is manageable on current regimen: YES    Skin:     Interventions: turn team, increase time out of bed, foam dressing, and PT/OT consult    Patient Safety:  Fall Score:    Interventions: assistive device (walker, cane. etc), gripper socks, and pt to call before getting OOB       Length of Stay:  Expected LOS: 30  Actual LOS: 27      LISA BROWN RN                           
End of Shift Note    Bedside shift change report given to nightshift RN (oncoming nurse) by Kiah Jiang RN (offgoing nurse).  Report included the following information SBAR, ED Summary, Cardiac Rhythm  , and Dual Neuro Assessment    Shift worked:  7a-7p     Shift summary and any significant changes:     1045: Patient off the floor to cath lab.   1126: Patient returened to IVCU from CCL; right raidla site is CDI with TR band patent at 12ccs. Patient denies having CP or SOB.  1130: Patient states having numbness to right hand. 2ccs of air removed from TR band. TR band PH @ 10ccs; right radial site is CDI no hematoma noted.  1145: Patient states having numbness to right hand; additional 2ccs removed from TR band. Patient denies having numbness, TR band patent at 8ccs; right radial site is CDI no hematoma noted; right radial pulse is 2+.   1404: TR band removed; right radial site is CDI with quick clot and Tegaderm dressing. Patient is alert and oriented to self and time but disoriented to place and situation.     Concerns for physician to address:  Poor oral intake     Zone phone for oncoming shift:   [phone]       Activity:     Number times ambulated in hallways past shift: 0  Number of times OOB to chair past shift: 3    Cardiac:   Cardiac Monitoring: Yes           Access:  Current line(s): PIV     Genitourinary:   Urinary status: oliguric    Respiratory:      Chronic home O2 use?: NO  Incentive spirometer at bedside: NO       GI:     Current diet:  ADULT DIET; Regular; Low Fat/Low Chol/High Fiber/2 gm Na  Passing flatus: YES  Tolerating current diet: NO       Pain Management:   Patient states pain is manageable on current regimen: YES    Skin:     Interventions: specialty bed, float heels, and limit briefs    Patient Safety:  Fall Score:    Interventions: bed/chair alarm, gripper socks, and pt to call before getting OOB       Length of Stay:  Expected LOS: 2  Actual LOS: 1      Kiah Jiang 
End of Shift Note    Bedside shift change report given to oliverio FLORENTINO (oncoming nurse) by Tae Estrada RN (offgoing nurse).  Report included the following information SBAR, Intake/Output, MAR, Recent Results, Cardiac Rhythm sinus rhythm with pvcs, Alarm Parameters , and Quality Measures    Shift worked:  1900 -0700     Shift summary and any significant changes:     Looks calm , blood pressure holding on well,still has got  bleeding tendancies,quite restful overnight although his nutritional status remains to be so poor     Concerns for physician to address:       Zone phone for oncoming shift:          Activity:     Number times ambulated in hallways past shift: 2  Number of times OOB to chair past shift: 2    Cardiac:   Cardiac Monitoring: Yes           Access:  Current line(s): PIV     Genitourinary:   Urinary status: anuric    Respiratory:      Chronic home O2 use?: NO  Incentive spirometer at bedside: YES       GI:     Current diet:  ADULT DIET; Clear Liquid  ADULT ORAL NUTRITION SUPPLEMENT; Breakfast, Lunch, Dinner; Clear Liquid Oral Supplement  Passing flatus: YES  Tolerating current diet: YES       Pain Management:   Patient states pain is manageable on current regimen: YES    Skin:     Interventions: float heels, limit briefs, and nutritional support    Patient Safety:  Fall Score:    Interventions: bed/chair alarm and gripper socks       Length of Stay:  Expected LOS: 21  Actual LOS: 19      Tae Estrada RN                            
GI Procedure Summary:      Impression:    -Normal terminal ileum  -Diverticulosis noted throughout the colon  -Grade 2 internal hemorrhoids  -No masses, polyps, or actively bleeding lesions, or hematin noted    Recommendations:   -No further colonoscopy indicated  -Resume diet  -Resume SQ heparin  -Transfuse PRN  -Suspect pt's hypotension is not related to active bleeding, but poor cardiac and renal status.  -Blood noted is consistent with hemorrhoids seen  -Anemia is felt consistent with noted renal disease.  -Resume normal medication(s).       GI will sign off at this time.      Sukhi Light MD    
Hospitalist Note  This is a nonbillable note    Mr Darrel Patterson is a 80 yo male with PMH significant for ESRD on HD T/Th/Sat, HFrEF 15%, recent GIB, who was admitted early this AM with SOB, poor PO intake, fatigue and lethargy, and found to have elevated troponins on admit.  H&P reviewed by admitting provider Dr. Shah (05:30am), agree with assessment and plan. Added repeat Echo as last EF 15% from 2023. Pt is known to Dr. Uriostegui for cards and Dr. Licea for nephrology, they have been consulted, as has palliative care for goals of care discussions.  Plan for cardiac cath this afternoon; further hospital course per findings and cards recommendations.    Attempted to visit patient x2, he was unavailable at the time.  Will see post-cath this afternoon.    I remain available for any patient needs today, please find me on Joann Sorensen NP.  
I reviewed the patient's medical history, the findings on physical examination, the patient's diagnoses, and treatment plan as documented in the note.  I concur with the treatment plan as documented.  Additional suggestions noted.    Will be available if needed.    VENTURA Sauceda D.O.  [facility]          GI PROGRESS NOTE  Sarah Amaya NP  [phone] NP in-hospital cell phone M-F until 4:30  After 5pm or on weekends, please call  for physician on call    NAME:Darrel Patterson :1945 MRN:[id number]   ATTG: Dr. Johnson  PCP: Roma Solitario APRN - [facility]  Date/Time:  2024 11:07 AM   Primary GI: Dr. Sauceda  Reason for following: rectal bleeding  Assessment:   Rectal bleeding  NSTEMI  Acute on Chronic Anemia  ESRD on HD  Hx of Chronic Systolic HF - EF 15-20% 2023   Acute episode of rectal bleeding over night with associated abdominal pain and syncope with hypotension  Hgb 13.3, improved since admission --> 10.2  24 CT abdomen/pelvis w wo: No active GI bleed. 2. Mild stranding left upper quadrant associated with descending colonic diverticula may represent diverticulitis. No bowel obstruction, free air, or free fluid. No significant change.     EGD 2024: Mild esophagitis. Irregular Z-line. Erosive gastritis w/ stigmata of bleeding. - bx w/ active gastritis w/ intestinal metaplasia. Surface hemorrhage and refractile foreign material suggesting pill-related gastritis     CLN 2023 - Nl TI, sigmoid tics, medium G2 hemms, no f/u recommended  Plan:   Patient no longer having bloody stools, Hgb stable, remains off pressors  Continue with supportive care  No plans for colonoscopy at this time as patient remains at high risk for any procedures given his significant co morbidities and active diverticulitis which remains a contraindication.   Supportive measures per primary team. Monitor for further S&S of GI bleed  Serial H&H as clinically indicated. Goal hemoglobin 
[place] [address] [address]                GI PROGRESS NOTE        NAME:   Darrel Patterson       :    1945       MRN:    [id number]     Assessment/Plan     Hematochezia: he had one BM with small amount of blood today. Patient seems to have changed his mind about colonoscopy. Dr. Schmid's team will re-evaluate on Monday to assess for a colonoscopy. If there is active bleeding, we will proceed with CTA.  Acute blood loss anmeia: Hb 7.0, he is being transfused 1 unit  CKD/ESRD on dialysis  Cardiomyopathy with low EF     Patient Active Problem List   Diagnosis    History of GI bleed    Anemia due to chronic kidney disease    A-V fistula ([facility])    S/P cataract surgery    Habitual alcohol use    Mild intermittent asthma without complication    Essential hypertension, benign    Diverticula of colon    Gastroesophageal reflux disease without esophagitis    Rheumatoid arthritis with positive rheumatoid factor ([facility])    Glaucoma    ESRD on hemodialysis ([facility])    Impingement syndrome of both shoulders    Hypertensive retinopathy of both eyes    CHF (congestive heart failure) ([facility])    DDD (degenerative disc disease), lumbar    DDD (degenerative disc disease), cervical    Psychophysiological insomnia    Fatigue    Hyperkalemia    Seizure ([facility])    Long term (current) use of systemic steroids    Weakness generalized    Left renal mass    GI bleed    NSTEMI (non-ST elevated myocardial infarction) ([facility])    Palliative care by specialist    Sarcopenia    Unintentional weight loss    Palliative care encounter    ESRD (end stage renal disease) ([facility])    Debility    Grief    Severe protein-calorie malnutrition ([facility])    Frailty    Goals of care, counseling/discussion    DNR (do not resuscitate) discussion       Subjective:     Darrel Patterson is a 79 y.o.  male who was admitted with hematochezia     Review of Systems:unable to obtain            Objective:     VITALS:   Last 24hrs VS 
Music Therapy Assessment  [facility]    Darrel Patterson [id number]     1945  79 y.o.  male    Patient Telephone Number: [phone] (home)   Denominational Affiliation: Episcopal   Language: English   Patient Active Problem List    Diagnosis Date Noted    Frailty 07/20/2024    Goals of care, counseling/discussion 07/20/2024    DNR (do not resuscitate) discussion 07/20/2024    Severe protein-calorie malnutrition ([facility]) 07/19/2024    Grief 07/17/2024    Palliative care by specialist 07/12/2024    Sarcopenia 07/12/2024    Unintentional weight loss 07/12/2024    Palliative care encounter 07/12/2024    ESRD (end stage renal disease) ([facility]) 07/12/2024    Debility 07/12/2024    NSTEMI (non-ST elevated myocardial infarction) ([facility]) 07/09/2024    GI bleed 06/10/2024    Left renal mass 12/06/2023    Hyperkalemia 07/25/2023    Seizure ([facility]) 07/25/2023    Long term (current) use of systemic steroids 07/25/2023    Weakness generalized 07/25/2023    Fatigue 04/11/2023    DDD (degenerative disc disease), lumbar 12/07/2022    DDD (degenerative disc disease), cervical 12/07/2022    Psychophysiological insomnia 12/07/2022    CHF (congestive heart failure) ([facility]) 04/25/2022    Glaucoma 06/04/2021    Hypertensive retinopathy of both eyes 11/19/2018    ESRD on hemodialysis ([facility])     A-V fistula ([facility]) 07/12/2018    Gastroesophageal reflux disease without esophagitis 10/24/2017    S/P cataract surgery 04/07/2017    Mild intermittent asthma without complication 02/13/2017    Anemia due to chronic kidney disease     Rheumatoid arthritis with positive rheumatoid factor ([facility]) 06/14/2016    Impingement syndrome of both shoulders 03/16/2016    Essential hypertension, benign 09/29/2014    Diverticula of colon 03/14/2014    History of GI bleed 01/08/2014    Habitual alcohol use 01/08/2014        Date: 7/24/2024                       MRM 2 PROGRESSIVE CARE      Session Observations:  Attempted music therapy visit with patient.  
Music Therapy Assessment  [facility]    Darrel Patterson [id number]     1945  79 y.o.  male    Patient Telephone Number: [phone] (home)   [employer] Affiliation: Baptist   Language: English   Patient Active Problem List    Diagnosis Date Noted    Palliative care by specialist 07/12/2024    Sarcopenia 07/12/2024    Unintentional weight loss 07/12/2024    Goals of care, counseling/discussion 07/12/2024    ESRD (end stage renal disease) ([facility]) 07/12/2024    Debility 07/12/2024    NSTEMI (non-ST elevated myocardial infarction) ([facility]) 07/09/2024    GI bleed 06/10/2024    Left renal mass 12/06/2023    Hyperkalemia 07/25/2023    Seizure ([facility]) 07/25/2023    Long term (current) use of systemic steroids 07/25/2023    Weakness generalized 07/25/2023    Fatigue 04/11/2023    DDD (degenerative disc disease), lumbar 12/07/2022    DDD (degenerative disc disease), cervical 12/07/2022    Psychophysiological insomnia 12/07/2022    CHF (congestive heart failure) ([facility]) 04/25/2022    Glaucoma 06/04/2021    Hypertensive retinopathy of both eyes 11/19/2018    ESRD on hemodialysis ([facility])     A-V fistula ([facility]) 07/12/2018    Gastroesophageal reflux disease without esophagitis 10/24/2017    S/P cataract surgery 04/07/2017    Mild intermittent asthma without complication 02/13/2017    Anemia due to chronic kidney disease     Rheumatoid arthritis with positive rheumatoid factor ([facility]) 06/14/2016    Impingement syndrome of both shoulders 03/16/2016    Essential hypertension, benign 09/29/2014    Diverticula of colon 03/14/2014    History of GI bleed 01/08/2014    Habitual alcohol use 01/08/2014        Date: 7/16/2024                       MRM 2 INTRVNTNL CARDIO      Session Observations:  Attempted music therapy visit with patient.   Patient was resting with eyes closed.  Family was visiting and reported that patient had had a difficult morning and needed to continue resting.      Mahsa Ibrahim M.M., [facility]  [employer] 
Nursing contacted Nocturnist/cross cover provider via non-urgent messaging system [employer] and notified patient bp 65/53 map 57. No other concerns reported. No acute distress reported. No other information provided by nurse. VSS. Ordered midodrine 10mg po x1 and also albumin iv x1, I called and spoke with rapid nurse to please assess pt/recheck bp at the bedside, she will notify me of further concern. Pt's nurse had reported prn midodrine was not given due to after 1800 per order. Will defer further evaluation/management to the day shift primary attending care team. Patient denies any further complaints or concerns. Nursing to notify Hospitalist for further/continued concerns. Will remain available overnight for further concerns if nursing/patient needs. Please note, there are RRT systems in this hospital in place that if nursing has acute or critical patient condition change or concern, this is to help facilitate and notify that patient needs immediate bedside evaluation by a provider.     Update:  2340 nursing notified me that patient is alert and oriented, his main concern at this moment is to go to sleep.  No acute distress.  States that the automatic blood pressure cuff is still registering in the 60s.  Reported that the radial pulse is strong and palpable bilaterally.  This indicates the patient's blood pressure is at least systolically in the 80s.  Patient denies any other concerns or complaints on exam.  Nursing is attempting to obtain a manual blood pressure presently, reported having a somewhat difficulty to locate a manual blood pressure cuff.  Will notify me for further concerns and update overnight once manual bp is obtained.    Update  Nurse reported bp manually still sbp 60s, pt remains asymptomatic. On bedside exam pt manual bp 82/36, he is a+ox4, NAD, his radial pulse is strong and palpable. Denies any chest pain, dizziness, lightheadedness, dyspnea, h/a. States having intermittent left arm pain 
Nursing contacted Nocturnist/cross cover provider via non-urgent messaging system [employer] and notified patient was having some active rectal bleeding that has now completely ceased, was assisted back to bed from the bathroom, reported that patient was a transfer from ICU as he was having hypotensive issues with bleeding prior, vital signs are stable with blood pressure 114/69, HR-88, SpO2 100, RR 22, temp 98.1, states, not in any distress, no other concerns or complaints.  As reported bleeding has stopped presently, asymptomatic. No other concerns reported. No acute distress reported. No other information provided by nurse. Ordered nurse wcm and notify for further concerns overnight. Will defer further evaluation/management to the day shift primary attending care team. Patient denies any further complaints or concerns. Nursing to notify Hospitalist for further/continued concerns. Will remain available overnight for further concerns if nursing/patient needs. Please note, there are RRT systems in this hospital in place that if nursing has acute or critical patient condition change or concern, this is to help facilitate and notify that patient needs immediate bedside evaluation by a provider.     Non-billable note.       
Nursing contacted Nocturnist/cross cover provider via non-urgent messaging system [employer] and notified patient asking for something for reflux, takes Tums chronically at home. No other concerns reported. No acute distress reported. No other information provided by nurse. VSS.  Patient is already on twice daily Protonix.  Ordered Pepcid 20 mg p.o. x 1 now given patient's kidney disease. Will defer further evaluation/management to the day shift primary attending care team. Patient denies any further complaints or concerns. Nursing to notify Hospitalist for further/continued concerns. Will remain available overnight for further concerns if nursing/patient needs. Please note, there are RRT systems in this hospital in place that if nursing has acute or critical patient condition change or concern, this is to help facilitate and notify that patient needs immediate bedside evaluation by a provider.     Non-billable note.       
Occupational Therapy    Chart reviewed in prep for skilled OT treatment; however, pt remains hypotensive.  OT to defer and continue to follow.  RN reports she is able to assist pt on/off BSC; however, BP 80/40's during BSC sitting.    Thank you,  Matthew Kerley, OT    
Occupational Therapy    Chart reviewed. Noted pt BP at rest 81/46 (MAP 54) per tele monitor at nursing station. Per MD note from rapid response yesterday, \"Blood pressure goals: systolic greater than 90 and MAP greater than 60\". Pt not hemodynamically stable for prolonged OOB ADLs; pt is getting to and from BSC with nursing at this time. Will defer and continue to follow once medically stable. Thanks.    Anamika Mendoza MS, OTR/L  
Occupational Therapy Contact Note  07/25/24     Chart reviewed and spoke with RN, patient is currently on HD at bedside and is unavailable to participate in therapy interventions at this time. Will follow up with patient to complete OT tx as able.    Thank you,  Raina Tolentino, OTSHANTE, OTR/L    
Occupational Therapy note:    Chart reviewed and discussed with nursing. Patient currently ALICIA for HD. Will defer and continue to follow.    Lizbeth Lawson, OTR/L, OTD  
Occupational Therapy note:    Chart reviewed and discussed with nursing. Patient had a rapid response this morning. Will hold OT eval at this time and continue to follow when patient medically stable.    Lizbeth Lawson, OTR/L, OTD  
Occupational Therapy note:    Chart reviewed and patient currently on HD at bedside. Will defer and continue to follow.    Lizbeth Lawson, OTR/L, OTD  
Occupational Therapy note:    Chart reviewed up to date. Attempted to see pt for OT session. Pt with rapid response this AM, not medically stable/appropriate for skilled OT intervention at this time. Will defer and continue to follow. Noted patient requesting to return home and declining SNF rehab. Will need 24/7 supervision and assist from family as he is a high fall risk.     Lizbeth Lawson, OTR/L, OTD  
Palliative Medicine  Patient Name: Darrel Patterson  YOB: 1945  MRN: [id number]  Age: 79 y.o.  Gender: male    Date of Initial Consult: 2024  Date of Service: 2024  Time: 2:31 PM  Provider: Chayito Serrano MD  Hospital Day: 9  Admit Date: 2024  Referring Provider: Dr. Bustamante        Reasons for Consultation:  Goals of Care    HISTORY OF PRESENT ILLNESS (HPI):   Darrel Patterson is a 79 y.o. male with a past medical history of ESRD on HD for about 4-5 years, CAD, hx nonischemic cardiomyopathy EF 15-20% (not compliant with lifevest) (Echo 2023 Dilated LV, global HK, severe MR) hx RA, hx PMR, hs GI bleed (2024: EGD gastritis/ prednisone stopped), COPD, insomnia, hx Seizure , who was admitted on 2024 from [facility]  with a diagnosis of lethargy, malaise, decreased PO intake.  Pt being treated for NSTEMI, hyponatremia, started on Megace for poor appetite.   Cardiac Cath this admission:  branch vessel dz, elevated LVEDP, mod-sev MR, severely reduced LV EF.   PT recommending he needs 24/7 supervision due to impaired cognition/ fall risk    Psychosocial: , lives at home alone in [place] but family often with him especially overnight. Still driving short distances.  Patient's wife  suddenly in 2024 of a heart attack.   Has so far refused to move near to his [facility] [employer] (works as NP,)  Has 10 children.  He relies mostly on [employer]  [place]  [phone]   No AMD on file       PALLIATIVE DIAGNOSES:    NSTEMI  HFrEF  , EF 15-20%  ESRD on HD  Unintentional weight loss (15 pounds), sarcopenia,  Debility, deconditioning  Protein calorie malnutrition, hypoalbuminemia, poor appetite, cardiac cachexia  Left sided chest wall pain, chronic (many days)    Poor insight to details of medical issues, use assistance of family for medical decision making.  Depressed mood, grief (loss of spouse)   Palliative medicine encounter  Care goals discussion    ASSESSMENT AND PLAN:   Following up 
Palliative Medicine  Per H&P: Darrel Patterson is a 79 y.o.  male with PMHx significant for listed PMH below who presents with the above chief complaint.   We were asked to admit for work up and evaluation of the above problems.      Patient with initial complaint of poor p.o. intake and lethargy and malaise.  States that this has been ongoing \"for some time\".  Notes that he has also had ongoing loose stool and diarrhea which she states is chronic for him.  Came to the emergency department after being brought by family due to poor appetite and general malaise.  In the emergency department, found to have an elevated troponin and referred for transfer for cardiology evaluation.  He explicitly denies any chest pain, chest pressure, nausea, lightheadedness.  Does state that he has shortness of breath however he thought that maybe this was coming from his respiratory disease or from his dialysis.     On discussion with family, they state they noticed that he is often short of breath including times when he exerts himself and both in between dialysis sessions as well as immediately after a dialysis session.  Daughter notes that after dialysis recently, they noted that he was very short of breath when walking even several feet immediately after dialysis.  Notes that often when he walks up a flight of stairs he must stop and catch his breath and this does seem to improve somewhat with rest.    Per Dr. Arboleda: Darrel Patterson is a 79 y.o. male with a past medical history of ESRD on HD for about 4-5 years, CAD, hx nonischemic cardiomyopathy EF 15-20% (not compliant with lifevest) (Echo 4/2023 Dilated LV, global HK, severe MR) hx RA, hx PMR, hs GI bleed (6/2024: EGD gastritis/ prednisone stopped), COPD, insomnia, hx Seizure 7/23, who was admitted on 7/9/2024 from [facility]  with a diagnosis of lethargy, malaise, decreased PO intake.  Pt being treated for NSTEMI, hyponatremia, started on Megace for poor appetite.   Cardiac 
Palliative Medicine  Per H&P: Darrel Patterson is a 79 y.o.  male with PMHx significant for listed PMH below who presents with the above chief complaint.   We were asked to admit for work up and evaluation of the above problems.    Patient with initial complaint of poor p.o. intake and lethargy and malaise.  States that this has been ongoing \"for some time\".  Notes that he has also had ongoing loose stool and diarrhea which she states is chronic for him.  Came to the emergency department after being brought by family due to poor appetite and general malaise.  In the emergency department, found to have an elevated troponin and referred for transfer for cardiology evaluation.  He explicitly denies any chest pain, chest pressure, nausea, lightheadedness.  Does state that he has shortness of breath however he thought that maybe this was coming from his respiratory disease or from his dialysis.  On discussion with family, they state they noticed that he is often short of breath including times when he exerts himself and both in between dialysis sessions as well as immediately after a dialysis session.  Daughter notes that after dialysis recently, they noted that he was very short of breath when walking even several feet immediately after dialysis.  Notes that often when he walks up a flight of stairs he must stop and catch his breath and this does seem to improve somewhat with rest.  Per Dr. Arboleda: Darrel Patterson is a 79 y.o. male with a past medical history of ESRD on HD for about 4-5 years, CAD, hx nonischemic cardiomyopathy EF 15-20% (not compliant with lifevest) (Echo 4/2023 Dilated LV, global HK, severe MR) hx RA, hx PMR, hs GI bleed (6/2024: EGD gastritis/ prednisone stopped), COPD, insomnia, hx Seizure 7/23, who was admitted on 7/9/2024 from [facility]  with a diagnosis of lethargy, malaise, decreased PO intake.  Pt being treated for NSTEMI, hyponatremia, started on Megace for poor appetite.   Cardiac Cath this 
Palliative Medicine  Per H&P: Darrel Patterson is a 79 y.o.  male with PMHx significant for listed PMH below who presents with the above chief complaint.   We were asked to admit for work up and evaluation of the above problems.    Patient with initial complaint of poor p.o. intake and lethargy and malaise.  States that this has been ongoing \"for some time\".  Notes that he has also had ongoing loose stool and diarrhea which she states is chronic for him.  Came to the emergency department after being brought by family due to poor appetite and general malaise.  In the emergency department, found to have an elevated troponin and referred for transfer for cardiology evaluation.  He explicitly denies any chest pain, chest pressure, nausea, lightheadedness.  Does state that he has shortness of breath however he thought that maybe this was coming from his respiratory disease or from his dialysis.  On discussion with family, they state they noticed that he is often short of breath including times when he exerts himself and both in between dialysis sessions as well as immediately after a dialysis session.  Daughter notes that after dialysis recently, they noted that he was very short of breath when walking even several feet immediately after dialysis.  Notes that often when he walks up a flight of stairs he must stop and catch his breath and this does seem to improve somewhat with rest.  Per Dr. Arboleda: Darrel Patterson is a 79 y.o. male with a past medical history of ESRD on HD for about 4-5 years, CAD, hx nonischemic cardiomyopathy EF 15-20% (not compliant with lifevest) (Echo 4/2023 Dilated LV, global HK, severe MR) hx RA, hx PMR, hs GI bleed (6/2024: EGD gastritis/ prednisone stopped), COPD, insomnia, hx Seizure 7/23, who was admitted on 7/9/2024 from [facility]  with a diagnosis of lethargy, malaise, decreased PO intake.  Pt being treated for NSTEMI, hyponatremia, started on Megace for poor appetite.   Cardiac Cath this 
Palliative Medicine  Per H&P: Darrel Patterson is a 79 y.o.  male with PMHx significant for listed PMH below who presents with the above chief complaint.   We were asked to admit for work up and evaluation of the above problems.    Patient with initial complaint of poor p.o. intake and lethargy and malaise.  States that this has been ongoing \"for some time\".  Notes that he has also had ongoing loose stool and diarrhea which she states is chronic for him.  Came to the emergency department after being brought by family due to poor appetite and general malaise.  In the emergency department, found to have an elevated troponin and referred for transfer for cardiology evaluation.  He explicitly denies any chest pain, chest pressure, nausea, lightheadedness.  Does state that he has shortness of breath however he thought that maybe this was coming from his respiratory disease or from his dialysis.  On discussion with family, they state they noticed that he is often short of breath including times when he exerts himself and both in between dialysis sessions as well as immediately after a dialysis session.  Daughter notes that after dialysis recently, they noted that he was very short of breath when walking even several feet immediately after dialysis.  Notes that often when he walks up a flight of stairs he must stop and catch his breath and this does seem to improve somewhat with rest.  Per Dr. Arboleda: Darrel Patterson is a 79 y.o. male with a past medical history of ESRD on HD for about 4-5 years, CAD, hx nonischemic cardiomyopathy EF 15-20% (not compliant with lifevest) (Echo 4/2023 Dilated LV, global HK, severe MR) hx RA, hx PMR, hs GI bleed (6/2024: EGD gastritis/ prednisone stopped), COPD, insomnia, hx Seizure 7/23, who was admitted on 7/9/2024 from [facility]  with a diagnosis of lethargy, malaise, decreased PO intake.  Pt being treated for NSTEMI, hyponatremia, started on Megace for poor appetite.   Cardiac Cath this 
Palliative Medicine SW Note    After review of chart, Palliative SW called Pat Traore on behalf of Dr. Serrano, and though she is working today, she will try to be available for a call around 1p for update with patient and to review GOC. She said she had an update with attending physician yesterday and visited yesterday with patient.    Thank you for including Palliative team in the care of Mr. Darrel Patterson.    Belle Booth, [facility]  Palliative Medicine [phone]  
Palliative Medicine SW Note    [facility] in to check on patient and offer support. Assigned nurse Meri reported he had a headache and stomach ache, but otherwise pleasant and alert. He was received lying on his back, waiting for nurse to return to place IV. When asked if there was anything this writer could do to make this a better day for him, he could not think of anything. Nurses arrived for procedure.  Palliative team is continuing to follow for education, support and GOC discussions as appropriate.    Thank you for including Palliative team in the care of Mr. Darrel Patterson.    Belle Booth, [facility]  Palliative Medicine [phone]  
Palliative Medicine SW Note    [facility] returned call to daughter Nadine, who had a question about getting DGPOA documents for her father to complete so she can help him with his finances. Suggested she contact his bank and inquire if they have any forms in addition to POA that would need to be notarized in order to access his accounts. She planned to go on the [facility] website to download a POA form, which is what the  suggested to her.    Thank you for including Palliative team in the care of Mr. Darrel Patterson.    Belle Booth, [facility]  Palliative Medicine [phone]  
Palliative Medicine SW Note    [facility], to clarify that Palliative team will sign off for now as patient's goals are clear and he is proceeding with aggressive treatment. She shared that his goals have not changed and this writer reflected that family support appears strong. Reminded her that our team is available in the future if we may be of support or further GOC discussion is desired. She expressed understanding and appreciation.    Thank you for including Palliative team in the care of Darrel Patterson.    Belle Booth, [facility]  Palliative Medicine [phone]  
Patient arrived to HD suite. Patient states he doesn't feel well today and only wants to run 1hr. Patient was informed that 1hr isn't long enough.  Dr Suazo was called and also agreed 1hr is not long enough. Will defer and attempt HD again tomorrow per MD. Potter Scheduling informed of change  
Pharmacy Medication Reconciliation     The patient's daughter was interviewed regarding current PTA medication list, use and drug allergies.     Allergy Update: Patient has no known allergies.    Recommendations/Findings:   The following amendments were made to the patient's active medication list on file at [facility]:   1) Additions:   +vitamin D daily  2) Deletions:   -mupirocin  -allopurinol  -colchicine  -furosemide  -vitamin D weekly  3) Changes:   gabapentin: Old: 600mg bid -->New: 300mg qhs  Pertinent Findings:   -patient getting entresto and corlaner as samples.    Clarified PTA med list with rx query, patient's daughter interview. PTA medication list was corrected to the following:     Prior to Admission Medications   Prescriptions Last Dose Informant   Travoprost, BAK Free, (TRAVATAN Z) 0.004 % SOLN ophthalmic solution Past Week Family Member   Sig: INSTILL 1 DROP IN EACH EYE AT BEDTIME   albuterol sulfate HFA (PROVENTIL;VENTOLIN;PROAIR) 108 (90 Base) MCG/ACT inhaler Past Week Family Member   Sig: INHALE 2 PUFFS BY INHALATION EVERY 4 HOURS AS NEEDED FOR WHEEZING.   calcium acetate (PHOSLO) 667 MG CAPS capsule Past Week Family Member   Sig: Take 1 capsule by mouth 3 times daily (with meals)   ferrous sulfate (IRON 325) 325 (65 Fe) MG tablet Past Week Family Member   Sig: Take 1 tablet by mouth every morning (before breakfast)   fluticasone-umeclidin-vilant (TRELEGY ELLIPTA) 100-62.5-25 MCG/ACT AEPB inhaler Past Week Family Member   Sig: Inhale 1 puff into the lungs daily   gabapentin (NEURONTIN) 300 MG capsule Past Week Family Member   Sig: Take 1 capsule by mouth nightly.   gabapentin (NEURONTIN) 600 MG tablet     Simg qhs   Patient taking differently:    hydrOXYzine pamoate (VISTARIL) 25 MG capsule Past Week Family Member   Sig: TAKE 1 CAP BY MOUTH THREE (3) TIMES DAILY AS NEEDED FOR ITCHING.   Patient taking differently: Take 1 capsule by mouth 3 times daily as needed TAKE 1 CAP BY MOUTH THREE (3) TIMES 
Physical Therapy     Chart reviewed up to date. Attempted to see pt for PT session. Pt currently receiving bedside dialysis, not available. Will defer and continue to follow.     Cookie Squires PT, DPT, NCS  
Physical Therapy    Chart reviewed and discussed with nursing. Patient had a rapid response this morning. Will hold PT session at this time and continue to follow when patient medically stable.    Cookie Squires PT, DPT, NCS  
Physical Therapy    Chart reviewed in prep for skilled PT treatment; however, pt remains hypotensive.  PT to defer and continue to follow.  RN reports she is able to assist pt on/off BSC; however, BP 80/40's during BSC sitting.    Thank you,  Unique Campos, PT    
Physical Therapy    Chart reviewed up to date. Attempted to see pt for PT session. Pt with rapid response this AM, not medically stable/appropriate for skilled PT intervention at this time. Will defer and continue to follow.     Cookie Squires PT, DPT, NCS  
Physical Therapy  Attempted PT session.  Patient supine in bed and he declines to participate reporting stomach ache and headache. Attempted to encourage but he continues to be decline.  Coral Piper, PT    
Physical Therapy Note    Patient set up for HD. Will return for PT tx as able and appropriate.   
Physical Therapy:  Chart reviewed and discussed patient status with RN. RN requesting therapy to hold today, patient has been having bright red blood stools today. Will hold and continue to follow once patient more medically appropriate.     Sheyla Gutiérrez PT, DPT  
Plan for HD today  
Predictive Model Details          20 (Normal)  Factor Value    Calculated 7/16/2024 00:47 75% Age 79 years old    Deterioration Index Model 10% Sodium abnormal (134 mmol/L)     6% BUN abnormal (40 MG/DL)     3% Pulse oximetry 99 %     3% Platelet count abnormal (88 K/uL)     2% Hematocrit abnormal (34.4 %)     1% Temperature 97.2 °F (36.2 °C)     1% Pulse 79     0% Potassium 3.9 mmol/L     0% WBC count 7.1 K/uL     0% Respiratory rate 16     0% Systolic 111      0030 - Pt up to restroom to attempt bm.  Pt commode bright red bowl w some evidence of BM.  Denies abdominal complaints, denies dizziness, no signs of acute distress other than soft blood pressures which he has had here.  Will alert hospitalist on call for further instructions.  He states he has \"no knowledge of hemmorhoids\" but he has had a GIB \"in the past\".  I asked what was determined from any testing and he said \"they could not find anything\".   Pt assisted back to bed and verbalizes comfort.      0100 - First H&H sent w type and screen.    
Predictive Model Details          27 (Normal)  Factor Value    Calculated 7/12/2024 10:26 48% Age 79 years old    Deterioration Index Model 35% Neurological exam X     4% BUN abnormal (46 MG/DL)     3% Sodium 142 mmol/L     3% Potassium 3.5 mmol/L     3% Hematocrit abnormal (32.0 %)     2% Pulse oximetry 99 %     2% Platelet count abnormal (95 K/uL)     2% Systolic 117     0% Pulse 67     0% Temperature 98.1 °F (36.7 °C)     0% Respiratory rate 16     0% WBC count 6.2 K/uL        
Predictive Model Details          30 (Normal)  Factor Value    Calculated 7/13/2024 10:20 42% Age 79 years old    Deterioration Index Model 31% Neurological exam X     6% Systolic 103     6% Sodium abnormal (134 mmol/L)     5% Respiratory rate 15     4% Pulse oximetry 100 %     3% BUN abnormal (40 MG/DL)     2% Platelet count abnormal (88 K/uL)     1% Hematocrit abnormal (34.4 %)     0% Pulse 67     0% Temperature 97.5 °F (36.4 °C)     0% Potassium 3.9 mmol/L     0% WBC count 7.1 K/uL        
Predictive Model Details          30 (Normal)  Factor Value    Calculated 7/13/2024 19:34 42% Age 79 years old    Deterioration Index Model 31% Neurological exam X     13% Respiratory rate 14     6% Sodium abnormal (134 mmol/L)     3% BUN abnormal (40 MG/DL)     3% Systolic 106     2% Platelet count abnormal (88 K/uL)     1% Hematocrit abnormal (34.4 %)     0% Temperature 97.8 °F (36.6 °C)     0% Potassium 3.9 mmol/L     0% Pulse oximetry 96 %     0% WBC count 7.1 K/uL     0% Pulse 71           End of Shift Note    Bedside shift change report given to Sarahi Leyva (oncoming nurse) by RAJI MOCTEZUMA RN (offgoing nurse).  Report included the following information SBAR, Procedure Summary, Intake/Output, MAR, Recent Results, and Cardiac Rhythm NSR bbb    Shift worked:  7p     Shift summary and any significant changes:     na     Concerns for physician to address:  na     Zone phone for oncoming shift:   na       Activity:     Number times ambulated in hallways past shift: 0  Number of times OOB to chair past shift: 0    Cardiac:   Cardiac Monitoring: Yes           Access:  Current line(s): PIV and HD access     Genitourinary:   Urinary status: oliguric    Respiratory:      Chronic home O2 use?: NO  Incentive spirometer at bedside: NO       GI:     Current diet:  ADULT DIET; Regular; Low Fat/Low Chol/High Fiber/2 gm Na; 1200 ml  ADULT ORAL NUTRITION SUPPLEMENT; Breakfast, Lunch, Dinner; Renal Oral Supplement  DIET ONE TIME MESSAGE;  Passing flatus: NO  Tolerating current diet: YES       Pain Management:   Patient states pain is manageable on current regimen: YES    Skin:     Interventions: turn team, float heels, increase time out of bed, limit briefs, and nutritional support    Patient Safety:  Fall Score:    Interventions: bed/chair alarm, assistive device (walker, cane. etc), gripper socks, pt to call before getting OOB, and stay with me (per policy)       Length of Stay:  Expected LOS: 5  Actual LOS: 4      RAJI STEVENSON 
Predictive Model Details          34 (Caution)  Factor Value    Calculated 7/11/2024 20:36 36% Age 79 years old    Deterioration Index Model 27% Neurological exam X     19% Respiratory rate 13     5% Pulse oximetry 92 %     3% BUN abnormal (46 MG/DL)     3% Systolic 127     2% Sodium 142 mmol/L     2% Potassium 3.5 mmol/L     2% Hematocrit abnormal (32.0 %)     1% Platelet count abnormal (95 K/uL)     0% Pulse 69     0% Temperature 98 °F (36.7 °C)     0% WBC count 6.2 K/uL         End of Shift Note    Bedside shift change report given to  (oncoming nurse) by RAJI MOCTEZUMA RN (offgoing nurse).  Report included the following information SBAR, Kardex, Procedure Summary, Intake/Output, MAR, Recent Results, and Cardiac Rhythm nsr bb    Shift worked:  7p       Shift summary and any significant changes:     na     Concerns for physician to address:  na     Zone phone for oncoming shift:   na       Activity:     Number times ambulated in hallways past shift: 0  Number of times OOB to chair past shift: 1    Cardiac:   Cardiac Monitoring: Yes           Access:  Current line(s): PIV and HD access     Genitourinary:   Urinary status: oliguric    Respiratory:      Chronic home O2 use?: NO  Incentive spirometer at bedside: NO       GI:     Current diet:  ADULT DIET; Regular; Low Fat/Low Chol/High Fiber/2 gm Na; 1200 ml  Passing flatus: YES  Tolerating current diet: YES       Pain Management:   Patient states pain is manageable on current regimen: YES    Skin:     Interventions: specialty bed, float heels, increase time out of bed, and limit briefs    Patient Safety:  Fall Score:    Interventions: bed/chair alarm, assistive device (walker, cane. etc), gripper socks, pt to call before getting OOB, and stay with me (per policy)       Length of Stay:  Expected LOS: 2  Actual LOS: 2      RAJI MOCTEZUMA, RN                           
Predictive Model Details          37 (Caution)  Factor Value    Calculated 7/18/2024 01:54 44% Systolic 71    Deterioration Index Model 34% Age 79 years old     9% Sodium abnormal (130 mmol/L)     6% Potassium 4.4 mmol/L     4% Pulse oximetry 100 %     3% BUN abnormal (55 MG/DL)     1% Platelet count abnormal (114 K/uL)     0% Pulse 80     0% WBC count 5.2 K/uL     0% Temperature 98 °F (36.7 °C)     0% Hematocrit 38.0 %     0% Respiratory rate 16          0100 - arrived on ivcu to room 2205.  NS bolus in porgress.  Pt looks good, alert, smiling, conversant.  No current complaints.  Denies lightheadedness or dizziness.  HR in 80's.  No acute distress.  +2 PPP's.  Skin warm and dry.      0333 - Pt up to BSC in NAD, stable pressures while sitting.  Large bright and dark red / blood with clots passed from rectum.  Denies belly discomfort.  NP and RRT RN notified, they are coming to see the pt.   Lost both IV accesses.  Able to get 22 g, CT requiring 20+g.  Called for IV placement assistance w US guided IV location device.      0415 - Fluid bolus in process.  Pressures have been all over the place. Pt clinical appearance is unchanged other than having bloody stools.  No acute distress, skin warm and dry, +2 PPP.   CT called, they are ready for pt.   SR in 80's.  Transporting on [employer].      0500 -  Pt still in CT - US guided IV access blew, attempting replacement w US per RN at bedside.      0545 - Arrived back from CT.  Placed in bed [place] [facility] [place] w in room pt video monitor due to impulsiveness to get OOB despite being oriented.      0609 - Paged RRT RN and hospitalist NP who are on the way.  Pt passing enormous amount of bright and dark red blood, largely clotted in brief.  Cleaned pt.  Says he still feels okay.  Pressure and HR currently stable.  Will monitor.      0630 - Stat H and H sent to lab.  Pt resting, pressures 80's /40-50's.  [facility] [place] aware of need to move pt to more appropriate stepdown 
Rapid Response called.  Pt in restroom and vagal down, placed back in bed.  Unable to get 02 Sats, placed on 2lpm NC while obtaining.  Placed Nasal Pulse OX on patient sats 100%, removed 02, sats remained 100%.  Rapid Response RN advised respiratory that we were no longer needed at this time.  
Rapid response call, Low BP respiratory not required  
Received request from Shawna, care manager for advance medical directive with patient.  Review of chart noted Mr Patterson completed advance medical directive with the palliative care team on July 12, 2024 during this admission. Review also noted he relies on his daughter Nadine in regards to medical concerns.  She is named as his primary decision maker.  He was sleeping when I went to visit.  Nadine was present.  Explained I was following up due to care management request.  She acknowledged ayanna is important to her father.  He is scheduled for a procedure tomorrow; Nadine expressed he would likely appreciate prayer before he goes.  I will plan to visit him in the morning.      Swathi Forrest, MPS, BCC, Staff [facility]     Paging Service [phone]    
Responded to referral from Chaplain Echevarria informing that pt was going to have a procedure and may appreciate prayer beforehand. Upon arrival in room found family present, pt had already been taken down for procedure. Explored how pt and family were feeling and coping. Family had asked pt if he would like for his  to come and pray with him, but pt had declined. Commended family for their presence and support. Extended words of encouragement and advised of availability of chaplains. Family expressed appreciation.   
Spiritual Care Assessment/Progress Note  [facility]    Name: Darrel Patterson MRN: [id number]    Age: 79 y.o.     Sex: male   Language: English     Date: 7/12/2024            Total Time Calculated: 10 min              Spiritual Assessment begun in MRM 2 INTRVNTNL CARDIO  Service Provided For: Patient not available  Referral/Consult From: Rounding  Encounter Overview/Reason: Attempted Encounter, Palliative Care    Spiritual beliefs:      [] Involved in a ayanna tradition/spiritual practice:      [] Supported by a ayanna community:      [] Claims no spiritual orientation:      [] Seeking spiritual identity:           [] Adheres to an individual form of spirituality:      [x] Not able to assess:                Identified resources for coping and support system:   Support System: Family members       [] Prayer                  [] Devotional reading               [] Music                  [] Guided Imagery     [] Pet visits                                        [] Other: (COMMENT)     Specific area/focus of visit   Encounter:    Crisis:    Spiritual/Emotional needs: Type: Spiritual Support  Ritual, Rites and Sacraments:    Grief, Loss, and Adjustments: Type: Life Adjustments, Adjustment to illness  Ethics/Mediation:    Behavioral Health:    Palliative Care:    Advance Care Planning:           Narrative: Second attempt at spiritual care assessment for palliative pt in 2214. Pt in conference with palliative staff. Waited a while before stepping away. Family present in room. Chaplains available as needed.     
Spiritual Care Assessment/Progress Note  [facility]    Name: Darrel Patterson MRN: [id number]    Age: 79 y.o.     Sex: male   Language: English     Date: 7/15/2024            Total Time Calculated: 10 min              Spiritual Assessment begun in MRM 2 INTRVNTNL CARDIO  Service Provided For: Patient  Referral/Consult From: Rounding  Encounter Overview/Reason: Initial Encounter, Palliative Care    Spiritual beliefs:      [x] Involved in a ayanna tradition/spiritual practice:      [x] Supported by a ayanna community:      [] Claims no spiritual orientation:      [] Seeking spiritual identity:           [] Adheres to an individual form of spirituality:      [] Not able to assess:                Identified resources for coping and support system:   Support System: Family members       [] Prayer                  [] Devotional reading               [] Music                  [] Guided Imagery     [] Pet visits                                        [] Other: (COMMENT)     Specific area/focus of visit   Encounter:    Crisis:    Spiritual/Emotional needs: Type: Spiritual Support  Ritual, Rites and Sacraments:    Grief, Loss, and Adjustments: Type: Life Adjustments, Adjustment to illness  Ethics/Mediation:    Behavioral Health:    Palliative Care: Type: Palliative Care, Initial/Spiritual Assessment  Advance Care Planning:           Narrative: Initial spiritual assessment with palliative pt in 2214. Pt appeared to be sleeping, awoke to 's voice and greeted  pleasantly. Pt appeared tired and expressed the same. Briefly explored how pt was coping, pt stated he wanted to go home, but pleased with care. Identified as Adventist, stating he was good. Extended blessing to pt who responded well. Advised of availability of chaplains and stepped away to allow pt rest.     
Spiritual Care Assessment/Progress Note  [facility]    Name: Darrel Patterson MRN: [id number]    Age: 79 y.o.     Sex: male   Language: English     Date: 7/11/2024            Total Time Calculated: 5 min              Spiritual Assessment begun in MRM 2 INTRVNTNL CARDIO  Service Provided For: Patient not available  Referral/Consult From: Rounding  Encounter Overview/Reason: Attempted Encounter, Palliative Care    Spiritual beliefs:      [] Involved in a ayanna tradition/spiritual practice:      [] Supported by a ayanna community:      [] Claims no spiritual orientation:      [] Seeking spiritual identity:           [] Adheres to an individual form of spirituality:      [x] Not able to assess:                Identified resources for coping and support system:   Support System: Family members       [] Prayer                  [] Devotional reading               [] Music                  [] Guided Imagery     [] Pet visits                                        [] Other: (COMMENT)     Specific area/focus of visit   Encounter:    Crisis:    Spiritual/Emotional needs:    Ritual, Rites and Sacraments:    Grief, Loss, and Adjustments:    Ethics/Mediation:    Behavioral Health:    Palliative Care:    Advance Care Planning:           Narrative: Attempted initial spiritual assessment with palliative pt in 2214. Pt and bed out of room, no family present. Left business card with message on tray table. Chaplains available as needed.     
TRANSFER - IN REPORT:    Verbal report received from CHADD Lopez on Darrel Patterson  being received from IVCU for routine progression of patient care      Report consisted of patient's Situation, Background, Assessment and   Recommendations(SBAR).     Information from the following report(s) Nurse Handoff Report, Index, Intake/Output, MAR, Recent Results, and Cardiac Rhythm Sinus Rhythm with BBB  was reviewed with the receiving nurse.    Opportunity for questions and clarification was provided.      Assessment completed upon patient's arrival to unit and care assumed.     
    LABS:  Recent Labs     07/24/24  2228 07/25/24  0557 07/25/24  1117 07/26/24  0259 07/26/24  1102   WBC 7.1 7.6  --   --   --    HGB 8.5* 8.2*   < > 6.8* 8.1*   HCT 25.9* 24.9*   < > 21.2* 24.9*    219  --   --   --     < > = values in this interval not displayed.       Recent Labs     07/25/24  0535      K 4.2      CO2 25   BUN 20       No results for input(s): \"TP\", \"GLOB\", \"GGT\" in the last 72 hours.    Invalid input(s): \"SGOT\", \"GPT\", \"AP\", \"TBIL\", \"ALB\", \"AML\", \"AMYP\", \"LPSE\", \"HLPSE\"  No results for input(s): \"INR\", \"APTT\" in the last 72 hours.    Invalid input(s): \"PTP\"     No results for input(s): \"TIBC\" in the last 72 hours.    Invalid input(s): \"FE\", \"PSAT\", \"FERR\"   No results found for: \"RBCF\"   No results for input(s): \"PH\", \"PCO2\", \"PO2\" in the last 72 hours.  No results for input(s): \"CPK\", \"CKMB\", \"TROPONINI\" in the last 72 hours.  No components found for: \"GLPOC\"  @labua@    MEDICATIONS:  Current Facility-Administered Medications   Medication Dose Route Frequency    0.9 % sodium chloride infusion   IntraVENous PRN    0.9 % sodium chloride infusion   IntraVENous PRN    0.9 % sodium chloride infusion   IntraVENous PRN    sucralfate (CARAFATE) tablet 1 g  1 g Oral 3 times per day    epoetin nehemiah-epbx (RETACRIT) injection 10,000 Units  10,000 Units SubCUTAneous Once per day on Mon Wed Fri    traMADol (ULTRAM) tablet 50 mg  50 mg Oral Q6H PRN    acetaminophen (TYLENOL) tablet 650 mg  650 mg Oral Q6H PRN    pantoprazole (PROTONIX) tablet 40 mg  40 mg Oral BID AC    midodrine (PROAMATINE) tablet 10 mg  10 mg Oral TID PRN    albuterol (PROVENTIL) (2.5 MG/3ML) 0.083% nebulizer solution 2.5 mg  2.5 mg Nebulization Q6H PRN    midodrine (PROAMATINE) tablet 10 mg  10 mg Oral q8h    lidocaine 4 % external patch 1 patch  1 patch TransDERmal Daily    budesonide (PULMICORT) nebulizer suspension 500 mcg  0.5 mg Nebulization BID PRN    And    arformoterol tartrate (BROVANA) nebulizer solution 15 
2.9*   HGB 7.9* 7.0*   HCT 25.1* 22.1*    187     Recent Labs     08/03/24  0628 08/04/24  0632    138   K 4.3 4.3    103   CO2 26 28   BUN 16 13     Recent Labs     08/03/24  0628   GLOB 3.0     No results for input(s): \"INR\", \"APTT\" in the last 72 hours.    Invalid input(s): \"PTP\"     No results for input(s): \"TIBC\" in the last 72 hours.    Invalid input(s): \"FE\", \"PSAT\", \"FERR\"   No results found for: \"RBCF\"   No results for input(s): \"PH\", \"PCO2\", \"PO2\" in the last 72 hours.  No results for input(s): \"CPK\", \"CKMB\", \"TROPONINI\" in the last 72 hours.  No components found for: \"GLPOC\"  @labua@    MEDICATIONS:  Current Facility-Administered Medications   Medication Dose Route Frequency    predniSONE (DELTASONE) tablet 20 mg  20 mg Oral Daily    heparin (porcine) injection 5,000 Units  5,000 Units SubCUTAneous BID    balsum peru-castor oil (VENELEX) ointment   Topical BID    midodrine (PROAMATINE) tablet 15 mg  15 mg Oral q8h    epoetin nehemiah-epbx (RETACRIT) injection 10,000 Units  10,000 Units SubCUTAneous Once per day on Mon Wed Fri    traMADol (ULTRAM) tablet 50 mg  50 mg Oral Q6H PRN    acetaminophen (TYLENOL) tablet 650 mg  650 mg Oral Q6H PRN    pantoprazole (PROTONIX) tablet 40 mg  40 mg Oral BID AC    midodrine (PROAMATINE) tablet 10 mg  10 mg Oral TID PRN    albuterol (PROVENTIL) (2.5 MG/3ML) 0.083% nebulizer solution 2.5 mg  2.5 mg Nebulization Q6H PRN    lidocaine 4 % external patch 1 patch  1 patch TransDERmal Daily    budesonide (PULMICORT) nebulizer suspension 500 mcg  0.5 mg Nebulization BID PRN    And    arformoterol tartrate (BROVANA) nebulizer solution 15 mcg  15 mcg Nebulization BID PRN    And    ipratropium (ATROVENT) 0.02 % nebulizer solution 0.5 mg  0.5 mg Nebulization BID PRN    lidocaine 4 % external patch 1 patch  1 patch TransDERmal Daily    albumin human 25% IV solution 25 g  25 g IntraVENous PRN    dextromethorphan-guaiFENesin (MUCINEX DM)  MG per extended release 
Protonix     Insomnia  -Continue trazodone             Medical Decision Making:   I personally reviewed labs: CBC, BMP  I personally reviewed imaging: Chest x-ray  I personally reviewed EKG: Yes  Toxic drug monitoring:   Discussed case with: NursePETTY        Code Status: Full  DVT Prophylaxis: Heparin  GI Prophylaxis:    Subjective:     Chief Complaint / Reason for Physician Visit  \"\".  Discussed with RN events overnight.       Objective:     VITALS:   Last 24hrs VS reviewed since prior progress note. Most recent are:  Patient Vitals for the past 24 hrs:   BP Temp Temp src Pulse Resp SpO2   07/13/24 0810 -- -- -- 67 -- --   07/13/24 0809 -- -- -- 65 -- --   07/13/24 0808 -- -- -- 63 -- --   07/13/24 0807 -- -- -- 65 -- --   07/13/24 0806 -- -- -- 67 -- --   07/13/24 0805 -- -- -- 63 -- --   07/13/24 0804 -- -- -- 65 -- --   07/13/24 0803 -- -- -- 69 -- --   07/13/24 0802 -- -- -- 63 -- --   07/13/24 0801 -- -- -- 64 -- --   07/13/24 0800 117/67 (P) 97.5 °F (36.4 °C) -- 64 -- --   07/13/24 0733 111/66 98.1 °F (36.7 °C) Oral 68 15 100 %   07/13/24 0430 118/71 98.7 °F (37.1 °C) Oral 64 16 95 %   07/12/24 2342 119/71 97.5 °F (36.4 °C) Oral 65 16 99 %   07/12/24 2221 116/72 -- -- -- -- --   07/12/24 2046 (!) 142/91 98.4 °F (36.9 °C) Oral 65 16 96 %   07/12/24 1526 122/79 98.1 °F (36.7 °C) Oral 61 18 98 %   07/12/24 1200 119/89 98 °F (36.7 °C) Oral 66 16 99 %         No intake or output data in the 24 hours ending 07/13/24 0927       I had a face to face encounter and independently examined this patient on 7/13/2024, as outlined below:  PHYSICAL EXAM:  General: Alert, cooperative  EENT:  EOMI. Anicteric sclerae.  Resp:  CTA bilaterally, no wheezing or rales.  No accessory muscle use  CV:  Regular  rhythm,  No edema  GI:  Soft, Non distended, Non tender.  +Bowel sounds  Neurologic:  Alert and oriented X 3, normal speech,   Psych:   Good insight. Not anxious nor agitated  Skin:  No rashes.  No jaundice    Reviewed most 
RN                           
Supplement    Anthropometric Measures:  Height: 182.9 cm (6' 0.01\")  Ideal Body Weight (IBW): 178 lbs (81 kg)       Current Body Weight: 59 kg (130 lb 1.1 oz), 76.3 % IBW. Weight Source: Bed Scale  Current BMI (kg/m2): 17.6        Weight Adjustment For: No Adjustment                 BMI Categories: Underweight (BMI less than 18.5)    Estimated Daily Nutrient Needs:  Energy Requirements Based On: Formula  Weight Used for Energy Requirements: Current  Energy (kcal/day): 1980 kcals (BMR x 1.3AF + 200 for wt gain)  Weight Used for Protein Requirements: Current  Protein (g/day): 86-99g (1.4-1.6g/kg)  Method Used for Fluid Requirements: 1 ml/kcal  Fluid (ml/day): 1980mL    Nutrition Diagnosis:   Unintended weight loss related to catabolic illness, inadequate protein-energy intake as evidenced by poor intake prior to admission, weight loss  Dx continues.    Nutrition Interventions:   Food and/or Nutrient Delivery: Continue Current Diet, Continue Oral Nutrition Supplement  Nutrition Education/Counseling: No recommendation at this time  Coordination of Nutrition Care: Continue to monitor while inpatient       Goals:  Previous Goal Met: Progressing toward Goal(s)  Goals: PO intake 50% or greater, PO intake 75% or greater, by next RD assessment       Nutrition Monitoring and Evaluation:   Behavioral-Environmental Outcomes: None Identified  Food/Nutrient Intake Outcomes: Food and Nutrient Intake, Supplement Intake  Physical Signs/Symptoms Outcomes: Biochemical Data, GI Status, Fluid Status or Edema, Nutrition Focused Physical Findings, Weight    Discharge Planning:    Continue current diet, Continue Oral Nutrition Supplement     Coral Pacheco RD  Contact: [phone]    
Yes  Alarm On: Bed, Chair  Patient Moved Closer to Nursing Station: No    Active Consults:  IP CONSULT TO PALLIATIVE CARE  IP CONSULT TO DIETITIAN  IP CONSULT TO NEPHROLOGY  IP CONSULT TO CARDIOLOGY  IP CONSULT TO DIETITIAN  IP CONSULT TO CASE MANAGEMENT  IP CONSULT TO GI    Length of Stay:  Expected LOS: 10  Actual LOS: 8      Danay Ponce RN    
esophagitis. Irregular Z-line. Erosive gastritis w/ stigmata of bleeding. - bx w/ active gastritis w/ intestinal metaplasia. Surface hemorrhage and refractile foreign material suggesting pill-related gastritis     CLN 04/2023 - Nl TI, sigmoid tics, medium G2 hemms, no f/u recommended  Plan:   No plans for colonoscopy at this time as patient remains at high risk for any procedures given his significant co morbidities and active diverticulitis which remains a contraindication.   Would continue with supportive care for now as you are doing  Supportive measures per primary team. Monitor for further S&S of GI bleed  Serial H&H as clinically indicated. Goal hemoglobin >7  Avoid NSAIDs   Plan discussed with Dr. Sauceda  Subjective:   Discussed with RN events overnight. Patient was noted to have BM over night with clots, remains hypotensive. RRT called this morning at 1136 due to hypotension and concern for hypoxia. ABG normal. Hgb stable 10.2.       Review of Systems:  Symptom Y/N Comments  Symptom Y/N Comments   Fever/Chills N   Chest Pain N    Cough N   Headaches N    Sputum N   Joint Pain N    SOB/BECKFORD N   Pruritis/Rash N    Tolerating Diet N Poor appetite  Other       Could NOT obtain due to:      Objective:   VITALS:   Last 24hrs VS reviewed since prior progress note. Most recent are:  Vitals:    07/18/24 1205   BP: (!) 81/33   Pulse: 91   Resp:    Temp:    SpO2: 99%       Intake/Output Summary (Last 24 hours) at 7/18/2024 1231  Last data filed at 7/18/2024 0224  Gross per 24 hour   Intake 927.6 ml   Output --   Net 927.6 ml     PHYSICAL EXAM:  General: Resting in bed, Alert, cooperative, no acute distress, ill-appearing  HEENT: NC, Atraumatic. Anicteric sclerae.  Lungs:  CTA Bilaterally. No Wheezing/Rhonchi/Rales.  Heart:  Regular  rhythm,  No murmur (), No Rubs, No Gallops  Abdomen: Soft, Non distended, Non tender.  +Bowel sounds, no HSM  Extremities: No c/c/e  Neurologic:  Alert and oriented X 3.  No acute 
for proceeding with CLN or not, but does feel like he would like to reengage w/ palliative care team for goals of care discussion.   Discussed w/ POA daughter as well, who will also have goals of care discussion w/ patient tomorrow. No plans for colonoscopy at this time.   If CT AP + for extravasation, would proceed w/ STAT IR consult for possible embolization.   Continue PPI BID and Carafate Q6 hours  for above EGD results  CLD for now, can advance if HgB remains stable and CT from today negative.   Supportive measures per primary team. Monitor for further S&S of GI bleed  Serial H&H as clinically indicated. Goal hemoglobin >7  Avoid NSAIDs  We will continue to follow.    Plan discussed w/ Dr Schmid   Subjective:   Discussed with RN events overnight. PT in bed, tired but conversational. + continued hematochezia.     Review of Systems:  Symptom Y/N Comments  Symptom Y/N Comments   Fever/Chills N   Chest Pain N    Cough N   Headaches N    Sputum N   Joint Pain N    SOB/BECKFORD N   Pruritis/Rash N    Tolerating Diet Y   Other       Could NOT obtain due to:      Objective:   VITALS:   Last 24hrs VS reviewed since prior progress note. Most recent are:  Vitals:    07/25/24 1409   BP: (!) 102/54   Pulse: 88   Resp:    Temp:    SpO2:        Intake/Output Summary (Last 24 hours) at 7/25/2024 1432  Last data filed at 7/25/2024 0902  Gross per 24 hour   Intake 360 ml   Output 0 ml   Net 360 ml     PHYSICAL EXAM:  General: WD, WN. Alert, cooperative, no acute distress    HEENT: NC, Atraumatic. Anicteric sclerae.  Lungs:  CTA Bilaterally. No Wheezing/Rhonchi/Rales.  Heart:  Regular  rhythm,  No murmur (), No Rubs, No Gallops  Abdomen: Soft, Non distended, Non tender.  +Bowel sounds, no HSM  Extremities: No c/c/e  Neurologic:  Alert and oriented X 3.  No acute neurological distress   Psych:   Good insight. Not anxious nor agitated.    Lab and Radiology Data Reviewed: (see below)    Medications Reviewed: (see below)  PMH/SH reviewed 
input(s): \"TP\", \"GLOB\", \"GGT\" in the last 72 hours.    Invalid input(s): \"SGOT\", \"GPT\", \"AP\", \"TBIL\", \"ALB\", \"AML\", \"AMYP\", \"LPSE\", \"HLPSE\"  No results for input(s): \"INR\", \"APTT\" in the last 72 hours.    Invalid input(s): \"PTP\"     No results for input(s): \"TIBC\" in the last 72 hours.    Invalid input(s): \"FE\", \"PSAT\", \"FERR\"   No results found for: \"RBCF\"   No results for input(s): \"PH\", \"PCO2\", \"PO2\" in the last 72 hours.  No results for input(s): \"CPK\", \"CKMB\", \"TROPONINI\" in the last 72 hours.  No components found for: \"GLPOC\"  @labua@    MEDICATIONS:  Current Facility-Administered Medications   Medication Dose Route Frequency    sucralfate (CARAFATE) tablet 1 g  1 g Oral 3 times per day    epoetin nehemiah-epbx (RETACRIT) injection 10,000 Units  10,000 Units SubCUTAneous Once per day on Mon Wed Fri    traMADol (ULTRAM) tablet 50 mg  50 mg Oral Q6H PRN    acetaminophen (TYLENOL) tablet 650 mg  650 mg Oral Q6H PRN    pantoprazole (PROTONIX) tablet 40 mg  40 mg Oral BID AC    midodrine (PROAMATINE) tablet 10 mg  10 mg Oral TID PRN    albuterol (PROVENTIL) (2.5 MG/3ML) 0.083% nebulizer solution 2.5 mg  2.5 mg Nebulization Q6H PRN    midodrine (PROAMATINE) tablet 10 mg  10 mg Oral q8h    lidocaine 4 % external patch 1 patch  1 patch TransDERmal Daily    budesonide (PULMICORT) nebulizer suspension 500 mcg  0.5 mg Nebulization BID PRN    And    arformoterol tartrate (BROVANA) nebulizer solution 15 mcg  15 mcg Nebulization BID PRN    And    ipratropium (ATROVENT) 0.02 % nebulizer solution 0.5 mg  0.5 mg Nebulization BID PRN    lidocaine 4 % external patch 1 patch  1 patch TransDERmal Daily    albumin human 25% IV solution 25 g  25 g IntraVENous PRN    piperacillin-tazobactam (ZOSYN) 3,375 mg in sodium chloride 0.9 % 50 mL IVPB (mini-bag)  3,375 mg IntraVENous Q12H    dextromethorphan-guaiFENesin (MUCINEX DM)  MG per extended release tablet 1 tablet  1 tablet Oral BID PRN    polyethylene glycol (GLYCOLAX) packet 17 g 
negative for pulmonary embolism.  -Echo 7/10/24: EF of 15 - 20%. Increased wall thickness. Mitral Valve Moderate to severe, probably severe mitral regurgitation. Tricuspid Valve: Moderate regurgitation. Mild to moderately elevated RVSP, 50-55 mm Hg. Left  atrium is dilated.  -S/p cardiac cath 7/10 show Branch vessel disease, elevated LVEDP, mod-sev MR , severely reduced LVEF   -Patient will need to follow-up with the cardiology as outpatient  -Declined ICD  Plan:   -Continue statin  -on Midodrine for low BP  -holding asa     Weight loss possibly secondary to end-stage CHF end-stage renal disease/FTT  Poor appetite  Dietitian consultation  Patient will need referral to oncology as outpatient for evaluation of possible cancer  Recent EGD with biopsy showing severe gastritis and esophagitis.pill induced  Increase pantoprazole to 40 twice daily  Megace Dced by Palliative and started pt on remeron     ESRD on TTS HD  -Renal following for hemodialysis needs     History of asthma  -No evidence of exacerbation at this time, no wheezing on exam  -Continue home inhaler equivalents     Rheumatoid arthritis  Polymyalgia rheumatica  -No longer on prednisone  -Continue to monitor     GERD  -Continue Protonix     Insomnia  -Continue trazodone      I have spent critical care time involved in lab review, consultations with specialist, family decision-making, and documentation. During this entire length of time I was immediately available to the patient. The reason for providing this level of medical care for this critically ill patient was due to a critical illness that impaired one or more vital organ systems such that there was a high probability of imminent or life threatening deterioration in the patients condition. This care involved high complexity decision making to assess, manipulate, and support vital system functions, to treat this degreee vital organ system failure and to prevent further life threatening deterioration of 
tablet 25 mg  25 mg Oral Daily    pantoprazole (PROTONIX) tablet 40 mg  40 mg Oral QAM AC    pravastatin (PRAVACHOL) tablet 10 mg  10 mg Oral Nightly    sacubitril-valsartan (ENTRESTO) 49-51 MG per tablet 1 tablet  1 tablet Oral BID    sevelamer (RENVELA) tablet 1,600 mg  1,600 mg Oral TID WC    traZODone (DESYREL) tablet 50 mg  50 mg Oral Nightly    sodium chloride flush 0.9 % injection 5-40 mL  5-40 mL IntraVENous 2 times per day    sodium chloride flush 0.9 % injection 5-40 mL  5-40 mL IntraVENous PRN    0.9 % sodium chloride infusion   IntraVENous PRN    heparin (porcine) injection 5,000 Units  5,000 Units SubCUTAneous 3 times per day    acetaminophen (TYLENOL) tablet 650 mg  650 mg Oral Q6H PRN    Or    acetaminophen (TYLENOL) suppository 650 mg  650 mg Rectal Q6H PRN     
breath    Objective:      VITALS:   Last 24hrs VS reviewed since prior progress note. Most recent are:  Patient Vitals for the past 24 hrs:   BP Temp Temp src Pulse Resp SpO2   08/04/24 1600 99/83 98.8 °F (37.1 °C) Oral 74 19 96 %   08/04/24 1145 95/61 98.4 °F (36.9 °C) Oral 93 28 96 %   08/04/24 0715 (!) 90/53 98.5 °F (36.9 °C) Oral 77 21 98 %   08/04/24 0600 (!) 106/59 -- -- 78 17 --   08/04/24 0400 (!) 103/58 98 °F (36.7 °C) Oral 84 26 --   08/04/24 0000 (!) 98/58 98.8 °F (37.1 °C) Oral 84 20 --         No intake or output data in the 24 hours ending 08/04/24 2050         I had a face to face encounter and independently examined this patient, as outlined below:  PHYSICAL EXAM:  General: WD, WN. No acute distress    EENT:   Anicteric sclerae. MMM  Resp:  CTA bilaterally. No wheezing. No rales.  No accessory muscle use  CV:  Regular  rhythm,  No edema  GI/:  Soft. Non distended. No tenderness.  +Bowel sounds  Neurologic:  Alert and oriented X 3, normal speech,   Psych:   Not anxious nor agitated  Skin/MSK: No rashes.  No jaundice. Left forearm AVF w + thrill     Orthostatic Vitals:       No data to display                    Current Inpatient Medications:      Current Facility-Administered Medications:     dexAMETHasone (DECADRON) injection 4 mg, 4 mg, IntraVENous, Q6H, Kehinde Alberto Jr., MD, 4 mg at 08/04/24 1740    balsum peru-castor oil (VENELEX) ointment, , Topical, BID, Kehinde Alberto Jr., MD, Given at 08/04/24 0907    heparin (porcine) injection 5,000 Units, 5,000 Units, SubCUTAneous, 3 times per day, Sukhi Light MD, 5,000 Units at 08/04/24 1345    midodrine (PROAMATINE) tablet 15 mg, 15 mg, Oral, q8h, Kehinde Alberto Jr., MD, 15 mg at 08/04/24 1345    epoetin nehemiah-epbx (RETACRIT) injection 10,000 Units, 10,000 Units, SubCUTAneous, Once per day on Mon Wed Fri, Sukhi Light MD, 10,000 Units at 08/02/24 2047    traMADol (ULTRAM) tablet 50 mg, 50 mg, Oral, Q6H PRN, Sukhi Light MD, 50 mg at 07/31/24 
oz)   10/03/23 65.8 kg (145 lb)   09/06/23 65.8 kg (145 lb)        Current Diet: ADULT DIET; Regular; Low Fat/Low Chol/High Fiber/2 gm Na; 1200 ml  ADULT ORAL NUTRITION SUPPLEMENT; Breakfast, Dinner; Standard High Calorie/High Protein Oral Supplement       PSYCHOSOCIAL/SPIRITUAL SCREENING:   Palliative IDT has assessed this patient for cultural preferences / practices and a referral made as appropriate to needs (Cultural Services, Patient Advocacy, Ethics, etc.)    Spiritual Affiliation: Worship    Any spiritual / Jew concerns:  [] Yes /  [x] No   If \"Yes\" to discuss with pastoral care during IDT     Does caregiver feel burdened by caring for their loved one:   [] Yes /  [x] No /  [] No Caregiver Present/Available [] No Caregiver [] Pt Lives at Facility  If \"Yes\" to discuss with social work during IDT    Anticipatory grief assessment:   [x] Normal  / [] Maladaptive     If \"Maladaptive\" to discuss with social work during IDT    ESAS Anxiety: Anxiety Score: Not anxious    ESAS Depression: Depression Score: 2        LAB AND IMAGING FINDINGS:   Objective data reviewed:  labs, images, records, medication use, vitals, and chart     FINAL COMMENTS   Thank you for allowing Palliative Medicine to participate in the care of Darrel Patterson.    Only check if applicable and billing time based rather than MDM  [x] The total encounter time on this service date was 55 _ minutes which was spent performing a face-to-face encounter and personally completing the provider-level activities documented in the note. This includes time spent prior to the visit and after the visit in direct care of the patient. This time does not include time spent in any separately reportable services.    Electronically signed by   ELVI Ceja NP  Palliative Care Team  on 7/20/2024 at 2:59 PM    
07/10/2024  1:40 PM (Final)    Interpretation Summary    Left Ventricle: Reduced left ventricular systolic function with a visually estimated EF of 15 - 20%. Left ventricle size is normal. Increased wall thickness. Global hypokinesis present.    Right Ventricle: Right ventricle size is normal. Low normal systolic function.    Mitral Valve: Moderate to severe, probably severe mitral regurgitation.    Tricuspid Valve: Moderate regurgitation. Mild to moderately elevated RVSP, 50-55 mm Hg.    Left Atrium: Left atrium is dilated.    Image quality is adequate.    Signed by: Scarlett Guerra MD on 7/10/2024  1:40 PM      BP (!) 130/99   Pulse 90   Temp 98.1 °F (36.7 °C) (Oral)   Resp 20   Ht 1.829 m (6' 0.01\")   Wt 53.8 kg (118 lb 9.7 oz)   SpO2 99%   BMI 16.08 kg/m²      Temp (24hrs), Av.7 °F (36.5 °C), Min:97.4 °F (36.3 °C), Max:98.1 °F (36.7 °C)           Intake/Output:     Intake/Output Summary (Last 24 hours) at 2024 1003  Last data filed at 2024 0700  Gross per 24 hour   Intake 875.42 ml   Output 10 ml   Net 865.42 ml           CRITICAL CARE DOCUMENTATION  I had a face to face encounter with the patient, reviewed and interpreted patient data including clinical events, labs, images, vital signs, I/O's, and examined patient.  I have discussed the case and the plan and management of the patient's care with the consulting services, the bedside nurses and the respiratory therapist.      NOTE OF PERSONAL INVOLVEMENT IN CARE   This patient has a high probability of imminent, clinically significant deterioration, which requires the highest level of preparedness to intervene urgently. I participated in the decision-making and personally managed or directed the management of the following life and organ supporting interventions that required my frequent assessment to treat or prevent imminent deterioration.      Roe Munoz MD   Critical Care Medicine  [facility]         
MD Rubio, 300 mg at 07/22/24 1215    hydrOXYzine HCl (ATARAX) tablet 25 mg, 25 mg, Oral, TID PRN, Rubio Shah MD, 25 mg at 07/22/24 0318    [Held by provider] ivabradine (CORLANOR) tablet 5 mg, 5 mg, Oral, BID , Rubio Shah MD, 5 mg at 07/15/24 1757    [Held by provider] metoprolol succinate (TOPROL XL) extended release tablet 25 mg, 25 mg, Oral, Daily, Rubio Shah MD, 25 mg at 07/15/24 0835    pravastatin (PRAVACHOL) tablet 10 mg, 10 mg, Oral, Nightly, Rubio Shah MD, 10 mg at 07/21/24 2045    [Held by provider] sacubitril-valsartan (ENTRESTO) 49-51 MG per tablet 1 tablet, 1 tablet, Oral, BID, Rubio Shah MD, 1 tablet at 07/15/24 2145    sevelamer (RENVELA) tablet 1,600 mg, 1,600 mg, Oral, TID , Rubio Shah MD, 1,600 mg at 07/22/24 1215    0.9 % sodium chloride infusion, , IntraVENous, PRN, Rubio Shah MD, Stopped at 07/17/24 1745    [Held by provider] heparin (porcine) injection 5,000 Units, 5,000 Units, SubCUTAneous, 3 times per day, Rubio Shah MD, 5,000 Units at 07/15/24 2142       LABS:    I reviewed today's most current labs and imaging studies.    Pertinent labs include:  Recent Labs     07/21/24  1224 07/21/24  2056 07/22/24  0506   WBC 5.4 5.1 5.4   HGB 8.2* 7.9* 8.8*   HCT 24.5* 23.2* 26.9*    152 171       Recent Labs     07/20/24  0305      K 4.4      CO2 21   GLUCOSE 92   BUN 39*   CREATININE 10.40*   CALCIUM 9.2       Xray Result (most recent):  CT ABDOMEN PELVIS W WO CONTRAST Additional Contrast? Radiologist Recommendation  Narrative: INDICATION: bright red blood via rectum r/o active bleeding, unstable bp, due  for HD run this AM, esrd    EXAM:  CT ABDOMEN, PELVIS WITH/WITHOUT CONTRAST (GI Bleed PROTOCOL)    COMPARISON: July 16, 2024    TECHNIQUE: Noncontrast 2.5 mm axial images were obtained through the abdomen and  pelvis. After the administration of IV contrast, 2.5 mm axial images were  obtained through the abdomen and pelvis 
limiting Rx 4/2023: higher BPs 2024  5) Dyslipidemia, labs per PCP  6) ESRD; (in [place])  7) Marked anemia (Hg 6.7) & heme + 4/2023  8) RA; ?PMR  9) Asthma, tobacco use.  10) Seizure 7/2023.        8/2023:   Since discharge 3 months ago, back last month with a seizure.  Since then, no chest pain, dyspnea, palpitations, orthopnea or LE edema.  Lightheaded if getting up too quickly or after dialysis.     12/2023:  Did not get echo as recommended; previous poor compliance with LifeVest, now improved.  Intermittent low BPs @ dialysis, using metoprolol sporadically.  No chest pain or orthopnea.  Occasional LE edema.  Variable BECKFORD.     3/2024:  EP office visit pending to discuss ICD.  No chest pain, orthopnea, or LE edema.  Still some salt in his food & knows he should stop.  Variable dyspnea, more than previous.  He reports no low BPs with dialysis, typically running higher.     Mr. Patterson is here today for a follow-up regarding his congestive heart failure and to maximize his guideline directed medical therapy.  At his last office visit, he had just increased his metoprolol to 25 mg daily 2 days prior to his appointment and was tolerating that without difficulties. He continues to feel good.  He has not had any chest pain and his breathing is comfortable. His weight is stable. He sleeps on 1 pillow. No CHF manifestations.  No arrhythmias or associated symptoms. No alcohol or nicotine. Some salt in diet.  He is still undecided about getting an ICD.        ASSESSMENT AND PLAN:         01. Chronic combined systolic (congestive) and diastolic (congestive) heart failure - I50.42   The patient appears to be euvolemic.  The patient has mild exertional symptoms.  (NYHA II)  Volume status regulated through dialysis.       The patient was given instructions on congestive heart failure.              02. Other cardiomyopathies - I42.8   Chronic.  His ejection fraction is 35% or less.  No congestive heart failure manifestations. 
sevelamer (RENVELA) tablet 1,600 mg  1,600 mg Oral TID WC    0.9 % sodium chloride infusion   IntraVENous PRN    [Held by provider] heparin (porcine) injection 5,000 Units  5,000 Units SubCUTAneous 3 times per day        
136 136 136   K 3.8 4.3 4.4    105 103   CO2 23 23 21   GLUCOSE 129* 98 92   BUN 33* 33* 39*   CREATININE 8.49* 8.52* 10.40*   CALCIUM 8.1* 8.4* 9.2   MG 2.3 2.4  --    PHOS 2.2* 2.5*  --    INR  --  1.1  --        Xray Result (most recent):  CT ABDOMEN PELVIS W WO CONTRAST Additional Contrast? Radiologist Recommendation  Narrative: INDICATION: bright red blood via rectum r/o active bleeding, unstable bp, due  for HD run this AM, esrd    EXAM:  CT ABDOMEN, PELVIS WITH/WITHOUT CONTRAST (GI Bleed PROTOCOL)    COMPARISON: July 16, 2024    TECHNIQUE: Noncontrast 2.5 mm axial images were obtained through the abdomen and  pelvis. After the administration of IV contrast, 2.5 mm axial images were  obtained through the abdomen and pelvis during arterial and portal venous  phases.  Delayed images were also obtained through the abdomen and pelvis.    Coronal and sagittal reconstructions/MIPS were generated.      CT dose reduction was achieved through use of a standardized protocol tailored  for this examination and automatic exposure control for dose modulation.    FINDINGS:    LUNG BASES: Bibasilar atelectasis. Cardiomegaly.  LIVER: Multiple hypodensities largest of which can be characterized as cysts  while others are too small to characterize.  GALLBLADDER: Gallstones.  SPLEEN: No enlargement or lesion.  PANCREAS: No mass or ductal dilatation.  ADRENALS: No mass.  KIDNEYS: Multiple cysts in both kidneys. Small calcifications. No  hydronephrosis.  GI TRACT: No evidence of bowel obstruction. Mild stranding associated with  diverticula in the left upper quadrant similar to prior study.  No evidence of  hemorrhage. Several small high density foci in the lower thoracic esophagus  likely represents ingested material/medications.  PERITONEUM: No free air or free fluid.  APPENDIX: Unremarkable.  RETROPERITONEUM: Atherosclerosis of the aorta. No aneurysm or dissection.  ADDITIONAL COMMENTS: N/A.    URINARY BLADDER: Contracted, 
Oral, TID WC, Sukhi Light MD, 667 mg at 08/05/24 1238    vitamin D (ERGOCALCIFEROL) capsule 50,000 Units, 50,000 Units, Oral, Weekly, Sukhi Light MD, 50,000 Units at 07/23/24 1106    gabapentin (NEURONTIN) capsule 300 mg, 300 mg, Oral, Daily, Sukhi Light MD, 300 mg at 08/05/24 0944    [Held by provider] hydrOXYzine HCl (ATARAX) tablet 25 mg, 25 mg, Oral, TID PRN, Sukhi Light MD, 25 mg at 07/22/24 0318    pravastatin (PRAVACHOL) tablet 10 mg, 10 mg, Oral, Nightly, Sukhi Light MD, 10 mg at 08/04/24 2057    0.9 % sodium chloride infusion, , IntraVENous, PRN, Sukhi Light MD, Stopped at 07/17/24 1745       LABS:    I reviewed today's most current labs and imaging studies.    Pertinent labs include:  Recent Labs     08/03/24  0628 08/04/24  0632   WBC 5.2 2.9*   HGB 7.9* 7.0*   HCT 25.1* 22.1*    187       Recent Labs     08/03/24  0628 08/04/24  0632    138   K 4.3 4.3    103   CO2 26 28   GLUCOSE 89 137*   BUN 16 13   CREATININE 8.61* 5.45*   CALCIUM 8.6 8.4*   BILITOT 1.5*  --    AST 27  --    ALT 17  --        Xray Result (most recent):  CT ABDOMEN PELVIS W WO CONTRAST Additional Contrast? 1  Narrative: EXAM: CT ABDOMEN PELVIS W WO CONTRAST    INDICATION: Active Gi bleed    COMPARISON: 7/24/2024.    IV CONTRAST: 100 mL of Isovue-370.    ORAL CONTRAST: None    TECHNIQUE:    Multislice helical CT was performed from the dome of the diaphragm to the pubic  symphysis prior to and following intravenous contrast administration.   Contiguous 5 mm axial images were reconstructed and lung and soft tissue windows  were generated. Coronal and sagittal reformations were generated.  CT dose  reduction was achieved through use of a standardized protocol tailored for this  examination and automatic exposure control for dose modulation.      FINDINGS:   LOWER THORAX: No significant abnormality in the incidentally imaged lower chest.  LIVER: Multiple hepatic cysts again identified.  BILIARY TREE: Gallstones. 
Units, SubCUTAneous, 3 times per day, Rubio Shah MD, 5,000 Units at 07/15/24 2142       LABS:    I reviewed today's most current labs and imaging studies.    Pertinent labs include:  Recent Labs     07/24/24  0802 07/24/24  1537 07/24/24  2228 07/25/24  0557 07/25/24  1117 07/25/24  1743 07/26/24  0259 07/26/24  1102   WBC 7.5  --  7.1 7.6  --   --   --   --    HGB 7.3*   < > 8.5* 8.2*   < > 7.0* 6.8* 8.1*   HCT 22.5*   < > 25.9* 24.9*   < > 20.9* 21.2* 24.9*     --  215 219  --   --   --   --     < > = values in this interval not displayed.       Recent Labs     07/25/24  0535      K 4.2      CO2 25   GLUCOSE 90   BUN 20   CREATININE 9.25*   CALCIUM 8.7       Xray Result (most recent):  CT ABDOMEN PELVIS W WO CONTRAST Additional Contrast? 1  Narrative: EXAM: CT ABDOMEN PELVIS W WO CONTRAST  ACC#: [id number]     INDICATION: active GI bleed     COMPARISON: 7/23/2024    IV CONTRAST: 100 mL of Isovue-370.    ORAL CONTRAST: None.    TECHNIQUE:  Axial images were obtained through the abdomen and pelvis with and without  intravenous contrast. Coronal and sagittal reconstructions were generated. CT  dose reduction was achieved through use of a standardized protocol tailored for  this examination and automatic exposure control for dose modulation.    FINDINGS:  No significant abnormalities in the lung bases.    Multiple cysts in the liver are again visualized.   The spleen enhances  appropriately.   The pancreas enhances homogeneously. No discrete masses. There  are stones in the gallbladder. No significant biliary dilatation.     The adrenal glands appear normal.    There are numerous bilateral renal cysts again visualized. There is no  hydronephrosis.    Contrast is visualized in the bladder. The bladder is under distended.    No free air, free fluid, or abscesses.     No significant lymphadenopathy.    No evidence of intestinal obstruction. There is colonic diverticulosis. There is  a small 
(porcine) injection 5,000 Units, 5,000 Units, SubCUTAneous, 3 times per day, Rubio Shah MD, 5,000 Units at 07/15/24 2142       LABS:    I reviewed today's most current labs and imaging studies.    Pertinent labs include:  Recent Labs     07/26/24  1954 07/27/24  0200 07/29/24  0019 07/29/24  0616 07/29/24  1147   WBC 7.0  --   --   --   --    HGB 7.7*   < > 8.8* 8.6* 8.1*   HCT 22.9*   < > 26.3* 26.5* 24.6*     --   --   --   --     < > = values in this interval not displayed.       Recent Labs     07/27/24  0211 07/27/24  1038 07/28/24  0925    140 140   K 4.0 3.7 3.3*    104 104   CO2 27 27 29   GLUCOSE 97 94 93   BUN 10 10 5*   CREATININE 7.35* 7.86* 5.34*   CALCIUM 8.5 8.7 8.4*       Xray Result (most recent):  CT ABDOMEN PELVIS W WO CONTRAST Additional Contrast? 1  Narrative: EXAM: CT ABDOMEN PELVIS W WO CONTRAST    INDICATION: Active Gi bleed    COMPARISON: 7/24/2024.    IV CONTRAST: 100 mL of Isovue-370.    ORAL CONTRAST: None    TECHNIQUE:    Multislice helical CT was performed from the dome of the diaphragm to the pubic  symphysis prior to and following intravenous contrast administration.   Contiguous 5 mm axial images were reconstructed and lung and soft tissue windows  were generated. Coronal and sagittal reformations were generated.  CT dose  reduction was achieved through use of a standardized protocol tailored for this  examination and automatic exposure control for dose modulation.      FINDINGS:   LOWER THORAX: No significant abnormality in the incidentally imaged lower chest.  LIVER: Multiple hepatic cysts again identified.  BILIARY TREE: Gallstones. CBD is not dilated.  SPLEEN: within normal limits.  PANCREAS: No mass or ductal dilatation.  ADRENALS: Unremarkable.  KIDNEYS: Bilateral renal cysts again noted.  STOMACH: Unremarkable.  SMALL BOWEL: No dilatation or wall thickening.  COLON: No dilatation or wall thickening.  APPENDIX: Not distended.  PERITONEUM: No ascites or 
ESRD (end stage renal disease) ([facility]) 2024     Priority: Low    Debility 2024     Priority: Low    NSTEMI (non-ST elevated myocardial infarction) ([facility]) 2024     Priority: Low        Subjective: Darrel Patterson reports       Chest pain X none  consistent with:  Non-cardiac CP         Atypical CP     None now  On going  Anginal CP     Dyspnea X none  at rest  with exertion         improved  unchanged  worse              PND X none  overnight       Orthopnea X none  improved  unchanged  worse   Presyncope X none  improved  unchanged  worse     Ambulated in hallway without symptoms   Yes   Ambulated in room without symptoms  Yes   Objective:     Physical Exam:  Overall VSSAF;  BP (!) 76/60   Pulse 78   Temp 97.4 °F (36.3 °C) (Oral)   Resp 12   Ht 1.829 m (6' 0.01\")   Wt 52.2 kg (115 lb 1.3 oz)   SpO2 100%   BMI 15.60 kg/m²   Temp (24hrs), Av.7 °F (36.5 °C), Min:97.2 °F (36.2 °C), Max:98 °F (36.7 °C)    Patient Vitals for the past 8 hrs:   Pulse   24 0905 78   24 0900 82   24 0855 76   24 0850 79   24 0845 75   24 0840 76   24 0835 90   24 0319 77       Patient Vitals for the past 8 hrs:   Resp   24 0806 12   24 0319 14       Patient Vitals for the past 8 hrs:   BP   24 0905 (!) 76/60   24 0900 (!) 91/51   24 0850 (!) 82/54   24 0845 (!) 67/34   24 0835 (!) 106/52   24 0319 (!) 98/56         Intake/Output Summary (Last 24 hours) at 2024 0909  Last data filed at 2024 0319  Gross per 24 hour   Intake 570 ml   Output --   Net 570 ml       General Appearance: Well developed, well nourished, no acute distress.   Ears/Nose/Mouth/Throat:   Normal MM; anicteric.   JVP: WNL   Resp:   Lungs clear to auscultation bilaterally.  Nl resp effort.   Cardiovascular:  RRR, S1, S2 normal, cont MR murmur. No gallop or rub.   Abdomen:   Soft, non-tender, bowel sounds are present.   Extremities: No edema 
Vitals for the past 8 hrs:   Resp   07/11/24 0800 16   07/11/24 0725 16       Patient Vitals for the past 8 hrs:   BP   07/11/24 1145 123/68   07/11/24 1130 (!) 121/58   07/11/24 1115 (!) 118/44   07/11/24 1100 (!) 140/76   07/11/24 1045 (!) 134/59   07/11/24 1030 128/61   07/11/24 1015 120/61   07/11/24 1000 136/77   07/11/24 0945 (!) 118/32   07/11/24 0930 126/72   07/11/24 0915 (!) 148/67   07/11/24 0900 (!) 159/72   07/11/24 0845 132/73   07/11/24 0830 (!) 125/58   07/11/24 0825 124/71   07/11/24 0800 133/87       No intake or output data in the 24 hours ending 07/11/24 1231    General Appearance: Well developed, well nourished, no acute distress.   Ears/Nose/Mouth/Throat:   Normal MM; anicteric.   JVP: WNL   Resp:   Lungs clear to auscultation bilaterally.  Nl resp effort.   Cardiovascular:  RRR, S1, S2 normal, cont MR murmur. No gallop or rub.   Abdomen:   Soft, non-tender, bowel sounds are present.   Extremities: No edema bilaterally.    Skin:  Neuro: Warm and dry.  A/O x3, grossly nonfocal       cath site intact w/o hematoma or new bruit; distal pulse unchanged  Yes   Data Review:     Telemetry independently reviewed x sinus  chronic afib  parox afib  NSVT     ECG independently reviewed  NSR  afib  no significant changes  NSST-Tw chgs     x no new ECG provided for review   Lab results reviewed as noted below.  Current medications reviewed as noted below.    No results for input(s): \"PH\", \"PCO2\", \"PO2\" in the last 72 hours.  No results for input(s): \"CPK\", \"CKMB\" in the last 72 hours.    Invalid input(s): \"CKNDX\", \"TROIQ\"  Recent Labs     07/09/24  0626 07/09/24  0734 07/10/24  0336 07/11/24  0324   NA  --  135* 136 142   K  --  4.8 3.4* 3.5   CL  --  93* 96* 108   CO2  --  22 28 19*   BUN  --  98* 41* 46*   WBC 6.4  --  6.0 6.2   HGB 11.1*  --  11.4* 10.8*   HCT 32.4*  --  33.7* 32.0*   PLT 98*  --  92* 95*       Lab Results   Component Value Date/Time    CHOL 160 04/12/2023 04:37 AM    HDL 88 04/12/2023 
evidence of  hemorrhage. Several small high density foci in the lower thoracic esophagus  likely represents ingested material/medications.  PERITONEUM: No free air or free fluid.  APPENDIX: Unremarkable.  RETROPERITONEUM: Atherosclerosis of the aorta. No aneurysm or dissection.  ADDITIONAL COMMENTS: N/A.    URINARY BLADDER: Contracted, contains contrast material.  REPRODUCTIVE ORGANS: Unremarkable.  LYMPH NODES: None enlarged.  FREE FLUID: None.  BONES: No destructive bone lesion.  ADDITIONAL COMMENTS: N/A.  Impression: 1. No active GI bleed.  2. Mild stranding left upper quadrant associated with descending colonic  diverticula may represent diverticulitis. No bowel obstruction, free air, or  free fluid. No significant change.    Electronically signed by Donnell Kim     Microbiology:  Results       ** No results found for the last 336 hours. **          ECHO:   07/09/24    ECHO (TTE) LIMITED (PRN CONTRAST/BUBBLE/STRAIN/3D) 07/10/2024  1:40 PM (Final)    Interpretation Summary    Left Ventricle: Reduced left ventricular systolic function with a visually estimated EF of 15 - 20%. Left ventricle size is normal. Increased wall thickness. Global hypokinesis present.    Right Ventricle: Right ventricle size is normal. Low normal systolic function.    Mitral Valve: Moderate to severe, probably severe mitral regurgitation.    Tricuspid Valve: Moderate regurgitation. Mild to moderately elevated RVSP, 50-55 mm Hg.    Left Atrium: Left atrium is dilated.    Image quality is adequate.    Signed by: Scarlett Guerra MD on 7/10/2024  1:40 PM    Procedures: see electronic medical records for all procedures/Xrays and details which were not copied into this note but were reviewed prior to creation of Plan.    Reviewed most current lab test results and cultures  YES  Reviewed most current radiology test results   YES  Review and summation of old records today    NO  Reviewed patient's current orders and MAR    YES  PMH/SH reviewed 
YES  Reviewed most current radiology test results   YES  Review and summation of old records today    NO  Reviewed patient's current orders and MAR    YES  PMH/SH reviewed - no change compared to H&P    ________________________________________________________________________      Total NON critical care TIME:  Minutes      Total CRITICAL CARE TIME Spent:30   Minutes non procedure based          Comments   >50% of visit spent in counseling and coordination of care x    ________________________________________________________________________        Signed: Kehinde Alberto Jr, MD             
since  7/16/2024.    Electronically signed by RAJI LO     Microbiology:      ECHO:   07/09/24    ECHO (TTE) LIMITED (PRN CONTRAST/BUBBLE/STRAIN/3D) 07/10/2024  1:40 PM (Final)    Interpretation Summary    Left Ventricle: Reduced left ventricular systolic function with a visually estimated EF of 15 - 20%. Left ventricle size is normal. Increased wall thickness. Global hypokinesis present.    Right Ventricle: Right ventricle size is normal. Low normal systolic function.    Mitral Valve: Moderate to severe, probably severe mitral regurgitation.    Tricuspid Valve: Moderate regurgitation. Mild to moderately elevated RVSP, 50-55 mm Hg.    Left Atrium: Left atrium is dilated.    Image quality is adequate.    Signed by: Scarlett Guerra MD on 7/10/2024  1:40 PM    Procedures: see electronic medical records for all procedures/Xrays and details which were not copied into this note but were reviewed prior to creation of Plan.    Reviewed most current lab test results and cultures  YES  Reviewed most current radiology test results   YES  Review and summation of old records today    NO  Reviewed patient's current orders and MAR    YES  PMH/ reviewed - no change compared to H&P    ________________________________________________________________________      Total NON critical care TIME:  Minutes      Total CRITICAL CARE TIME Spent:30   Minutes non procedure based          Comments   >50% of visit spent in counseling and coordination of care x    ________________________________________________________________________        Signed: Randi Hedrick MD             
--  8.2*   HCT 22.6*   < > 22.5* 18.9* 25.9*  --  24.9*      < > 211  --  215  --  219    < > = values in this interval not displayed.       Lab Results   Component Value Date/Time    CHOL 160 04/12/2023 04:37 AM    HDL 88 04/12/2023 04:37 AM    LDL 55.2 04/12/2023 04:37 AM     No results for input(s): \"TP\", \"GLOB\", \"GGT\" in the last 72 hours.    Invalid input(s): \"SGOT\", \"GPT\", \"AP\", \"TBIL\", \"ALB\", \"AML\", \"AMYP\", \"LPSE\", \"HLPSE\"    No results for input(s): \"INR\", \"APTT\" in the last 72 hours.    Invalid input(s): \"PTP\"     No components found for: \"GLPOC\"    Current Facility-Administered Medications   Medication Dose Route Frequency    0.9 % sodium chloride infusion   IntraVENous PRN    sucralfate (CARAFATE) tablet 1 g  1 g Oral 3 times per day    epoetin nehemiah-epbx (RETACRIT) injection 10,000 Units  10,000 Units SubCUTAneous Once per day on Mon Wed Fri    traMADol (ULTRAM) tablet 50 mg  50 mg Oral Q6H PRN    acetaminophen (TYLENOL) tablet 650 mg  650 mg Oral Q6H PRN    pantoprazole (PROTONIX) tablet 40 mg  40 mg Oral BID AC    midodrine (PROAMATINE) tablet 10 mg  10 mg Oral TID PRN    albuterol (PROVENTIL) (2.5 MG/3ML) 0.083% nebulizer solution 2.5 mg  2.5 mg Nebulization Q6H PRN    midodrine (PROAMATINE) tablet 10 mg  10 mg Oral q8h    lidocaine 4 % external patch 1 patch  1 patch TransDERmal Daily    budesonide (PULMICORT) nebulizer suspension 500 mcg  0.5 mg Nebulization BID PRN    And    arformoterol tartrate (BROVANA) nebulizer solution 15 mcg  15 mcg Nebulization BID PRN    And    ipratropium (ATROVENT) 0.02 % nebulizer solution 0.5 mg  0.5 mg Nebulization BID PRN    lidocaine 4 % external patch 1 patch  1 patch TransDERmal Daily    albumin human 25% IV solution 25 g  25 g IntraVENous PRN    piperacillin-tazobactam (ZOSYN) 3,375 mg in sodium chloride 0.9 % 50 mL IVPB (mini-bag)  3,375 mg IntraVENous Q12H    dextromethorphan-guaiFENesin (MUCINEX DM)  MG per extended release tablet 1 tablet  1 
in the last 72 hours.    Invalid input(s): \"PTP\"     No components found for: \"GLPOC\"    Current Facility-Administered Medications   Medication Dose Route Frequency    traMADol (ULTRAM) tablet 50 mg  50 mg Oral Q6H PRN    acetaminophen (TYLENOL) tablet 650 mg  650 mg Oral Q6H PRN    pantoprazole (PROTONIX) tablet 40 mg  40 mg Oral BID AC    midodrine (PROAMATINE) tablet 10 mg  10 mg Oral TID PRN    albuterol (PROVENTIL) (2.5 MG/3ML) 0.083% nebulizer solution 2.5 mg  2.5 mg Nebulization Q6H PRN    midodrine (PROAMATINE) tablet 10 mg  10 mg Oral q8h    lidocaine 4 % external patch 1 patch  1 patch TransDERmal Daily    budesonide (PULMICORT) nebulizer suspension 500 mcg  0.5 mg Nebulization BID PRN    And    arformoterol tartrate (BROVANA) nebulizer solution 15 mcg  15 mcg Nebulization BID PRN    And    ipratropium (ATROVENT) 0.02 % nebulizer solution 0.5 mg  0.5 mg Nebulization BID PRN    lidocaine 4 % external patch 1 patch  1 patch TransDERmal Daily    albumin human 25% IV solution 25 g  25 g IntraVENous PRN    sucralfate (CARAFATE) tablet 1 g  1 g Oral 4x Daily AC & HS    piperacillin-tazobactam (ZOSYN) 3,375 mg in sodium chloride 0.9 % 50 mL IVPB (mini-bag)  3,375 mg IntraVENous Q12H    dextromethorphan-guaiFENesin (MUCINEX DM)  MG per extended release tablet 1 tablet  1 tablet Oral BID PRN    polyethylene glycol (GLYCOLAX) packet 17 g  17 g Oral Daily    bisacodyl (DULCOLAX) suppository 10 mg  10 mg Rectal Daily PRN    docusate sodium (COLACE) capsule 200 mg  200 mg Oral BID    mirtazapine (REMERON) tablet 7.5 mg  7.5 mg Oral Nightly    calcium acetate (PHOSLO) capsule 667 mg  1 capsule Oral TID WC    vitamin D (ERGOCALCIFEROL) capsule 50,000 Units  50,000 Units Oral Weekly    gabapentin (NEURONTIN) capsule 300 mg  300 mg Oral Daily    hydrOXYzine HCl (ATARAX) tablet 25 mg  25 mg Oral TID PRN    [Held by provider] ivabradine (CORLANOR) tablet 5 mg  5 mg Oral BID WC    [Held by provider] metoprolol succinate 
pantoprazole (PROTONIX) tablet 40 mg  40 mg Oral BID AC    midodrine (PROAMATINE) tablet 10 mg  10 mg Oral TID PRN    albuterol (PROVENTIL) (2.5 MG/3ML) 0.083% nebulizer solution 2.5 mg  2.5 mg Nebulization Q6H PRN    midodrine (PROAMATINE) tablet 10 mg  10 mg Oral q8h    lidocaine 4 % external patch 1 patch  1 patch TransDERmal Daily    budesonide (PULMICORT) nebulizer suspension 500 mcg  0.5 mg Nebulization BID PRN    And    arformoterol tartrate (BROVANA) nebulizer solution 15 mcg  15 mcg Nebulization BID PRN    And    ipratropium (ATROVENT) 0.02 % nebulizer solution 0.5 mg  0.5 mg Nebulization BID PRN    lidocaine 4 % external patch 1 patch  1 patch TransDERmal Daily    albumin human 25% IV solution 25 g  25 g IntraVENous PRN    sucralfate (CARAFATE) tablet 1 g  1 g Oral 4x Daily AC & HS    piperacillin-tazobactam (ZOSYN) 3,375 mg in sodium chloride 0.9 % 50 mL IVPB (mini-bag)  3,375 mg IntraVENous Q12H    dextromethorphan-guaiFENesin (MUCINEX DM)  MG per extended release tablet 1 tablet  1 tablet Oral BID PRN    polyethylene glycol (GLYCOLAX) packet 17 g  17 g Oral Daily    bisacodyl (DULCOLAX) suppository 10 mg  10 mg Rectal Daily PRN    docusate sodium (COLACE) capsule 200 mg  200 mg Oral BID    mirtazapine (REMERON) tablet 7.5 mg  7.5 mg Oral Nightly    calcium acetate (PHOSLO) capsule 667 mg  1 capsule Oral TID WC    vitamin D (ERGOCALCIFEROL) capsule 50,000 Units  50,000 Units Oral Weekly    gabapentin (NEURONTIN) capsule 300 mg  300 mg Oral Daily    hydrOXYzine HCl (ATARAX) tablet 25 mg  25 mg Oral TID PRN    [Held by provider] ivabradine (CORLANOR) tablet 5 mg  5 mg Oral BID WC    [Held by provider] metoprolol succinate (TOPROL XL) extended release tablet 25 mg  25 mg Oral Daily    pravastatin (PRAVACHOL) tablet 10 mg  10 mg Oral Nightly    [Held by provider] sacubitril-valsartan (ENTRESTO) 49-51 MG per tablet 1 tablet  1 tablet Oral BID    sevelamer (RENVELA) tablet 1,600 mg  1,600 mg Oral TID WC 
tablet 40 mg  40 mg Oral QAM AC    pravastatin (PRAVACHOL) tablet 10 mg  10 mg Oral Nightly    sacubitril-valsartan (ENTRESTO) 49-51 MG per tablet 1 tablet  1 tablet Oral BID    sevelamer (RENVELA) tablet 1,600 mg  1,600 mg Oral TID WC    sodium chloride flush 0.9 % injection 5-40 mL  5-40 mL IntraVENous 2 times per day    sodium chloride flush 0.9 % injection 5-40 mL  5-40 mL IntraVENous PRN    0.9 % sodium chloride infusion   IntraVENous PRN    heparin (porcine) injection 5,000 Units  5,000 Units SubCUTAneous 3 times per day        Jake Uriostegui MD     
\"GLPOC\"    Current Facility-Administered Medications   Medication Dose Route Frequency    pantoprazole (PROTONIX) tablet 40 mg  40 mg Oral BID AC    midodrine (PROAMATINE) tablet 10 mg  10 mg Oral TID PRN    norepinephrine (LEVOPHED) 16 mg in sodium chloride 0.9 % 250 mL infusion  0.5-20 mcg/min IntraVENous Continuous    albuterol (PROVENTIL) (2.5 MG/3ML) 0.083% nebulizer solution 2.5 mg  2.5 mg Nebulization Q6H PRN    midodrine (PROAMATINE) tablet 10 mg  10 mg Oral q8h    alcohol 62% (NOZIN) nasal  1 ampule  1 ampule Nasal BID    lidocaine 4 % external patch 1 patch  1 patch TransDERmal Daily    budesonide (PULMICORT) nebulizer suspension 500 mcg  0.5 mg Nebulization BID PRN    And    arformoterol tartrate (BROVANA) nebulizer solution 15 mcg  15 mcg Nebulization BID PRN    And    ipratropium (ATROVENT) 0.02 % nebulizer solution 0.5 mg  0.5 mg Nebulization BID PRN    lidocaine 4 % external patch 1 patch  1 patch TransDERmal Daily    albumin human 25% IV solution 25 g  25 g IntraVENous PRN    sucralfate (CARAFATE) tablet 1 g  1 g Oral 4x Daily AC & HS    piperacillin-tazobactam (ZOSYN) 3,375 mg in sodium chloride 0.9 % 50 mL IVPB (mini-bag)  3,375 mg IntraVENous Q12H    dextromethorphan-guaiFENesin (MUCINEX DM)  MG per extended release tablet 1 tablet  1 tablet Oral BID PRN    polyethylene glycol (GLYCOLAX) packet 17 g  17 g Oral Daily    bisacodyl (DULCOLAX) suppository 10 mg  10 mg Rectal Daily PRN    docusate sodium (COLACE) capsule 200 mg  200 mg Oral BID    acetaminophen (TYLENOL) tablet 1,000 mg  1,000 mg Oral Q8H    mirtazapine (REMERON) tablet 7.5 mg  7.5 mg Oral Nightly    calcium acetate (PHOSLO) capsule 667 mg  1 capsule Oral TID WC    vitamin D (ERGOCALCIFEROL) capsule 50,000 Units  50,000 Units Oral Weekly    gabapentin (NEURONTIN) capsule 300 mg  300 mg Oral Daily    hydrOXYzine HCl (ATARAX) tablet 25 mg  25 mg Oral TID PRN    [Held by provider] ivabradine (CORLANOR) tablet 5 mg  5 mg

## 2024-08-09 ENCOUNTER — TELEPHONE (OUTPATIENT)
Age: 79
End: 2024-08-09

## 2024-08-09 NOTE — TELEPHONE ENCOUNTER
Care Transitions Initial Follow Up Call    Outreach made within 2 business days of discharge: Yes    Patient: Darrel Patterson Patient : 1945   MRN: [id number]  Reason for Admission: n stemi  Discharge Date: 24       Spoke with: Patients daughter    Discharge department/facility: [facility] Interactive Patient Contact:  Was patient able to fill all prescriptions: Yes  Was patient instructed to bring all medications to the follow-up visit: Yes  Is patient taking all medications as directed in the discharge summary? Yes  Does patient understand their discharge instructions: Yes  Does patient have questions or concerns that need addressed prior to 7-14 day follow up office visit: no    Additional needs identified to be addressed with provider  Standard priority: yes             Scheduled appointment with PCP within 7-14 days    Follow Up  No future appointments.    Deepa Hoffman LPN

## 2024-08-09 NOTE — TELEPHONE ENCOUNTER
Pt discharged from [facility] 8/7, needs WILIAM call and needs hospital follow up appt scheduled

## 2024-08-14 ENCOUNTER — OFFICE VISIT (OUTPATIENT)
Age: 79
End: 2024-08-14
Payer: MEDICARE

## 2024-08-14 VITALS
TEMPERATURE: 97.7 F | OXYGEN SATURATION: 96 % | DIASTOLIC BLOOD PRESSURE: 62 MMHG | HEART RATE: 86 BPM | SYSTOLIC BLOOD PRESSURE: 110 MMHG | RESPIRATION RATE: 18 BRPM | WEIGHT: 142.13 LBS | HEIGHT: 72 IN | BODY MASS INDEX: 19.25 KG/M2

## 2024-08-14 DIAGNOSIS — N28.89 LEFT RENAL MASS: ICD-10-CM

## 2024-08-14 DIAGNOSIS — D50.0 ANEMIA, BLOOD LOSS: Primary | ICD-10-CM

## 2024-08-14 DIAGNOSIS — I50.9 CHRONIC CONGESTIVE HEART FAILURE, UNSPECIFIED HEART FAILURE TYPE (HCC): ICD-10-CM

## 2024-08-14 DIAGNOSIS — Z99.2 ESRD ON HEMODIALYSIS (HCC): ICD-10-CM

## 2024-08-14 DIAGNOSIS — N18.6 ESRD ON HEMODIALYSIS (HCC): ICD-10-CM

## 2024-08-14 DIAGNOSIS — J44.9 CHRONIC OBSTRUCTIVE PULMONARY DISEASE, UNSPECIFIED COPD TYPE (HCC): ICD-10-CM

## 2024-08-14 DIAGNOSIS — M05.79 RHEUMATOID ARTHRITIS INVOLVING MULTIPLE SITES WITH POSITIVE RHEUMATOID FACTOR (HCC): ICD-10-CM

## 2024-08-14 DIAGNOSIS — Z87.19 HISTORY OF GASTRITIS: ICD-10-CM

## 2024-08-14 PROCEDURE — 3078F DIAST BP <80 MM HG: CPT | Performed by: NURSE PRACTITIONER

## 2024-08-14 PROCEDURE — 99214 OFFICE O/P EST MOD 30 MIN: CPT | Performed by: NURSE PRACTITIONER

## 2024-08-14 PROCEDURE — 3074F SYST BP LT 130 MM HG: CPT | Performed by: NURSE PRACTITIONER

## 2024-08-14 PROCEDURE — 1123F ACP DISCUSS/DSCN MKR DOCD: CPT | Performed by: NURSE PRACTITIONER

## 2024-08-14 RX ORDER — PRAVASTATIN SODIUM 10 MG
10 TABLET ORAL DAILY
Qty: 90 TABLET | Refills: 1 | Status: SHIPPED | OUTPATIENT
Start: 2024-08-14 | End: 2024-08-14 | Stop reason: SDUPTHER

## 2024-08-14 RX ORDER — OMEPRAZOLE 40 MG/1
40 CAPSULE, DELAYED RELEASE ORAL 2 TIMES DAILY
Qty: 30 CAPSULE | Refills: 1 | Status: CANCELLED | OUTPATIENT
Start: 2024-08-14

## 2024-08-14 RX ORDER — PRAVASTATIN SODIUM 10 MG
10 TABLET ORAL
Qty: 30 TABLET | Status: CANCELLED | OUTPATIENT
Start: 2024-08-14

## 2024-08-14 RX ORDER — MIRTAZAPINE 7.5 MG/1
7.5 TABLET, FILM COATED ORAL NIGHTLY
Qty: 90 TABLET | Refills: 1 | Status: SHIPPED | OUTPATIENT
Start: 2024-08-14

## 2024-08-14 RX ORDER — TRAVOPROST OPHTHALMIC SOLUTION 0.04 MG/ML
SOLUTION OPHTHALMIC
Qty: 2.5 ML | Refills: 0 | Status: SHIPPED | OUTPATIENT
Start: 2024-08-14

## 2024-08-14 RX ORDER — PRAVASTATIN SODIUM 10 MG
10 TABLET ORAL DAILY
Qty: 90 TABLET | Refills: 1 | Status: SHIPPED | OUTPATIENT
Start: 2024-08-14

## 2024-08-14 RX ORDER — GABAPENTIN 300 MG/1
300 CAPSULE ORAL
Qty: 90 CAPSULE | Refills: 0 | Status: SHIPPED | OUTPATIENT
Start: 2024-08-14 | End: 2024-11-12

## 2024-08-14 RX ORDER — PREDNISONE 5 MG/1
TABLET ORAL
Qty: 56 TABLET | Refills: 0 | Status: SHIPPED | OUTPATIENT
Start: 2024-08-14

## 2024-08-14 ASSESSMENT — PATIENT HEALTH QUESTIONNAIRE - PHQ9
SUM OF ALL RESPONSES TO PHQ QUESTIONS 1-9: 0
SUM OF ALL RESPONSES TO PHQ9 QUESTIONS 1 & 2: 0
SUM OF ALL RESPONSES TO PHQ QUESTIONS 1-9: 0
1. LITTLE INTEREST OR PLEASURE IN DOING THINGS: NOT AT ALL
SUM OF ALL RESPONSES TO PHQ QUESTIONS 1-9: 0
2. FEELING DOWN, DEPRESSED OR HOPELESS: NOT AT ALL
SUM OF ALL RESPONSES TO PHQ QUESTIONS 1-9: 0

## 2024-08-14 NOTE — PROGRESS NOTES
\"Have you been to the ER, urgent care clinic since your last visit?  Hospitalized since your last visit?\"    YES - When: approximately 1 months ago.  Where and Why: hospital.    “Have you seen or consulted any other health care providers outside of [facility] since your last visit?”    YES - When: approximately 3  weeks ago.  Where and Why: colonoscopy.          Click Here for Release of Records Request  
and gabapentin prn pain      F/U here 3 months      Verbal and written instructions (see AVS) provided.  Patient expresses understanding of diagnosis and treatment plan.    Health Maintenance Due   Topic Date Due    Lipids  04/12/2024    Flu vaccine (1) 08/01/2024         No follow-up provider specified.      Roma Solitario, NP

## 2024-08-16 RX ORDER — PRAVASTATIN SODIUM 10 MG
10 TABLET ORAL DAILY
Qty: 90 TABLET | Refills: 1 | OUTPATIENT
Start: 2024-08-16

## 2024-08-19 RX ORDER — MONTELUKAST SODIUM 10 MG/1
10 TABLET ORAL DAILY
Qty: 90 TABLET | Refills: 1 | Status: SHIPPED | OUTPATIENT
Start: 2024-08-19

## 2024-08-26 RX ORDER — PREDNISONE 5 MG/1
TABLET ORAL
Qty: 56 TABLET | Refills: 0 | OUTPATIENT
Start: 2024-08-26

## 2024-09-14 ENCOUNTER — CLINICAL DOCUMENTATION (OUTPATIENT)
Age: 79
End: 2024-09-14

## 2024-09-20 ENCOUNTER — HOSPITAL ENCOUNTER (INPATIENT)
Facility: HOSPITAL | Age: 79
LOS: 3 days | Discharge: HOME | DRG: 308 | End: 2024-09-23
Attending: HOSPITALIST | Admitting: STUDENT IN AN ORGANIZED HEALTH CARE EDUCATION/TRAINING PROGRAM
Payer: MEDICARE

## 2024-09-20 ENCOUNTER — CLINICAL DOCUMENTATION (OUTPATIENT)
Age: 79
End: 2024-09-20

## 2024-09-20 ENCOUNTER — HOSPITAL ENCOUNTER (EMERGENCY)
Facility: HOSPITAL | Age: 79
Discharge: ANOTHER HOSPITAL | End: 2024-09-20
Attending: EMERGENCY MEDICINE
Payer: MEDICARE

## 2024-09-20 ENCOUNTER — APPOINTMENT (OUTPATIENT)
Facility: HOSPITAL | Age: 79
End: 2024-09-20
Payer: MEDICARE

## 2024-09-20 VITALS
WEIGHT: 140 LBS | OXYGEN SATURATION: 98 % | SYSTOLIC BLOOD PRESSURE: 148 MMHG | RESPIRATION RATE: 22 BRPM | BODY MASS INDEX: 18.96 KG/M2 | TEMPERATURE: 98.7 F | HEART RATE: 94 BPM | DIASTOLIC BLOOD PRESSURE: 102 MMHG | HEIGHT: 72 IN

## 2024-09-20 DIAGNOSIS — R00.0 TACHYCARDIA: Primary | ICD-10-CM

## 2024-09-20 DIAGNOSIS — I50.9 CONGESTIVE HEART FAILURE, UNSPECIFIED HF CHRONICITY, UNSPECIFIED HEART FAILURE TYPE (HCC): Primary | ICD-10-CM

## 2024-09-20 LAB
ALBUMIN SERPL-MCNC: 3.4 G/DL (ref 3.5–5)
ALBUMIN/GLOB SERPL: 1.1 (ref 1.1–2.2)
ALP SERPL-CCNC: 84 U/L (ref 45–117)
ALT SERPL-CCNC: 20 U/L (ref 12–78)
ANION GAP SERPL CALC-SCNC: 10 MMOL/L (ref 2–12)
AST SERPL-CCNC: 22 U/L (ref 15–37)
BASOPHILS # BLD: 0.1 K/UL (ref 0–0.1)
BASOPHILS NFR BLD: 1 % (ref 0–1)
BILIRUB SERPL-MCNC: 0.6 MG/DL (ref 0.2–1)
BUN SERPL-MCNC: 40 MG/DL (ref 6–20)
BUN/CREAT SERPL: 6 (ref 12–20)
CALCIUM SERPL-MCNC: 9.1 MG/DL (ref 8.5–10.1)
CHLORIDE SERPL-SCNC: 99 MMOL/L (ref 97–108)
CO2 SERPL-SCNC: 31 MMOL/L (ref 21–32)
CREAT SERPL-MCNC: 6.44 MG/DL (ref 0.7–1.3)
DIFFERENTIAL METHOD BLD: ABNORMAL
EKG ATRIAL RATE: 39 BPM
EKG ATRIAL RATE: 86 BPM
EKG DIAGNOSIS: NORMAL
EKG DIAGNOSIS: NORMAL
EKG P AXIS: -1 DEGREES
EKG P AXIS: 39 DEGREES
EKG P-R INTERVAL: 114 MS
EKG P-R INTERVAL: 156 MS
EKG Q-T INTERVAL: 408 MS
EKG Q-T INTERVAL: 416 MS
EKG QRS DURATION: 104 MS
EKG QRS DURATION: 104 MS
EKG QTC CALCULATION (BAZETT): 495 MS
EKG QTC CALCULATION (BAZETT): 504 MS
EKG R AXIS: -2 DEGREES
EKG R AXIS: -4 DEGREES
EKG T AXIS: 115 DEGREES
EKG T AXIS: 167 DEGREES
EKG VENTRICULAR RATE: 85 BPM
EKG VENTRICULAR RATE: 92 BPM
EOSINOPHIL # BLD: 0.3 K/UL (ref 0–0.4)
EOSINOPHIL NFR BLD: 4 % (ref 0–7)
ERYTHROCYTE [DISTWIDTH] IN BLOOD BY AUTOMATED COUNT: 18.7 % (ref 11.5–14.5)
FLUAV RNA SPEC QL NAA+PROBE: NOT DETECTED
FLUBV RNA SPEC QL NAA+PROBE: NOT DETECTED
GLOBULIN SER CALC-MCNC: 3.1 G/DL (ref 2–4)
GLUCOSE SERPL-MCNC: 96 MG/DL (ref 65–100)
HCT VFR BLD AUTO: 31.3 % (ref 36.6–50.3)
HGB BLD-MCNC: 10.7 G/DL (ref 12.1–17)
IMM GRANULOCYTES # BLD AUTO: 0.1 K/UL (ref 0–0.04)
IMM GRANULOCYTES NFR BLD AUTO: 1 % (ref 0–0.5)
LYMPHOCYTES # BLD: 1.3 K/UL (ref 0.8–3.5)
LYMPHOCYTES NFR BLD: 16 % (ref 12–49)
MAGNESIUM SERPL-MCNC: 2.1 MG/DL (ref 1.6–2.4)
MCH RBC QN AUTO: 32.3 PG (ref 26–34)
MCHC RBC AUTO-ENTMCNC: 34.2 G/DL (ref 30–36.5)
MCV RBC AUTO: 94.6 FL (ref 80–99)
MONOCYTES # BLD: 0.9 K/UL (ref 0–1)
MONOCYTES NFR BLD: 11 % (ref 5–13)
NEUTS SEG # BLD: 5.2 K/UL (ref 1.8–8)
NEUTS SEG NFR BLD: 67 % (ref 32–75)
NRBC # BLD: 0 K/UL (ref 0–0.01)
NRBC BLD-RTO: 0 PER 100 WBC
NT PRO BNP: ABNORMAL PG/ML (ref 0–450)
PLATELET # BLD AUTO: 193 K/UL (ref 150–400)
PLATELET COMMENT: ABNORMAL
PMV BLD AUTO: 12 FL (ref 8.9–12.9)
POTASSIUM SERPL-SCNC: 3.8 MMOL/L (ref 3.5–5.1)
PROT SERPL-MCNC: 6.5 G/DL (ref 6.4–8.2)
RBC # BLD AUTO: 3.31 M/UL (ref 4.1–5.7)
RBC MORPH BLD: ABNORMAL
RBC MORPH BLD: ABNORMAL
SARS-COV-2 RNA RESP QL NAA+PROBE: NOT DETECTED
SODIUM SERPL-SCNC: 140 MMOL/L (ref 136–145)
SOURCE: NORMAL
TROPONIN I SERPL HS-MCNC: 97 NG/L (ref 0–76)
TROPONIN I SERPL HS-MCNC: 98 NG/L (ref 0–76)
WBC # BLD AUTO: 7.9 K/UL (ref 4.1–11.1)

## 2024-09-20 PROCEDURE — 83735 ASSAY OF MAGNESIUM: CPT

## 2024-09-20 PROCEDURE — 85025 COMPLETE CBC W/AUTO DIFF WBC: CPT

## 2024-09-20 PROCEDURE — 80053 COMPREHEN METABOLIC PANEL: CPT

## 2024-09-20 PROCEDURE — 1100000003 HC PRIVATE W/ TELEMETRY

## 2024-09-20 PROCEDURE — 71045 X-RAY EXAM CHEST 1 VIEW: CPT

## 2024-09-20 PROCEDURE — 2700000000 HC OXYGEN THERAPY PER DAY

## 2024-09-20 PROCEDURE — 6360000002 HC RX W HCPCS: Performed by: EMERGENCY MEDICINE

## 2024-09-20 PROCEDURE — 83880 ASSAY OF NATRIURETIC PEPTIDE: CPT

## 2024-09-20 PROCEDURE — 36415 COLL VENOUS BLD VENIPUNCTURE: CPT

## 2024-09-20 PROCEDURE — 87636 SARSCOV2 & INF A&B AMP PRB: CPT

## 2024-09-20 PROCEDURE — 96374 THER/PROPH/DIAG INJ IV PUSH: CPT

## 2024-09-20 PROCEDURE — 99285 EMERGENCY DEPT VISIT HI MDM: CPT

## 2024-09-20 PROCEDURE — 84484 ASSAY OF TROPONIN QUANT: CPT

## 2024-09-20 RX ORDER — FUROSEMIDE 10 MG/ML
40 INJECTION INTRAMUSCULAR; INTRAVENOUS
Status: COMPLETED | OUTPATIENT
Start: 2024-09-20 | End: 2024-09-20

## 2024-09-20 RX ADMIN — FUROSEMIDE 40 MG: 10 INJECTION, SOLUTION INTRAMUSCULAR; INTRAVENOUS at 14:25

## 2024-09-20 ASSESSMENT — PAIN SCALES - GENERAL
PAINLEVEL_OUTOF10: 0
PAINLEVEL_OUTOF10: 0

## 2024-09-20 ASSESSMENT — PAIN - FUNCTIONAL ASSESSMENT
PAIN_FUNCTIONAL_ASSESSMENT: 0-10
PAIN_FUNCTIONAL_ASSESSMENT: NONE - DENIES PAIN

## 2024-09-21 PROBLEM — R00.0 TACHYCARDIA: Status: ACTIVE | Noted: 2024-09-21

## 2024-09-21 LAB
ALBUMIN SERPL-MCNC: 3.1 G/DL (ref 3.5–5)
ALBUMIN/GLOB SERPL: 1 (ref 1.1–2.2)
ALP SERPL-CCNC: 78 U/L (ref 45–117)
ALT SERPL-CCNC: 17 U/L (ref 12–78)
ANION GAP SERPL CALC-SCNC: 9 MMOL/L (ref 2–12)
AST SERPL-CCNC: 12 U/L (ref 15–37)
BASOPHILS # BLD: 0.1 K/UL (ref 0–0.1)
BASOPHILS NFR BLD: 1 % (ref 0–1)
BILIRUB SERPL-MCNC: 0.8 MG/DL (ref 0.2–1)
BUN SERPL-MCNC: 43 MG/DL (ref 6–20)
BUN/CREAT SERPL: 6 (ref 12–20)
CALCIUM SERPL-MCNC: 8.7 MG/DL (ref 8.5–10.1)
CHLORIDE SERPL-SCNC: 103 MMOL/L (ref 97–108)
CO2 SERPL-SCNC: 27 MMOL/L (ref 21–32)
CREAT SERPL-MCNC: 7.36 MG/DL (ref 0.7–1.3)
DIFFERENTIAL METHOD BLD: ABNORMAL
EOSINOPHIL # BLD: 0.4 K/UL (ref 0–0.4)
EOSINOPHIL NFR BLD: 5 % (ref 0–7)
ERYTHROCYTE [DISTWIDTH] IN BLOOD BY AUTOMATED COUNT: 18.7 % (ref 11.5–14.5)
GLOBULIN SER CALC-MCNC: 3.2 G/DL (ref 2–4)
GLUCOSE SERPL-MCNC: 92 MG/DL (ref 65–100)
HBV SURFACE AB SER QL: NONREACTIVE
HBV SURFACE AB SER-ACNC: 6.01 MIU/ML
HBV SURFACE AG SER QL: <0.1 INDEX
HBV SURFACE AG SER QL: NEGATIVE
HCT VFR BLD AUTO: 31.2 % (ref 36.6–50.3)
HGB BLD-MCNC: 10.2 G/DL (ref 12.1–17)
IMM GRANULOCYTES # BLD AUTO: 0 K/UL (ref 0–0.04)
IMM GRANULOCYTES NFR BLD AUTO: 1 % (ref 0–0.5)
LYMPHOCYTES # BLD: 1.1 K/UL (ref 0.8–3.5)
LYMPHOCYTES NFR BLD: 15 % (ref 12–49)
MCH RBC QN AUTO: 31.7 PG (ref 26–34)
MCHC RBC AUTO-ENTMCNC: 32.7 G/DL (ref 30–36.5)
MCV RBC AUTO: 96.9 FL (ref 80–99)
MONOCYTES # BLD: 0.8 K/UL (ref 0–1)
MONOCYTES NFR BLD: 11 % (ref 5–13)
NEUTS SEG # BLD: 4.8 K/UL (ref 1.8–8)
NEUTS SEG NFR BLD: 67 % (ref 32–75)
NRBC # BLD: 0 K/UL (ref 0–0.01)
NRBC BLD-RTO: 0 PER 100 WBC
PLATELET # BLD AUTO: 185 K/UL (ref 150–400)
PMV BLD AUTO: 11.7 FL (ref 8.9–12.9)
POTASSIUM SERPL-SCNC: 3.8 MMOL/L (ref 3.5–5.1)
PROT SERPL-MCNC: 6.3 G/DL (ref 6.4–8.2)
RBC # BLD AUTO: 3.22 M/UL (ref 4.1–5.7)
SODIUM SERPL-SCNC: 139 MMOL/L (ref 136–145)
WBC # BLD AUTO: 7.1 K/UL (ref 4.1–11.1)

## 2024-09-21 PROCEDURE — 86706 HEP B SURFACE ANTIBODY: CPT

## 2024-09-21 PROCEDURE — 36415 COLL VENOUS BLD VENIPUNCTURE: CPT

## 2024-09-21 PROCEDURE — 94640 AIRWAY INHALATION TREATMENT: CPT

## 2024-09-21 PROCEDURE — 2580000003 HC RX 258: Performed by: STUDENT IN AN ORGANIZED HEALTH CARE EDUCATION/TRAINING PROGRAM

## 2024-09-21 PROCEDURE — 87340 HEPATITIS B SURFACE AG IA: CPT

## 2024-09-21 PROCEDURE — 6360000002 HC RX W HCPCS: Performed by: STUDENT IN AN ORGANIZED HEALTH CARE EDUCATION/TRAINING PROGRAM

## 2024-09-21 PROCEDURE — 6370000000 HC RX 637 (ALT 250 FOR IP): Performed by: STUDENT IN AN ORGANIZED HEALTH CARE EDUCATION/TRAINING PROGRAM

## 2024-09-21 PROCEDURE — 5A1D70Z PERFORMANCE OF URINARY FILTRATION, INTERMITTENT, LESS THAN 6 HOURS PER DAY: ICD-10-PCS | Performed by: STUDENT IN AN ORGANIZED HEALTH CARE EDUCATION/TRAINING PROGRAM

## 2024-09-21 PROCEDURE — 80053 COMPREHEN METABOLIC PANEL: CPT

## 2024-09-21 PROCEDURE — 1100000003 HC PRIVATE W/ TELEMETRY

## 2024-09-21 PROCEDURE — 85025 COMPLETE CBC W/AUTO DIFF WBC: CPT

## 2024-09-21 PROCEDURE — 90935 HEMODIALYSIS ONE EVALUATION: CPT

## 2024-09-21 PROCEDURE — 6370000000 HC RX 637 (ALT 250 FOR IP): Performed by: INTERNAL MEDICINE

## 2024-09-21 PROCEDURE — 93005 ELECTROCARDIOGRAM TRACING: CPT | Performed by: STUDENT IN AN ORGANIZED HEALTH CARE EDUCATION/TRAINING PROGRAM

## 2024-09-21 RX ORDER — GABAPENTIN 300 MG/1
300 CAPSULE ORAL
Status: DISCONTINUED | OUTPATIENT
Start: 2024-09-21 | End: 2024-09-23 | Stop reason: HOSPADM

## 2024-09-21 RX ORDER — SODIUM CHLORIDE 0.9 % (FLUSH) 0.9 %
5-40 SYRINGE (ML) INJECTION PRN
Status: DISCONTINUED | OUTPATIENT
Start: 2024-09-21 | End: 2024-09-23 | Stop reason: HOSPADM

## 2024-09-21 RX ORDER — ONDANSETRON 2 MG/ML
4 INJECTION INTRAMUSCULAR; INTRAVENOUS EVERY 6 HOURS PRN
Status: DISCONTINUED | OUTPATIENT
Start: 2024-09-21 | End: 2024-09-23 | Stop reason: HOSPADM

## 2024-09-21 RX ORDER — ARFORMOTEROL TARTRATE 15 UG/2ML
15 SOLUTION RESPIRATORY (INHALATION)
Status: DISCONTINUED | OUTPATIENT
Start: 2024-09-21 | End: 2024-09-21

## 2024-09-21 RX ORDER — SODIUM CHLORIDE 0.9 % (FLUSH) 0.9 %
5-40 SYRINGE (ML) INJECTION EVERY 12 HOURS SCHEDULED
Status: DISCONTINUED | OUTPATIENT
Start: 2024-09-21 | End: 2024-09-23 | Stop reason: HOSPADM

## 2024-09-21 RX ORDER — ACETAMINOPHEN 650 MG/1
650 SUPPOSITORY RECTAL EVERY 6 HOURS PRN
Status: DISCONTINUED | OUTPATIENT
Start: 2024-09-21 | End: 2024-09-23 | Stop reason: HOSPADM

## 2024-09-21 RX ORDER — PANTOPRAZOLE SODIUM 40 MG/1
40 TABLET, DELAYED RELEASE ORAL
Status: DISCONTINUED | OUTPATIENT
Start: 2024-09-21 | End: 2024-09-23 | Stop reason: HOSPADM

## 2024-09-21 RX ORDER — ARFORMOTEROL TARTRATE 15 UG/2ML
15 SOLUTION RESPIRATORY (INHALATION)
Status: DISCONTINUED | OUTPATIENT
Start: 2024-09-21 | End: 2024-09-23 | Stop reason: HOSPADM

## 2024-09-21 RX ORDER — SEVELAMER CARBONATE 800 MG/1
1600 TABLET, FILM COATED ORAL
Status: DISCONTINUED | OUTPATIENT
Start: 2024-09-21 | End: 2024-09-23 | Stop reason: HOSPADM

## 2024-09-21 RX ORDER — MIDODRINE HYDROCHLORIDE 5 MG/1
15 TABLET ORAL EVERY 8 HOURS
Status: DISCONTINUED | OUTPATIENT
Start: 2024-09-21 | End: 2024-09-23 | Stop reason: HOSPADM

## 2024-09-21 RX ORDER — HEPARIN SODIUM 5000 [USP'U]/ML
5000 INJECTION, SOLUTION INTRAVENOUS; SUBCUTANEOUS EVERY 8 HOURS SCHEDULED
Status: DISCONTINUED | OUTPATIENT
Start: 2024-09-21 | End: 2024-09-23 | Stop reason: HOSPADM

## 2024-09-21 RX ORDER — VITAMIN B COMPLEX
1000 TABLET ORAL DAILY
Status: DISCONTINUED | OUTPATIENT
Start: 2024-09-21 | End: 2024-09-23 | Stop reason: HOSPADM

## 2024-09-21 RX ORDER — MIRTAZAPINE 15 MG/1
7.5 TABLET, FILM COATED ORAL NIGHTLY
Status: DISCONTINUED | OUTPATIENT
Start: 2024-09-21 | End: 2024-09-23 | Stop reason: HOSPADM

## 2024-09-21 RX ORDER — SODIUM CHLORIDE 9 MG/ML
INJECTION, SOLUTION INTRAVENOUS PRN
Status: DISCONTINUED | OUTPATIENT
Start: 2024-09-21 | End: 2024-09-23 | Stop reason: HOSPADM

## 2024-09-21 RX ORDER — BUDESONIDE 0.25 MG/2ML
0.25 INHALANT ORAL
Status: DISCONTINUED | OUTPATIENT
Start: 2024-09-21 | End: 2024-09-23 | Stop reason: HOSPADM

## 2024-09-21 RX ORDER — BUDESONIDE 0.25 MG/2ML
0.25 INHALANT ORAL
Status: DISCONTINUED | OUTPATIENT
Start: 2024-09-21 | End: 2024-09-21

## 2024-09-21 RX ORDER — LANOLIN ALCOHOL/MO/W.PET/CERES
3 CREAM (GRAM) TOPICAL NIGHTLY PRN
Status: DISCONTINUED | OUTPATIENT
Start: 2024-09-21 | End: 2024-09-23 | Stop reason: HOSPADM

## 2024-09-21 RX ORDER — MEGESTROL ACETATE 40 MG/1
40 TABLET ORAL DAILY
Status: DISCONTINUED | OUTPATIENT
Start: 2024-09-21 | End: 2024-09-23 | Stop reason: HOSPADM

## 2024-09-21 RX ORDER — PRAVASTATIN SODIUM 10 MG
10 TABLET ORAL DAILY
Status: DISCONTINUED | OUTPATIENT
Start: 2024-09-21 | End: 2024-09-23 | Stop reason: HOSPADM

## 2024-09-21 RX ORDER — FERROUS SULFATE 325(65) MG
325 TABLET ORAL
Status: DISCONTINUED | OUTPATIENT
Start: 2024-09-21 | End: 2024-09-23 | Stop reason: HOSPADM

## 2024-09-21 RX ORDER — ACETAMINOPHEN 325 MG/1
650 TABLET ORAL EVERY 6 HOURS PRN
Status: DISCONTINUED | OUTPATIENT
Start: 2024-09-21 | End: 2024-09-23 | Stop reason: HOSPADM

## 2024-09-21 RX ADMIN — GABAPENTIN 300 MG: 300 CAPSULE ORAL at 01:33

## 2024-09-21 RX ADMIN — SODIUM CHLORIDE, PRESERVATIVE FREE 10 ML: 5 INJECTION INTRAVENOUS at 21:12

## 2024-09-21 RX ADMIN — BUDESONIDE 250 MCG: 0.25 SUSPENSION RESPIRATORY (INHALATION) at 10:33

## 2024-09-21 RX ADMIN — ACETAMINOPHEN 650 MG: 325 TABLET ORAL at 21:06

## 2024-09-21 RX ADMIN — IPRATROPIUM BROMIDE 0.5 MG: 0.5 SOLUTION RESPIRATORY (INHALATION) at 10:25

## 2024-09-21 RX ADMIN — ARFORMOTEROL TARTRATE 15 MCG: 15 SOLUTION RESPIRATORY (INHALATION) at 19:24

## 2024-09-21 RX ADMIN — HEPARIN SODIUM 5000 UNITS: 5000 INJECTION INTRAVENOUS; SUBCUTANEOUS at 15:54

## 2024-09-21 RX ADMIN — PRAVASTATIN SODIUM 10 MG: 10 TABLET ORAL at 09:03

## 2024-09-21 RX ADMIN — IPRATROPIUM BROMIDE 0.5 MG: 0.5 SOLUTION RESPIRATORY (INHALATION) at 19:18

## 2024-09-21 RX ADMIN — MIRTAZAPINE 7.5 MG: 15 TABLET, FILM COATED ORAL at 01:33

## 2024-09-21 RX ADMIN — SEVELAMER CARBONATE 1600 MG: 800 TABLET, FILM COATED ORAL at 09:03

## 2024-09-21 RX ADMIN — ACETAMINOPHEN 650 MG: 325 TABLET ORAL at 13:56

## 2024-09-21 RX ADMIN — MELATONIN 3 MG: at 21:14

## 2024-09-21 RX ADMIN — MEGESTROL ACETATE 40 MG: 40 TABLET ORAL at 15:52

## 2024-09-21 RX ADMIN — BUDESONIDE 250 MCG: 0.25 SUSPENSION RESPIRATORY (INHALATION) at 19:24

## 2024-09-21 RX ADMIN — ARFORMOTEROL TARTRATE 15 MCG: 15 SOLUTION RESPIRATORY (INHALATION) at 10:33

## 2024-09-21 RX ADMIN — FERROUS SULFATE TAB 325 MG (65 MG ELEMENTAL FE) 325 MG: 325 (65 FE) TAB at 07:00

## 2024-09-21 RX ADMIN — HEPARIN SODIUM 5000 UNITS: 5000 INJECTION INTRAVENOUS; SUBCUTANEOUS at 21:07

## 2024-09-21 RX ADMIN — SEVELAMER CARBONATE 1600 MG: 800 TABLET, FILM COATED ORAL at 17:55

## 2024-09-21 RX ADMIN — Medication 1000 UNITS: at 09:03

## 2024-09-21 RX ADMIN — SODIUM CHLORIDE, PRESERVATIVE FREE 10 ML: 5 INJECTION INTRAVENOUS at 09:17

## 2024-09-21 RX ADMIN — GABAPENTIN 300 MG: 300 CAPSULE ORAL at 21:05

## 2024-09-21 RX ADMIN — PANTOPRAZOLE SODIUM 40 MG: 40 TABLET, DELAYED RELEASE ORAL at 07:00

## 2024-09-21 RX ADMIN — HEPARIN SODIUM 5000 UNITS: 5000 INJECTION INTRAVENOUS; SUBCUTANEOUS at 07:00

## 2024-09-21 RX ADMIN — MIRTAZAPINE 7.5 MG: 15 TABLET, FILM COATED ORAL at 21:05

## 2024-09-21 ASSESSMENT — PAIN SCALES - GENERAL
PAINLEVEL_OUTOF10: 0
PAINLEVEL_OUTOF10: 8
PAINLEVEL_OUTOF10: 8
PAINLEVEL_OUTOF10: 0

## 2024-09-21 ASSESSMENT — PAIN DESCRIPTION - LOCATION
LOCATION: HEAD
LOCATION: HEAD

## 2024-09-21 ASSESSMENT — PAIN SCALES - WONG BAKER: WONGBAKER_NUMERICALRESPONSE: HURTS A LITTLE BIT

## 2024-09-21 ASSESSMENT — PAIN DESCRIPTION - DESCRIPTORS
DESCRIPTORS: ACHING
DESCRIPTORS: ACHING

## 2024-09-21 NOTE — CONSULTS
Pt seen and examined    Full consult dictated    Agree with current Rx    Prior cath showed no signif CAD    Cont fluid removal with HD    Will follow

## 2024-09-21 NOTE — CARE COORDINATION
7502 to 1000 - CM attempted to contact patient via room phone to complete initial assessment, unable to reach anyone.  CM attempted to contact patient at cell phone number, unable to reach anyone or leave VM.  CM to try again at a later time.      Roma Leary, FRANCKN, RN    Care Management  [phone]

## 2024-09-21 NOTE — PROGRESS NOTES
End of Shift Note    Bedside shift change report given to  (oncoming nurse) by Meri Ramirez RN (offgoing nurse).  Report included the following information SBAR, Kardex, Procedure Summary, Intake/Output, MAR, and Recent Results    Shift worked:  7a-7p     Shift summary and any significant changes:     Pt had 3 L removed at dialysis today. On 2 L NC. Consulted [facility] cardiology.      Concerns for physician to address:       Zone phone for oncoming shift:          Activity:     Number times ambulated in hallways past shift: 0  Number of times OOB to chair past shift: 3    Cardiac:   Cardiac Monitoring: Yes           Access:  Current line(s): PIV     Genitourinary:   Urinary status: oliguric    Respiratory:      Chronic home O2 use?: Yes/PRN  Incentive spirometer at bedside: YES       GI:     Current diet:  ADULT DIET; Regular; Low Potassium (Less than 3000 mg/day); Low Phosphorus (Less than 1000 mg)  Passing flatus: YES  Tolerating current diet: YES       Pain Management:   Patient states pain is manageable on current regimen: YES    Skin:     Interventions: float heels    Patient Safety:  Fall Score:    Interventions: bed/chair alarm and gripper socks       Length of Stay:  Expected LOS: 3  Actual LOS: 1      Meri Ramirez, RN

## 2024-09-21 NOTE — CONSULTS
Seen and examined  Thanks for the consult  A/P:  ESRD TTS at [facility]  AVF  SOB  Anemia    HD today  Tariq estrada  Will follow

## 2024-09-21 NOTE — FLOWSHEET NOTE
Primary RN SBAR: GLENNA KAUR RN  Incapacitated Nurse [facility]. provided: YES  Patient Education provided: YES  Preferred Education method and Primary language: ENGLISH  [facility] General Consent Verified: YES  Hospital associated wait time; reason: NO  Hepatitis B Surface Ag   Date/Time Value Ref Range Status   08/05/2024 04:40 AM <0.10 Index Final     Hep B S Ag Interp   Date/Time Value Ref Range Status   08/05/2024 04:40 AM Negative NEG   Final     HEP B SURF AB   Date/Time Value Ref Range Status   04/11/2023 09:23 AM <3.10 mIU/mL Final     Hep B S Ab   Date/Time Value Ref Range Status   08/05/2024 04:40 AM 3.57 mIU/mL Final     Hep B S Ab Interp   Date/Time Value Ref Range Status   08/05/2024 04:40 AM NONREACTIVE NR   Final     Comment:     (NOTE)  The [employer] Centaur Anti-HBs2 assay is traceable to the World Health   Organization (WHO) Hepatitis B Immunoglobulin 1st International   Reference Preparation (1977). Samples with a calculated value of 10   mIU/mL or greater are considered reactive (protective) in accordance   with the CDC guidelines. The accepted criteria for immunity to HBV is   anti-HBs activity greater than or equal to 10 mIU/mL, as defined by   the WHO International Reference Preparation.  Assay performance has not been established in pregnant women,   patients who are immunosuppressed or immunocompromised, nor have   performance characteristics been established in conjunction with   other 's assays for specific HBV serologic markers. This   assay does not differentiate between vaccine induced immune response   and a response due to infection with HBV. Passively acquired anti-HBs   may be identified following patient transfusion, receipt of   immunoglobulin products, etc.        09/21/24 1045   Hemodialysis Fistula/Graft Arteriovenous fistula Left Forearm   No placement date or time found.   Present on Admission/Arrival: Yes  Access Type: Arteriovenous fistula  Orientation: Left  Access

## 2024-09-21 NOTE — PROGRESS NOTES
End of Shift Note    Bedside shift change report given to Josi (oncoming nurse) by Aissatou Escamilla RN (offgoing nurse).  Report included the following information SBAR, Kardex, and MAR    Shift worked:  7p-7a     Shift summary and any significant changes:     Scheduled medications were given, see MAR.  IV has been flushed and is patent.  Family members momentarily throughout my shift.  An EKG was done at 0300.  Patient did not complain of any pain during my shift.  Patient was placed on 2L of oxygen on arrival from [facility], his SpO2 was 88% before being assessed by the Hospitalist.  Patient teaching and routine rounding has been done.     Concerns for physician to address:       Zone phone for oncoming shift:          Activity:     Number times ambulated in hallways past shift: 0  Number of times OOB to chair past shift: 0    Cardiac:   Cardiac Monitoring: Yes           Access:  Current line(s): PIV     Genitourinary:   Urinary status: oliguric    Respiratory:      Chronic home O2 use?: YES  Incentive spirometer at bedside: NO       GI:     Current diet:  ADULT DIET; Regular; Low Potassium (Less than 3000 mg/day); Low Phosphorus (Less than 1000 mg)  Passing flatus: YES  Tolerating current diet: YES       Pain Management:   Patient states pain is manageable on current regimen: YES    Skin:     Interventions: increase time out of bed    Patient Safety:  Fall Score:    Interventions: bed/chair alarm and pt to call before getting OOB       Length of Stay:  Expected LOS: 3  Actual LOS: 1      Aissatou Escamilla RN                        '

## 2024-09-21 NOTE — CONSULTS
[facility]              [address] [place] [address]                              CONSULTATION      PATIENT NAME: EMILIO ALEJANDRE            : 1945  MED REC NO: [id number]                       ROOM: 2115  ACCOUNT NO: [id number]                       ADMIT DATE: 2024  PROVIDER: Clyde Alanis MD    DATE OF SERVICE:  2024    ATTENDING PHYSICIAN:  Maxi Donahue MD    REASON FOR CONSULT:  Seen for end-stage renal disease.    HISTORY OF PRESENT ILLNESS:  The patient dialyzes at [facility], [facility], came here with shortness of breath, palpitation, and we called to do dialysis.    PAST MEDICAL HISTORY:  Include hypertension, end-stage renal disease, hemodialysis AV fistula, low EF, seizure, rheumatoid arthritis, multiple procedures, GI, cardiac procedures.    SOCIAL HISTORY:  Reviewed.    FAMILY HISTORY:  Reviewed.    MEDICATIONS:  Inpatient includes nebulizer, Atrovent, Neurontin 300 at bedtime, Megace, *** heparin, midodrine, Remeron, Protonix, vitamin D.    SOCIAL HISTORY:  Reviewed.    FAMILY HISTORY:  Reviewed.    ALLERGIES:  NO KNOWN ALLERGY.      PHYSICAL EXAMINATION:  GENERAL:  Not in distress.  NECK:  Positive JVD.  VITAL SIGNS:  BP is 145/105, sating 94% on 2 L.  Check AV fistula, patent.    LABS:  As follows:  Potassium 3.8, sodium 139, BUN 43, creatinine 7.3, calcium is 8.7.  Hemoglobin 10.2, platelets 185, WBC 7.1.    IMPRESSION:    1. End-stage renal disease, hemodialysis TTS at [facility].  2. AV fistula.  3. Anemia, stable.  4. Shortness of breath, volume overload.  5. Palpitation.    RECOMMENDATIONS:  As follow:  1. We will dialyze today.  2. [facility].  3. We will follow.        CLYDE ALANIS MD      WGA/AQS  D:  2024 09:43:38  T:  2024 10:25:28  JOB #:  [id number]

## 2024-09-21 NOTE — H&P
Hospitalist Admission Note    NAME:Darrel Patterson   : 1945   MRN: [id number]     Date/Time: 2024 2:59 AM    Patient PCP: Roma Solitario APRN - NP    *Please be aware this note is formulated with assistance from voice-recognition dictation software. Please excuse any errors that may be present*    ______________________________________________________________________  Given the patient's current clinical presentation, I have a high level of concern for decompensation if discharged from the emergency department.  Complex decision making was performed, which includes reviewing the patient's available past medical records, laboratory results, and x-ray films.       My assessment of this patient's clinical condition and my plan of care is as follows.    Problem List:  Patient Active Problem List   Diagnosis    History of GI bleed    Anemia due to chronic kidney disease    A-V fistula ([facility])    S/P cataract surgery    Habitual alcohol use    Mild intermittent asthma without complication    Essential hypertension, benign    Diverticula of colon    Gastroesophageal reflux disease without esophagitis    Rheumatoid arthritis with positive rheumatoid factor ([facility])    Glaucoma    ESRD on hemodialysis ([facility])    Impingement syndrome of both shoulders    Hypertensive retinopathy of both eyes    CHF (congestive heart failure) ([facility])    DDD (degenerative disc disease), lumbar    DDD (degenerative disc disease), cervical    Psychophysiological insomnia    Fatigue    Hyperkalemia    Seizure ([facility])    Long term (current) use of systemic steroids    Weakness generalized    Left renal mass    GI bleed    NSTEMI (non-ST elevated myocardial infarction) ([facility])    Palliative care by specialist    Sarcopenia    Unintentional weight loss    Palliative care encounter    ESRD (end stage renal disease) ([facility])    Debility    Grief    Severe protein-calorie malnutrition ([facility])    Frailty    Goals of care, counseling/discussion    DNR  medially in each lower lobe most likely due to atelectasis. Follow-up to exclude pneumonia is suggested. There is pulmonary vascular congestion mild interstitial edema and small pleural effusions.     1. There is pulmonary vascular congestion and mild interstitial edema. There are small pleural effusions. 2. Bibasilar opacities medially in each lower lobe left greater than right are likely combination of atelectasis and edema. Follow-up to exclude pneumonia suggested. Electronically signed by PAULINA PEDRAZA     _______________________________________________________________________    TOTAL TIME:  76 Minutes    Signed: Rubio Shah MD    Procedures: see electronic medical records for all procedures/Xrays and details which were not copied into this note but were reviewed prior to creation of Plan.

## 2024-09-22 LAB
ALBUMIN SERPL-MCNC: 2.8 G/DL (ref 3.5–5)
ALBUMIN/GLOB SERPL: 0.9 (ref 1.1–2.2)
ALP SERPL-CCNC: 76 U/L (ref 45–117)
ALT SERPL-CCNC: 13 U/L (ref 12–78)
ANION GAP SERPL CALC-SCNC: 6 MMOL/L (ref 2–12)
AST SERPL-CCNC: 14 U/L (ref 15–37)
BASOPHILS # BLD: 0.1 K/UL (ref 0–0.1)
BASOPHILS NFR BLD: 1 % (ref 0–1)
BILIRUB SERPL-MCNC: 0.9 MG/DL (ref 0.2–1)
BUN SERPL-MCNC: 24 MG/DL (ref 6–20)
BUN/CREAT SERPL: 5 (ref 12–20)
CALCIUM SERPL-MCNC: 8.2 MG/DL (ref 8.5–10.1)
CHLORIDE SERPL-SCNC: 99 MMOL/L (ref 97–108)
CO2 SERPL-SCNC: 33 MMOL/L (ref 21–32)
CREAT SERPL-MCNC: 5.16 MG/DL (ref 0.7–1.3)
DIFFERENTIAL METHOD BLD: ABNORMAL
EKG ATRIAL RATE: 46 BPM
EKG DIAGNOSIS: NORMAL
EKG P AXIS: 62 DEGREES
EKG P-R INTERVAL: 136 MS
EKG Q-T INTERVAL: 404 MS
EKG QRS DURATION: 100 MS
EKG QTC CALCULATION (BAZETT): 477 MS
EKG R AXIS: 72 DEGREES
EKG T AXIS: 231 DEGREES
EKG VENTRICULAR RATE: 84 BPM
EOSINOPHIL # BLD: 0.4 K/UL (ref 0–0.4)
EOSINOPHIL NFR BLD: 5 % (ref 0–7)
ERYTHROCYTE [DISTWIDTH] IN BLOOD BY AUTOMATED COUNT: 18.2 % (ref 11.5–14.5)
GLOBULIN SER CALC-MCNC: 3.1 G/DL (ref 2–4)
GLUCOSE SERPL-MCNC: 87 MG/DL (ref 65–100)
HCT VFR BLD AUTO: 30.6 % (ref 36.6–50.3)
HGB BLD-MCNC: 10.1 G/DL (ref 12.1–17)
IMM GRANULOCYTES # BLD AUTO: 0.1 K/UL (ref 0–0.04)
IMM GRANULOCYTES NFR BLD AUTO: 1 % (ref 0–0.5)
LYMPHOCYTES # BLD: 0.9 K/UL (ref 0.8–3.5)
LYMPHOCYTES NFR BLD: 11 % (ref 12–49)
MAGNESIUM SERPL-MCNC: 1.9 MG/DL (ref 1.6–2.4)
MCH RBC QN AUTO: 31.8 PG (ref 26–34)
MCHC RBC AUTO-ENTMCNC: 33 G/DL (ref 30–36.5)
MCV RBC AUTO: 96.2 FL (ref 80–99)
MONOCYTES # BLD: 0.9 K/UL (ref 0–1)
MONOCYTES NFR BLD: 10 % (ref 5–13)
NEUTS SEG # BLD: 6.1 K/UL (ref 1.8–8)
NEUTS SEG NFR BLD: 72 % (ref 32–75)
NRBC # BLD: 0 K/UL (ref 0–0.01)
NRBC BLD-RTO: 0 PER 100 WBC
PLATELET # BLD AUTO: 174 K/UL (ref 150–400)
PMV BLD AUTO: 11.4 FL (ref 8.9–12.9)
POTASSIUM SERPL-SCNC: 3.5 MMOL/L (ref 3.5–5.1)
PROT SERPL-MCNC: 5.9 G/DL (ref 6.4–8.2)
RBC # BLD AUTO: 3.18 M/UL (ref 4.1–5.7)
SODIUM SERPL-SCNC: 138 MMOL/L (ref 136–145)
TSH SERPL DL<=0.05 MIU/L-ACNC: 1.67 UIU/ML (ref 0.36–3.74)
WBC # BLD AUTO: 8.5 K/UL (ref 4.1–11.1)

## 2024-09-22 PROCEDURE — 85025 COMPLETE CBC W/AUTO DIFF WBC: CPT

## 2024-09-22 PROCEDURE — 6370000000 HC RX 637 (ALT 250 FOR IP): Performed by: INTERNAL MEDICINE

## 2024-09-22 PROCEDURE — 6360000002 HC RX W HCPCS: Performed by: STUDENT IN AN ORGANIZED HEALTH CARE EDUCATION/TRAINING PROGRAM

## 2024-09-22 PROCEDURE — 36415 COLL VENOUS BLD VENIPUNCTURE: CPT

## 2024-09-22 PROCEDURE — 94640 AIRWAY INHALATION TREATMENT: CPT

## 2024-09-22 PROCEDURE — 6370000000 HC RX 637 (ALT 250 FOR IP): Performed by: STUDENT IN AN ORGANIZED HEALTH CARE EDUCATION/TRAINING PROGRAM

## 2024-09-22 PROCEDURE — 83735 ASSAY OF MAGNESIUM: CPT

## 2024-09-22 PROCEDURE — 80053 COMPREHEN METABOLIC PANEL: CPT

## 2024-09-22 PROCEDURE — 84443 ASSAY THYROID STIM HORMONE: CPT

## 2024-09-22 PROCEDURE — 1100000003 HC PRIVATE W/ TELEMETRY

## 2024-09-22 PROCEDURE — 2700000000 HC OXYGEN THERAPY PER DAY

## 2024-09-22 PROCEDURE — 2580000003 HC RX 258: Performed by: STUDENT IN AN ORGANIZED HEALTH CARE EDUCATION/TRAINING PROGRAM

## 2024-09-22 RX ADMIN — BUDESONIDE 250 MCG: 0.25 SUSPENSION RESPIRATORY (INHALATION) at 20:52

## 2024-09-22 RX ADMIN — Medication 1000 UNITS: at 10:05

## 2024-09-22 RX ADMIN — BUDESONIDE 250 MCG: 0.25 SUSPENSION RESPIRATORY (INHALATION) at 08:41

## 2024-09-22 RX ADMIN — GABAPENTIN 300 MG: 300 CAPSULE ORAL at 21:50

## 2024-09-22 RX ADMIN — SEVELAMER CARBONATE 1600 MG: 800 TABLET, FILM COATED ORAL at 11:45

## 2024-09-22 RX ADMIN — HEPARIN SODIUM 5000 UNITS: 5000 INJECTION INTRAVENOUS; SUBCUTANEOUS at 06:49

## 2024-09-22 RX ADMIN — IPRATROPIUM BROMIDE 0.5 MG: 0.5 SOLUTION RESPIRATORY (INHALATION) at 20:52

## 2024-09-22 RX ADMIN — MELATONIN 3 MG: at 22:02

## 2024-09-22 RX ADMIN — SEVELAMER CARBONATE 1600 MG: 800 TABLET, FILM COATED ORAL at 10:04

## 2024-09-22 RX ADMIN — IPRATROPIUM BROMIDE 0.5 MG: 0.5 SOLUTION RESPIRATORY (INHALATION) at 14:05

## 2024-09-22 RX ADMIN — HEPARIN SODIUM 5000 UNITS: 5000 INJECTION INTRAVENOUS; SUBCUTANEOUS at 21:51

## 2024-09-22 RX ADMIN — SODIUM CHLORIDE, PRESERVATIVE FREE 10 ML: 5 INJECTION INTRAVENOUS at 10:07

## 2024-09-22 RX ADMIN — MIDODRINE HYDROCHLORIDE 15 MG: 5 TABLET ORAL at 15:01

## 2024-09-22 RX ADMIN — PRAVASTATIN SODIUM 10 MG: 10 TABLET ORAL at 10:06

## 2024-09-22 RX ADMIN — ARFORMOTEROL TARTRATE 15 MCG: 15 SOLUTION RESPIRATORY (INHALATION) at 08:41

## 2024-09-22 RX ADMIN — PANTOPRAZOLE SODIUM 40 MG: 40 TABLET, DELAYED RELEASE ORAL at 06:52

## 2024-09-22 RX ADMIN — FERROUS SULFATE TAB 325 MG (65 MG ELEMENTAL FE) 325 MG: 325 (65 FE) TAB at 06:52

## 2024-09-22 RX ADMIN — ARFORMOTEROL TARTRATE 15 MCG: 15 SOLUTION RESPIRATORY (INHALATION) at 20:52

## 2024-09-22 RX ADMIN — HEPARIN SODIUM 5000 UNITS: 5000 INJECTION INTRAVENOUS; SUBCUTANEOUS at 14:54

## 2024-09-22 RX ADMIN — MIRTAZAPINE 7.5 MG: 15 TABLET, FILM COATED ORAL at 22:02

## 2024-09-22 RX ADMIN — MEGESTROL ACETATE 40 MG: 40 TABLET ORAL at 10:06

## 2024-09-22 RX ADMIN — SEVELAMER CARBONATE 1600 MG: 800 TABLET, FILM COATED ORAL at 17:20

## 2024-09-22 RX ADMIN — IPRATROPIUM BROMIDE 0.5 MG: 0.5 SOLUTION RESPIRATORY (INHALATION) at 08:47

## 2024-09-22 ASSESSMENT — PAIN SCALES - GENERAL: PAINLEVEL_OUTOF10: 0

## 2024-09-22 ASSESSMENT — PAIN SCALES - WONG BAKER: WONGBAKER_NUMERICALRESPONSE: HURTS A LITTLE BIT

## 2024-09-22 NOTE — CARE COORDINATION
Care Management Initial Assessment       RUR:26%  Readmission? No  1st IM letter given? Yes -   1st  letter given: No     09/22/24 1020   Service Assessment   Patient Orientation Alert and Oriented   Cognition Alert   History Provided By Patient   Primary Caregiver Self   Accompanied By/Relationship none   Support Systems Children   Patient's Healthcare Decision Maker is: Legal Next of Kin   PCP Verified by CM Yes   Last Visit to PCP Within last 3 months   Prior Functional Level Independent in ADLs/IADLs   Current Functional Level Independent in ADLs/IADLs   Can patient return to prior living arrangement Yes   Ability to make needs known: Good   Family able to assist with home care needs: Yes   Would you like for me to discuss the discharge plan with any other family members/significant others, and if so, who? Yes  (either of 2 daughters)   Financial Resources Medicare;Medicaid   Social/Functional History   Lives With Family   Type of Home House   Bathroom Toilet Standard   Bathroom Accessibility Accessible   Receives Help From Family   ADL Assistance Independent   Homemaking Assistance Independent   Ambulation Assistance Independent   Active  No   Occupation Retired   Discharge Planning   Type of Residence House   Living Arrangements Family Members   Current Services Prior To Admission Oxygen Therapy   Potential Assistance Purchasing Medications No   Patient expects to be discharged to: House   Services At/After Discharge   [place] Resource Information Provided? No     CM met with patient, introduced self, explained role and offered assistance  He is independent at home, does not drive, is retired.  Lives with Children who help with anything he is unable to do. They will transport him when discharged.  He normally uses the cane when needed and has 02 but does not know the company. He only wears 02 at night when he needs it and has a concentrator... He says he mobilizes without difficulty and no needs  noted at time of DC. If he is still on 02 when discharged he will need family to bring in tank to go home with if he is not weaned off on days.     CM will continue to follow for DC needs.     English Conrad FLORENTINO CM   [phone]

## 2024-09-22 NOTE — PROGRESS NOTES
Advance Care Planning     General Advance Care Planning (ACP) Conversation    Date of Conversation: 9/22/2024  Conducted with: Patient with Decision Making Capacity  Other persons present: None    Healthcare Decision Maker:   Primary Decision Maker: Nadine Traore - Child - [phone]    Secondary Decision Maker: Linda Traore - Child - [phone]       Content/Action Overview:  Has ACP document(s) on file - reflects the patient's care preferences  Reviewed DNR/DNI and patient elects Full Code (Attempt Resuscitation)   The ACP he has on file only list the HCDM and does not clarify code status.           Length of Voluntary ACP Conversation in minutes:  <16 minutes (Non-Billable)    ENGLISH H HOSAY

## 2024-09-22 NOTE — PROGRESS NOTES
Cardiology Progress Note      9/22/2024 4:57 PM    Admit Date: 9/20/2024          Subjective:  No new c/o. Feels better          /89   Pulse 89   Temp 98.6 °F (37 °C) (Oral)   Resp 20   Ht 1.829 m (6' 0.01\")   Wt 63.5 kg (139 lb 15.9 oz)   SpO2 99%   BMI 18.98 kg/m²   09/20 1901 - 09/22 0700  In: 500   Out: 3500         Objective:      Physical Exam:  VS as above  Neck veins nondistended  Chest scattered crackles  Card RRR 2-3 /6 systolic mur     Data Review:   Labs:    K 3.5  Hgb 10.1  Alb 2.8     Telemetry: SR       Assessment:     Acute on chronic systolic ht failure  Dilated nonischemic CM  ESRD  Moderate - severe mitral reg   Anemia  Rheum arthritis   Seizure d/o  Moderate branch vessel CAD by recent cath     Plan: HD tomm- if he continues to improve then possible d/c

## 2024-09-22 NOTE — PROGRESS NOTES
End of Shift Note    Bedside shift change report given to  (oncoming nurse) by Meri Ramirez RN (offgoing nurse).  Report included the following information SBAR, Kardex, Intake/Output, MAR, and Recent Results    Shift worked: Pt rested quietly in room for most of shift. Ambulated to bathroom with walker and x1 assist. L leg weakness with ambulation. Pt had no complaints of pain this shift. On 2 L nasal cannula.      Shift summary and any significant changes:          Concerns for physician to address:       Zone phone for oncoming shift:          Activity:     Number times ambulated in hallways past shift: 0  Number of times OOB to chair past shift: 0    Cardiac:   Cardiac Monitoring: Yes           Access:  Current line(s): PIV     Genitourinary:   Urinary status: oliguric    Respiratory:      Chronic home O2 use?: NO  Incentive spirometer at bedside: NO       GI:     Current diet:  ADULT DIET; Regular; Low Potassium (Less than 3000 mg/day); Low Phosphorus (Less than 1000 mg)  Passing flatus: YES  Tolerating current diet: YES       Pain Management:   Patient states pain is manageable on current regimen: YES    Skin:     Interventions: float heels    Patient Safety:  Fall Score:    Interventions: bed/chair alarm       Length of Stay:  Expected LOS: 3  Actual LOS: 2      Meri Ramirez, RN

## 2024-09-23 ENCOUNTER — TELEPHONE (OUTPATIENT)
Age: 79
End: 2024-09-23

## 2024-09-23 VITALS
WEIGHT: 139.99 LBS | HEART RATE: 102 BPM | SYSTOLIC BLOOD PRESSURE: 136 MMHG | BODY MASS INDEX: 18.96 KG/M2 | DIASTOLIC BLOOD PRESSURE: 98 MMHG | RESPIRATION RATE: 20 BRPM | HEIGHT: 72 IN | OXYGEN SATURATION: 94 % | TEMPERATURE: 99.7 F

## 2024-09-23 LAB
ALBUMIN SERPL-MCNC: 3.1 G/DL (ref 3.5–5)
ALBUMIN/GLOB SERPL: 0.9 (ref 1.1–2.2)
ALP SERPL-CCNC: 74 U/L (ref 45–117)
ALT SERPL-CCNC: 12 U/L (ref 12–78)
ANION GAP SERPL CALC-SCNC: 10 MMOL/L (ref 2–12)
AST SERPL-CCNC: 12 U/L (ref 15–37)
BASOPHILS # BLD: 0.1 K/UL (ref 0–0.1)
BASOPHILS NFR BLD: 1 % (ref 0–1)
BILIRUB SERPL-MCNC: 1 MG/DL (ref 0.2–1)
BUN SERPL-MCNC: 36 MG/DL (ref 6–20)
BUN/CREAT SERPL: 5 (ref 12–20)
CALCIUM SERPL-MCNC: 8.6 MG/DL (ref 8.5–10.1)
CHLORIDE SERPL-SCNC: 97 MMOL/L (ref 97–108)
CO2 SERPL-SCNC: 27 MMOL/L (ref 21–32)
CREAT SERPL-MCNC: 6.91 MG/DL (ref 0.7–1.3)
DIFFERENTIAL METHOD BLD: ABNORMAL
EOSINOPHIL # BLD: 0.5 K/UL (ref 0–0.4)
EOSINOPHIL NFR BLD: 5 % (ref 0–7)
ERYTHROCYTE [DISTWIDTH] IN BLOOD BY AUTOMATED COUNT: 18.1 % (ref 11.5–14.5)
GLOBULIN SER CALC-MCNC: 3.3 G/DL (ref 2–4)
GLUCOSE SERPL-MCNC: 88 MG/DL (ref 65–100)
HCT VFR BLD AUTO: 30.9 % (ref 36.6–50.3)
HGB BLD-MCNC: 10.3 G/DL (ref 12.1–17)
IMM GRANULOCYTES # BLD AUTO: 0 K/UL (ref 0–0.04)
IMM GRANULOCYTES NFR BLD AUTO: 1 % (ref 0–0.5)
LYMPHOCYTES # BLD: 1.4 K/UL (ref 0.8–3.5)
LYMPHOCYTES NFR BLD: 16 % (ref 12–49)
MCH RBC QN AUTO: 32.2 PG (ref 26–34)
MCHC RBC AUTO-ENTMCNC: 33.3 G/DL (ref 30–36.5)
MCV RBC AUTO: 96.6 FL (ref 80–99)
MONOCYTES # BLD: 1.2 K/UL (ref 0–1)
MONOCYTES NFR BLD: 13 % (ref 5–13)
NEUTS SEG # BLD: 5.6 K/UL (ref 1.8–8)
NEUTS SEG NFR BLD: 64 % (ref 32–75)
NRBC # BLD: 0 K/UL (ref 0–0.01)
NRBC BLD-RTO: 0 PER 100 WBC
PLATELET # BLD AUTO: 177 K/UL (ref 150–400)
PMV BLD AUTO: 11.6 FL (ref 8.9–12.9)
POTASSIUM SERPL-SCNC: 4 MMOL/L (ref 3.5–5.1)
PROT SERPL-MCNC: 6.4 G/DL (ref 6.4–8.2)
RBC # BLD AUTO: 3.2 M/UL (ref 4.1–5.7)
SODIUM SERPL-SCNC: 134 MMOL/L (ref 136–145)
WBC # BLD AUTO: 8.8 K/UL (ref 4.1–11.1)

## 2024-09-23 PROCEDURE — 6360000002 HC RX W HCPCS: Performed by: STUDENT IN AN ORGANIZED HEALTH CARE EDUCATION/TRAINING PROGRAM

## 2024-09-23 PROCEDURE — 6370000000 HC RX 637 (ALT 250 FOR IP): Performed by: STUDENT IN AN ORGANIZED HEALTH CARE EDUCATION/TRAINING PROGRAM

## 2024-09-23 PROCEDURE — 85025 COMPLETE CBC W/AUTO DIFF WBC: CPT

## 2024-09-23 PROCEDURE — 6370000000 HC RX 637 (ALT 250 FOR IP): Performed by: INTERNAL MEDICINE

## 2024-09-23 PROCEDURE — 80053 COMPREHEN METABOLIC PANEL: CPT

## 2024-09-23 PROCEDURE — 94640 AIRWAY INHALATION TREATMENT: CPT

## 2024-09-23 PROCEDURE — 2580000003 HC RX 258: Performed by: STUDENT IN AN ORGANIZED HEALTH CARE EDUCATION/TRAINING PROGRAM

## 2024-09-23 PROCEDURE — 2700000000 HC OXYGEN THERAPY PER DAY

## 2024-09-23 PROCEDURE — 36415 COLL VENOUS BLD VENIPUNCTURE: CPT

## 2024-09-23 RX ADMIN — SEVELAMER CARBONATE 1600 MG: 800 TABLET, FILM COATED ORAL at 08:52

## 2024-09-23 RX ADMIN — MEGESTROL ACETATE 40 MG: 40 TABLET ORAL at 08:52

## 2024-09-23 RX ADMIN — PANTOPRAZOLE SODIUM 40 MG: 40 TABLET, DELAYED RELEASE ORAL at 08:52

## 2024-09-23 RX ADMIN — ARFORMOTEROL TARTRATE 15 MCG: 15 SOLUTION RESPIRATORY (INHALATION) at 08:21

## 2024-09-23 RX ADMIN — SEVELAMER CARBONATE 1600 MG: 800 TABLET, FILM COATED ORAL at 12:12

## 2024-09-23 RX ADMIN — IPRATROPIUM BROMIDE 0.5 MG: 0.5 SOLUTION RESPIRATORY (INHALATION) at 14:09

## 2024-09-23 RX ADMIN — Medication 1000 UNITS: at 08:52

## 2024-09-23 RX ADMIN — FERROUS SULFATE TAB 325 MG (65 MG ELEMENTAL FE) 325 MG: 325 (65 FE) TAB at 08:52

## 2024-09-23 RX ADMIN — Medication 1 LOZENGE: at 14:11

## 2024-09-23 RX ADMIN — IPRATROPIUM BROMIDE 0.5 MG: 0.5 SOLUTION RESPIRATORY (INHALATION) at 08:15

## 2024-09-23 RX ADMIN — PRAVASTATIN SODIUM 10 MG: 10 TABLET ORAL at 08:52

## 2024-09-23 RX ADMIN — BUDESONIDE 250 MCG: 0.25 SUSPENSION RESPIRATORY (INHALATION) at 08:21

## 2024-09-23 RX ADMIN — SODIUM CHLORIDE, PRESERVATIVE FREE 10 ML: 5 INJECTION INTRAVENOUS at 08:52

## 2024-09-23 NOTE — DISCHARGE INSTRUCTIONS
Patient Discharge Instructions    Darrel Patterson / [id number] : 1945    Admitted 2024 Discharged: 2024         DISCHARGE DIAGNOSIS:   Palpitations  Frequent PVC, multifocal PVC   ESRD on HD   Chronic hypotension           Take Home Medications     {Medication reconciliation information is now added to the patient's AVS automatically when it is printed.  There is no need to use this SmartLink in discharge instructions.  Highlight this text and delete it to clear this message}      General drug facts     If you have a very bad allergy, wear an allergy ID at all times.   It is important that you take the medication exactly as they are prescribed.   Keep your medication in the bottles provided by the pharmacist.  Keep a list of all your drugs (prescription, natural products, vitamins, OTC) with you. Give this list to your doctor.  Do not take other medications without consulting your doctor.    Do not share your drugs with others and do not take anyone else's drugs.   Keep all drugs out of the reach of children and pets.    Most drugs may be thrown away in household trash after mixing with coffee grounds or rosio litter and sealing in a plastic bag.    Keep a list Call your doctor for help with any side effects. If in the [place] you may also call the FDA at 0-998-FDA-8468    Talk with the doctor before starting any new drug, including OTC, natural products, or vitamins.        What to do at Home    1. Recommended diet: Regular     2. Recommended activity: activity as tolerated    3. If you experience any of the following symptoms then please call your primary care physician or return to the emergency room if you cannot get hold of your doctor:    4. Wound Care: none     5. Lab work: cbc and bmp in 1 week     6. Avoid  smocking          If you have questions regarding the hospital related prescriptions or hospital related issues please call [facility] at . You can always direct your

## 2024-09-23 NOTE — PROGRESS NOTES
I called patient's daughter Liset to discuss hospital course and also further plan of care.  She did not .

## 2024-09-23 NOTE — PROGRESS NOTES
1550: I have reviewed discharge instructions with patient and the daughter. They verbalized understanding. Discharge medications reviewed and appropriate educational materials and side effects teaching were provided. Follow-up appointments reviewed. Opportunity for questions and clarification was provided. Venous access removed without difficulty. Patient is waiting on oxygen tank to be delivered to his room.     1625: Oxygen tank was delivered to patient's room.    1627: Patient was taken to discharge area by JOCELYNE Valle.

## 2024-09-23 NOTE — PROGRESS NOTES
Hospitalist Admission Note    NAME:Darrel Patterson   : 1945   MRN: [id number]     Date/Time: 2024 8:40 PM    Patient PCP: Roma Solitario APRN - NP    *Please be aware this note is formulated with assistance from voice-recognition dictation software. Please excuse any errors that may be present*          Assessment / Plan:    Palpitations  Frequent PVC, multifocal PVC   -Status post improvement of symptoms after hemodialysis.  -Recent echo shows EF of around 15 to 20%  -Cardiology following  -TSH is within normal limits    ESRD on HD   - Volume mgmt primarily via HD  -Renal following for hemodialysis needs y  - Continue PTA renvela     Chronic hypotension  - Continue PTA midodrine 5mg TID     Poor appetite  - Continue home remeron       Medical Decision Making:   I personally reviewed labs: CBC, CMP  I personally reviewed imaging:   I personally reviewed EKG:   Toxic drug monitoring:   Discussed case with:       Code Status: FULL CODE  DVT Prophylaxis: heparin  Consults: Cardiology, Nephrology     Subjective:   CHIEF COMPLAINT:    Does not report any palpitations.  No chest pain.  No shortness of breath  Overnight events noted      Prior to Admission medications    Medication Sig Start Date End Date Taking? Authorizing Provider   montelukast (SINGULAIR) 10 MG tablet TAKE 1 TABLET BY MOUTH EVERY DAY 24   Roma Solitario APRN - NP   gabapentin (NEURONTIN) 300 MG capsule Take 1 capsule by mouth nightly for 90 days. Max Daily Amount: 300 mg 24  Roma Solitario APRN - NP   Travoprost, LEONOR Free, (TRAVATAN Z) 0.004 % SOLN ophthalmic solution INSTILL 1 DROP IN EACH EYE AT BEDTIME 24   Roma Solitario APRN - NP   mirtazapine (REMERON) 7.5 MG tablet Take 1 tablet by mouth nightly 24   Roma Solitario APRN - NP   predniSONE (DELTASONE) 5 MG tablet Take 3 tabs daily x 1 week then take 2 tabs daily x 1 week then take 1 tab daily x 1 week then STOP 24   Roma Solitario

## 2024-09-23 NOTE — PROGRESS NOTES
NAME: Darrel Patterson        :  1945        MRN:  [id number]                                                       Assessment:       ESRD - TTS - [facility] [place]  Anemia  dyspnea       Discussion:       No acute need for HD today.  Plan HD in AM.    H/H at goal.   Hold SIENA         Subjective:     Chief Complaint:  \" I'd like to go home.\"  No N/V.  No CP.      Review of Systems:    Symptom Y/N Comments  Symptom Y/N Comments   Fever/Chills    Chest Pain     Poor Appetite    Edema     Cough    Abdominal Pain     Sputum    Joint Pain     SOB/BECKFORD    Pruritis/Rash     Nausea/vomit    Tolerating PT/OT     Diarrhea    Tolerating Diet     Constipation    Other       Could not obtain due to:      Objective:     VITALS:   Last 24hrs VS reviewed since prior progress note. Most recent are:  Vitals:    24 0745   BP:    Pulse:    Resp:    Temp:    SpO2: 93%     No intake or output data in the 24 hours ending 24 1101   Telemetry Reviewed:     PHYSICAL EXAM:  General: NAD      Lab Data Reviewed: (see below)    Medications Reviewed: (see below)    PMH/ reviewed - no change compared to H&P  ________________________________________________________________________  Care Plan discussed with:  Patient     Family      RN     Care Manager                    Consultant:          Comments   >50% of visit spent in counseling and coordination of care       ________________________________________________________________________  Reginald Kennedy MD     Procedures: see electronic medical records for all procedures/Xrays and details which  were not copied into this note but were reviewed prior to creation of Plan.      LABS:  Recent Labs     24  0450 24  0730   WBC 8.5 8.8   HGB 10.1* 10.3*   HCT 30.6* 30.9*    177     Recent Labs     24  1254 24  0522 24  0450 24  0730    139 138 134*   K 3.8 3.8 3.5  4.0   CL 99 103 99 97   CO2 31 27 33* 27   BUN 40* 43* 24* 36*   MG 2.1  --  1.9  --      Recent Labs     09/21/24  0522 09/22/24  0450 09/23/24  0730   GLOB 3.2 3.1 3.3     No results for input(s): \"INR\", \"APTT\" in the last 72 hours.    Invalid input(s): \"PTP\"   No results for input(s): \"TIBC\" in the last 72 hours.    Invalid input(s): \"FE\", \"PSAT\", \"FERR\"   No results found for: \"RBCF\"   No results for input(s): \"PH\", \"PCO2\", \"PO2\" in the last 72 hours.  No results for input(s): \"CPK\", \"CKMB\", \"TROPONINI\" in the last 72 hours.  No components found for: \"GLPOC\"  @labua@    MEDICATIONS:  Current Facility-Administered Medications   Medication Dose Route Frequency    ferrous sulfate (IRON 325) tablet 325 mg  325 mg Oral QAM AC    gabapentin (NEURONTIN) capsule 300 mg  300 mg Oral QHS    megestrol (MEGACE) tablet 40 mg  40 mg Oral Daily    midodrine (PROAMATINE) tablet 15 mg  15 mg Oral q8h    mirtazapine (REMERON) tablet 7.5 mg  7.5 mg Oral Nightly    pantoprazole (PROTONIX) tablet 40 mg  40 mg Oral QAM AC    pravastatin (PRAVACHOL) tablet 10 mg  10 mg Oral Daily    sevelamer (RENVELA) tablet 1,600 mg  1,600 mg Oral TID     Vitamin D (CHOLECALCIFEROL) tablet 1,000 Units  1,000 Units Oral Daily    sodium chloride flush 0.9 % injection 5-40 mL  5-40 mL IntraVENous 2 times per day    sodium chloride flush 0.9 % injection 5-40 mL  5-40 mL IntraVENous PRN    0.9 % sodium chloride infusion   IntraVENous PRN    ondansetron (ZOFRAN) injection 4 mg  4 mg IntraVENous Q6H PRN    acetaminophen (TYLENOL) tablet 650 mg  650 mg Oral Q6H PRN    Or    acetaminophen (TYLENOL) suppository 650 mg  650 mg Rectal Q6H PRN    heparin (porcine) injection 5,000 Units  5,000 Units SubCUTAneous 3 times per day    budesonide (PULMICORT) nebulizer suspension 250 mcg  0.25 mg Nebulization BID RT    And    arformoterol tartrate (BROVANA) nebulizer solution 15 mcg  15 mcg Nebulization BID RT    And    ipratropium (ATROVENT) 0.02 % nebulizer

## 2024-09-23 NOTE — CARE COORDINATION
Patient is clear from a CM standpoint once O2 tank is at bedside.    [facility] (O2 rep): [facility] - [phone]    Transition of Care Plan: Home with follow ups and [facility] HD [facility]     RUR: 25% (high RUR)  Prior Level of Functioning: Independent  Disposition: Home with follow ups and [facility] (contact information listed on AVS)  If SNF or IPR: Date FOC offered: N/A  Date FOC received: N/A  Accepting facility: N/A  Date authorization started with reference number: N/A  Date authorization received and expires: N/A  Follow up appointments: PCP/specialists if needed.  [facility] contact information listed on AVS.  DME needed: None.  Patient has a cane and 2L O2 NC through [facility] (contact information listed on AVS; tank to be delivered at bedside).  Transportation at discharge: Family  IM/IMM Medicare/ letter given: 2nd IM done 9/23/2024.  Is patient a  and connected with [facility]? No.   If yes, was [place] transfer form completed and [facility] notified? N/A  Caregiver Contact: Nadine Traore - DTR - [phone]    Discharge Caregiver contacted prior to discharge? Patient to contact.  Care Conference needed? No.  Barriers to discharge: O2 tank to be delivered at bedside    1038 - O2 challenge requested during morning IDRs.  Notes reviewed; it does not appear that a previous home O2 challenge was documented on this admission.    1250 - CM met with patient at bedside who confirmed he was set up with O2 this year but does not know agency name or liter flow associated with his O2.  He said he has tanks at home for someone to bring on discharge, if needed.  He confirmed that he schedules HD rides through his insurance and has no issues with getting to HD.    1419 - O2 challenge requested during afternoon IDRs.    1515 to 1532 - CM attempted to reach patient via room phone; unable to reach anyone.  CM contacted patient via room phone and he passed the phone to his DTR, Nadine.  Nadine said she does

## 2024-09-23 NOTE — DISCHARGE SUMMARY
Discharge Summary    Name: Darrel Patterson  [id number]  YOB: 1945 (Age: 79 y.o.)   Date of Admission: 9/20/2024  Date of Discharge: 9/23/2024  Attending Physician: No att. providers found    Discharge Diagnosis:     Palpitations  Frequent PVC, multifocal PVC   ESRD on HD   Chronic hypotension    Consultations:  IP CONSULT TO NEPHROLOGY  IP CONSULT TO CARDIOLOGY  IP CONSULT TO CASE MANAGEMENT      Brief Admission History/Reason for Admission Per Rubio Shah MD:   Darrel Patterson is a 79 y.o.  male with PMHx significant for listed PMH below who presents with the above chief complaint.   We were asked to admit for work up and evaluation of the above problems.      Patient presents as a transfer from [facility] emergency department due to increasing shortness of breath and palpitations.  He initially presented with increased shortness of breath to the ED.  He also notes that during this time to me that he has had increasing palpitations.  Describes that over the past several days he felt intermittently that his heart was racing.  Denies any significant chest pain, nausea, vomiting.  No lightheadedness or dizziness.  States that he has never had the symptoms previously.  He states that he receives hemodialysis on Tuesday Thursdays and Saturdays and reports no missed sessions with last session on Thursday.       Brief Hospital Course by Main Problems:     Palpitations  Frequent PVC, multifocal PVC   -Status post improvement of symptoms after hemodialysis.  -Recent echo shows EF of around 15 to 20%  -Cardiology following and recommendations noted.  Patient reported that she was not taking midodrine PTA so medication was discontinued.  -TSH is within normal limits  -Cardiology will add more medications on outpatient basis based on blood pressure at home.     ESRD on HD   - Volume mgmt primarily via HD  -Renal following for hemodialysis needs y  -

## 2024-09-23 NOTE — PROGRESS NOTES
Hospital follow-up PCP transitional care appointment has been scheduled with NP Roma Solitario on 10/3/2024 at 0930. This is the first available appt due to limited provider availability. PCP office does not offer alternate provider option for hospital follow up. [facility] placed [facility] information AVS for patient resource. Pending patient discharge.  Lucia Unger , Care Management Assistant

## 2024-09-23 NOTE — PROGRESS NOTES
Attempted to schedule [facility] hospital follow up. Sent office a message, awaiting return call from office with appt information. [facility] placed [facility] information AVS for patient resource.  Pending patient discharge. Lucia Unger, Care Management Assistant

## 2024-09-23 NOTE — PROGRESS NOTES
Attempted to schedule [facility] hospital follow up. Sent office a message, awaiting return call from office with appt information. [facility] placed [facility] information AVS for patient resource.  Pending patient discharge. Lucia Unger, Care Management Assistant

## 2024-09-23 NOTE — PROGRESS NOTES
Pulse oximetry assessment   87% at rest on room air (if 88% or less, skip next steps)  97% at rest on 2 LPM
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
## 2024-09-23 NOTE — CONSULTS
[facility]              [address] [place] [address]                              CONSULTATION      PATIENT NAME: EMILIO ALEJANDRE            : 1945  MED REC NO: [id number]                       ROOM: 2115  ACCOUNT NO: [id number]                       ADMIT DATE: 2024  PROVIDER: Shen Bullock MD    DATE OF SERVICE:  2024    ATTENDING PHYSICIAN:  Maxi Donahue MD    REFERRING PHYSICIAN:  Rubio Shah MD    REASON FOR CONSULTATION:  Evaluate CHF.    CHIEF COMPLAINT:  Shortness of breath.    HISTORY OF PRESENT ILLNESS:  The patient is a 79-year-old man with a history of a known nonischemic cardiomyopathy with an EF of 15-20%.  He is followed by Dr. Kaur.  He also has significant moderate-to-severe mitral regurgitation, hypertension, and dyslipidemia.  He has end-stage renal disease and is on chronic hemodialysis.  After his initial diagnosis with cardiomyopathy and severe LV dysfunction, he wore a LifeVest for a brief period of time, but eventually stopped this and apparently decided he did not want a defibrillator.  He has had multiple admissions this year for various problems.  He was seen back in July by us during admission for lower GI bleed, which required transfusion.  He presented to [facility] ER yesterday with complaints of progressive shortness of breath and chest tightness.  He was found to be volume overloaded and his chest x-ray showed interstitial edema.  Troponins were borderline elevated, and he was transferred to [facility] for further evaluation.  He underwent hemodialysis earlier today and is feeling better.  His last hemodialysis prior to that was on Thursday and he has not missed any recent dialysis treatments.  He is feeling better.  His family is at the bedside.  No other specific complaints at this time.  He has not had any lower extremity swelling and denies syncope.    PAST MEDICAL HISTORY:  End-stage renal

## 2024-09-23 NOTE — PLAN OF CARE
Problem: Discharge Planning  Goal: Discharge to home or other facility with appropriate resources  9/23/2024 1338 by Jo, Yeong Ae Jenny, RN  Outcome: Adequate for Discharge  9/23/2024 1014 by Jo, Yeong Ae Jenny, RN  Outcome: Progressing     Problem: Safety - Adult  Goal: Free from fall injury  9/23/2024 1338 by Jo, Yeong Ae Jenny, RN  Outcome: Adequate for Discharge  9/23/2024 1014 by Jo, Yeong Ae Jenny, RN  Outcome: Progressing     Problem: Chronic Conditions and Co-morbidities  Goal: Patient's chronic conditions and co-morbidity symptoms are monitored and maintained or improved  9/23/2024 1338 by Jo, Yeong Ae Jenny, RN  Outcome: Adequate for Discharge  9/23/2024 1014 by Jo, Yeong Ae Jenny, RN  Outcome: Progressing     Problem: Pain  Goal: Verbalizes/displays adequate comfort level or baseline comfort level  9/23/2024 1338 by Jo, Yeong Ae Jenny, RN  Outcome: Adequate for Discharge  9/23/2024 1014 by Jo, Yeong Ae Jenny, RN  Outcome: Progressing     Problem: Respiratory - Adult  Goal: Achieves optimal ventilation and oxygenation  9/23/2024 1338 by Jo, Yeong Ae Jenny, RN  Outcome: Adequate for Discharge  9/23/2024 1014 by Jo, Yeong Ae Jenny, RN  Outcome: Progressing  9/23/2024 0931 by Og Cole, RT  Outcome: Progressing  9/23/2024 0619 by Efrain Hoffman RCP  Outcome: Progressing

## 2024-09-24 ENCOUNTER — FOLLOWUP TELEPHONE ENCOUNTER (OUTPATIENT)
Facility: HOSPITAL | Age: 79
End: 2024-09-24

## 2024-09-24 LAB
EKG ATRIAL RATE: 39 BPM
EKG ATRIAL RATE: 86 BPM
EKG DIAGNOSIS: NORMAL
EKG DIAGNOSIS: NORMAL
EKG P AXIS: -1 DEGREES
EKG P AXIS: 39 DEGREES
EKG P-R INTERVAL: 114 MS
EKG P-R INTERVAL: 156 MS
EKG Q-T INTERVAL: 408 MS
EKG Q-T INTERVAL: 416 MS
EKG QRS DURATION: 104 MS
EKG QRS DURATION: 104 MS
EKG QTC CALCULATION (BAZETT): 495 MS
EKG QTC CALCULATION (BAZETT): 504 MS
EKG R AXIS: -2 DEGREES
EKG R AXIS: -4 DEGREES
EKG T AXIS: 115 DEGREES
EKG T AXIS: 167 DEGREES
EKG VENTRICULAR RATE: 85 BPM
EKG VENTRICULAR RATE: 92 BPM

## 2024-09-27 NOTE — PROGRESS NOTES
Physician Progress Note      PATIENT:               EMILIO ALEJANDRE  CSN #:                  [id number]  :                       1945  ADMIT DATE:       2024 11:15 PM  DISCH DATE:        2024 4:28 PM  RESPONDING  PROVIDER #:        Maxi Donahue MD          QUERY TEXT:    79M patient admitted with Tachycardia.   Noted documentation of Acute on   chronic systolic heart failure and volume overload.on  by Cardiology and    Chronic combined systolic (congestive) and diastolic (congestive) heart   failure on  by Cardiolgy.  If possible, please document in progress notes and discharge summary if you   are evaluating and /or treating any of the following:    The medical record reflects the following:  Risk Factors: ESRD, NICM,  mod-severe MR, HTN, anemia  Clinical Indicators: progressive SOB, chest tighthness, volume overload,   NTpBNP > 35K, CXR findings 'There is pulmonary vascular congestion and mild   interstitial edema. There are small pleural effusions.  2. Bibasilar opacities medially in each lower lobe left greater than right are  likely combination of atelectasis and edema.'     Cardiology noted ' acute on chronic systolic ht failure'   Cardiology note '02. Other cardiomyopathies - I42.8  Chronic.  His ejection fraction is 35% or less.  No congestive heart failure   manifestations.  Still no interest in ICD.  OK for d/c from cardiac standpoint; F/U with me in 1 month  Future add toprol XL and ARB, IF BP allows.'  Treatment: Hemodialysis, supplemental O2, O2 challenge, Cardiology &   Nephrology consult.  Options provided:  -- Chronic  combined systolic and diastolic HF confirmed and Acute on chronic   Combined CHF ruled out.  -- Acute on chronic diastolic HF confirmed.  -- Other - I will add my own diagnosis  -- Disagree - Not applicable / Not valid  -- Disagree - Clinically unable to determine / Unknown  -- Refer to Clinical Documentation Reviewer    PROVIDER RESPONSE

## 2024-10-04 ENCOUNTER — OFFICE VISIT (OUTPATIENT)
Age: 79
End: 2024-10-04

## 2024-10-04 VITALS
RESPIRATION RATE: 16 BRPM | DIASTOLIC BLOOD PRESSURE: 78 MMHG | TEMPERATURE: 97.5 F | SYSTOLIC BLOOD PRESSURE: 137 MMHG | BODY MASS INDEX: 19.67 KG/M2 | WEIGHT: 145.2 LBS | OXYGEN SATURATION: 98 % | HEIGHT: 72 IN | HEART RATE: 81 BPM

## 2024-10-04 DIAGNOSIS — Z87.19 HISTORY OF GASTRITIS: ICD-10-CM

## 2024-10-04 DIAGNOSIS — F51.04 PSYCHOPHYSIOLOGICAL INSOMNIA: ICD-10-CM

## 2024-10-04 DIAGNOSIS — R00.2 PALPITATIONS: Primary | ICD-10-CM

## 2024-10-04 DIAGNOSIS — Z99.2 ESRD ON HEMODIALYSIS (HCC): ICD-10-CM

## 2024-10-04 DIAGNOSIS — N18.6 ESRD ON HEMODIALYSIS (HCC): ICD-10-CM

## 2024-10-04 DIAGNOSIS — I50.9 CHRONIC CONGESTIVE HEART FAILURE, UNSPECIFIED HEART FAILURE TYPE (HCC): ICD-10-CM

## 2024-10-04 DIAGNOSIS — J44.9 CHRONIC OBSTRUCTIVE PULMONARY DISEASE, UNSPECIFIED COPD TYPE (HCC): ICD-10-CM

## 2024-10-04 RX ORDER — PRAVASTATIN SODIUM 10 MG
10 TABLET ORAL DAILY
Qty: 90 TABLET | Refills: 1 | Status: SHIPPED | OUTPATIENT
Start: 2024-10-04

## 2024-10-04 RX ORDER — OMEPRAZOLE 40 MG/1
40 CAPSULE, DELAYED RELEASE ORAL 2 TIMES DAILY
Qty: 90 CAPSULE | Refills: 1 | Status: SHIPPED | OUTPATIENT
Start: 2024-10-04

## 2024-10-04 RX ORDER — ALBUTEROL SULFATE 90 UG/1
INHALANT RESPIRATORY (INHALATION)
Qty: 8.5 EACH | Refills: 1 | Status: SHIPPED | OUTPATIENT
Start: 2024-10-04

## 2024-10-04 SDOH — ECONOMIC STABILITY: FOOD INSECURITY: WITHIN THE PAST 12 MONTHS, THE FOOD YOU BOUGHT JUST DIDN'T LAST AND YOU DIDN'T HAVE MONEY TO GET MORE.: NEVER TRUE

## 2024-10-04 SDOH — ECONOMIC STABILITY: FOOD INSECURITY: WITHIN THE PAST 12 MONTHS, YOU WORRIED THAT YOUR FOOD WOULD RUN OUT BEFORE YOU GOT MONEY TO BUY MORE.: NEVER TRUE

## 2024-10-04 SDOH — ECONOMIC STABILITY: INCOME INSECURITY: HOW HARD IS IT FOR YOU TO PAY FOR THE VERY BASICS LIKE FOOD, HOUSING, MEDICAL CARE, AND HEATING?: NOT HARD AT ALL

## 2024-10-04 ASSESSMENT — PATIENT HEALTH QUESTIONNAIRE - PHQ9
SUM OF ALL RESPONSES TO PHQ9 QUESTIONS 1 & 2: 0
2. FEELING DOWN, DEPRESSED OR HOPELESS: NOT AT ALL
SUM OF ALL RESPONSES TO PHQ QUESTIONS 1-9: 0
SUM OF ALL RESPONSES TO PHQ QUESTIONS 1-9: 0
1. LITTLE INTEREST OR PLEASURE IN DOING THINGS: NOT AT ALL
SUM OF ALL RESPONSES TO PHQ QUESTIONS 1-9: 0
SUM OF ALL RESPONSES TO PHQ QUESTIONS 1-9: 0

## 2024-10-04 NOTE — PROGRESS NOTES
Chief Complaint   Patient presents with    Follow-Up from Hospital       Vitals:    10/04/24 0926   BP: 137/78   Pulse: 81   Resp: 16   Temp: 97.5 °F (36.4 °C)   SpO2: 98%   \"Have you been to the ER, urgent care clinic since your last visit?  Hospitalized since your last visit?\"    YES - When: approximately 2  weeks ago.  Where and Why: [facility] Tachycardia and shortness of breath.    “Have you seen or consulted any other health care providers outside our system since your last visit?”    NO

## 2024-10-04 NOTE — PROGRESS NOTES
Subjective:     CC: WILIAM    Darrel Patterson is a 79 y.o. male with ESRD on HD, COPD, CHF, and recurrent GI bleeds, who presents today for another hospital follow up.     He is accompanied by his daughter Nadine.    He also has a son, Delon.    He was admitted to [facility] on 9-20-24 through 9-23-24 for SOB and palpitations. HR was irregular. He was placed on telemetry. TSH was normal. Symptoms improved after HD. Dx'd with PVC's. Midodrine was D/C'd due to elevated BP. He was discharged home.     Today he is any more palpitations.  Denies any chest pain or chest pressure.  He has chronic shortness of breath and needs a refill on his albuterol inhaler that he uses for his COPD.  Denies coughing or wheezing.    He has home oxygen that he uses as needed. Uses it on occasion.    Has follow up an upcoming follow up appt with his cardiologist.    Since he has not been sleeping well at night and today I advised him to try over-the-counter melatonin.  I did inform him that many of the sleep aids can impact his heart.        Prior to this he was admitted to [facility] on 7/9/24 for vasovagal syncope secondary to acute blood loss anemia with recurrent lower GI bleeds and hypotension. He was given another unit of blood. Underwent colonoscopy withOUT apparent source of bleeding. Internal hemorrhoids and extensive diverticulosis were noted. GI recommended transfusing as needed. They believe his hypotension is likely secondary to poor cardiac and renal function (EF 15% and ESRD unable to undergo dialysis due to low blood pressures).   This is when the Midodrine was started that he is no longer taking.    He followed up with Sarah Driver NP, 8/21/24. Felt better since discharge with no rectal bleeding. Was advised to continue PPI BID and taper down prednisone. Advised to decrease omeprazole to once daily. Follow up EGD in 5 years.     Today he states he has not seen any more blood in his stool.  Refill on his omeprazole 40 mg

## 2024-10-07 PROCEDURE — 99285 EMERGENCY DEPT VISIT HI MDM: CPT

## 2024-10-08 ENCOUNTER — APPOINTMENT (OUTPATIENT)
Facility: HOSPITAL | Age: 79
End: 2024-10-08
Payer: MEDICARE

## 2024-10-08 ENCOUNTER — HOSPITAL ENCOUNTER (EMERGENCY)
Facility: HOSPITAL | Age: 79
Discharge: ANOTHER HOSPITAL | End: 2024-10-08
Attending: EMERGENCY MEDICINE
Payer: MEDICARE

## 2024-10-08 ENCOUNTER — HOSPITAL ENCOUNTER (INPATIENT)
Facility: HOSPITAL | Age: 79
LOS: 3 days | Discharge: HOME | End: 2024-10-11
Attending: STUDENT IN AN ORGANIZED HEALTH CARE EDUCATION/TRAINING PROGRAM | Admitting: INTERNAL MEDICINE
Payer: MEDICARE

## 2024-10-08 VITALS
TEMPERATURE: 97.9 F | HEIGHT: 72 IN | BODY MASS INDEX: 19.67 KG/M2 | SYSTOLIC BLOOD PRESSURE: 122 MMHG | HEART RATE: 73 BPM | WEIGHT: 145.2 LBS | RESPIRATION RATE: 14 BRPM | OXYGEN SATURATION: 100 % | DIASTOLIC BLOOD PRESSURE: 89 MMHG

## 2024-10-08 DIAGNOSIS — Z99.2 ESRD NEEDING DIALYSIS (HCC): Primary | ICD-10-CM

## 2024-10-08 DIAGNOSIS — N18.6 ESRD NEEDING DIALYSIS (HCC): Primary | ICD-10-CM

## 2024-10-08 DIAGNOSIS — E87.5 HYPERKALEMIA: ICD-10-CM

## 2024-10-08 DIAGNOSIS — E87.70 HYPERVOLEMIA, UNSPECIFIED HYPERVOLEMIA TYPE: ICD-10-CM

## 2024-10-08 DIAGNOSIS — E16.2 HYPOGLYCEMIA: ICD-10-CM

## 2024-10-08 LAB
ALBUMIN SERPL-MCNC: 3.6 G/DL (ref 3.5–5)
ALBUMIN/GLOB SERPL: 0.9 (ref 1.1–2.2)
ALP SERPL-CCNC: 115 U/L (ref 45–117)
ALT SERPL-CCNC: 27 U/L (ref 12–78)
ANION GAP SERPL CALC-SCNC: 16 MMOL/L (ref 2–12)
AST SERPL-CCNC: 40 U/L (ref 15–37)
BASOPHILS # BLD: 0.1 K/UL (ref 0–0.1)
BASOPHILS NFR BLD: 1 % (ref 0–1)
BILIRUB SERPL-MCNC: 1 MG/DL (ref 0.2–1)
BUN SERPL-MCNC: 49 MG/DL (ref 6–20)
BUN/CREAT SERPL: 6 (ref 12–20)
CALCIUM SERPL-MCNC: 9.6 MG/DL (ref 8.5–10.1)
CHLORIDE SERPL-SCNC: 98 MMOL/L (ref 97–108)
CO2 SERPL-SCNC: 28 MMOL/L (ref 21–32)
CREAT SERPL-MCNC: 8.84 MG/DL (ref 0.7–1.3)
DIFFERENTIAL METHOD BLD: ABNORMAL
EOSINOPHIL # BLD: 0.2 K/UL (ref 0–0.4)
EOSINOPHIL NFR BLD: 2 % (ref 0–7)
ERYTHROCYTE [DISTWIDTH] IN BLOOD BY AUTOMATED COUNT: 18.9 % (ref 11.5–14.5)
EST. AVERAGE GLUCOSE BLD GHB EST-MCNC: 103 MG/DL
FLUAV RNA SPEC QL NAA+PROBE: NOT DETECTED
FLUBV RNA SPEC QL NAA+PROBE: NOT DETECTED
GLOBULIN SER CALC-MCNC: 3.8 G/DL (ref 2–4)
GLUCOSE BLD STRIP.AUTO-MCNC: 106 MG/DL (ref 65–117)
GLUCOSE BLD STRIP.AUTO-MCNC: 112 MG/DL (ref 65–117)
GLUCOSE BLD STRIP.AUTO-MCNC: 113 MG/DL (ref 65–117)
GLUCOSE BLD STRIP.AUTO-MCNC: 120 MG/DL (ref 65–117)
GLUCOSE BLD STRIP.AUTO-MCNC: 120 MG/DL (ref 65–117)
GLUCOSE BLD STRIP.AUTO-MCNC: 23 MG/DL (ref 65–117)
GLUCOSE BLD STRIP.AUTO-MCNC: 72 MG/DL (ref 65–117)
GLUCOSE BLD STRIP.AUTO-MCNC: 77 MG/DL (ref 65–117)
GLUCOSE BLD STRIP.AUTO-MCNC: 86 MG/DL (ref 65–117)
GLUCOSE BLD STRIP.AUTO-MCNC: 89 MG/DL (ref 65–117)
GLUCOSE BLD STRIP.AUTO-MCNC: 92 MG/DL (ref 65–117)
GLUCOSE BLD STRIP.AUTO-MCNC: 95 MG/DL (ref 65–117)
GLUCOSE BLD STRIP.AUTO-MCNC: 99 MG/DL (ref 65–117)
GLUCOSE SERPL-MCNC: 24 MG/DL (ref 65–100)
HBA1C MFR BLD: 5.2 % (ref 4–5.6)
HCT VFR BLD AUTO: 39.1 % (ref 36.6–50.3)
HGB BLD-MCNC: 12.7 G/DL (ref 12.1–17)
IMM GRANULOCYTES # BLD AUTO: 0 K/UL (ref 0–0.04)
IMM GRANULOCYTES NFR BLD AUTO: 0 % (ref 0–0.5)
LYMPHOCYTES # BLD: 2.6 K/UL (ref 0.8–3.5)
LYMPHOCYTES NFR BLD: 26 % (ref 12–49)
MAGNESIUM SERPL-MCNC: 2.7 MG/DL (ref 1.6–2.4)
MCH RBC QN AUTO: 31.2 PG (ref 26–34)
MCHC RBC AUTO-ENTMCNC: 32.5 G/DL (ref 30–36.5)
MCV RBC AUTO: 96.1 FL (ref 80–99)
MONOCYTES # BLD: 0.9 K/UL (ref 0–1)
MONOCYTES NFR BLD: 9 % (ref 5–13)
NEUTS SEG # BLD: 6.1 K/UL (ref 1.8–8)
NEUTS SEG NFR BLD: 62 % (ref 32–75)
NRBC # BLD: 0.02 K/UL (ref 0–0.01)
NRBC BLD-RTO: 0.2 PER 100 WBC
PLATELET # BLD AUTO: 276 K/UL (ref 150–400)
PMV BLD AUTO: 10.9 FL (ref 8.9–12.9)
POTASSIUM SERPL-SCNC: 6 MMOL/L (ref 3.5–5.1)
PROT SERPL-MCNC: 7.4 G/DL (ref 6.4–8.2)
RBC # BLD AUTO: 4.07 M/UL (ref 4.1–5.7)
SARS-COV-2 RNA RESP QL NAA+PROBE: NOT DETECTED
SERVICE CMNT-IMP: ABNORMAL
SERVICE CMNT-IMP: NORMAL
SODIUM SERPL-SCNC: 142 MMOL/L (ref 136–145)
SOURCE: NORMAL
TROPONIN I SERPL HS-MCNC: 111 NG/L (ref 0–76)
TROPONIN I SERPL HS-MCNC: 98 NG/L (ref 0–76)
WBC # BLD AUTO: 9.9 K/UL (ref 4.1–11.1)

## 2024-10-08 PROCEDURE — 93005 ELECTROCARDIOGRAM TRACING: CPT | Performed by: EMERGENCY MEDICINE

## 2024-10-08 PROCEDURE — 82962 GLUCOSE BLOOD TEST: CPT

## 2024-10-08 PROCEDURE — 84484 ASSAY OF TROPONIN QUANT: CPT

## 2024-10-08 PROCEDURE — 6370000000 HC RX 637 (ALT 250 FOR IP): Performed by: INTERNAL MEDICINE

## 2024-10-08 PROCEDURE — 2580000003 HC RX 258: Performed by: INTERNAL MEDICINE

## 2024-10-08 PROCEDURE — 94640 AIRWAY INHALATION TREATMENT: CPT

## 2024-10-08 PROCEDURE — 83735 ASSAY OF MAGNESIUM: CPT

## 2024-10-08 PROCEDURE — 6370000000 HC RX 637 (ALT 250 FOR IP): Performed by: EMERGENCY MEDICINE

## 2024-10-08 PROCEDURE — 90935 HEMODIALYSIS ONE EVALUATION: CPT

## 2024-10-08 PROCEDURE — 36415 COLL VENOUS BLD VENIPUNCTURE: CPT

## 2024-10-08 PROCEDURE — 80053 COMPREHEN METABOLIC PANEL: CPT

## 2024-10-08 PROCEDURE — 2060000000 HC ICU INTERMEDIATE R&B

## 2024-10-08 PROCEDURE — 6370000000 HC RX 637 (ALT 250 FOR IP): Performed by: NURSE PRACTITIONER

## 2024-10-08 PROCEDURE — 85025 COMPLETE CBC W/AUTO DIFF WBC: CPT

## 2024-10-08 PROCEDURE — 6360000002 HC RX W HCPCS: Performed by: INTERNAL MEDICINE

## 2024-10-08 PROCEDURE — 5A1D70Z PERFORMANCE OF URINARY FILTRATION, INTERMITTENT, LESS THAN 6 HOURS PER DAY: ICD-10-PCS | Performed by: INTERNAL MEDICINE

## 2024-10-08 PROCEDURE — 1100000003 HC PRIVATE W/ TELEMETRY

## 2024-10-08 PROCEDURE — 71045 X-RAY EXAM CHEST 1 VIEW: CPT

## 2024-10-08 PROCEDURE — 87636 SARSCOV2 & INF A&B AMP PRB: CPT

## 2024-10-08 PROCEDURE — 83036 HEMOGLOBIN GLYCOSYLATED A1C: CPT

## 2024-10-08 PROCEDURE — 2500000003 HC RX 250 WO HCPCS: Performed by: EMERGENCY MEDICINE

## 2024-10-08 RX ORDER — PRAVASTATIN SODIUM 10 MG
10 TABLET ORAL DAILY
Status: DISCONTINUED | OUTPATIENT
Start: 2024-10-08 | End: 2024-10-11 | Stop reason: HOSPADM

## 2024-10-08 RX ORDER — ONDANSETRON 4 MG/1
4 TABLET, ORALLY DISINTEGRATING ORAL EVERY 8 HOURS PRN
Status: DISCONTINUED | OUTPATIENT
Start: 2024-10-08 | End: 2024-10-11 | Stop reason: HOSPADM

## 2024-10-08 RX ORDER — SODIUM CHLORIDE 0.9 % (FLUSH) 0.9 %
5-40 SYRINGE (ML) INJECTION PRN
Status: DISCONTINUED | OUTPATIENT
Start: 2024-10-08 | End: 2024-10-11 | Stop reason: HOSPADM

## 2024-10-08 RX ORDER — SEVELAMER CARBONATE 800 MG/1
1600 TABLET, FILM COATED ORAL
Status: DISCONTINUED | OUTPATIENT
Start: 2024-10-08 | End: 2024-10-11 | Stop reason: HOSPADM

## 2024-10-08 RX ORDER — IPRATROPIUM BROMIDE AND ALBUTEROL SULFATE 2.5; .5 MG/3ML; MG/3ML
1 SOLUTION RESPIRATORY (INHALATION)
Status: COMPLETED | OUTPATIENT
Start: 2024-10-08 | End: 2024-10-08

## 2024-10-08 RX ORDER — SODIUM CHLORIDE 9 MG/ML
INJECTION, SOLUTION INTRAVENOUS PRN
Status: DISCONTINUED | OUTPATIENT
Start: 2024-10-08 | End: 2024-10-11 | Stop reason: HOSPADM

## 2024-10-08 RX ORDER — DORZOLAMIDE HYDROCHLORIDE AND TIMOLOL MALEATE 20; 5 MG/ML; MG/ML
1 SOLUTION/ DROPS OPHTHALMIC 3 TIMES DAILY
COMMUNITY

## 2024-10-08 RX ORDER — BUDESONIDE 0.25 MG/2ML
0.25 INHALANT ORAL
Status: DISCONTINUED | OUTPATIENT
Start: 2024-10-08 | End: 2024-10-11

## 2024-10-08 RX ORDER — DEXTROSE MONOHYDRATE 25 G/50ML
25 INJECTION, SOLUTION INTRAVENOUS
Status: COMPLETED | OUTPATIENT
Start: 2024-10-08 | End: 2024-10-08

## 2024-10-08 RX ORDER — HEPARIN SODIUM 5000 [USP'U]/ML
5000 INJECTION, SOLUTION INTRAVENOUS; SUBCUTANEOUS EVERY 8 HOURS SCHEDULED
Status: DISCONTINUED | OUTPATIENT
Start: 2024-10-08 | End: 2024-10-11 | Stop reason: HOSPADM

## 2024-10-08 RX ORDER — ALBUMIN (HUMAN) 12.5 G/50ML
25 SOLUTION INTRAVENOUS PRN
Status: DISCONTINUED | OUTPATIENT
Start: 2024-10-08 | End: 2024-10-11 | Stop reason: HOSPADM

## 2024-10-08 RX ORDER — ACETAMINOPHEN 325 MG/1
650 TABLET ORAL EVERY 6 HOURS PRN
Status: DISCONTINUED | OUTPATIENT
Start: 2024-10-08 | End: 2024-10-11 | Stop reason: HOSPADM

## 2024-10-08 RX ORDER — SODIUM CHLORIDE 0.9 % (FLUSH) 0.9 %
5-40 SYRINGE (ML) INJECTION EVERY 12 HOURS SCHEDULED
Status: DISCONTINUED | OUTPATIENT
Start: 2024-10-08 | End: 2024-10-11 | Stop reason: HOSPADM

## 2024-10-08 RX ORDER — GLUCAGON 1 MG/ML
1 KIT INJECTION PRN
Status: DISCONTINUED | OUTPATIENT
Start: 2024-10-08 | End: 2024-10-11 | Stop reason: HOSPADM

## 2024-10-08 RX ORDER — ALBUTEROL SULFATE 0.83 MG/ML
2.5 SOLUTION RESPIRATORY (INHALATION) EVERY 6 HOURS PRN
Status: DISCONTINUED | OUTPATIENT
Start: 2024-10-08 | End: 2024-10-11 | Stop reason: HOSPADM

## 2024-10-08 RX ORDER — ACETAMINOPHEN 650 MG/1
650 SUPPOSITORY RECTAL EVERY 6 HOURS PRN
Status: DISCONTINUED | OUTPATIENT
Start: 2024-10-08 | End: 2024-10-11 | Stop reason: HOSPADM

## 2024-10-08 RX ORDER — DORZOLAMIDE HYDROCHLORIDE AND TIMOLOL MALEATE 20; 5 MG/ML; MG/ML
1 SOLUTION/ DROPS OPHTHALMIC 3 TIMES DAILY
Status: DISCONTINUED | OUTPATIENT
Start: 2024-10-08 | End: 2024-10-11 | Stop reason: HOSPADM

## 2024-10-08 RX ORDER — LIDOCAINE 4 G/G
1 PATCH TOPICAL
Status: DISCONTINUED | OUTPATIENT
Start: 2024-10-08 | End: 2024-10-11 | Stop reason: HOSPADM

## 2024-10-08 RX ORDER — ONDANSETRON 2 MG/ML
4 INJECTION INTRAMUSCULAR; INTRAVENOUS EVERY 6 HOURS PRN
Status: DISCONTINUED | OUTPATIENT
Start: 2024-10-08 | End: 2024-10-11 | Stop reason: HOSPADM

## 2024-10-08 RX ORDER — MIRTAZAPINE 15 MG/1
7.5 TABLET, FILM COATED ORAL NIGHTLY
Status: DISCONTINUED | OUTPATIENT
Start: 2024-10-08 | End: 2024-10-11 | Stop reason: HOSPADM

## 2024-10-08 RX ORDER — GABAPENTIN 300 MG/1
300 CAPSULE ORAL
Status: DISCONTINUED | OUTPATIENT
Start: 2024-10-08 | End: 2024-10-11 | Stop reason: HOSPADM

## 2024-10-08 RX ORDER — ALBUTEROL SULFATE 90 UG/1
1 INHALANT RESPIRATORY (INHALATION) EVERY 6 HOURS PRN
Status: DISCONTINUED | OUTPATIENT
Start: 2024-10-08 | End: 2024-10-08

## 2024-10-08 RX ORDER — POLYETHYLENE GLYCOL 3350 17 G/17G
17 POWDER, FOR SOLUTION ORAL DAILY PRN
Status: DISCONTINUED | OUTPATIENT
Start: 2024-10-08 | End: 2024-10-11 | Stop reason: HOSPADM

## 2024-10-08 RX ORDER — ARFORMOTEROL TARTRATE 15 UG/2ML
15 SOLUTION RESPIRATORY (INHALATION)
Status: DISCONTINUED | OUTPATIENT
Start: 2024-10-08 | End: 2024-10-11

## 2024-10-08 RX ORDER — DEXTROSE MONOHYDRATE 100 MG/ML
INJECTION, SOLUTION INTRAVENOUS CONTINUOUS PRN
Status: DISCONTINUED | OUTPATIENT
Start: 2024-10-08 | End: 2024-10-11 | Stop reason: HOSPADM

## 2024-10-08 RX ADMIN — SEVELAMER CARBONATE 1600 MG: 800 TABLET, FILM COATED ORAL at 17:22

## 2024-10-08 RX ADMIN — SODIUM CHLORIDE, PRESERVATIVE FREE 10 ML: 5 INJECTION INTRAVENOUS at 11:25

## 2024-10-08 RX ADMIN — DORZOLAMIDE HYDROCHLORIDE AND TIMOLOL MALEATE 1 DROP: 20; 5 SOLUTION/ DROPS OPHTHALMIC at 20:50

## 2024-10-08 RX ADMIN — ACETAMINOPHEN 650 MG: 325 TABLET ORAL at 17:22

## 2024-10-08 RX ADMIN — SODIUM ZIRCONIUM CYCLOSILICATE 10 G: 5 POWDER, FOR SUSPENSION ORAL at 01:41

## 2024-10-08 RX ADMIN — GABAPENTIN 300 MG: 300 CAPSULE ORAL at 20:55

## 2024-10-08 RX ADMIN — IPRATROPIUM BROMIDE AND ALBUTEROL SULFATE 1 DOSE: .5; 2.5 SOLUTION RESPIRATORY (INHALATION) at 00:22

## 2024-10-08 RX ADMIN — BUDESONIDE 250 MCG: 0.25 INHALANT RESPIRATORY (INHALATION) at 20:41

## 2024-10-08 RX ADMIN — MIRTAZAPINE 7.5 MG: 15 TABLET, FILM COATED ORAL at 20:55

## 2024-10-08 RX ADMIN — DORZOLAMIDE HYDROCHLORIDE AND TIMOLOL MALEATE 1 DROP: 20; 5 SOLUTION/ DROPS OPHTHALMIC at 17:22

## 2024-10-08 RX ADMIN — HEPARIN SODIUM 5000 UNITS: 5000 INJECTION INTRAVENOUS; SUBCUTANEOUS at 21:47

## 2024-10-08 RX ADMIN — SODIUM CHLORIDE, PRESERVATIVE FREE 10 ML: 5 INJECTION INTRAVENOUS at 20:54

## 2024-10-08 RX ADMIN — IPRATROPIUM BROMIDE 0.5 MG: 0.5 SOLUTION RESPIRATORY (INHALATION) at 20:46

## 2024-10-08 RX ADMIN — PRAVASTATIN SODIUM 10 MG: 10 TABLET ORAL at 17:26

## 2024-10-08 RX ADMIN — DEXTROSE MONOHYDRATE 25 G: 25 INJECTION, SOLUTION INTRAVENOUS at 00:24

## 2024-10-08 RX ADMIN — HEPARIN SODIUM 5000 UNITS: 5000 INJECTION INTRAVENOUS; SUBCUTANEOUS at 17:21

## 2024-10-08 RX ADMIN — ARFORMOTEROL TARTRATE 15 MCG: 15 SOLUTION RESPIRATORY (INHALATION) at 20:41

## 2024-10-08 ASSESSMENT — PAIN DESCRIPTION - DESCRIPTORS: DESCRIPTORS: ACHING

## 2024-10-08 ASSESSMENT — PAIN SCALES - GENERAL
PAINLEVEL_OUTOF10: 0
PAINLEVEL_OUTOF10: 3
PAINLEVEL_OUTOF10: 0
PAINLEVEL_OUTOF10: 0
PAINLEVEL_OUTOF10: 3
PAINLEVEL_OUTOF10: 0

## 2024-10-08 ASSESSMENT — PAIN DESCRIPTION - ORIENTATION: ORIENTATION: MID

## 2024-10-08 ASSESSMENT — PAIN DESCRIPTION - LOCATION: LOCATION: HEAD

## 2024-10-08 ASSESSMENT — PAIN - FUNCTIONAL ASSESSMENT: PAIN_FUNCTIONAL_ASSESSMENT: 0-10

## 2024-10-08 NOTE — PROGRESS NOTES
0730: TRANSFER - IN REPORT:    Verbal report received from RN on Darrel Patterson  being received from [facility] for change in patient condition (Dialysis)      Report consisted of patient's Situation, Background, Assessment and   Recommendations(SBAR).     Information from the following report(s) Nurse Handoff Report, Index, ED Encounter Summary, ED SBAR, Adult Overview, Intake/Output, MAR, Recent Results, and Cardiac Rhythm NSR w/ mult. PVC's  was reviewed with the receiving nurse.    Opportunity for questions and clarification was provided.      Assessment completed upon patient's arrival to unit and care assumed.      1930: End of Shift Note    Bedside shift change report given to RN (oncoming nurse) by Augie Fowler RN (offgoing nurse).  Report included the following information SBAR, Kardex, Intake/Output, MAR, Recent Results, and Cardiac Rhythm NSR w/ PVC's    Shift worked:  0830-1930     Shift summary and any significant changes:     See above note     Concerns for physician to address:  N/A     Zone phone for oncoming shift:          Activity:     Number times ambulated in hallways past shift: 0  Number of times OOB to chair past shift: 0    Cardiac:   Cardiac Monitoring: Yes           Access:  Current line(s): PIV and HD access     Genitourinary:   Urinary status: oliguric    Respiratory:      Chronic home O2 use?: YES  Incentive spirometer at bedside: YES       GI:     Current diet:  ADULT DIET; Regular  Passing flatus: YES  Tolerating current diet: YES       Pain Management:   Patient states pain is manageable on current regimen: YES    Skin:     Interventions: float heels and increase time out of bed    Patient Safety:  Fall Score:    Interventions: bed/chair alarm, assistive device (walker, cane. etc), gripper socks, pt to call before getting OOB, and stay with me (per policy)       Length of Stay:  Expected LOS: 2  Actual LOS: 0      Augie Fowler RN

## 2024-10-08 NOTE — H&P
Hospitalist Admission Note    NAME:   Darrel Patterson   : 1945   MRN: [id number]     Date/Time: 10/8/2024 2:42 PM    Patient PCP: Roma Solitario, ELVI - NP    ______________________________________________________________________  Given the patient's current clinical presentation, I have a high level of concern for decompensation if discharged from the emergency department.  Complex decision making was performed, which includes reviewing the patient's available past medical records, laboratory results, and x-ray films.       My assessment of this patient's clinical condition and my plan of care is as follows.    Assessment / Plan:      Acute hypoxic respiratory failure  Hypoglycemia  Acute on chronic systolic heart failure, EF 15-20%    -Glucose 24 on arrival to [facility] ED,not on any sulfonylurea or insulin  -Improved with D50  -Possibly from cardiac failure/ESRD  -Check A1c  -Hypoglycemic protocol  -Check insulin, C-peptide, A1c, blood culture    -Consult cards  -May benefit from cardiac meds, but in the past  BP tolerance was issue.    Mild hyperkalemia, K6.0  ESRD on hemodialysis  -Currently getting HD, that should help with potassium  -Volume management as per nephro      COPD, continue home inhaler      Insomnia, continue mirtazapine      HLD, continue statin          Medical Decision Making:   I personally reviewed labs: CBC/BMP  I personally reviewed imaging: Chest x-ray  I personally reviewed EKG:  Toxic drug monitoring:   Discussed case with: ED provider. After discussion I am in agreement that acuity of patient's medical condition necessitates hospital stay.      Code Status: Full code  DVT Prophylaxis: Heparin   Baseline:     Subjective:   CHIEF COMPLAINT: Transfer for hypoglycemia and CHF    HISTORY OF PRESENT ILLNESS:     Darrel Patterson is a 79 y.o.  male with PMHx significant for anemia, rheumatoid arthritis, ESRD on hemodialysis, HTN, CAD, CHF with EF 15 to 20% who is a transfer from [facility]

## 2024-10-08 NOTE — PROGRESS NOTES
Pharmacy Medication Reconciliation     The patient's daughter Nadine was interviewed regarding current PTA medication list, use and drug allergies; family members present in room and obtained permission from patient to discuss drug regimen with visitor(s) present. The patient was questioned regarding use of any other inhalers, topical products, over the counter medications, herbal medications, vitamin products or ophthalmic/nasal/otic medication use.     Allergy Update: Patient has no known allergies.    Recommendations/Findings:   The following amendments were made to the patient's active medication list on file at [facility]:   1) Deletions:   - vitamin D 1000    2) Changes:   - omeprazole: BID -> QD in am   - lidocaine: 5% -> 4%     Pertinent Findings:   - Trelegy: per chart review, either for asthma or COPD, stepped up therapy from Symbicort. Nadine does not recall PFT ever completed for patient. Patient reports needing more medication after taking a dose.     -> Unsure of patient's inspiratory function, would recommend switching from Trelegy to Breztri (HFA inhaler) for easier administration.   - metoprolol ER 25mg daily and Entresto 49/51mg bid on hold due to hypotension. Patient has appointment with Dr. Uriostegui (Jake) 10/23 to re-evaluate.   - Prednisone: patient was on this chronically for RA (pain in shoulder and knee). Was decided to taper off and finally off of steroid as of end of September.   - Midodrine: stopped by PCP on 10/4 due to hypertension.  - Patient's daughter Nadine keeps track of everything. Could even recall when meds were changed.    Clarified PTA med list with daughter. PTA medication list was corrected to the following:     Prior to Admission Medications   Prescriptions Last Dose Informant   Ergocalciferol (VITAMIN D) 68264 units CAPS Past Week Family Member   Sig: Take 50,000 Units by mouth once a week   albuterol sulfate HFA (PROVENTIL;VENTOLIN;PROAIR) 108 (90 Base) MCG/ACT inhaler 10/7/2024  Family Member   Sig: INHALE 2 PUFFS BY INHALATION EVERY 4 HOURS AS NEEDED FOR WHEEZING.   dorzolamide-timolol (COSOPT) 2-0.5 % ophthalmic solution 10/7/2024 Family Member   Sig: Place 1 drop into both eyes in the morning, at noon, and at bedtime   ferrous sulfate (IRON 325) 325 (65 Fe) MG tablet 10/7/2024 Family Member   Sig: Take 1 tablet by mouth every morning (before breakfast)   fluticasone-umeclidin-vilant (TRELEGY ELLIPTA) 100-62.5-25 MCG/ACT AEPB inhaler 10/7/2024 Family Member   Sig: Inhale 1 puff into the lungs daily   gabapentin (NEURONTIN) 300 MG capsule 10/7/2024 Family Member   Sig: Take 1 capsule by mouth nightly for 90 days. Max Daily Amount: 300 mg   lidocaine (LIDODERM) 4% Past Week Family Member   Si patch daily as needed (shoulder pain)   mirtazapine (REMERON) 7.5 MG tablet Past Week Family Member   Sig: Take 1 tablet by mouth nightly   montelukast (SINGULAIR) 10 MG tablet 10/7/2024 Family Member   Sig: TAKE 1 TABLET BY MOUTH EVERY DAY   omeprazole (PRILOSEC) 40 MG delayed release capsule 10/7/2024 Family Member   Sig: Take 1 capsule by mouth in the morning and at bedtime   Patient taking differently: Take 1 capsule by mouth daily   pravastatin (PRAVACHOL) 10 MG tablet 10/7/2024 Family Member   Sig: Take 1 tablet by mouth daily   sevelamer (RENVELA) 800 MG tablet 10/7/2024 Family Member   Sig: Take 2 tablets by mouth 3 times daily (with meals)      Facility-Administered Medications: None        Thank you,  Myriam Yang, [facility]

## 2024-10-08 NOTE — ED NOTES
TRANSFER - OUT REPORT:    Verbal report given to Terri Moffett RN on Darrel Patterson  being transferred to [facility] for routine progression of patient care       Report consisted of patient's Situation, Background, Assessment and   Recommendations(SBAR).     Information from the following report(s) Nurse Handoff Report, ED Encounter Summary, ED SBAR, Intake/Output, MAR, Recent Results, Med Rec Status, Cardiac Rhythm Sinus Rhythm with PVCs, and Neuro Assessment was reviewed with the receiving nurse.    [place] Fall Assessment:    Presents to emergency department  because of falls (Syncope, seizure, or loss of consciousness): No  Age > 70: Yes  Altered Mental Status, Intoxication with alcohol or substance confusion (Disorientation, impaired judgment, poor safety awaremess, or inability to follow instructions): Yes  Impaired Mobility: Ambulates or transfers with assistive devices or assistance; Unable to ambulate or transer.: Yes  Nursing Judgement: Yes          Lines:   Peripheral IV 10/08/24 Right Antecubital (Active)   Site Assessment Clean, dry & intact 10/08/24 0005   Line Status Brisk blood return;Capped;Flushed;Normal saline locked;Specimen collected 10/08/24 0005   Line Care Connections checked and tightened 10/08/24 0005   Phlebitis Assessment No symptoms 10/08/24 0005   Infiltration Assessment 0 10/08/24 0005   Dressing Status New dressing applied;Clean, dry & intact 10/08/24 0005   Dressing Type Transparent 10/08/24 0005   Dressing Intervention New 10/08/24 0005        Opportunity for questions and clarification was provided.      Patient transported with:  Monitor, Cardiac Monitor, Oxygen 2L/min, [facility]

## 2024-10-08 NOTE — ED NOTES
Terri with [facility] access line called with bed assignment for this patinet and nurse report number. Bed # 2123, Report # [phone]

## 2024-10-08 NOTE — ED NOTES
[facility] dispatch notified of ALS transport from [facility] ED bed #3 to [facility] bed 2123. Spoke with Héctor. Provided information needed for transport reservation (demographics, name, , weight, insurance information, cardiac monitor, oxygen, no special precautions or isolation, and Dx of ESRD with mild pulmonary edema with need for dialysis). Informed by dispatch that the [facility] crew was 90 minutes from hospital. ED nurse, CHADD Carrasquillo notified.

## 2024-10-08 NOTE — PLAN OF CARE
Problem: Chronic Conditions and Co-morbidities  Goal: Patient's chronic conditions and co-morbidity symptoms are monitored and maintained or improved  Outcome: Progressing  Flowsheets (Taken 10/8/2024 0826)  Care Plan - Patient's Chronic Conditions and Co-Morbidity Symptoms are Monitored and Maintained or Improved: Monitor and assess patient's chronic conditions and comorbid symptoms for stability, deterioration, or improvement     Problem: Discharge Planning  Goal: Discharge to home or other facility with appropriate resources  Outcome: Progressing  Flowsheets (Taken 10/8/2024 0826)  Discharge to home or other facility with appropriate resources: Identify barriers to discharge with patient and caregiver

## 2024-10-08 NOTE — ED TRIAGE NOTES
Pt arrived via EMS from home with report of shortness of breath and hypoglycemia. Respiratory 40 per EMS and FSBS: 44 - pt received oral glucose by EMS. Pt unresponsive and drooling on arrival/tachypneic.

## 2024-10-08 NOTE — ED PROVIDER NOTES
syndrome.    Will check labs and imaging including chest x-ray, CBC, CMP, troponin,.    Labs and imaging reviewed.  Chest x-ray shows pulmonary edema and small bilateral pleural effusions.  CMP shows chronic renal failure.  Potassium is elevated at 6.0.  High-sensitivity troponin was 111.  CBC unremarkable.    Patient with end-stage renal disease and fluid overload on chest x-ray with increased shortness of breath.  Also noted to have hyperkalemia with potassium of 6.0.  Treated with Lokelma.  Maintaining sats on usual 2 L nasal cannula.  Patient due for dialysis today and will need to be transferred to facility with nephrology/dialysis.  ED Course as of 10/08/24 0722   Tue Oct 08, 2024   0015 EKG at 12:10 AM on 10/8/2024 interpreted by me: Sinus rhythm with occasional PVCs, 85 bpm, normal axis, normal MN, QRS, QTc intervals, LVH with strain, T wave inversions in leads I, aVL, V4 through V6 [AO]   0226 Case discussed with Dr. Reji Shah (hospitalist at [facility]) who accepts patient as a transfer. [AO]      ED Course User Index  [AO] Sheyla Ward MD           FINAL IMPRESSION     1. ESRD needing dialysis (HCC)    2. Hypoglycemia    3. Hypervolemia, unspecified hypervolemia type    4. Hyperkalemia          DISPOSITION/PLAN   DISPOSITION    Transfer     I am the Primary Clinician of Record.   Sheyla Ward MD (electronically signed)      (Please note that parts of this dictation were completed with voice recognition software. Quite often unanticipated grammatical, syntax, homophones, and other interpretive errors are inadvertently transcribed by the computer software. Please disregards these errors. Please excuse any errors that have escaped final proofreading.)         Sheyla Ward MD  10/10/24 6915

## 2024-10-08 NOTE — FLOWSHEET NOTE
Pre Treatment:  Primary RN SBAR: Augie Fowler RN  Incapacitated Nurse [facility]. provided: to patient  Patient Education provided: HD treatment  Preferred Education method and Primary language: Verbal, English  Hospital General Consent Verified: yes  Hospital associated wait time; reason: none  Hepatitis B Surface Ag   Date/Time Value Ref Range Status   09/21/2024 11:50 AM <0.10 Index Final     Hep B S Ag Interp   Date/Time Value Ref Range Status   09/21/2024 11:50 AM Negative NEG   Final     Hep B S Ab   Date/Time Value Ref Range Status   09/21/2024 11:50 AM 6.01 mIU/mL Final     Hep B S Ab Interp   Date/Time Value Ref Range Status   09/21/2024 11:50 AM NONREACTIVE NR   Final     Comment:     (NOTE)  The [employer] Centaur Anti-HBs2 assay is traceable to the World Health   Organization (WHO) Hepatitis B Immunoglobulin 1st International   Reference Preparation (1977). Samples with a calculated value of 10   mIU/mL or greater are considered reactive (protective) in accordance   with the CDC guidelines. The accepted criteria for immunity to HBV is   anti-HBs activity greater than or equal to 10 mIU/mL, as defined by   the WHO International Reference Preparation.  Assay performance has not been established in pregnant women,   patients who are immunosuppressed or immunocompromised, nor have   performance characteristics been established in conjunction with   other 's assays for specific HBV serologic markers. This   assay does not differentiate between vaccine induced immune response   and a response due to infection with HBV. Passively acquired anti-HBs   may be identified following patient transfusion, receipt of   immunoglobulin products, etc.            10/08/24 1300   Treatment   Treatment Goal 3500 ml   Observations & Evaluations   Level of Consciousness 0   Oriented X 4   Heart Rhythm Irregular   Respiratory Quality/Effort Dyspnea with exertion   O2 Device Nasal cannula  (2 L)   Bilateral Breath Sounds

## 2024-10-08 NOTE — ED NOTES
Pt is A&Ox3, pt responds to voice. Pt is tolerating sips of sprite without difficulty. Pt denies any nausea.
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
## 2024-10-08 NOTE — ED NOTES
TRANSFER - OUT REPORT:    Verbal report given to Jadon Cortes with [facility] on Darrel Patterson  being transferred to [facility] for routine progression of patient care       Report consisted of patient's Situation, Background, Assessment and   Recommendations(SBAR).     Information from the following report(s) ED Encounter Summary, ED SBAR, MAR, Recent Results, Med Rec Status, Cardiac Rhythm NSR with PVCs, and Neuro Assessment was reviewed with the receiving nurse.    [place] Fall Assessment:    Presents to emergency department  because of falls (Syncope, seizure, or loss of consciousness): No  Age > 70: Yes  Altered Mental Status, Intoxication with alcohol or substance confusion (Disorientation, impaired judgment, poor safety awaremess, or inability to follow instructions): Yes  Impaired Mobility: Ambulates or transfers with assistive devices or assistance; Unable to ambulate or transer.: Yes  Nursing Judgement: Yes          Lines:   Peripheral IV 10/08/24 Right Antecubital (Active)   Site Assessment Clean, dry & intact 10/08/24 0005   Line Status Brisk blood return;Capped;Flushed;Normal saline locked;Specimen collected 10/08/24 0005   Line Care Connections checked and tightened 10/08/24 0005   Phlebitis Assessment No symptoms 10/08/24 0005   Infiltration Assessment 0 10/08/24 0005   Dressing Status New dressing applied;Clean, dry & intact 10/08/24 0005   Dressing Type Transparent 10/08/24 0005   Dressing Intervention New 10/08/24 0005        Opportunity for questions and clarification was provided.      Patient transported with:  Monitor, Cardiac Monitor, Oxygen @ 2L/min, [facility]

## 2024-10-09 LAB
ANION GAP SERPL CALC-SCNC: 11 MMOL/L (ref 2–12)
BUN SERPL-MCNC: 29 MG/DL (ref 6–20)
BUN/CREAT SERPL: 5 (ref 12–20)
CALCIUM SERPL-MCNC: 8.4 MG/DL (ref 8.5–10.1)
CHLORIDE SERPL-SCNC: 100 MMOL/L (ref 97–108)
CO2 SERPL-SCNC: 25 MMOL/L (ref 21–32)
CREAT SERPL-MCNC: 6.28 MG/DL (ref 0.7–1.3)
EKG ATRIAL RATE: 85 BPM
EKG DIAGNOSIS: NORMAL
EKG P AXIS: 37 DEGREES
EKG P-R INTERVAL: 148 MS
EKG Q-T INTERVAL: 396 MS
EKG QRS DURATION: 108 MS
EKG QTC CALCULATION (BAZETT): 471 MS
EKG R AXIS: -10 DEGREES
EKG T AXIS: 133 DEGREES
EKG VENTRICULAR RATE: 85 BPM
ERYTHROCYTE [DISTWIDTH] IN BLOOD BY AUTOMATED COUNT: 18.6 % (ref 11.5–14.5)
GLUCOSE BLD STRIP.AUTO-MCNC: 105 MG/DL (ref 65–117)
GLUCOSE BLD STRIP.AUTO-MCNC: 107 MG/DL (ref 65–117)
GLUCOSE BLD STRIP.AUTO-MCNC: 109 MG/DL (ref 65–117)
GLUCOSE BLD STRIP.AUTO-MCNC: 120 MG/DL (ref 65–117)
GLUCOSE BLD STRIP.AUTO-MCNC: 85 MG/DL (ref 65–117)
GLUCOSE SERPL-MCNC: 80 MG/DL (ref 65–100)
HCT VFR BLD AUTO: 34.9 % (ref 36.6–50.3)
HGB BLD-MCNC: 11.7 G/DL (ref 12.1–17)
MAGNESIUM SERPL-MCNC: 2.2 MG/DL (ref 1.6–2.4)
MCH RBC QN AUTO: 31 PG (ref 26–34)
MCHC RBC AUTO-ENTMCNC: 33.5 G/DL (ref 30–36.5)
MCV RBC AUTO: 92.3 FL (ref 80–99)
NRBC # BLD: 0.02 K/UL (ref 0–0.01)
NRBC BLD-RTO: 0.2 PER 100 WBC
PLATELET # BLD AUTO: 222 K/UL (ref 150–400)
PMV BLD AUTO: 11.4 FL (ref 8.9–12.9)
POTASSIUM SERPL-SCNC: 4 MMOL/L (ref 3.5–5.1)
RBC # BLD AUTO: 3.78 M/UL (ref 4.1–5.7)
SERVICE CMNT-IMP: ABNORMAL
SERVICE CMNT-IMP: NORMAL
SODIUM SERPL-SCNC: 136 MMOL/L (ref 136–145)
WBC # BLD AUTO: 9.8 K/UL (ref 4.1–11.1)

## 2024-10-09 PROCEDURE — 36415 COLL VENOUS BLD VENIPUNCTURE: CPT

## 2024-10-09 PROCEDURE — 85027 COMPLETE CBC AUTOMATED: CPT

## 2024-10-09 PROCEDURE — 6370000000 HC RX 637 (ALT 250 FOR IP): Performed by: INTERNAL MEDICINE

## 2024-10-09 PROCEDURE — 80048 BASIC METABOLIC PNL TOTAL CA: CPT

## 2024-10-09 PROCEDURE — 2580000003 HC RX 258: Performed by: INTERNAL MEDICINE

## 2024-10-09 PROCEDURE — 6360000002 HC RX W HCPCS: Performed by: INTERNAL MEDICINE

## 2024-10-09 PROCEDURE — 83735 ASSAY OF MAGNESIUM: CPT

## 2024-10-09 PROCEDURE — 94640 AIRWAY INHALATION TREATMENT: CPT

## 2024-10-09 PROCEDURE — 1100000003 HC PRIVATE W/ TELEMETRY

## 2024-10-09 RX ORDER — VALSARTAN 80 MG/1
20 TABLET ORAL DAILY
Status: DISCONTINUED | OUTPATIENT
Start: 2024-10-09 | End: 2024-10-11 | Stop reason: HOSPADM

## 2024-10-09 RX ADMIN — SODIUM CHLORIDE, PRESERVATIVE FREE 10 ML: 5 INJECTION INTRAVENOUS at 09:14

## 2024-10-09 RX ADMIN — DORZOLAMIDE HYDROCHLORIDE AND TIMOLOL MALEATE 1 DROP: 20; 5 SOLUTION/ DROPS OPHTHALMIC at 13:58

## 2024-10-09 RX ADMIN — SEVELAMER CARBONATE 1600 MG: 800 TABLET, FILM COATED ORAL at 11:41

## 2024-10-09 RX ADMIN — BUDESONIDE 250 MCG: 0.25 INHALANT RESPIRATORY (INHALATION) at 08:08

## 2024-10-09 RX ADMIN — DORZOLAMIDE HYDROCHLORIDE AND TIMOLOL MALEATE 1 DROP: 20; 5 SOLUTION/ DROPS OPHTHALMIC at 20:49

## 2024-10-09 RX ADMIN — DORZOLAMIDE HYDROCHLORIDE AND TIMOLOL MALEATE 1 DROP: 20; 5 SOLUTION/ DROPS OPHTHALMIC at 09:14

## 2024-10-09 RX ADMIN — ARFORMOTEROL TARTRATE 15 MCG: 15 SOLUTION RESPIRATORY (INHALATION) at 20:43

## 2024-10-09 RX ADMIN — SEVELAMER CARBONATE 1600 MG: 800 TABLET, FILM COATED ORAL at 15:33

## 2024-10-09 RX ADMIN — IPRATROPIUM BROMIDE 0.5 MG: 0.5 SOLUTION RESPIRATORY (INHALATION) at 01:23

## 2024-10-09 RX ADMIN — HEPARIN SODIUM 5000 UNITS: 5000 INJECTION INTRAVENOUS; SUBCUTANEOUS at 05:34

## 2024-10-09 RX ADMIN — SEVELAMER CARBONATE 1600 MG: 800 TABLET, FILM COATED ORAL at 09:13

## 2024-10-09 RX ADMIN — SODIUM CHLORIDE, PRESERVATIVE FREE 10 ML: 5 INJECTION INTRAVENOUS at 20:49

## 2024-10-09 RX ADMIN — IPRATROPIUM BROMIDE 0.5 MG: 0.5 SOLUTION RESPIRATORY (INHALATION) at 20:43

## 2024-10-09 RX ADMIN — MIRTAZAPINE 7.5 MG: 15 TABLET, FILM COATED ORAL at 20:47

## 2024-10-09 RX ADMIN — HEPARIN SODIUM 5000 UNITS: 5000 INJECTION INTRAVENOUS; SUBCUTANEOUS at 20:48

## 2024-10-09 RX ADMIN — PRAVASTATIN SODIUM 10 MG: 10 TABLET ORAL at 09:13

## 2024-10-09 RX ADMIN — VALSARTAN 20 MG: 80 TABLET, FILM COATED ORAL at 15:34

## 2024-10-09 RX ADMIN — IPRATROPIUM BROMIDE 0.5 MG: 0.5 SOLUTION RESPIRATORY (INHALATION) at 13:35

## 2024-10-09 RX ADMIN — IPRATROPIUM BROMIDE 0.5 MG: 0.5 SOLUTION RESPIRATORY (INHALATION) at 08:08

## 2024-10-09 RX ADMIN — HEPARIN SODIUM 5000 UNITS: 5000 INJECTION INTRAVENOUS; SUBCUTANEOUS at 13:58

## 2024-10-09 RX ADMIN — GABAPENTIN 300 MG: 300 CAPSULE ORAL at 20:48

## 2024-10-09 RX ADMIN — BUDESONIDE 250 MCG: 0.25 INHALANT RESPIRATORY (INHALATION) at 20:43

## 2024-10-09 RX ADMIN — ARFORMOTEROL TARTRATE 15 MCG: 15 SOLUTION RESPIRATORY (INHALATION) at 08:08

## 2024-10-09 ASSESSMENT — PAIN SCALES - GENERAL
PAINLEVEL_OUTOF10: 0

## 2024-10-09 NOTE — PROGRESS NOTES
Nursing contacted Nocturnist/cross cover provider via non-urgent messaging system [employer] and notified patient states has a h/o known OA in back and chronic back pain, uses lidocaine patches at home and asking for these for the back. No other concerns reported. No acute distress reported. No other information provided by nurse. VSS.     Ordered lidocaine patch hs topical daily per pt request start now. Will defer further evaluation/management to the day shift primary attending care team. Patient denies any further complaints or concerns.     Nursing to notify Hospitalist for further/continued concerns. Will remain available overnight for further concerns if nursing/patient needs. Please note, there are RRT systems in this hospital in place that if nursing has acute or critical patient condition change or concern, this is to help facilitate and notify that patient needs immediate bedside evaluation by a provider.     Non-billable note.

## 2024-10-09 NOTE — PROGRESS NOTES
Hospitalist Progress Note    NAME:   Darrel Patterson   : 1945   MRN: [id number]     Date/Time: 10/9/2024 7:39 PM  Patient PCP: Roma Solitario APRN - NP    Estimated discharge date: 10/11  Barriers: Nephro clearance, volume improvement      Assessment / Plan:    Acute hypoxic respiratory failure  Hypoglycemia - improved  Acute on chronic systolic heart failure, EF 15-20%  -Glucose 24 on arrival to [facility] ED,not on any sulfonylurea or insulin  -Improved with D50  -Possibly from cardiac failure/ESRD  -Check A1c  -Hypoglycemic protocol  -Check insulin, C-peptide,   blood cultures pending  - A1c 5.2  -Consult Cardiology  - ARB added  -May benefit from cardiac meds, but in the past  BP tolerance was issue.     Mild hyperkalemia - improved  ESRD on hemodialysis  -Currently getting HD, that should help with potassium  -Volume management as per nephro  - maximum UF per Nephro    COPD  continue home inhaler     Insomnia  continue mirtazapine     HLD  continue statin              Medical Decision Making:   I personally reviewed labs: cbc bmp  I personally reviewed imaging: prior Chest x-ray  I personally reviewed EKG:  Toxic drug monitoring: monitor mental status for lethargy from gabapentin toxicity  Discussed case with: RN CM        Code Status: Full code  DVT Prophylaxis: Heparin   Baseline:     Subjective:     Chief Complaint / Reason for Physician Visit  \"Followup volume overload\".  Discussed with RN events overnight. No complaints today.      Objective:     VITALS:   Last 24hrs VS reviewed since prior progress note. Most recent are:  Patient Vitals for the past 24 hrs:   BP Temp Temp src Pulse Resp SpO2   10/09/24 1559 113/72 97.7 °F (36.5 °C) Oral 73 17 98 %   10/09/24 1534 (!) 122/95 -- -- -- -- --   10/09/24 1055 117/67 98.3 °F (36.8 °C) Oral 75 16 97 %   10/09/24 0715 (!) 125/107 98.6 °F (37 °C) Oral 73 17 98 %   10/09/24 0325 109/73 98.5 °F (36.9 °C) Oral 71 16 97 %   10/09/24 0123 -- -- -- -- 18 94 %    10/09/24 0005 110/65 98.1 °F (36.7 °C) Oral -- 14 93 %   10/08/24 2326 119/76 97.5 °F (36.4 °C) Oral 69 16 95 %   10/08/24 2325 -- -- -- -- -- 95 %   10/08/24 2046 -- -- -- -- 19 --   10/08/24 2041 -- -- -- -- 19 95 %         Intake/Output Summary (Last 24 hours) at 10/9/2024 1939  Last data filed at 10/9/2024 1723  Gross per 24 hour   Intake 689 ml   Output 0 ml   Net 689 ml        I had a face to face encounter and independently examined this patient on 10/9/2024, as outlined below:  PHYSICAL EXAM:  General: Alert, cooperative  EENT:  EOMI. Anicteric sclerae.  Resp:  CTA bilaterally, no wheezing or rales.  No accessory muscle use  CV:  Regular  rate,  No edema  GI:  Soft, Non distended, Non tender.    Neurologic:  Alert and oriented X 3, normal speech,   Skin:  No rashes.  No jaundice    Reviewed most current lab test results and cultures  YES  Reviewed most current radiology test results   YES  Review and summation of old records today    NO  Reviewed patient's current orders and MAR    YES  PMH/SH reviewed - no change compared to H&P    Procedures: see electronic medical records for all procedures/Xrays and details which were not copied into this note but were reviewed prior to creation of Plan.      LABS:  I reviewed today's most current labs and imaging studies.  Pertinent labs include:  Recent Labs     10/08/24  0006 10/09/24  0546   WBC 9.9 9.8   HGB 12.7 11.7*   HCT 39.1 34.9*    222     Recent Labs     10/08/24  0006 10/09/24  0546    136   K 6.0* 4.0   CL 98 100   CO2 28 25   GLUCOSE 24* 80   BUN 49* 29*   CREATININE 8.84* 6.28*   CALCIUM 9.6 8.4*   MG 2.7* 2.2   BILITOT 1.0  --    AST 40*  --    ALT 27  --        Signed: David L Mendel, MD

## 2024-10-09 NOTE — PLAN OF CARE
Problem: Chronic Conditions and Co-morbidities  Goal: Patient's chronic conditions and co-morbidity symptoms are monitored and maintained or improved  10/9/2024 1055 by Augie Fowler RN  Outcome: Progressing  Flowsheets (Taken 10/9/2024 0715)  Care Plan - Patient's Chronic Conditions and Co-Morbidity Symptoms are Monitored and Maintained or Improved: Monitor and assess patient's chronic conditions and comorbid symptoms for stability, deterioration, or improvement  10/9/2024 0639 by Miguel Bauer RN  Outcome: Progressing  10/9/2024 0302 by Miguel Bauer RN  Outcome: Progressing     Problem: Discharge Planning  Goal: Discharge to home or other facility with appropriate resources  10/9/2024 1055 by Augie Fowler RN  Outcome: Progressing  Flowsheets (Taken 10/9/2024 0715)  Discharge to home or other facility with appropriate resources: Identify barriers to discharge with patient and caregiver  10/9/2024 0639 by Miguel Bauer RN  Outcome: Progressing  10/9/2024 0302 by Miguel Bauer RN  Outcome: Progressing     Problem: Safety - Adult  Goal: Free from fall injury  10/9/2024 1055 by Augie Fowler RN  Outcome: Progressing  10/9/2024 0639 by Miguel Bauer RN  Outcome: Progressing  10/9/2024 0302 by Miguel Bauer RN  Outcome: Progressing     Problem: Pain  Goal: Verbalizes/displays adequate comfort level or baseline comfort level  Outcome: Progressing  Flowsheets (Taken 10/9/2024 0715)  Verbalizes/displays adequate comfort level or baseline comfort level: Encourage patient to monitor pain and request assistance     Problem: Respiratory - Adult  Goal: Achieves optimal ventilation and oxygenation  10/9/2024 1055 by Augie Fowler RN  Outcome: Progressing  10/9/2024 0952 by Jess Graff, RT  Outcome: Progressing  Flowsheets (Taken 10/9/2024 0715 by Augie Fowler RN)  Achieves optimal ventilation and oxygenation: Assess for changes in respiratory status  10/9/2024 0208 by

## 2024-10-09 NOTE — CONSULTS
Surgical History:   Procedure Laterality Date    CARDIAC PROCEDURE N/A 07/10/2024    Left heart cath / coronary angiography performed by Lonnie Bean III, DO at [facility] CARDIAC CATH LAB    COLONOSCOPY N/A 2023    COLONOSCOPY performed by Sukhi Light MD at [facility] ENDOSCOPY    COLONOSCOPY N/A 2024    COLONOSCOPY DIAGNOSTIC performed by Sukhi Light MD at [facility] MAIN OR    COLONOSCOPY,DIAGNOSTIC  2023    DIALYSIS FISTULA CREATION Left     HEENT Bilateral     Cataract Surgery    ORTHOPEDIC SURGERY Left     aarm fx 30 years +    UPPER GASTROINTESTINAL ENDOSCOPY  2014    gastritis    UPPER GASTROINTESTINAL ENDOSCOPY N/A 2024    ESOPHAGOGASTRODUODENOSCOPY performed by Olga Lidia Sauceda DO at [facility] ENDOSCOPY    UPPER GI ENDOSCOPY,BIOPSY  2023     No Known Allergies   Family History   Problem Relation Age of Onset    Cancer Father     Cancer Mother     Cancer Brother         colon    Asthma Sister       Social History     Socioeconomic History    Marital status: Single     Spouse name: Not on file    Number of children: Not on file    Years of education: Not on file    Highest education level: Not on file   Occupational History    Not on file   Tobacco Use    Smoking status: Former     Current packs/day: 0.00     Average packs/day: 0.8 packs/day for 30.0 years (22.5 ttl pk-yrs)     Types: Cigarettes     Start date:      Quit date:      Years since quittin.7    Smokeless tobacco: Never    Tobacco comments:     Quit smoking: Quit 30 years ago   Vaping Use    Vaping status: Never Used   Substance and Sexual Activity    Alcohol use: No     Alcohol/week: 0.0 standard drinks of alcohol    Drug use: No    Sexual activity: Yes     Partners: Female   Other Topics Concern    Not on file   Social History Narrative    Not on file     Social Determinants of Health     Financial Resource Strain: Low Risk  (10/4/2024)    Overall Financial Resource Strain (CARDIA)     Difficulty of Paying Living  of peripheral embolization.   Derm: No ulcers or stasis dermatitis of lower extremities.   Neuro: Alert and oriented x 3;  Grossly non-focal. Normal mood and affect.     Labs:   Recent Results (from the past 24 hour(s))   POCT Glucose    Collection Time: 10/08/24  6:22 PM   Result Value Ref Range    POC Glucose 89 65 - 117 mg/dL    Performed by: Leonardo Gay (CON)    POCT Glucose    Collection Time: 10/08/24  8:40 PM   Result Value Ref Range    POC Glucose 120 (H) 65 - 117 mg/dL    Performed by: Crow Lopes (CON)    Magnesium    Collection Time: 10/09/24  5:46 AM   Result Value Ref Range    Magnesium 2.2 1.6 - 2.4 mg/dL   Basic Metabolic Panel    Collection Time: 10/09/24  5:46 AM   Result Value Ref Range    Sodium 136 136 - 145 mmol/L    Potassium 4.0 3.5 - 5.1 mmol/L    Chloride 100 97 - 108 mmol/L    CO2 25 21 - 32 mmol/L    Anion Gap 11 2 - 12 mmol/L    Glucose 80 65 - 100 mg/dL    BUN 29 (H) 6 - 20 MG/DL    Creatinine 6.28 (H) 0.70 - 1.30 MG/DL    BUN/Creatinine Ratio 5 (L) 12 - 20      Est, Glom Filt Rate 8 (L) >60 ml/min/1.73m2    Calcium 8.4 (L) 8.5 - 10.1 MG/DL   CBC    Collection Time: 10/09/24  5:46 AM   Result Value Ref Range    WBC 9.8 4.1 - 11.1 K/uL    RBC 3.78 (L) 4.10 - 5.70 M/uL    Hemoglobin 11.7 (L) 12.1 - 17.0 g/dL    Hematocrit 34.9 (L) 36.6 - 50.3 %    MCV 92.3 80.0 - 99.0 FL    MCH 31.0 26.0 - 34.0 PG    MCHC 33.5 30.0 - 36.5 g/dL    RDW 18.6 (H) 11.5 - 14.5 %    Platelets 222 150 - 400 K/uL    MPV 11.4 8.9 - 12.9 FL    Nucleated RBCs 0.2 (H) 0  WBC    nRBC 0.02 (H) 0.00 - 0.01 K/uL   POCT Glucose    Collection Time: 10/09/24  7:52 AM   Result Value Ref Range    POC Glucose 85 65 - 117 mg/dL    Performed by: Helen CASANOVA    POCT Glucose    Collection Time: 10/09/24 11:23 AM   Result Value Ref Range    POC Glucose 109 65 - 117 mg/dL    Performed by: Helen Robert PCT      No results for input(s): \"CPK\", \"CKMB\" in the last 72 hours.    Invalid input(s): \"CKNDX\",

## 2024-10-09 NOTE — PROGRESS NOTES
Bedside and Verbal shift change report given to CHADD Rivera (oncoming nurse) by CHADD Ghosh (offgoing nurse). Report included the following information Nurse Handoff Report, ED Encounter Summary, ED SBAR, MAR, and Cardiac Rhythm Sinus Rhythm with multiform PVCs .      End of Shift Note    Bedside shift change report given to CHADD Ghosh (oncoming nurse) by Miguel Bauer RN (offgoing nurse).  Report included the following information SBAR, ED Summary, MAR, and Cardiac Rhythm Sinus Rhythm with multi formed PVCs    Shift worked:  7p-7a     Shift summary and any significant changes:     N/a     Concerns for physician to address:  N/a     Zone phone for oncoming shift:   N/a         Miguel Bauer RN

## 2024-10-09 NOTE — PLAN OF CARE
Problem: Chronic Conditions and Co-morbidities  Goal: Patient's chronic conditions and co-morbidity symptoms are monitored and maintained or improved  Outcome: Progressing     Problem: Discharge Planning  Goal: Discharge to home or other facility with appropriate resources  Outcome: Progressing     Problem: Safety - Adult  Goal: Free from fall injury  Outcome: Progressing     Problem: Pain  Goal: Verbalizes/displays adequate comfort level or baseline comfort level  Recent Flowsheet Documentation  Taken 10/8/2024 1715 by Augie Fowler RN  Verbalizes/displays adequate comfort level or baseline comfort level: Encourage patient to monitor pain and request assistance

## 2024-10-09 NOTE — PROGRESS NOTES
0715: Bedside and Verbal shift change report given to Augie (oncoming nurse) by Miguel (offgoing nurse). Report included the following information Nurse Handoff Report, Index, ED SBAR, Adult Overview, Intake/Output, MAR, Recent Results, and Cardiac Rhythm NSR w/ PVC .      1930: End of Shift Note    Bedside shift change report given to RN (oncoming nurse) by Augie Fowler RN (offgoing nurse).  Report included the following information SBAR, Kardex, Intake/Output, MAR, Recent Results, and Cardiac Rhythm NSR w/ PVC    Shift worked:  [id number]     Shift summary and any significant changes:     See above note     Concerns for physician to address:  N/A     Zone phone for oncoming shift:          Activity:     Number times ambulated in hallways past shift: 0  Number of times OOB to chair past shift: 2    Cardiac:   Cardiac Monitoring: Yes           Access:  Current line(s): PIV and HD access     Genitourinary:   Urinary status: voiding and oliguric    Respiratory:      Chronic home O2 use?: YES  Incentive spirometer at bedside: NO       GI:     Current diet:  ADULT DIET; Regular  Passing flatus: YES  Tolerating current diet: YES       Pain Management:   Patient states pain is manageable on current regimen: YES    Skin:     Interventions: float heels and increase time out of bed    Patient Safety:  Fall Score:    Interventions: bed/chair alarm, assistive device (walker, cane. etc), gripper socks, pt to call before getting OOB, and stay with me (per policy)       Length of Stay:  Expected LOS: 3  Actual LOS: 1      Augie Fowler RN

## 2024-10-09 NOTE — PLAN OF CARE
Problem: Chronic Conditions and Co-morbidities  Goal: Patient's chronic conditions and co-morbidity symptoms are monitored and maintained or improved  10/9/2024 0639 by Miguel Bauer RN  Outcome: Progressing  10/9/2024 0302 by Miguel Bauer RN  Outcome: Progressing     Problem: Discharge Planning  Goal: Discharge to home or other facility with appropriate resources  10/9/2024 0639 by Miguel Bauer RN  Outcome: Progressing  10/9/2024 0302 by Miguel Bauer RN  Outcome: Progressing     Problem: Safety - Adult  Goal: Free from fall injury  10/9/2024 0639 by Miguel Bauer RN  Outcome: Progressing  10/9/2024 0302 by Miguel Bauer RN  Outcome: Progressing

## 2024-10-09 NOTE — CARE COORDINATION
Care Management Initial Assessment       RUR: 20% High RUR  Readmission? Yes - 9/20/2024 - 9/23/2024 (3 days)  1st IM letter given? Yes - 10/08  1st  letter given: No     10/09/24 1033   Service Assessment   Patient Orientation Alert and Oriented;Person;Place;Situation;Self   Cognition Alert   History Provided By Patient   Primary Caregiver Self   Accompanied By/Relationship no one   Support Systems Children  (The patient reports having 10 children, main contact are Nadine Traore  [phone]      León,Linda  [phone])   Patient's Healthcare Decision Maker is: Named in Scanned ACP Document   PCP Verified by CM Yes   Last Visit to PCP Within last 3 months   Prior Functional Level Assistance with the following:;Cooking;Housework;Shopping   Current Functional Level Assistance with the following:;Shopping;Housework;Cooking   Can patient return to prior living arrangement Yes   Ability to make needs known: Good   Family able to assist with home care needs: Yes   Would you like for me to discuss the discharge plan with any other family members/significant others, and if so, who? Yes  (The patient reports having 10 children, main contact are Nadine Traore [phone] León,Linda [phone])   Financial Resources Medicare   Community Resources None   CM/SW Referral Disease Management Education   Social/Functional History   Lives With Daughter;Son  (Nadine Traore [phone] Linda Traore [phone])   Type of Home House   Home Layout One level   Home Access Ramped entrance;Stairs to enter with rails   Entrance Stairs - Number of Steps 8   Entrance Stairs - Rails Both   Home Equipment Cane;Oxygen  (l oxygen supplier [employer]  at 2 lpm)   Active  No   Patient's  Info Nadine Traore [phone]   Discharge Planning   Type of Residence House   Living Arrangements Children  (Nadine Traore [phone] Linda Traore [phone])   Current Services Prior To Admission Oxygen Therapy   Potential Assistance
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
## 2024-10-10 LAB
ANION GAP SERPL CALC-SCNC: 10 MMOL/L (ref 2–12)
BASOPHILS # BLD: 0.1 K/UL (ref 0–0.1)
BASOPHILS NFR BLD: 2 % (ref 0–1)
BUN SERPL-MCNC: 36 MG/DL (ref 6–20)
BUN/CREAT SERPL: 5 (ref 12–20)
CALCIUM SERPL-MCNC: 8.4 MG/DL (ref 8.5–10.1)
CHLORIDE SERPL-SCNC: 100 MMOL/L (ref 97–108)
CO2 SERPL-SCNC: 26 MMOL/L (ref 21–32)
CREAT SERPL-MCNC: 7.56 MG/DL (ref 0.7–1.3)
DIFFERENTIAL METHOD BLD: ABNORMAL
EOSINOPHIL # BLD: 0.5 K/UL (ref 0–0.4)
EOSINOPHIL NFR BLD: 7 % (ref 0–7)
ERYTHROCYTE [DISTWIDTH] IN BLOOD BY AUTOMATED COUNT: 19 % (ref 11.5–14.5)
GLUCOSE BLD STRIP.AUTO-MCNC: 121 MG/DL (ref 65–117)
GLUCOSE BLD STRIP.AUTO-MCNC: 84 MG/DL (ref 65–117)
GLUCOSE BLD STRIP.AUTO-MCNC: 86 MG/DL (ref 65–117)
GLUCOSE BLD STRIP.AUTO-MCNC: 92 MG/DL (ref 65–117)
GLUCOSE SERPL-MCNC: 104 MG/DL (ref 65–100)
HCT VFR BLD AUTO: 33.7 % (ref 36.6–50.3)
HGB BLD-MCNC: 10.5 G/DL (ref 12.1–17)
IMM GRANULOCYTES # BLD AUTO: 0 K/UL (ref 0–0.04)
IMM GRANULOCYTES NFR BLD AUTO: 0 % (ref 0–0.5)
LYMPHOCYTES # BLD: 1.2 K/UL (ref 0.8–3.5)
LYMPHOCYTES NFR BLD: 17 % (ref 12–49)
MAGNESIUM SERPL-MCNC: 2.2 MG/DL (ref 1.6–2.4)
MCH RBC QN AUTO: 31.3 PG (ref 26–34)
MCHC RBC AUTO-ENTMCNC: 31.2 G/DL (ref 30–36.5)
MCV RBC AUTO: 100.6 FL (ref 80–99)
MONOCYTES # BLD: 1 K/UL (ref 0–1)
MONOCYTES NFR BLD: 15 % (ref 5–13)
NEUTS SEG # BLD: 4.2 K/UL (ref 1.8–8)
NEUTS SEG NFR BLD: 60 % (ref 32–75)
NRBC # BLD: 0 K/UL (ref 0–0.01)
NRBC BLD-RTO: 0 PER 100 WBC
PHOSPHATE SERPL-MCNC: 4.9 MG/DL (ref 2.6–4.7)
PLATELET # BLD AUTO: 289 K/UL (ref 150–400)
PMV BLD AUTO: 11.4 FL (ref 8.9–12.9)
POTASSIUM SERPL-SCNC: 3.7 MMOL/L (ref 3.5–5.1)
RBC # BLD AUTO: 3.35 M/UL (ref 4.1–5.7)
SERVICE CMNT-IMP: ABNORMAL
SERVICE CMNT-IMP: NORMAL
SODIUM SERPL-SCNC: 136 MMOL/L (ref 136–145)
WBC # BLD AUTO: 6.9 K/UL (ref 4.1–11.1)

## 2024-10-10 PROCEDURE — 36415 COLL VENOUS BLD VENIPUNCTURE: CPT

## 2024-10-10 PROCEDURE — 84100 ASSAY OF PHOSPHORUS: CPT

## 2024-10-10 PROCEDURE — 6370000000 HC RX 637 (ALT 250 FOR IP): Performed by: INTERNAL MEDICINE

## 2024-10-10 PROCEDURE — 1100000003 HC PRIVATE W/ TELEMETRY

## 2024-10-10 PROCEDURE — 2580000003 HC RX 258: Performed by: INTERNAL MEDICINE

## 2024-10-10 PROCEDURE — 6360000002 HC RX W HCPCS

## 2024-10-10 PROCEDURE — 82962 GLUCOSE BLOOD TEST: CPT

## 2024-10-10 PROCEDURE — 6360000002 HC RX W HCPCS: Performed by: INTERNAL MEDICINE

## 2024-10-10 PROCEDURE — 80048 BASIC METABOLIC PNL TOTAL CA: CPT

## 2024-10-10 PROCEDURE — 87040 BLOOD CULTURE FOR BACTERIA: CPT

## 2024-10-10 PROCEDURE — 94640 AIRWAY INHALATION TREATMENT: CPT

## 2024-10-10 PROCEDURE — 2700000000 HC OXYGEN THERAPY PER DAY

## 2024-10-10 PROCEDURE — 90935 HEMODIALYSIS ONE EVALUATION: CPT

## 2024-10-10 PROCEDURE — 85025 COMPLETE CBC W/AUTO DIFF WBC: CPT

## 2024-10-10 PROCEDURE — P9047 ALBUMIN (HUMAN), 25%, 50ML: HCPCS

## 2024-10-10 PROCEDURE — 83735 ASSAY OF MAGNESIUM: CPT

## 2024-10-10 PROCEDURE — 6370000000 HC RX 637 (ALT 250 FOR IP): Performed by: NURSE PRACTITIONER

## 2024-10-10 RX ORDER — VALSARTAN 40 MG/1
20 TABLET ORAL
Qty: 30 TABLET | Refills: 3 | Status: SHIPPED | OUTPATIENT
Start: 2024-10-10

## 2024-10-10 RX ORDER — ALBUMIN (HUMAN) 12.5 G/50ML
SOLUTION INTRAVENOUS
Status: COMPLETED
Start: 2024-10-10 | End: 2024-10-10

## 2024-10-10 RX ADMIN — SEVELAMER CARBONATE 1600 MG: 800 TABLET, FILM COATED ORAL at 13:59

## 2024-10-10 RX ADMIN — ALBUMIN (HUMAN) 25 G: 0.25 INJECTION, SOLUTION INTRAVENOUS at 09:48

## 2024-10-10 RX ADMIN — DORZOLAMIDE HYDROCHLORIDE AND TIMOLOL MALEATE 1 DROP: 20; 5 SOLUTION/ DROPS OPHTHALMIC at 21:42

## 2024-10-10 RX ADMIN — MIRTAZAPINE 7.5 MG: 15 TABLET, FILM COATED ORAL at 21:40

## 2024-10-10 RX ADMIN — DORZOLAMIDE HYDROCHLORIDE AND TIMOLOL MALEATE 1 DROP: 20; 5 SOLUTION/ DROPS OPHTHALMIC at 08:41

## 2024-10-10 RX ADMIN — ACETAMINOPHEN 650 MG: 325 TABLET ORAL at 15:17

## 2024-10-10 RX ADMIN — SODIUM CHLORIDE, PRESERVATIVE FREE 10 ML: 5 INJECTION INTRAVENOUS at 08:47

## 2024-10-10 RX ADMIN — ALBUMIN (HUMAN) 25 G: 12.5 SOLUTION INTRAVENOUS at 09:48

## 2024-10-10 RX ADMIN — SODIUM CHLORIDE, PRESERVATIVE FREE 10 ML: 5 INJECTION INTRAVENOUS at 21:42

## 2024-10-10 RX ADMIN — GABAPENTIN 300 MG: 300 CAPSULE ORAL at 21:40

## 2024-10-10 RX ADMIN — DORZOLAMIDE HYDROCHLORIDE AND TIMOLOL MALEATE 1 DROP: 20; 5 SOLUTION/ DROPS OPHTHALMIC at 14:00

## 2024-10-10 RX ADMIN — PRAVASTATIN SODIUM 10 MG: 10 TABLET ORAL at 08:42

## 2024-10-10 RX ADMIN — SEVELAMER CARBONATE 1600 MG: 800 TABLET, FILM COATED ORAL at 08:43

## 2024-10-10 RX ADMIN — IPRATROPIUM BROMIDE 0.5 MG: 0.5 SOLUTION RESPIRATORY (INHALATION) at 15:15

## 2024-10-10 RX ADMIN — HEPARIN SODIUM 5000 UNITS: 5000 INJECTION INTRAVENOUS; SUBCUTANEOUS at 13:59

## 2024-10-10 RX ADMIN — HEPARIN SODIUM 5000 UNITS: 5000 INJECTION INTRAVENOUS; SUBCUTANEOUS at 05:35

## 2024-10-10 RX ADMIN — SEVELAMER CARBONATE 1600 MG: 800 TABLET, FILM COATED ORAL at 17:48

## 2024-10-10 ASSESSMENT — PAIN DESCRIPTION - ORIENTATION: ORIENTATION: ANTERIOR

## 2024-10-10 ASSESSMENT — PAIN DESCRIPTION - DESCRIPTORS: DESCRIPTORS: ACHING;DULL

## 2024-10-10 ASSESSMENT — PAIN DESCRIPTION - LOCATION: LOCATION: HEAD

## 2024-10-10 ASSESSMENT — PAIN - FUNCTIONAL ASSESSMENT: PAIN_FUNCTIONAL_ASSESSMENT: ACTIVITIES ARE NOT PREVENTED

## 2024-10-10 ASSESSMENT — PAIN SCALES - GENERAL: PAINLEVEL_OUTOF10: 7

## 2024-10-10 NOTE — PLAN OF CARE
Problem: Chronic Conditions and Co-morbidities  Goal: Patient's chronic conditions and co-morbidity symptoms are monitored and maintained or improved  Recent Flowsheet Documentation  Taken 10/10/2024 0847 by Gauri Gil RN  Care Plan - Patient's Chronic Conditions and Co-Morbidity Symptoms are Monitored and Maintained or Improved:   Monitor and assess patient's chronic conditions and comorbid symptoms for stability, deterioration, or improvement   Collaborate with multidisciplinary team to address chronic and comorbid conditions and prevent exacerbation or deterioration   Update acute care plan with appropriate goals if chronic or comorbid symptoms are exacerbated and prevent overall improvement and discharge  10/10/2024 0137 by Rolo Araujo RN  Outcome: Progressing     Problem: Discharge Planning  Goal: Discharge to home or other facility with appropriate resources  Recent Flowsheet Documentation  Taken 10/10/2024 0847 by Gauri Gil RN  Discharge to home or other facility with appropriate resources:   Identify barriers to discharge with patient and caregiver   Arrange for needed discharge resources and transportation as appropriate   Identify discharge learning needs (meds, wound care, etc)  10/10/2024 0137 by Rolo Araujo RN  Outcome: Progressing     Problem: Safety - Adult  Goal: Free from fall injury  Recent Flowsheet Documentation  Taken 10/10/2024 0945 by Gauri Gil, RN  Free From Fall Injury:   Instruct family/caregiver on patient safety   Based on caregiver fall risk screen, instruct family/caregiver to ask for assistance with transferring infant if caregiver noted to have fall risk factors  10/10/2024 0137 by Rolo Araujo RN  Outcome: Progressing     Problem: Pain  Goal: Verbalizes/displays adequate comfort level or baseline comfort level  10/10/2024 0137 by Rolo Araujo RN  Outcome: Progressing  Flowsheets (Taken 10/9/2024 3725 by Augie Fowler RN)  Verbalizes/displays

## 2024-10-10 NOTE — PROGRESS NOTES
Glucose 84 65 - 117 mg/dL    Performed by: Leonardo Gay (CON)          No orders to display        Patient Active Problem List   Diagnosis    History of GI bleed    Anemia due to chronic kidney disease    A-V fistula ([facility])    S/P cataract surgery    Habitual alcohol use    Mild intermittent asthma without complication    Essential hypertension, benign    Diverticula of colon    Gastroesophageal reflux disease without esophagitis    Rheumatoid arthritis with positive rheumatoid factor ([facility])    Glaucoma    ESRD on hemodialysis ([facility])    Impingement syndrome of both shoulders    Hypertensive retinopathy of both eyes    CHF (congestive heart failure) ([facility])    DDD (degenerative disc disease), lumbar    DDD (degenerative disc disease), cervical    Psychophysiological insomnia    Fatigue    Hyperkalemia    Seizure ([facility])    Long term (current) use of systemic steroids    Weakness generalized    Left renal mass    GI bleed    NSTEMI (non-ST elevated myocardial infarction) ([facility])    Palliative care by specialist    Sarcopenia    Unintentional weight loss    Palliative care encounter    ESRD (end stage renal disease) ([facility])    Debility    Grief    Severe protein-calorie malnutrition ([facility])    Frailty    Goals of care, counseling/discussion    DNR (do not resuscitate) discussion    Tachycardia    Hypoglycemia      Full Code     Care Plan discussed with:  Patient x    Family      RN     Dialysis RN x    Consultant:     ED     Intensivist     Hospitalist         Comments   >50% of visit spent in counseling and coordination of care         This dictation was done by dragon, computer voice recognition software.  Often unanticipated grammatical, syntax, phones and other interpretive errors are inadvertently transcribed.  Please excuse errors that have escaped final proofreading. Please contact me if you suspect dictation or transcription errors.      Signed By: Rosendo Guerra MD     October 10, 2024       Dr Aysha Suazo    Office No-  [phone]  [facility]  [address]  [address]  [place] [address]    Fax: [phone]

## 2024-10-10 NOTE — PLAN OF CARE
Problem: Chronic Conditions and Co-morbidities  Goal: Patient's chronic conditions and co-morbidity symptoms are monitored and maintained or improved  Outcome: Progressing     Problem: Discharge Planning  Goal: Discharge to home or other facility with appropriate resources  Outcome: Progressing     Problem: Safety - Adult  Goal: Free from fall injury  Outcome: Progressing     Problem: Pain  Goal: Verbalizes/displays adequate comfort level or baseline comfort level  Outcome: Progressing  Flowsheets (Taken 10/9/2024 1559 by Augie Fowler, RN)  Verbalizes/displays adequate comfort level or baseline comfort level: Encourage patient to monitor pain and request assistance     Problem: Respiratory - Adult  Goal: Achieves optimal ventilation and oxygenation  10/10/2024 0137 by Rolo Araujo, RN  Outcome: Progressing  10/9/2024 2049 by Chapis Newby, RT  Outcome: Progressing

## 2024-10-10 NOTE — FLOWSHEET NOTE
Primary RN SBAR: Zoraida Garza RN  Incapacitated Nurse [facility]. provided: yes  Patient Education provided: need for fluid removal; procedural  Preferred Education method and Primary language: verbal/ English  Hospital General Consent Verified: yes  Hospital associated wait time; reason: 50min transport to suite  Hepatitis B Surface Ag   Date/Time Value Ref Range Status   09/21/2024 11:50 AM <0.10 Index Final     Hep B S Ag Interp   Date/Time Value Ref Range Status   09/21/2024 11:50 AM Negative NEG   Final     HEP B SURF AB   Date/Time Value Ref Range Status   04/11/2023 09:23 AM <3.10 mIU/mL Final     Hep B S Ab   Date/Time Value Ref Range Status   09/21/2024 11:50 AM 6.01 mIU/mL Final     Hep B S Ab Interp   Date/Time Value Ref Range Status   09/21/2024 11:50 AM NONREACTIVE NR   Final     Comment:     (NOTE)  The ADVIA Centaur Anti-HBs2 assay is traceable to the World Health   Organization (WHO) Hepatitis B Immunoglobulin 1st International   Reference Preparation (1977). Samples with a calculated value of 10   mIU/mL or greater are considered reactive (protective) in accordance   with the CDC guidelines. The accepted criteria for immunity to HBV is   anti-HBs activity greater than or equal to 10 mIU/mL, as defined by   the WHO International Reference Preparation.  Assay performance has not been established in pregnant women,   patients who are immunosuppressed or immunocompromised, nor have   performance characteristics been established in conjunction with   other 's assays for specific HBV serologic markers. This   assay does not differentiate between vaccine induced immune response   and a response due to infection with HBV. Passively acquired anti-HBs   may be identified following patient transfusion, receipt of   immunoglobulin products, etc.       Pre-HD   10/10/24 0930   Observations & Evaluations   Level of Consciousness 0   Oriented X 3   Heart Rhythm Irregular   Respiratory Quality/Effort  Unlabored   O2 Device Nasal cannula  (2L O2)   Bilateral Breath Sounds Clear;Diminished   Skin Condition/Temp Warm;Dry   Edema Right lower extremity;Left lower extremity;Facial   RLE Edema +1   LLE Edema +1   Facial Edema +2   Vital Signs   BP (!) 103/59   Temp 98 °F (36.7 °C)   Pulse 80   Respirations 18   SpO2 98 %   Pain Assessment   Pain Assessment None - Denies Pain   Technical Checks   Dialysis Machine No. 09   RO Machine Number [id number]   Dialyzer Lot No. [id number]   Tubing Lot Number [id number]   All Connections Secure Yes   NS Bag Yes   Saline Line Double Clamped Yes   Dialyzer Nipro ELISIO   Prime Volume (mL) 200 mL   ICEBOAT I;C;E;B;O;A;T   RO Machine Log Sheet Completed Yes   Machine Alarm Self Test Completed;Passed   Air Foam Detector Tested;Proper Function   Extracorporeal Circuit Tested for Integrity Yes   Machine Conductivity 13.8   Machine Ph 7.4   Bleach Test (Neg) Yes   Bath Temperature 96.8 °F (36 °C)   Dialysis Bath   K+ (Potassium) 3   Ca+ (Calcium) 2.5   Na+ (Sodium) 138   HCO3 (Bicarb) 35     Start of HD     10/10/24 0935   Treatment   Time On 0935   Treatment Goal 3.5kg in 3.5hr   Observations & Evaluations   O2 Device Nasal cannula   Vital Signs   /62   Temp 98 °F (36.7 °C)   Pulse 74   Respirations 18   SpO2 99 %   Treatment Initiation   Dialyze Hours 3.5   Treatment  Initiation Universal Precautions maintained;Lines secured to patient;Connections secured;Prime given;Venous Parameters set;Arterial Parameters set;Air foam detector engaged;Saline line double clamped   During Hemodialysis Assessment   Blood Flow Rate (ml/min) 450 ml/min   Arterial Pressure (mmHg) -210 mmHg   Venous Pressure (mmHg) 190   TMP 80      Comments Treatment initiaed   Access Visible Yes   Ultrafiltration Rate (ml/hr) 1140 ml/hr   Ultrafiltration Removed (ml) 0 ml   Hemodialysis Fistula/Graft Arteriovenous fistula Left Forearm   No placement date or time found.   Present on Admission/Arrival: Yes  Access Type:  Consent 3/Introductory Paragraph: I gave the patient a chance to ask questions they had about the procedure.  Following this I explained the Mohs procedure and consent was obtained. The risks, benefits and alternatives to therapy were discussed in detail. Specifically, the risks of infection, scarring, bleeding, prolonged wound healing, incomplete removal, allergy to anesthesia, nerve injury and recurrence were addressed. Prior to the procedure, the treatment site was clearly identified and confirmed by the patient. All components of Universal Protocol/PAUSE Rule completed.

## 2024-10-10 NOTE — DISCHARGE INSTRUCTIONS
You were treated for low blood sugar and volume overload. You were seen by Cardiology and Nephrology. Please take the medications as listed and followup with the doctors as listed in this paperwork.

## 2024-10-10 NOTE — PROGRESS NOTES
Bedside and Verbal shift change report given to CHADD Seth (oncoming nurse) by CHADD Ghosh (offgoing nurse). Report included the following information Nurse Handoff Report, Adult Overview, Intake/Output, MAR, Recent Results, Cardiac Rhythm NSR, Neuro Assessment, and Event Log.      End of Shift Note    Bedside shift change report given to CHADD Conway (oncoming nurse) by Rolo Araujo RN (offgoing nurse).  Report included the following information SBAR, Intake/Output, MAR, Recent Results, and Cardiac Rhythm NSR    Shift worked:  7p-7a     Shift summary and any significant changes:     No acute events overnight     Concerns for physician to address:        Zone phone for oncoming shift:               Length of Stay:  Expected LOS: 3  Actual LOS: 2      Rolo Araujo RN

## 2024-10-10 NOTE — CARE COORDINATION
Patient is clear from a CM standpoint.    Transition of Care Plan: Return home with OP HD [facility] TTS (contact information listed on AVS)    RUR: 21% (high RUR, readmission)  Prior Level of Functioning: Assistance with cooking, housework and shopping  Disposition: Return home with OP HD [facility] T[facility] (contact information listed on AVS)  If SNF or IPR: Date FOC offered: N/A  Date FOC received: N/A  Accepting facility: N/A  Date authorization started with reference number: N/A  Date authorization received and expires: N/A  Follow up appointments: PCP/specialists if needed.  [facility] contact information listed on AVS.  DME needed: None.  Patient has a cane and 2L O2 NC through [employer] at home.  Transportation at discharge: Family to transport on discharge.  IM/IMM Medicare/ letter given: IM done 10/9/2024.  Is patient a [place] and connected with [facility]? No.   If yes, was  transfer form completed and [facility] notified? N/A  Caregiver Contact: Nadine León - DTR - [phone]   Discharge Caregiver contacted prior to discharge? Patient to contact.  Care Conference needed? No.  Barriers to discharge: None.    1541 - RN confirmed that patient ambulates independently and received HD today.  CM notified that patient is clear from a CM standpoint.    3384 - Clinicals faxed to [facility].  Discharge summary not yet available.     10/10/24 1620   Services At/After Discharge   Transition of Care Consult (CM Consult) N/A   Services At/After Discharge Outpatient  (HD)   Mode of Transport at Discharge Other (see comment)  (Family)   Confirm Follow Up Transport Family   Condition of Participation: Discharge Planning   The Plan for Transition of Care is related to the following treatment goals: Return home with follow ups and [facility] OP HD TTS   The Patient and/or Patient Representative was provided with a Choice of Provider? Patient   The Patient and/Or Patient Representative agree with the Discharge Plan? Yes   [place]

## 2024-10-10 NOTE — PROGRESS NOTES
Cardiology Progress Note  10/10/2024     Admit Date: 10/8/2024  Admit Diagnosis: Hypoglycemia [E16.2]  CC: none currentlyCardiologist:  Dr Uriostegui. Has appt w/ me 10/23/24.   Cardiac Assessment/Plan:    1) Nonischemic CM 4/2023: EF 15-20%; No CAD          *Nl TSH;  High ferritin (2100) but normal iron % sat (29) 4/2023; No ARB at first d/t low BPs (added as outpt)         *LifeVest, prior compliance then stopped.              *EF 30% 2/2024: ICD rec; Patient declined ICD in 3/2024 and 8/2024:          *EF: 15-20% 7/2024  2) CAD: no focal CAD except 80% ostial D4: Med Rx only.  2) Brief PAT, NOT afib on initial ECG 4/2023; cont sinus.  3) MR: Mod-severe 4/2023; mod-severe visually & severe by  ERO 2/2024.            *No role for MitraClip if no ICD w/ CM.  4) HTN: low BP limiting Rx 4/2023: higher BPs 2024: stopped midodrine 9/2024.     5) Dyslipidemia, labs per PCP  6) ESRD; (in [place])  7) Marked anemia (Hg 6.7) & heme + 4/2023  8) RA; ?PMR  9) Asthma, tobacco use.  10) Seizure 7/2023.     Admitted after hypoglycemia; CHF on CXR; -140; Worse BECKFORD/LE edema POA.  Current cardiac meds:   Pravastatin 10.     Volume Rx per Dr Taylor.  Rec 10/9: add low dose ARB (non HD days as outpt); increase as able.      10/10: No CP/resting dyspnea; not LH  -120; HR 70s; sinus; 96% 2L;   K 3.7; Cr 7.6; Hg 10.5;     Cardiac meds: pravachol 10; valsartan 20    No new cardiac recs; OK for d/c from cardiac standpoint.  Volume Rx per Dr Taylor  Dispo per primary team.  Keep OV w/ me 10/23/24.  ____________________________________________________________________  Darrel Patterson is a 79 y.o. male presents with Hypoglycemia [E16.2].     As noted in H&P: \"79 y.o.  male with PMHx significant for anemia, rheumatoid arthritis, ESRD on hemodialysis, HTN, CAD, CHF with EF 15 to 20% who is a transfer from [facility] ER.    Patient was feeling short of breath at home, so EMS was called initially he was on  IntraVENous PRN    heparin (porcine) injection 5,000 Units  5,000 Units SubCUTAneous 3 times per day    ondansetron (ZOFRAN-ODT) disintegrating tablet 4 mg  4 mg Oral Q8H PRN    Or    ondansetron (ZOFRAN) injection 4 mg  4 mg IntraVENous Q6H PRN    polyethylene glycol (GLYCOLAX) packet 17 g  17 g Oral Daily PRN    acetaminophen (TYLENOL) tablet 650 mg  650 mg Oral Q6H PRN    Or    acetaminophen (TYLENOL) suppository 650 mg  650 mg Rectal Q6H PRN    glucose chewable tablet 16 g  4 tablet Oral PRN    dextrose bolus 10% 125 mL  125 mL IntraVENous PRN    Or    dextrose bolus 10% 250 mL  250 mL IntraVENous PRN    glucagon injection 1 mg  1 mg SubCUTAneous PRN    dextrose 10 % infusion   IntraVENous Continuous PRN    budesonide (PULMICORT) nebulizer suspension 250 mcg  0.25 mg Nebulization BID RT    And    arformoterol tartrate (BROVANA) nebulizer solution 15 mcg  15 mcg Nebulization BID RT    And    ipratropium (ATROVENT) 0.02 % nebulizer solution 0.5 mg  0.5 mg Nebulization Q6H RT    sevelamer (RENVELA) tablet 1,600 mg  1,600 mg Oral TID WC    pravastatin (PRAVACHOL) tablet 10 mg  10 mg Oral Daily    mirtazapine (REMERON) tablet 7.5 mg  7.5 mg Oral Nightly    gabapentin (NEURONTIN) capsule 300 mg  300 mg Oral QHS    dorzolamide-timolol (COSOPT) 2-0.5 % ophthalmic solution 1 drop  1 drop Both Eyes TID    albuterol (PROVENTIL) (2.5 MG/3ML) 0.083% nebulizer solution 2.5 mg  2.5 mg Nebulization Q6H PRN    albumin human 25% IV solution 25 g  25 g IntraVENous PRN    lidocaine 4 % external patch 1 patch  1 patch TransDERmal QHS        Jake Uriostegui MD

## 2024-10-10 NOTE — PROGRESS NOTES
End of Shift Note    Bedside shift change report given to Rolo (oncoming nurse) by Gauri Gil RN (offgoing nurse).  Report included the following information SBAR, intake/output, MAR, cardiac rhythm, dialysis, and quality measures.    Shift worked:  Day Shift [id number]     Shift summary and any significant changes:     Pt went to HD today and got 3.5 Liters of fluid removed. Pt was scheduled to be discharged today, however family stated that they could not take him home today, but would take him home tomorrow. Doctor make aware and confirmed it was okay.      Concerns for physician to address:  None     [facility]   [phone]       Activity:     Number times ambulated in hallways past shift: 0  Number of times OOB to chair past shift: 4    Cardiac:   Cardiac Monitoring: Yes           Access:  Current line(s): PIV 20 G Right AC    Genitourinary:   Urinary status: voiding    Respiratory:      Chronic home O2 use?: YES  Incentive spirometer at bedside: NO       GI:     Current diet:  ADULT DIET; Regular  Passing flatus: YES  Tolerating current diet: YES       Pain Management:   Patient states pain is manageable on current regimen: YES    Skin:     Interventions: turn team, float heels, and increase time out of bed    Patient Safety:  Fall Score:    Interventions: bed/chair alarm, assistive device (walker, cane. etc), gripper socks, pt to call before getting OOB, and stay with me (per policy)       Length of Stay:  Expected LOS: 2  Actual LOS: 2      Gauri Gil, RN

## 2024-10-11 ENCOUNTER — TELEPHONE (OUTPATIENT)
Age: 79
End: 2024-10-11

## 2024-10-11 VITALS
OXYGEN SATURATION: 94 % | TEMPERATURE: 98.3 F | RESPIRATION RATE: 22 BRPM | HEIGHT: 72 IN | SYSTOLIC BLOOD PRESSURE: 101 MMHG | HEART RATE: 76 BPM | BODY MASS INDEX: 19.64 KG/M2 | DIASTOLIC BLOOD PRESSURE: 73 MMHG | WEIGHT: 145 LBS

## 2024-10-11 LAB
ANION GAP SERPL CALC-SCNC: 9 MMOL/L (ref 2–12)
BASOPHILS # BLD: 0.1 K/UL (ref 0–0.1)
BASOPHILS NFR BLD: 1 % (ref 0–1)
BUN SERPL-MCNC: 21 MG/DL (ref 6–20)
BUN/CREAT SERPL: 4 (ref 12–20)
CALCIUM SERPL-MCNC: 8.4 MG/DL (ref 8.5–10.1)
CHLORIDE SERPL-SCNC: 100 MMOL/L (ref 97–108)
CO2 SERPL-SCNC: 29 MMOL/L (ref 21–32)
CREAT SERPL-MCNC: 5.54 MG/DL (ref 0.7–1.3)
DIFFERENTIAL METHOD BLD: ABNORMAL
EOSINOPHIL # BLD: 0.6 K/UL (ref 0–0.4)
EOSINOPHIL NFR BLD: 9 % (ref 0–7)
ERYTHROCYTE [DISTWIDTH] IN BLOOD BY AUTOMATED COUNT: 18.3 % (ref 11.5–14.5)
GLUCOSE BLD STRIP.AUTO-MCNC: 103 MG/DL (ref 65–117)
GLUCOSE BLD STRIP.AUTO-MCNC: 123 MG/DL (ref 65–117)
GLUCOSE BLD STRIP.AUTO-MCNC: 87 MG/DL (ref 65–117)
GLUCOSE SERPL-MCNC: 93 MG/DL (ref 65–100)
HCT VFR BLD AUTO: 32.6 % (ref 36.6–50.3)
HGB BLD-MCNC: 10.6 G/DL (ref 12.1–17)
IMM GRANULOCYTES # BLD AUTO: 0 K/UL (ref 0–0.04)
IMM GRANULOCYTES NFR BLD AUTO: 0 % (ref 0–0.5)
LYMPHOCYTES # BLD: 1.1 K/UL (ref 0.8–3.5)
LYMPHOCYTES NFR BLD: 16 % (ref 12–49)
MAGNESIUM SERPL-MCNC: 2.2 MG/DL (ref 1.6–2.4)
MCH RBC QN AUTO: 31.3 PG (ref 26–34)
MCHC RBC AUTO-ENTMCNC: 32.5 G/DL (ref 30–36.5)
MCV RBC AUTO: 96.2 FL (ref 80–99)
MONOCYTES # BLD: 1.3 K/UL (ref 0–1)
MONOCYTES NFR BLD: 18 % (ref 5–13)
NEUTS SEG # BLD: 3.9 K/UL (ref 1.8–8)
NEUTS SEG NFR BLD: 55 % (ref 32–75)
NRBC # BLD: 0 K/UL (ref 0–0.01)
NRBC BLD-RTO: 0 PER 100 WBC
PHOSPHATE SERPL-MCNC: 3.5 MG/DL (ref 2.6–4.7)
PLATELET # BLD AUTO: 205 K/UL (ref 150–400)
PMV BLD AUTO: 11.6 FL (ref 8.9–12.9)
POTASSIUM SERPL-SCNC: 3.7 MMOL/L (ref 3.5–5.1)
RBC # BLD AUTO: 3.39 M/UL (ref 4.1–5.7)
SERVICE CMNT-IMP: ABNORMAL
SERVICE CMNT-IMP: NORMAL
SERVICE CMNT-IMP: NORMAL
SODIUM SERPL-SCNC: 138 MMOL/L (ref 136–145)
WBC # BLD AUTO: 7 K/UL (ref 4.1–11.1)

## 2024-10-11 PROCEDURE — 84100 ASSAY OF PHOSPHORUS: CPT

## 2024-10-11 PROCEDURE — 85025 COMPLETE CBC W/AUTO DIFF WBC: CPT

## 2024-10-11 PROCEDURE — 80048 BASIC METABOLIC PNL TOTAL CA: CPT

## 2024-10-11 PROCEDURE — 83735 ASSAY OF MAGNESIUM: CPT

## 2024-10-11 PROCEDURE — 6370000000 HC RX 637 (ALT 250 FOR IP): Performed by: INTERNAL MEDICINE

## 2024-10-11 PROCEDURE — 2700000000 HC OXYGEN THERAPY PER DAY

## 2024-10-11 PROCEDURE — 6360000002 HC RX W HCPCS: Performed by: INTERNAL MEDICINE

## 2024-10-11 PROCEDURE — 94640 AIRWAY INHALATION TREATMENT: CPT

## 2024-10-11 PROCEDURE — 2580000003 HC RX 258: Performed by: INTERNAL MEDICINE

## 2024-10-11 PROCEDURE — 82962 GLUCOSE BLOOD TEST: CPT

## 2024-10-11 RX ORDER — ARFORMOTEROL TARTRATE 15 UG/2ML
15 SOLUTION RESPIRATORY (INHALATION)
Status: DISCONTINUED | OUTPATIENT
Start: 2024-10-11 | End: 2024-10-11 | Stop reason: HOSPADM

## 2024-10-11 RX ORDER — BUDESONIDE 0.25 MG/2ML
0.25 INHALANT ORAL
Status: DISCONTINUED | OUTPATIENT
Start: 2024-10-11 | End: 2024-10-11 | Stop reason: HOSPADM

## 2024-10-11 RX ADMIN — SEVELAMER CARBONATE 1600 MG: 800 TABLET, FILM COATED ORAL at 08:25

## 2024-10-11 RX ADMIN — SEVELAMER CARBONATE 1600 MG: 800 TABLET, FILM COATED ORAL at 11:19

## 2024-10-11 RX ADMIN — DORZOLAMIDE HYDROCHLORIDE AND TIMOLOL MALEATE 1 DROP: 20; 5 SOLUTION/ DROPS OPHTHALMIC at 14:25

## 2024-10-11 RX ADMIN — BUDESONIDE 250 MCG: 0.25 INHALANT RESPIRATORY (INHALATION) at 08:06

## 2024-10-11 RX ADMIN — VALSARTAN 20 MG: 80 TABLET, FILM COATED ORAL at 08:25

## 2024-10-11 RX ADMIN — PRAVASTATIN SODIUM 10 MG: 10 TABLET ORAL at 08:25

## 2024-10-11 RX ADMIN — IPRATROPIUM BROMIDE 0.5 MG: 0.5 SOLUTION RESPIRATORY (INHALATION) at 08:01

## 2024-10-11 RX ADMIN — SEVELAMER CARBONATE 1600 MG: 800 TABLET, FILM COATED ORAL at 17:13

## 2024-10-11 RX ADMIN — ARFORMOTEROL TARTRATE 15 MCG: 15 SOLUTION RESPIRATORY (INHALATION) at 08:06

## 2024-10-11 RX ADMIN — SODIUM CHLORIDE, PRESERVATIVE FREE 10 ML: 5 INJECTION INTRAVENOUS at 08:24

## 2024-10-11 RX ADMIN — HEPARIN SODIUM 5000 UNITS: 5000 INJECTION INTRAVENOUS; SUBCUTANEOUS at 14:25

## 2024-10-11 RX ADMIN — DORZOLAMIDE HYDROCHLORIDE AND TIMOLOL MALEATE 1 DROP: 20; 5 SOLUTION/ DROPS OPHTHALMIC at 08:24
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
## 2024-10-11 NOTE — PROGRESS NOTES
Hospitalist Progress Note    NAME:   Darrel Patterson   : 1945   MRN: [id number]     Date/Time: 10/10/2024 8:52 PM  Patient PCP: Roma Solitario APRN - NP    Estimated discharge date: 10/11  Barriers: ride from family      Assessment / Plan:    Acute hypoxic respiratory failure  Hypoglycemia - improved  Acute on chronic systolic heart failure, EF 15-20%  -Glucose 24 on arrival to [facility] ED,not on any sulfonylurea or insulin  -Improved with D50  -Possibly from cardiac failure/ESRD  -Check A1c  -Hypoglycemic protocol  -Check insulin, C-peptide,   blood cultures pending  - A1c 5.2  -Consult Cardiology  - ARB added  -May benefit from cardiac meds, but in the past  BP tolerance was issue.     Mild hyperkalemia - improved  ESRD on hemodialysis  -Currently getting HD, that should help with potassium  -Volume management as per nephro  - clear for discharge per nephro    COPD  continue home inhaler     Insomnia  continue mirtazapine     HLD  continue statin           Medical Decision Making:   I personally reviewed labs: cbc bmp  I personally reviewed imaging:   I personally reviewed EKG:  Toxic drug monitoring: monitor mental status for lethargy from gabapentin toxicity  Discussed case with: RN CM Nephro        Code Status: Full code  DVT Prophylaxis: Heparin   Baseline:     Subjective:     Chief Complaint / Reason for Physician Visit  \"Followup volume overload\".  Discussed with RN events overnight. Feels ok today.      Objective:     VITALS:   Last 24hrs VS reviewed since prior progress note. Most recent are:  Patient Vitals for the past 24 hrs:   BP Temp Temp src Pulse Resp SpO2   10/10/24 1915 -- 98.9 °F (37.2 °C) Oral -- -- --   10/10/24 1517 137/80 99.4 °F (37.4 °C) Axillary -- 18 96 %   10/10/24 1310 (!) 136/105 98 °F (36.7 °C) -- 79 16 97 %   10/10/24 1300 (!) 150/105 -- -- 77 -- --   10/10/24 1245 (!) 112/56 -- -- 77 -- --   10/10/24 1230 (!) 123/97 -- -- 96 -- --   10/10/24 1215 (!) 144/87 -- -- 82 --  --   10/10/24 1200 126/81 -- -- 79 -- --   10/10/24 1145 (!) 142/84 -- -- 82 -- 97 %   10/10/24 1130 117/70 -- -- 82 -- 97 %   10/10/24 1115 125/68 -- -- 82 -- 98 %   10/10/24 1100 117/87 -- -- 89 -- 100 %   10/10/24 1045 101/72 -- -- 83 -- 100 %   10/10/24 1030 134/83 -- -- 74 -- 95 %   10/10/24 1015 111/71 -- -- 77 -- 100 %   10/10/24 1000 122/75 -- -- 74 -- 98 %   10/10/24 0945 95/71 -- -- 74 -- 100 %   10/10/24 0935 103/62 98 °F (36.7 °C) -- 74 18 99 %   10/10/24 0930 (!) 103/59 98 °F (36.7 °C) -- 80 18 98 %         Intake/Output Summary (Last 24 hours) at 10/10/2024 2052  Last data filed at 10/10/2024 1310  Gross per 24 hour   Intake 790 ml   Output 4050 ml   Net -3260 ml        I had a face to face encounter and independently examined this patient on 10/10/2024, as outlined below:  PHYSICAL EXAM:  General: Alert, cooperative  EENT:  EOMI. Anicteric sclerae.  Resp:  CTA bilaterally, no wheezing or rales.  No accessory muscle use  CV:  Regular  rate,  No edema  GI:  Soft, Non distended, Non tender.    Neurologic:  Alert and oriented X 3, normal speech,   Skin:  No rashes.  No jaundice    Reviewed most current lab test results and cultures  YES  Reviewed most current radiology test results   YES  Review and summation of old records today    NO  Reviewed patient's current orders and MAR    YES  PMH/SH reviewed - no change compared to H&P    Procedures: see electronic medical records for all procedures/Xrays and details which were not copied into this note but were reviewed prior to creation of Plan.      LABS:  I reviewed today's most current labs and imaging studies.  Pertinent labs include:  Recent Labs     10/08/24  0006 10/09/24  0546 10/10/24  0356   WBC 9.9 9.8 6.9   HGB 12.7 11.7* 10.5*   HCT 39.1 34.9* 33.7*    222 289     Recent Labs     10/08/24  0006 10/09/24  0546 10/10/24  0356    136 136   K 6.0* 4.0 3.7   CL 98 100 100   CO2 28 25 26   GLUCOSE 24* 80 104*   BUN 49* 29* 36*   CREATININE

## 2024-10-11 NOTE — PROGRESS NOTES
DISCHARGE SUMMARY FROM [facility] NURSE    The patient is stable for discharge. I have reviewed the discharge instructions with the patient and caregiver. The patient and caregiver verbalized understanding. All questions were fully answered. The patient and caregiver verbalized no complaints.    Hard scripts and medication handouts were given and reviewed with the patient and caregiver. Appropriate educational materials and medication side effects teaching were also provided.    Cardiac monitor and IV line(s) were removed.     There were no personal belongings, valuables or home medications left at patient's bedside,  or safe.     Gauri Gil RN, 10/11/2024 5:50 PM

## 2024-10-11 NOTE — CARE COORDINATION
Patient is clear from a CM standpoint.    If Medicaid Transportation is needed: [phone], Member ID #: [id number].    Transition of Care Plan: Return home with OP HD [facility] (contact information listed on AVS)    RUR: 21% (high RUR, readmission)  Prior Level of Functioning: Assistance with cooking, housework and shopping  Disposition: Return home with OP HD [facility] (contact information listed on AVS)  If SNF or IPR: Date FOC offered: N/A  Date FOC received: N/A  Accepting facility: N/A  Date authorization started with reference number: N/A  Date authorization received and expires: N/A  Follow up appointments: PCP/specialists if needed.  [facility] contact information listed on AVS.  DME needed: None.  Patient has a cane and 2L O2 NC through [employer] at home.  Transportation at discharge: Son to transport at 4pm.  IM/IMM Medicare/ letter given: 2nd IM done 10/11/2024.  Is patient a  and connected with [facility]? No.   If yes, was  transfer form completed and VA notified? N/A  Caregiver Contact[facility] [phone]   Discharge Caregiver contacted prior to discharge? Patient to contact.  Care Conference needed? No.  Barriers to discharge: None.    1034 to 1037 - Patient's son confirmed that his DTR was going to [place] yesterday and could not take him him and his son is going to transport him today at 4pm.  RN notified that patient is clear from a CM standpoint.     10/11/24 0819   Services At/After Discharge   Transition of Care Consult (CM Consult) N/A   Services At/After Discharge Outpatient  (HD)   Mode of Transport at Discharge Other (see comment)  (Family)   Confirm Follow Up Transport Family   Condition of Participation: Discharge Planning   The Plan for Transition of Care is related to the following treatment goals: Return home with follow ups and [facility] HD [facility]   The Patient and/or Patient Representative was provided with a Choice of Provider? Patient

## 2024-10-11 NOTE — PROGRESS NOTES
December 21, 2020     Dear Linda Cavazos,    We are pleased to provide you with secure, online access to medical information via [facility] for: Alevism Dirk       How Do I Sign Up?  Activating a [facility] account is as easy as 1-2-3!     1. Visit [email or web address] and enter this activation code and your date of birth, then select Next.  [email or web address]  2. Create a username and password to use when you visit [facility] in the future and select a security question in case you lose your password and select Next.  3. Enter your e-mail address and click Sign Up!       Additional Information  If you have questions, please e-mail [email or web address] or call [phone] to talk to our [facility] staff. Remember, [facility] is NOT to be used for urgent needs. For non-life threatening issues outside of normal clinic hours, call our after-hours nurse care line, [facility] at [phone]. For medical emergencies, dial 911.     Sincerely,    Your [facility] Team   Hospital follow-up PCP transitional care appointment has been scheduled with NP Roma Solitario on 10/30/24 at 1300. This is the first available appt due to limited provider availability. PCP office does not offer alternate provider option for hospital follow up. [facility] placed [facility] information AVS for patient resource. Pending patient discharge.  Lucia Unger , Care Management Assistant

## 2024-10-11 NOTE — PROGRESS NOTES
severe by  ERO 2024.  4) HTN: low BP limiting Rx 2023: higher BPs   5) Dyslipidemia, labs per PCP  6) ESRD; (in [place])  7) Marked anemia (Hg 6.7) & heme + 2023  8) RA; ?PMR  9) Asthma, tobacco use.  10) Seizure 2023.     Cath 7/10/24:  LM: Large caliber vessel without significant stenosis  LAD: Large caliber vessel that wraps around the apex without significant stenosis.   D4: Small caliber vessel with 80% ostial stenosis  LCX: Large caliber vessel without significant stenosis.    RCA: Moderate caliber dominant vessel without significant stenosis.   LV angiography: EF: 15-20%; Wall motion: global HK; MR: moderate to severe     Patient declined ICD in 3/2024 and 2024: No role for MitraClip if no ICD w/ CM.     : No CP/resting dyspnea  -130;H HR 90-110s; sinus; % 1L  K 3.5; Cr 4 5; alb 2.8; nl TSH; Hg 10.1.  Cardiac meds: midodrine 15 tid; pravachol 10;   He has NOT been taking midodrine at home; will stop order here.  OK for d/c from cardiac standpoint; F/U with me in 1 month  Future add toprol XL and ARB, IF BP allows.  For other plans, see orders.  Hospital problem list     Active Hospital Problems    Diagnosis Date Noted    Hypoglycemia 10/08/2024     Priority: Low        Subjective: Darrel Leonardo reports       Chest pain X none  consistent with:  Non-cardiac CP         Atypical CP     None now  On going  Anginal CP     Dyspnea X none  at rest  with exertion         improved  unchanged  worse              PND X none  overnight       Orthopnea X none  improved  unchanged  worse   Presyncope X none  improved  unchanged  worse     Ambulated in hallway without symptoms   Yes   Ambulated in room without symptoms  Yes   Objective:     Physical Exam:  Overall VSSAF;  /68   Pulse 73   Temp 98.4 °F (36.9 °C) (Oral)   Resp 14   Ht 1.829 m (6')   Wt 65.8 kg (145 lb)   SpO2 96%   BMI 19.67 kg/m²   Temp (24hrs), Av.5 °F (36.9 °C), Min:98 °F (36.7 °C), Max:99.4 °F (37.4  °C)    No data found.  No data found.  No data found.    Intake/Output Summary (Last 24 hours) at 10/11/2024 0800  Last data filed at 10/10/2024 1310  Gross per 24 hour   Intake 550 ml   Output 4050 ml   Net -3500 ml     General Appearance: Well developed, well nourished, no acute distress.   Ears/Nose/Mouth/Throat:   Normal MM; anicteric.   JVP: WNL   Resp:   Lungs clear to auscultation bilaterally.  Nl resp effort.   Cardiovascular:  RRR, S1, S2 normal, no new murmur. No gallop or rub.   Abdomen:   Soft, non-tender, bowel sounds are present.   Extremities: No edema bilaterally.    Skin:  Neuro: Warm and dry.  A/O x3, grossly nonfocal       cath site intact w/o hematoma or new bruit; distal pulse unchanged  Yes   Data Review:     Telemetry independently reviewed x sinus  chronic afib  parox afib  NSVT     ECG independently reviewed  NSR  afib  no significant changes  NSST-Tw chgs     x no new ECG provided for review   Lab results reviewed as noted below.  Current medications reviewed as noted below.    No results for input(s): \"PH\", \"PCO2\", \"PO2\" in the last 72 hours.  No results for input(s): \"CPK\", \"CKMB\" in the last 72 hours.    Invalid input(s): \"CKNDX\", \"TROIQ\"  Recent Labs     10/09/24  0546 10/10/24  0356 10/11/24  0621    136 138   K 4.0 3.7 3.7    100 100   CO2 25 26 29   BUN 29* 36* 21*   PHOS  --  4.9* 3.5   WBC 9.8 6.9 7.0   HGB 11.7* 10.5* 10.6*   HCT 34.9* 33.7* 32.6*    289 205     Lab Results   Component Value Date/Time    CHOL 160 04/12/2023 04:37 AM    HDL 88 04/12/2023 04:37 AM    LDL 55.2 04/12/2023 04:37 AM     No results for input(s): \"TP\", \"GLOB\", \"GGT\" in the last 72 hours.    Invalid input(s): \"SGOT\", \"GPT\", \"AP\", \"TBIL\", \"ALB\", \"AML\", \"AMYP\", \"LPSE\", \"HLPSE\"    No results for input(s): \"INR\", \"APTT\" in the last 72 hours.    Invalid input(s): \"PTP\"   No components found for: \"GLPOC\"    Current Facility-Administered Medications   Medication Dose Route Frequency

## 2024-10-11 NOTE — PLAN OF CARE
Problem: Chronic Conditions and Co-morbidities  Goal: Patient's chronic conditions and co-morbidity symptoms are monitored and maintained or improved  Recent Flowsheet Documentation  Taken 10/11/2024 0814 by Gauri Gil RN  Care Plan - Patient's Chronic Conditions and Co-Morbidity Symptoms are Monitored and Maintained or Improved:   Monitor and assess patient's chronic conditions and comorbid symptoms for stability, deterioration, or improvement   Collaborate with multidisciplinary team to address chronic and comorbid conditions and prevent exacerbation or deterioration   Update acute care plan with appropriate goals if chronic or comorbid symptoms are exacerbated and prevent overall improvement and discharge  10/10/2024 2102 by Rolo Araujo RN  Outcome: Progressing  Flowsheets (Taken 10/10/2024 0847 by Gauri Gil RN)  Care Plan - Patient's Chronic Conditions and Co-Morbidity Symptoms are Monitored and Maintained or Improved:   Monitor and assess patient's chronic conditions and comorbid symptoms for stability, deterioration, or improvement   Collaborate with multidisciplinary team to address chronic and comorbid conditions and prevent exacerbation or deterioration   Update acute care plan with appropriate goals if chronic or comorbid symptoms are exacerbated and prevent overall improvement and discharge     Problem: Discharge Planning  Goal: Discharge to home or other facility with appropriate resources  Recent Flowsheet Documentation  Taken 10/11/2024 0814 by Gauri Gil RN  Discharge to home or other facility with appropriate resources:   Identify barriers to discharge with patient and caregiver   Arrange for needed discharge resources and transportation as appropriate   Identify discharge learning needs (meds, wound care, etc)  10/10/2024 2102 by Rolo Araujo RN  Outcome: Progressing  Flowsheets (Taken 10/10/2024 0847 by Gauri Gil, RN)  Discharge to home or other facility with

## 2024-10-11 NOTE — DISCHARGE SUMMARY
Discharge Summary    Name: Darrel Patterson  [id number]  YOB: 1945 (Age: 79 y.o.)   Date of Admission: 10/8/2024  Date of Discharge: 10/11/2024  Attending Physician: Mendel, David L, MD    Discharge Diagnosis:     Acute hypoxic respiratory failure  Hypoglycemia - improved  Acute on chronic systolic heart failure, EF 15-20%  Mild hyperkalemia - improved  ESRD on hemodialysis  COPD   Insomnia   HLD     Consultations:  IP CONSULT TO NEPHROLOGY  IP CONSULT TO CARDIOLOGY      Brief Admission History/Reason for Admission Per David Guerra MD:     Darrel Patterson is a 79 y.o.  male with PMHx significant for anemia, rheumatoid arthritis, ESRD on hemodialysis, HTN, CAD, CHF with EF 15 to 20% who is a transfer from [facility].    Patient was feeling short of breath at home, so EMS was called initially he was on only 2 L nasal cannula and after they brought him to the ambulance, he became less responsive, his glucose was checked and it was noted to be 40, he received dextrose, and was sent to [facility] ED.  In the ED his glucose was 24, he became more responsive after D50.  He was transferred here for further evaluation  Currently patient seen on the floor, glucose for past 4 hours has been within normal limit.  But he endorses that lately he has been more shortness of breath than usual.  Despite regular dialysis.  He denies taking any sulfonylurea, or insulin.  Daughter at the bedside who reports that he is p.o. intake is adequate  We were asked to admit for work up and evaluation of the above problems.     Brief Hospital Course by Main Problems:     Acute hypoxic respiratory failure  Hypoglycemia - improved  Acute on chronic systolic heart failure, EF 15-20%  -Glucose 24 on arrival to [facility] ED,not on any sulfonylurea or insulin  -Improved with D50  -Possibly from cardiac failure/ESRD  -A1c 5.2  -Hypoglycemic protocol  blood cultures neg thus far  - A1c 5.2  -Consulted Cardiology, ARB  that comes to your home. You may call them if you would like to set up an appt to be seen for a follow up while waiting to be seen by your PCP.    Rosendo Guerra MD  [address]  [facility] [address] [phone]    Follow up in 1 week(s)       [facility]  Address: [address] [place] [address]   Phone: [phone]       Fax: [phone]  Follow up  Please resume your HD schedule.          Total time in minutes spent coordinating this discharge (includes going over instructions, follow-up, prescriptions, and preparing report for sign off to her PCP) :  35 minutes    David L Mendel, MD

## 2024-10-11 NOTE — PLAN OF CARE
Bedside and Verbal shift change report given to CHADD Seth (oncoming nurse) by CHADD Astorga (offgoing nurse). Report included the following information Nurse Handoff Report, Adult Overview, Intake/Output, MAR, Recent Results, Cardiac Rhythm NSR, Neuro Assessment, and Event Log.       End of Shift Note    Bedside shift change report given to CHADD Astorga (oncoming nurse) by Rolo Araujo RN (offgoing nurse).  Report included the following information SBAR, Intake/Output, MAR, Recent Results, Med Rec Status, and Cardiac Rhythm NSR    Shift worked:  7p-7a     Shift summary and any significant changes:     No acute events overnight     Concerns for physician to address:        Zone phone for oncoming shift:                 Length of Stay:  Expected LOS: 2  Actual LOS: 2      Rolo Araujo RN

## 2024-10-13 LAB
BACTERIA SPEC CULT: NORMAL
SERVICE CMNT-IMP: NORMAL

## 2024-10-14 ENCOUNTER — FOLLOWUP TELEPHONE ENCOUNTER (OUTPATIENT)
Facility: HOSPITAL | Age: 79
End: 2024-10-14

## 2024-10-14 NOTE — TELEPHONE ENCOUNTER
0236 - Discharge summary faxed to HD center.      Roma Leary, FRANCKN, RN  [facility] Care Management

## 2024-10-14 NOTE — TELEPHONE ENCOUNTER
Care Transitions Initial Follow Up Call    Outreach made within 2 business days of discharge: Yes    Patient: Darrel Patterson Patient : 1945   MRN: [id number]  Reason for Admission: hypoglycemia  Discharge Date: 10/11/24       Spoke with: wily arnold    Discharge department/facility: [facility] Interactive Patient Contact:  Was patient able to fill all prescriptions: Yes  Was patient instructed to bring all medications to the follow-up visit: Yes  Is patient taking all medications as directed in the discharge summary? Yes  Does patient understand their discharge instructions: Yes  Does patient have questions or concerns that need addressed prior to 7-14 day follow up office visit: no    Additional needs identified to be addressed with provider  No needs identified             Scheduled appointment with PCP within 7-14 days    Follow Up  Future Appointments   Date Time Provider Department Center   10/30/2024  1:00 PM Afsaneh Augustine MD [facility] MAIN [facility] DEP   2025  1:30 PM Roma Solitario, APRN - NP [facility] MAIN [facility] DEP       Naomi Richardson LPN

## 2024-10-15 LAB
BACTERIA SPEC CULT: NORMAL
SERVICE CMNT-IMP: NORMAL

## 2024-10-30 ENCOUNTER — OFFICE VISIT (OUTPATIENT)
Age: 79
End: 2024-10-30
Payer: MEDICARE

## 2024-10-30 VITALS
BODY MASS INDEX: 18.22 KG/M2 | WEIGHT: 134.5 LBS | DIASTOLIC BLOOD PRESSURE: 70 MMHG | RESPIRATION RATE: 18 BRPM | SYSTOLIC BLOOD PRESSURE: 122 MMHG | HEART RATE: 62 BPM | HEIGHT: 72 IN | TEMPERATURE: 97.8 F | OXYGEN SATURATION: 96 %

## 2024-10-30 DIAGNOSIS — F51.04 PSYCHOPHYSIOLOGICAL INSOMNIA: ICD-10-CM

## 2024-10-30 DIAGNOSIS — Z09 HOSPITAL DISCHARGE FOLLOW-UP: Primary | ICD-10-CM

## 2024-10-30 DIAGNOSIS — J44.9 COPD, SEVERE (HCC): ICD-10-CM

## 2024-10-30 DIAGNOSIS — E16.2 HYPOGLYCEMIA: ICD-10-CM

## 2024-10-30 DIAGNOSIS — R63.0 DECREASED APPETITE: ICD-10-CM

## 2024-10-30 DIAGNOSIS — I50.9 CHRONIC CONGESTIVE HEART FAILURE, UNSPECIFIED HEART FAILURE TYPE (HCC): ICD-10-CM

## 2024-10-30 DIAGNOSIS — N18.6 ESRD ON HEMODIALYSIS (HCC): ICD-10-CM

## 2024-10-30 DIAGNOSIS — Z99.2 ESRD ON HEMODIALYSIS (HCC): ICD-10-CM

## 2024-10-30 DIAGNOSIS — R06.09 DOE (DYSPNEA ON EXERTION): ICD-10-CM

## 2024-10-30 PROCEDURE — 1123F ACP DISCUSS/DSCN MKR DOCD: CPT | Performed by: STUDENT IN AN ORGANIZED HEALTH CARE EDUCATION/TRAINING PROGRAM

## 2024-10-30 PROCEDURE — 99214 OFFICE O/P EST MOD 30 MIN: CPT | Performed by: STUDENT IN AN ORGANIZED HEALTH CARE EDUCATION/TRAINING PROGRAM

## 2024-10-30 PROCEDURE — 1160F RVW MEDS BY RX/DR IN RCRD: CPT | Performed by: STUDENT IN AN ORGANIZED HEALTH CARE EDUCATION/TRAINING PROGRAM

## 2024-10-30 PROCEDURE — 3074F SYST BP LT 130 MM HG: CPT | Performed by: STUDENT IN AN ORGANIZED HEALTH CARE EDUCATION/TRAINING PROGRAM

## 2024-10-30 PROCEDURE — 1159F MED LIST DOCD IN RCRD: CPT | Performed by: STUDENT IN AN ORGANIZED HEALTH CARE EDUCATION/TRAINING PROGRAM

## 2024-10-30 PROCEDURE — 3078F DIAST BP <80 MM HG: CPT | Performed by: STUDENT IN AN ORGANIZED HEALTH CARE EDUCATION/TRAINING PROGRAM

## 2024-10-30 PROCEDURE — 1126F AMNT PAIN NOTED NONE PRSNT: CPT | Performed by: STUDENT IN AN ORGANIZED HEALTH CARE EDUCATION/TRAINING PROGRAM

## 2024-10-30 RX ORDER — MIRTAZAPINE 7.5 MG/1
15 TABLET, FILM COATED ORAL NIGHTLY
Qty: 90 TABLET | Refills: 1 | Status: SHIPPED
Start: 2024-10-30

## 2024-10-30 ASSESSMENT — PATIENT HEALTH QUESTIONNAIRE - PHQ9
SUM OF ALL RESPONSES TO PHQ QUESTIONS 1-9: 0
2. FEELING DOWN, DEPRESSED OR HOPELESS: NOT AT ALL
SUM OF ALL RESPONSES TO PHQ9 QUESTIONS 1 & 2: 0
SUM OF ALL RESPONSES TO PHQ QUESTIONS 1-9: 0
1. LITTLE INTEREST OR PLEASURE IN DOING THINGS: NOT AT ALL
SUM OF ALL RESPONSES TO PHQ QUESTIONS 1-9: 0
SUM OF ALL RESPONSES TO PHQ QUESTIONS 1-9: 0

## 2024-10-30 NOTE — PROGRESS NOTES
Subjective:     Chief Complaint   Patient presents with    Follow-Up from Hospital       Darrel Patterson is a 79 y.o. male who presents today for follow up     HPI    Patient is a 79 y.o. male with PMHx significant for anemia, rheumatoid arthritis, ESRD on hemodialysis, HTN, CAD, CHF with EF 15 to 20% who presents today for hospital follow-up.    He was admitted 10/8 - 10/11 for acute hypoxic respiratory failure, hypoglycemia, and CHF exacerbation.  EMS was initially called for increasing dyspnea at home requiring 2 L nasal cannula.  Initially blood glucose noted to be 40, despite receiving dextrose blood sugar was 24 in the ER.  He became more responsive after administration of D50.  Glucose remained normal throughout admission.  He is not on insulin or sulfonylurea. A1C 5.2    Hyperkalemia on admission however he is ESRD on HD,   Initially found to be volume overloaded thought to be the cause of his hypoxia.    Volume status improved following run of dialysis with electrolyte normalization (K 6 on admission).  Continue with outpatient dialysis at discharge.  Potassium at discharge 3.7    Cardiology was also consulted during admission.  Valsartan 20 mg was added on nondialysis days.    His son has helped him monitor glucose levels since discharge.   Glucose readings have been 90-130s since discharge. Son has TIIDM    He continues to have BECKFORD, this occurs with walking across the room or walking up the stairs  He uses Telergy and albuterol prn which is typically 2-3x daily   Has home O2 at home however has not been using this regularly.  He is not followed by pulmonology.  He is not having a cough or wheeze.    States he has had follow-up with his cardiologist through [facility] since discharge.  He believes a new medication was started and he does not know which one -will obtain records      He has maintained his regular IHD schedule.  His son who is with him today states they have been checking his labs there and have

## 2024-10-30 NOTE — PATIENT INSTRUCTIONS
Try increasing Remeron to 1.5 Tablets nightly for 1-2 weeks   If you feel good with this can increase to 2 tablets  (15mg)

## 2024-10-30 NOTE — PROGRESS NOTES
\"Have you been to the ER, urgent care clinic since your last visit?  Hospitalized since your last visit?\"    YES - When: approximately 2  weeks ago.  Where and Why: ER.    “Have you seen or consulted any other health care providers outside our system since your last visit?”    NO

## 2024-10-31 ENCOUNTER — CLINICAL DOCUMENTATION (OUTPATIENT)
Age: 79
End: 2024-10-31

## 2024-11-04 ENCOUNTER — CLINICAL DOCUMENTATION (OUTPATIENT)
Age: 79
End: 2024-11-04

## 2024-11-04 NOTE — PROGRESS NOTES
Patient seen by cardiologist Dr. Uriostegui on 10/23/2024.  ECG was done.  He will follow-up with the nurse practitioner in 2 weeks

## 2024-11-22 RX ORDER — ERGOCALCIFEROL 1.25 MG/1
50000 CAPSULE ORAL WEEKLY
Qty: 5 CAPSULE | Refills: 2 | OUTPATIENT
Start: 2024-11-22

## 2024-11-22 RX ORDER — FERROUS SULFATE 325(65) MG
325 TABLET ORAL
Qty: 30 TABLET | Refills: 2 | OUTPATIENT
Start: 2024-11-22

## 2024-11-22 NOTE — TELEPHONE ENCOUNTER
He needs to get these from his kidney doctor because the dose depends on his labs results which the kidney doctor is monitoring

## 2024-11-22 NOTE — TELEPHONE ENCOUNTER
Medication Refill Request    Darrel Patterson is requesting a refill of the following medication(s):     Ergocalciferol 29548 units caps  Ferrous sulfate 325 MG Tab    Please send refill to:     [facility]/pharmacy #[id number] - [place], [place] - [address] [facility] [phone] - F [phone]  [address] [facility] [address]  Phone: [phone] Fax: [phone]

## 2024-11-24 ENCOUNTER — CLINICAL DOCUMENTATION (OUTPATIENT)
Age: 79
End: 2024-11-24

## 2024-11-24 RX ORDER — VALSARTAN 40 MG/1
TABLET ORAL
Qty: 90 TABLET | Refills: 0
Start: 2024-11-24

## 2024-12-05 DIAGNOSIS — M05.79 RHEUMATOID ARTHRITIS INVOLVING MULTIPLE SITES WITH POSITIVE RHEUMATOID FACTOR (HCC): ICD-10-CM

## 2024-12-06 RX ORDER — GABAPENTIN 300 MG/1
300 CAPSULE ORAL
Qty: 90 CAPSULE | Refills: 0 | Status: SHIPPED | OUTPATIENT
Start: 2024-12-06 | End: 2025-03-06

## 2024-12-06 NOTE — TELEPHONE ENCOUNTER
Patient requesting refill on     Requested Prescriptions     Pending Prescriptions Disp Refills    gabapentin (NEURONTIN) 300 MG capsule 90 capsule 0     Sig: Take 1 capsule by mouth nightly for 90 days. Max Daily Amount: 300 mg        Last OV 10/4/2024

## 2024-12-11 ENCOUNTER — CLINICAL DOCUMENTATION (OUTPATIENT)
Age: 79
End: 2024-12-11

## 2024-12-27 ENCOUNTER — TELEPHONE (OUTPATIENT)
Age: 79
End: 2024-12-27

## 2024-12-27 NOTE — TELEPHONE ENCOUNTER
Pharmacy refill request for:    HydrOXYzine pamoate (VISTARIL) 25 MG capsule - PT also called about it. Says he needs it again.    NOV 1/8/2025    [id number] - [place] - [facility] [place] [address] [facility] [address] - P [phone] - F [phone] [[id number]]

## 2024-12-29 RX ORDER — HYDROXYZINE PAMOATE 25 MG/1
CAPSULE ORAL
Qty: 90 CAPSULE | Refills: 0 | Status: SHIPPED | OUTPATIENT
Start: 2024-12-29

## 2024-12-30 RX ORDER — OMEPRAZOLE 40 MG/1
40 CAPSULE, DELAYED RELEASE ORAL DAILY
Qty: 90 CAPSULE | Refills: 1 | Status: SHIPPED | OUTPATIENT
Start: 2024-12-30

## 2025-01-06 RX ORDER — PRAVASTATIN SODIUM 10 MG
10 TABLET ORAL DAILY
Qty: 90 TABLET | Refills: 1 | Status: SHIPPED | OUTPATIENT
Start: 2025-01-06 | End: 2025-01-08 | Stop reason: SDUPTHER

## 2025-01-07 NOTE — PROGRESS NOTES
Subjective:     CC: CHF, COPD, ESRD    Darrel Patterson is a 79 y.o. male who presents today for a 3 month follow up for CHF, ESRD, COPD, and other chronic medical problems.     He is accompanied by his daughter.    Has a new c/o burning sensation in the right arm that started 2-3 weeks ago.  Denies any injury. Denies any weakness, temperature changes, or discoloration in that arm. He does have some pain in his neck that he attributes to arthritis.     He has had multiple hospitalizations over the past year.     He has chronic SOB.     ESRD  He goes to dialysis TIW. Has a fistula or graft (he is not sure which) in the left wrist.    COPD  Patient seen by pulmonologist Dr. Bhatia on 12/9/2024 for shortness of breath.  Currently using Trelegy.  He has COPD but could be related to CHF or ESRD.  PFTs were ordered. This has been scheduled for 3-3-15. If he does have significant airflow obstruction, the addition of Ohtuvayre was to be considered.  He will follow-up after test results are back.     He has home oxygen that he uses as needed. Uses it on occasion.    CHF, severe  Has been followed by cardiologist Dr Uriostegui.   Last ECHO in 4-2023 showed EF of 15%.    He has chronic SOB.  He denies any leg swelling today.    RA  He has chronic generalized joint pain.  He is not able to take NSAIDS due to ESRD  Prednisone was d/c'd due to GI bleed  Takes Gabapentin 300mg qHS     Hx of GI bleed  He was hospitalized a couple of times this year due to hypotension and severe anemia.  Underwent colonoscopy withOUT apparent source of bleeding.   Internal hemorrhoids and extensive diverticulosis were noted.   GI recommended transfusing as needed.   They believe his hypotension is likely secondary to poor cardiac and renal function (EF 15% and ESRD unable to undergo dialysis due to low blood pressures).     He followed up with Sarah Driver NP, 8/21/24. Felt better since discharge with no rectal bleeding. Was advised to

## 2025-01-08 ENCOUNTER — OFFICE VISIT (OUTPATIENT)
Age: 80
End: 2025-01-08

## 2025-01-08 VITALS
HEART RATE: 74 BPM | HEIGHT: 72 IN | RESPIRATION RATE: 18 BRPM | SYSTOLIC BLOOD PRESSURE: 142 MMHG | TEMPERATURE: 97.9 F | WEIGHT: 147 LBS | OXYGEN SATURATION: 97 % | BODY MASS INDEX: 19.91 KG/M2 | DIASTOLIC BLOOD PRESSURE: 76 MMHG

## 2025-01-08 DIAGNOSIS — N28.89 LEFT RENAL MASS: ICD-10-CM

## 2025-01-08 DIAGNOSIS — M05.79 RHEUMATOID ARTHRITIS INVOLVING MULTIPLE SITES WITH POSITIVE RHEUMATOID FACTOR (HCC): ICD-10-CM

## 2025-01-08 DIAGNOSIS — Z87.19 HISTORY OF GI BLEED: ICD-10-CM

## 2025-01-08 DIAGNOSIS — I50.9 CHRONIC CONGESTIVE HEART FAILURE, UNSPECIFIED HEART FAILURE TYPE (HCC): ICD-10-CM

## 2025-01-08 DIAGNOSIS — J44.9 COPD, SEVERE (HCC): Primary | ICD-10-CM

## 2025-01-08 DIAGNOSIS — N18.6 ESRD ON HEMODIALYSIS (HCC): ICD-10-CM

## 2025-01-08 DIAGNOSIS — M54.12 CERVICAL RADICULOPATHY: ICD-10-CM

## 2025-01-08 DIAGNOSIS — Z99.2 ESRD ON HEMODIALYSIS (HCC): ICD-10-CM

## 2025-01-08 PROBLEM — R56.9 SEIZURE (HCC): Status: RESOLVED | Noted: 2023-07-25 | Resolved: 2025-01-08

## 2025-01-08 RX ORDER — PRAVASTATIN SODIUM 10 MG
10 TABLET ORAL DAILY
Qty: 90 TABLET | Refills: 1 | Status: SHIPPED | OUTPATIENT
Start: 2025-01-08

## 2025-01-08 RX ORDER — MONTELUKAST SODIUM 10 MG/1
10 TABLET ORAL DAILY
Qty: 90 TABLET | Refills: 1 | Status: SHIPPED | OUTPATIENT
Start: 2025-01-08

## 2025-01-08 RX ORDER — PREDNISONE 10 MG/1
TABLET ORAL
Qty: 21 TABLET | Refills: 0 | Status: SHIPPED | OUTPATIENT
Start: 2025-01-08

## 2025-01-08 SDOH — ECONOMIC STABILITY: FOOD INSECURITY: WITHIN THE PAST 12 MONTHS, YOU WORRIED THAT YOUR FOOD WOULD RUN OUT BEFORE YOU GOT MONEY TO BUY MORE.: NEVER TRUE

## 2025-01-08 SDOH — ECONOMIC STABILITY: FOOD INSECURITY: WITHIN THE PAST 12 MONTHS, THE FOOD YOU BOUGHT JUST DIDN'T LAST AND YOU DIDN'T HAVE MONEY TO GET MORE.: NEVER TRUE

## 2025-01-08 ASSESSMENT — PATIENT HEALTH QUESTIONNAIRE - PHQ9
SUM OF ALL RESPONSES TO PHQ QUESTIONS 1-9: 0
SUM OF ALL RESPONSES TO PHQ9 QUESTIONS 1 & 2: 0
SUM OF ALL RESPONSES TO PHQ QUESTIONS 1-9: 0
SUM OF ALL RESPONSES TO PHQ QUESTIONS 1-9: 0
1. LITTLE INTEREST OR PLEASURE IN DOING THINGS: NOT AT ALL
SUM OF ALL RESPONSES TO PHQ QUESTIONS 1-9: 0
2. FEELING DOWN, DEPRESSED OR HOPELESS: NOT AT ALL

## 2025-01-08 NOTE — PROGRESS NOTES
\"Have you been to the ER, urgent care clinic since your last visit?  Hospitalized since your last visit?\"    NO    “Have you seen or consulted any other health care providers outside our system since your last visit?”    YES - When: approximately 1 months ago.  Where and Why: cardiology.

## 2025-01-10 ENCOUNTER — TRANSCRIBE ORDERS (OUTPATIENT)
Facility: HOSPITAL | Age: 80
End: 2025-01-10

## 2025-01-10 DIAGNOSIS — N28.89 OTHER SPECIFIED DISORDERS OF KIDNEY AND URETER: Primary | ICD-10-CM

## 2025-01-15 DIAGNOSIS — J44.9 CHRONIC OBSTRUCTIVE PULMONARY DISEASE, UNSPECIFIED COPD TYPE (HCC): ICD-10-CM

## 2025-01-15 RX ORDER — FLUTICASONE FUROATE, UMECLIDINIUM BROMIDE AND VILANTEROL TRIFENATATE 100; 62.5; 25 UG/1; UG/1; UG/1
POWDER RESPIRATORY (INHALATION)
Qty: 60 EACH | Refills: 5 | Status: SHIPPED | OUTPATIENT
Start: 2025-01-15

## 2025-01-20 RX ORDER — HYDROXYZINE PAMOATE 25 MG/1
CAPSULE ORAL
Qty: 270 CAPSULE | Refills: 1 | Status: SHIPPED | OUTPATIENT
Start: 2025-01-20

## 2025-01-20 NOTE — TELEPHONE ENCOUNTER
Patient requesting refill on     Requested Prescriptions     Pending Prescriptions Disp Refills    hydrOXYzine pamoate (VISTARIL) 25 MG capsule [Pharmacy Med Name: HYDROXYZINE BHARGAV 25 MG CAP] 270 capsule 1     Sig: TAKE 1 CAP BY MOUTH THREE (3) TIMES DAILY AS NEEDED FOR ITCHING.        Last OV 1/8/2025

## 2025-01-24 ENCOUNTER — HOSPITAL ENCOUNTER (OUTPATIENT)
Facility: HOSPITAL | Age: 80
Discharge: HOME | End: 2025-01-27
Attending: UROLOGY
Payer: MEDICARE

## 2025-01-24 DIAGNOSIS — M05.79 RHEUMATOID ARTHRITIS INVOLVING MULTIPLE SITES WITH POSITIVE RHEUMATOID FACTOR (HCC): ICD-10-CM

## 2025-01-24 DIAGNOSIS — N28.89 OTHER SPECIFIED DISORDERS OF KIDNEY AND URETER: ICD-10-CM

## 2025-01-24 PROCEDURE — 74176 CT ABD & PELVIS W/O CONTRAST: CPT

## 2025-01-27 RX ORDER — GABAPENTIN 300 MG/1
300 CAPSULE ORAL
Qty: 90 CAPSULE | Refills: 0 | OUTPATIENT
Start: 2025-01-27 | End: 2025-04-27

## 2025-01-29 DIAGNOSIS — M05.79 RHEUMATOID ARTHRITIS INVOLVING MULTIPLE SITES WITH POSITIVE RHEUMATOID FACTOR (HCC): ICD-10-CM

## 2025-01-30 RX ORDER — GABAPENTIN 300 MG/1
300 CAPSULE ORAL
Qty: 90 CAPSULE | Refills: 0 | OUTPATIENT
Start: 2025-01-30 | End: 2025-04-30

## 2025-01-31 RX ORDER — MIRTAZAPINE 15 MG/1
15 TABLET, FILM COATED ORAL NIGHTLY
Qty: 90 TABLET | Refills: 0 | Status: SHIPPED | OUTPATIENT
Start: 2025-01-31

## 2025-02-06 ENCOUNTER — CLINICAL DOCUMENTATION (OUTPATIENT)
Age: 80
End: 2025-02-06

## 2025-02-06 DIAGNOSIS — M05.79 RHEUMATOID ARTHRITIS INVOLVING MULTIPLE SITES WITH POSITIVE RHEUMATOID FACTOR (HCC): ICD-10-CM

## 2025-02-06 RX ORDER — GABAPENTIN 300 MG/1
300 CAPSULE ORAL
Qty: 90 CAPSULE | Refills: 0 | OUTPATIENT
Start: 2025-02-06 | End: 2025-05-07

## 2025-02-06 RX ORDER — VALSARTAN 40 MG/1
TABLET ORAL
Qty: 90 TABLET | Refills: 0
Start: 2025-02-06

## 2025-02-06 NOTE — PROGRESS NOTES
Patient seen by cardiology specialist YUNIEL Luna on 1/31/2025 for Dr. Uriostegui.  Valsartan was increased to 40 mg daily.  If stable at his follow-up visit will increase Toprol XL to 25 mg daily.  From there change valsartan to Entresto if able.  He has consistently declined an ICD.  For his mitral regurgitation if he has continued severe regurgitation after maximal medical treatment and still symptomatic he will need to consider the mitraclip.    Patient also seen by urologist Dr. Andres Hernandez on 2/3/2025 for left renal mass.  CT of abdomen was ordered.

## 2025-02-08 DIAGNOSIS — M05.79 RHEUMATOID ARTHRITIS INVOLVING MULTIPLE SITES WITH POSITIVE RHEUMATOID FACTOR (HCC): ICD-10-CM

## 2025-02-10 PROCEDURE — 93005 ELECTROCARDIOGRAM TRACING: CPT | Performed by: EMERGENCY MEDICINE

## 2025-02-10 PROCEDURE — 99285 EMERGENCY DEPT VISIT HI MDM: CPT

## 2025-02-10 NOTE — TELEPHONE ENCOUNTER
Patient requesting refill on     Requested Prescriptions     Pending Prescriptions Disp Refills    gabapentin (NEURONTIN) 300 MG capsule [Pharmacy Med Name: GABAPENTIN 300 MG CAPSULE] 90 capsule 0     Sig: Take 1 capsule by mouth nightly for 90 days. Max Daily Amount: 300 mg        Last OV 1/8/2025

## 2025-02-11 ENCOUNTER — APPOINTMENT (OUTPATIENT)
Facility: HOSPITAL | Age: 80
End: 2025-02-11
Payer: MEDICARE

## 2025-02-11 ENCOUNTER — HOSPITAL ENCOUNTER (EMERGENCY)
Facility: HOSPITAL | Age: 80
Discharge: HOME | End: 2025-02-11
Attending: EMERGENCY MEDICINE
Payer: MEDICARE

## 2025-02-11 VITALS
HEART RATE: 67 BPM | WEIGHT: 140 LBS | SYSTOLIC BLOOD PRESSURE: 127 MMHG | TEMPERATURE: 97.3 F | HEIGHT: 72 IN | DIASTOLIC BLOOD PRESSURE: 74 MMHG | OXYGEN SATURATION: 93 % | RESPIRATION RATE: 22 BRPM | BODY MASS INDEX: 18.96 KG/M2

## 2025-02-11 DIAGNOSIS — I49.8 VENTRICULAR BIGEMINY: Primary | ICD-10-CM

## 2025-02-11 LAB
ALBUMIN SERPL-MCNC: 3.5 G/DL (ref 3.5–5)
ALBUMIN/GLOB SERPL: 0.9 (ref 1.1–2.2)
ALP SERPL-CCNC: 169 U/L (ref 45–117)
ALT SERPL-CCNC: 13 U/L (ref 12–78)
ANION GAP SERPL CALC-SCNC: 13 MMOL/L (ref 2–12)
AST SERPL-CCNC: 15 U/L (ref 15–37)
BASOPHILS # BLD: 0.12 K/UL (ref 0–0.1)
BASOPHILS NFR BLD: 1.3 % (ref 0–1)
BILIRUB SERPL-MCNC: 0.9 MG/DL (ref 0.2–1)
BUN SERPL-MCNC: 52 MG/DL (ref 6–20)
BUN/CREAT SERPL: 6 (ref 12–20)
CALCIUM SERPL-MCNC: 8.3 MG/DL (ref 8.5–10.1)
CHLORIDE SERPL-SCNC: 96 MMOL/L (ref 97–108)
CO2 SERPL-SCNC: 32 MMOL/L (ref 21–32)
CREAT SERPL-MCNC: 8.64 MG/DL (ref 0.7–1.3)
DIFFERENTIAL METHOD BLD: ABNORMAL
EOSINOPHIL # BLD: 2.82 K/UL (ref 0–0.4)
EOSINOPHIL NFR BLD: 31 % (ref 0–7)
ERYTHROCYTE [DISTWIDTH] IN BLOOD BY AUTOMATED COUNT: 15.7 % (ref 11.5–14.5)
GLOBULIN SER CALC-MCNC: 3.9 G/DL (ref 2–4)
GLUCOSE BLD STRIP.AUTO-MCNC: 94 MG/DL (ref 65–117)
GLUCOSE SERPL-MCNC: 108 MG/DL (ref 65–100)
HCT VFR BLD AUTO: 27.4 % (ref 36.6–50.3)
HGB BLD-MCNC: 9.4 G/DL (ref 12.1–17)
IMM GRANULOCYTES # BLD AUTO: 0.02 K/UL (ref 0–0.04)
IMM GRANULOCYTES NFR BLD AUTO: 0.2 % (ref 0–0.5)
LYMPHOCYTES # BLD: 1.73 K/UL (ref 0.8–3.5)
LYMPHOCYTES NFR BLD: 19 % (ref 12–49)
MAGNESIUM SERPL-MCNC: 2.1 MG/DL (ref 1.6–2.4)
MCH RBC QN AUTO: 32.4 PG (ref 26–34)
MCHC RBC AUTO-ENTMCNC: 34.3 G/DL (ref 30–36.5)
MCV RBC AUTO: 94.5 FL (ref 80–99)
MONOCYTES # BLD: 1.28 K/UL (ref 0–1)
MONOCYTES NFR BLD: 14.1 % (ref 5–13)
NEUTS SEG # BLD: 3.13 K/UL (ref 1.8–8)
NEUTS SEG NFR BLD: 34.4 % (ref 32–75)
NRBC # BLD: 0 K/UL (ref 0–0.01)
NRBC BLD-RTO: 0 PER 100 WBC
PLATELET # BLD AUTO: 186 K/UL (ref 150–400)
PMV BLD AUTO: 12.5 FL (ref 8.9–12.9)
POTASSIUM SERPL-SCNC: 4.9 MMOL/L (ref 3.5–5.1)
PROT SERPL-MCNC: 7.4 G/DL (ref 6.4–8.2)
RBC # BLD AUTO: 2.9 M/UL (ref 4.1–5.7)
RBC MORPH BLD: ABNORMAL
SERVICE CMNT-IMP: NORMAL
SODIUM SERPL-SCNC: 141 MMOL/L (ref 136–145)
TROPONIN I SERPL HS-MCNC: 27 NG/L (ref 0–76)
WBC # BLD AUTO: 9.1 K/UL (ref 4.1–11.1)

## 2025-02-11 PROCEDURE — 80053 COMPREHEN METABOLIC PANEL: CPT

## 2025-02-11 PROCEDURE — 71045 X-RAY EXAM CHEST 1 VIEW: CPT

## 2025-02-11 PROCEDURE — 83735 ASSAY OF MAGNESIUM: CPT

## 2025-02-11 PROCEDURE — 36415 COLL VENOUS BLD VENIPUNCTURE: CPT

## 2025-02-11 PROCEDURE — 85025 COMPLETE CBC W/AUTO DIFF WBC: CPT

## 2025-02-11 PROCEDURE — 84484 ASSAY OF TROPONIN QUANT: CPT

## 2025-02-11 PROCEDURE — 82962 GLUCOSE BLOOD TEST: CPT

## 2025-02-11 RX ORDER — GABAPENTIN 300 MG/1
300 CAPSULE ORAL
Qty: 90 CAPSULE | Refills: 0 | Status: SHIPPED | OUTPATIENT
Start: 2025-02-11 | End: 2025-05-12

## 2025-02-11 ASSESSMENT — PAIN - FUNCTIONAL ASSESSMENT
PAIN_FUNCTIONAL_ASSESSMENT: ACTIVITIES ARE NOT PREVENTED
PAIN_FUNCTIONAL_ASSESSMENT: 0-10

## 2025-02-11 ASSESSMENT — PAIN DESCRIPTION - ORIENTATION: ORIENTATION: RIGHT;LEFT

## 2025-02-11 ASSESSMENT — PAIN DESCRIPTION - FREQUENCY: FREQUENCY: CONTINUOUS

## 2025-02-11 ASSESSMENT — PAIN SCALES - GENERAL: PAINLEVEL_OUTOF10: 4

## 2025-02-11 ASSESSMENT — PAIN DESCRIPTION - PAIN TYPE: TYPE: CHRONIC PAIN

## 2025-02-11 ASSESSMENT — PAIN DESCRIPTION - ONSET: ONSET: ON-GOING

## 2025-02-11 ASSESSMENT — PAIN DESCRIPTION - LOCATION: LOCATION: LEG

## 2025-02-11 ASSESSMENT — PAIN DESCRIPTION - DESCRIPTORS: DESCRIPTORS: NUMBNESS

## 2025-02-11 NOTE — ED PROVIDER NOTES
[facility] EMERGENCY DEPARTMENT  EMERGENCY DEPARTMENT ENCOUNTER       Pt Name: Darrel Patterson  MRN: [id number]  Birthdate 1945  Date of evaluation: 2/10/2025  Provider: Sheyla Ward MD   PCP: Roma Solitario APRN - NP  Note Started: 2:43 AM EST 2/11/25     CHIEF COMPLAINT       Chief Complaint   Patient presents with    Bradycardia    Shortness of Breath        HISTORY OF PRESENT ILLNESS: 1 or more elements      History From: Patient, Patient's Daughter, and Patient's Son  HPI Limitations: None     Darrel Patterson is a 79 y.o. male with history of anemia of chronic disease, rheumatoid arthritis, end-stage renal disease on dialysis Tuesdays, Thursday, Saturday, last dialyzed on 2/8/2025, hypertension, CAD, congestive heart failure with a EF of 15 to 20%, who presents to the ED from home due to concerns for low heart rate.  Patient checked his heart rate using his home pulse oximeter and noted it to be in the 30s.  He called for his daughter who checked it again using pulse ox and noted to be in the 30s.  She checked his pulse with blood pressure monitor, however, and heart rate was in the 60s to 70s.  Patient was complaining of feeling lightheaded and  had some shortness of breath at home as well, but those symptoms have subsided.  Denies any headache, chest pain, palpitations, abdominal pain, nausea, vomiting, diarrhea, decreased appetite.  He does report chronic tingling in his legs that he has had for several months.  Denies any weakness, slurred speech, vision changes.  REVIEW OF SYSTEMS      Review of Systems     Positives and Pertinent negatives as per HPI.    PAST HISTORY     Past Medical History:  Past Medical History:   Diagnosis Date    Anemia due to chronic kidney disease     Asthma     Autoimmune disease (HCC)     Rheumatoid arthritis    Chronic back pain     Diverticulosis     ESRD (end stage renal disease) (HCC)     GERD (gastroesophageal reflux disease)     GI bleed 2024

## 2025-02-11 NOTE — ED TRIAGE NOTES
Pt reports that his heart rate was low approximately 1 hour prior to arrival - HR   36-40 at uia time per pulse ox. Family came to his home and checked his heart rate and it was in the 50's. Pt took a Beta blocker about 30 minutes prior to his heart rate dropping per son. Pt reports SOB with ambulation. Pt reports dizziness when his heart rate was low.

## 2025-02-12 LAB
EKG ATRIAL RATE: 67 BPM
EKG DIAGNOSIS: NORMAL
EKG P AXIS: 30 DEGREES
EKG P-R INTERVAL: 136 MS
EKG Q-T INTERVAL: 458 MS
EKG QRS DURATION: 104 MS
EKG QTC CALCULATION (BAZETT): 483 MS
EKG R AXIS: 7 DEGREES
EKG T AXIS: 117 DEGREES
EKG VENTRICULAR RATE: 67 BPM

## 2025-03-07 NOTE — TELEPHONE ENCOUNTER
Patient requesting refill on     Requested Prescriptions     Pending Prescriptions Disp Refills    pravastatin (PRAVACHOL) 10 MG tablet 90 tablet 1     Sig: Take 1 tablet by mouth daily        Last OV 1/8/2025

## 2025-03-08 RX ORDER — PRAVASTATIN SODIUM 10 MG
10 TABLET ORAL DAILY
Qty: 90 TABLET | Refills: 1 | Status: SHIPPED | OUTPATIENT
Start: 2025-03-08

## 2025-03-09 ENCOUNTER — CLINICAL DOCUMENTATION (OUTPATIENT)
Age: 80
End: 2025-03-09

## 2025-03-09 RX ORDER — ENSIFENTRINE 3 MG/2.5ML
2.5 SUSPENSION RESPIRATORY (INHALATION) 2 TIMES DAILY
Qty: 2.5 ML | Refills: 0
Start: 2025-03-09

## 2025-03-13 ENCOUNTER — TELEPHONE (OUTPATIENT)
Age: 80
End: 2025-03-13

## 2025-04-08 ENCOUNTER — OFFICE VISIT (OUTPATIENT)
Age: 80
End: 2025-04-08
Payer: MEDICARE

## 2025-04-08 VITALS
HEART RATE: 80 BPM | OXYGEN SATURATION: 97 % | BODY MASS INDEX: 19.64 KG/M2 | WEIGHT: 145 LBS | HEIGHT: 72 IN | SYSTOLIC BLOOD PRESSURE: 122 MMHG | TEMPERATURE: 97.8 F | DIASTOLIC BLOOD PRESSURE: 82 MMHG | RESPIRATION RATE: 16 BRPM

## 2025-04-08 DIAGNOSIS — Z87.19 HISTORY OF GI BLEED: ICD-10-CM

## 2025-04-08 DIAGNOSIS — I50.9 CHRONIC CONGESTIVE HEART FAILURE, UNSPECIFIED HEART FAILURE TYPE (HCC): ICD-10-CM

## 2025-04-08 DIAGNOSIS — N28.89 LEFT RENAL MASS: ICD-10-CM

## 2025-04-08 DIAGNOSIS — I34.0 MITRAL VALVE INSUFFICIENCY, UNSPECIFIED ETIOLOGY: ICD-10-CM

## 2025-04-08 DIAGNOSIS — Z00.00 MEDICARE ANNUAL WELLNESS VISIT, SUBSEQUENT: Primary | ICD-10-CM

## 2025-04-08 DIAGNOSIS — M05.79 RHEUMATOID ARTHRITIS INVOLVING MULTIPLE SITES WITH POSITIVE RHEUMATOID FACTOR (HCC): ICD-10-CM

## 2025-04-08 DIAGNOSIS — J44.9 COPD, SEVERE (HCC): ICD-10-CM

## 2025-04-08 PROCEDURE — 3023F SPIROM DOC REV: CPT | Performed by: NURSE PRACTITIONER

## 2025-04-08 PROCEDURE — 1123F ACP DISCUSS/DSCN MKR DOCD: CPT | Performed by: NURSE PRACTITIONER

## 2025-04-08 PROCEDURE — 1126F AMNT PAIN NOTED NONE PRSNT: CPT | Performed by: NURSE PRACTITIONER

## 2025-04-08 PROCEDURE — 1159F MED LIST DOCD IN RCRD: CPT | Performed by: NURSE PRACTITIONER

## 2025-04-08 PROCEDURE — 36415 COLL VENOUS BLD VENIPUNCTURE: CPT | Performed by: NURSE PRACTITIONER

## 2025-04-08 PROCEDURE — G0439 PPPS, SUBSEQ VISIT: HCPCS | Performed by: NURSE PRACTITIONER

## 2025-04-08 PROCEDURE — 3079F DIAST BP 80-89 MM HG: CPT | Performed by: NURSE PRACTITIONER

## 2025-04-08 PROCEDURE — 3074F SYST BP LT 130 MM HG: CPT | Performed by: NURSE PRACTITIONER

## 2025-04-08 PROCEDURE — 99214 OFFICE O/P EST MOD 30 MIN: CPT | Performed by: NURSE PRACTITIONER

## 2025-04-08 PROCEDURE — 1036F TOBACCO NON-USER: CPT | Performed by: NURSE PRACTITIONER

## 2025-04-08 PROCEDURE — G8420 CALC BMI NORM PARAMETERS: HCPCS | Performed by: NURSE PRACTITIONER

## 2025-04-08 PROCEDURE — G8427 DOCREV CUR MEDS BY ELIG CLIN: HCPCS | Performed by: NURSE PRACTITIONER

## 2025-04-08 PROCEDURE — 1160F RVW MEDS BY RX/DR IN RCRD: CPT | Performed by: NURSE PRACTITIONER

## 2025-04-08 SDOH — ECONOMIC STABILITY: FOOD INSECURITY: WITHIN THE PAST 12 MONTHS, THE FOOD YOU BOUGHT JUST DIDN'T LAST AND YOU DIDN'T HAVE MONEY TO GET MORE.: NEVER TRUE

## 2025-04-08 SDOH — ECONOMIC STABILITY: FOOD INSECURITY: WITHIN THE PAST 12 MONTHS, YOU WORRIED THAT YOUR FOOD WOULD RUN OUT BEFORE YOU GOT MONEY TO BUY MORE.: NEVER TRUE

## 2025-04-08 ASSESSMENT — PATIENT HEALTH QUESTIONNAIRE - PHQ9
SUM OF ALL RESPONSES TO PHQ QUESTIONS 1-9: 0
SUM OF ALL RESPONSES TO PHQ QUESTIONS 1-9: 0
2. FEELING DOWN, DEPRESSED OR HOPELESS: NOT AT ALL
2. FEELING DOWN, DEPRESSED OR HOPELESS: NOT AT ALL
SUM OF ALL RESPONSES TO PHQ QUESTIONS 1-9: 0
1. LITTLE INTEREST OR PLEASURE IN DOING THINGS: NOT AT ALL
SUM OF ALL RESPONSES TO PHQ QUESTIONS 1-9: 0
1. LITTLE INTEREST OR PLEASURE IN DOING THINGS: NOT AT ALL

## 2025-04-08 NOTE — PATIENT INSTRUCTIONS
Sweating.     Shortness of breath.     Pain, pressure, or a strange feeling in the back, neck, jaw, or upper belly or in one or both shoulders or arms.     Lightheadedness or sudden weakness.     A fast or irregular heartbeat.   After you call 911, the  may tell you to chew 1 adult-strength or 2 to 4 low-dose aspirin. Wait for an ambulance. Do not try to drive yourself.  Watch closely for changes in your health, and be sure to contact your doctor if you have any problems.  Where can you learn more?  Go to https://www.[employer].net/patientEd and enter F075 to learn more about \"A Healthy Heart: Care Instructions.\"  Current as of: July 31, 2024  Content Version: 14.4  © 5307-9651 [employer].   Care instructions adapted under license by [employer]. If you have questions about a medical condition or this instruction, always ask your healthcare professional. [employer], disclaims any warranty or liability for your use of this information.    Personalized Preventive Plan for Darrel Patterson - 4/8/2025  Medicare offers a range of preventive health benefits. Some of the tests and screenings are paid in full while other may be subject to a deductible, co-insurance, and/or copay.  Some of these benefits include a comprehensive review of your medical history including lifestyle, illnesses that may run in your family, and various assessments and screenings as appropriate.  After reviewing your medical record and screening and assessments performed today your provider may have ordered immunizations, labs, imaging, and/or referrals for you.  A list of these orders (if applicable) as well as your Preventive Care list are included within your After Visit Summary for your review.

## 2025-04-08 NOTE — PROGRESS NOTES
Chief Complaint   Patient presents with    Medicare AWV       Vitals:    04/08/25 1551   BP: 122/82   Pulse: 80   Resp: 16   Temp: 97.8 °F (36.6 °C)   SpO2: 97%     \"Have you been to the ER, urgent care clinic since your last visit?  Hospitalized since your last visit?\"    NO    “Have you seen or consulted any other health care providers outside our system since your last visit?”    NO         
Patient labs drawn in left arm per NP orders. Patient tolerated well.    
needing help with:  Select all that apply: (!) Dressing  Select all that apply: (!) Laundry, Housekeeping, Banking/Finances, Shopping, Food Preparation, Taking Medications, Transportation  Interventions:  His daughter assists him with ADLS.    Advanced Directives:  Do you have a Living Will?: (!) No    Intervention:    HE DOES HAVE ACP documents on file after further review of the chart.                 Objective   Vitals:    04/08/25 1551   BP: 122/82   BP Site: Right Upper Arm   Pulse: 80   Resp: 16   Temp: 97.8 °F (36.6 °C)   TempSrc: Oral   SpO2: 97%   Weight: 65.8 kg (145 lb)   Height: 1.829 m (6')      Body mass index is 19.67 kg/m².                  No Known Allergies  Prior to Visit Medications    Medication Sig Taking? Authorizing Provider   Ensifentrine (OHTUVAYRE) 3 MG/2.5ML SUSP Inhale 2.5 mLs into the lungs in the morning and at bedtime  Roma Solitario APRN - NP   pravastatin (PRAVACHOL) 10 MG tablet Take 1 tablet by mouth daily  Roma Solitario APRN - NP   gabapentin (NEURONTIN) 300 MG capsule Take 1 capsule by mouth nightly for 90 days. Max Daily Amount: 300 mg  Roma Solitario APRN - NP   valsartan (DIOVAN) 40 MG tablet Take 1 pill once a day on non dialysis days  Roma Solitario APRN - NP   mirtazapine (REMERON) 15 MG tablet Take 1 tablet by mouth nightly  Roma Solitario APRN - NP   hydrOXYzine pamoate (VISTARIL) 25 MG capsule TAKE 1 CAP BY MOUTH THREE (3) TIMES DAILY AS NEEDED FOR ITCHING.  Roma Solitario APRN - NP   TRELEGY ELLIPTA 100-62.5-25 MCG/ACT AEPB inhaler TAKE 1 PUFF BY MOUTH EVERY DAY  Roma Solitario APRN - NP   montelukast (SINGULAIR) 10 MG tablet Take 1 tablet by mouth daily  Roma Solitario APRN - NP   omeprazole (PRILOSEC) 40 MG delayed release capsule Take 1 capsule by mouth daily  Roma Solitario APRN - NP   dorzolamide-timolol (COSOPT) 2-0.5 % ophthalmic solution Place 1 drop into both eyes in the morning, at noon, and at bedtime  Shivam Gallardo OD

## 2025-04-09 ENCOUNTER — RESULTS FOLLOW-UP (OUTPATIENT)
Age: 80
End: 2025-04-09

## 2025-04-09 LAB
ANION GAP SERPL CALC-SCNC: 9 MMOL/L (ref 2–12)
BASOPHILS # BLD: 0.09 K/UL (ref 0–0.1)
BASOPHILS NFR BLD: 1.5 % (ref 0–1)
BUN SERPL-MCNC: 29 MG/DL (ref 6–20)
BUN/CREAT SERPL: 6 (ref 12–20)
CALCIUM SERPL-MCNC: 8.7 MG/DL (ref 8.5–10.1)
CHLORIDE SERPL-SCNC: 98 MMOL/L (ref 97–108)
CO2 SERPL-SCNC: 31 MMOL/L (ref 21–32)
CREAT SERPL-MCNC: 5.17 MG/DL (ref 0.7–1.3)
DIFFERENTIAL METHOD BLD: ABNORMAL
EOSINOPHIL # BLD: 0.52 K/UL (ref 0–0.4)
EOSINOPHIL NFR BLD: 8.9 % (ref 0–7)
ERYTHROCYTE [DISTWIDTH] IN BLOOD BY AUTOMATED COUNT: 16.4 % (ref 11.5–14.5)
GLUCOSE SERPL-MCNC: 119 MG/DL (ref 65–100)
HCT VFR BLD AUTO: 29.4 % (ref 36.6–50.3)
HGB BLD-MCNC: 9.9 G/DL (ref 12.1–17)
IMM GRANULOCYTES # BLD AUTO: 0.01 K/UL (ref 0–0.04)
IMM GRANULOCYTES NFR BLD AUTO: 0.2 % (ref 0–0.5)
LYMPHOCYTES # BLD: 0.95 K/UL (ref 0.8–3.5)
LYMPHOCYTES NFR BLD: 16.2 % (ref 12–49)
MCH RBC QN AUTO: 31.1 PG (ref 26–34)
MCHC RBC AUTO-ENTMCNC: 33.7 G/DL (ref 30–36.5)
MCV RBC AUTO: 92.5 FL (ref 80–99)
MONOCYTES # BLD: 1.01 K/UL (ref 0–1)
MONOCYTES NFR BLD: 17.2 % (ref 5–13)
NEUTS SEG # BLD: 3.29 K/UL (ref 1.8–8)
NEUTS SEG NFR BLD: 56 % (ref 32–75)
NRBC # BLD: 0 K/UL (ref 0–0.01)
NRBC BLD-RTO: 0 PER 100 WBC
NT PRO BNP: ABNORMAL PG/ML
PLATELET # BLD AUTO: 189 K/UL (ref 150–400)
PMV BLD AUTO: 11.8 FL (ref 8.9–12.9)
POTASSIUM SERPL-SCNC: 3.4 MMOL/L (ref 3.5–5.1)
RBC # BLD AUTO: 3.18 M/UL (ref 4.1–5.7)
SODIUM SERPL-SCNC: 138 MMOL/L (ref 136–145)
WBC # BLD AUTO: 5.9 K/UL (ref 4.1–11.1)

## 2025-04-22 ENCOUNTER — ANESTHESIA (OUTPATIENT)
Facility: HOSPITAL | Age: 80
End: 2025-04-22
Payer: MEDICARE

## 2025-04-22 ENCOUNTER — HOSPITAL ENCOUNTER (OUTPATIENT)
Facility: HOSPITAL | Age: 80
Discharge: HOME | End: 2025-04-22
Attending: INTERNAL MEDICINE | Admitting: INTERNAL MEDICINE
Payer: MEDICARE

## 2025-04-22 ENCOUNTER — ANESTHESIA EVENT (OUTPATIENT)
Facility: HOSPITAL | Age: 80
End: 2025-04-22
Payer: MEDICARE

## 2025-04-22 VITALS
BODY MASS INDEX: 18.28 KG/M2 | RESPIRATION RATE: 18 BRPM | OXYGEN SATURATION: 92 % | DIASTOLIC BLOOD PRESSURE: 82 MMHG | HEIGHT: 72 IN | WEIGHT: 135 LBS | SYSTOLIC BLOOD PRESSURE: 134 MMHG | TEMPERATURE: 97.7 F | HEART RATE: 78 BPM

## 2025-04-22 DIAGNOSIS — I34.0 MITRAL VALVE INSUFFICIENCY, UNSPECIFIED ETIOLOGY: ICD-10-CM

## 2025-04-22 LAB — ECHO BSA: 1.76 M2

## 2025-04-22 PROCEDURE — 6360000002 HC RX W HCPCS: Performed by: NURSE ANESTHETIST, CERTIFIED REGISTERED

## 2025-04-22 PROCEDURE — C1713 ANCHOR/SCREW BN/BN,TIS/BN: HCPCS | Performed by: INTERNAL MEDICINE

## 2025-04-22 PROCEDURE — C1769 GUIDE WIRE: HCPCS | Performed by: INTERNAL MEDICINE

## 2025-04-22 PROCEDURE — 99152 MOD SED SAME PHYS/QHP 5/>YRS: CPT | Performed by: INTERNAL MEDICINE

## 2025-04-22 PROCEDURE — C1725 CATH, TRANSLUMIN NON-LASER: HCPCS | Performed by: INTERNAL MEDICINE

## 2025-04-22 PROCEDURE — 2580000003 HC RX 258: Performed by: NURSE ANESTHETIST, CERTIFIED REGISTERED

## 2025-04-22 PROCEDURE — 3700000001 HC ADD 15 MINUTES (ANESTHESIA): Performed by: INTERNAL MEDICINE

## 2025-04-22 PROCEDURE — C1894 INTRO/SHEATH, NON-LASER: HCPCS | Performed by: INTERNAL MEDICINE

## 2025-04-22 PROCEDURE — 76937 US GUIDE VASCULAR ACCESS: CPT | Performed by: INTERNAL MEDICINE

## 2025-04-22 PROCEDURE — 93451 RIGHT HEART CATH: CPT | Performed by: INTERNAL MEDICINE

## 2025-04-22 PROCEDURE — 2709999900 HC NON-CHARGEABLE SUPPLY: Performed by: INTERNAL MEDICINE

## 2025-04-22 PROCEDURE — 3700000000 HC ANESTHESIA ATTENDED CARE: Performed by: INTERNAL MEDICINE

## 2025-04-22 PROCEDURE — 6360000002 HC RX W HCPCS: Performed by: INTERNAL MEDICINE

## 2025-04-22 RX ORDER — SODIUM CHLORIDE, SODIUM LACTATE, POTASSIUM CHLORIDE, CALCIUM CHLORIDE 600; 310; 30; 20 MG/100ML; MG/100ML; MG/100ML; MG/100ML
INJECTION, SOLUTION INTRAVENOUS
Status: DISCONTINUED | OUTPATIENT
Start: 2025-04-22 | End: 2025-04-22 | Stop reason: SDUPTHER

## 2025-04-22 RX ORDER — ACETAMINOPHEN 325 MG/1
650 TABLET ORAL EVERY 4 HOURS PRN
Status: DISCONTINUED | OUTPATIENT
Start: 2025-04-22 | End: 2025-04-22 | Stop reason: HOSPADM

## 2025-04-22 RX ORDER — PHENYLEPHRINE HCL IN 0.9% NACL 0.4MG/10ML
SYRINGE (ML) INTRAVENOUS
Status: DISCONTINUED | OUTPATIENT
Start: 2025-04-22 | End: 2025-04-22 | Stop reason: SDUPTHER

## 2025-04-22 RX ORDER — LIDOCAINE HYDROCHLORIDE 10 MG/ML
INJECTION, SOLUTION INFILTRATION; PERINEURAL PRN
Status: DISCONTINUED | OUTPATIENT
Start: 2025-04-22 | End: 2025-04-22 | Stop reason: HOSPADM

## 2025-04-22 RX ORDER — SODIUM CHLORIDE 9 MG/ML
INJECTION, SOLUTION INTRAVENOUS PRN
Status: DISCONTINUED | OUTPATIENT
Start: 2025-04-22 | End: 2025-04-22 | Stop reason: HOSPADM

## 2025-04-22 RX ORDER — SODIUM CHLORIDE 0.9 % (FLUSH) 0.9 %
5-40 SYRINGE (ML) INJECTION EVERY 12 HOURS SCHEDULED
Status: DISCONTINUED | OUTPATIENT
Start: 2025-04-22 | End: 2025-04-22 | Stop reason: HOSPADM

## 2025-04-22 RX ORDER — PROPOFOL 10 MG/ML
INJECTION, EMULSION INTRAVENOUS
Status: DISCONTINUED | OUTPATIENT
Start: 2025-04-22 | End: 2025-04-22 | Stop reason: SDUPTHER

## 2025-04-22 RX ORDER — SODIUM CHLORIDE 0.9 % (FLUSH) 0.9 %
5-40 SYRINGE (ML) INJECTION PRN
Status: DISCONTINUED | OUTPATIENT
Start: 2025-04-22 | End: 2025-04-22 | Stop reason: HOSPADM

## 2025-04-22 RX ADMIN — PROPOFOL 50 MCG/KG/MIN: 10 INJECTION, EMULSION INTRAVENOUS at 14:21

## 2025-04-22 RX ADMIN — PROPOFOL 10 MG: 10 INJECTION, EMULSION INTRAVENOUS at 14:35

## 2025-04-22 RX ADMIN — Medication 80 MCG: at 14:34

## 2025-04-22 RX ADMIN — Medication 80 MCG: at 14:32

## 2025-04-22 RX ADMIN — Medication 120 MCG: at 14:40

## 2025-04-22 RX ADMIN — PROPOFOL 20 MG: 10 INJECTION, EMULSION INTRAVENOUS at 14:24

## 2025-04-22 RX ADMIN — PROPOFOL 30 MG: 10 INJECTION, EMULSION INTRAVENOUS at 14:21

## 2025-04-22 RX ADMIN — PROPOFOL 20 MG: 10 INJECTION, EMULSION INTRAVENOUS at 14:43

## 2025-04-22 RX ADMIN — Medication 40 MCG: at 14:29

## 2025-04-22 RX ADMIN — PROPOFOL 15 MG: 10 INJECTION, EMULSION INTRAVENOUS at 14:48

## 2025-04-22 RX ADMIN — Medication 80 MCG: at 14:37

## 2025-04-22 RX ADMIN — SODIUM CHLORIDE, SODIUM LACTATE, POTASSIUM CHLORIDE, AND CALCIUM CHLORIDE: 600; 310; 30; 20 INJECTION, SOLUTION INTRAVENOUS at 14:15

## 2025-04-22 RX ADMIN — PROPOFOL 15 MG: 10 INJECTION, EMULSION INTRAVENOUS at 14:33

## 2025-04-22 NOTE — PROGRESS NOTES
Cardiac Cath Lab Recovery Arrival Note:      Darrel Patterson arrived to Cardiac Cath Lab, Recovery Area. Staff introduced to patient. Patient identifiers verified with NAME and DATE OF BIRTH. Procedure verified with patient. Consent forms reviewed and signed by patient or authorized representative and verified. Allergies verified.     Patient and family oriented to department. Patient and family informed of procedure and plan of care.     Questions answered with review. Patient prepped for procedure, per orders from physician, prior to arrival.    Patient on cardiac monitor, non-invasive blood pressure, SPO2 monitor. On RA. Patient is A&Ox 4. Patient reports no complaints.     Patient in stretcher, in low position, with side rails up, call bell within reach, patient instructed to call if assistance as needed.    Patient prep in: CCL , [place] FT 4.     Family in: waiting area.    Nadine (daughter)  [phone]    Wally (son)  [phone]

## 2025-04-22 NOTE — PROGRESS NOTES
Ambulated patient to the bathroom with a steady gait with No complaints, voided without difficulty. Returned to stretcher. Vital signs stable.     Site is clean, dry, and intact. No bleeding, no hematoma.     Assisted patient in dressing/Patient dressed self.   Educated patient about their sedation precautions such as not driving, operating any machinery, or signing legal documents 24 hours post procedure.     Reviewed discharge instructions, including medications, and site care, using the teach back method. Answered all questions. Verbalized understanding.     Removed peripheral IV    Escorted to discharge area in a wheelchair with all of their belongings.    relative present to take patient home. Reviewed discharge instructions with relative. Verbalized understanding.

## 2025-04-22 NOTE — ANESTHESIA PRE PROCEDURE
given: Not Answered  Tobacco comments: Quit smoking: Quit 30 years ago      Vital Signs (Current):   There were no vitals filed for this visit.                                             BP Readings from Last 3 Encounters:   04/22/25 113/72   04/08/25 122/82   02/11/25 127/74       NPO Status:                                                                                 BMI:   Wt Readings from Last 3 Encounters:   04/22/25 61.2 kg (135 lb)   04/08/25 65.8 kg (145 lb)   02/11/25 63.5 kg (140 lb)     There is no height or weight on file to calculate BMI.    CBC:   Lab Results   Component Value Date/Time    WBC 5.9 04/08/2025 04:40 PM    RBC 3.18 04/08/2025 04:40 PM    HGB 9.9 04/08/2025 04:40 PM    HCT 29.4 04/08/2025 04:40 PM    MCV 92.5 04/08/2025 04:40 PM    RDW 16.4 04/08/2025 04:40 PM     04/08/2025 04:40 PM       CMP:   Lab Results   Component Value Date/Time     04/08/2025 04:40 PM    K 3.4 04/08/2025 04:40 PM    CL 98 04/08/2025 04:40 PM    CO2 31 04/08/2025 04:40 PM    BUN 29 04/08/2025 04:40 PM    CREATININE 5.17 04/08/2025 04:40 PM    GFRAA 9 09/02/2022 11:56 AM    AGRATIO 0.7 04/15/2023 03:30 AM    LABGLOM 11 04/08/2025 04:40 PM    LABGLOM 5 11/13/2023 10:52 AM    LABGLOM 7 04/20/2023 03:46 AM    GLUCOSE 119 04/08/2025 04:40 PM    CALCIUM 8.7 04/08/2025 04:40 PM    BILITOT 0.9 02/11/2025 12:02 AM    ALKPHOS 169 02/11/2025 12:02 AM    ALKPHOS 86 04/15/2023 03:30 AM    AST 15 02/11/2025 12:02 AM    ALT 13 02/11/2025 12:02 AM       POC Tests: No results for input(s): \"POCGLU\", \"POCNA\", \"POCK\", \"POCCL\", \"POCBUN\", \"POCHEMO\", \"POCHCT\" in the last 72 hours.    Coags:   Lab Results   Component Value Date/Time    PROTIME 11.8 07/19/2024 03:37 AM    INR 1.1 07/19/2024 03:37 AM    APTT 25.5 07/19/2024 03:37 AM       HCG (If Applicable): No results found for: \"PREGTESTUR\", \"PREGSERUM\", \"HCG\", \"HCGQUANT\"     ABGs:   Lab Results   Component Value Date/Time    PHART 7.44 04/11/2023 08:08 AM    PO2ART 85

## 2025-04-22 NOTE — PROGRESS NOTES
Cardiac Cath Lab Procedure Area Arrival Note:    Darrel Patterson arrived to Cardiac Cath Lab, Procedure Area. Patient identifiers verified with NAME and DATE OF BIRTH. Procedure verified with patient. Consent forms verified. Allergies verified. Patient informed of procedure and plan of care. Questions answered with review. Patient voiced understanding of procedure and plan of care.    Patient on cardiac monitor, non-invasive blood pressure, SPO2 monitor. On room air.  IV of NS on pump at 50 ml/hr. Patient status doing well without problems. Patient is A&Ox 4. Patient reports no complaints.     Patient medicated during procedure with orders obtained and verified by Dr. Mccollum.    Refer to patients Cardiac Cath Lab PROCEDURE REPORT for vital signs, assessment, status, and response during procedure, printed at end of case. Printed report on chart or scanned into chart.

## 2025-04-22 NOTE — PROGRESS NOTES
TRANSFER - IN Cath Lab RR REPORT:    Verbal report received from Jersey FLORENTINO on Darrel Patterson  being received from the EP Lab for routine progression of care. Report consisted of patient’s Situation, Background, Assessment and Recommendations(SBAR). Procedural summary and MAR reviewed with receiving nurse. Opportunity for questions and clarification was provided. Assessment completed upon patient’s arrival to Cardiac Cath Lab RECOVERY AREA and care assumed.       Cardiac Cath Lab Recovery Arrival Note:    Darrel Patterson arrived to CCL recovery area.  Patient procedure= YELENA/RHC. Patient on cardiac monitor, non-invasive blood pressure, SPO2 monitor. On O2 @ 4LNC. Patient is A&Ox 1. Patient reports No complaints.    PROCEDURE SITE CHECK:    Procedure site: No bleeding and no hematoma, no pain/discomfort reported at procedure site.     No change in patient status. Continue to monitor patient and status.

## 2025-04-22 NOTE — PROGRESS NOTES
CATH LAB to RECOVERY ROOM REPORT    Procedure: [facility]    MD: VENTURA Mccollum MD    The procedure was diagnostic only.    Verbal Report given to Recovery Nurse on patient being transferred to Cardiac Cath Lab  for routine post-op. Patient stable upon transfer to .  Vitals, mental status, MAR, procedural summary discussed with recovery RN.    Medication given during procedure:    No sedation given      Sheaths:    Right internal jugular vein sheath pulled at 1333 pm, secured with a band-aid.

## 2025-04-22 NOTE — ANESTHESIA POSTPROCEDURE EVALUATION
Department of Anesthesiology  Postprocedure Note    Patient: Darrel Patterson  MRN: [id number]  YOB: 1945  Date of evaluation: 4/22/2025    Procedure Summary       Date: 04/22/25 Room / Location: [facility] CATH LAB 4 / [facility] CARDIAC CATH LAB; [facility] CARDIAC CATH/EP LAB    Anesthesia Start: 1419 Anesthesia Stop: 1456    Procedures:       Right heart cath      Ronnie during cath case      RONNIE (PRN CONTRAST/BUBBLE/3D) Diagnosis:       Mitral valve insufficiency, unspecified etiology      PAF (paroxysmal atrial fibrillation) (HCC)      (Pulmonary hypertension  Mitral regurgitation  Tricuspid regurgitation)    Scheduled Providers: Tirso Mccollum MD; Oscar Gan MD Responsible Provider: Renny Cast MD    Anesthesia Type: MAC ASA Status: 4            Anesthesia Type: MAC    Theodora Phase I: Theodora Score: 7    Theodora Phase II:      Anesthesia Post Evaluation    Patient location during evaluation: bedside  Nausea & Vomiting: no nausea  Cardiovascular status: blood pressure returned to baseline  Hydration status: euvolemic    There were no known notable events for this encounter.

## 2025-04-30 RX ORDER — MIRTAZAPINE 15 MG/1
15 TABLET, FILM COATED ORAL NIGHTLY
Qty: 90 TABLET | Refills: 0 | Status: SHIPPED | OUTPATIENT
Start: 2025-04-30

## 2025-05-01 NOTE — TELEPHONE ENCOUNTER
Pt needs letter for [employer] stating that patient is on oxygen and needs power for the device.  
n/a

## 2025-05-20 ENCOUNTER — TELEPHONE (OUTPATIENT)
Age: 80
End: 2025-05-20

## 2025-05-23 ENCOUNTER — HOSPITAL ENCOUNTER (EMERGENCY)
Facility: HOSPITAL | Age: 80
Discharge: ANOTHER HOSPITAL | End: 2025-05-23
Attending: EMERGENCY MEDICINE
Payer: MEDICARE

## 2025-05-23 ENCOUNTER — APPOINTMENT (OUTPATIENT)
Facility: HOSPITAL | Age: 80
End: 2025-05-23
Payer: MEDICARE

## 2025-05-23 ENCOUNTER — HOSPITAL ENCOUNTER (INPATIENT)
Facility: HOSPITAL | Age: 80
LOS: 18 days | DRG: 870 | End: 2025-06-10
Attending: INTERNAL MEDICINE | Admitting: STUDENT IN AN ORGANIZED HEALTH CARE EDUCATION/TRAINING PROGRAM
Payer: MEDICARE

## 2025-05-23 VITALS
HEIGHT: 72 IN | SYSTOLIC BLOOD PRESSURE: 119 MMHG | WEIGHT: 140 LBS | RESPIRATION RATE: 20 BRPM | TEMPERATURE: 98.1 F | BODY MASS INDEX: 18.96 KG/M2 | HEART RATE: 80 BPM | OXYGEN SATURATION: 100 % | DIASTOLIC BLOOD PRESSURE: 34 MMHG

## 2025-05-23 DIAGNOSIS — G93.41 SEPSIS WITH ENCEPHALOPATHY AND SEPTIC SHOCK, DUE TO UNSPECIFIED ORGANISM (HCC): ICD-10-CM

## 2025-05-23 DIAGNOSIS — R65.21 SEPSIS WITH ENCEPHALOPATHY AND SEPTIC SHOCK, DUE TO UNSPECIFIED ORGANISM (HCC): ICD-10-CM

## 2025-05-23 DIAGNOSIS — Z71.89 DNR (DO NOT RESUSCITATE) DISCUSSION: Primary | ICD-10-CM

## 2025-05-23 DIAGNOSIS — A41.9 SEPSIS WITH ENCEPHALOPATHY AND SEPTIC SHOCK, DUE TO UNSPECIFIED ORGANISM (HCC): ICD-10-CM

## 2025-05-23 DIAGNOSIS — I24.9 ACUTE CORONARY SYNDROME (HCC): ICD-10-CM

## 2025-05-23 DIAGNOSIS — A41.9 SEPSIS, DUE TO UNSPECIFIED ORGANISM, UNSPECIFIED WHETHER ACUTE ORGAN DYSFUNCTION PRESENT (HCC): Primary | ICD-10-CM

## 2025-05-23 LAB
ALBUMIN SERPL-MCNC: 2.3 G/DL (ref 3.5–5)
ALBUMIN/GLOB SERPL: 0.5 (ref 1.1–2.2)
ALP SERPL-CCNC: 219 U/L (ref 45–117)
ALT SERPL-CCNC: 6 U/L (ref 12–78)
ANION GAP SERPL CALC-SCNC: 15 MMOL/L (ref 2–12)
AST SERPL-CCNC: 24 U/L (ref 15–37)
BASOPHILS # BLD: 0.02 K/UL (ref 0–0.1)
BASOPHILS NFR BLD: 0.1 % (ref 0–1)
BILIRUB SERPL-MCNC: 3.2 MG/DL (ref 0.2–1)
BUN SERPL-MCNC: 47 MG/DL (ref 6–20)
BUN/CREAT SERPL: 6 (ref 12–20)
CALCIUM SERPL-MCNC: 8.6 MG/DL (ref 8.5–10.1)
CHLORIDE SERPL-SCNC: 94 MMOL/L (ref 97–108)
CO2 SERPL-SCNC: 26 MMOL/L (ref 21–32)
CREAT SERPL-MCNC: 7.99 MG/DL (ref 0.7–1.3)
DIFFERENTIAL METHOD BLD: ABNORMAL
EKG ATRIAL RATE: 88 BPM
EKG DIAGNOSIS: NORMAL
EKG P AXIS: 58 DEGREES
EKG P-R INTERVAL: 154 MS
EKG Q-T INTERVAL: 442 MS
EKG QRS DURATION: 102 MS
EKG QTC CALCULATION (BAZETT): 537 MS
EKG R AXIS: -21 DEGREES
EKG T AXIS: 266 DEGREES
EKG VENTRICULAR RATE: 89 BPM
EOSINOPHIL # BLD: 0 K/UL (ref 0–0.4)
EOSINOPHIL NFR BLD: 0 % (ref 0–7)
ERYTHROCYTE [DISTWIDTH] IN BLOOD BY AUTOMATED COUNT: 18.4 % (ref 11.5–14.5)
ETHANOL SERPL-MCNC: <10 MG/DL (ref 0–0.08)
GLOBULIN SER CALC-MCNC: 4.8 G/DL (ref 2–4)
GLUCOSE SERPL-MCNC: 61 MG/DL (ref 65–100)
HCT VFR BLD AUTO: 21.5 % (ref 36.6–50.3)
HGB BLD-MCNC: 7.6 G/DL (ref 12.1–17)
IMM GRANULOCYTES # BLD AUTO: 0.06 K/UL (ref 0–0.04)
IMM GRANULOCYTES NFR BLD AUTO: 0.4 % (ref 0–0.5)
LACTATE SERPL-SCNC: 2.9 MMOL/L (ref 0.4–2)
LACTATE SERPL-SCNC: 4.8 MMOL/L (ref 0.4–2)
LYMPHOCYTES # BLD: 0.84 K/UL (ref 0.8–3.5)
LYMPHOCYTES NFR BLD: 5.8 % (ref 12–49)
MAGNESIUM SERPL-MCNC: 2.2 MG/DL (ref 1.6–2.4)
MCH RBC QN AUTO: 30.3 PG (ref 26–34)
MCHC RBC AUTO-ENTMCNC: 35.3 G/DL (ref 30–36.5)
MCV RBC AUTO: 85.7 FL (ref 80–99)
MONOCYTES # BLD: 1.5 K/UL (ref 0–1)
MONOCYTES NFR BLD: 10.4 % (ref 5–13)
NEUTS SEG # BLD: 11.99 K/UL (ref 1.8–8)
NEUTS SEG NFR BLD: 83.3 % (ref 32–75)
NRBC # BLD: 0 K/UL (ref 0–0.01)
NRBC BLD-RTO: 0 PER 100 WBC
PLATELET # BLD AUTO: 271 K/UL (ref 150–400)
PMV BLD AUTO: 11.8 FL (ref 8.9–12.9)
POTASSIUM SERPL-SCNC: 5.3 MMOL/L (ref 3.5–5.1)
POTASSIUM SERPL-SCNC: 5.6 MMOL/L (ref 3.5–5.1)
PROCALCITONIN SERPL-MCNC: 15.61 NG/ML
PROT SERPL-MCNC: 7.1 G/DL (ref 6.4–8.2)
RBC # BLD AUTO: 2.51 M/UL (ref 4.1–5.7)
SODIUM SERPL-SCNC: 135 MMOL/L (ref 136–145)
TROPONIN I SERPL HS-MCNC: 108 NG/L (ref 0–76)
WBC # BLD AUTO: 14.4 K/UL (ref 4.1–11.1)

## 2025-05-23 PROCEDURE — 84132 ASSAY OF SERUM POTASSIUM: CPT

## 2025-05-23 PROCEDURE — 36415 COLL VENOUS BLD VENIPUNCTURE: CPT

## 2025-05-23 PROCEDURE — 84145 PROCALCITONIN (PCT): CPT

## 2025-05-23 PROCEDURE — 83735 ASSAY OF MAGNESIUM: CPT

## 2025-05-23 PROCEDURE — 2580000003 HC RX 258: Performed by: EMERGENCY MEDICINE

## 2025-05-23 PROCEDURE — 83605 ASSAY OF LACTIC ACID: CPT

## 2025-05-23 PROCEDURE — 99285 EMERGENCY DEPT VISIT HI MDM: CPT

## 2025-05-23 PROCEDURE — 6360000002 HC RX W HCPCS: Performed by: STUDENT IN AN ORGANIZED HEALTH CARE EDUCATION/TRAINING PROGRAM

## 2025-05-23 PROCEDURE — 96367 TX/PROPH/DG ADDL SEQ IV INF: CPT

## 2025-05-23 PROCEDURE — 6360000002 HC RX W HCPCS: Performed by: EMERGENCY MEDICINE

## 2025-05-23 PROCEDURE — 96365 THER/PROPH/DIAG IV INF INIT: CPT

## 2025-05-23 PROCEDURE — 82077 ASSAY SPEC XCP UR&BREATH IA: CPT

## 2025-05-23 PROCEDURE — 80053 COMPREHEN METABOLIC PANEL: CPT

## 2025-05-23 PROCEDURE — 87040 BLOOD CULTURE FOR BACTERIA: CPT

## 2025-05-23 PROCEDURE — 94640 AIRWAY INHALATION TREATMENT: CPT

## 2025-05-23 PROCEDURE — 85025 COMPLETE CBC W/AUTO DIFF WBC: CPT

## 2025-05-23 PROCEDURE — 6370000000 HC RX 637 (ALT 250 FOR IP): Performed by: EMERGENCY MEDICINE

## 2025-05-23 PROCEDURE — 71045 X-RAY EXAM CHEST 1 VIEW: CPT

## 2025-05-23 PROCEDURE — 84484 ASSAY OF TROPONIN QUANT: CPT

## 2025-05-23 PROCEDURE — 2060000000 HC ICU INTERMEDIATE R&B

## 2025-05-23 PROCEDURE — 6370000000 HC RX 637 (ALT 250 FOR IP): Performed by: STUDENT IN AN ORGANIZED HEALTH CARE EDUCATION/TRAINING PROGRAM

## 2025-05-23 PROCEDURE — 2580000003 HC RX 258: Performed by: STUDENT IN AN ORGANIZED HEALTH CARE EDUCATION/TRAINING PROGRAM

## 2025-05-23 RX ORDER — SODIUM CHLORIDE 9 MG/ML
INJECTION, SOLUTION INTRAVENOUS PRN
Status: DISCONTINUED | OUTPATIENT
Start: 2025-05-23 | End: 2025-06-10 | Stop reason: HOSPADM

## 2025-05-23 RX ORDER — SODIUM CHLORIDE 0.9 % (FLUSH) 0.9 %
5-40 SYRINGE (ML) INJECTION EVERY 12 HOURS SCHEDULED
Status: DISCONTINUED | OUTPATIENT
Start: 2025-05-23 | End: 2025-06-10 | Stop reason: HOSPADM

## 2025-05-23 RX ORDER — ACETAMINOPHEN 650 MG/1
650 SUPPOSITORY RECTAL EVERY 6 HOURS PRN
Status: DISCONTINUED | OUTPATIENT
Start: 2025-05-23 | End: 2025-06-10 | Stop reason: HOSPADM

## 2025-05-23 RX ORDER — ACETAMINOPHEN 325 MG/1
650 TABLET ORAL EVERY 6 HOURS PRN
Status: DISCONTINUED | OUTPATIENT
Start: 2025-05-23 | End: 2025-06-10 | Stop reason: HOSPADM

## 2025-05-23 RX ORDER — SEVELAMER CARBONATE 800 MG/1
1600 TABLET, FILM COATED ORAL
Status: DISCONTINUED | OUTPATIENT
Start: 2025-05-24 | End: 2025-05-30

## 2025-05-23 RX ORDER — 0.9 % SODIUM CHLORIDE 0.9 %
500 INTRAVENOUS SOLUTION INTRAVENOUS ONCE
Status: COMPLETED | OUTPATIENT
Start: 2025-05-23 | End: 2025-05-23

## 2025-05-23 RX ORDER — VALSARTAN 80 MG/1
40 TABLET ORAL
Status: DISCONTINUED | OUTPATIENT
Start: 2025-05-26 | End: 2025-05-29

## 2025-05-23 RX ORDER — GLUCAGON 1 MG/ML
1 KIT INJECTION PRN
Status: DISCONTINUED | OUTPATIENT
Start: 2025-05-23 | End: 2025-06-10 | Stop reason: HOSPADM

## 2025-05-23 RX ORDER — DORZOLAMIDE HYDROCHLORIDE AND TIMOLOL MALEATE 20; 5 MG/ML; MG/ML
1 SOLUTION/ DROPS OPHTHALMIC 3 TIMES DAILY
Status: DISCONTINUED | OUTPATIENT
Start: 2025-05-23 | End: 2025-06-10 | Stop reason: HOSPADM

## 2025-05-23 RX ORDER — POLYETHYLENE GLYCOL 3350 17 G/17G
17 POWDER, FOR SOLUTION ORAL DAILY PRN
Status: DISCONTINUED | OUTPATIENT
Start: 2025-05-23 | End: 2025-06-10 | Stop reason: HOSPADM

## 2025-05-23 RX ORDER — ONDANSETRON 2 MG/ML
4 INJECTION INTRAMUSCULAR; INTRAVENOUS EVERY 6 HOURS PRN
Status: DISCONTINUED | OUTPATIENT
Start: 2025-05-23 | End: 2025-06-02

## 2025-05-23 RX ORDER — PRAVASTATIN SODIUM 10 MG
10 TABLET ORAL DAILY
Status: DISCONTINUED | OUTPATIENT
Start: 2025-05-24 | End: 2025-06-10 | Stop reason: HOSPADM

## 2025-05-23 RX ORDER — SODIUM CHLORIDE 0.9 % (FLUSH) 0.9 %
5-40 SYRINGE (ML) INJECTION PRN
Status: DISCONTINUED | OUTPATIENT
Start: 2025-05-23 | End: 2025-06-10 | Stop reason: HOSPADM

## 2025-05-23 RX ORDER — ARFORMOTEROL TARTRATE 15 UG/2ML
15 SOLUTION RESPIRATORY (INHALATION)
Status: DISCONTINUED | OUTPATIENT
Start: 2025-05-23 | End: 2025-05-24 | Stop reason: SDUPTHER

## 2025-05-23 RX ORDER — BUDESONIDE 0.5 MG/2ML
1 INHALANT ORAL
Status: DISCONTINUED | OUTPATIENT
Start: 2025-05-23 | End: 2025-05-24 | Stop reason: SDUPTHER

## 2025-05-23 RX ORDER — ALBUTEROL SULFATE 0.83 MG/ML
2.5 SOLUTION RESPIRATORY (INHALATION) EVERY 4 HOURS PRN
Status: DISCONTINUED | OUTPATIENT
Start: 2025-05-23 | End: 2025-05-24 | Stop reason: SDUPTHER

## 2025-05-23 RX ORDER — MIRTAZAPINE 15 MG/1
15 TABLET, FILM COATED ORAL NIGHTLY
Status: DISCONTINUED | OUTPATIENT
Start: 2025-05-23 | End: 2025-06-10 | Stop reason: HOSPADM

## 2025-05-23 RX ORDER — PANTOPRAZOLE SODIUM 40 MG/1
40 TABLET, DELAYED RELEASE ORAL
Status: DISCONTINUED | OUTPATIENT
Start: 2025-05-24 | End: 2025-05-26

## 2025-05-23 RX ORDER — HYDROXYZINE HYDROCHLORIDE 25 MG/1
25 TABLET, FILM COATED ORAL 3 TIMES DAILY PRN
Status: DISCONTINUED | OUTPATIENT
Start: 2025-05-23 | End: 2025-06-10 | Stop reason: HOSPADM

## 2025-05-23 RX ORDER — POTASSIUM CHLORIDE 7.45 MG/ML
10 INJECTION INTRAVENOUS
Status: DISCONTINUED | OUTPATIENT
Start: 2025-05-23 | End: 2025-05-23

## 2025-05-23 RX ORDER — ACETAMINOPHEN 500 MG
1000 TABLET ORAL
Status: COMPLETED | OUTPATIENT
Start: 2025-05-23 | End: 2025-05-23

## 2025-05-23 RX ORDER — GABAPENTIN 300 MG/1
300 CAPSULE ORAL
Status: DISCONTINUED | OUTPATIENT
Start: 2025-05-23 | End: 2025-05-24

## 2025-05-23 RX ORDER — ONDANSETRON 4 MG/1
4 TABLET, ORALLY DISINTEGRATING ORAL EVERY 8 HOURS PRN
Status: DISCONTINUED | OUTPATIENT
Start: 2025-05-23 | End: 2025-06-02

## 2025-05-23 RX ORDER — DEXTROSE MONOHYDRATE 100 MG/ML
INJECTION, SOLUTION INTRAVENOUS CONTINUOUS PRN
Status: DISCONTINUED | OUTPATIENT
Start: 2025-05-23 | End: 2025-05-31 | Stop reason: SDUPTHER

## 2025-05-23 RX ORDER — MONTELUKAST SODIUM 10 MG/1
10 TABLET ORAL DAILY
Status: DISCONTINUED | OUTPATIENT
Start: 2025-05-24 | End: 2025-06-10 | Stop reason: HOSPADM

## 2025-05-23 RX ORDER — HEPARIN SODIUM 5000 [USP'U]/ML
5000 INJECTION, SOLUTION INTRAVENOUS; SUBCUTANEOUS EVERY 8 HOURS SCHEDULED
Status: DISCONTINUED | OUTPATIENT
Start: 2025-05-23 | End: 2025-06-10 | Stop reason: HOSPADM

## 2025-05-23 RX ADMIN — SODIUM CHLORIDE 500 ML: 0.9 INJECTION, SOLUTION INTRAVENOUS at 12:44

## 2025-05-23 RX ADMIN — ACETAMINOPHEN 1000 MG: 500 TABLET ORAL at 12:28

## 2025-05-23 RX ADMIN — IPRATROPIUM BROMIDE 0.5 MG: 0.5 SOLUTION RESPIRATORY (INHALATION) at 23:40

## 2025-05-23 RX ADMIN — GABAPENTIN 300 MG: 300 CAPSULE ORAL at 22:55

## 2025-05-23 RX ADMIN — HEPARIN SODIUM 5000 UNITS: 5000 INJECTION INTRAVENOUS; SUBCUTANEOUS at 22:55

## 2025-05-23 RX ADMIN — ARFORMOTEROL TARTRATE 15 MCG: 15 SOLUTION RESPIRATORY (INHALATION) at 23:47

## 2025-05-23 RX ADMIN — PIPERACILLIN AND TAZOBACTAM 3375 MG: 3; .375 INJECTION, POWDER, LYOPHILIZED, FOR SOLUTION INTRAVENOUS at 23:00

## 2025-05-23 RX ADMIN — SODIUM CHLORIDE 1500 MG: 0.9 INJECTION, SOLUTION INTRAVENOUS at 13:24

## 2025-05-23 RX ADMIN — MIRTAZAPINE 15 MG: 15 TABLET, FILM COATED ORAL at 22:55

## 2025-05-23 RX ADMIN — PIPERACILLIN AND TAZOBACTAM 4500 MG: 4; .5 INJECTION, POWDER, LYOPHILIZED, FOR SOLUTION INTRAVENOUS at 12:30

## 2025-05-23 RX ADMIN — BUDESONIDE 1000 MCG: 0.5 INHALANT RESPIRATORY (INHALATION) at 23:47

## 2025-05-23 ASSESSMENT — PAIN - FUNCTIONAL ASSESSMENT: PAIN_FUNCTIONAL_ASSESSMENT: NONE - DENIES PAIN

## 2025-05-23 NOTE — PROGRESS NOTES
arranged ALS transportation from [facility] ED 1 to [facility] 4367 .  Arranged ALS transport w/[facility] -  advised to bring appropiate equipment - ETA of  1800 given - updated ED staff w/ETA

## 2025-05-23 NOTE — ED NOTES
Some difficulty obtaining O2 sats, pt PRN oxygen 2L bedtime and ambulation with normal at rest O2 sats 94%. Only periodic readings for O2 sats coming on monitoring with multiple attempts to obtain. 88-90%. Pt placed on 2L NC O2.

## 2025-05-23 NOTE — ED TRIAGE NOTES
Pt arrives POV with c/o AMS. Pt has had diarrhea x 2-3 days, and has been taking Immodium.  Pt is a dialysis pt and was feeling poorly yesterday so did not go to dialysis yesterday.  Pt has had a few episodes of confusion this morning, as well as some episodes of incontinence.

## 2025-05-23 NOTE — ED NOTES
TRANSFER - OUT REPORT:    Verbal report given to Mariana Sabillon RN on Darrel Patterson  being transferred to [facility]10 for routine progression of patient care       Report consisted of patient's Situation, Background, Assessment and   Recommendations(SBAR).     Information from the following report(s) Nurse Handoff Report, ED SBAR, Adult Overview, MAR, Med Rec Status, Cardiac Rhythm NSR, and Quality Measures was reviewed with the receiving nurse.    [place] Assessment:    Presents to emergency department  because of falls (Syncope, seizure, or loss of consciousness): No  Age > 70: Yes  Altered Mental Status, Intoxication with alcohol or substance confusion (Disorientation, impaired judgment, poor safety awaremess, or inability to follow instructions): Yes  Impaired Mobility: Ambulates or transfers with assistive devices or assistance; Unable to ambulate or transer.: Yes  Nursing Judgement: Yes          Lines:   Peripheral IV 05/23/25 Right Antecubital (Active)   Site Assessment Clean, dry & intact 05/23/25 1156   Line Status Blood return noted;Brisk blood return;Flushed;Normal saline locked 05/23/25 1156   Phlebitis Assessment No symptoms 05/23/25 1156   Infiltration Assessment 0 05/23/25 1156   Dressing Status New dressing applied;Clean, dry & intact 05/23/25 1156   Dressing Type Transparent 05/23/25 1156        Opportunity for questions and clarification was provided.      Patient transported with:  [facility] on continued cardiac monitoring, [place] Hi-flow oxygen

## 2025-05-23 NOTE — ED NOTES
TRANSFER - OUT REPORT:    Verbal report given to Norberto Leach EMT-P on Darrel Patterson  being transferred to [facility]10 for routine progression of patient care       Report consisted of patient's Situation, Background, Assessment and   Recommendations(SBAR).     Information from the following report(s) Nurse Handoff Report, ED SBAR, Adult Overview, Surgery Report, MAR, Med Rec Status, Cardiac Rhythm NSR , and Quality Measures was reviewed with the receiving nurse.    [place] Assessment:    Presents to emergency department  because of falls (Syncope, seizure, or loss of consciousness): No  Age > 70: Yes  Altered Mental Status, Intoxication with alcohol or substance confusion (Disorientation, impaired judgment, poor safety awaremess, or inability to follow instructions): Yes  Impaired Mobility: Ambulates or transfers with assistive devices or assistance; Unable to ambulate or transer.: Yes  Nursing Judgement: Yes          Lines:   Peripheral IV 05/23/25 Right Antecubital (Active)   Site Assessment Clean, dry & intact 05/23/25 1156   Line Status Blood return noted;Brisk blood return;Flushed;Normal saline locked 05/23/25 1156   Phlebitis Assessment No symptoms 05/23/25 1156   Infiltration Assessment 0 05/23/25 1156   Dressing Status New dressing applied;Clean, dry & intact 05/23/25 1156   Dressing Type Transparent 05/23/25 1156        Opportunity for questions and clarification was provided.      Patient transported with:  [facility] with continued cardiac monitoring, continued supplemental oxygen

## 2025-05-23 NOTE — ED NOTES
RT at bedside to set up up with Hi-flow NS, at 10L at this time with humidified oxygen for pt. Pt daughter at bedside.

## 2025-05-23 NOTE — ED PROVIDER NOTES
[facility]  for management of sepsis with ESRD. The results of their tests and reasons for their transfer have been discussed with the patient and/or available family. The patient/family has conveyed agreement and understanding for the need to be admitted and for their admission diagnosis. Consultation has been made with Dr. Leiva, who agrees to accept the transfer.      PATIENT REFERRED TO:  No follow-up provider specified.     DISCHARGE MEDICATIONS:     Medication List        ASK your doctor about these medications      albuterol sulfate  (90 Base) MCG/ACT inhaler  Commonly known as: PROVENTIL;VENTOLIN;PROAIR  INHALE 2 PUFFS BY INHALATION EVERY 4 HOURS AS NEEDED FOR WHEEZING.     dorzolamide-timolol 2-0.5 % ophthalmic solution  Commonly known as: COSOPT     ferrous sulfate 325 (65 Fe) MG tablet  Commonly known as: IRON 325     gabapentin 300 MG capsule  Commonly known as: NEURONTIN  Take 1 capsule by mouth nightly for 90 days. Max Daily Amount: 300 mg     hydrOXYzine pamoate 25 MG capsule  Commonly known as: VISTARIL  TAKE 1 CAP BY MOUTH THREE (3) TIMES DAILY AS NEEDED FOR ITCHING.     mirtazapine 15 MG tablet  Commonly known as: REMERON  TAKE 1 TABLET BY MOUTH EVERY DAY AT NIGHT     montelukast 10 MG tablet  Commonly known as: SINGULAIR  Take 1 tablet by mouth daily     Ohtuvayre 3 MG/2.5ML Susp  Generic drug: Ensifentrine  Inhale 2.5 mLs into the lungs in the morning and at bedtime     omeprazole 40 MG delayed release capsule  Commonly known as: PRILOSEC  Take 1 capsule by mouth daily     pravastatin 10 MG tablet  Commonly known as: PRAVACHOL  Take 1 tablet by mouth daily     sevelamer 800 MG tablet  Commonly known as: RENVELA     Trelegy Ellipta 100-62.5-25 MCG/ACT Aepb inhaler  Generic drug: fluticasone-umeclidin-vilant  TAKE 1 PUFF BY MOUTH EVERY DAY     valsartan 40 MG tablet  Commonly known as: DIOVAN  Take 1 pill once a day on non dialysis days     Vitamin D (Ergocalciferol) 20801 units  Caps  Take 50,000 Units by mouth once a week                DISCONTINUED MEDICATIONS:  Current Discharge Medication List          I am the Primary Clinician of Record.   Sharron Ko MD (electronically signed)    (Please note that parts of this dictation were completed with voice recognition software. Quite often unanticipated grammatical, syntax, homophones, and other interpretive errors are inadvertently transcribed by the computer software. Please disregards these errors. Please excuse any errors that have escaped final proofreading.)         Sharron Ko MD  05/23/25 2424

## 2025-05-24 ENCOUNTER — APPOINTMENT (OUTPATIENT)
Facility: HOSPITAL | Age: 80
DRG: 870 | End: 2025-05-24
Attending: INTERNAL MEDICINE
Payer: MEDICARE

## 2025-05-24 LAB
ALBUMIN SERPL-MCNC: 1.8 G/DL (ref 3.5–5)
ALBUMIN/GLOB SERPL: 0.3 (ref 1.1–2.2)
ALP SERPL-CCNC: 373 U/L (ref 45–117)
ALT SERPL-CCNC: 8 U/L (ref 12–78)
ANION GAP SERPL CALC-SCNC: 12 MMOL/L (ref 2–12)
ANION GAP SERPL CALC-SCNC: 17 MMOL/L (ref 2–12)
ARTERIAL PATENCY WRIST A: YES
AST SERPL-CCNC: 27 U/L (ref 15–37)
B PERT DNA SPEC QL NAA+PROBE: NOT DETECTED
BASE EXCESS BLDA CALC-SCNC: 0.7 MMOL/L
BASOPHILS # BLD: 0 K/UL (ref 0–0.1)
BASOPHILS # BLD: 0 K/UL (ref 0–0.1)
BASOPHILS NFR BLD: 0 % (ref 0–1)
BASOPHILS NFR BLD: 0 % (ref 0–1)
BDY SITE: ABNORMAL
BILIRUB DIRECT SERPL-MCNC: 1.3 MG/DL (ref 0–0.2)
BILIRUB SERPL-MCNC: 3.2 MG/DL (ref 0.2–1)
BORDETELLA PARAPERTUSSIS BY PCR: NOT DETECTED
BUN SERPL-MCNC: 38 MG/DL (ref 6–20)
BUN SERPL-MCNC: 57 MG/DL (ref 6–20)
BUN/CREAT SERPL: 5 (ref 12–20)
BUN/CREAT SERPL: 7 (ref 12–20)
C PNEUM DNA SPEC QL NAA+PROBE: NOT DETECTED
CALCIUM SERPL-MCNC: 7 MG/DL (ref 8.5–10.1)
CALCIUM SERPL-MCNC: 8.2 MG/DL (ref 8.5–10.1)
CHLORIDE SERPL-SCNC: 90 MMOL/L (ref 97–108)
CHLORIDE SERPL-SCNC: 99 MMOL/L (ref 97–108)
CO2 SERPL-SCNC: 23 MMOL/L (ref 21–32)
CO2 SERPL-SCNC: 27 MMOL/L (ref 21–32)
COMMENT:: NORMAL
CREAT SERPL-MCNC: 7.25 MG/DL (ref 0.7–1.3)
CREAT SERPL-MCNC: 8.65 MG/DL (ref 0.7–1.3)
DIFFERENTIAL METHOD BLD: ABNORMAL
DIFFERENTIAL METHOD BLD: ABNORMAL
EOSINOPHIL # BLD: 0 K/UL (ref 0–0.4)
EOSINOPHIL # BLD: 0 K/UL (ref 0–0.4)
EOSINOPHIL NFR BLD: 0 % (ref 0–7)
EOSINOPHIL NFR BLD: 0 % (ref 0–7)
ERYTHROCYTE [DISTWIDTH] IN BLOOD BY AUTOMATED COUNT: 17.9 % (ref 11.5–14.5)
ERYTHROCYTE [DISTWIDTH] IN BLOOD BY AUTOMATED COUNT: 18 % (ref 11.5–14.5)
FERRITIN SERPL-MCNC: 4872 NG/ML (ref 26–388)
FLUAV SUBTYP SPEC NAA+PROBE: NOT DETECTED
FLUBV RNA SPEC QL NAA+PROBE: NOT DETECTED
GAS FLOW.O2 O2 DELIVERY SYS: 15 L/MIN
GLOBULIN SER CALC-MCNC: 5.6 G/DL (ref 2–4)
GLUCOSE BLD STRIP.AUTO-MCNC: 106 MG/DL (ref 65–117)
GLUCOSE BLD STRIP.AUTO-MCNC: 108 MG/DL (ref 65–117)
GLUCOSE BLD STRIP.AUTO-MCNC: 32 MG/DL (ref 65–117)
GLUCOSE BLD STRIP.AUTO-MCNC: 78 MG/DL (ref 65–117)
GLUCOSE BLD STRIP.AUTO-MCNC: 82 MG/DL (ref 65–117)
GLUCOSE SERPL-MCNC: 115 MG/DL (ref 65–100)
GLUCOSE SERPL-MCNC: 55 MG/DL (ref 65–100)
HADV DNA SPEC QL NAA+PROBE: NOT DETECTED
HAPTOGLOB SERPL-MCNC: 155 MG/DL (ref 30–200)
HBV SURFACE AB SER QL: NONREACTIVE
HBV SURFACE AB SER-ACNC: <3.1 MIU/ML
HBV SURFACE AG SER QL: <0.1 INDEX
HBV SURFACE AG SER QL: NEGATIVE
HCO3 BLDA-SCNC: 23 MMOL/L (ref 22–26)
HCOV 229E RNA SPEC QL NAA+PROBE: NOT DETECTED
HCOV HKU1 RNA SPEC QL NAA+PROBE: NOT DETECTED
HCOV NL63 RNA SPEC QL NAA+PROBE: NOT DETECTED
HCOV OC43 RNA SPEC QL NAA+PROBE: NOT DETECTED
HCT VFR BLD AUTO: 21.9 % (ref 36.6–50.3)
HCT VFR BLD AUTO: 23.8 % (ref 36.6–50.3)
HGB BLD-MCNC: 7.7 G/DL (ref 12.1–17)
HGB BLD-MCNC: 8.3 G/DL (ref 12.1–17)
HMPV RNA SPEC QL NAA+PROBE: NOT DETECTED
HPIV1 RNA SPEC QL NAA+PROBE: NOT DETECTED
HPIV2 RNA SPEC QL NAA+PROBE: NOT DETECTED
HPIV3 RNA SPEC QL NAA+PROBE: NOT DETECTED
HPIV4 RNA SPEC QL NAA+PROBE: NOT DETECTED
IMM GRANULOCYTES # BLD AUTO: 0 K/UL (ref 0–0.04)
IMM GRANULOCYTES # BLD AUTO: 0 K/UL (ref 0–0.04)
IMM GRANULOCYTES NFR BLD AUTO: 0 % (ref 0–0.5)
IMM GRANULOCYTES NFR BLD AUTO: 0 % (ref 0–0.5)
IRON SATN MFR SERPL: 31 % (ref 20–50)
IRON SERPL-MCNC: 18 UG/DL (ref 35–150)
LACTATE SERPL-SCNC: 1.8 MMOL/L (ref 0.4–2)
LDH SERPL L TO P-CCNC: 469 U/L (ref 85–241)
LYMPHOCYTES # BLD: 0.35 K/UL (ref 0.8–3.5)
LYMPHOCYTES # BLD: 1.08 K/UL (ref 0.8–3.5)
LYMPHOCYTES NFR BLD: 2 % (ref 12–49)
LYMPHOCYTES NFR BLD: 6 % (ref 12–49)
M PNEUMO DNA SPEC QL NAA+PROBE: NOT DETECTED
MCH RBC QN AUTO: 29 PG (ref 26–34)
MCH RBC QN AUTO: 29.1 PG (ref 26–34)
MCHC RBC AUTO-ENTMCNC: 34.9 G/DL (ref 30–36.5)
MCHC RBC AUTO-ENTMCNC: 35.2 G/DL (ref 30–36.5)
MCV RBC AUTO: 82.6 FL (ref 80–99)
MCV RBC AUTO: 83.2 FL (ref 80–99)
MONOCYTES # BLD: 1.08 K/UL (ref 0–1)
MONOCYTES # BLD: 1.73 K/UL (ref 0–1)
MONOCYTES NFR BLD: 10 % (ref 5–13)
MONOCYTES NFR BLD: 6 % (ref 5–13)
NEUTS BAND NFR BLD MANUAL: 15 %
NEUTS BAND NFR BLD MANUAL: 4 %
NEUTS SEG # BLD: 15.22 K/UL (ref 1.8–8)
NEUTS SEG # BLD: 15.84 K/UL (ref 1.8–8)
NEUTS SEG NFR BLD: 73 % (ref 32–75)
NEUTS SEG NFR BLD: 84 % (ref 32–75)
NRBC # BLD: 0.03 K/UL (ref 0–0.01)
NRBC # BLD: 0.04 K/UL (ref 0–0.01)
NRBC BLD-RTO: 0.2 PER 100 WBC
NRBC BLD-RTO: 0.2 PER 100 WBC
PCO2 BLDA: 32 MMHG (ref 35–45)
PERIPHERAL SMEAR, MD REVIEW: NORMAL
PH BLDA: 7.48 (ref 7.35–7.45)
PLATELET # BLD AUTO: 170 K/UL (ref 150–400)
PLATELET # BLD AUTO: 226 K/UL (ref 150–400)
PMV BLD AUTO: 11.8 FL (ref 8.9–12.9)
PMV BLD AUTO: 11.8 FL (ref 8.9–12.9)
PO2 BLDA: 198 MMHG (ref 80–100)
POTASSIUM SERPL-SCNC: 5.6 MMOL/L (ref 3.5–5.1)
POTASSIUM SERPL-SCNC: 5.7 MMOL/L (ref 3.5–5.1)
PROT SERPL-MCNC: 7.4 G/DL (ref 6.4–8.2)
RBC # BLD AUTO: 2.65 M/UL (ref 4.1–5.7)
RBC # BLD AUTO: 2.86 M/UL (ref 4.1–5.7)
RBC MORPH BLD: ABNORMAL
RSV RNA SPEC QL NAA+PROBE: NOT DETECTED
RV+EV RNA SPEC QL NAA+PROBE: NOT DETECTED
SAO2 % BLD: 99 % (ref 92–97)
SAO2% DEVICE SAO2% SENSOR NAME: ABNORMAL
SARS-COV-2 RNA RESP QL NAA+PROBE: NOT DETECTED
SERVICE CMNT-IMP: ABNORMAL
SERVICE CMNT-IMP: NORMAL
SODIUM SERPL-SCNC: 129 MMOL/L (ref 136–145)
SODIUM SERPL-SCNC: 139 MMOL/L (ref 136–145)
SPECIMEN HOLD: NORMAL
SPECIMEN SITE: ABNORMAL
TIBC SERPL-MCNC: 58 UG/DL (ref 250–450)
TROPONIN I SERPL HS-MCNC: 94 NG/L (ref 0–76)
WBC # BLD AUTO: 17.3 K/UL (ref 4.1–11.1)
WBC # BLD AUTO: 18 K/UL (ref 4.1–11.1)
WBC MORPH BLD: ABNORMAL

## 2025-05-24 PROCEDURE — 6360000004 HC RX CONTRAST MEDICATION: Performed by: STUDENT IN AN ORGANIZED HEALTH CARE EDUCATION/TRAINING PROGRAM

## 2025-05-24 PROCEDURE — 87506 IADNA-DNA/RNA PROBE TQ 6-11: CPT

## 2025-05-24 PROCEDURE — 2580000003 HC RX 258: Performed by: STUDENT IN AN ORGANIZED HEALTH CARE EDUCATION/TRAINING PROGRAM

## 2025-05-24 PROCEDURE — 6360000002 HC RX W HCPCS: Performed by: STUDENT IN AN ORGANIZED HEALTH CARE EDUCATION/TRAINING PROGRAM

## 2025-05-24 PROCEDURE — 2060000000 HC ICU INTERMEDIATE R&B

## 2025-05-24 PROCEDURE — 85025 COMPLETE CBC W/AUTO DIFF WBC: CPT

## 2025-05-24 PROCEDURE — 86706 HEP B SURFACE ANTIBODY: CPT

## 2025-05-24 PROCEDURE — 97166 OT EVAL MOD COMPLEX 45 MIN: CPT

## 2025-05-24 PROCEDURE — 97530 THERAPEUTIC ACTIVITIES: CPT

## 2025-05-24 PROCEDURE — 90935 HEMODIALYSIS ONE EVALUATION: CPT

## 2025-05-24 PROCEDURE — 87449 NOS EACH ORGANISM AG IA: CPT

## 2025-05-24 PROCEDURE — P9047 ALBUMIN (HUMAN), 25%, 50ML: HCPCS | Performed by: STUDENT IN AN ORGANIZED HEALTH CARE EDUCATION/TRAINING PROGRAM

## 2025-05-24 PROCEDURE — 87324 CLOSTRIDIUM AG IA: CPT

## 2025-05-24 PROCEDURE — 6370000000 HC RX 637 (ALT 250 FOR IP): Performed by: STUDENT IN AN ORGANIZED HEALTH CARE EDUCATION/TRAINING PROGRAM

## 2025-05-24 PROCEDURE — 82803 BLOOD GASES ANY COMBINATION: CPT

## 2025-05-24 PROCEDURE — 83615 LACTATE (LD) (LDH) ENZYME: CPT

## 2025-05-24 PROCEDURE — 97535 SELF CARE MNGMENT TRAINING: CPT

## 2025-05-24 PROCEDURE — 6360000002 HC RX W HCPCS: Performed by: INTERNAL MEDICINE

## 2025-05-24 PROCEDURE — 2500000003 HC RX 250 WO HCPCS: Performed by: STUDENT IN AN ORGANIZED HEALTH CARE EDUCATION/TRAINING PROGRAM

## 2025-05-24 PROCEDURE — 82962 GLUCOSE BLOOD TEST: CPT

## 2025-05-24 PROCEDURE — 2700000000 HC OXYGEN THERAPY PER DAY

## 2025-05-24 PROCEDURE — 94640 AIRWAY INHALATION TREATMENT: CPT

## 2025-05-24 PROCEDURE — 5A1D90Z PERFORMANCE OF URINARY FILTRATION, CONTINUOUS, GREATER THAN 18 HOURS PER DAY: ICD-10-PCS | Performed by: INTERNAL MEDICINE

## 2025-05-24 PROCEDURE — 84484 ASSAY OF TROPONIN QUANT: CPT

## 2025-05-24 PROCEDURE — 36600 WITHDRAWAL OF ARTERIAL BLOOD: CPT

## 2025-05-24 PROCEDURE — 5A1D70Z PERFORMANCE OF URINARY FILTRATION, INTERMITTENT, LESS THAN 6 HOURS PER DAY: ICD-10-PCS | Performed by: INTERNAL MEDICINE

## 2025-05-24 PROCEDURE — 80048 BASIC METABOLIC PNL TOTAL CA: CPT

## 2025-05-24 PROCEDURE — 83010 ASSAY OF HAPTOGLOBIN QUANT: CPT

## 2025-05-24 PROCEDURE — 83540 ASSAY OF IRON: CPT

## 2025-05-24 PROCEDURE — 71045 X-RAY EXAM CHEST 1 VIEW: CPT

## 2025-05-24 PROCEDURE — 6370000000 HC RX 637 (ALT 250 FOR IP): Performed by: INTERNAL MEDICINE

## 2025-05-24 PROCEDURE — 97162 PT EVAL MOD COMPLEX 30 MIN: CPT

## 2025-05-24 PROCEDURE — 74177 CT ABD & PELVIS W/CONTRAST: CPT

## 2025-05-24 PROCEDURE — 0202U NFCT DS 22 TRGT SARS-COV-2: CPT

## 2025-05-24 PROCEDURE — 87340 HEPATITIS B SURFACE AG IA: CPT

## 2025-05-24 PROCEDURE — 82728 ASSAY OF FERRITIN: CPT

## 2025-05-24 PROCEDURE — 83605 ASSAY OF LACTIC ACID: CPT

## 2025-05-24 PROCEDURE — 87493 C DIFF AMPLIFIED PROBE: CPT

## 2025-05-24 PROCEDURE — 80076 HEPATIC FUNCTION PANEL: CPT

## 2025-05-24 PROCEDURE — 36415 COLL VENOUS BLD VENIPUNCTURE: CPT

## 2025-05-24 PROCEDURE — 83550 IRON BINDING TEST: CPT

## 2025-05-24 RX ORDER — ALBUTEROL SULFATE 90 UG/1
2 INHALANT RESPIRATORY (INHALATION) EVERY 6 HOURS PRN
Status: DISCONTINUED | OUTPATIENT
Start: 2025-05-24 | End: 2025-06-10 | Stop reason: HOSPADM

## 2025-05-24 RX ORDER — BUDESONIDE AND FORMOTEROL FUMARATE DIHYDRATE 80; 4.5 UG/1; UG/1
2 AEROSOL RESPIRATORY (INHALATION)
Status: DISCONTINUED | OUTPATIENT
Start: 2025-05-24 | End: 2025-05-25 | Stop reason: ALTCHOICE

## 2025-05-24 RX ORDER — ALBUMIN (HUMAN) 12.5 G/50ML
25 SOLUTION INTRAVENOUS ONCE
Status: COMPLETED | OUTPATIENT
Start: 2025-05-24 | End: 2025-05-24

## 2025-05-24 RX ORDER — IOPAMIDOL 755 MG/ML
100 INJECTION, SOLUTION INTRAVASCULAR
Status: COMPLETED | OUTPATIENT
Start: 2025-05-24 | End: 2025-05-24

## 2025-05-24 RX ORDER — MIDODRINE HYDROCHLORIDE 5 MG/1
5 TABLET ORAL ONCE
Status: COMPLETED | OUTPATIENT
Start: 2025-05-24 | End: 2025-05-24

## 2025-05-24 RX ORDER — GABAPENTIN 100 MG/1
100 CAPSULE ORAL
Status: DISCONTINUED | OUTPATIENT
Start: 2025-05-24 | End: 2025-06-10 | Stop reason: HOSPADM

## 2025-05-24 RX ORDER — ALBUTEROL SULFATE 0.83 MG/ML
2.5 SOLUTION RESPIRATORY (INHALATION) EVERY 6 HOURS PRN
Status: DISCONTINUED | OUTPATIENT
Start: 2025-05-24 | End: 2025-05-24 | Stop reason: SDUPTHER

## 2025-05-24 RX ADMIN — SODIUM ZIRCONIUM CYCLOSILICATE 10 G: 10 POWDER, FOR SUSPENSION ORAL at 02:13

## 2025-05-24 RX ADMIN — VANCOMYCIN HYDROCHLORIDE 750 MG: 750 INJECTION, POWDER, LYOPHILIZED, FOR SOLUTION INTRAVENOUS at 18:08

## 2025-05-24 RX ADMIN — SEVELAMER CARBONATE 1600 MG: 800 TABLET, FILM COATED ORAL at 11:49

## 2025-05-24 RX ADMIN — HYDROXYZINE HYDROCHLORIDE 25 MG: 25 TABLET, FILM COATED ORAL at 19:42

## 2025-05-24 RX ADMIN — SODIUM CHLORIDE, PRESERVATIVE FREE 10 ML: 5 INJECTION INTRAVENOUS at 19:44

## 2025-05-24 RX ADMIN — EPOETIN ALFA-EPBX 10000 UNITS: 10000 INJECTION, SOLUTION INTRAVENOUS; SUBCUTANEOUS at 20:30

## 2025-05-24 RX ADMIN — DEXTROSE 250 ML: 10 SOLUTION INTRAVENOUS at 01:57

## 2025-05-24 RX ADMIN — ACETAMINOPHEN 650 MG: 325 TABLET ORAL at 19:42

## 2025-05-24 RX ADMIN — PIPERACILLIN AND TAZOBACTAM 3375 MG: 3; .375 INJECTION, POWDER, LYOPHILIZED, FOR SOLUTION INTRAVENOUS at 11:54

## 2025-05-24 RX ADMIN — HEPARIN SODIUM 5000 UNITS: 5000 INJECTION INTRAVENOUS; SUBCUTANEOUS at 20:30

## 2025-05-24 RX ADMIN — MIRTAZAPINE 15 MG: 15 TABLET, FILM COATED ORAL at 19:42

## 2025-05-24 RX ADMIN — SEVELAMER CARBONATE 1600 MG: 800 TABLET, FILM COATED ORAL at 09:10

## 2025-05-24 RX ADMIN — DORZOLAMIDE HYDROCHLORIDE AND TIMOLOL MALEATE 1 DROP: 20; 5 SOLUTION/ DROPS OPHTHALMIC at 00:16

## 2025-05-24 RX ADMIN — PANTOPRAZOLE SODIUM 40 MG: 40 TABLET, DELAYED RELEASE ORAL at 06:12

## 2025-05-24 RX ADMIN — IOPAMIDOL 100 ML: 755 INJECTION, SOLUTION INTRAVENOUS at 01:08

## 2025-05-24 RX ADMIN — SEVELAMER CARBONATE 1600 MG: 800 TABLET, FILM COATED ORAL at 16:48

## 2025-05-24 RX ADMIN — BUDESONIDE AND FORMOTEROL FUMARATE DIHYDRATE 2 PUFF: 80; 4.5 AEROSOL RESPIRATORY (INHALATION) at 19:41

## 2025-05-24 RX ADMIN — SODIUM CHLORIDE, PRESERVATIVE FREE 10 ML: 5 INJECTION INTRAVENOUS at 09:10

## 2025-05-24 RX ADMIN — DORZOLAMIDE HYDROCHLORIDE AND TIMOLOL MALEATE 1 DROP: 20; 5 SOLUTION/ DROPS OPHTHALMIC at 19:44

## 2025-05-24 RX ADMIN — SODIUM CHLORIDE, PRESERVATIVE FREE 10 ML: 5 INJECTION INTRAVENOUS at 00:25

## 2025-05-24 RX ADMIN — PIPERACILLIN AND TAZOBACTAM 3375 MG: 3; .375 INJECTION, POWDER, LYOPHILIZED, FOR SOLUTION INTRAVENOUS at 22:38

## 2025-05-24 RX ADMIN — DORZOLAMIDE HYDROCHLORIDE AND TIMOLOL MALEATE 1 DROP: 20; 5 SOLUTION/ DROPS OPHTHALMIC at 16:49

## 2025-05-24 RX ADMIN — PRAVASTATIN SODIUM 10 MG: 10 TABLET ORAL at 09:10

## 2025-05-24 RX ADMIN — HEPARIN SODIUM 5000 UNITS: 5000 INJECTION INTRAVENOUS; SUBCUTANEOUS at 06:12

## 2025-05-24 RX ADMIN — MONTELUKAST 10 MG: 10 TABLET, FILM COATED ORAL at 09:10

## 2025-05-24 RX ADMIN — MIDODRINE HYDROCHLORIDE 5 MG: 5 TABLET ORAL at 20:30

## 2025-05-24 RX ADMIN — ALBUMIN (HUMAN) 25 G: 0.25 INJECTION, SOLUTION INTRAVENOUS at 22:01

## 2025-05-24 RX ADMIN — HEPARIN SODIUM 5000 UNITS: 5000 INJECTION INTRAVENOUS; SUBCUTANEOUS at 16:48

## 2025-05-24 RX ADMIN — DORZOLAMIDE HYDROCHLORIDE AND TIMOLOL MALEATE 1 DROP: 20; 5 SOLUTION/ DROPS OPHTHALMIC at 09:10

## 2025-05-24 RX ADMIN — GABAPENTIN 100 MG: 100 CAPSULE ORAL at 19:43

## 2025-05-24 NOTE — H&P
significance  Acute on chronic anemia  Trend hemoglobin  Check formal liver function panel  CT abdomen pelvis  Labs: LDH, haptoglobin, peripheral smear    Elevated troponin-suspect demand ischemia  CAD  Essential hypertension  Hyperlipidemia  Continue PTA pravastatin, valsartan  Trend troponin out of abundance of caution    COPD  Formulary substitution budesonide arformoterol ipratropium nebulizer  Continue PTA ensifentrine nebulizer  Orally substitution every 4 hours as needed albuterol nebulizer    Peripheral neuropathy  Continue PTA gabapentin nightly    Ophthalmologic disorder  Continue PTA eyedrops    GERD  Continue PTA Protonix    Insomnia  Continue PTA Remeron  Melatonin nightly as needed    Medical Decision Making:   I personally reviewed labs: Yes, as listed below  I personally reviewed imaging: CXR  Toxic drug monitoring: Vancomycin  Discussed case with: ED provider. After discussion I am in agreement that acuity of patient's medical condition necessitates hospital stay.      Code Status: Full  DVT Prophylaxis: Heparin  GI Prophylaxis: Protonix  Baseline: Home with family    Subjective:   CHIEF COMPLAINT: Transfer-sepsis    HISTORY OF PRESENT ILLNESS:     Darrel Patterson is a 80 y.o.  male with PMHx as listed below presenting as transfer from [facility].  Patient presented with complaints of 2-3 days progressively worsening malaise, intermittent confusion, intermittent dry cough, lethargy.  Denies known sick contacts or other associated symptoms.  Missed last hemodialysis session (Tuesday Thursday Saturday schedule) due to generalized malaise.    At OSH, patient found to be febrile to 102 °F, hemodynamically stable, saturating upper 80s on room air improved to low 90s on 2 L supplemental oxygen.  Labs demonstrated lactic acid 4.8 => 2.9 (following 500 cc IVF), high sensitive troponin 108, needing 2.2, serum ethanol undetectable, WBC 14.4 (83% PMNs), hemoglobin 10.6 (baseline around

## 2025-05-24 NOTE — DIALYSIS
RN patient assessment performed for (LPN): K.BURAK RN for BENITO LPN  Vascular Access type/ assessment: FISTULA/GRAFT  Respiratory status: HIGH FLOW NASAL CANNULA  Cardiac Status: TELEMETRY MONITOR   Edema: NONE  Skin assessment: DRY/WARM  A&Ox : AOX4  VS reviewed: YES  Hep B results, dates, and Primary Source: HEP B ANTIGEN AND ANTIBODY DRAWN BY LPN, PENDING RESULTS.   Hepatitis B Surface Ag   Date/Time Value Ref Range Status   09/21/2024 11:50 AM <0.10 Index Final     Hep B S Ag Interp   Date/Time Value Ref Range Status   09/21/2024 11:50 AM Negative NEG   Final     HEP B SURF AB   Date/Time Value Ref Range Status   04/11/2023 09:23 AM <3.10 mIU/mL Final     Hep B S Ab   Date/Time Value Ref Range Status   09/21/2024 11:50 AM 6.01 mIU/mL Final     Hep B S Ab Interp   Date/Time Value Ref Range Status   09/21/2024 11:50 AM NONREACTIVE NR   Final     Comment:     (NOTE)  The [employer] Centaur Anti-HBs2 assay is traceable to the World Health   Organization (WHO) Hepatitis B Immunoglobulin 1st International   Reference Preparation (1977). Samples with a calculated value of 10   mIU/mL or greater are considered reactive (protective) in accordance   with the CDC guidelines. The accepted criteria for immunity to HBV is   anti-HBs activity greater than or equal to 10 mIU/mL, as defined by   the WHO International Reference Preparation.  Assay performance has not been established in pregnant women,   patients who are immunosuppressed or immunocompromised, nor have   performance characteristics been established in conjunction with   other 's assays for specific HBV serologic markers. This   assay does not differentiate between vaccine induced immune response   and a response due to infection with HBV. Passively acquired anti-HBs   may be identified following patient transfusion, receipt of   immunoglobulin products, etc.       Machine disinfect process:BLEACH/ACID/HEAT

## 2025-05-25 ENCOUNTER — APPOINTMENT (OUTPATIENT)
Facility: HOSPITAL | Age: 80
DRG: 870 | End: 2025-05-25
Attending: INTERNAL MEDICINE
Payer: MEDICARE

## 2025-05-25 LAB
ANION GAP SERPL CALC-SCNC: 12 MMOL/L (ref 2–12)
ANION GAP SERPL CALC-SCNC: 20 MMOL/L (ref 2–12)
ARTERIAL PATENCY WRIST A: YES
BACTERIA SPEC CULT: NORMAL
BACTERIA SPEC CULT: NORMAL
BASE DEFICIT BLDA-SCNC: 3.6 MMOL/L
BASE DEFICIT BLDA-SCNC: 5.5 MMOL/L
BASE EXCESS BLDA CALC-SCNC: 3 MMOL/L
BASOPHILS # BLD: 0 K/UL (ref 0–0.1)
BASOPHILS NFR BLD: 0 % (ref 0–1)
BDY SITE: ABNORMAL
BUN SERPL-MCNC: 41 MG/DL (ref 6–20)
BUN SERPL-MCNC: 42 MG/DL (ref 6–20)
BUN/CREAT SERPL: 6 (ref 12–20)
BUN/CREAT SERPL: 6 (ref 12–20)
C COLI+JEJUNI TUF STL QL NAA+PROBE: NEGATIVE
C DIFF TOX GENS STL QL NAA+PROBE: POSITIVE
C DIFF TOXIN INTERPRETATION: ABNORMAL
CALCIUM SERPL-MCNC: 7.5 MG/DL (ref 8.5–10.1)
CALCIUM SERPL-MCNC: 8 MG/DL (ref 8.5–10.1)
CHLORIDE SERPL-SCNC: 92 MMOL/L (ref 97–108)
CHLORIDE SERPL-SCNC: 95 MMOL/L (ref 97–108)
CO2 SERPL-SCNC: 17 MMOL/L (ref 21–32)
CO2 SERPL-SCNC: 25 MMOL/L (ref 21–32)
CREAT SERPL-MCNC: 6.45 MG/DL (ref 0.7–1.3)
CREAT SERPL-MCNC: 6.53 MG/DL (ref 0.7–1.3)
DIFFERENTIAL METHOD BLD: ABNORMAL
EC STX1+STX2 GENES STL QL NAA+PROBE: NEGATIVE
EOSINOPHIL # BLD: 0 K/UL (ref 0–0.4)
EOSINOPHIL NFR BLD: 0 % (ref 0–7)
ERYTHROCYTE [DISTWIDTH] IN BLOOD BY AUTOMATED COUNT: 18 % (ref 11.5–14.5)
ETEC ELTA+ESTB GENES STL QL NAA+PROBE: NEGATIVE
GAS FLOW.O2 O2 DELIVERY SYS: 10 L/MIN
GAS FLOW.O2 O2 DELIVERY SYS: 9 L/MIN
GLUCOSE BLD STRIP.AUTO-MCNC: 20 MG/DL (ref 65–117)
GLUCOSE BLD STRIP.AUTO-MCNC: 23 MG/DL (ref 65–117)
GLUCOSE BLD STRIP.AUTO-MCNC: 46 MG/DL (ref 65–117)
GLUCOSE BLD STRIP.AUTO-MCNC: 48 MG/DL (ref 65–117)
GLUCOSE BLD STRIP.AUTO-MCNC: 54 MG/DL (ref 65–117)
GLUCOSE BLD STRIP.AUTO-MCNC: 60 MG/DL (ref 65–117)
GLUCOSE BLD STRIP.AUTO-MCNC: 68 MG/DL (ref 65–117)
GLUCOSE BLD STRIP.AUTO-MCNC: 70 MG/DL (ref 65–117)
GLUCOSE BLD STRIP.AUTO-MCNC: 74 MG/DL (ref 65–117)
GLUCOSE BLD STRIP.AUTO-MCNC: 77 MG/DL (ref 65–117)
GLUCOSE BLD STRIP.AUTO-MCNC: 80 MG/DL (ref 65–117)
GLUCOSE BLD STRIP.AUTO-MCNC: 81 MG/DL (ref 65–117)
GLUCOSE SERPL-MCNC: 105 MG/DL (ref 65–100)
GLUCOSE SERPL-MCNC: 61 MG/DL (ref 65–100)
HCO3 BLDA-SCNC: 18 MMOL/L (ref 22–26)
HCO3 BLDA-SCNC: 19 MMOL/L (ref 22–26)
HCO3 BLDA-SCNC: 27 MMOL/L (ref 22–26)
HCT VFR BLD AUTO: 22.9 % (ref 36.6–50.3)
HGB BLD-MCNC: 7.9 G/DL (ref 12.1–17)
IMM GRANULOCYTES # BLD AUTO: 0 K/UL (ref 0–0.04)
IMM GRANULOCYTES NFR BLD AUTO: 0 % (ref 0–0.5)
LYMPHOCYTES # BLD: 0.82 K/UL (ref 0.8–3.5)
LYMPHOCYTES NFR BLD: 5 % (ref 12–49)
MCH RBC QN AUTO: 28.4 PG (ref 26–34)
MCHC RBC AUTO-ENTMCNC: 34.5 G/DL (ref 30–36.5)
MCV RBC AUTO: 82.4 FL (ref 80–99)
MONOCYTES # BLD: 0.33 K/UL (ref 0–1)
MONOCYTES NFR BLD: 2 % (ref 5–13)
NEUTS BAND NFR BLD MANUAL: 12 %
NEUTS SEG # BLD: 15.25 K/UL (ref 1.8–8)
NEUTS SEG NFR BLD: 81 % (ref 32–75)
NRBC # BLD: 0 K/UL (ref 0–0.01)
NRBC BLD-RTO: 0 PER 100 WBC
P SHIGELLOIDES DNA STL QL NAA+PROBE: NEGATIVE
PCO2 BLDA: 28 MMHG (ref 35–45)
PCO2 BLDA: 30 MMHG (ref 35–45)
PCO2 BLDA: 39 MMHG (ref 35–45)
PH BLDA: 7.41 (ref 7.35–7.45)
PH BLDA: 7.43 (ref 7.35–7.45)
PH BLDA: 7.46 (ref 7.35–7.45)
PLATELET # BLD AUTO: 198 K/UL (ref 150–400)
PMV BLD AUTO: 11.2 FL (ref 8.9–12.9)
PO2 BLDA: 125 MMHG (ref 80–100)
PO2 BLDA: 130 MMHG (ref 80–100)
PO2 BLDA: 93 MMHG (ref 80–100)
POTASSIUM SERPL-SCNC: 4.9 MMOL/L (ref 3.5–5.1)
POTASSIUM SERPL-SCNC: 4.9 MMOL/L (ref 3.5–5.1)
RBC # BLD AUTO: 2.78 M/UL (ref 4.1–5.7)
RBC MORPH BLD: ABNORMAL
REFLEX: ABNORMAL
SALMONELLA SP SPAO STL QL NAA+PROBE: NEGATIVE
SAO2 % BLD: 97 % (ref 92–97)
SAO2 % BLD: 99 % (ref 92–97)
SAO2 % BLD: 99 % (ref 92–97)
SAO2% DEVICE SAO2% SENSOR NAME: ABNORMAL
SERVICE CMNT-IMP: ABNORMAL
SERVICE CMNT-IMP: NORMAL
SHIGELLA SP+EIEC IPAH STL QL NAA+PROBE: NEGATIVE
SODIUM SERPL-SCNC: 129 MMOL/L (ref 136–145)
SODIUM SERPL-SCNC: 132 MMOL/L (ref 136–145)
SPECIMEN SITE: ABNORMAL
V CHOL+PARA+VUL DNA STL QL NAA+NON-PROBE: NEGATIVE
WBC # BLD AUTO: 16.4 K/UL (ref 4.1–11.1)
WBC MORPH BLD: ABNORMAL
Y ENTEROCOL DNA STL QL NAA+NON-PROBE: NEGATIVE

## 2025-05-25 PROCEDURE — 6370000000 HC RX 637 (ALT 250 FOR IP): Performed by: INTERNAL MEDICINE

## 2025-05-25 PROCEDURE — 2700000000 HC OXYGEN THERAPY PER DAY

## 2025-05-25 PROCEDURE — 36415 COLL VENOUS BLD VENIPUNCTURE: CPT

## 2025-05-25 PROCEDURE — 6370000000 HC RX 637 (ALT 250 FOR IP): Performed by: STUDENT IN AN ORGANIZED HEALTH CARE EDUCATION/TRAINING PROGRAM

## 2025-05-25 PROCEDURE — 2580000003 HC RX 258: Performed by: STUDENT IN AN ORGANIZED HEALTH CARE EDUCATION/TRAINING PROGRAM

## 2025-05-25 PROCEDURE — 5A1955Z RESPIRATORY VENTILATION, GREATER THAN 96 CONSECUTIVE HOURS: ICD-10-PCS | Performed by: HOSPITALIST

## 2025-05-25 PROCEDURE — 6360000002 HC RX W HCPCS: Performed by: HOSPITALIST

## 2025-05-25 PROCEDURE — 6360000002 HC RX W HCPCS: Performed by: STUDENT IN AN ORGANIZED HEALTH CARE EDUCATION/TRAINING PROGRAM

## 2025-05-25 PROCEDURE — 3E033XZ INTRODUCTION OF VASOPRESSOR INTO PERIPHERAL VEIN, PERCUTANEOUS APPROACH: ICD-10-PCS | Performed by: HOSPITALIST

## 2025-05-25 PROCEDURE — 0BH17EZ INSERTION OF ENDOTRACHEAL AIRWAY INTO TRACHEA, VIA NATURAL OR ARTIFICIAL OPENING: ICD-10-PCS | Performed by: HOSPITALIST

## 2025-05-25 PROCEDURE — 82962 GLUCOSE BLOOD TEST: CPT

## 2025-05-25 PROCEDURE — P9047 ALBUMIN (HUMAN), 25%, 50ML: HCPCS | Performed by: STUDENT IN AN ORGANIZED HEALTH CARE EDUCATION/TRAINING PROGRAM

## 2025-05-25 PROCEDURE — 82803 BLOOD GASES ANY COMBINATION: CPT

## 2025-05-25 PROCEDURE — 94002 VENT MGMT INPAT INIT DAY: CPT

## 2025-05-25 PROCEDURE — 2500000003 HC RX 250 WO HCPCS: Performed by: HOSPITALIST

## 2025-05-25 PROCEDURE — 94640 AIRWAY INHALATION TREATMENT: CPT

## 2025-05-25 PROCEDURE — 71045 X-RAY EXAM CHEST 1 VIEW: CPT

## 2025-05-25 PROCEDURE — 2500000003 HC RX 250 WO HCPCS: Performed by: STUDENT IN AN ORGANIZED HEALTH CARE EDUCATION/TRAINING PROGRAM

## 2025-05-25 PROCEDURE — 36600 WITHDRAWAL OF ARTERIAL BLOOD: CPT

## 2025-05-25 PROCEDURE — 6370000000 HC RX 637 (ALT 250 FOR IP): Performed by: HOSPITALIST

## 2025-05-25 PROCEDURE — 31500 INSERT EMERGENCY AIRWAY: CPT

## 2025-05-25 PROCEDURE — 3E0G76Z INTRODUCTION OF NUTRITIONAL SUBSTANCE INTO UPPER GI, VIA NATURAL OR ARTIFICIAL OPENING: ICD-10-PCS | Performed by: HOSPITALIST

## 2025-05-25 PROCEDURE — 02HV33Z INSERTION OF INFUSION DEVICE INTO SUPERIOR VENA CAVA, PERCUTANEOUS APPROACH: ICD-10-PCS | Performed by: INTERNAL MEDICINE

## 2025-05-25 PROCEDURE — 2580000003 HC RX 258: Performed by: HOSPITALIST

## 2025-05-25 PROCEDURE — 85025 COMPLETE CBC W/AUTO DIFF WBC: CPT

## 2025-05-25 PROCEDURE — 92950 HEART/LUNG RESUSCITATION CPR: CPT

## 2025-05-25 PROCEDURE — 80048 BASIC METABOLIC PNL TOTAL CA: CPT

## 2025-05-25 PROCEDURE — 2000000000 HC ICU R&B

## 2025-05-25 PROCEDURE — 0DH67UZ INSERTION OF FEEDING DEVICE INTO STOMACH, VIA NATURAL OR ARTIFICIAL OPENING: ICD-10-PCS | Performed by: HOSPITALIST

## 2025-05-25 RX ORDER — CHLORHEXIDINE GLUCONATE ORAL RINSE 1.2 MG/ML
15 SOLUTION DENTAL 2 TIMES DAILY
Status: DISCONTINUED | OUTPATIENT
Start: 2025-05-25 | End: 2025-06-06

## 2025-05-25 RX ORDER — ETOMIDATE 2 MG/ML
INJECTION INTRAVENOUS
Status: DISCONTINUED | OUTPATIENT
Start: 2025-05-25 | End: 2025-05-25

## 2025-05-25 RX ORDER — BUDESONIDE 0.25 MG/2ML
0.25 INHALANT ORAL
Status: DISCONTINUED | OUTPATIENT
Start: 2025-05-25 | End: 2025-06-07

## 2025-05-25 RX ORDER — INDOMETHACIN 25 MG/1
CAPSULE ORAL
Status: DISCONTINUED | OUTPATIENT
Start: 2025-05-25 | End: 2025-05-25

## 2025-05-25 RX ORDER — SODIUM CHLORIDE, SODIUM LACTATE, POTASSIUM CHLORIDE, AND CALCIUM CHLORIDE .6; .31; .03; .02 G/100ML; G/100ML; G/100ML; G/100ML
1000 INJECTION, SOLUTION INTRAVENOUS ONCE
Status: COMPLETED | OUTPATIENT
Start: 2025-05-25 | End: 2025-05-25

## 2025-05-25 RX ORDER — ARFORMOTEROL TARTRATE 15 UG/2ML
15 SOLUTION RESPIRATORY (INHALATION)
Status: DISCONTINUED | OUTPATIENT
Start: 2025-05-25 | End: 2025-06-07

## 2025-05-25 RX ORDER — NOREPINEPHRINE BITARTRATE 0.06 MG/ML
1-100 INJECTION, SOLUTION INTRAVENOUS CONTINUOUS
Status: DISCONTINUED | OUTPATIENT
Start: 2025-05-25 | End: 2025-05-26

## 2025-05-25 RX ORDER — FENTANYL CITRATE-0.9 % NACL/PF 20 MCG/2ML
50 SYRINGE (ML) INTRAVENOUS ONCE
Refills: 0 | Status: COMPLETED | OUTPATIENT
Start: 2025-05-25 | End: 2025-05-25

## 2025-05-25 RX ORDER — FENTANYL CITRATE-0.9 % NACL/PF 20 MCG/2ML
25 SYRINGE (ML) INTRAVENOUS ONCE
Refills: 0 | Status: COMPLETED | OUTPATIENT
Start: 2025-05-25 | End: 2025-05-25

## 2025-05-25 RX ORDER — PROPOFOL 10 MG/ML
5-50 INJECTION, EMULSION INTRAVENOUS CONTINUOUS
Status: DISCONTINUED | OUTPATIENT
Start: 2025-05-25 | End: 2025-05-27

## 2025-05-25 RX ORDER — INDOMETHACIN 25 MG/1
CAPSULE ORAL
Status: COMPLETED | OUTPATIENT
Start: 2025-05-25 | End: 2025-05-25

## 2025-05-25 RX ORDER — ALBUMIN (HUMAN) 12.5 G/50ML
25 SOLUTION INTRAVENOUS ONCE
Status: COMPLETED | OUTPATIENT
Start: 2025-05-25 | End: 2025-05-25

## 2025-05-25 RX ORDER — EPINEPHRINE IN SOD CHLOR,ISO 1 MG/10 ML
SYRINGE (ML) INTRAVENOUS
Status: COMPLETED | OUTPATIENT
Start: 2025-05-25 | End: 2025-05-25

## 2025-05-25 RX ORDER — FENTANYL CITRATE-0.9 % NACL/PF 10 MCG/ML
25-200 PLASTIC BAG, INJECTION (ML) INTRAVENOUS CONTINUOUS
Refills: 0 | Status: DISCONTINUED | OUTPATIENT
Start: 2025-05-25 | End: 2025-06-01

## 2025-05-25 RX ORDER — DEXTROSE MONOHYDRATE 100 MG/ML
INJECTION, SOLUTION INTRAVENOUS CONTINUOUS
Status: DISCONTINUED | OUTPATIENT
Start: 2025-05-25 | End: 2025-06-05

## 2025-05-25 RX ORDER — EPINEPHRINE IN SOD CHLOR,ISO 1 MG/10 ML
SYRINGE (ML) INTRAVENOUS
Status: DISCONTINUED | OUTPATIENT
Start: 2025-05-25 | End: 2025-05-25

## 2025-05-25 RX ORDER — ETOMIDATE 2 MG/ML
INJECTION INTRAVENOUS
Status: COMPLETED | OUTPATIENT
Start: 2025-05-25 | End: 2025-05-25

## 2025-05-25 RX ORDER — PROPOFOL 10 MG/ML
INJECTION, EMULSION INTRAVENOUS
Status: DISPENSED
Start: 2025-05-25 | End: 2025-05-26

## 2025-05-25 RX ADMIN — Medication 50 MCG/HR: at 13:08

## 2025-05-25 RX ADMIN — DORZOLAMIDE HYDROCHLORIDE AND TIMOLOL MALEATE 1 DROP: 20; 5 SOLUTION/ DROPS OPHTHALMIC at 13:28

## 2025-05-25 RX ADMIN — PROPOFOL 20 MCG/KG/MIN: 10 INJECTION, EMULSION INTRAVENOUS at 12:06

## 2025-05-25 RX ADMIN — DEXTROSE 250 ML: 10 SOLUTION INTRAVENOUS at 15:18

## 2025-05-25 RX ADMIN — SODIUM CHLORIDE: 0.9 INJECTION, SOLUTION INTRAVENOUS at 13:27

## 2025-05-25 RX ADMIN — HEPARIN SODIUM 5000 UNITS: 5000 INJECTION INTRAVENOUS; SUBCUTANEOUS at 23:25

## 2025-05-25 RX ADMIN — NOREPINEPHRINE BITARTRATE 4 MCG/MIN: 64 SOLUTION INTRAVENOUS at 12:42

## 2025-05-25 RX ADMIN — BUDESONIDE 250 MCG: 0.25 INHALANT RESPIRATORY (INHALATION) at 21:02

## 2025-05-25 RX ADMIN — MIRTAZAPINE 15 MG: 15 TABLET, FILM COATED ORAL at 23:28

## 2025-05-25 RX ADMIN — SODIUM CHLORIDE, PRESERVATIVE FREE 20 ML: 5 INJECTION INTRAVENOUS at 21:00

## 2025-05-25 RX ADMIN — DEXTROSE 125 ML: 10 SOLUTION INTRAVENOUS at 18:51

## 2025-05-25 RX ADMIN — ACETAMINOPHEN 650 MG: 325 TABLET ORAL at 09:21

## 2025-05-25 RX ADMIN — HEPARIN SODIUM 5000 UNITS: 5000 INJECTION INTRAVENOUS; SUBCUTANEOUS at 06:23

## 2025-05-25 RX ADMIN — EPINEPHRINE 1 MG: 0.1 INJECTION, SOLUTION ENDOTRACHEAL; INTRACARDIAC; INTRAVENOUS at 11:46

## 2025-05-25 RX ADMIN — MONTELUKAST 10 MG: 10 TABLET, FILM COATED ORAL at 08:43

## 2025-05-25 RX ADMIN — SODIUM BICARBONATE 50 MEQ: 84 INJECTION, SOLUTION INTRAVENOUS at 11:49

## 2025-05-25 RX ADMIN — DEXTROSE: 10 SOLUTION INTRAVENOUS at 16:07

## 2025-05-25 RX ADMIN — BUDESONIDE AND FORMOTEROL FUMARATE DIHYDRATE 2 PUFF: 80; 4.5 AEROSOL RESPIRATORY (INHALATION) at 08:56

## 2025-05-25 RX ADMIN — ETOMIDATE 10 MG: 2 INJECTION, SOLUTION INTRAVENOUS at 11:53

## 2025-05-25 RX ADMIN — Medication 1 AMPULE: at 21:00

## 2025-05-25 RX ADMIN — IPRATROPIUM BROMIDE 0.5 MG: 0.5 SOLUTION RESPIRATORY (INHALATION) at 20:57

## 2025-05-25 RX ADMIN — HYDROCORTISONE SODIUM SUCCINATE 50 MG: 100 INJECTION, POWDER, FOR SOLUTION INTRAMUSCULAR; INTRAVENOUS at 23:27

## 2025-05-25 RX ADMIN — Medication 25 MCG: at 18:01

## 2025-05-25 RX ADMIN — PIPERACILLIN AND TAZOBACTAM 3375 MG: 3; .375 INJECTION, POWDER, LYOPHILIZED, FOR SOLUTION INTRAVENOUS at 23:31

## 2025-05-25 RX ADMIN — PIPERACILLIN AND TAZOBACTAM 3375 MG: 3; .375 INJECTION, POWDER, LYOPHILIZED, FOR SOLUTION INTRAVENOUS at 13:28

## 2025-05-25 RX ADMIN — PANTOPRAZOLE SODIUM 40 MG: 40 TABLET, DELAYED RELEASE ORAL at 06:22

## 2025-05-25 RX ADMIN — TIOTROPIUM BROMIDE INHALATION SPRAY 2 PUFF: 3.12 SPRAY, METERED RESPIRATORY (INHALATION) at 08:57

## 2025-05-25 RX ADMIN — ETOMIDATE 10 MG: 2 INJECTION, SOLUTION INTRAVENOUS at 11:52

## 2025-05-25 RX ADMIN — SEVELAMER CARBONATE 1600 MG: 800 TABLET, FILM COATED ORAL at 08:42

## 2025-05-25 RX ADMIN — DEXTROSE 250 ML: 10 SOLUTION INTRAVENOUS at 19:39

## 2025-05-25 RX ADMIN — Medication 50 MCG: at 13:09

## 2025-05-25 RX ADMIN — CHLORHEXIDINE GLUCONATE 15 ML: 1.2 RINSE ORAL at 23:27

## 2025-05-25 RX ADMIN — DORZOLAMIDE HYDROCHLORIDE AND TIMOLOL MALEATE 1 DROP: 20; 5 SOLUTION/ DROPS OPHTHALMIC at 08:43

## 2025-05-25 RX ADMIN — EPINEPHRINE 1 MG: 0.1 INJECTION, SOLUTION ENDOTRACHEAL; INTRACARDIAC; INTRAVENOUS at 11:49

## 2025-05-25 RX ADMIN — HEPARIN SODIUM 5000 UNITS: 5000 INJECTION INTRAVENOUS; SUBCUTANEOUS at 13:34

## 2025-05-25 RX ADMIN — GABAPENTIN 100 MG: 100 CAPSULE ORAL at 23:29

## 2025-05-25 RX ADMIN — SODIUM CHLORIDE, SODIUM LACTATE, POTASSIUM CHLORIDE, AND CALCIUM CHLORIDE 1000 ML: .6; .31; .03; .02 INJECTION, SOLUTION INTRAVENOUS at 14:55

## 2025-05-25 RX ADMIN — ALBUMIN (HUMAN) 25 G: 0.25 INJECTION, SOLUTION INTRAVENOUS at 08:48

## 2025-05-25 RX ADMIN — DORZOLAMIDE HYDROCHLORIDE AND TIMOLOL MALEATE 1 DROP: 20; 5 SOLUTION/ DROPS OPHTHALMIC at 23:48

## 2025-05-25 RX ADMIN — PRAVASTATIN SODIUM 10 MG: 10 TABLET ORAL at 08:43

## 2025-05-25 RX ADMIN — SODIUM BICARBONATE 50 MEQ: 84 INJECTION, SOLUTION INTRAVENOUS at 11:51

## 2025-05-25 RX ADMIN — SODIUM CHLORIDE, PRESERVATIVE FREE 10 ML: 5 INJECTION INTRAVENOUS at 08:50

## 2025-05-25 RX ADMIN — ARFORMOTEROL TARTRATE 15 MCG: 15 SOLUTION RESPIRATORY (INHALATION) at 21:02

## 2025-05-25 ASSESSMENT — PAIN DESCRIPTION - LOCATION
LOCATION: GENERALIZED
LOCATION: BACK

## 2025-05-25 ASSESSMENT — PAIN SCALES - GENERAL
PAINLEVEL_OUTOF10: 9
PAINLEVEL_OUTOF10: 0
PAINLEVEL_OUTOF10: 0
PAINLEVEL_OUTOF10: 1

## 2025-05-25 ASSESSMENT — PULMONARY FUNCTION TESTS
PIF_VALUE: 24
PIF_VALUE: 25
PIF_VALUE: 21
PIF_VALUE: 20

## 2025-05-25 NOTE — PROCEDURES
PROCEDURE NOTE  Date: 5/25/2025   Name: Darrel Patterson  YOB: 1945    CENTRAL LINE    Date/Time: 5/25/2025 6:17 PM    Performed by: Sharon Bourgeois MD  Authorized by: Sharon Bourgeois MD  Consent: Written consent obtained.  Risks and benefits: risks, benefits and alternatives were discussed  Consent given by: power of   Time out: Immediately prior to procedure a \"time out\" was called to verify the correct patient, procedure, equipment, support staff and site/side marked as required.  Indications: vascular access    Sedation:  Patient sedated: yes  Vitals: Vital signs were monitored during sedation.    Preparation: skin prepped with 2% chlorhexidine  Skin prep agent dried: skin prep agent completely dried prior to procedure  Sterile barriers: all five maximum sterile barriers used - cap, mask, sterile gown, sterile gloves, and large sterile sheet  Hand hygiene: hand hygiene performed prior to central venous catheter insertion  Location details: right femoral  Patient position: flat  Catheter type: triple lumen  Pre-procedure: landmarks identified  Ultrasound guidance: yes  Sterile ultrasound techniques: sterile gel and sterile probe covers were used  Number of attempts: 1  Successful placement: yes  Post-procedure: line sutured and dressing applied  Assessment: blood return through all ports and free fluid flow  Patient tolerance: patient tolerated the procedure well with no immediate complications

## 2025-05-25 NOTE — PROCEDURES
PROCEDURE NOTE  Date: 5/25/2025   Name: Darrel Patterson  YOB: 1945    Procedures             Procedure Note - Intubation:   Performed by Kevin Phillips MD .     Diagnosis: Acute resp arrest and cardiac arrest  Obtained Consent? no; emergent   Procedure Location:  Step down unit during code blues.      Immediately prior to the procedure, the patient was reevaluated and found suitable for the planned procedure and any planned medications.  Immediately prior to the procedure a time out was called to verify the correct patient, procedure, equipment, staff, and marking as appropriate.    Preoxygenation applied via BVM  Medications given were etomidate.     A number 7.5 cuffed   ETT was placed to 25 cm at the teeth.   Placement was evaluated by noting bilateral, symmetric breath sounds, good end-tidal CO2 detector color change , and no breath sounds over stomach.    Attempts required: 1.    Complications: none.    RSI was used..  The procedure was tolerated well.  A follow-up chest x-ray was ordered post procedure.      Kevin Phillips MD  Critical Care Medicine  [facility]

## 2025-05-26 LAB
ALBUMIN FLD-MCNC: 1.2 G/DL
ALBUMIN SERPL-MCNC: 2.5 G/DL (ref 3.5–5)
ALBUMIN SERPL-MCNC: 2.5 G/DL (ref 3.5–5)
ALBUMIN/GLOB SERPL: 0.6 (ref 1.1–2.2)
ALBUMIN/GLOB SERPL: 0.6 (ref 1.1–2.2)
ALP SERPL-CCNC: 89 U/L (ref 45–117)
ALP SERPL-CCNC: 95 U/L (ref 45–117)
ALT SERPL-CCNC: 11 U/L (ref 12–78)
ALT SERPL-CCNC: 25 U/L (ref 12–78)
ANION GAP SERPL CALC-SCNC: 12 MMOL/L (ref 2–12)
ANION GAP SERPL CALC-SCNC: 19 MMOL/L (ref 2–12)
ANION GAP SERPL CALC-SCNC: 20 MMOL/L (ref 2–12)
AST SERPL-CCNC: 174 U/L (ref 15–37)
AST SERPL-CCNC: 58 U/L (ref 15–37)
BASOPHILS # BLD: 0.02 K/UL (ref 0–0.1)
BASOPHILS NFR BLD: 0.1 % (ref 0–1)
BILIRUB SERPL-MCNC: 2.1 MG/DL (ref 0.2–1)
BILIRUB SERPL-MCNC: 2.3 MG/DL (ref 0.2–1)
BUN SERPL-MCNC: 33 MG/DL (ref 6–20)
BUN SERPL-MCNC: 44 MG/DL (ref 6–20)
BUN SERPL-MCNC: 45 MG/DL (ref 6–20)
BUN/CREAT SERPL: 7 (ref 12–20)
CALCIUM SERPL-MCNC: 7.5 MG/DL (ref 8.5–10.1)
CALCIUM SERPL-MCNC: 8.3 MG/DL (ref 8.5–10.1)
CALCIUM SERPL-MCNC: 8.4 MG/DL (ref 8.5–10.1)
CHLORIDE SERPL-SCNC: 91 MMOL/L (ref 97–108)
CHLORIDE SERPL-SCNC: 93 MMOL/L (ref 97–108)
CHLORIDE SERPL-SCNC: 96 MMOL/L (ref 97–108)
CO2 SERPL-SCNC: 19 MMOL/L (ref 21–32)
CO2 SERPL-SCNC: 20 MMOL/L (ref 21–32)
CO2 SERPL-SCNC: 23 MMOL/L (ref 21–32)
CREAT SERPL-MCNC: 4.46 MG/DL (ref 0.7–1.3)
CREAT SERPL-MCNC: 6.41 MG/DL (ref 0.7–1.3)
CREAT SERPL-MCNC: 6.9 MG/DL (ref 0.7–1.3)
DIFFERENTIAL METHOD BLD: ABNORMAL
EOSINOPHIL # BLD: 0 K/UL (ref 0–0.4)
EOSINOPHIL NFR BLD: 0 % (ref 0–7)
ERYTHROCYTE [DISTWIDTH] IN BLOOD BY AUTOMATED COUNT: 17.5 % (ref 11.5–14.5)
GLOBULIN SER CALC-MCNC: 3.9 G/DL (ref 2–4)
GLOBULIN SER CALC-MCNC: 4.1 G/DL (ref 2–4)
GLUCOSE BLD STRIP.AUTO-MCNC: 102 MG/DL (ref 65–117)
GLUCOSE BLD STRIP.AUTO-MCNC: 103 MG/DL (ref 65–117)
GLUCOSE BLD STRIP.AUTO-MCNC: 110 MG/DL (ref 65–117)
GLUCOSE BLD STRIP.AUTO-MCNC: 119 MG/DL (ref 65–117)
GLUCOSE BLD STRIP.AUTO-MCNC: 135 MG/DL (ref 65–117)
GLUCOSE BLD STRIP.AUTO-MCNC: 141 MG/DL (ref 65–117)
GLUCOSE BLD STRIP.AUTO-MCNC: 68 MG/DL (ref 65–117)
GLUCOSE BLD STRIP.AUTO-MCNC: 87 MG/DL (ref 65–117)
GLUCOSE BLD STRIP.AUTO-MCNC: 88 MG/DL (ref 65–117)
GLUCOSE BLD STRIP.AUTO-MCNC: 88 MG/DL (ref 65–117)
GLUCOSE BLD STRIP.AUTO-MCNC: 93 MG/DL (ref 65–117)
GLUCOSE BLD STRIP.AUTO-MCNC: 93 MG/DL (ref 65–117)
GLUCOSE SERPL-MCNC: 112 MG/DL (ref 65–100)
GLUCOSE SERPL-MCNC: 136 MG/DL (ref 65–100)
GLUCOSE SERPL-MCNC: 99 MG/DL (ref 65–100)
HCT VFR BLD AUTO: 22.5 % (ref 36.6–50.3)
HGB BLD-MCNC: 7.5 G/DL (ref 12.1–17)
IMM GRANULOCYTES # BLD AUTO: 0.12 K/UL (ref 0–0.04)
IMM GRANULOCYTES NFR BLD AUTO: 0.6 % (ref 0–0.5)
LYMPHOCYTES # BLD: 0.64 K/UL (ref 0.8–3.5)
LYMPHOCYTES NFR BLD: 3.2 % (ref 12–49)
MAGNESIUM SERPL-MCNC: 1.9 MG/DL (ref 1.6–2.4)
MAGNESIUM SERPL-MCNC: 2.2 MG/DL (ref 1.6–2.4)
MAGNESIUM SERPL-MCNC: 2.3 MG/DL (ref 1.6–2.4)
MCH RBC QN AUTO: 28.1 PG (ref 26–34)
MCHC RBC AUTO-ENTMCNC: 33.3 G/DL (ref 30–36.5)
MCV RBC AUTO: 84.3 FL (ref 80–99)
MONOCYTES # BLD: 1.1 K/UL (ref 0–1)
MONOCYTES NFR BLD: 5.5 % (ref 5–13)
NEUTS SEG # BLD: 18.12 K/UL (ref 1.8–8)
NEUTS SEG NFR BLD: 90.6 % (ref 32–75)
NRBC # BLD: 0.05 K/UL (ref 0–0.01)
NRBC BLD-RTO: 0.2 PER 100 WBC
PHOSPHATE SERPL-MCNC: 3.8 MG/DL (ref 2.6–4.7)
PHOSPHATE SERPL-MCNC: 5.8 MG/DL (ref 2.6–4.7)
PHOSPHATE SERPL-MCNC: 6.8 MG/DL (ref 2.6–4.7)
PLATELET # BLD AUTO: 197 K/UL (ref 150–400)
PMV BLD AUTO: 12.2 FL (ref 8.9–12.9)
POTASSIUM SERPL-SCNC: 4.1 MMOL/L (ref 3.5–5.1)
POTASSIUM SERPL-SCNC: 4.9 MMOL/L (ref 3.5–5.1)
POTASSIUM SERPL-SCNC: 5 MMOL/L (ref 3.5–5.1)
PROT FLD-MCNC: 3.3 G/DL
PROT SERPL-MCNC: 6.4 G/DL (ref 6.4–8.2)
PROT SERPL-MCNC: 6.6 G/DL (ref 6.4–8.2)
RBC # BLD AUTO: 2.67 M/UL (ref 4.1–5.7)
RBC MORPH BLD: ABNORMAL
SERVICE CMNT-IMP: ABNORMAL
SERVICE CMNT-IMP: NORMAL
SODIUM SERPL-SCNC: 130 MMOL/L (ref 136–145)
SODIUM SERPL-SCNC: 131 MMOL/L (ref 136–145)
SODIUM SERPL-SCNC: 132 MMOL/L (ref 136–145)
SPECIMEN SOURCE FLD: NORMAL
SPECIMEN SOURCE FLD: NORMAL
WBC # BLD AUTO: 20 K/UL (ref 4.1–11.1)
WBC MORPH BLD: ABNORMAL

## 2025-05-26 PROCEDURE — 0W9G3ZZ DRAINAGE OF PERITONEAL CAVITY, PERCUTANEOUS APPROACH: ICD-10-PCS | Performed by: HOSPITALIST

## 2025-05-26 PROCEDURE — 36600 WITHDRAWAL OF ARTERIAL BLOOD: CPT

## 2025-05-26 PROCEDURE — 84100 ASSAY OF PHOSPHORUS: CPT

## 2025-05-26 PROCEDURE — 2580000003 HC RX 258: Performed by: STUDENT IN AN ORGANIZED HEALTH CARE EDUCATION/TRAINING PROGRAM

## 2025-05-26 PROCEDURE — 36415 COLL VENOUS BLD VENIPUNCTURE: CPT

## 2025-05-26 PROCEDURE — 80053 COMPREHEN METABOLIC PANEL: CPT

## 2025-05-26 PROCEDURE — P9045 ALBUMIN (HUMAN), 5%, 250 ML: HCPCS | Performed by: HOSPITALIST

## 2025-05-26 PROCEDURE — 2500000003 HC RX 250 WO HCPCS: Performed by: NURSE PRACTITIONER

## 2025-05-26 PROCEDURE — 83735 ASSAY OF MAGNESIUM: CPT

## 2025-05-26 PROCEDURE — 2000000000 HC ICU R&B

## 2025-05-26 PROCEDURE — 82042 OTHER SOURCE ALBUMIN QUAN EA: CPT

## 2025-05-26 PROCEDURE — 2580000003 HC RX 258: Performed by: INTERNAL MEDICINE

## 2025-05-26 PROCEDURE — 6360000002 HC RX W HCPCS: Performed by: HOSPITALIST

## 2025-05-26 PROCEDURE — 94640 AIRWAY INHALATION TREATMENT: CPT

## 2025-05-26 PROCEDURE — 2580000003 HC RX 258: Performed by: HOSPITALIST

## 2025-05-26 PROCEDURE — 84157 ASSAY OF PROTEIN OTHER: CPT

## 2025-05-26 PROCEDURE — 6360000002 HC RX W HCPCS: Performed by: STUDENT IN AN ORGANIZED HEALTH CARE EDUCATION/TRAINING PROGRAM

## 2025-05-26 PROCEDURE — 90945 DIALYSIS ONE EVALUATION: CPT

## 2025-05-26 PROCEDURE — 2500000003 HC RX 250 WO HCPCS: Performed by: STUDENT IN AN ORGANIZED HEALTH CARE EDUCATION/TRAINING PROGRAM

## 2025-05-26 PROCEDURE — 87040 BLOOD CULTURE FOR BACTERIA: CPT

## 2025-05-26 PROCEDURE — 87070 CULTURE OTHR SPECIMN AEROBIC: CPT

## 2025-05-26 PROCEDURE — 82962 GLUCOSE BLOOD TEST: CPT

## 2025-05-26 PROCEDURE — 87205 SMEAR GRAM STAIN: CPT

## 2025-05-26 PROCEDURE — 87015 SPECIMEN INFECT AGNT CONCNTJ: CPT

## 2025-05-26 PROCEDURE — 2700000000 HC OXYGEN THERAPY PER DAY

## 2025-05-26 PROCEDURE — 6370000000 HC RX 637 (ALT 250 FOR IP): Performed by: HOSPITALIST

## 2025-05-26 PROCEDURE — 6370000000 HC RX 637 (ALT 250 FOR IP): Performed by: INTERNAL MEDICINE

## 2025-05-26 PROCEDURE — 94003 VENT MGMT INPAT SUBQ DAY: CPT

## 2025-05-26 PROCEDURE — 82803 BLOOD GASES ANY COMBINATION: CPT

## 2025-05-26 PROCEDURE — 2500000003 HC RX 250 WO HCPCS: Performed by: HOSPITALIST

## 2025-05-26 PROCEDURE — 6370000000 HC RX 637 (ALT 250 FOR IP): Performed by: STUDENT IN AN ORGANIZED HEALTH CARE EDUCATION/TRAINING PROGRAM

## 2025-05-26 PROCEDURE — 85025 COMPLETE CBC W/AUTO DIFF WBC: CPT

## 2025-05-26 RX ORDER — ALBUMIN HUMAN 50 G/1000ML
25 SOLUTION INTRAVENOUS ONCE
Status: COMPLETED | OUTPATIENT
Start: 2025-05-26 | End: 2025-05-26

## 2025-05-26 RX ORDER — NOREPINEPHRINE BITARTRATE 0.06 MG/ML
1-100 INJECTION, SOLUTION INTRAVENOUS CONTINUOUS
Status: DISCONTINUED | OUTPATIENT
Start: 2025-05-26 | End: 2025-05-29

## 2025-05-26 RX ORDER — CALCIUM CHLORIDE, MAGNESIUM CHLORIDE, DEXTROSE MONOHYDRATE, LACTIC ACID, SODIUM CHLORIDE, SODIUM BICARBONATE AND POTASSIUM CHLORIDE 5.15; 2.03; 22; 5.4; 6.46; 3.09; .157 G/L; G/L; G/L; G/L; G/L; G/L; G/L
INJECTION INTRAVENOUS CONTINUOUS
Status: DISCONTINUED | OUTPATIENT
Start: 2025-05-26 | End: 2025-05-27

## 2025-05-26 RX ORDER — SODIUM CHLORIDE 0.9 % (FLUSH) 0.9 %
1.1-1.9 SYRINGE (ML) INJECTION PRN
Status: DISCONTINUED | OUTPATIENT
Start: 2025-05-26 | End: 2025-05-29

## 2025-05-26 RX ORDER — FENTANYL CITRATE-0.9 % NACL/PF 20 MCG/2ML
50 SYRINGE (ML) INTRAVENOUS ONCE
Refills: 0 | Status: COMPLETED | OUTPATIENT
Start: 2025-05-26 | End: 2025-05-26

## 2025-05-26 RX ORDER — VANCOMYCIN HYDROCHLORIDE 125 MG/1
125 CAPSULE ORAL 4 TIMES DAILY
Status: DISCONTINUED | OUTPATIENT
Start: 2025-05-26 | End: 2025-05-26

## 2025-05-26 RX ADMIN — Medication 1 AMPULE: at 08:22

## 2025-05-26 RX ADMIN — CALCIUM CHLORIDE, MAGNESIUM CHLORIDE, DEXTROSE MONOHYDRATE, LACTIC ACID, SODIUM CHLORIDE, SODIUM BICARBONATE AND POTASSIUM CHLORIDE: 5.15; 2.03; 22; 5.4; 6.46; 3.09; .157 INJECTION INTRAVENOUS at 22:01

## 2025-05-26 RX ADMIN — DORZOLAMIDE HYDROCHLORIDE AND TIMOLOL MALEATE 1 DROP: 20; 5 SOLUTION/ DROPS OPHTHALMIC at 20:34

## 2025-05-26 RX ADMIN — IPRATROPIUM BROMIDE 0.5 MG: 0.5 SOLUTION RESPIRATORY (INHALATION) at 20:02

## 2025-05-26 RX ADMIN — IPRATROPIUM BROMIDE 0.5 MG: 0.5 SOLUTION RESPIRATORY (INHALATION) at 01:18

## 2025-05-26 RX ADMIN — PIPERACILLIN AND TAZOBACTAM 3375 MG: 3; .375 INJECTION, POWDER, LYOPHILIZED, FOR SOLUTION INTRAVENOUS at 18:14

## 2025-05-26 RX ADMIN — DORZOLAMIDE HYDROCHLORIDE AND TIMOLOL MALEATE 1 DROP: 20; 5 SOLUTION/ DROPS OPHTHALMIC at 10:06

## 2025-05-26 RX ADMIN — CALCIUM CHLORIDE, MAGNESIUM CHLORIDE, DEXTROSE MONOHYDRATE, LACTIC ACID, SODIUM CHLORIDE, SODIUM BICARBONATE AND POTASSIUM CHLORIDE: 5.15; 2.03; 22; 5.4; 6.46; 3.09; .157 INJECTION INTRAVENOUS at 16:23

## 2025-05-26 RX ADMIN — Medication 25 MCG/HR: at 02:22

## 2025-05-26 RX ADMIN — HEPARIN SODIUM 5000 UNITS: 5000 INJECTION INTRAVENOUS; SUBCUTANEOUS at 06:11

## 2025-05-26 RX ADMIN — DEXTROSE 125 ML: 10 SOLUTION INTRAVENOUS at 01:58

## 2025-05-26 RX ADMIN — SODIUM CHLORIDE: 0.9 INJECTION, SOLUTION INTRAVENOUS at 17:22

## 2025-05-26 RX ADMIN — DORZOLAMIDE HYDROCHLORIDE AND TIMOLOL MALEATE 1 DROP: 20; 5 SOLUTION/ DROPS OPHTHALMIC at 13:58

## 2025-05-26 RX ADMIN — Medication 50 MCG: at 14:21

## 2025-05-26 RX ADMIN — PIPERACILLIN AND TAZOBACTAM 3375 MG: 3; .375 INJECTION, POWDER, LYOPHILIZED, FOR SOLUTION INTRAVENOUS at 10:41

## 2025-05-26 RX ADMIN — Medication 24 MCG/MIN: at 08:31

## 2025-05-26 RX ADMIN — ARFORMOTEROL TARTRATE 15 MCG: 15 SOLUTION RESPIRATORY (INHALATION) at 20:07

## 2025-05-26 RX ADMIN — VANCOMYCIN HYDROCHLORIDE 750 MG: 750 INJECTION, POWDER, LYOPHILIZED, FOR SOLUTION INTRAVENOUS at 18:05

## 2025-05-26 RX ADMIN — DEXTROSE: 10 SOLUTION INTRAVENOUS at 18:19

## 2025-05-26 RX ADMIN — MIRTAZAPINE 15 MG: 15 TABLET, FILM COATED ORAL at 20:33

## 2025-05-26 RX ADMIN — HYDROCORTISONE SODIUM SUCCINATE 50 MG: 100 INJECTION, POWDER, FOR SOLUTION INTRAMUSCULAR; INTRAVENOUS at 20:31

## 2025-05-26 RX ADMIN — HEPARIN SODIUM 5000 UNITS: 5000 INJECTION INTRAVENOUS; SUBCUTANEOUS at 20:42

## 2025-05-26 RX ADMIN — Medication 14 MCG/MIN: at 16:34

## 2025-05-26 RX ADMIN — BUDESONIDE 250 MCG: 0.25 INHALANT RESPIRATORY (INHALATION) at 07:34

## 2025-05-26 RX ADMIN — CHLORHEXIDINE GLUCONATE 15 ML: 1.2 RINSE ORAL at 08:29

## 2025-05-26 RX ADMIN — BUDESONIDE 250 MCG: 0.25 INHALANT RESPIRATORY (INHALATION) at 20:07

## 2025-05-26 RX ADMIN — ALBUMIN (HUMAN) 25 G: 12.5 INJECTION, SOLUTION INTRAVENOUS at 15:30

## 2025-05-26 RX ADMIN — VANCOMYCIN HYDROCHLORIDE 125 MG: 1 INJECTION, POWDER, LYOPHILIZED, FOR SOLUTION INTRAVENOUS at 10:39

## 2025-05-26 RX ADMIN — HYDROCORTISONE SODIUM SUCCINATE 50 MG: 100 INJECTION, POWDER, FOR SOLUTION INTRAMUSCULAR; INTRAVENOUS at 08:24

## 2025-05-26 RX ADMIN — CHLORHEXIDINE GLUCONATE 15 ML: 1.2 RINSE ORAL at 20:33

## 2025-05-26 RX ADMIN — HYDROCORTISONE SODIUM SUCCINATE 50 MG: 100 INJECTION, POWDER, FOR SOLUTION INTRAMUSCULAR; INTRAVENOUS at 13:04

## 2025-05-26 RX ADMIN — HEPARIN SODIUM 5000 UNITS: 5000 INJECTION INTRAVENOUS; SUBCUTANEOUS at 13:58

## 2025-05-26 RX ADMIN — ARFORMOTEROL TARTRATE 15 MCG: 15 SOLUTION RESPIRATORY (INHALATION) at 07:34

## 2025-05-26 RX ADMIN — PANTOPRAZOLE SODIUM 40 MG: 40 INJECTION, POWDER, LYOPHILIZED, FOR SOLUTION INTRAVENOUS at 08:56

## 2025-05-26 RX ADMIN — VANCOMYCIN HYDROCHLORIDE 125 MG: 1 INJECTION, POWDER, LYOPHILIZED, FOR SOLUTION INTRAVENOUS at 17:58

## 2025-05-26 RX ADMIN — CALCIUM CHLORIDE, MAGNESIUM CHLORIDE, DEXTROSE MONOHYDRATE, LACTIC ACID, SODIUM CHLORIDE, SODIUM BICARBONATE AND POTASSIUM CHLORIDE: 5.15; 2.03; 22; 5.4; 6.46; 3.09; .157 INJECTION INTRAVENOUS at 21:59

## 2025-05-26 RX ADMIN — PRAVASTATIN SODIUM 10 MG: 10 TABLET ORAL at 09:04

## 2025-05-26 RX ADMIN — CALCIUM CHLORIDE, MAGNESIUM CHLORIDE, DEXTROSE MONOHYDRATE, LACTIC ACID, SODIUM CHLORIDE, SODIUM BICARBONATE AND POTASSIUM CHLORIDE: 5.15; 2.03; 22; 5.4; 6.46; 3.09; .157 INJECTION INTRAVENOUS at 10:33

## 2025-05-26 RX ADMIN — IPRATROPIUM BROMIDE 0.5 MG: 0.5 SOLUTION RESPIRATORY (INHALATION) at 15:17

## 2025-05-26 RX ADMIN — HYDROCORTISONE SODIUM SUCCINATE 50 MG: 100 INJECTION, POWDER, FOR SOLUTION INTRAMUSCULAR; INTRAVENOUS at 02:26

## 2025-05-26 RX ADMIN — ACETAMINOPHEN 650 MG: 650 SUPPOSITORY RECTAL at 09:16

## 2025-05-26 RX ADMIN — SODIUM CHLORIDE, PRESERVATIVE FREE 10 ML: 5 INJECTION INTRAVENOUS at 08:22

## 2025-05-26 RX ADMIN — VANCOMYCIN HYDROCHLORIDE 125 MG: 1 INJECTION, POWDER, LYOPHILIZED, FOR SOLUTION INTRAVENOUS at 20:34

## 2025-05-26 RX ADMIN — NOREPINEPHRINE BITARTRATE 22 MCG/MIN: 64 SOLUTION INTRAVENOUS at 02:19

## 2025-05-26 RX ADMIN — SODIUM CHLORIDE, PRESERVATIVE FREE 10 ML: 5 INJECTION INTRAVENOUS at 20:33

## 2025-05-26 RX ADMIN — GABAPENTIN 100 MG: 100 CAPSULE ORAL at 20:33

## 2025-05-26 RX ADMIN — MONTELUKAST 10 MG: 10 TABLET, FILM COATED ORAL at 09:04

## 2025-05-26 RX ADMIN — ALBUMIN (HUMAN) 25 G: 12.5 INJECTION, SOLUTION INTRAVENOUS at 09:03

## 2025-05-26 RX ADMIN — Medication 1 AMPULE: at 20:32

## 2025-05-26 RX ADMIN — IPRATROPIUM BROMIDE 0.5 MG: 0.5 SOLUTION RESPIRATORY (INHALATION) at 07:34

## 2025-05-26 RX ADMIN — CALCIUM CHLORIDE, MAGNESIUM CHLORIDE, DEXTROSE MONOHYDRATE, LACTIC ACID, SODIUM CHLORIDE, SODIUM BICARBONATE AND POTASSIUM CHLORIDE: 5.15; 2.03; 22; 5.4; 6.46; 3.09; .157 INJECTION INTRAVENOUS at 22:04

## 2025-05-26 ASSESSMENT — PAIN SCALES - GENERAL
PAINLEVEL_OUTOF10: 0

## 2025-05-26 ASSESSMENT — PULMONARY FUNCTION TESTS
PIF_VALUE: 22
PIF_VALUE: 21
PIF_VALUE: 19
PIF_VALUE: 20
PIF_VALUE: 21

## 2025-05-26 NOTE — PROCEDURES
PROCEDURE NOTE  Date: 5/26/2025   Name: Darrel Patterson  YOB: 1945    Procedures      PROCEDURE: Diagnostic and Therapeutic Paracentesis    SERVICE: ICU     INDICATION:    ANESTHESIA: Lidocaine 1% (10 cc)    OPERATORS: Jacqueline Brown MD. MD.     PROCEDURE DESCRIPTION: Consent was obtained. Patient was evaluated immediately prior to the procedure and a time out was performed for safety. Site of entry was marked after a thorough evaluation in right lower quadrant using an ultrasound probe. The procedure was subsequently performed under aseptic precautions.    The patient was positioned supine. Lidocaine 1% was injected at the previously marked site. Using a finder needle, additional lidocaine was injected in the subcutaneous tissue and the peritoneal space was accessed. Once the fluid was aspirated, the needle was gently pulled back and the catheter over a needle was advanced via a small incision to access the peritoneal space. The catheter was attached to draining bag.    A total of 2700 cc of  ascitic fluid was aspirated. Patient tolerated the procedure well.    COMPLICATIONS: No immediate complications    Jacqueline Brown MD  Critical care

## 2025-05-27 LAB
ALBUMIN SERPL-MCNC: 2.1 G/DL (ref 3.5–5)
ALBUMIN/GLOB SERPL: 0.6 (ref 1.1–2.2)
ALP SERPL-CCNC: 110 U/L (ref 45–117)
ALT SERPL-CCNC: 90 U/L (ref 12–78)
ANION GAP SERPL CALC-SCNC: 12 MMOL/L (ref 2–12)
ANION GAP SERPL CALC-SCNC: 13 MMOL/L (ref 2–12)
ARTERIAL PATENCY WRIST A: YES
AST SERPL-CCNC: 648 U/L (ref 15–37)
BASE DEFICIT BLDA-SCNC: 8.9 MMOL/L
BASOPHILS # BLD: 0 K/UL (ref 0–0.1)
BASOPHILS NFR BLD: 0 % (ref 0–1)
BDY SITE: ABNORMAL
BILIRUB SERPL-MCNC: 2.2 MG/DL (ref 0.2–1)
BUN SERPL-MCNC: 23 MG/DL (ref 6–20)
BUN SERPL-MCNC: 29 MG/DL (ref 6–20)
BUN/CREAT SERPL: 7 (ref 12–20)
BUN/CREAT SERPL: 8 (ref 12–20)
CALCIUM SERPL-MCNC: 8.3 MG/DL (ref 8.5–10.1)
CALCIUM SERPL-MCNC: 8.4 MG/DL (ref 8.5–10.1)
CHLORIDE SERPL-SCNC: 95 MMOL/L (ref 97–108)
CHLORIDE SERPL-SCNC: 98 MMOL/L (ref 97–108)
CO2 SERPL-SCNC: 22 MMOL/L (ref 21–32)
CO2 SERPL-SCNC: 25 MMOL/L (ref 21–32)
CREAT SERPL-MCNC: 2.8 MG/DL (ref 0.7–1.3)
CREAT SERPL-MCNC: 3.92 MG/DL (ref 0.7–1.3)
DIFFERENTIAL METHOD BLD: ABNORMAL
EKG DIAGNOSIS: NORMAL
EKG Q-T INTERVAL: 324 MS
EKG QRS DURATION: 102 MS
EKG QTC CALCULATION (BAZETT): 503 MS
EKG R AXIS: 36 DEGREES
EKG T AXIS: 215 DEGREES
EKG VENTRICULAR RATE: 145 BPM
EOSINOPHIL # BLD: 0 K/UL (ref 0–0.4)
EOSINOPHIL NFR BLD: 0 % (ref 0–7)
ERYTHROCYTE [DISTWIDTH] IN BLOOD BY AUTOMATED COUNT: 17.1 % (ref 11.5–14.5)
FIO2 ON VENT: 70 %
GAS FLOW.O2 SETTING OXYMISER: 14
GLOBULIN SER CALC-MCNC: 3.8 G/DL (ref 2–4)
GLUCOSE BLD STRIP.AUTO-MCNC: 136 MG/DL (ref 65–117)
GLUCOSE BLD STRIP.AUTO-MCNC: 155 MG/DL (ref 65–117)
GLUCOSE BLD STRIP.AUTO-MCNC: 158 MG/DL (ref 65–117)
GLUCOSE BLD STRIP.AUTO-MCNC: 159 MG/DL (ref 65–117)
GLUCOSE BLD STRIP.AUTO-MCNC: 162 MG/DL (ref 65–117)
GLUCOSE BLD STRIP.AUTO-MCNC: 175 MG/DL (ref 65–117)
GLUCOSE BLD STRIP.AUTO-MCNC: 180 MG/DL (ref 65–117)
GLUCOSE BLD STRIP.AUTO-MCNC: 81 MG/DL (ref 65–117)
GLUCOSE SERPL-MCNC: 152 MG/DL (ref 65–100)
GLUCOSE SERPL-MCNC: 158 MG/DL (ref 65–100)
HCO3 BLDA-SCNC: 16 MMOL/L (ref 22–26)
HCT VFR BLD AUTO: 19.5 % (ref 36.6–50.3)
HGB BLD-MCNC: 7 G/DL (ref 12.1–17)
HISTORY CHECK: NORMAL
IMM GRANULOCYTES # BLD AUTO: 0 K/UL (ref 0–0.04)
IMM GRANULOCYTES NFR BLD AUTO: 0 % (ref 0–0.5)
LACTATE SERPL-SCNC: 5.1 MMOL/L (ref 0.4–2)
LACTATE SERPL-SCNC: 5.7 MMOL/L (ref 0.4–2)
LYMPHOCYTES # BLD: 0.45 K/UL (ref 0.8–3.5)
LYMPHOCYTES NFR BLD: 2 % (ref 12–49)
MAGNESIUM SERPL-MCNC: 1.9 MG/DL (ref 1.6–2.4)
MAGNESIUM SERPL-MCNC: 2.1 MG/DL (ref 1.6–2.4)
MCH RBC QN AUTO: 28.5 PG (ref 26–34)
MCHC RBC AUTO-ENTMCNC: 35.9 G/DL (ref 30–36.5)
MCV RBC AUTO: 79.3 FL (ref 80–99)
MONOCYTES # BLD: 0.68 K/UL (ref 0–1)
MONOCYTES NFR BLD: 3 % (ref 5–13)
NEUTS SEG # BLD: 21.57 K/UL (ref 1.8–8)
NEUTS SEG NFR BLD: 95 % (ref 32–75)
NRBC # BLD: 0.03 K/UL (ref 0–0.01)
NRBC BLD-RTO: 0.1 PER 100 WBC
PCO2 BLDA: 34 MMHG (ref 35–45)
PEEP RESPIRATORY: 8
PH BLDA: 7.31 (ref 7.35–7.45)
PHOSPHATE SERPL-MCNC: 2.7 MG/DL (ref 2.6–4.7)
PHOSPHATE SERPL-MCNC: 3.5 MG/DL (ref 2.6–4.7)
PLATELET # BLD AUTO: 169 K/UL (ref 150–400)
PMV BLD AUTO: 12.2 FL (ref 8.9–12.9)
PO2 BLDA: 163 MMHG (ref 80–100)
POTASSIUM SERPL-SCNC: 3.9 MMOL/L (ref 3.5–5.1)
POTASSIUM SERPL-SCNC: 4.1 MMOL/L (ref 3.5–5.1)
PROT SERPL-MCNC: 5.9 G/DL (ref 6.4–8.2)
RBC # BLD AUTO: 2.46 M/UL (ref 4.1–5.7)
RBC MORPH BLD: ABNORMAL
SAO2 % BLD: 99 % (ref 92–97)
SERVICE CMNT-IMP: ABNORMAL
SERVICE CMNT-IMP: NORMAL
SODIUM SERPL-SCNC: 132 MMOL/L (ref 136–145)
SODIUM SERPL-SCNC: 133 MMOL/L (ref 136–145)
SPECIMEN SITE: ABNORMAL
VANCOMYCIN SERPL-MCNC: 18.6 UG/ML
WBC # BLD AUTO: 22.7 K/UL (ref 4.1–11.1)

## 2025-05-27 PROCEDURE — 6370000000 HC RX 637 (ALT 250 FOR IP): Performed by: STUDENT IN AN ORGANIZED HEALTH CARE EDUCATION/TRAINING PROGRAM

## 2025-05-27 PROCEDURE — 2000000000 HC ICU R&B

## 2025-05-27 PROCEDURE — 2500000003 HC RX 250 WO HCPCS: Performed by: HOSPITALIST

## 2025-05-27 PROCEDURE — 2580000003 HC RX 258: Performed by: INTERNAL MEDICINE

## 2025-05-27 PROCEDURE — 83735 ASSAY OF MAGNESIUM: CPT

## 2025-05-27 PROCEDURE — 4A133B1 MONITORING OF ARTERIAL PRESSURE, PERIPHERAL, PERCUTANEOUS APPROACH: ICD-10-PCS | Performed by: INTERNAL MEDICINE

## 2025-05-27 PROCEDURE — 86923 COMPATIBILITY TEST ELECTRIC: CPT

## 2025-05-27 PROCEDURE — 90945 DIALYSIS ONE EVALUATION: CPT

## 2025-05-27 PROCEDURE — 82962 GLUCOSE BLOOD TEST: CPT

## 2025-05-27 PROCEDURE — 85025 COMPLETE CBC W/AUTO DIFF WBC: CPT

## 2025-05-27 PROCEDURE — 6360000002 HC RX W HCPCS

## 2025-05-27 PROCEDURE — 03HY32Z INSERTION OF MONITORING DEVICE INTO UPPER ARTERY, PERCUTANEOUS APPROACH: ICD-10-PCS | Performed by: INTERNAL MEDICINE

## 2025-05-27 PROCEDURE — 94640 AIRWAY INHALATION TREATMENT: CPT

## 2025-05-27 PROCEDURE — 94003 VENT MGMT INPAT SUBQ DAY: CPT

## 2025-05-27 PROCEDURE — P9016 RBC LEUKOCYTES REDUCED: HCPCS

## 2025-05-27 PROCEDURE — 86850 RBC ANTIBODY SCREEN: CPT

## 2025-05-27 PROCEDURE — P9045 ALBUMIN (HUMAN), 5%, 250 ML: HCPCS | Performed by: HOSPITALIST

## 2025-05-27 PROCEDURE — 36620 INSERTION CATHETER ARTERY: CPT

## 2025-05-27 PROCEDURE — 6370000000 HC RX 637 (ALT 250 FOR IP): Performed by: HOSPITALIST

## 2025-05-27 PROCEDURE — 80202 ASSAY OF VANCOMYCIN: CPT

## 2025-05-27 PROCEDURE — 2500000003 HC RX 250 WO HCPCS

## 2025-05-27 PROCEDURE — 30233N1 TRANSFUSION OF NONAUTOLOGOUS RED BLOOD CELLS INTO PERIPHERAL VEIN, PERCUTANEOUS APPROACH: ICD-10-PCS | Performed by: HOSPITALIST

## 2025-05-27 PROCEDURE — 36430 TRANSFUSION BLD/BLD COMPNT: CPT

## 2025-05-27 PROCEDURE — 6360000002 HC RX W HCPCS: Performed by: HOSPITALIST

## 2025-05-27 PROCEDURE — 84100 ASSAY OF PHOSPHORUS: CPT

## 2025-05-27 PROCEDURE — 6360000002 HC RX W HCPCS: Performed by: STUDENT IN AN ORGANIZED HEALTH CARE EDUCATION/TRAINING PROGRAM

## 2025-05-27 PROCEDURE — 93005 ELECTROCARDIOGRAM TRACING: CPT | Performed by: HOSPITALIST

## 2025-05-27 PROCEDURE — 36415 COLL VENOUS BLD VENIPUNCTURE: CPT

## 2025-05-27 PROCEDURE — 4A133J1 MONITORING OF ARTERIAL PULSE, PERIPHERAL, PERCUTANEOUS APPROACH: ICD-10-PCS | Performed by: INTERNAL MEDICINE

## 2025-05-27 PROCEDURE — 6370000000 HC RX 637 (ALT 250 FOR IP): Performed by: INTERNAL MEDICINE

## 2025-05-27 PROCEDURE — 2500000003 HC RX 250 WO HCPCS: Performed by: STUDENT IN AN ORGANIZED HEALTH CARE EDUCATION/TRAINING PROGRAM

## 2025-05-27 PROCEDURE — 86900 BLOOD TYPING SEROLOGIC ABO: CPT

## 2025-05-27 PROCEDURE — 86901 BLOOD TYPING SEROLOGIC RH(D): CPT

## 2025-05-27 PROCEDURE — 2580000003 HC RX 258: Performed by: NURSE PRACTITIONER

## 2025-05-27 PROCEDURE — 2580000003 HC RX 258: Performed by: HOSPITALIST

## 2025-05-27 PROCEDURE — 80053 COMPREHEN METABOLIC PANEL: CPT

## 2025-05-27 PROCEDURE — 6360000002 HC RX W HCPCS: Performed by: INTERNAL MEDICINE

## 2025-05-27 PROCEDURE — 83605 ASSAY OF LACTIC ACID: CPT

## 2025-05-27 RX ORDER — AMIODARONE HYDROCHLORIDE 450 MG/9ML
INJECTION, SOLUTION INTRAVENOUS
Status: COMPLETED
Start: 2025-05-27 | End: 2025-05-27

## 2025-05-27 RX ORDER — FENTANYL CITRATE-0.9 % NACL/PF 20 MCG/2ML
50 SYRINGE (ML) INTRAVENOUS EVERY 30 MIN PRN
Refills: 0 | Status: DISCONTINUED | OUTPATIENT
Start: 2025-05-27 | End: 2025-06-06

## 2025-05-27 RX ORDER — SODIUM CHLORIDE, SODIUM LACTATE, POTASSIUM CHLORIDE, AND CALCIUM CHLORIDE .6; .31; .03; .02 G/100ML; G/100ML; G/100ML; G/100ML
1000 INJECTION, SOLUTION INTRAVENOUS ONCE
Status: COMPLETED | OUTPATIENT
Start: 2025-05-27 | End: 2025-05-28

## 2025-05-27 RX ORDER — ALBUMIN HUMAN 50 G/1000ML
25 SOLUTION INTRAVENOUS ONCE
Status: COMPLETED | OUTPATIENT
Start: 2025-05-27 | End: 2025-05-27

## 2025-05-27 RX ORDER — SODIUM CHLORIDE 9 MG/ML
INJECTION, SOLUTION INTRAVENOUS PRN
Status: DISCONTINUED | OUTPATIENT
Start: 2025-05-27 | End: 2025-05-28

## 2025-05-27 RX ADMIN — ARFORMOTEROL TARTRATE 15 MCG: 15 SOLUTION RESPIRATORY (INHALATION) at 09:16

## 2025-05-27 RX ADMIN — CALCIUM CHLORIDE, MAGNESIUM CHLORIDE, DEXTROSE MONOHYDRATE, LACTIC ACID, SODIUM CHLORIDE, SODIUM BICARBONATE AND POTASSIUM CHLORIDE: 5.15; 2.03; 22; 5.4; 6.46; 3.09; .157 INJECTION INTRAVENOUS at 09:23

## 2025-05-27 RX ADMIN — AMIODARONE HYDROCHLORIDE 450 MG: 50 INJECTION, SOLUTION INTRAVENOUS at 08:08

## 2025-05-27 RX ADMIN — HYDROCORTISONE SODIUM SUCCINATE 50 MG: 100 INJECTION, POWDER, FOR SOLUTION INTRAMUSCULAR; INTRAVENOUS at 00:13

## 2025-05-27 RX ADMIN — PIPERACILLIN AND TAZOBACTAM 3375 MG: 3; .375 INJECTION, POWDER, LYOPHILIZED, FOR SOLUTION INTRAVENOUS at 19:47

## 2025-05-27 RX ADMIN — HEPARIN SODIUM 5000 UNITS: 5000 INJECTION INTRAVENOUS; SUBCUTANEOUS at 05:06

## 2025-05-27 RX ADMIN — ALBUMIN (HUMAN) 25 G: 12.5 INJECTION, SOLUTION INTRAVENOUS at 18:40

## 2025-05-27 RX ADMIN — VANCOMYCIN HYDROCHLORIDE 125 MG: 1 INJECTION, POWDER, LYOPHILIZED, FOR SOLUTION INTRAVENOUS at 21:00

## 2025-05-27 RX ADMIN — BUDESONIDE 250 MCG: 0.25 INHALANT RESPIRATORY (INHALATION) at 21:20

## 2025-05-27 RX ADMIN — Medication: at 21:13

## 2025-05-27 RX ADMIN — VANCOMYCIN HYDROCHLORIDE 125 MG: 1 INJECTION, POWDER, LYOPHILIZED, FOR SOLUTION INTRAVENOUS at 10:03

## 2025-05-27 RX ADMIN — HEPARIN SODIUM 5000 UNITS: 5000 INJECTION INTRAVENOUS; SUBCUTANEOUS at 13:47

## 2025-05-27 RX ADMIN — DEXTROSE: 10 SOLUTION INTRAVENOUS at 14:15

## 2025-05-27 RX ADMIN — PIPERACILLIN AND TAZOBACTAM 3375 MG: 3; .375 INJECTION, POWDER, LYOPHILIZED, FOR SOLUTION INTRAVENOUS at 10:13

## 2025-05-27 RX ADMIN — Medication: at 10:26

## 2025-05-27 RX ADMIN — PRAVASTATIN SODIUM 10 MG: 10 TABLET ORAL at 10:03

## 2025-05-27 RX ADMIN — SEVELAMER CARBONATE 1600 MG: 800 TABLET, FILM COATED ORAL at 10:03

## 2025-05-27 RX ADMIN — CHLORHEXIDINE GLUCONATE 15 ML: 1.2 RINSE ORAL at 10:01

## 2025-05-27 RX ADMIN — HYDROCORTISONE SODIUM SUCCINATE 50 MG: 100 INJECTION, POWDER, FOR SOLUTION INTRAMUSCULAR; INTRAVENOUS at 05:06

## 2025-05-27 RX ADMIN — AMIODARONE HYDROCHLORIDE 1 MG/MIN: 50 INJECTION, SOLUTION INTRAVENOUS at 23:14

## 2025-05-27 RX ADMIN — ARFORMOTEROL TARTRATE 15 MCG: 15 SOLUTION RESPIRATORY (INHALATION) at 21:20

## 2025-05-27 RX ADMIN — IPRATROPIUM BROMIDE 0.5 MG: 0.5 SOLUTION RESPIRATORY (INHALATION) at 09:11

## 2025-05-27 RX ADMIN — SEVELAMER CARBONATE 1600 MG: 800 TABLET, FILM COATED ORAL at 13:47

## 2025-05-27 RX ADMIN — SODIUM CHLORIDE, PRESERVATIVE FREE 10 ML: 5 INJECTION INTRAVENOUS at 20:39

## 2025-05-27 RX ADMIN — PIPERACILLIN AND TAZOBACTAM 3375 MG: 3; .375 INJECTION, POWDER, LYOPHILIZED, FOR SOLUTION INTRAVENOUS at 02:26

## 2025-05-27 RX ADMIN — CALCIUM CHLORIDE, MAGNESIUM CHLORIDE, DEXTROSE MONOHYDRATE, LACTIC ACID, SODIUM CHLORIDE, SODIUM BICARBONATE AND POTASSIUM CHLORIDE: 5.15; 2.03; 22; 5.4; 6.46; 3.09; .157 INJECTION INTRAVENOUS at 09:24

## 2025-05-27 RX ADMIN — Medication 25 MCG/HR: at 17:23

## 2025-05-27 RX ADMIN — BUDESONIDE 250 MCG: 0.25 INHALANT RESPIRATORY (INHALATION) at 09:16

## 2025-05-27 RX ADMIN — EPOETIN ALFA-EPBX 10000 UNITS: 10000 INJECTION, SOLUTION INTRAVENOUS; SUBCUTANEOUS at 20:56

## 2025-05-27 RX ADMIN — IPRATROPIUM BROMIDE 0.5 MG: 0.5 SOLUTION RESPIRATORY (INHALATION) at 21:15

## 2025-05-27 RX ADMIN — Medication: at 10:27

## 2025-05-27 RX ADMIN — AMIODARONE HYDROCHLORIDE 1 MG/MIN: 50 INJECTION, SOLUTION INTRAVENOUS at 08:12

## 2025-05-27 RX ADMIN — DORZOLAMIDE HYDROCHLORIDE AND TIMOLOL MALEATE 1 DROP: 20; 5 SOLUTION/ DROPS OPHTHALMIC at 20:43

## 2025-05-27 RX ADMIN — CALCIUM CHLORIDE, MAGNESIUM CHLORIDE, DEXTROSE MONOHYDRATE, LACTIC ACID, SODIUM CHLORIDE, SODIUM BICARBONATE AND POTASSIUM CHLORIDE: 5.15; 2.03; 22; 5.4; 6.46; 3.09; .157 INJECTION INTRAVENOUS at 03:41

## 2025-05-27 RX ADMIN — IPRATROPIUM BROMIDE 0.5 MG: 0.5 SOLUTION RESPIRATORY (INHALATION) at 02:47

## 2025-05-27 RX ADMIN — Medication: at 15:41

## 2025-05-27 RX ADMIN — CHLORHEXIDINE GLUCONATE 15 ML: 1.2 RINSE ORAL at 20:27

## 2025-05-27 RX ADMIN — PANTOPRAZOLE SODIUM 40 MG: 40 INJECTION, POWDER, LYOPHILIZED, FOR SOLUTION INTRAVENOUS at 10:04

## 2025-05-27 RX ADMIN — CALCIUM CHLORIDE, MAGNESIUM CHLORIDE, DEXTROSE MONOHYDRATE, LACTIC ACID, SODIUM CHLORIDE, SODIUM BICARBONATE AND POTASSIUM CHLORIDE: 5.15; 2.03; 22; 5.4; 6.46; 3.09; .157 INJECTION INTRAVENOUS at 09:26

## 2025-05-27 RX ADMIN — DORZOLAMIDE HYDROCHLORIDE AND TIMOLOL MALEATE 1 DROP: 20; 5 SOLUTION/ DROPS OPHTHALMIC at 13:57

## 2025-05-27 RX ADMIN — MONTELUKAST 10 MG: 10 TABLET, FILM COATED ORAL at 10:03

## 2025-05-27 RX ADMIN — VANCOMYCIN HYDROCHLORIDE 750 MG: 750 INJECTION, POWDER, LYOPHILIZED, FOR SOLUTION INTRAVENOUS at 06:30

## 2025-05-27 RX ADMIN — AMIODARONE HYDROCHLORIDE 1 MG/MIN: 50 INJECTION, SOLUTION INTRAVENOUS at 15:39

## 2025-05-27 RX ADMIN — SODIUM CHLORIDE, PRESERVATIVE FREE 10 ML: 5 INJECTION INTRAVENOUS at 10:04

## 2025-05-27 RX ADMIN — HYDROCORTISONE SODIUM SUCCINATE 50 MG: 100 INJECTION, POWDER, FOR SOLUTION INTRAMUSCULAR; INTRAVENOUS at 15:34

## 2025-05-27 RX ADMIN — SEVELAMER CARBONATE 1600 MG: 800 TABLET, FILM COATED ORAL at 17:32

## 2025-05-27 RX ADMIN — IPRATROPIUM BROMIDE 0.5 MG: 0.5 SOLUTION RESPIRATORY (INHALATION) at 16:03

## 2025-05-27 RX ADMIN — Medication: at 21:15

## 2025-05-27 RX ADMIN — VANCOMYCIN HYDROCHLORIDE 125 MG: 1 INJECTION, POWDER, LYOPHILIZED, FOR SOLUTION INTRAVENOUS at 16:50

## 2025-05-27 RX ADMIN — Medication 1 AMPULE: at 20:43

## 2025-05-27 RX ADMIN — Medication: at 21:14

## 2025-05-27 RX ADMIN — GABAPENTIN 100 MG: 100 CAPSULE ORAL at 20:39

## 2025-05-27 RX ADMIN — HYDROCORTISONE SODIUM SUCCINATE 50 MG: 100 INJECTION, POWDER, FOR SOLUTION INTRAMUSCULAR; INTRAVENOUS at 20:46

## 2025-05-27 RX ADMIN — VANCOMYCIN HYDROCHLORIDE 750 MG: 750 INJECTION, POWDER, LYOPHILIZED, FOR SOLUTION INTRAVENOUS at 17:31

## 2025-05-27 RX ADMIN — Medication: at 10:25

## 2025-05-27 RX ADMIN — MIRTAZAPINE 15 MG: 15 TABLET, FILM COATED ORAL at 20:39

## 2025-05-27 RX ADMIN — DORZOLAMIDE HYDROCHLORIDE AND TIMOLOL MALEATE 1 DROP: 20; 5 SOLUTION/ DROPS OPHTHALMIC at 10:06

## 2025-05-27 RX ADMIN — SODIUM CHLORIDE, SODIUM LACTATE, POTASSIUM CHLORIDE, AND CALCIUM CHLORIDE 1000 ML: .6; .31; .03; .02 INJECTION, SOLUTION INTRAVENOUS at 22:31

## 2025-05-27 RX ADMIN — AMIODARONE HYDROCHLORIDE 150 MG: 1.5 INJECTION, SOLUTION INTRAVENOUS at 07:57

## 2025-05-27 ASSESSMENT — PULMONARY FUNCTION TESTS
PIF_VALUE: 17
PIF_VALUE: 18
PIF_VALUE: 19
PIF_VALUE: 19
PIF_VALUE: 17
PIF_VALUE: 19

## 2025-05-27 ASSESSMENT — PAIN SCALES - GENERAL
PAINLEVEL_OUTOF10: 0
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
## 2025-05-27 NOTE — PROCEDURES
PROCEDURE NOTE  Date: 5/27/2025   Name: Darrel Patterson  YOB: 1945    Insert Arterial Line    Date/Time: 5/27/2025 5:25 PM    Performed by: Sharon Bourgeois MD  Authorized by: Sharon Bourgeois MD  Consent: Written consent obtained.  Risks and benefits: risks, benefits and alternatives were discussed  Indications: multiple ABGs, respiratory failure and hemodynamic monitoring  Location: right radial  Seldinger technique: Seldinger technique used  Number of attempts: 2  Post-procedure: line sutured and dressing applied  Patient tolerance: patient tolerated the procedure well with no immediate complications

## 2025-05-27 NOTE — INTERDISCIPLINARY ROUNDS
Interdisciplinary team rounds were held 5/27/2025 with the following team members: Physician, Nursing, Dietitian, Pharmacist, , and the CCU Charge RN.    Plan of care discussed. See clinical pathway and/or care plan for interventions and desired outcomes.    Goals of the Day: Continue with trialysis for CVVH and vasopressor use.

## 2025-05-28 ENCOUNTER — APPOINTMENT (OUTPATIENT)
Facility: HOSPITAL | Age: 80
DRG: 870 | End: 2025-05-28
Attending: INTERNAL MEDICINE
Payer: MEDICARE

## 2025-05-28 LAB
ABO + RH BLD: NORMAL
ALBUMIN SERPL-MCNC: 2.4 G/DL (ref 3.5–5)
ALBUMIN SERPL-MCNC: 2.5 G/DL (ref 3.5–5)
ALBUMIN/GLOB SERPL: 0.6 (ref 1.1–2.2)
ALBUMIN/GLOB SERPL: 0.7 (ref 1.1–2.2)
ALP SERPL-CCNC: 111 U/L (ref 45–117)
ALP SERPL-CCNC: 114 U/L (ref 45–117)
ALT SERPL-CCNC: 126 U/L (ref 12–78)
ALT SERPL-CCNC: 144 U/L (ref 12–78)
ANION GAP SERPL CALC-SCNC: 11 MMOL/L (ref 2–12)
ANION GAP SERPL CALC-SCNC: 12 MMOL/L (ref 2–12)
ARTERIAL PATENCY WRIST A: ABNORMAL
AST SERPL-CCNC: 794 U/L (ref 15–37)
AST SERPL-CCNC: 839 U/L (ref 15–37)
BASE DEFICIT BLDA-SCNC: 6.1 MMOL/L
BASE DEFICIT BLDV-SCNC: 5.8 MMOL/L
BASOPHILS # BLD: 0.04 K/UL (ref 0–0.1)
BASOPHILS NFR BLD: 0.2 % (ref 0–1)
BDY SITE: ABNORMAL
BDY SITE: ABNORMAL
BILIRUB DIRECT SERPL-MCNC: 2.2 MG/DL (ref 0–0.2)
BILIRUB SERPL-MCNC: 3.2 MG/DL (ref 0.2–1)
BILIRUB SERPL-MCNC: 4 MG/DL (ref 0.2–1)
BLD PROD TYP BPU: NORMAL
BLOOD BANK BLOOD PRODUCT EXPIRATION DATE: NORMAL
BLOOD BANK DISPENSE STATUS: NORMAL
BLOOD BANK ISBT PRODUCT BLOOD TYPE: 600
BLOOD BANK PRODUCT CODE: NORMAL
BLOOD BANK UNIT TYPE AND RH: NORMAL
BLOOD GROUP ANTIBODIES SERPL: NORMAL
BPU ID: NORMAL
BUN SERPL-MCNC: 17 MG/DL (ref 6–20)
BUN SERPL-MCNC: 18 MG/DL (ref 6–20)
BUN/CREAT SERPL: 8 (ref 12–20)
BUN/CREAT SERPL: 9 (ref 12–20)
CALCIUM SERPL-MCNC: 8.2 MG/DL (ref 8.5–10.1)
CALCIUM SERPL-MCNC: 8.3 MG/DL (ref 8.5–10.1)
CHLORIDE SERPL-SCNC: 100 MMOL/L (ref 97–108)
CHLORIDE SERPL-SCNC: 98 MMOL/L (ref 97–108)
CO2 SERPL-SCNC: 20 MMOL/L (ref 21–32)
CO2 SERPL-SCNC: 22 MMOL/L (ref 21–32)
CREAT SERPL-MCNC: 1.81 MG/DL (ref 0.7–1.3)
CREAT SERPL-MCNC: 2.21 MG/DL (ref 0.7–1.3)
CROSSMATCH RESULT: NORMAL
DIFFERENTIAL METHOD BLD: ABNORMAL
EKG ATRIAL RATE: 88 BPM
EKG DIAGNOSIS: NORMAL
EKG P AXIS: 58 DEGREES
EKG P-R INTERVAL: 154 MS
EKG Q-T INTERVAL: 442 MS
EKG QRS DURATION: 102 MS
EKG QTC CALCULATION (BAZETT): 537 MS
EKG R AXIS: -21 DEGREES
EKG T AXIS: 266 DEGREES
EKG VENTRICULAR RATE: 89 BPM
EOSINOPHIL # BLD: 0 K/UL (ref 0–0.4)
EOSINOPHIL NFR BLD: 0 % (ref 0–7)
ERYTHROCYTE [DISTWIDTH] IN BLOOD BY AUTOMATED COUNT: 17.8 % (ref 11.5–14.5)
ERYTHROCYTE [DISTWIDTH] IN BLOOD BY AUTOMATED COUNT: 18.1 % (ref 11.5–14.5)
FIO2 ON VENT: 100 %
FIO2 ON VENT: 100 %
GAS FLOW.O2 SETTING OXYMISER: 14
GAS FLOW.O2 SETTING OXYMISER: 14
GLOBULIN SER CALC-MCNC: 3.6 G/DL (ref 2–4)
GLOBULIN SER CALC-MCNC: 3.9 G/DL (ref 2–4)
GLUCOSE BLD STRIP.AUTO-MCNC: 135 MG/DL (ref 65–117)
GLUCOSE BLD STRIP.AUTO-MCNC: 173 MG/DL (ref 65–117)
GLUCOSE BLD-MCNC: 167 MG/DL (ref 74–99)
GLUCOSE BLD-MCNC: 172 MG/DL (ref 74–99)
GLUCOSE BLD-MCNC: 187 MG/DL (ref 74–99)
GLUCOSE BLD-MCNC: 193 MG/DL (ref 74–99)
GLUCOSE SERPL-MCNC: 145 MG/DL (ref 65–100)
GLUCOSE SERPL-MCNC: 164 MG/DL (ref 65–100)
HCO3 BLDA-SCNC: 18 MMOL/L (ref 22–26)
HCO3 BLDV-SCNC: 20 MMOL/L (ref 23–28)
HCT VFR BLD AUTO: 25.3 % (ref 36.6–50.3)
HCT VFR BLD AUTO: 25.7 % (ref 36.6–50.3)
HGB BLD-MCNC: 8.3 G/DL (ref 12.1–17)
HGB BLD-MCNC: 8.6 G/DL (ref 12.1–17)
IMM GRANULOCYTES # BLD AUTO: 0.35 K/UL (ref 0–0.04)
IMM GRANULOCYTES NFR BLD AUTO: 1.8 % (ref 0–0.5)
LYMPHOCYTES # BLD: 0.49 K/UL (ref 0.8–3.5)
LYMPHOCYTES NFR BLD: 2.5 % (ref 12–49)
MAGNESIUM SERPL-MCNC: 2.2 MG/DL (ref 1.6–2.4)
MAGNESIUM SERPL-MCNC: 2.3 MG/DL (ref 1.6–2.4)
MCH RBC QN AUTO: 27.9 PG (ref 26–34)
MCH RBC QN AUTO: 28.5 PG (ref 26–34)
MCHC RBC AUTO-ENTMCNC: 32.8 G/DL (ref 30–36.5)
MCHC RBC AUTO-ENTMCNC: 33.5 G/DL (ref 30–36.5)
MCV RBC AUTO: 84.9 FL (ref 80–99)
MCV RBC AUTO: 85.1 FL (ref 80–99)
MONOCYTES # BLD: 1.18 K/UL (ref 0–1)
MONOCYTES NFR BLD: 6 % (ref 5–13)
NEUTS SEG # BLD: 17.54 K/UL (ref 1.8–8)
NEUTS SEG NFR BLD: 89.5 % (ref 32–75)
NRBC # BLD: 0.07 K/UL (ref 0–0.01)
NRBC # BLD: 0.1 K/UL (ref 0–0.01)
NRBC BLD-RTO: 0.4 PER 100 WBC
NRBC BLD-RTO: 0.5 PER 100 WBC
PCO2 BLDA: 31 MMHG (ref 35–45)
PCO2 BLDV: 37.8 MMHG (ref 41–51)
PEEP RESPIRATORY: 8
PEEP RESPIRATORY: 8
PH BLDA: 7.38 (ref 7.35–7.45)
PH BLDV: 7.33 (ref 7.32–7.42)
PHOSPHATE SERPL-MCNC: 2.1 MG/DL (ref 2.6–4.7)
PHOSPHATE SERPL-MCNC: 2.4 MG/DL (ref 2.6–4.7)
PLATELET # BLD AUTO: 147 K/UL (ref 150–400)
PLATELET # BLD AUTO: 149 K/UL (ref 150–400)
PMV BLD AUTO: 12.6 FL (ref 8.9–12.9)
PMV BLD AUTO: 12.8 FL (ref 8.9–12.9)
PO2 BLDA: 453 MMHG (ref 80–100)
PO2 BLDV: 49 MMHG (ref 25–40)
POTASSIUM SERPL-SCNC: 4.2 MMOL/L (ref 3.5–5.1)
POTASSIUM SERPL-SCNC: 4.3 MMOL/L (ref 3.5–5.1)
PROT SERPL-MCNC: 6 G/DL (ref 6.4–8.2)
PROT SERPL-MCNC: 6.4 G/DL (ref 6.4–8.2)
RBC # BLD AUTO: 2.98 M/UL (ref 4.1–5.7)
RBC # BLD AUTO: 3.02 M/UL (ref 4.1–5.7)
RBC MORPH BLD: ABNORMAL
SAO2 % BLD: 100 % (ref 92–97)
SAO2% DEVICE SAO2% SENSOR NAME: ABNORMAL
SAO2% DEVICE SAO2% SENSOR NAME: ABNORMAL
SERVICE CMNT-IMP: ABNORMAL
SODIUM SERPL-SCNC: 131 MMOL/L (ref 136–145)
SODIUM SERPL-SCNC: 132 MMOL/L (ref 136–145)
SPECIMEN EXP DATE BLD: NORMAL
SPECIMEN SITE: ABNORMAL
SPECIMEN SITE: ABNORMAL
UNIT DIVISION: 0
UNIT ISSUE DATE/TIME: NORMAL
VANCOMYCIN SERPL-MCNC: 19.6 UG/ML
VENTILATION MODE VENT: ABNORMAL
VENTILATION MODE VENT: ABNORMAL
VT SETTING VENT: 470
VT SETTING VENT: 470
WBC # BLD AUTO: 19.4 K/UL (ref 4.1–11.1)
WBC # BLD AUTO: 19.6 K/UL (ref 4.1–11.1)

## 2025-05-28 PROCEDURE — 80202 ASSAY OF VANCOMYCIN: CPT

## 2025-05-28 PROCEDURE — 6370000000 HC RX 637 (ALT 250 FOR IP): Performed by: HOSPITALIST

## 2025-05-28 PROCEDURE — 6360000002 HC RX W HCPCS

## 2025-05-28 PROCEDURE — 02HV33Z INSERTION OF INFUSION DEVICE INTO SUPERIOR VENA CAVA, PERCUTANEOUS APPROACH: ICD-10-PCS | Performed by: NURSE PRACTITIONER

## 2025-05-28 PROCEDURE — 2580000003 HC RX 258: Performed by: NURSE PRACTITIONER

## 2025-05-28 PROCEDURE — 6360000002 HC RX W HCPCS: Performed by: HOSPITALIST

## 2025-05-28 PROCEDURE — 6360000002 HC RX W HCPCS: Performed by: STUDENT IN AN ORGANIZED HEALTH CARE EDUCATION/TRAINING PROGRAM

## 2025-05-28 PROCEDURE — 6370000000 HC RX 637 (ALT 250 FOR IP): Performed by: INTERNAL MEDICINE

## 2025-05-28 PROCEDURE — 2500000003 HC RX 250 WO HCPCS: Performed by: STUDENT IN AN ORGANIZED HEALTH CARE EDUCATION/TRAINING PROGRAM

## 2025-05-28 PROCEDURE — 6360000002 HC RX W HCPCS: Performed by: NURSE PRACTITIONER

## 2025-05-28 PROCEDURE — 80053 COMPREHEN METABOLIC PANEL: CPT

## 2025-05-28 PROCEDURE — 82962 GLUCOSE BLOOD TEST: CPT

## 2025-05-28 PROCEDURE — 94003 VENT MGMT INPAT SUBQ DAY: CPT

## 2025-05-28 PROCEDURE — 82803 BLOOD GASES ANY COMBINATION: CPT

## 2025-05-28 PROCEDURE — 90945 DIALYSIS ONE EVALUATION: CPT

## 2025-05-28 PROCEDURE — 84100 ASSAY OF PHOSPHORUS: CPT

## 2025-05-28 PROCEDURE — 2500000003 HC RX 250 WO HCPCS: Performed by: HOSPITALIST

## 2025-05-28 PROCEDURE — 2000000000 HC ICU R&B

## 2025-05-28 PROCEDURE — 83735 ASSAY OF MAGNESIUM: CPT

## 2025-05-28 PROCEDURE — 6370000000 HC RX 637 (ALT 250 FOR IP): Performed by: STUDENT IN AN ORGANIZED HEALTH CARE EDUCATION/TRAINING PROGRAM

## 2025-05-28 PROCEDURE — 85025 COMPLETE CBC W/AUTO DIFF WBC: CPT

## 2025-05-28 PROCEDURE — 2580000003 HC RX 258: Performed by: HOSPITALIST

## 2025-05-28 PROCEDURE — 94640 AIRWAY INHALATION TREATMENT: CPT

## 2025-05-28 PROCEDURE — 71045 X-RAY EXAM CHEST 1 VIEW: CPT

## 2025-05-28 PROCEDURE — 85027 COMPLETE CBC AUTOMATED: CPT

## 2025-05-28 PROCEDURE — 36415 COLL VENOUS BLD VENIPUNCTURE: CPT

## 2025-05-28 PROCEDURE — 2580000003 HC RX 258: Performed by: INTERNAL MEDICINE

## 2025-05-28 PROCEDURE — 37799 UNLISTED PX VASCULAR SURGERY: CPT

## 2025-05-28 PROCEDURE — 80076 HEPATIC FUNCTION PANEL: CPT

## 2025-05-28 RX ORDER — VASOPRESSIN 20 [USP'U]/ML
INJECTION, SOLUTION INTRAVENOUS
Status: COMPLETED
Start: 2025-05-28 | End: 2025-05-28

## 2025-05-28 RX ORDER — SENNOSIDES 8.8 MG/5ML
5 LIQUID ORAL 2 TIMES DAILY
Status: DISCONTINUED | OUTPATIENT
Start: 2025-05-28 | End: 2025-06-10 | Stop reason: HOSPADM

## 2025-05-28 RX ORDER — POLYETHYLENE GLYCOL 3350 17 G/17G
17 POWDER, FOR SOLUTION ORAL 2 TIMES DAILY
Status: DISCONTINUED | OUTPATIENT
Start: 2025-05-28 | End: 2025-06-10 | Stop reason: HOSPADM

## 2025-05-28 RX ORDER — VASOPRESSIN 20 [USP'U]/ML
INJECTION, SOLUTION INTRAVENOUS
Status: DISPENSED
Start: 2025-05-28 | End: 2025-05-28

## 2025-05-28 RX ADMIN — PIPERACILLIN AND TAZOBACTAM 3375 MG: 3; .375 INJECTION, POWDER, LYOPHILIZED, FOR SOLUTION INTRAVENOUS at 02:36

## 2025-05-28 RX ADMIN — VANCOMYCIN HYDROCHLORIDE 750 MG: 750 INJECTION, POWDER, LYOPHILIZED, FOR SOLUTION INTRAVENOUS at 17:32

## 2025-05-28 RX ADMIN — CHLORHEXIDINE GLUCONATE 15 ML: 1.2 RINSE ORAL at 20:37

## 2025-05-28 RX ADMIN — VANCOMYCIN HYDROCHLORIDE 125 MG: 1 INJECTION, POWDER, LYOPHILIZED, FOR SOLUTION INTRAVENOUS at 21:01

## 2025-05-28 RX ADMIN — METHYLPREDNISOLONE SODIUM SUCCINATE 20 MG: 40 INJECTION INTRAMUSCULAR; INTRAVENOUS at 12:31

## 2025-05-28 RX ADMIN — CHLORHEXIDINE GLUCONATE 15 ML: 1.2 RINSE ORAL at 08:43

## 2025-05-28 RX ADMIN — DORZOLAMIDE HYDROCHLORIDE AND TIMOLOL MALEATE 1 DROP: 20; 5 SOLUTION/ DROPS OPHTHALMIC at 20:54

## 2025-05-28 RX ADMIN — BUDESONIDE 250 MCG: 0.25 INHALANT RESPIRATORY (INHALATION) at 21:02

## 2025-05-28 RX ADMIN — Medication 1 AMPULE: at 20:31

## 2025-05-28 RX ADMIN — VANCOMYCIN HYDROCHLORIDE 125 MG: 1 INJECTION, POWDER, LYOPHILIZED, FOR SOLUTION INTRAVENOUS at 03:55

## 2025-05-28 RX ADMIN — SEVELAMER CARBONATE 1600 MG: 800 TABLET, FILM COATED ORAL at 12:30

## 2025-05-28 RX ADMIN — AMIODARONE HYDROCHLORIDE 1 MG/MIN: 50 INJECTION, SOLUTION INTRAVENOUS at 06:52

## 2025-05-28 RX ADMIN — VASOPRESSIN 20 UNITS: 20 INJECTION INTRAVENOUS at 02:10

## 2025-05-28 RX ADMIN — SENNOSIDES 8.8 MG: 8.8 LIQUID ORAL at 20:33

## 2025-05-28 RX ADMIN — VANCOMYCIN HYDROCHLORIDE 125 MG: 1 INJECTION, POWDER, LYOPHILIZED, FOR SOLUTION INTRAVENOUS at 15:47

## 2025-05-28 RX ADMIN — Medication: at 14:14

## 2025-05-28 RX ADMIN — Medication: at 08:39

## 2025-05-28 RX ADMIN — SENNOSIDES 8.8 MG: 8.8 LIQUID ORAL at 12:44

## 2025-05-28 RX ADMIN — HEPARIN SODIUM 5000 UNITS: 5000 INJECTION INTRAVENOUS; SUBCUTANEOUS at 05:54

## 2025-05-28 RX ADMIN — MIRTAZAPINE 15 MG: 15 TABLET, FILM COATED ORAL at 20:33

## 2025-05-28 RX ADMIN — Medication: at 08:41

## 2025-05-28 RX ADMIN — IPRATROPIUM BROMIDE 0.5 MG: 0.5 SOLUTION RESPIRATORY (INHALATION) at 20:57

## 2025-05-28 RX ADMIN — SEVELAMER CARBONATE 1600 MG: 800 TABLET, FILM COATED ORAL at 09:51

## 2025-05-28 RX ADMIN — BUDESONIDE 250 MCG: 0.25 INHALANT RESPIRATORY (INHALATION) at 10:28

## 2025-05-28 RX ADMIN — HEPARIN SODIUM 5000 UNITS: 5000 INJECTION INTRAVENOUS; SUBCUTANEOUS at 14:14

## 2025-05-28 RX ADMIN — HEPARIN SODIUM 5000 UNITS: 5000 INJECTION INTRAVENOUS; SUBCUTANEOUS at 22:09

## 2025-05-28 RX ADMIN — SODIUM CHLORIDE, PRESERVATIVE FREE 10 ML: 5 INJECTION INTRAVENOUS at 08:43

## 2025-05-28 RX ADMIN — Medication: at 19:54

## 2025-05-28 RX ADMIN — Medication 12 MCG/MIN: at 06:00

## 2025-05-28 RX ADMIN — Medication 50 MCG/HR: at 16:58

## 2025-05-28 RX ADMIN — SEVELAMER CARBONATE 1600 MG: 800 TABLET, FILM COATED ORAL at 17:31

## 2025-05-28 RX ADMIN — VASOPRESSIN 0.03 UNITS/MIN: 20 INJECTION INTRAVENOUS at 00:59

## 2025-05-28 RX ADMIN — Medication: at 08:40

## 2025-05-28 RX ADMIN — Medication: at 20:01

## 2025-05-28 RX ADMIN — PANTOPRAZOLE SODIUM 40 MG: 40 INJECTION, POWDER, LYOPHILIZED, FOR SOLUTION INTRAVENOUS at 09:52

## 2025-05-28 RX ADMIN — ARFORMOTEROL TARTRATE 15 MCG: 15 SOLUTION RESPIRATORY (INHALATION) at 10:28

## 2025-05-28 RX ADMIN — VANCOMYCIN HYDROCHLORIDE 750 MG: 750 INJECTION, POWDER, LYOPHILIZED, FOR SOLUTION INTRAVENOUS at 05:56

## 2025-05-28 RX ADMIN — VASOPRESSIN 20 UNITS: 20 INJECTION INTRAVENOUS at 22:05

## 2025-05-28 RX ADMIN — DOCUSATE SODIUM LIQUID 100 MG: 100 LIQUID ORAL at 15:14

## 2025-05-28 RX ADMIN — POLYETHYLENE GLYCOL 3350 17 G: 17 POWDER, FOR SOLUTION ORAL at 12:30

## 2025-05-28 RX ADMIN — PIPERACILLIN AND TAZOBACTAM 3375 MG: 3; .375 INJECTION, POWDER, LYOPHILIZED, FOR SOLUTION INTRAVENOUS at 10:10

## 2025-05-28 RX ADMIN — VASOPRESSIN 0.03 UNITS/MIN: 20 INJECTION INTRAVENOUS at 10:37

## 2025-05-28 RX ADMIN — ARFORMOTEROL TARTRATE 15 MCG: 15 SOLUTION RESPIRATORY (INHALATION) at 21:02

## 2025-05-28 RX ADMIN — HYDROCORTISONE SODIUM SUCCINATE 50 MG: 100 INJECTION, POWDER, FOR SOLUTION INTRAMUSCULAR; INTRAVENOUS at 01:23

## 2025-05-28 RX ADMIN — IPRATROPIUM BROMIDE 0.5 MG: 0.5 SOLUTION RESPIRATORY (INHALATION) at 02:52

## 2025-05-28 RX ADMIN — Medication 1 AMPULE: at 09:51

## 2025-05-28 RX ADMIN — IPRATROPIUM BROMIDE 0.5 MG: 0.5 SOLUTION RESPIRATORY (INHALATION) at 14:01

## 2025-05-28 RX ADMIN — GABAPENTIN 100 MG: 100 CAPSULE ORAL at 20:34

## 2025-05-28 RX ADMIN — DORZOLAMIDE HYDROCHLORIDE AND TIMOLOL MALEATE 1 DROP: 20; 5 SOLUTION/ DROPS OPHTHALMIC at 15:19

## 2025-05-28 RX ADMIN — DOCUSATE SODIUM LIQUID 100 MG: 100 LIQUID ORAL at 20:38

## 2025-05-28 RX ADMIN — Medication: at 02:56

## 2025-05-28 RX ADMIN — IPRATROPIUM BROMIDE 0.5 MG: 0.5 SOLUTION RESPIRATORY (INHALATION) at 10:23

## 2025-05-28 RX ADMIN — DORZOLAMIDE HYDROCHLORIDE AND TIMOLOL MALEATE 1 DROP: 20; 5 SOLUTION/ DROPS OPHTHALMIC at 08:43

## 2025-05-28 RX ADMIN — VANCOMYCIN HYDROCHLORIDE 125 MG: 1 INJECTION, POWDER, LYOPHILIZED, FOR SOLUTION INTRAVENOUS at 09:52

## 2025-05-28 RX ADMIN — POLYETHYLENE GLYCOL 3350 17 G: 17 POWDER, FOR SOLUTION ORAL at 20:34

## 2025-05-28 RX ADMIN — PRAVASTATIN SODIUM 10 MG: 10 TABLET ORAL at 09:53

## 2025-05-28 RX ADMIN — Medication: at 20:02

## 2025-05-28 RX ADMIN — MONTELUKAST 10 MG: 10 TABLET, FILM COATED ORAL at 09:53

## 2025-05-28 RX ADMIN — SODIUM CHLORIDE, PRESERVATIVE FREE 10 ML: 5 INJECTION INTRAVENOUS at 20:32

## 2025-05-28 RX ADMIN — PIPERACILLIN AND TAZOBACTAM 3375 MG: 3; .375 INJECTION, POWDER, LYOPHILIZED, FOR SOLUTION INTRAVENOUS at 19:08

## 2025-05-28 RX ADMIN — HYDROCORTISONE SODIUM SUCCINATE 50 MG: 100 INJECTION, POWDER, FOR SOLUTION INTRAMUSCULAR; INTRAVENOUS at 08:16

## 2025-05-28 ASSESSMENT — PULMONARY FUNCTION TESTS
PIF_VALUE: 18
PIF_VALUE: 17
PIF_VALUE: 18
PIF_VALUE: 17
PIF_VALUE: 17
PIF_VALUE: 22
PIF_VALUE: 16

## 2025-05-28 ASSESSMENT — PAIN SCALES - GENERAL
PAINLEVEL_OUTOF10: 0

## 2025-05-28 NOTE — INTERDISCIPLINARY ROUNDS
Interdisciplinary team rounds were held 5/28/2025 with the following team members:  Physician, Nursing, Dietitian, Pharmacist, , and the CCU Charge RN.    Plan of care discussed. See clinical pathway and/or care plan for interventions and desired outcomes.    Goals of the Day: Continue with trialysis catheter for continuous dialysis and continue with CVC for vasopressor use.

## 2025-05-28 NOTE — PROCEDURES
PROCEDURE NOTE  Date: 5/28/2025   Name: Darrel Patterson  YOB: 1945    Procedures         Procedure Note - Central Venous Access:   Performed by ELVI Jacques NP  Diagnosis: Shock  Insertion Date: 05/28/25  Time:12:26 AM  Obtained Consent? yes; informed   Procedure Location:  ICU.      Immediately prior to the procedure, the patient was reevaluated and found suitable for the planned procedure and any planned medications.  Immediately prior to the procedure a time out was called to verify the correct patient, procedure, equipment, staff, and marking as appropriate.    Central line Bundle:  Full sterile barrier precautions used.  7-Step Sterility Protocol followed.  (cap, mask sterile gown, sterile gloves, large sterile sheet, hand hygiene, 2% chlorhexidine for cutaneous antisepsis)  5 mL 1% Lidocaine placed at insertion site.      Patient positioned in Trendelenburg?yes   The site was prepped with ChloraPrep.   Catheter inserted into a new site.     Using Seldinger technique a Arrow Triple Lumen CVC was placed in the Left, Internal Jugular Vein via direct cannulation with 1 number of attempts for Blood Drawing and IV Access.   Ultrasound Guidance was utilized.    There was good dark, non-pulsatile blood return in all ports.   Femoral Site? no. If Yes, reason femoral site was chosen: na  Catheter secured. Biopatch/CHG bio-occlusive dressing in place? yes.  The following complications were encountered: None.  A follow-up chest x-ray was ordered post procedure.    The procedure was tolerated well.        ELVI Jacques NP  Critical Care Medicine  [facility]

## 2025-05-28 NOTE — CONSENT
Informed Consent for Blood Component Transfusion Note    I have discussed with the daughter the rationale for blood component transfusion; its benefits in treating or preventing fatigue, organ damage, or death; and its risk which includes mild transfusion reactions, rare risk of blood borne infection, or more serious but rare reactions. I have discussed the alternatives to transfusion, including the risk and consequences of not receiving transfusion. The daughter had an opportunity to ask questions and had agreed to proceed with transfusion of blood components.    Electronically signed by Kevin Phillips MD on 5/28/25 at 10:10 AM EDT

## 2025-05-29 ENCOUNTER — APPOINTMENT (OUTPATIENT)
Facility: HOSPITAL | Age: 80
DRG: 870 | End: 2025-05-29
Attending: INTERNAL MEDICINE
Payer: MEDICARE

## 2025-05-29 LAB
ANION GAP SERPL CALC-SCNC: 9 MMOL/L (ref 2–12)
BACTERIA SPEC CULT: NORMAL
BACTERIA SPEC CULT: NORMAL
BUN SERPL-MCNC: 16 MG/DL (ref 6–20)
BUN/CREAT SERPL: 10 (ref 12–20)
CALCIUM SERPL-MCNC: 8.2 MG/DL (ref 8.5–10.1)
CHLORIDE SERPL-SCNC: 100 MMOL/L (ref 97–108)
CO2 SERPL-SCNC: 23 MMOL/L (ref 21–32)
CREAT SERPL-MCNC: 1.56 MG/DL (ref 0.7–1.3)
ECHO AO ASC DIAM: 3.8 CM
ECHO AO ASCENDING AORTA INDEX: 2.07 CM/M2
ECHO AO ROOT DIAM: 3.7 CM
ECHO AO ROOT INDEX: 2.01 CM/M2
ECHO AV AREA PEAK VELOCITY: 1.5 CM2
ECHO AV AREA VTI: 1.6 CM2
ECHO AV AREA/BSA PEAK VELOCITY: 0.8 CM2/M2
ECHO AV AREA/BSA VTI: 0.9 CM2/M2
ECHO AV MEAN GRADIENT: 4 MMHG
ECHO AV MEAN VELOCITY: 0.9 M/S
ECHO AV PEAK GRADIENT: 7 MMHG
ECHO AV PEAK VELOCITY: 1.3 M/S
ECHO AV VELOCITY RATIO: 0.54
ECHO AV VTI: 18 CM
ECHO LA DIAMETER INDEX: 2.28 CM/M2
ECHO LA DIAMETER: 4.2 CM
ECHO LA TO AORTIC ROOT RATIO: 1.14
ECHO LV E' LATERAL VELOCITY: 8.18 CM/S
ECHO LV E' SEPTAL VELOCITY: 4 CM/S
ECHO LV EDV A4C: 162 ML
ECHO LV EDV INDEX A4C: 88 ML/M2
ECHO LV EF PHYSICIAN: 20 %
ECHO LV EJECTION FRACTION A4C: 24 %
ECHO LV ESV A4C: 123 ML
ECHO LV ESV INDEX A4C: 67 ML/M2
ECHO LV FRACTIONAL SHORTENING: 7 % (ref 28–44)
ECHO LV INTERNAL DIMENSION DIASTOLE INDEX: 3.15 CM/M2
ECHO LV INTERNAL DIMENSION DIASTOLIC: 5.8 CM (ref 4.2–5.9)
ECHO LV INTERNAL DIMENSION SYSTOLIC INDEX: 2.93 CM/M2
ECHO LV INTERNAL DIMENSION SYSTOLIC: 5.4 CM
ECHO LV IVSD: 0.9 CM (ref 0.6–1)
ECHO LV MASS 2D: 203.5 G (ref 88–224)
ECHO LV MASS INDEX 2D: 110.6 G/M2 (ref 49–115)
ECHO LV POSTERIOR WALL DIASTOLIC: 0.9 CM (ref 0.6–1)
ECHO LV RELATIVE WALL THICKNESS RATIO: 0.31
ECHO LVOT AREA: 2.8 CM2
ECHO LVOT AV VTI INDEX: 0.56
ECHO LVOT DIAM: 1.9 CM
ECHO LVOT MEAN GRADIENT: 1 MMHG
ECHO LVOT PEAK GRADIENT: 2 MMHG
ECHO LVOT PEAK VELOCITY: 0.7 M/S
ECHO LVOT STROKE VOLUME INDEX: 15.6 ML/M2
ECHO LVOT SV: 28.6 ML
ECHO LVOT VTI: 10.1 CM
ECHO MV A VELOCITY: 0.46 M/S
ECHO MV E DECELERATION TIME (DT): 121.8 MS
ECHO MV E VELOCITY: 1 M/S
ECHO MV E/A RATIO: 2.17
ECHO MV E/E' LATERAL: 12.22
ECHO MV E/E' RATIO (AVERAGED): 18.61
ECHO MV E/E' SEPTAL: 25
ECHO MV REGURGITANT PEAK GRADIENT: 81 MMHG
ECHO MV REGURGITANT PEAK VELOCITY: 4.5 M/S
ECHO MV REGURGITANT VTIA: 132 CM
ECHO RV FREE WALL PEAK S': 8.1 CM/S
ECHO RV INTERNAL DIMENSION: 3.7 CM
ECHO RV TAPSE: 0.8 CM (ref 1.7–?)
ECHO TV REGURGITANT MAX VELOCITY: 3.05 M/S
ECHO TV REGURGITANT PEAK GRADIENT: 37 MMHG
ERYTHROCYTE [DISTWIDTH] IN BLOOD BY AUTOMATED COUNT: 18.1 % (ref 11.5–14.5)
GLUCOSE BLD STRIP.AUTO-MCNC: 127 MG/DL (ref 65–117)
GLUCOSE BLD STRIP.AUTO-MCNC: 156 MG/DL (ref 65–117)
GLUCOSE BLD STRIP.AUTO-MCNC: 163 MG/DL (ref 65–117)
GLUCOSE BLD-MCNC: 159 MG/DL (ref 74–99)
GLUCOSE SERPL-MCNC: 155 MG/DL (ref 65–100)
HCT VFR BLD AUTO: 22.8 % (ref 36.6–50.3)
HGB BLD-MCNC: 7.5 G/DL (ref 12.1–17)
MAGNESIUM SERPL-MCNC: 2.3 MG/DL (ref 1.6–2.4)
MCH RBC QN AUTO: 27.9 PG (ref 26–34)
MCHC RBC AUTO-ENTMCNC: 32.9 G/DL (ref 30–36.5)
MCV RBC AUTO: 84.8 FL (ref 80–99)
NRBC # BLD: 0.27 K/UL (ref 0–0.01)
NRBC BLD-RTO: 2.6 PER 100 WBC
PHOSPHATE SERPL-MCNC: 1.7 MG/DL (ref 2.6–4.7)
PLATELET # BLD AUTO: 120 K/UL (ref 150–400)
PMV BLD AUTO: 12.7 FL (ref 8.9–12.9)
POTASSIUM SERPL-SCNC: 4 MMOL/L (ref 3.5–5.1)
RBC # BLD AUTO: 2.69 M/UL (ref 4.1–5.7)
SERVICE CMNT-IMP: ABNORMAL
SERVICE CMNT-IMP: NORMAL
SERVICE CMNT-IMP: NORMAL
SODIUM SERPL-SCNC: 132 MMOL/L (ref 136–145)
VANCOMYCIN SERPL-MCNC: 23.1 UG/ML
WBC # BLD AUTO: 10.3 K/UL (ref 4.1–11.1)

## 2025-05-29 PROCEDURE — 2580000003 HC RX 258: Performed by: NURSE PRACTITIONER

## 2025-05-29 PROCEDURE — 90945 DIALYSIS ONE EVALUATION: CPT

## 2025-05-29 PROCEDURE — 83735 ASSAY OF MAGNESIUM: CPT

## 2025-05-29 PROCEDURE — 2500000003 HC RX 250 WO HCPCS: Performed by: STUDENT IN AN ORGANIZED HEALTH CARE EDUCATION/TRAINING PROGRAM

## 2025-05-29 PROCEDURE — 6370000000 HC RX 637 (ALT 250 FOR IP): Performed by: INTERNAL MEDICINE

## 2025-05-29 PROCEDURE — 6360000002 HC RX W HCPCS: Performed by: INTERNAL MEDICINE

## 2025-05-29 PROCEDURE — 2000000000 HC ICU R&B

## 2025-05-29 PROCEDURE — 2500000003 HC RX 250 WO HCPCS: Performed by: HOSPITALIST

## 2025-05-29 PROCEDURE — 2580000003 HC RX 258: Performed by: INTERNAL MEDICINE

## 2025-05-29 PROCEDURE — 85027 COMPLETE CBC AUTOMATED: CPT

## 2025-05-29 PROCEDURE — 94640 AIRWAY INHALATION TREATMENT: CPT

## 2025-05-29 PROCEDURE — 2580000003 HC RX 258: Performed by: HOSPITALIST

## 2025-05-29 PROCEDURE — 93306 TTE W/DOPPLER COMPLETE: CPT

## 2025-05-29 PROCEDURE — 84100 ASSAY OF PHOSPHORUS: CPT

## 2025-05-29 PROCEDURE — 6360000002 HC RX W HCPCS: Performed by: STUDENT IN AN ORGANIZED HEALTH CARE EDUCATION/TRAINING PROGRAM

## 2025-05-29 PROCEDURE — 6360000002 HC RX W HCPCS: Performed by: NURSE PRACTITIONER

## 2025-05-29 PROCEDURE — 6360000002 HC RX W HCPCS: Performed by: HOSPITALIST

## 2025-05-29 PROCEDURE — 6370000000 HC RX 637 (ALT 250 FOR IP): Performed by: HOSPITALIST

## 2025-05-29 PROCEDURE — 36415 COLL VENOUS BLD VENIPUNCTURE: CPT

## 2025-05-29 PROCEDURE — 6370000000 HC RX 637 (ALT 250 FOR IP): Performed by: STUDENT IN AN ORGANIZED HEALTH CARE EDUCATION/TRAINING PROGRAM

## 2025-05-29 PROCEDURE — 82962 GLUCOSE BLOOD TEST: CPT

## 2025-05-29 PROCEDURE — 80202 ASSAY OF VANCOMYCIN: CPT

## 2025-05-29 PROCEDURE — 80048 BASIC METABOLIC PNL TOTAL CA: CPT

## 2025-05-29 PROCEDURE — 94003 VENT MGMT INPAT SUBQ DAY: CPT

## 2025-05-29 RX ORDER — VASOPRESSIN 20 [USP'U]/ML
INJECTION, SOLUTION INTRAVENOUS
Status: DISPENSED
Start: 2025-05-29 | End: 2025-05-29

## 2025-05-29 RX ORDER — NOREPINEPHRINE BITARTRATE 0.06 MG/ML
1-100 INJECTION, SOLUTION INTRAVENOUS CONTINUOUS
Status: DISCONTINUED | OUTPATIENT
Start: 2025-05-29 | End: 2025-06-01

## 2025-05-29 RX ADMIN — VANCOMYCIN HYDROCHLORIDE 125 MG: 1 INJECTION, POWDER, LYOPHILIZED, FOR SOLUTION INTRAVENOUS at 08:21

## 2025-05-29 RX ADMIN — Medication: at 07:09

## 2025-05-29 RX ADMIN — SEVELAMER CARBONATE 1600 MG: 800 TABLET, FILM COATED ORAL at 16:31

## 2025-05-29 RX ADMIN — PANTOPRAZOLE SODIUM 40 MG: 40 INJECTION, POWDER, LYOPHILIZED, FOR SOLUTION INTRAVENOUS at 08:21

## 2025-05-29 RX ADMIN — VASOPRESSIN 0.03 UNITS/MIN: 20 INJECTION INTRAVENOUS at 10:17

## 2025-05-29 RX ADMIN — SODIUM CHLORIDE, PRESERVATIVE FREE 10 ML: 5 INJECTION INTRAVENOUS at 20:17

## 2025-05-29 RX ADMIN — DOCUSATE SODIUM LIQUID 100 MG: 100 LIQUID ORAL at 09:27

## 2025-05-29 RX ADMIN — HEPARIN SODIUM 5000 UNITS: 5000 INJECTION INTRAVENOUS; SUBCUTANEOUS at 22:17

## 2025-05-29 RX ADMIN — IPRATROPIUM BROMIDE 0.5 MG: 0.5 SOLUTION RESPIRATORY (INHALATION) at 14:39

## 2025-05-29 RX ADMIN — SEVELAMER CARBONATE 1600 MG: 800 TABLET, FILM COATED ORAL at 12:31

## 2025-05-29 RX ADMIN — IPRATROPIUM BROMIDE 0.5 MG: 0.5 SOLUTION RESPIRATORY (INHALATION) at 02:56

## 2025-05-29 RX ADMIN — Medication 1 AMPULE: at 09:27

## 2025-05-29 RX ADMIN — DORZOLAMIDE HYDROCHLORIDE AND TIMOLOL MALEATE 1 DROP: 20; 5 SOLUTION/ DROPS OPHTHALMIC at 20:19

## 2025-05-29 RX ADMIN — IPRATROPIUM BROMIDE 0.5 MG: 0.5 SOLUTION RESPIRATORY (INHALATION) at 20:05

## 2025-05-29 RX ADMIN — VANCOMYCIN HYDROCHLORIDE 125 MG: 1 INJECTION, POWDER, LYOPHILIZED, FOR SOLUTION INTRAVENOUS at 03:07

## 2025-05-29 RX ADMIN — DORZOLAMIDE HYDROCHLORIDE AND TIMOLOL MALEATE 1 DROP: 20; 5 SOLUTION/ DROPS OPHTHALMIC at 14:14

## 2025-05-29 RX ADMIN — PIPERACILLIN AND TAZOBACTAM 3375 MG: 3; .375 INJECTION, POWDER, LYOPHILIZED, FOR SOLUTION INTRAVENOUS at 02:06

## 2025-05-29 RX ADMIN — ARFORMOTEROL TARTRATE 15 MCG: 15 SOLUTION RESPIRATORY (INHALATION) at 20:09

## 2025-05-29 RX ADMIN — BUDESONIDE 250 MCG: 0.25 INHALANT RESPIRATORY (INHALATION) at 20:09

## 2025-05-29 RX ADMIN — GABAPENTIN 100 MG: 100 CAPSULE ORAL at 20:17

## 2025-05-29 RX ADMIN — DEXTROSE: 10 SOLUTION INTRAVENOUS at 13:09

## 2025-05-29 RX ADMIN — Medication 1 AMPULE: at 20:17

## 2025-05-29 RX ADMIN — METHYLPREDNISOLONE SODIUM SUCCINATE 20 MG: 40 INJECTION INTRAMUSCULAR; INTRAVENOUS at 01:20

## 2025-05-29 RX ADMIN — POLYETHYLENE GLYCOL 3350 17 G: 17 POWDER, FOR SOLUTION ORAL at 08:16

## 2025-05-29 RX ADMIN — METHYLPREDNISOLONE SODIUM SUCCINATE 20 MG: 40 INJECTION INTRAMUSCULAR; INTRAVENOUS at 13:09

## 2025-05-29 RX ADMIN — VANCOMYCIN HYDROCHLORIDE 750 MG: 750 INJECTION, POWDER, LYOPHILIZED, FOR SOLUTION INTRAVENOUS at 06:01

## 2025-05-29 RX ADMIN — VANCOMYCIN HYDROCHLORIDE 125 MG: 1 INJECTION, POWDER, LYOPHILIZED, FOR SOLUTION INTRAVENOUS at 20:17

## 2025-05-29 RX ADMIN — EPOETIN ALFA-EPBX 10000 UNITS: 10000 INJECTION, SOLUTION INTRAVENOUS; SUBCUTANEOUS at 20:18

## 2025-05-29 RX ADMIN — BUDESONIDE 250 MCG: 0.25 INHALANT RESPIRATORY (INHALATION) at 08:31

## 2025-05-29 RX ADMIN — POLYETHYLENE GLYCOL 3350 17 G: 17 POWDER, FOR SOLUTION ORAL at 20:18

## 2025-05-29 RX ADMIN — PIPERACILLIN AND TAZOBACTAM 3375 MG: 3; .375 INJECTION, POWDER, LYOPHILIZED, FOR SOLUTION INTRAVENOUS at 18:02

## 2025-05-29 RX ADMIN — MONTELUKAST 10 MG: 10 TABLET, FILM COATED ORAL at 08:22

## 2025-05-29 RX ADMIN — SENNOSIDES 8.8 MG: 8.8 LIQUID ORAL at 20:18

## 2025-05-29 RX ADMIN — CHLORHEXIDINE GLUCONATE 15 ML: 1.2 RINSE ORAL at 08:20

## 2025-05-29 RX ADMIN — PRAVASTATIN SODIUM 10 MG: 10 TABLET ORAL at 08:22

## 2025-05-29 RX ADMIN — SEVELAMER CARBONATE 1600 MG: 800 TABLET, FILM COATED ORAL at 08:22

## 2025-05-29 RX ADMIN — IPRATROPIUM BROMIDE 0.5 MG: 0.5 SOLUTION RESPIRATORY (INHALATION) at 08:31

## 2025-05-29 RX ADMIN — PIPERACILLIN AND TAZOBACTAM 3375 MG: 3; .375 INJECTION, POWDER, LYOPHILIZED, FOR SOLUTION INTRAVENOUS at 10:24

## 2025-05-29 RX ADMIN — Medication: at 01:40

## 2025-05-29 RX ADMIN — Medication: at 07:08

## 2025-05-29 RX ADMIN — SENNOSIDES 8.8 MG: 8.8 LIQUID ORAL at 08:21

## 2025-05-29 RX ADMIN — VANCOMYCIN HYDROCHLORIDE 125 MG: 1 INJECTION, POWDER, LYOPHILIZED, FOR SOLUTION INTRAVENOUS at 16:30

## 2025-05-29 RX ADMIN — HEPARIN SODIUM 5000 UNITS: 5000 INJECTION INTRAVENOUS; SUBCUTANEOUS at 13:09

## 2025-05-29 RX ADMIN — ARFORMOTEROL TARTRATE 15 MCG: 15 SOLUTION RESPIRATORY (INHALATION) at 08:31

## 2025-05-29 RX ADMIN — Medication 12 MCG/MIN: at 01:19

## 2025-05-29 RX ADMIN — Medication 50 MCG/HR: at 13:07

## 2025-05-29 RX ADMIN — HEPARIN SODIUM 5000 UNITS: 5000 INJECTION INTRAVENOUS; SUBCUTANEOUS at 05:56

## 2025-05-29 RX ADMIN — DOCUSATE SODIUM LIQUID 100 MG: 100 LIQUID ORAL at 20:18

## 2025-05-29 RX ADMIN — MIRTAZAPINE 15 MG: 15 TABLET, FILM COATED ORAL at 20:18

## 2025-05-29 RX ADMIN — CHLORHEXIDINE GLUCONATE 15 ML: 1.2 RINSE ORAL at 20:18

## 2025-05-29 RX ADMIN — DORZOLAMIDE HYDROCHLORIDE AND TIMOLOL MALEATE 1 DROP: 20; 5 SOLUTION/ DROPS OPHTHALMIC at 08:23

## 2025-05-29 RX ADMIN — SODIUM CHLORIDE, PRESERVATIVE FREE 10 ML: 5 INJECTION INTRAVENOUS at 08:21

## 2025-05-29 ASSESSMENT — PULMONARY FUNCTION TESTS
PIF_VALUE: 17
PIF_VALUE: 17
PIF_VALUE: 16
PIF_VALUE: 16
PIF_VALUE: 17

## 2025-05-29 ASSESSMENT — PAIN SCALES - GENERAL
PAINLEVEL_OUTOF10: 0

## 2025-05-29 NOTE — INTERDISCIPLINARY ROUNDS
Critical care interdisciplinary rounds today.  Following members present: Case Management, , Clinical Lead, Nursing, Nutrition, Pharmacy, and Physician. Family invited to participate.  Plan of care discussed.  See clinical pathway for plan of care and interventions and desired outcomes.    Pt. Remains intubated and sedated.    CVC in place for Access.     HD access placed for CRRT needs.     Restraints in place and in use.

## 2025-05-30 PROBLEM — I46.9 CARDIAC ARREST (HCC): Status: ACTIVE | Noted: 2025-05-30

## 2025-05-30 PROBLEM — I50.21 ACUTE SYSTOLIC HEART FAILURE (HCC): Status: ACTIVE | Noted: 2025-05-30

## 2025-05-30 LAB
ANION GAP SERPL CALC-SCNC: 6 MMOL/L (ref 2–12)
BACTERIA SPEC CULT: NORMAL
BUN SERPL-MCNC: 26 MG/DL (ref 6–20)
BUN/CREAT SERPL: 11 (ref 12–20)
CALCIUM SERPL-MCNC: 8.3 MG/DL (ref 8.5–10.1)
CHLORIDE SERPL-SCNC: 101 MMOL/L (ref 97–108)
CO2 SERPL-SCNC: 24 MMOL/L (ref 21–32)
CREAT SERPL-MCNC: 2.42 MG/DL (ref 0.7–1.3)
ERYTHROCYTE [DISTWIDTH] IN BLOOD BY AUTOMATED COUNT: 18.3 % (ref 11.5–14.5)
GLUCOSE BLD STRIP.AUTO-MCNC: 109 MG/DL (ref 65–117)
GLUCOSE BLD STRIP.AUTO-MCNC: 134 MG/DL (ref 65–117)
GLUCOSE BLD STRIP.AUTO-MCNC: 141 MG/DL (ref 65–117)
GLUCOSE SERPL-MCNC: 131 MG/DL (ref 65–100)
GRAM STN SPEC: NORMAL
HCT VFR BLD AUTO: 22.5 % (ref 36.6–50.3)
HGB BLD-MCNC: 7.6 G/DL (ref 12.1–17)
MAGNESIUM SERPL-MCNC: 2.4 MG/DL (ref 1.6–2.4)
MCH RBC QN AUTO: 28.9 PG (ref 26–34)
MCHC RBC AUTO-ENTMCNC: 33.8 G/DL (ref 30–36.5)
MCV RBC AUTO: 85.6 FL (ref 80–99)
NRBC # BLD: 0.69 K/UL (ref 0–0.01)
NRBC BLD-RTO: 5.9 PER 100 WBC
PHOSPHATE SERPL-MCNC: 1.6 MG/DL (ref 2.6–4.7)
PLATELET # BLD AUTO: 102 K/UL (ref 150–400)
POTASSIUM SERPL-SCNC: 4.1 MMOL/L (ref 3.5–5.1)
RBC # BLD AUTO: 2.63 M/UL (ref 4.1–5.7)
SERVICE CMNT-IMP: ABNORMAL
SERVICE CMNT-IMP: ABNORMAL
SERVICE CMNT-IMP: NORMAL
SERVICE CMNT-IMP: NORMAL
SODIUM SERPL-SCNC: 131 MMOL/L (ref 136–145)
VANCOMYCIN SERPL-MCNC: 25.4 UG/ML
WBC # BLD AUTO: 11.6 K/UL (ref 4.1–11.1)

## 2025-05-30 PROCEDURE — 2500000003 HC RX 250 WO HCPCS: Performed by: STUDENT IN AN ORGANIZED HEALTH CARE EDUCATION/TRAINING PROGRAM

## 2025-05-30 PROCEDURE — 94640 AIRWAY INHALATION TREATMENT: CPT

## 2025-05-30 PROCEDURE — 2500000003 HC RX 250 WO HCPCS: Performed by: NURSE PRACTITIONER

## 2025-05-30 PROCEDURE — 2000000000 HC ICU R&B

## 2025-05-30 PROCEDURE — 80202 ASSAY OF VANCOMYCIN: CPT

## 2025-05-30 PROCEDURE — 6360000002 HC RX W HCPCS: Performed by: HOSPITALIST

## 2025-05-30 PROCEDURE — 99222 1ST HOSP IP/OBS MODERATE 55: CPT | Performed by: NURSE PRACTITIONER

## 2025-05-30 PROCEDURE — 2580000003 HC RX 258: Performed by: HOSPITALIST

## 2025-05-30 PROCEDURE — 6360000002 HC RX W HCPCS: Performed by: STUDENT IN AN ORGANIZED HEALTH CARE EDUCATION/TRAINING PROGRAM

## 2025-05-30 PROCEDURE — 80048 BASIC METABOLIC PNL TOTAL CA: CPT

## 2025-05-30 PROCEDURE — 94003 VENT MGMT INPAT SUBQ DAY: CPT

## 2025-05-30 PROCEDURE — 99222 1ST HOSP IP/OBS MODERATE 55: CPT

## 2025-05-30 PROCEDURE — 2500000003 HC RX 250 WO HCPCS: Performed by: HOSPITALIST

## 2025-05-30 PROCEDURE — 6370000000 HC RX 637 (ALT 250 FOR IP): Performed by: HOSPITALIST

## 2025-05-30 PROCEDURE — 36415 COLL VENOUS BLD VENIPUNCTURE: CPT

## 2025-05-30 PROCEDURE — 85027 COMPLETE CBC AUTOMATED: CPT

## 2025-05-30 PROCEDURE — 2580000003 HC RX 258: Performed by: NURSE PRACTITIONER

## 2025-05-30 PROCEDURE — 6370000000 HC RX 637 (ALT 250 FOR IP): Performed by: INTERNAL MEDICINE

## 2025-05-30 PROCEDURE — 6370000000 HC RX 637 (ALT 250 FOR IP): Performed by: STUDENT IN AN ORGANIZED HEALTH CARE EDUCATION/TRAINING PROGRAM

## 2025-05-30 PROCEDURE — 84100 ASSAY OF PHOSPHORUS: CPT

## 2025-05-30 PROCEDURE — 82962 GLUCOSE BLOOD TEST: CPT

## 2025-05-30 PROCEDURE — 83735 ASSAY OF MAGNESIUM: CPT

## 2025-05-30 PROCEDURE — 2500000003 HC RX 250 WO HCPCS: Performed by: INTERNAL MEDICINE

## 2025-05-30 RX ADMIN — HEPARIN SODIUM 5000 UNITS: 5000 INJECTION INTRAVENOUS; SUBCUTANEOUS at 22:25

## 2025-05-30 RX ADMIN — BUDESONIDE 250 MCG: 0.25 INHALANT RESPIRATORY (INHALATION) at 21:05

## 2025-05-30 RX ADMIN — DORZOLAMIDE HYDROCHLORIDE AND TIMOLOL MALEATE 1 DROP: 20; 5 SOLUTION/ DROPS OPHTHALMIC at 20:38

## 2025-05-30 RX ADMIN — MONTELUKAST 10 MG: 10 TABLET, FILM COATED ORAL at 12:13

## 2025-05-30 RX ADMIN — VANCOMYCIN HYDROCHLORIDE 125 MG: 1 INJECTION, POWDER, LYOPHILIZED, FOR SOLUTION INTRAVENOUS at 21:01

## 2025-05-30 RX ADMIN — Medication 1 AMPULE: at 09:21

## 2025-05-30 RX ADMIN — DOCUSATE SODIUM LIQUID 100 MG: 100 LIQUID ORAL at 20:53

## 2025-05-30 RX ADMIN — IPRATROPIUM BROMIDE 0.5 MG: 0.5 SOLUTION RESPIRATORY (INHALATION) at 08:48

## 2025-05-30 RX ADMIN — SODIUM CHLORIDE, PRESERVATIVE FREE 10 ML: 5 INJECTION INTRAVENOUS at 09:19

## 2025-05-30 RX ADMIN — DORZOLAMIDE HYDROCHLORIDE AND TIMOLOL MALEATE 1 DROP: 20; 5 SOLUTION/ DROPS OPHTHALMIC at 15:00

## 2025-05-30 RX ADMIN — SODIUM PHOSPHATE, MONOBASIC, MONOHYDRATE AND SODIUM PHOSPHATE, DIBASIC, ANHYDROUS 30 MMOL: 276; 142 INJECTION, SOLUTION INTRAVENOUS at 12:41

## 2025-05-30 RX ADMIN — ARFORMOTEROL TARTRATE 15 MCG: 15 SOLUTION RESPIRATORY (INHALATION) at 21:05

## 2025-05-30 RX ADMIN — DORZOLAMIDE HYDROCHLORIDE AND TIMOLOL MALEATE 1 DROP: 20; 5 SOLUTION/ DROPS OPHTHALMIC at 12:34

## 2025-05-30 RX ADMIN — CHLORHEXIDINE GLUCONATE 15 ML: 1.2 RINSE ORAL at 20:40

## 2025-05-30 RX ADMIN — POLYETHYLENE GLYCOL 3350 17 G: 17 POWDER, FOR SOLUTION ORAL at 12:13

## 2025-05-30 RX ADMIN — DOCUSATE SODIUM LIQUID 100 MG: 100 LIQUID ORAL at 13:30

## 2025-05-30 RX ADMIN — ACETAMINOPHEN 650 MG: 325 TABLET ORAL at 00:54

## 2025-05-30 RX ADMIN — MIRTAZAPINE 15 MG: 15 TABLET, FILM COATED ORAL at 20:53

## 2025-05-30 RX ADMIN — ARFORMOTEROL TARTRATE 15 MCG: 15 SOLUTION RESPIRATORY (INHALATION) at 08:44

## 2025-05-30 RX ADMIN — IPRATROPIUM BROMIDE 0.5 MG: 0.5 SOLUTION RESPIRATORY (INHALATION) at 01:35

## 2025-05-30 RX ADMIN — DEXTROSE: 10 SOLUTION INTRAVENOUS at 02:30

## 2025-05-30 RX ADMIN — Medication 13 MCG/MIN: at 00:15

## 2025-05-30 RX ADMIN — POLYETHYLENE GLYCOL 3350 17 G: 17 POWDER, FOR SOLUTION ORAL at 20:53

## 2025-05-30 RX ADMIN — METHYLPREDNISOLONE SODIUM SUCCINATE 40 MG: 40 INJECTION INTRAMUSCULAR; INTRAVENOUS at 02:32

## 2025-05-30 RX ADMIN — GABAPENTIN 100 MG: 100 CAPSULE ORAL at 20:53

## 2025-05-30 RX ADMIN — PRAVASTATIN SODIUM 10 MG: 10 TABLET ORAL at 12:13

## 2025-05-30 RX ADMIN — IPRATROPIUM BROMIDE 0.5 MG: 0.5 SOLUTION RESPIRATORY (INHALATION) at 20:53

## 2025-05-30 RX ADMIN — BUDESONIDE 250 MCG: 0.25 INHALANT RESPIRATORY (INHALATION) at 08:44

## 2025-05-30 RX ADMIN — SENNOSIDES 8.8 MG: 8.8 LIQUID ORAL at 20:58

## 2025-05-30 RX ADMIN — VANCOMYCIN HYDROCHLORIDE 125 MG: 1 INJECTION, POWDER, LYOPHILIZED, FOR SOLUTION INTRAVENOUS at 17:52

## 2025-05-30 RX ADMIN — CHLORHEXIDINE GLUCONATE 15 ML: 1.2 RINSE ORAL at 12:46

## 2025-05-30 RX ADMIN — SODIUM CHLORIDE, PRESERVATIVE FREE 10 ML: 5 INJECTION INTRAVENOUS at 20:54

## 2025-05-30 RX ADMIN — HEPARIN SODIUM 5000 UNITS: 5000 INJECTION INTRAVENOUS; SUBCUTANEOUS at 13:31

## 2025-05-30 RX ADMIN — VANCOMYCIN HYDROCHLORIDE 125 MG: 1 INJECTION, POWDER, LYOPHILIZED, FOR SOLUTION INTRAVENOUS at 13:29

## 2025-05-30 RX ADMIN — VANCOMYCIN HYDROCHLORIDE 125 MG: 1 INJECTION, POWDER, LYOPHILIZED, FOR SOLUTION INTRAVENOUS at 03:16

## 2025-05-30 RX ADMIN — SENNOSIDES 8.8 MG: 8.8 LIQUID ORAL at 12:13

## 2025-05-30 RX ADMIN — HEPARIN SODIUM 5000 UNITS: 5000 INJECTION INTRAVENOUS; SUBCUTANEOUS at 05:08

## 2025-05-30 RX ADMIN — IPRATROPIUM BROMIDE 0.5 MG: 0.5 SOLUTION RESPIRATORY (INHALATION) at 13:49

## 2025-05-30 RX ADMIN — Medication 50 MCG/HR: at 05:08

## 2025-05-30 RX ADMIN — PANTOPRAZOLE SODIUM 40 MG: 40 INJECTION, POWDER, LYOPHILIZED, FOR SOLUTION INTRAVENOUS at 12:18

## 2025-05-30 RX ADMIN — Medication 1 AMPULE: at 20:58

## 2025-05-30 RX ADMIN — PIPERACILLIN AND TAZOBACTAM 3375 MG: 3; .375 INJECTION, POWDER, LYOPHILIZED, FOR SOLUTION INTRAVENOUS at 02:26

## 2025-05-30 ASSESSMENT — PULMONARY FUNCTION TESTS
PIF_VALUE: 17
PIF_VALUE: 18
PIF_VALUE: 17
PIF_VALUE: 18
PIF_VALUE: 18
PIF_VALUE: 17
PIF_VALUE: 18

## 2025-05-30 ASSESSMENT — PAIN SCALES - GENERAL: PAINLEVEL_OUTOF10: 0

## 2025-05-30 NOTE — INTERDISCIPLINARY ROUNDS
Critical care interdisciplinary rounds today.  Following members present: Case Management, Nursing, Pharmacy, and Physician. Family invited to participate. (At bedside)  Femoral and Central line noted, remains in RF with access for HD or CRRT. Remains restrained. Weaning pressors as BP tolerates. Palliative is following per Dr. Munoz.   Plan of care discussed.  See clinical pathway for plan of care and interventions and desired outcomes.

## 2025-05-31 LAB
ALBUMIN SERPL-MCNC: 1.8 G/DL (ref 3.5–5)
ALBUMIN/GLOB SERPL: 0.5 (ref 1.1–2.2)
ALP SERPL-CCNC: 92 U/L (ref 45–117)
ALT SERPL-CCNC: 55 U/L (ref 12–78)
ANION GAP SERPL CALC-SCNC: 9 MMOL/L (ref 2–12)
AST SERPL-CCNC: 113 U/L (ref 15–37)
BACTERIA SPEC CULT: NORMAL
BACTERIA SPEC CULT: NORMAL
BILIRUB DIRECT SERPL-MCNC: 1.8 MG/DL (ref 0–0.2)
BILIRUB SERPL-MCNC: 2.6 MG/DL (ref 0.2–1)
BUN SERPL-MCNC: 35 MG/DL (ref 6–20)
BUN/CREAT SERPL: 12 (ref 12–20)
CALCIUM SERPL-MCNC: 8.3 MG/DL (ref 8.5–10.1)
CHLORIDE SERPL-SCNC: 100 MMOL/L (ref 97–108)
CO2 SERPL-SCNC: 22 MMOL/L (ref 21–32)
CREAT SERPL-MCNC: 3.02 MG/DL (ref 0.7–1.3)
ERYTHROCYTE [DISTWIDTH] IN BLOOD BY AUTOMATED COUNT: 18.2 % (ref 11.5–14.5)
GLOBULIN SER CALC-MCNC: 3.7 G/DL (ref 2–4)
GLUCOSE BLD STRIP.AUTO-MCNC: 146 MG/DL (ref 65–117)
GLUCOSE BLD STRIP.AUTO-MCNC: 181 MG/DL (ref 65–117)
GLUCOSE BLD STRIP.AUTO-MCNC: 185 MG/DL (ref 65–117)
GLUCOSE BLD STRIP.AUTO-MCNC: 198 MG/DL (ref 65–117)
GLUCOSE SERPL-MCNC: 162 MG/DL (ref 65–100)
HCT VFR BLD AUTO: 22 % (ref 36.6–50.3)
HGB BLD-MCNC: 7.3 G/DL (ref 12.1–17)
MAGNESIUM SERPL-MCNC: 2.2 MG/DL (ref 1.6–2.4)
MCH RBC QN AUTO: 28 PG (ref 26–34)
MCHC RBC AUTO-ENTMCNC: 33.2 G/DL (ref 30–36.5)
MCV RBC AUTO: 84.3 FL (ref 80–99)
NRBC # BLD: 1.43 K/UL (ref 0–0.01)
NRBC BLD-RTO: 8.8 PER 100 WBC
PHOSPHATE SERPL-MCNC: 3.1 MG/DL (ref 2.6–4.7)
PLATELET # BLD AUTO: 98 K/UL (ref 150–400)
POTASSIUM SERPL-SCNC: 3.6 MMOL/L (ref 3.5–5.1)
PROT SERPL-MCNC: 5.5 G/DL (ref 6.4–8.2)
RBC # BLD AUTO: 2.61 M/UL (ref 4.1–5.7)
SERVICE CMNT-IMP: ABNORMAL
SERVICE CMNT-IMP: NORMAL
SERVICE CMNT-IMP: NORMAL
SODIUM SERPL-SCNC: 131 MMOL/L (ref 136–145)
WBC # BLD AUTO: 16.3 K/UL (ref 4.1–11.1)

## 2025-05-31 PROCEDURE — 80048 BASIC METABOLIC PNL TOTAL CA: CPT

## 2025-05-31 PROCEDURE — 83735 ASSAY OF MAGNESIUM: CPT

## 2025-05-31 PROCEDURE — 6360000002 HC RX W HCPCS: Performed by: HOSPITALIST

## 2025-05-31 PROCEDURE — 36415 COLL VENOUS BLD VENIPUNCTURE: CPT

## 2025-05-31 PROCEDURE — 85027 COMPLETE CBC AUTOMATED: CPT

## 2025-05-31 PROCEDURE — 94640 AIRWAY INHALATION TREATMENT: CPT

## 2025-05-31 PROCEDURE — 2000000000 HC ICU R&B

## 2025-05-31 PROCEDURE — 2500000003 HC RX 250 WO HCPCS: Performed by: STUDENT IN AN ORGANIZED HEALTH CARE EDUCATION/TRAINING PROGRAM

## 2025-05-31 PROCEDURE — 94003 VENT MGMT INPAT SUBQ DAY: CPT

## 2025-05-31 PROCEDURE — 6360000002 HC RX W HCPCS: Performed by: INTERNAL MEDICINE

## 2025-05-31 PROCEDURE — 2580000003 HC RX 258: Performed by: HOSPITALIST

## 2025-05-31 PROCEDURE — 84100 ASSAY OF PHOSPHORUS: CPT

## 2025-05-31 PROCEDURE — 90935 HEMODIALYSIS ONE EVALUATION: CPT

## 2025-05-31 PROCEDURE — 6370000000 HC RX 637 (ALT 250 FOR IP): Performed by: STUDENT IN AN ORGANIZED HEALTH CARE EDUCATION/TRAINING PROGRAM

## 2025-05-31 PROCEDURE — 82962 GLUCOSE BLOOD TEST: CPT

## 2025-05-31 PROCEDURE — 6370000000 HC RX 637 (ALT 250 FOR IP): Performed by: HOSPITALIST

## 2025-05-31 PROCEDURE — 2500000003 HC RX 250 WO HCPCS: Performed by: HOSPITALIST

## 2025-05-31 PROCEDURE — 80076 HEPATIC FUNCTION PANEL: CPT

## 2025-05-31 PROCEDURE — 6360000002 HC RX W HCPCS: Performed by: STUDENT IN AN ORGANIZED HEALTH CARE EDUCATION/TRAINING PROGRAM

## 2025-05-31 PROCEDURE — 6370000000 HC RX 637 (ALT 250 FOR IP): Performed by: INTERNAL MEDICINE

## 2025-05-31 RX ADMIN — DORZOLAMIDE HYDROCHLORIDE AND TIMOLOL MALEATE 1 DROP: 20; 5 SOLUTION/ DROPS OPHTHALMIC at 20:16

## 2025-05-31 RX ADMIN — IPRATROPIUM BROMIDE 0.5 MG: 0.5 SOLUTION RESPIRATORY (INHALATION) at 19:43

## 2025-05-31 RX ADMIN — DORZOLAMIDE HYDROCHLORIDE AND TIMOLOL MALEATE 1 DROP: 20; 5 SOLUTION/ DROPS OPHTHALMIC at 18:15

## 2025-05-31 RX ADMIN — VANCOMYCIN HYDROCHLORIDE 125 MG: 1 INJECTION, POWDER, LYOPHILIZED, FOR SOLUTION INTRAVENOUS at 21:46

## 2025-05-31 RX ADMIN — HEPARIN SODIUM 5000 UNITS: 5000 INJECTION INTRAVENOUS; SUBCUTANEOUS at 16:41

## 2025-05-31 RX ADMIN — SENNOSIDES 8.8 MG: 8.8 LIQUID ORAL at 08:50

## 2025-05-31 RX ADMIN — CHLORHEXIDINE GLUCONATE 15 ML: 1.2 RINSE ORAL at 08:08

## 2025-05-31 RX ADMIN — ARFORMOTEROL TARTRATE 15 MCG: 15 SOLUTION RESPIRATORY (INHALATION) at 19:48

## 2025-05-31 RX ADMIN — CHLORHEXIDINE GLUCONATE 15 ML: 1.2 RINSE ORAL at 20:18

## 2025-05-31 RX ADMIN — SENNOSIDES 8.8 MG: 8.8 LIQUID ORAL at 20:21

## 2025-05-31 RX ADMIN — PANTOPRAZOLE SODIUM 40 MG: 40 INJECTION, POWDER, LYOPHILIZED, FOR SOLUTION INTRAVENOUS at 08:17

## 2025-05-31 RX ADMIN — VANCOMYCIN HYDROCHLORIDE 125 MG: 1 INJECTION, POWDER, LYOPHILIZED, FOR SOLUTION INTRAVENOUS at 16:41

## 2025-05-31 RX ADMIN — Medication 1 AMPULE: at 08:08

## 2025-05-31 RX ADMIN — EPOETIN ALFA-EPBX 10000 UNITS: 10000 INJECTION, SOLUTION INTRAVENOUS; SUBCUTANEOUS at 20:18

## 2025-05-31 RX ADMIN — BUDESONIDE 250 MCG: 0.25 INHALANT RESPIRATORY (INHALATION) at 08:54

## 2025-05-31 RX ADMIN — POLYETHYLENE GLYCOL 3350 17 G: 17 POWDER, FOR SOLUTION ORAL at 08:06

## 2025-05-31 RX ADMIN — Medication 1 AMPULE: at 20:31

## 2025-05-31 RX ADMIN — BUDESONIDE 250 MCG: 0.25 INHALANT RESPIRATORY (INHALATION) at 19:48

## 2025-05-31 RX ADMIN — HEPARIN SODIUM 5000 UNITS: 5000 INJECTION INTRAVENOUS; SUBCUTANEOUS at 07:10

## 2025-05-31 RX ADMIN — HEPARIN SODIUM 5000 UNITS: 5000 INJECTION INTRAVENOUS; SUBCUTANEOUS at 21:46

## 2025-05-31 RX ADMIN — DORZOLAMIDE HYDROCHLORIDE AND TIMOLOL MALEATE 1 DROP: 20; 5 SOLUTION/ DROPS OPHTHALMIC at 08:21

## 2025-05-31 RX ADMIN — ARFORMOTEROL TARTRATE 15 MCG: 15 SOLUTION RESPIRATORY (INHALATION) at 08:54

## 2025-05-31 RX ADMIN — POLYETHYLENE GLYCOL 3350 17 G: 17 POWDER, FOR SOLUTION ORAL at 20:21

## 2025-05-31 RX ADMIN — MIRTAZAPINE 15 MG: 15 TABLET, FILM COATED ORAL at 20:20

## 2025-05-31 RX ADMIN — IPRATROPIUM BROMIDE 0.5 MG: 0.5 SOLUTION RESPIRATORY (INHALATION) at 08:54

## 2025-05-31 RX ADMIN — SODIUM CHLORIDE, PRESERVATIVE FREE 10 ML: 5 INJECTION INTRAVENOUS at 08:17

## 2025-05-31 RX ADMIN — IPRATROPIUM BROMIDE 0.5 MG: 0.5 SOLUTION RESPIRATORY (INHALATION) at 03:52

## 2025-05-31 RX ADMIN — GABAPENTIN 100 MG: 100 CAPSULE ORAL at 20:32

## 2025-05-31 RX ADMIN — VANCOMYCIN HYDROCHLORIDE 125 MG: 1 INJECTION, POWDER, LYOPHILIZED, FOR SOLUTION INTRAVENOUS at 03:57

## 2025-05-31 RX ADMIN — DOCUSATE SODIUM LIQUID 100 MG: 100 LIQUID ORAL at 08:06

## 2025-05-31 RX ADMIN — MONTELUKAST 10 MG: 10 TABLET, FILM COATED ORAL at 08:06

## 2025-05-31 RX ADMIN — DOCUSATE SODIUM LIQUID 100 MG: 100 LIQUID ORAL at 20:31

## 2025-05-31 RX ADMIN — IPRATROPIUM BROMIDE 0.5 MG: 0.5 SOLUTION RESPIRATORY (INHALATION) at 17:00

## 2025-05-31 RX ADMIN — VANCOMYCIN HYDROCHLORIDE 125 MG: 1 INJECTION, POWDER, LYOPHILIZED, FOR SOLUTION INTRAVENOUS at 08:46

## 2025-05-31 RX ADMIN — SODIUM CHLORIDE, PRESERVATIVE FREE 10 ML: 5 INJECTION INTRAVENOUS at 20:31

## 2025-05-31 ASSESSMENT — PULMONARY FUNCTION TESTS
PIF_VALUE: 15
PIF_VALUE: 17
PIF_VALUE: 15

## 2025-05-31 ASSESSMENT — PAIN SCALES - GENERAL
PAINLEVEL_OUTOF10: 0

## 2025-06-01 ENCOUNTER — APPOINTMENT (OUTPATIENT)
Facility: HOSPITAL | Age: 80
DRG: 870 | End: 2025-06-01
Attending: INTERNAL MEDICINE
Payer: MEDICARE

## 2025-06-01 LAB
ANION GAP SERPL CALC-SCNC: 10 MMOL/L (ref 2–12)
BUN SERPL-MCNC: 28 MG/DL (ref 6–20)
BUN/CREAT SERPL: 11 (ref 12–20)
CALCIUM SERPL-MCNC: 8.5 MG/DL (ref 8.5–10.1)
CHLORIDE SERPL-SCNC: 102 MMOL/L (ref 97–108)
CO2 SERPL-SCNC: 24 MMOL/L (ref 21–32)
CREAT SERPL-MCNC: 2.44 MG/DL (ref 0.7–1.3)
GLUCOSE BLD STRIP.AUTO-MCNC: 137 MG/DL (ref 65–117)
GLUCOSE BLD STRIP.AUTO-MCNC: 156 MG/DL (ref 65–117)
GLUCOSE BLD STRIP.AUTO-MCNC: 157 MG/DL (ref 65–117)
GLUCOSE BLD STRIP.AUTO-MCNC: 163 MG/DL (ref 65–117)
GLUCOSE SERPL-MCNC: 144 MG/DL (ref 65–100)
MAGNESIUM SERPL-MCNC: 2 MG/DL (ref 1.6–2.4)
PHOSPHATE SERPL-MCNC: 1.5 MG/DL (ref 2.6–4.7)
POTASSIUM SERPL-SCNC: 3.1 MMOL/L (ref 3.5–5.1)
SERVICE CMNT-IMP: ABNORMAL
SODIUM SERPL-SCNC: 136 MMOL/L (ref 136–145)

## 2025-06-01 PROCEDURE — 2000000000 HC ICU R&B

## 2025-06-01 PROCEDURE — 94640 AIRWAY INHALATION TREATMENT: CPT

## 2025-06-01 PROCEDURE — 82962 GLUCOSE BLOOD TEST: CPT

## 2025-06-01 PROCEDURE — 6370000000 HC RX 637 (ALT 250 FOR IP): Performed by: HOSPITALIST

## 2025-06-01 PROCEDURE — 80048 BASIC METABOLIC PNL TOTAL CA: CPT

## 2025-06-01 PROCEDURE — 83735 ASSAY OF MAGNESIUM: CPT

## 2025-06-01 PROCEDURE — 84100 ASSAY OF PHOSPHORUS: CPT

## 2025-06-01 PROCEDURE — 2500000003 HC RX 250 WO HCPCS: Performed by: HOSPITALIST

## 2025-06-01 PROCEDURE — 71045 X-RAY EXAM CHEST 1 VIEW: CPT

## 2025-06-01 PROCEDURE — 6370000000 HC RX 637 (ALT 250 FOR IP): Performed by: INTERNAL MEDICINE

## 2025-06-01 PROCEDURE — 6370000000 HC RX 637 (ALT 250 FOR IP): Performed by: STUDENT IN AN ORGANIZED HEALTH CARE EDUCATION/TRAINING PROGRAM

## 2025-06-01 PROCEDURE — 6360000002 HC RX W HCPCS: Performed by: STUDENT IN AN ORGANIZED HEALTH CARE EDUCATION/TRAINING PROGRAM

## 2025-06-01 PROCEDURE — 6360000002 HC RX W HCPCS: Performed by: HOSPITALIST

## 2025-06-01 PROCEDURE — 2580000003 HC RX 258: Performed by: HOSPITALIST

## 2025-06-01 PROCEDURE — 94003 VENT MGMT INPAT SUBQ DAY: CPT

## 2025-06-01 PROCEDURE — 36415 COLL VENOUS BLD VENIPUNCTURE: CPT

## 2025-06-01 PROCEDURE — 2500000003 HC RX 250 WO HCPCS: Performed by: STUDENT IN AN ORGANIZED HEALTH CARE EDUCATION/TRAINING PROGRAM

## 2025-06-01 RX ORDER — CARVEDILOL 3.12 MG/1
3.12 TABLET ORAL 2 TIMES DAILY WITH MEALS
Status: DISCONTINUED | OUTPATIENT
Start: 2025-06-01 | End: 2025-06-06

## 2025-06-01 RX ORDER — METOPROLOL SUCCINATE 25 MG/1
25 TABLET, EXTENDED RELEASE ORAL DAILY
Status: DISCONTINUED | OUTPATIENT
Start: 2025-06-01 | End: 2025-06-01

## 2025-06-01 RX ADMIN — IPRATROPIUM BROMIDE 0.5 MG: 0.5 SOLUTION RESPIRATORY (INHALATION) at 15:09

## 2025-06-01 RX ADMIN — CHLORHEXIDINE GLUCONATE 15 ML: 1.2 RINSE ORAL at 21:20

## 2025-06-01 RX ADMIN — Medication 1 AMPULE: at 21:21

## 2025-06-01 RX ADMIN — VANCOMYCIN HYDROCHLORIDE 125 MG: 1 INJECTION, POWDER, LYOPHILIZED, FOR SOLUTION INTRAVENOUS at 21:20

## 2025-06-01 RX ADMIN — VANCOMYCIN HYDROCHLORIDE 125 MG: 1 INJECTION, POWDER, LYOPHILIZED, FOR SOLUTION INTRAVENOUS at 03:02

## 2025-06-01 RX ADMIN — DORZOLAMIDE HYDROCHLORIDE AND TIMOLOL MALEATE 1 DROP: 20; 5 SOLUTION/ DROPS OPHTHALMIC at 08:52

## 2025-06-01 RX ADMIN — Medication 1 AMPULE: at 08:53

## 2025-06-01 RX ADMIN — HEPARIN SODIUM 5000 UNITS: 5000 INJECTION INTRAVENOUS; SUBCUTANEOUS at 21:20

## 2025-06-01 RX ADMIN — SODIUM CHLORIDE, PRESERVATIVE FREE 10 ML: 5 INJECTION INTRAVENOUS at 08:53

## 2025-06-01 RX ADMIN — ARFORMOTEROL TARTRATE 15 MCG: 15 SOLUTION RESPIRATORY (INHALATION) at 07:19

## 2025-06-01 RX ADMIN — HEPARIN SODIUM 5000 UNITS: 5000 INJECTION INTRAVENOUS; SUBCUTANEOUS at 05:51

## 2025-06-01 RX ADMIN — VANCOMYCIN HYDROCHLORIDE 125 MG: 1 INJECTION, POWDER, LYOPHILIZED, FOR SOLUTION INTRAVENOUS at 09:02

## 2025-06-01 RX ADMIN — BUDESONIDE 250 MCG: 0.25 INHALANT RESPIRATORY (INHALATION) at 21:08

## 2025-06-01 RX ADMIN — DORZOLAMIDE HYDROCHLORIDE AND TIMOLOL MALEATE 1 DROP: 20; 5 SOLUTION/ DROPS OPHTHALMIC at 15:53

## 2025-06-01 RX ADMIN — IPRATROPIUM BROMIDE 0.5 MG: 0.5 SOLUTION RESPIRATORY (INHALATION) at 02:43

## 2025-06-01 RX ADMIN — ARFORMOTEROL TARTRATE 15 MCG: 15 SOLUTION RESPIRATORY (INHALATION) at 21:08

## 2025-06-01 RX ADMIN — POTASSIUM BICARBONATE 20 MEQ: 782 TABLET, EFFERVESCENT ORAL at 09:29

## 2025-06-01 RX ADMIN — SENNOSIDES 8.8 MG: 8.8 LIQUID ORAL at 09:10

## 2025-06-01 RX ADMIN — MIRTAZAPINE 15 MG: 15 TABLET, FILM COATED ORAL at 21:20

## 2025-06-01 RX ADMIN — MONTELUKAST 10 MG: 10 TABLET, FILM COATED ORAL at 09:02

## 2025-06-01 RX ADMIN — GABAPENTIN 100 MG: 100 CAPSULE ORAL at 21:20

## 2025-06-01 RX ADMIN — IPRATROPIUM BROMIDE 0.5 MG: 0.5 SOLUTION RESPIRATORY (INHALATION) at 07:19

## 2025-06-01 RX ADMIN — HEPARIN SODIUM 5000 UNITS: 5000 INJECTION INTRAVENOUS; SUBCUTANEOUS at 13:35

## 2025-06-01 RX ADMIN — CARVEDILOL 3.12 MG: 3.12 TABLET, FILM COATED ORAL at 11:16

## 2025-06-01 RX ADMIN — PANTOPRAZOLE SODIUM 40 MG: 40 INJECTION, POWDER, LYOPHILIZED, FOR SOLUTION INTRAVENOUS at 09:06

## 2025-06-01 RX ADMIN — SODIUM CHLORIDE, PRESERVATIVE FREE 10 ML: 5 INJECTION INTRAVENOUS at 21:21

## 2025-06-01 RX ADMIN — CARVEDILOL 3.12 MG: 3.12 TABLET, FILM COATED ORAL at 16:50

## 2025-06-01 RX ADMIN — IPRATROPIUM BROMIDE 0.5 MG: 0.5 SOLUTION RESPIRATORY (INHALATION) at 21:03

## 2025-06-01 RX ADMIN — DORZOLAMIDE HYDROCHLORIDE AND TIMOLOL MALEATE 1 DROP: 20; 5 SOLUTION/ DROPS OPHTHALMIC at 21:23

## 2025-06-01 RX ADMIN — CHLORHEXIDINE GLUCONATE 15 ML: 1.2 RINSE ORAL at 08:56

## 2025-06-01 RX ADMIN — VANCOMYCIN HYDROCHLORIDE 125 MG: 1 INJECTION, POWDER, LYOPHILIZED, FOR SOLUTION INTRAVENOUS at 15:53

## 2025-06-01 RX ADMIN — BUDESONIDE 250 MCG: 0.25 INHALANT RESPIRATORY (INHALATION) at 07:19

## 2025-06-01 ASSESSMENT — PULMONARY FUNCTION TESTS
PIF_VALUE: 11
PIF_VALUE: 10
PIF_VALUE: 10
PIF_VALUE: 11
PIF_VALUE: 15
PIF_VALUE: 13
PIF_VALUE: 11
PIF_VALUE: 15

## 2025-06-01 ASSESSMENT — PAIN SCALES - GENERAL
PAINLEVEL_OUTOF10: 0

## 2025-06-02 LAB
ANION GAP SERPL CALC-SCNC: 8 MMOL/L (ref 2–12)
BUN SERPL-MCNC: 39 MG/DL (ref 6–20)
BUN/CREAT SERPL: 13 (ref 12–20)
CALCIUM SERPL-MCNC: 8.6 MG/DL (ref 8.5–10.1)
CHLORIDE SERPL-SCNC: 101 MMOL/L (ref 97–108)
CO2 SERPL-SCNC: 24 MMOL/L (ref 21–32)
CREAT SERPL-MCNC: 3.06 MG/DL (ref 0.7–1.3)
ERYTHROCYTE [DISTWIDTH] IN BLOOD BY AUTOMATED COUNT: 22.5 % (ref 11.5–14.5)
GLUCOSE BLD STRIP.AUTO-MCNC: 106 MG/DL (ref 65–117)
GLUCOSE BLD STRIP.AUTO-MCNC: 129 MG/DL (ref 65–117)
GLUCOSE BLD STRIP.AUTO-MCNC: 144 MG/DL (ref 65–117)
GLUCOSE SERPL-MCNC: 141 MG/DL (ref 65–100)
HCT VFR BLD AUTO: 23.2 % (ref 36.6–50.3)
HGB BLD-MCNC: 7.6 G/DL (ref 12.1–17)
MAGNESIUM SERPL-MCNC: 2.1 MG/DL (ref 1.6–2.4)
MCH RBC QN AUTO: 29.5 PG (ref 26–34)
MCHC RBC AUTO-ENTMCNC: 32.8 G/DL (ref 30–36.5)
MCV RBC AUTO: 89.9 FL (ref 80–99)
NRBC # BLD: 1.38 K/UL (ref 0–0.01)
NRBC BLD-RTO: 8 PER 100 WBC
PHOSPHATE SERPL-MCNC: 1.7 MG/DL (ref 2.6–4.7)
PLATELET # BLD AUTO: 108 K/UL (ref 150–400)
POTASSIUM SERPL-SCNC: 3.4 MMOL/L (ref 3.5–5.1)
RBC # BLD AUTO: 2.58 M/UL (ref 4.1–5.7)
SERVICE CMNT-IMP: ABNORMAL
SERVICE CMNT-IMP: ABNORMAL
SERVICE CMNT-IMP: NORMAL
SODIUM SERPL-SCNC: 133 MMOL/L (ref 136–145)
WBC # BLD AUTO: 17.3 K/UL (ref 4.1–11.1)

## 2025-06-02 PROCEDURE — 85027 COMPLETE CBC AUTOMATED: CPT

## 2025-06-02 PROCEDURE — 2580000003 HC RX 258: Performed by: HOSPITALIST

## 2025-06-02 PROCEDURE — 36415 COLL VENOUS BLD VENIPUNCTURE: CPT

## 2025-06-02 PROCEDURE — 2000000000 HC ICU R&B

## 2025-06-02 PROCEDURE — 6370000000 HC RX 637 (ALT 250 FOR IP): Performed by: HOSPITALIST

## 2025-06-02 PROCEDURE — 99231 SBSQ HOSP IP/OBS SF/LOW 25: CPT | Performed by: NURSE PRACTITIONER

## 2025-06-02 PROCEDURE — 6370000000 HC RX 637 (ALT 250 FOR IP): Performed by: INTERNAL MEDICINE

## 2025-06-02 PROCEDURE — 6360000002 HC RX W HCPCS: Performed by: STUDENT IN AN ORGANIZED HEALTH CARE EDUCATION/TRAINING PROGRAM

## 2025-06-02 PROCEDURE — 99291 CRITICAL CARE FIRST HOUR: CPT | Performed by: NURSE PRACTITIONER

## 2025-06-02 PROCEDURE — 2580000003 HC RX 258: Performed by: STUDENT IN AN ORGANIZED HEALTH CARE EDUCATION/TRAINING PROGRAM

## 2025-06-02 PROCEDURE — 94640 AIRWAY INHALATION TREATMENT: CPT

## 2025-06-02 PROCEDURE — 6370000000 HC RX 637 (ALT 250 FOR IP): Performed by: STUDENT IN AN ORGANIZED HEALTH CARE EDUCATION/TRAINING PROGRAM

## 2025-06-02 PROCEDURE — 6360000002 HC RX W HCPCS: Performed by: HOSPITALIST

## 2025-06-02 PROCEDURE — 94003 VENT MGMT INPAT SUBQ DAY: CPT

## 2025-06-02 PROCEDURE — 83735 ASSAY OF MAGNESIUM: CPT

## 2025-06-02 PROCEDURE — 80048 BASIC METABOLIC PNL TOTAL CA: CPT

## 2025-06-02 PROCEDURE — 82962 GLUCOSE BLOOD TEST: CPT

## 2025-06-02 PROCEDURE — 2500000003 HC RX 250 WO HCPCS: Performed by: HOSPITALIST

## 2025-06-02 PROCEDURE — 84100 ASSAY OF PHOSPHORUS: CPT

## 2025-06-02 PROCEDURE — 2500000003 HC RX 250 WO HCPCS: Performed by: STUDENT IN AN ORGANIZED HEALTH CARE EDUCATION/TRAINING PROGRAM

## 2025-06-02 RX ADMIN — SODIUM CHLORIDE, PRESERVATIVE FREE 10 ML: 5 INJECTION INTRAVENOUS at 09:36

## 2025-06-02 RX ADMIN — SODIUM CHLORIDE, PRESERVATIVE FREE 10 ML: 5 INJECTION INTRAVENOUS at 09:35

## 2025-06-02 RX ADMIN — DORZOLAMIDE HYDROCHLORIDE AND TIMOLOL MALEATE 1 DROP: 20; 5 SOLUTION/ DROPS OPHTHALMIC at 09:45

## 2025-06-02 RX ADMIN — CHLORHEXIDINE GLUCONATE 15 ML: 1.2 RINSE ORAL at 20:55

## 2025-06-02 RX ADMIN — MIRTAZAPINE 15 MG: 15 TABLET, FILM COATED ORAL at 21:03

## 2025-06-02 RX ADMIN — IPRATROPIUM BROMIDE 0.5 MG: 0.5 SOLUTION RESPIRATORY (INHALATION) at 15:11

## 2025-06-02 RX ADMIN — HEPARIN SODIUM 5000 UNITS: 5000 INJECTION INTRAVENOUS; SUBCUTANEOUS at 14:30

## 2025-06-02 RX ADMIN — HEPARIN SODIUM 5000 UNITS: 5000 INJECTION INTRAVENOUS; SUBCUTANEOUS at 05:00

## 2025-06-02 RX ADMIN — SODIUM CHLORIDE: 0.9 INJECTION, SOLUTION INTRAVENOUS at 21:07

## 2025-06-02 RX ADMIN — POTASSIUM PHOSPHATE, MONOBASIC POTASSIUM PHOSPHATE, DIBASIC 30 MMOL: 224; 236 INJECTION, SOLUTION, CONCENTRATE INTRAVENOUS at 12:59

## 2025-06-02 RX ADMIN — DORZOLAMIDE HYDROCHLORIDE AND TIMOLOL MALEATE 1 DROP: 20; 5 SOLUTION/ DROPS OPHTHALMIC at 15:53

## 2025-06-02 RX ADMIN — BUDESONIDE 250 MCG: 0.25 INHALANT RESPIRATORY (INHALATION) at 09:06

## 2025-06-02 RX ADMIN — ARFORMOTEROL TARTRATE 15 MCG: 15 SOLUTION RESPIRATORY (INHALATION) at 21:00

## 2025-06-02 RX ADMIN — DORZOLAMIDE HYDROCHLORIDE AND TIMOLOL MALEATE 1 DROP: 20; 5 SOLUTION/ DROPS OPHTHALMIC at 20:56

## 2025-06-02 RX ADMIN — CHLORHEXIDINE GLUCONATE 15 ML: 1.2 RINSE ORAL at 08:26

## 2025-06-02 RX ADMIN — SODIUM CHLORIDE, PRESERVATIVE FREE 10 ML: 5 INJECTION INTRAVENOUS at 20:56

## 2025-06-02 RX ADMIN — CARVEDILOL 3.12 MG: 3.12 TABLET, FILM COATED ORAL at 09:03

## 2025-06-02 RX ADMIN — IPRATROPIUM BROMIDE 0.5 MG: 0.5 SOLUTION RESPIRATORY (INHALATION) at 09:06

## 2025-06-02 RX ADMIN — VANCOMYCIN HYDROCHLORIDE 125 MG: 1 INJECTION, POWDER, LYOPHILIZED, FOR SOLUTION INTRAVENOUS at 04:59

## 2025-06-02 RX ADMIN — PANTOPRAZOLE SODIUM 40 MG: 40 INJECTION, POWDER, LYOPHILIZED, FOR SOLUTION INTRAVENOUS at 09:26

## 2025-06-02 RX ADMIN — CARVEDILOL 3.12 MG: 3.12 TABLET, FILM COATED ORAL at 17:28

## 2025-06-02 RX ADMIN — VANCOMYCIN HYDROCHLORIDE 125 MG: 1 INJECTION, POWDER, LYOPHILIZED, FOR SOLUTION INTRAVENOUS at 21:11

## 2025-06-02 RX ADMIN — HEPARIN SODIUM 5000 UNITS: 5000 INJECTION INTRAVENOUS; SUBCUTANEOUS at 21:37

## 2025-06-02 RX ADMIN — GABAPENTIN 100 MG: 100 CAPSULE ORAL at 21:03

## 2025-06-02 RX ADMIN — MONTELUKAST 10 MG: 10 TABLET, FILM COATED ORAL at 09:18

## 2025-06-02 RX ADMIN — IPRATROPIUM BROMIDE 0.5 MG: 0.5 SOLUTION RESPIRATORY (INHALATION) at 20:50

## 2025-06-02 RX ADMIN — VANCOMYCIN HYDROCHLORIDE 125 MG: 1 INJECTION, POWDER, LYOPHILIZED, FOR SOLUTION INTRAVENOUS at 09:26

## 2025-06-02 RX ADMIN — BUDESONIDE 250 MCG: 0.25 INHALANT RESPIRATORY (INHALATION) at 21:04

## 2025-06-02 RX ADMIN — VANCOMYCIN HYDROCHLORIDE 125 MG: 1 INJECTION, POWDER, LYOPHILIZED, FOR SOLUTION INTRAVENOUS at 15:53

## 2025-06-02 RX ADMIN — Medication 1 AMPULE: at 09:06

## 2025-06-02 RX ADMIN — Medication 1 AMPULE: at 20:56

## 2025-06-02 RX ADMIN — ARFORMOTEROL TARTRATE 15 MCG: 15 SOLUTION RESPIRATORY (INHALATION) at 09:06

## 2025-06-02 ASSESSMENT — PULMONARY FUNCTION TESTS
PIF_VALUE: 10
PIF_VALUE: 14
PIF_VALUE: 14
PIF_VALUE: 13
PIF_VALUE: 14
PIF_VALUE: 10
PIF_VALUE: 14
PIF_VALUE: 14

## 2025-06-02 ASSESSMENT — PAIN SCALES - GENERAL
PAINLEVEL_OUTOF10: 0

## 2025-06-02 NOTE — CARE COORDINATION
Transition of Care Plan:     RUR: 21%  Prior Level of Functioning: Independent  Disposition: Family to see how the patient progresses.  DEMETRA: 6/8/25  If SNF or IPR: Date FOC offered: Family wants to see how the patient progresses.  Date FOC received: N/A  Accepting facility: N/A  Date authorization started with reference number: N/A  Date authorization received and expires: N/A  Follow up appointments: To be done prior to discharge.  DME needed: None  Transportation at discharge: Family to decide  IM/IMM Medicare/ letter given: To be given prior to discharge.  Is patient a  and connected with VA? No              If yes, was [place] transfer form completed and VA notified? N/A  Caregiver Contact: Daughter  Discharge Caregiver contacted prior to discharge? Caregiver to be contacted prior to discharge.  Care Conference needed? No  Barriers to discharge: Medical stability.      Patient discussed during rounds. Remains intubated.       Alicja Wilhelm RN BSN CRM        [phone]         
Transition of Care Plan:    RUR: 21%  Prior Level of Functioning: Independent  Disposition: Family to see how the patient progresses.  DEMETRA: 6/8/25  If SNF or IPR: Date FOC offered: Family wants to see how the patient progresses.  Date FOC received: N/A  Accepting facility: N/A  Date authorization started with reference number: N/A  Date authorization received and expires: N/A  Follow up appointments: To be done prior to discharge.  DME needed: None  Transportation at discharge: Family to decide  IM/IMM Medicare/ letter given: To be given prior to discharge.  Is patient a  and connected with VA? No   If yes, was [place] transfer form completed and VA notified? N/A  Caregiver Contact: Daughter  Discharge Caregiver contacted prior to discharge? Caregiver to be contacted prior to discharge.  Care Conference needed? No  Barriers to discharge: Medical stability.        Patient discussed during rounds and family member present.  Remains intubated.    Alicja Wilhelm RN BSN CRM        [phone]     
progresses.)         Advance Care Planning     General Advance Care Planning (ACP) Conversation    Date of Conversation: 5/27/2025  Conducted with: Patient with Decision Making Capacity and Legal next of kin  Other persons present: None    Healthcare Decision Maker:   Primary Decision Maker: LeónNadine - Child - [phone]    Secondary Decision Maker: Linda Traore - Child - [phone]     Today we documented Decision Maker(s) consistent with ACP documents on file.  Content/Action Overview:  Has ACP document(s) on file - reflects the patient's care preferences  Reviewed DNR/DNI and patient elects Full Code (Attempt Resuscitation)        Length of Voluntary ACP Conversation in minutes:  <16 minutes (Non-Billable)    MERT TAY RN

## 2025-06-03 LAB
ANION GAP SERPL CALC-SCNC: 9 MMOL/L (ref 2–12)
BUN SERPL-MCNC: 54 MG/DL (ref 6–20)
BUN/CREAT SERPL: 14 (ref 12–20)
CALCIUM SERPL-MCNC: 8.6 MG/DL (ref 8.5–10.1)
CHLORIDE SERPL-SCNC: 100 MMOL/L (ref 97–108)
CO2 SERPL-SCNC: 25 MMOL/L (ref 21–32)
CREAT SERPL-MCNC: 3.73 MG/DL (ref 0.7–1.3)
ERYTHROCYTE [DISTWIDTH] IN BLOOD BY AUTOMATED COUNT: 23.7 % (ref 11.5–14.5)
GLUCOSE BLD STRIP.AUTO-MCNC: 104 MG/DL (ref 65–117)
GLUCOSE BLD STRIP.AUTO-MCNC: 122 MG/DL (ref 65–117)
GLUCOSE BLD STRIP.AUTO-MCNC: 122 MG/DL (ref 65–117)
GLUCOSE BLD STRIP.AUTO-MCNC: 131 MG/DL (ref 65–117)
GLUCOSE SERPL-MCNC: 123 MG/DL (ref 65–100)
HCT VFR BLD AUTO: 22.1 % (ref 36.6–50.3)
HGB BLD-MCNC: 7.3 G/DL (ref 12.1–17)
MAGNESIUM SERPL-MCNC: 2.1 MG/DL (ref 1.6–2.4)
MCH RBC QN AUTO: 29.7 PG (ref 26–34)
MCHC RBC AUTO-ENTMCNC: 33 G/DL (ref 30–36.5)
MCV RBC AUTO: 89.8 FL (ref 80–99)
NRBC # BLD: 0.47 K/UL (ref 0–0.01)
NRBC BLD-RTO: 2.7 PER 100 WBC
PHOSPHATE SERPL-MCNC: 3.7 MG/DL (ref 2.6–4.7)
PLATELET # BLD AUTO: 137 K/UL (ref 150–400)
PMV BLD AUTO: 12.5 FL (ref 8.9–12.9)
POTASSIUM SERPL-SCNC: 4.4 MMOL/L (ref 3.5–5.1)
RBC # BLD AUTO: 2.46 M/UL (ref 4.1–5.7)
SERVICE CMNT-IMP: ABNORMAL
SERVICE CMNT-IMP: NORMAL
SODIUM SERPL-SCNC: 134 MMOL/L (ref 136–145)
WBC # BLD AUTO: 17.5 K/UL (ref 4.1–11.1)

## 2025-06-03 PROCEDURE — 2580000003 HC RX 258: Performed by: HOSPITALIST

## 2025-06-03 PROCEDURE — 85027 COMPLETE CBC AUTOMATED: CPT

## 2025-06-03 PROCEDURE — 2500000003 HC RX 250 WO HCPCS: Performed by: HOSPITALIST

## 2025-06-03 PROCEDURE — 2500000003 HC RX 250 WO HCPCS: Performed by: STUDENT IN AN ORGANIZED HEALTH CARE EDUCATION/TRAINING PROGRAM

## 2025-06-03 PROCEDURE — 6370000000 HC RX 637 (ALT 250 FOR IP): Performed by: HOSPITALIST

## 2025-06-03 PROCEDURE — 84100 ASSAY OF PHOSPHORUS: CPT

## 2025-06-03 PROCEDURE — 36415 COLL VENOUS BLD VENIPUNCTURE: CPT

## 2025-06-03 PROCEDURE — 94640 AIRWAY INHALATION TREATMENT: CPT

## 2025-06-03 PROCEDURE — 82962 GLUCOSE BLOOD TEST: CPT

## 2025-06-03 PROCEDURE — 6360000002 HC RX W HCPCS: Performed by: HOSPITALIST

## 2025-06-03 PROCEDURE — 99291 CRITICAL CARE FIRST HOUR: CPT | Performed by: NURSE PRACTITIONER

## 2025-06-03 PROCEDURE — 80048 BASIC METABOLIC PNL TOTAL CA: CPT

## 2025-06-03 PROCEDURE — 6360000002 HC RX W HCPCS: Performed by: INTERNAL MEDICINE

## 2025-06-03 PROCEDURE — 6370000000 HC RX 637 (ALT 250 FOR IP): Performed by: INTERNAL MEDICINE

## 2025-06-03 PROCEDURE — 2000000000 HC ICU R&B

## 2025-06-03 PROCEDURE — 6360000002 HC RX W HCPCS: Performed by: STUDENT IN AN ORGANIZED HEALTH CARE EDUCATION/TRAINING PROGRAM

## 2025-06-03 PROCEDURE — 94003 VENT MGMT INPAT SUBQ DAY: CPT

## 2025-06-03 PROCEDURE — 83735 ASSAY OF MAGNESIUM: CPT

## 2025-06-03 PROCEDURE — 6370000000 HC RX 637 (ALT 250 FOR IP): Performed by: STUDENT IN AN ORGANIZED HEALTH CARE EDUCATION/TRAINING PROGRAM

## 2025-06-03 RX ORDER — CASTOR OIL AND BALSAM, PERU 788; 87 MG/G; MG/G
OINTMENT TOPICAL 2 TIMES DAILY
Status: DISCONTINUED | OUTPATIENT
Start: 2025-06-03 | End: 2025-06-09 | Stop reason: ALTCHOICE

## 2025-06-03 RX ORDER — ALBUMIN (HUMAN) 12.5 G/50ML
SOLUTION INTRAVENOUS
Status: DISPENSED
Start: 2025-06-03 | End: 2025-06-04

## 2025-06-03 RX ADMIN — HEPARIN SODIUM 5000 UNITS: 5000 INJECTION INTRAVENOUS; SUBCUTANEOUS at 07:02

## 2025-06-03 RX ADMIN — IPRATROPIUM BROMIDE 0.5 MG: 0.5 SOLUTION RESPIRATORY (INHALATION) at 01:57

## 2025-06-03 RX ADMIN — DORZOLAMIDE HYDROCHLORIDE AND TIMOLOL MALEATE 1 DROP: 20; 5 SOLUTION/ DROPS OPHTHALMIC at 08:20

## 2025-06-03 RX ADMIN — ARFORMOTEROL TARTRATE 15 MCG: 15 SOLUTION RESPIRATORY (INHALATION) at 19:45

## 2025-06-03 RX ADMIN — BUDESONIDE 250 MCG: 0.25 INHALANT RESPIRATORY (INHALATION) at 19:45

## 2025-06-03 RX ADMIN — VANCOMYCIN HYDROCHLORIDE 125 MG: 1 INJECTION, POWDER, LYOPHILIZED, FOR SOLUTION INTRAVENOUS at 02:57

## 2025-06-03 RX ADMIN — PANTOPRAZOLE SODIUM 40 MG: 40 INJECTION, POWDER, LYOPHILIZED, FOR SOLUTION INTRAVENOUS at 08:20

## 2025-06-03 RX ADMIN — SODIUM CHLORIDE, PRESERVATIVE FREE 10 ML: 5 INJECTION INTRAVENOUS at 21:17

## 2025-06-03 RX ADMIN — BUDESONIDE 250 MCG: 0.25 INHALANT RESPIRATORY (INHALATION) at 07:49

## 2025-06-03 RX ADMIN — CHLORHEXIDINE GLUCONATE 15 ML: 1.2 RINSE ORAL at 08:20

## 2025-06-03 RX ADMIN — CARVEDILOL 3.12 MG: 3.12 TABLET, FILM COATED ORAL at 17:41

## 2025-06-03 RX ADMIN — ARFORMOTEROL TARTRATE 15 MCG: 15 SOLUTION RESPIRATORY (INHALATION) at 07:49

## 2025-06-03 RX ADMIN — GABAPENTIN 100 MG: 100 CAPSULE ORAL at 21:17

## 2025-06-03 RX ADMIN — HEPARIN SODIUM 5000 UNITS: 5000 INJECTION INTRAVENOUS; SUBCUTANEOUS at 21:17

## 2025-06-03 RX ADMIN — VANCOMYCIN HYDROCHLORIDE 125 MG: 1 INJECTION, POWDER, LYOPHILIZED, FOR SOLUTION INTRAVENOUS at 21:17

## 2025-06-03 RX ADMIN — MIRTAZAPINE 15 MG: 15 TABLET, FILM COATED ORAL at 21:17

## 2025-06-03 RX ADMIN — IPRATROPIUM BROMIDE 0.5 MG: 0.5 SOLUTION RESPIRATORY (INHALATION) at 15:33

## 2025-06-03 RX ADMIN — CARVEDILOL 3.12 MG: 3.12 TABLET, FILM COATED ORAL at 08:20

## 2025-06-03 RX ADMIN — Medication 1 AMPULE: at 08:20

## 2025-06-03 RX ADMIN — Medication: at 21:18

## 2025-06-03 RX ADMIN — Medication 1 AMPULE: at 21:17

## 2025-06-03 RX ADMIN — DORZOLAMIDE HYDROCHLORIDE AND TIMOLOL MALEATE 1 DROP: 20; 5 SOLUTION/ DROPS OPHTHALMIC at 13:52

## 2025-06-03 RX ADMIN — IPRATROPIUM BROMIDE 0.5 MG: 0.5 SOLUTION RESPIRATORY (INHALATION) at 07:49

## 2025-06-03 RX ADMIN — HEPARIN SODIUM 5000 UNITS: 5000 INJECTION INTRAVENOUS; SUBCUTANEOUS at 13:52

## 2025-06-03 RX ADMIN — VANCOMYCIN HYDROCHLORIDE 125 MG: 1 INJECTION, POWDER, LYOPHILIZED, FOR SOLUTION INTRAVENOUS at 15:38

## 2025-06-03 RX ADMIN — SODIUM CHLORIDE, PRESERVATIVE FREE 10 ML: 5 INJECTION INTRAVENOUS at 08:22

## 2025-06-03 RX ADMIN — IPRATROPIUM BROMIDE 0.5 MG: 0.5 SOLUTION RESPIRATORY (INHALATION) at 19:46

## 2025-06-03 RX ADMIN — CHLORHEXIDINE GLUCONATE 15 ML: 1.2 RINSE ORAL at 21:17

## 2025-06-03 RX ADMIN — EPOETIN ALFA-EPBX 10000 UNITS: 10000 INJECTION, SOLUTION INTRAVENOUS; SUBCUTANEOUS at 21:17

## 2025-06-03 RX ADMIN — MONTELUKAST 10 MG: 10 TABLET, FILM COATED ORAL at 08:20

## 2025-06-03 RX ADMIN — DORZOLAMIDE HYDROCHLORIDE AND TIMOLOL MALEATE 1 DROP: 20; 5 SOLUTION/ DROPS OPHTHALMIC at 22:33

## 2025-06-03 RX ADMIN — VANCOMYCIN HYDROCHLORIDE 125 MG: 1 INJECTION, POWDER, LYOPHILIZED, FOR SOLUTION INTRAVENOUS at 09:26

## 2025-06-03 ASSESSMENT — PAIN SCALES - GENERAL
PAINLEVEL_OUTOF10: 0

## 2025-06-03 ASSESSMENT — PULMONARY FUNCTION TESTS
PIF_VALUE: 10
PIF_VALUE: 11
PIF_VALUE: 12
PIF_VALUE: 11
PIF_VALUE: 11
PIF_VALUE: 13

## 2025-06-03 NOTE — INTERDISCIPLINARY ROUNDS
Interdisciplinary team rounds were held 6/3/2025 with the following team members: Physician, Nursing, Dietitian, Pharmacist, , and the CCU Charge RN.    Plan of care discussed. See clinical pathway and/or care plan for interventions and desired outcomes.    Goals of the Day: One-way extubation today and remove trialysis line once HD is completed to ensure fistula is working (as per Dr. Kennedy via [employer]).

## 2025-06-04 ENCOUNTER — APPOINTMENT (OUTPATIENT)
Facility: HOSPITAL | Age: 80
DRG: 870 | End: 2025-06-04
Attending: INTERNAL MEDICINE
Payer: MEDICARE

## 2025-06-04 LAB
ANION GAP SERPL CALC-SCNC: 5 MMOL/L (ref 2–12)
BUN SERPL-MCNC: 37 MG/DL (ref 6–20)
BUN/CREAT SERPL: 14 (ref 12–20)
CALCIUM SERPL-MCNC: 8.4 MG/DL (ref 8.5–10.1)
CHLORIDE SERPL-SCNC: 101 MMOL/L (ref 97–108)
CO2 SERPL-SCNC: 28 MMOL/L (ref 21–32)
CREAT SERPL-MCNC: 2.59 MG/DL (ref 0.7–1.3)
ERYTHROCYTE [DISTWIDTH] IN BLOOD BY AUTOMATED COUNT: 25.2 % (ref 11.5–14.5)
GLUCOSE BLD STRIP.AUTO-MCNC: 101 MG/DL (ref 65–117)
GLUCOSE BLD STRIP.AUTO-MCNC: 119 MG/DL (ref 65–117)
GLUCOSE BLD STRIP.AUTO-MCNC: 138 MG/DL (ref 65–117)
GLUCOSE BLD STRIP.AUTO-MCNC: 145 MG/DL (ref 65–117)
GLUCOSE SERPL-MCNC: 123 MG/DL (ref 65–100)
HCT VFR BLD AUTO: 22.6 % (ref 36.6–50.3)
HGB BLD-MCNC: 7.4 G/DL (ref 12.1–17)
MCH RBC QN AUTO: 29.7 PG (ref 26–34)
MCHC RBC AUTO-ENTMCNC: 32.7 G/DL (ref 30–36.5)
MCV RBC AUTO: 90.8 FL (ref 80–99)
NRBC # BLD: 0.1 K/UL (ref 0–0.01)
NRBC BLD-RTO: 0.6 PER 100 WBC
PLATELET # BLD AUTO: 174 K/UL (ref 150–400)
PMV BLD AUTO: 12.3 FL (ref 8.9–12.9)
POTASSIUM SERPL-SCNC: 4.1 MMOL/L (ref 3.5–5.1)
RBC # BLD AUTO: 2.49 M/UL (ref 4.1–5.7)
SERVICE CMNT-IMP: ABNORMAL
SERVICE CMNT-IMP: NORMAL
SODIUM SERPL-SCNC: 134 MMOL/L (ref 136–145)
WBC # BLD AUTO: 16.8 K/UL (ref 4.1–11.1)

## 2025-06-04 PROCEDURE — 94640 AIRWAY INHALATION TREATMENT: CPT

## 2025-06-04 PROCEDURE — 6360000002 HC RX W HCPCS: Performed by: STUDENT IN AN ORGANIZED HEALTH CARE EDUCATION/TRAINING PROGRAM

## 2025-06-04 PROCEDURE — 2500000003 HC RX 250 WO HCPCS: Performed by: HOSPITALIST

## 2025-06-04 PROCEDURE — 90935 HEMODIALYSIS ONE EVALUATION: CPT

## 2025-06-04 PROCEDURE — 2000000000 HC ICU R&B

## 2025-06-04 PROCEDURE — 6370000000 HC RX 637 (ALT 250 FOR IP): Performed by: HOSPITALIST

## 2025-06-04 PROCEDURE — 36415 COLL VENOUS BLD VENIPUNCTURE: CPT

## 2025-06-04 PROCEDURE — 85027 COMPLETE CBC AUTOMATED: CPT

## 2025-06-04 PROCEDURE — 6370000000 HC RX 637 (ALT 250 FOR IP): Performed by: INTERNAL MEDICINE

## 2025-06-04 PROCEDURE — 82962 GLUCOSE BLOOD TEST: CPT

## 2025-06-04 PROCEDURE — 6360000002 HC RX W HCPCS: Performed by: HOSPITALIST

## 2025-06-04 PROCEDURE — 2500000003 HC RX 250 WO HCPCS: Performed by: STUDENT IN AN ORGANIZED HEALTH CARE EDUCATION/TRAINING PROGRAM

## 2025-06-04 PROCEDURE — 71045 X-RAY EXAM CHEST 1 VIEW: CPT

## 2025-06-04 PROCEDURE — 94003 VENT MGMT INPAT SUBQ DAY: CPT

## 2025-06-04 PROCEDURE — 2580000003 HC RX 258: Performed by: HOSPITALIST

## 2025-06-04 PROCEDURE — 6370000000 HC RX 637 (ALT 250 FOR IP): Performed by: STUDENT IN AN ORGANIZED HEALTH CARE EDUCATION/TRAINING PROGRAM

## 2025-06-04 PROCEDURE — 80048 BASIC METABOLIC PNL TOTAL CA: CPT

## 2025-06-04 RX ORDER — ALBUMIN (HUMAN) 12.5 G/50ML
12.5 SOLUTION INTRAVENOUS AS NEEDED
Status: DISCONTINUED | OUTPATIENT
Start: 2025-06-04 | End: 2025-06-10 | Stop reason: HOSPADM

## 2025-06-04 RX ORDER — VALSARTAN 40 MG/1
20 TABLET ORAL
Status: DISCONTINUED | OUTPATIENT
Start: 2025-06-04 | End: 2025-06-06

## 2025-06-04 RX ADMIN — CHLORHEXIDINE GLUCONATE 15 ML: 1.2 RINSE ORAL at 09:12

## 2025-06-04 RX ADMIN — Medication 1 AMPULE: at 20:51

## 2025-06-04 RX ADMIN — Medication: at 09:25

## 2025-06-04 RX ADMIN — VANCOMYCIN HYDROCHLORIDE 125 MG: 1 INJECTION, POWDER, LYOPHILIZED, FOR SOLUTION INTRAVENOUS at 03:48

## 2025-06-04 RX ADMIN — DORZOLAMIDE HYDROCHLORIDE AND TIMOLOL MALEATE 1 DROP: 20; 5 SOLUTION/ DROPS OPHTHALMIC at 15:00

## 2025-06-04 RX ADMIN — CARVEDILOL 3.12 MG: 3.12 TABLET, FILM COATED ORAL at 09:11

## 2025-06-04 RX ADMIN — SODIUM CHLORIDE, PRESERVATIVE FREE 10 ML: 5 INJECTION INTRAVENOUS at 09:09

## 2025-06-04 RX ADMIN — HEPARIN SODIUM 5000 UNITS: 5000 INJECTION INTRAVENOUS; SUBCUTANEOUS at 20:49

## 2025-06-04 RX ADMIN — CHLORHEXIDINE GLUCONATE 15 ML: 1.2 RINSE ORAL at 20:51

## 2025-06-04 RX ADMIN — BUDESONIDE 250 MCG: 0.25 INHALANT RESPIRATORY (INHALATION) at 07:52

## 2025-06-04 RX ADMIN — VALSARTAN 20 MG: 40 TABLET, FILM COATED ORAL at 13:13

## 2025-06-04 RX ADMIN — ARFORMOTEROL TARTRATE 15 MCG: 15 SOLUTION RESPIRATORY (INHALATION) at 21:59

## 2025-06-04 RX ADMIN — DORZOLAMIDE HYDROCHLORIDE AND TIMOLOL MALEATE 1 DROP: 20; 5 SOLUTION/ DROPS OPHTHALMIC at 09:13

## 2025-06-04 RX ADMIN — PANTOPRAZOLE SODIUM 40 MG: 40 INJECTION, POWDER, LYOPHILIZED, FOR SOLUTION INTRAVENOUS at 09:08

## 2025-06-04 RX ADMIN — VANCOMYCIN HYDROCHLORIDE 125 MG: 1 INJECTION, POWDER, LYOPHILIZED, FOR SOLUTION INTRAVENOUS at 09:08

## 2025-06-04 RX ADMIN — MIRTAZAPINE 15 MG: 15 TABLET, FILM COATED ORAL at 20:50

## 2025-06-04 RX ADMIN — IPRATROPIUM BROMIDE 0.5 MG: 0.5 SOLUTION RESPIRATORY (INHALATION) at 01:54

## 2025-06-04 RX ADMIN — Medication 1 AMPULE: at 09:09

## 2025-06-04 RX ADMIN — CARVEDILOL 3.12 MG: 3.12 TABLET, FILM COATED ORAL at 18:09

## 2025-06-04 RX ADMIN — ARFORMOTEROL TARTRATE 15 MCG: 15 SOLUTION RESPIRATORY (INHALATION) at 07:52

## 2025-06-04 RX ADMIN — IPRATROPIUM BROMIDE 0.5 MG: 0.5 SOLUTION RESPIRATORY (INHALATION) at 16:50

## 2025-06-04 RX ADMIN — GABAPENTIN 100 MG: 100 CAPSULE ORAL at 20:50

## 2025-06-04 RX ADMIN — SODIUM CHLORIDE, PRESERVATIVE FREE 10 ML: 5 INJECTION INTRAVENOUS at 20:50

## 2025-06-04 RX ADMIN — IPRATROPIUM BROMIDE 0.5 MG: 0.5 SOLUTION RESPIRATORY (INHALATION) at 21:54

## 2025-06-04 RX ADMIN — IPRATROPIUM BROMIDE 0.5 MG: 0.5 SOLUTION RESPIRATORY (INHALATION) at 07:52

## 2025-06-04 RX ADMIN — Medication: at 20:51

## 2025-06-04 RX ADMIN — SODIUM CHLORIDE, PRESERVATIVE FREE 30 ML: 5 INJECTION INTRAVENOUS at 09:09

## 2025-06-04 RX ADMIN — HEPARIN SODIUM 5000 UNITS: 5000 INJECTION INTRAVENOUS; SUBCUTANEOUS at 06:38

## 2025-06-04 RX ADMIN — HEPARIN SODIUM 5000 UNITS: 5000 INJECTION INTRAVENOUS; SUBCUTANEOUS at 13:33

## 2025-06-04 RX ADMIN — BUDESONIDE 250 MCG: 0.25 INHALANT RESPIRATORY (INHALATION) at 21:59

## 2025-06-04 RX ADMIN — DORZOLAMIDE HYDROCHLORIDE AND TIMOLOL MALEATE 1 DROP: 20; 5 SOLUTION/ DROPS OPHTHALMIC at 20:51

## 2025-06-04 RX ADMIN — MONTELUKAST 10 MG: 10 TABLET, FILM COATED ORAL at 09:08

## 2025-06-04 ASSESSMENT — PULMONARY FUNCTION TESTS
PIF_VALUE: 11
PIF_VALUE: 10
PIF_VALUE: 10
PIF_VALUE: 11
PIF_VALUE: 10
PIF_VALUE: 10
PIF_VALUE: 11

## 2025-06-04 ASSESSMENT — PAIN SCALES - GENERAL
PAINLEVEL_OUTOF10: 0

## 2025-06-04 NOTE — INTERDISCIPLINARY ROUNDS
Critical care interdisciplinary rounds today.  Following members present: Case Management, Nursing, Nutrition, Pharmacy, and Physician. Family invited to participate.  Plan of care discussed.  See clinical pathway for plan of care and interventions and desired outcomes.    Pt. Remains intubated and in restraints.    CVC remains in the IJ for access.     Trialysis remains in the Fem.     Plan yesterday was to Remove Fem line after HD was completed on his Fistula to ensure patency. Dialysis nurse dialyzed patient off the Trialysis. Unknown why. Dr. Brown to reassess need for Fem line this afternoon.

## 2025-06-05 ENCOUNTER — APPOINTMENT (OUTPATIENT)
Facility: HOSPITAL | Age: 80
DRG: 870 | End: 2025-06-05
Attending: INTERNAL MEDICINE
Payer: MEDICARE

## 2025-06-05 LAB
ANION GAP SERPL CALC-SCNC: 5 MMOL/L (ref 2–12)
ARTERIAL PATENCY WRIST A: YES
BASE EXCESS BLDA CALC-SCNC: 1.2 MMOL/L
BDY SITE: ABNORMAL
BUN SERPL-MCNC: 47 MG/DL (ref 6–20)
BUN/CREAT SERPL: 14 (ref 12–20)
CALCIUM SERPL-MCNC: 8.5 MG/DL (ref 8.5–10.1)
CHLORIDE SERPL-SCNC: 101 MMOL/L (ref 97–108)
CO2 SERPL-SCNC: 29 MMOL/L (ref 21–32)
CREAT SERPL-MCNC: 3.33 MG/DL (ref 0.7–1.3)
ERYTHROCYTE [DISTWIDTH] IN BLOOD BY AUTOMATED COUNT: 26.5 % (ref 11.5–14.5)
GLUCOSE BLD STRIP.AUTO-MCNC: 102 MG/DL (ref 65–117)
GLUCOSE BLD STRIP.AUTO-MCNC: 104 MG/DL (ref 65–117)
GLUCOSE BLD STRIP.AUTO-MCNC: 106 MG/DL (ref 65–117)
GLUCOSE BLD STRIP.AUTO-MCNC: 131 MG/DL (ref 65–117)
GLUCOSE BLD STRIP.AUTO-MCNC: 68 MG/DL (ref 65–117)
GLUCOSE BLD STRIP.AUTO-MCNC: 68 MG/DL (ref 65–117)
GLUCOSE BLD STRIP.AUTO-MCNC: 72 MG/DL (ref 65–117)
GLUCOSE BLD STRIP.AUTO-MCNC: 87 MG/DL (ref 65–117)
GLUCOSE BLD STRIP.AUTO-MCNC: 87 MG/DL (ref 65–117)
GLUCOSE SERPL-MCNC: 96 MG/DL (ref 65–100)
HCO3 BLDA-SCNC: 29 MMOL/L (ref 22–26)
HCT VFR BLD AUTO: 23.3 % (ref 36.6–50.3)
HGB BLD-MCNC: 7.4 G/DL (ref 12.1–17)
MCH RBC QN AUTO: 29.7 PG (ref 26–34)
MCHC RBC AUTO-ENTMCNC: 31.8 G/DL (ref 30–36.5)
MCV RBC AUTO: 93.6 FL (ref 80–99)
NRBC # BLD: 0.02 K/UL (ref 0–0.01)
NRBC BLD-RTO: 0.1 PER 100 WBC
PCO2 BLDA: 60 MMHG (ref 35–45)
PH BLDA: 7.3 (ref 7.35–7.45)
PLATELET # BLD AUTO: 205 K/UL (ref 150–400)
PMV BLD AUTO: 12 FL (ref 8.9–12.9)
PO2 BLDA: 50 MMHG (ref 80–100)
POTASSIUM SERPL-SCNC: 4.6 MMOL/L (ref 3.5–5.1)
RBC # BLD AUTO: 2.49 M/UL (ref 4.1–5.7)
SAO2 % BLD: 81 % (ref 92–97)
SAO2% DEVICE SAO2% SENSOR NAME: ABNORMAL
SERVICE CMNT-IMP: ABNORMAL
SERVICE CMNT-IMP: ABNORMAL
SERVICE CMNT-IMP: NORMAL
SODIUM SERPL-SCNC: 135 MMOL/L (ref 136–145)
SPECIMEN SITE: ABNORMAL
WBC # BLD AUTO: 16.8 K/UL (ref 4.1–11.1)

## 2025-06-05 PROCEDURE — 6370000000 HC RX 637 (ALT 250 FOR IP): Performed by: HOSPITALIST

## 2025-06-05 PROCEDURE — 71045 X-RAY EXAM CHEST 1 VIEW: CPT

## 2025-06-05 PROCEDURE — 5A09457 ASSISTANCE WITH RESPIRATORY VENTILATION, 24-96 CONSECUTIVE HOURS, CONTINUOUS POSITIVE AIRWAY PRESSURE: ICD-10-PCS | Performed by: HOSPITALIST

## 2025-06-05 PROCEDURE — 6370000000 HC RX 637 (ALT 250 FOR IP): Performed by: INTERNAL MEDICINE

## 2025-06-05 PROCEDURE — 6360000002 HC RX W HCPCS: Performed by: HOSPITALIST

## 2025-06-05 PROCEDURE — 80048 BASIC METABOLIC PNL TOTAL CA: CPT

## 2025-06-05 PROCEDURE — 2580000003 HC RX 258: Performed by: HOSPITALIST

## 2025-06-05 PROCEDURE — 85027 COMPLETE CBC AUTOMATED: CPT

## 2025-06-05 PROCEDURE — 82803 BLOOD GASES ANY COMBINATION: CPT

## 2025-06-05 PROCEDURE — 2500000003 HC RX 250 WO HCPCS: Performed by: STUDENT IN AN ORGANIZED HEALTH CARE EDUCATION/TRAINING PROGRAM

## 2025-06-05 PROCEDURE — 6360000002 HC RX W HCPCS: Performed by: INTERNAL MEDICINE

## 2025-06-05 PROCEDURE — 6360000002 HC RX W HCPCS: Performed by: STUDENT IN AN ORGANIZED HEALTH CARE EDUCATION/TRAINING PROGRAM

## 2025-06-05 PROCEDURE — 6370000000 HC RX 637 (ALT 250 FOR IP): Performed by: STUDENT IN AN ORGANIZED HEALTH CARE EDUCATION/TRAINING PROGRAM

## 2025-06-05 PROCEDURE — 94640 AIRWAY INHALATION TREATMENT: CPT

## 2025-06-05 PROCEDURE — 2000000000 HC ICU R&B

## 2025-06-05 PROCEDURE — 94660 CPAP INITIATION&MGMT: CPT

## 2025-06-05 PROCEDURE — 36600 WITHDRAWAL OF ARTERIAL BLOOD: CPT

## 2025-06-05 PROCEDURE — 82962 GLUCOSE BLOOD TEST: CPT

## 2025-06-05 PROCEDURE — 36415 COLL VENOUS BLD VENIPUNCTURE: CPT

## 2025-06-05 PROCEDURE — P9047 ALBUMIN (HUMAN), 25%, 50ML: HCPCS | Performed by: INTERNAL MEDICINE

## 2025-06-05 PROCEDURE — 36556 INSERT NON-TUNNEL CV CATH: CPT

## 2025-06-05 PROCEDURE — 2580000003 HC RX 258: Performed by: STUDENT IN AN ORGANIZED HEALTH CARE EDUCATION/TRAINING PROGRAM

## 2025-06-05 PROCEDURE — 2580000003 HC RX 258: Performed by: NURSE PRACTITIONER

## 2025-06-05 RX ORDER — ALBUMIN (HUMAN) 12.5 G/50ML
25 SOLUTION INTRAVENOUS ONCE
Status: DISCONTINUED | OUTPATIENT
Start: 2025-06-05 | End: 2025-06-10 | Stop reason: HOSPADM

## 2025-06-05 RX ORDER — DEXTROSE MONOHYDRATE 100 MG/ML
INJECTION, SOLUTION INTRAVENOUS CONTINUOUS
Status: DISCONTINUED | OUTPATIENT
Start: 2025-06-05 | End: 2025-06-08 | Stop reason: DRUGHIGH

## 2025-06-05 RX ADMIN — HEPARIN SODIUM 5000 UNITS: 5000 INJECTION INTRAVENOUS; SUBCUTANEOUS at 21:53

## 2025-06-05 RX ADMIN — HEPARIN SODIUM 5000 UNITS: 5000 INJECTION INTRAVENOUS; SUBCUTANEOUS at 14:09

## 2025-06-05 RX ADMIN — MONTELUKAST 10 MG: 10 TABLET, FILM COATED ORAL at 08:01

## 2025-06-05 RX ADMIN — ARFORMOTEROL TARTRATE 15 MCG: 15 SOLUTION RESPIRATORY (INHALATION) at 20:25

## 2025-06-05 RX ADMIN — ARFORMOTEROL TARTRATE 15 MCG: 15 SOLUTION RESPIRATORY (INHALATION) at 08:50

## 2025-06-05 RX ADMIN — CHLORHEXIDINE GLUCONATE 15 ML: 1.2 RINSE ORAL at 08:01

## 2025-06-05 RX ADMIN — BUDESONIDE 250 MCG: 0.25 INHALANT RESPIRATORY (INHALATION) at 08:50

## 2025-06-05 RX ADMIN — EPOETIN ALFA-EPBX 10000 UNITS: 10000 INJECTION, SOLUTION INTRAVENOUS; SUBCUTANEOUS at 20:57

## 2025-06-05 RX ADMIN — CHLORHEXIDINE GLUCONATE 15 ML: 1.2 RINSE ORAL at 21:04

## 2025-06-05 RX ADMIN — Medication: at 21:05

## 2025-06-05 RX ADMIN — DORZOLAMIDE HYDROCHLORIDE AND TIMOLOL MALEATE 1 DROP: 20; 5 SOLUTION/ DROPS OPHTHALMIC at 10:49

## 2025-06-05 RX ADMIN — Medication: at 10:49

## 2025-06-05 RX ADMIN — IPRATROPIUM BROMIDE 0.5 MG: 0.5 SOLUTION RESPIRATORY (INHALATION) at 03:57

## 2025-06-05 RX ADMIN — Medication 1 AMPULE: at 08:02

## 2025-06-05 RX ADMIN — MIRTAZAPINE 15 MG: 15 TABLET, FILM COATED ORAL at 20:56

## 2025-06-05 RX ADMIN — DORZOLAMIDE HYDROCHLORIDE AND TIMOLOL MALEATE 1 DROP: 20; 5 SOLUTION/ DROPS OPHTHALMIC at 14:10

## 2025-06-05 RX ADMIN — Medication 1 AMPULE: at 21:05

## 2025-06-05 RX ADMIN — ACETAMINOPHEN 650 MG: 325 TABLET ORAL at 20:56

## 2025-06-05 RX ADMIN — SODIUM CHLORIDE, PRESERVATIVE FREE 10 ML: 5 INJECTION INTRAVENOUS at 08:02

## 2025-06-05 RX ADMIN — DEXTROSE 125 ML: 10 SOLUTION INTRAVENOUS at 06:43

## 2025-06-05 RX ADMIN — HEPARIN SODIUM 5000 UNITS: 5000 INJECTION INTRAVENOUS; SUBCUTANEOUS at 06:40

## 2025-06-05 RX ADMIN — DORZOLAMIDE HYDROCHLORIDE AND TIMOLOL MALEATE 1 DROP: 20; 5 SOLUTION/ DROPS OPHTHALMIC at 21:04

## 2025-06-05 RX ADMIN — SODIUM CHLORIDE, PRESERVATIVE FREE 40 ML: 5 INJECTION INTRAVENOUS at 21:07

## 2025-06-05 RX ADMIN — IPRATROPIUM BROMIDE 0.5 MG: 0.5 SOLUTION RESPIRATORY (INHALATION) at 08:56

## 2025-06-05 RX ADMIN — BUDESONIDE 250 MCG: 0.25 INHALANT RESPIRATORY (INHALATION) at 20:26

## 2025-06-05 RX ADMIN — DEXTROSE 125 ML: 10 SOLUTION INTRAVENOUS at 07:08

## 2025-06-05 RX ADMIN — PANTOPRAZOLE SODIUM 40 MG: 40 INJECTION, POWDER, LYOPHILIZED, FOR SOLUTION INTRAVENOUS at 08:02

## 2025-06-05 RX ADMIN — GABAPENTIN 100 MG: 100 CAPSULE ORAL at 20:57

## 2025-06-05 RX ADMIN — IPRATROPIUM BROMIDE 0.5 MG: 0.5 SOLUTION RESPIRATORY (INHALATION) at 20:26

## 2025-06-05 RX ADMIN — CARVEDILOL 3.12 MG: 3.12 TABLET, FILM COATED ORAL at 17:50

## 2025-06-05 RX ADMIN — ALBUMIN (HUMAN) 12.5 G: 0.25 INJECTION, SOLUTION INTRAVENOUS at 11:36

## 2025-06-05 RX ADMIN — CARVEDILOL 3.12 MG: 3.12 TABLET, FILM COATED ORAL at 08:01

## 2025-06-05 RX ADMIN — DEXTROSE: 10 SOLUTION INTRAVENOUS at 20:55

## 2025-06-05 ASSESSMENT — PAIN DESCRIPTION - ORIENTATION: ORIENTATION: POSTERIOR

## 2025-06-05 ASSESSMENT — PAIN DESCRIPTION - LOCATION: LOCATION: NECK

## 2025-06-05 ASSESSMENT — PAIN SCALES - GENERAL
PAINLEVEL_OUTOF10: 0
PAINLEVEL_OUTOF10: 2
PAINLEVEL_OUTOF10: 0
PAINLEVEL_OUTOF10: 1

## 2025-06-05 NOTE — INTERDISCIPLINARY ROUNDS
Critical care interdisciplinary rounds today.  Following members present: Case Management,  Nursing, Nutrition, Pharmacy, and Physician. Currently receiving HD.   Central line remains in for access.   Plan of care discussed.  See clinical pathway for plan of care and interventions and desired outcomes.

## 2025-06-05 NOTE — DIALYSIS
RN patient assessment performed for (LPN): Facundo Barrios  Vascular Access type/ assessment: AVF  Respiratory status: Unlabored  Cardiac Status: RRR  Edema: BLE 1+  Skin assessment: Dry,Warm  A&Ox : 2  VS reviewed: Yes  Hep B results, dates, and Primary Source: Epic  Hepatitis B Surface Ag   Date/Time Value Ref Range Status   05/24/2025 01:38 PM <0.10 Index Final     Hep B S Ag Interp   Date/Time Value Ref Range Status   05/24/2025 01:38 PM Negative NEG   Final     Hep B S Ab   Date/Time Value Ref Range Status   05/24/2025 01:38 PM <3.10 mIU/mL Final     Hep B S Ab Interp   Date/Time Value Ref Range Status   05/24/2025 01:38 PM NONREACTIVE NR   Final     Comment:     (NOTE)  The ADVIA Centaur Anti-HBs2 assay is traceable to the World Health   Organization (WHO) Hepatitis B Immunoglobulin 1st International   Reference Preparation (1977). Samples with a calculated value of 10   mIU/mL or greater are considered reactive (protective) in accordance   with the CDC guidelines. The accepted criteria for immunity to HBV is   anti-HBs activity greater than or equal to 10 mIU/mL, as defined by   the WHO International Reference Preparation.  Assay performance has not been established in pregnant women,   patients who are immunosuppressed or immunocompromised, nor have   performance characteristics been established in conjunction with   other 's assays for specific HBV serologic markers. This   assay does not differentiate between vaccine induced immune response   and a response due to infection with HBV. Passively acquired anti-HBs   may be identified following patient transfusion, receipt of   immunoglobulin products, etc.       Machine disinfect process:Acid/Heat

## 2025-06-06 PROBLEM — J96.01 ACUTE RESPIRATORY FAILURE WITH HYPOXIA (HCC): Status: ACTIVE | Noted: 2025-06-06

## 2025-06-06 LAB
ANION GAP SERPL CALC-SCNC: 5 MMOL/L (ref 2–12)
BUN SERPL-MCNC: 28 MG/DL (ref 6–20)
BUN/CREAT SERPL: 12 (ref 12–20)
CALCIUM SERPL-MCNC: 8.3 MG/DL (ref 8.5–10.1)
CHLORIDE SERPL-SCNC: 101 MMOL/L (ref 97–108)
CO2 SERPL-SCNC: 30 MMOL/L (ref 21–32)
CREAT SERPL-MCNC: 2.42 MG/DL (ref 0.7–1.3)
ERYTHROCYTE [DISTWIDTH] IN BLOOD BY AUTOMATED COUNT: 26 % (ref 11.5–14.5)
GLUCOSE BLD STRIP.AUTO-MCNC: 104 MG/DL (ref 65–117)
GLUCOSE BLD STRIP.AUTO-MCNC: 83 MG/DL (ref 65–117)
GLUCOSE BLD STRIP.AUTO-MCNC: 87 MG/DL (ref 65–117)
GLUCOSE BLD STRIP.AUTO-MCNC: 89 MG/DL (ref 65–117)
GLUCOSE BLD STRIP.AUTO-MCNC: 92 MG/DL (ref 65–117)
GLUCOSE SERPL-MCNC: 87 MG/DL (ref 65–100)
HCT VFR BLD AUTO: 22.3 % (ref 36.6–50.3)
HGB BLD-MCNC: 7 G/DL (ref 12.1–17)
MCH RBC QN AUTO: 29.7 PG (ref 26–34)
MCHC RBC AUTO-ENTMCNC: 31.4 G/DL (ref 30–36.5)
MCV RBC AUTO: 94.5 FL (ref 80–99)
NRBC # BLD: 0.02 K/UL (ref 0–0.01)
NRBC BLD-RTO: 0.2 PER 100 WBC
PLATELET # BLD AUTO: 249 K/UL (ref 150–400)
PMV BLD AUTO: 12 FL (ref 8.9–12.9)
POTASSIUM SERPL-SCNC: 3.9 MMOL/L (ref 3.5–5.1)
RBC # BLD AUTO: 2.36 M/UL (ref 4.1–5.7)
SERVICE CMNT-IMP: NORMAL
SODIUM SERPL-SCNC: 136 MMOL/L (ref 136–145)
WBC # BLD AUTO: 13.2 K/UL (ref 4.1–11.1)

## 2025-06-06 PROCEDURE — 36415 COLL VENOUS BLD VENIPUNCTURE: CPT

## 2025-06-06 PROCEDURE — 2500000003 HC RX 250 WO HCPCS: Performed by: HOSPITALIST

## 2025-06-06 PROCEDURE — 82962 GLUCOSE BLOOD TEST: CPT

## 2025-06-06 PROCEDURE — 85027 COMPLETE CBC AUTOMATED: CPT

## 2025-06-06 PROCEDURE — 6370000000 HC RX 637 (ALT 250 FOR IP): Performed by: INTERNAL MEDICINE

## 2025-06-06 PROCEDURE — 80048 BASIC METABOLIC PNL TOTAL CA: CPT

## 2025-06-06 PROCEDURE — 6370000000 HC RX 637 (ALT 250 FOR IP): Performed by: STUDENT IN AN ORGANIZED HEALTH CARE EDUCATION/TRAINING PROGRAM

## 2025-06-06 PROCEDURE — 6360000002 HC RX W HCPCS: Performed by: HOSPITALIST

## 2025-06-06 PROCEDURE — 2580000003 HC RX 258: Performed by: NURSE PRACTITIONER

## 2025-06-06 PROCEDURE — 94640 AIRWAY INHALATION TREATMENT: CPT

## 2025-06-06 PROCEDURE — 6360000002 HC RX W HCPCS: Performed by: STUDENT IN AN ORGANIZED HEALTH CARE EDUCATION/TRAINING PROGRAM

## 2025-06-06 PROCEDURE — 2500000003 HC RX 250 WO HCPCS: Performed by: STUDENT IN AN ORGANIZED HEALTH CARE EDUCATION/TRAINING PROGRAM

## 2025-06-06 PROCEDURE — 2060000000 HC ICU INTERMEDIATE R&B

## 2025-06-06 PROCEDURE — 99233 SBSQ HOSP IP/OBS HIGH 50: CPT | Performed by: NURSE PRACTITIONER

## 2025-06-06 PROCEDURE — 94660 CPAP INITIATION&MGMT: CPT

## 2025-06-06 PROCEDURE — 2700000000 HC OXYGEN THERAPY PER DAY

## 2025-06-06 PROCEDURE — 6370000000 HC RX 637 (ALT 250 FOR IP): Performed by: HOSPITALIST

## 2025-06-06 RX ORDER — DIAZEPAM 10 MG/2ML
2.5 INJECTION, SOLUTION INTRAMUSCULAR; INTRAVENOUS EVERY 30 MIN PRN
Status: DISCONTINUED | OUTPATIENT
Start: 2025-06-06 | End: 2025-06-09

## 2025-06-06 RX ORDER — HYDROMORPHONE HYDROCHLORIDE 1 MG/ML
1 INJECTION, SOLUTION INTRAMUSCULAR; INTRAVENOUS; SUBCUTANEOUS
Status: DISCONTINUED | OUTPATIENT
Start: 2025-06-06 | End: 2025-06-09

## 2025-06-06 RX ORDER — CARVEDILOL 3.12 MG/1
3.12 TABLET ORAL
Status: DISCONTINUED | OUTPATIENT
Start: 2025-06-06 | End: 2025-06-10 | Stop reason: HOSPADM

## 2025-06-06 RX ADMIN — IPRATROPIUM BROMIDE 0.5 MG: 0.5 SOLUTION RESPIRATORY (INHALATION) at 08:31

## 2025-06-06 RX ADMIN — CARVEDILOL 3.12 MG: 3.12 TABLET, FILM COATED ORAL at 09:00

## 2025-06-06 RX ADMIN — CHLORHEXIDINE GLUCONATE 15 ML: 1.2 RINSE ORAL at 07:46

## 2025-06-06 RX ADMIN — Medication 1 AMPULE: at 20:29

## 2025-06-06 RX ADMIN — MONTELUKAST 10 MG: 10 TABLET, FILM COATED ORAL at 08:55

## 2025-06-06 RX ADMIN — DORZOLAMIDE HYDROCHLORIDE AND TIMOLOL MALEATE 1 DROP: 20; 5 SOLUTION/ DROPS OPHTHALMIC at 20:32

## 2025-06-06 RX ADMIN — GABAPENTIN 100 MG: 100 CAPSULE ORAL at 20:29

## 2025-06-06 RX ADMIN — IPRATROPIUM BROMIDE 0.5 MG: 0.5 SOLUTION RESPIRATORY (INHALATION) at 01:10

## 2025-06-06 RX ADMIN — DORZOLAMIDE HYDROCHLORIDE AND TIMOLOL MALEATE 1 DROP: 20; 5 SOLUTION/ DROPS OPHTHALMIC at 07:46

## 2025-06-06 RX ADMIN — IPRATROPIUM BROMIDE 0.5 MG: 0.5 SOLUTION RESPIRATORY (INHALATION) at 13:05

## 2025-06-06 RX ADMIN — PANTOPRAZOLE SODIUM 40 MG: 40 INJECTION, POWDER, LYOPHILIZED, FOR SOLUTION INTRAVENOUS at 08:55

## 2025-06-06 RX ADMIN — IPRATROPIUM BROMIDE 0.5 MG: 0.5 SOLUTION RESPIRATORY (INHALATION) at 19:20

## 2025-06-06 RX ADMIN — SODIUM CHLORIDE, PRESERVATIVE FREE 10 ML: 5 INJECTION INTRAVENOUS at 20:34

## 2025-06-06 RX ADMIN — Medication: at 07:46

## 2025-06-06 RX ADMIN — HEPARIN SODIUM 5000 UNITS: 5000 INJECTION INTRAVENOUS; SUBCUTANEOUS at 13:47

## 2025-06-06 RX ADMIN — BUDESONIDE 250 MCG: 0.25 INHALANT RESPIRATORY (INHALATION) at 19:20

## 2025-06-06 RX ADMIN — DORZOLAMIDE HYDROCHLORIDE AND TIMOLOL MALEATE 1 DROP: 20; 5 SOLUTION/ DROPS OPHTHALMIC at 13:54

## 2025-06-06 RX ADMIN — Medication: at 20:31

## 2025-06-06 RX ADMIN — ARFORMOTEROL TARTRATE 15 MCG: 15 SOLUTION RESPIRATORY (INHALATION) at 19:20

## 2025-06-06 RX ADMIN — MIRTAZAPINE 15 MG: 15 TABLET, FILM COATED ORAL at 20:29

## 2025-06-06 RX ADMIN — ARFORMOTEROL TARTRATE 15 MCG: 15 SOLUTION RESPIRATORY (INHALATION) at 08:26

## 2025-06-06 RX ADMIN — SODIUM CHLORIDE, PRESERVATIVE FREE 10 ML: 5 INJECTION INTRAVENOUS at 07:46

## 2025-06-06 RX ADMIN — HEPARIN SODIUM 5000 UNITS: 5000 INJECTION INTRAVENOUS; SUBCUTANEOUS at 06:00

## 2025-06-06 RX ADMIN — Medication 1 AMPULE: at 07:47

## 2025-06-06 RX ADMIN — POLYETHYLENE GLYCOL 3350 17 G: 17 POWDER, FOR SOLUTION ORAL at 08:55

## 2025-06-06 RX ADMIN — DEXTROSE: 10 SOLUTION INTRAVENOUS at 22:38

## 2025-06-06 RX ADMIN — BUDESONIDE 250 MCG: 0.25 INHALANT RESPIRATORY (INHALATION) at 08:26

## 2025-06-06 ASSESSMENT — PAIN SCALES - GENERAL
PAINLEVEL_OUTOF10: 1
PAINLEVEL_OUTOF10: 0

## 2025-06-06 NOTE — INTERDISCIPLINARY ROUNDS
Critical care interdisciplinary rounds today.  Following members present: Case Management,  Nursing, Nutrition, Pharmacy, and Physician.   Removing Femoral line today. Keeping Central Line due to access difficulty.  For transfer to Step Down later today.     Plan of care discussed.  See clinical pathway for plan of care and interventions and desired outcomes.

## 2025-06-06 NOTE — WOUND CARE
Wound care consult for a pink skin tear that occurred on the coccyx area. Pt. Has been here for several weeks and he has decided on DNR and no intubation but he has opted for bipap at the bedside. Pt. Is malnourished / cachectic. Family is almost always at the bedside and supportive.   Assessment: Pt. Has been seen before for the right hip discoloration. This is not a wound any longer but a scar.  The coccyx area wound is pink and blanchable but it is painful to palpate.     Treatment: Zinc oxide applied to the wound bed and surrounding skin today and patient turn off of the sacrum completely.   Will continue the zinc oxide and off load the bony prominence at all times.   Martha Gil, RN, BSN, CWON

## 2025-06-07 LAB
ANION GAP SERPL CALC-SCNC: 7 MMOL/L (ref 2–12)
BUN SERPL-MCNC: 32 MG/DL (ref 6–20)
BUN/CREAT SERPL: 10 (ref 12–20)
CALCIUM SERPL-MCNC: 8.5 MG/DL (ref 8.5–10.1)
CHLORIDE SERPL-SCNC: 101 MMOL/L (ref 97–108)
CO2 SERPL-SCNC: 29 MMOL/L (ref 21–32)
CREAT SERPL-MCNC: 3.21 MG/DL (ref 0.7–1.3)
ERYTHROCYTE [DISTWIDTH] IN BLOOD BY AUTOMATED COUNT: 26.6 % (ref 11.5–14.5)
GLUCOSE BLD STRIP.AUTO-MCNC: 88 MG/DL (ref 65–117)
GLUCOSE BLD STRIP.AUTO-MCNC: 96 MG/DL (ref 65–117)
GLUCOSE SERPL-MCNC: 98 MG/DL (ref 65–100)
HCT VFR BLD AUTO: 22.7 % (ref 36.6–50.3)
HGB BLD-MCNC: 7.3 G/DL (ref 12.1–17)
MCH RBC QN AUTO: 30.4 PG (ref 26–34)
MCHC RBC AUTO-ENTMCNC: 32.2 G/DL (ref 30–36.5)
MCV RBC AUTO: 94.6 FL (ref 80–99)
NRBC # BLD: 0 K/UL (ref 0–0.01)
NRBC BLD-RTO: 0 PER 100 WBC
PLATELET # BLD AUTO: 256 K/UL (ref 150–400)
PMV BLD AUTO: 11.9 FL (ref 8.9–12.9)
POTASSIUM SERPL-SCNC: 4.1 MMOL/L (ref 3.5–5.1)
RBC # BLD AUTO: 2.4 M/UL (ref 4.1–5.7)
SERVICE CMNT-IMP: NORMAL
SERVICE CMNT-IMP: NORMAL
SODIUM SERPL-SCNC: 137 MMOL/L (ref 136–145)
WBC # BLD AUTO: 10.2 K/UL (ref 4.1–11.1)

## 2025-06-07 PROCEDURE — 90935 HEMODIALYSIS ONE EVALUATION: CPT

## 2025-06-07 PROCEDURE — 94640 AIRWAY INHALATION TREATMENT: CPT

## 2025-06-07 PROCEDURE — 94660 CPAP INITIATION&MGMT: CPT

## 2025-06-07 PROCEDURE — 2500000003 HC RX 250 WO HCPCS: Performed by: HOSPITALIST

## 2025-06-07 PROCEDURE — 85027 COMPLETE CBC AUTOMATED: CPT

## 2025-06-07 PROCEDURE — 6360000002 HC RX W HCPCS: Performed by: INTERNAL MEDICINE

## 2025-06-07 PROCEDURE — 82962 GLUCOSE BLOOD TEST: CPT

## 2025-06-07 PROCEDURE — 80048 BASIC METABOLIC PNL TOTAL CA: CPT

## 2025-06-07 PROCEDURE — 2060000000 HC ICU INTERMEDIATE R&B

## 2025-06-07 PROCEDURE — 6360000002 HC RX W HCPCS: Performed by: HOSPITALIST

## 2025-06-07 PROCEDURE — 36415 COLL VENOUS BLD VENIPUNCTURE: CPT

## 2025-06-07 PROCEDURE — 6360000002 HC RX W HCPCS: Performed by: STUDENT IN AN ORGANIZED HEALTH CARE EDUCATION/TRAINING PROGRAM

## 2025-06-07 PROCEDURE — 6370000000 HC RX 637 (ALT 250 FOR IP): Performed by: INTERNAL MEDICINE

## 2025-06-07 PROCEDURE — 2500000003 HC RX 250 WO HCPCS: Performed by: STUDENT IN AN ORGANIZED HEALTH CARE EDUCATION/TRAINING PROGRAM

## 2025-06-07 PROCEDURE — 6370000000 HC RX 637 (ALT 250 FOR IP): Performed by: HOSPITALIST

## 2025-06-07 PROCEDURE — 6370000000 HC RX 637 (ALT 250 FOR IP): Performed by: STUDENT IN AN ORGANIZED HEALTH CARE EDUCATION/TRAINING PROGRAM

## 2025-06-07 RX ORDER — ARFORMOTEROL TARTRATE 15 UG/2ML
15 SOLUTION RESPIRATORY (INHALATION)
Status: DISCONTINUED | OUTPATIENT
Start: 2025-06-07 | End: 2025-06-08

## 2025-06-07 RX ORDER — BUDESONIDE 0.25 MG/2ML
0.25 INHALANT ORAL
Status: DISCONTINUED | OUTPATIENT
Start: 2025-06-07 | End: 2025-06-08

## 2025-06-07 RX ADMIN — SODIUM CHLORIDE, PRESERVATIVE FREE 10 ML: 5 INJECTION INTRAVENOUS at 09:29

## 2025-06-07 RX ADMIN — BUDESONIDE 250 MCG: 0.25 INHALANT RESPIRATORY (INHALATION) at 08:32

## 2025-06-07 RX ADMIN — HEPARIN SODIUM 5000 UNITS: 5000 INJECTION INTRAVENOUS; SUBCUTANEOUS at 21:37

## 2025-06-07 RX ADMIN — Medication: at 21:36

## 2025-06-07 RX ADMIN — IPRATROPIUM BROMIDE 0.5 MG: 0.5 SOLUTION RESPIRATORY (INHALATION) at 14:13

## 2025-06-07 RX ADMIN — BUDESONIDE 250 MCG: 0.25 INHALANT RESPIRATORY (INHALATION) at 20:09

## 2025-06-07 RX ADMIN — DORZOLAMIDE HYDROCHLORIDE AND TIMOLOL MALEATE 1 DROP: 20; 5 SOLUTION/ DROPS OPHTHALMIC at 09:26

## 2025-06-07 RX ADMIN — ARFORMOTEROL TARTRATE 15 MCG: 15 SOLUTION RESPIRATORY (INHALATION) at 08:32

## 2025-06-07 RX ADMIN — SENNOSIDES 8.8 MG: 8.8 LIQUID ORAL at 16:04

## 2025-06-07 RX ADMIN — EPOETIN ALFA-EPBX 10000 UNITS: 10000 INJECTION, SOLUTION INTRAVENOUS; SUBCUTANEOUS at 21:14

## 2025-06-07 RX ADMIN — IPRATROPIUM BROMIDE 0.5 MG: 0.5 SOLUTION RESPIRATORY (INHALATION) at 20:04

## 2025-06-07 RX ADMIN — HEPARIN SODIUM 5000 UNITS: 5000 INJECTION INTRAVENOUS; SUBCUTANEOUS at 14:40

## 2025-06-07 RX ADMIN — POLYETHYLENE GLYCOL 3350 17 G: 17 POWDER, FOR SOLUTION ORAL at 09:26

## 2025-06-07 RX ADMIN — IPRATROPIUM BROMIDE 0.5 MG: 0.5 SOLUTION RESPIRATORY (INHALATION) at 08:32

## 2025-06-07 RX ADMIN — MIRTAZAPINE 15 MG: 15 TABLET, FILM COATED ORAL at 21:14

## 2025-06-07 RX ADMIN — GABAPENTIN 100 MG: 100 CAPSULE ORAL at 21:14

## 2025-06-07 RX ADMIN — Medication 1 AMPULE: at 09:29

## 2025-06-07 RX ADMIN — DOCUSATE SODIUM LIQUID 100 MG: 100 LIQUID ORAL at 09:26

## 2025-06-07 RX ADMIN — Medication: at 09:28

## 2025-06-07 RX ADMIN — MONTELUKAST 10 MG: 10 TABLET, FILM COATED ORAL at 09:26

## 2025-06-07 RX ADMIN — DORZOLAMIDE HYDROCHLORIDE AND TIMOLOL MALEATE 1 DROP: 20; 5 SOLUTION/ DROPS OPHTHALMIC at 14:41

## 2025-06-07 RX ADMIN — HEPARIN SODIUM 5000 UNITS: 5000 INJECTION INTRAVENOUS; SUBCUTANEOUS at 08:05

## 2025-06-07 RX ADMIN — Medication 1 AMPULE: at 21:14

## 2025-06-07 RX ADMIN — DORZOLAMIDE HYDROCHLORIDE AND TIMOLOL MALEATE 1 DROP: 20; 5 SOLUTION/ DROPS OPHTHALMIC at 21:36

## 2025-06-07 RX ADMIN — ARFORMOTEROL TARTRATE 15 MCG: 15 SOLUTION RESPIRATORY (INHALATION) at 20:09

## 2025-06-07 RX ADMIN — SODIUM CHLORIDE, PRESERVATIVE FREE 10 ML: 5 INJECTION INTRAVENOUS at 21:15

## 2025-06-07 RX ADMIN — PANTOPRAZOLE SODIUM 40 MG: 40 INJECTION, POWDER, LYOPHILIZED, FOR SOLUTION INTRAVENOUS at 09:26

## 2025-06-07 NOTE — FLOWSHEET NOTE
05/29/25 0930   Observations & Evaluations   Level of Consciousness 1   Heart Rhythm   (ICU monitoring in progress)   Respiratory Quality/Effort Unlabored   FiO2  30 %   O2 Device Ventilator   Bilateral Breath Sounds   (mild coarseness)   Skin Color   (appropriate for ethnicity)   Skin Condition/Temp Cool;Dry   Abdomen Inspection Flat   Bowel Sounds (All Quadrants) Active   Edema Generalized Trace   ICEBOAT   ICEBOAT I;C;E;B;O;A;T   Treatment   $CRRT $Yes  (treatment terminated per  order)   CRRT Activities   Intervention   (Treatment discontinued per order)   Ultrafiltrate Assessment   Ultrafiltrate Color Yellow/straw   Ultrafiltrate Appearance Clear   Hemodialysis Central Access - Temporary Right Femoral vein   Placement Date/Time: 05/25/25 1830   Present on Admission/Arrival: No  Inserted by: Carolee GRIFFIN  Insertion Practices: Chlorohexadine skin antisepsis;Hand hygiene;Maximal barrier precautions;Betadine skin antisepsis;Optimal catheter site selection;Steril...   Continued need for line? Yes   Site Assessment Clean, dry & intact   CVC Lumen Status Normal saline locked   Blue Lumen Status Flushed   Red Lumen Status Flushed   Line Care Cap changed;Connections checked and tightened   Dressing Type Bacteriocidal;Transparent   Date of Last Dressing Change 05/29/25   Dressing Status New dressing applied   Initiation of Therapy   Dialysis Nurse Intiation of CRRT Therapy Yes        05/29/25 0945   Vitals   Pulse 72   Respirations 14   Art Line (2)   Arterial Line BP 2 117/51   Arterial Line MAP (mmHg) 69 mmHg     Primary RN SBAR: DARLING Pate, RN  Comments: CRRT discontinued as per conversation with . Dressing changed/machine collected per policy.     
CRRT ROUNDING   05/28/25 8336   Observations & Evaluations   Level of Consciousness 1   Oriented X   (will need to be reevaluated)   Respiratory Quality/Effort Unlabored   FiO2  50 %   O2 Device Ventilator   Skin Color   (appropriate for ethnicity)   Skin Condition/Temp Cool;Dry   Abdomen Inspection Soft   Edema Generalized   Edema Generalized Trace  (inclduding scrotal)   Vital Signs   BP   (arterial line reading)   Pulse 85   Respirations (!) 9   SpO2 100 %   ICEBOAT   ICEBOAT I;C;E;B;O;A;T  (written consent in chart verified)   Treatment   $CRRT $Yes   Therapy Type CVVH   Non-CRRT Intake (Must also document in I&O flowsheet)   IV/IVPB (mL) 44 mL   Total Non-CRRT Intake (Calculated) 44   Non-CRRT Output (Must also document in I&O flowsheet)   Urine (mL) 0   Total Non-CRRT Output  (Calculated) 0 mL   System Used   System Used [place]   NxStage Calculation   (A) Total Non-NxStage Intake (mL) 44 mL   (C) Total Non-NxStage Output (mL) 0 mL/hr   Pressures ([place])   Access (mmHg) -54 mmHg   Return/Venous (mmHg) (!) 41 mmHg   TMP (mmHg) 100 mmHg   Pressure Drop (mmHg) 66 mmHg   Filter (mmHg) 143 mmHg   Effluent (mmHg) (!) -18 mmHg   Deaeration Chamber Check Yes   Flow Rates ([place])   Blood Flow (mL/min) 200 mL/min   Replacement Fluid Pre-Filter (mL/hr) 450 mL/hr   Replacement Filter Post-Filter (mL/hr) 450 mL/hr   Pre-Blood Pump (mL/hr) 900 mL/hr   [place] Calculation   (A) Total Non-[place] Intake (mL) 44 mL   (B) Total Non-[place] Output (mL) 0 mL   (C) Balance (A-B) 44 ml   (D) Physician Ordered Hourly Removal (mL) 50 ml   (E) Amount of UF removed last hour 113.1 ml   (F) Amount Programmed to Remove (I from last hour) 100   (G) Amount ahead or behind (E-F) 13.1   (H) Calculated Removal Rate for the next hour (C+D-G) 81   (I) Set Removal Rate for the next hour (see row info) 100   (J) Actual Fluid Balance (C-E) -69.1 ml   CRRT Activities   Intervention Ongoing   Ultrafiltrate Assessment   Ultrafiltrate Color Yellow/straw 
Pre Dialysis   06/03/25 2315   Treatment   Treatment Goal 1L in 3hrs   Observations & Evaluations   Level of Consciousness 0   Respiratory Quality/Effort Unlabored   FiO2  30 %   O2 Device Ventilator   Skin Condition/Temp Warm;Dry;Flaky   Abdomen Inspection Soft;Flat   Edema Left lower extremity;Right lower extremity;Right upper extremity;Left upper extremity   RUE Edema +1   LUE Edema +1   RLE Edema +1   LLE Edema +1   Vital Signs   /71   Temp 97.9 °F (36.6 °C)   Pulse 77   Respirations 23   SpO2 91 %   Technical Checks   Dialysis Machine No. 09   RO Machine Number [id number]   Dialyzer Lot No. [id number]   Tubing Lot Number [id number]   All Connections Secure Yes   NS Bag Yes   Saline Line Double Clamped Yes   Dialyzer Nipro ELISIO   Prime Volume (mL) 200 mL   ICEBOAT I;C;E;B;O;A;T   RO Machine Log Sheet Completed Yes   Machine Alarm Self Test Passed;Completed   Air Foam Detector Tested;Proper Function;pH Reading   Extracorporeal Circuit Tested for Integrity Yes   Machine Conductivity 14.0   Manual Ph 7.4   Bleach Test (Neg) Yes   [place] Temperature 98.6 °F (37 °C)   Hemodialysis Fistula/Graft Arteriovenous fistula Left Forearm   No placement date or time found.   Present on Admission/Arrival: Yes  Access Type: Arteriovenous fistula  Orientation: Left  Access Location: Forearm   Site Assessment Clean, dry & intact   Thrill Present   Bruit Present   Hemodialysis Central Access - Temporary Right Femoral vein   Placement Date/Time: 05/25/25 1830   Present on Admission/Arrival: No  Inserted by: Carolee GRIFFIN  Insertion Practices: Chlorohexadine skin antisepsis;Hand hygiene;Maximal barrier precautions;Betadine skin antisepsis;Optimal catheter site selection;Steril...   Continued need for line? Yes   Site Assessment Clean, dry & intact   CVC Lumen Status Alcohol cap present   Blue Lumen Status Alcohol cap present;Brisk blood return;Flushed   Red Lumen Status Alcohol cap present;Brisk blood return;Flushed   Line Care Connections 
Primary RN SBAR: CHADD Escalona  Incapacitated Nurse [facility]. provided: Yes  Patient Education provided: Access needle rotation  Preferred Education method and Primary language: English  Dialysis consent: Chronic Consent  Hospital General Consent Verified: Yes  Hospital associated wait time; reason: None  Hepatitis B Surface Ag   Date/Time Value Ref Range Status   05/24/2025 01:38 PM <0.10 Index Final     Hep B S Ag Interp   Date/Time Value Ref Range Status   05/24/2025 01:38 PM Negative NEG   Final     HEP B SURF AB   Date/Time Value Ref Range Status   04/11/2023 09:23 AM <3.10 mIU/mL Final     Hep B S Ab   Date/Time Value Ref Range Status   05/24/2025 01:38 PM <3.10 mIU/mL Final     Hep B S Ab Interp   Date/Time Value Ref Range Status   05/24/2025 01:38 PM NONREACTIVE NR   Final     Comment:     (NOTE)  The ADVIA Centaur Anti-HBs2 assay is traceable to the World Health   Organization (WHO) Hepatitis B Immunoglobulin 1st International   Reference Preparation (1977). Samples with a calculated value of 10   mIU/mL or greater are considered reactive (protective) in accordance   with the CDC guidelines. The accepted criteria for immunity to HBV is   anti-HBs activity greater than or equal to 10 mIU/mL, as defined by   the WHO International Reference Preparation.  Assay performance has not been established in pregnant women,   patients who are immunosuppressed or immunocompromised, nor have   performance characteristics been established in conjunction with   other 's assays for specific HBV serologic markers. This   assay does not differentiate between vaccine induced immune response   and a response due to infection with HBV. Passively acquired anti-HBs   may be identified following patient transfusion, receipt of   immunoglobulin products, etc.         PRE HD   06/07/25 1240   Vital Signs   /70   Temp 97.6 °F (36.4 °C)   Pain Assessment   Pain Assessment None - Denies Pain   Treatment   Time On 1248 
Primary RN SBAR: HECTOR Pacheco RN  Incapacitated Nurse [facility]. provided: Yes  Patient Education provided: N/A, pt intubated  Preferred Education method and Primary language: Verbal/English  Dialysis consent: Chronic applies  Hospital General Consent Verified: Yes  Hospital associated wait time; reason: N/A  Hepatitis B Surface Ag   Date/Time Value Ref Range Status   05/24/2025 01:38 PM <0.10 Index Final     Hep B S Ag Interp   Date/Time Value Ref Range Status   05/24/2025 01:38 PM Negative NEG   Final     HEP B SURF AB   Date/Time Value Ref Range Status   04/11/2023 09:23 AM <3.10 mIU/mL Final     Hep B S Ab   Date/Time Value Ref Range Status   05/24/2025 01:38 PM <3.10 mIU/mL Final     Hep B S Ab Interp   Date/Time Value Ref Range Status   05/24/2025 01:38 PM NONREACTIVE NR   Final     Comment:     (NOTE)  The ADVIA Centaur Anti-HBs2 assay is traceable to the World Health   Organization (WHO) Hepatitis B Immunoglobulin 1st International   Reference Preparation (1977). Samples with a calculated value of 10   mIU/mL or greater are considered reactive (protective) in accordance   with the CDC guidelines. The accepted criteria for immunity to HBV is   anti-HBs activity greater than or equal to 10 mIU/mL, as defined by   the WHO International Reference Preparation.  Assay performance has not been established in pregnant women,   patients who are immunosuppressed or immunocompromised, nor have   performance characteristics been established in conjunction with   other 's assays for specific HBV serologic markers. This   assay does not differentiate between vaccine induced immune response   and a response due to infection with HBV. Passively acquired anti-HBs   may be identified following patient transfusion, receipt of   immunoglobulin products, etc.       Pre-Treatment:       05/31/25 1315   Treatment   Treatment Goal 1000 ml   Observations & Evaluations   Level of Consciousness 1   Oriented X EZE, intubated 
English  Dialysis consent: yes, obtained 5/27/24 by dialysis RN  [facility] General Consent Verified: yes  Hospital associated wait time; reason: N/A  Hepatitis B Surface Ag   Date/Time Value Ref Range Status   05/24/2025 01:38 PM <0.10 Index Final     Hep B S Ag Interp   Date/Time Value Ref Range Status   05/24/2025 01:38 PM Negative NEG   Final     HEP B SURF AB   Date/Time Value Ref Range Status   04/11/2023 09:23 AM <3.10 mIU/mL Final     Hep B S Ab   Date/Time Value Ref Range Status   05/24/2025 01:38 PM <3.10 mIU/mL Final     Hep B S Ab Interp   Date/Time Value Ref Range Status   05/24/2025 01:38 PM NONREACTIVE NR   Final     Comment:     (NOTE)  The ADVIA Centaur Anti-HBs2 assay is traceable to the World Health   Organization (WHO) Hepatitis B Immunoglobulin 1st International   Reference Preparation (1977). Samples with a calculated value of 10   mIU/mL or greater are considered reactive (protective) in accordance   with the CDC guidelines. The accepted criteria for immunity to HBV is   anti-HBs activity greater than or equal to 10 mIU/mL, as defined by   the WHO International Reference Preparation.  Assay performance has not been established in pregnant women,   patients who are immunosuppressed or immunocompromised, nor have   performance characteristics been established in conjunction with   other 's assays for specific HBV serologic markers. This   assay does not differentiate between vaccine induced immune response   and a response due to infection with HBV. Passively acquired anti-HBs   may be identified following patient transfusion, receipt of   immunoglobulin products, etc.       [id number] filter running well with no indication for change at this time. Consents, patient, code status, labs and orders verified. R femoral CVC, dressing CDI. No signs of redness, drainage, or infection visualized. Lines visible and connections secure with blood warmer to return line at 37*C.  
Ventilator   Skin Color Dusky;Hyperpigmentation   Skin Condition/Temp Cool;Dry;Swollen/edematous   Abdomen Inspection Soft;Flat   Edema Generalized;Periorbital   Edema Generalized +1   Periorbital Edema +1   Vital Signs   BP 92/62   Temp 99.4 °F (37.4 °C)   Pulse 74   Respirations 14   ICEBOAT   ICEBOAT I;C;E;B;O;A;T   Treatment   $CRRT $Yes   Machine #   ([id number])   Cartridge Lot #   ([id number])   Therapy Type CVVH   System Used   System Used [place]   Pressures ([place])   Access (mmHg) (!) -38 mmHg   Return/Venous (mmHg) 72 mmHg   TMP (mmHg) 40 mmHg   Pressure Drop (mmHg) 52 mmHg   Filter (mmHg) 149 mmHg   Deaeration Chamber Check Yes   Flow Rates ([place])   Blood Flow (mL/min) 200 mL/min   Replacement Fluid Pre-Filter (mL/hr) 450 mL/hr   Replacement Filter Post-Filter (mL/hr) 450 mL/hr   Pre-Blood Pump (mL/hr) 900 mL/hr   [place] Calculation   (D) Physician Ordered Hourly Removal (mL) 0 ml   CRRT Activities   Intervention Initiated   Ultrafiltrate Assessment   Ultrafiltrate Color Yellow/straw   Ultrafiltrate Appearance Clear   Hemodialysis Fistula/Graft Arteriovenous fistula Left Forearm   No placement date or time found.   Present on Admission/Arrival: Yes  Access Type: Arteriovenous fistula  Orientation: Left  Access Location: Forearm   Site Assessment Clean, dry & intact   Thrill Present   Bruit Absent   Status Other (Comment)  (Not currently in use w/ CRRT)   Dressing Status Clean, dry & intact   Hemodialysis Central Access - Temporary Right Femoral vein   Placement Date/Time: 05/25/25 1830   Present on Admission/Arrival: No  Inserted by: Carolee GRIFFIN  Insertion Practices: Chlorohexadine skin antisepsis;Hand hygiene;Maximal barrier precautions;Betadine skin antisepsis;Optimal catheter site selection;Steril...   Continued need for line? Yes   Site Assessment Clean, dry & intact   CVC Lumen Status Infusing   Blue Lumen Status Brisk blood return;Flushed   Red Lumen Status Brisk blood return;Flushed   Line Care Ports 
  During Hemodialysis Assessment   Blood Flow Rate (ml/min) 400 ml/min   Arterial Pressure (mmHg) -160 mmHg   Venous Pressure (mmHg) 170   TMP 80      Access Visible Yes   Ultrafiltration Rate (ml/hr) 330 ml/hr   Ultrafiltration Removed (ml) 0 ml       Post Hd     05/24/25 1631   Treatment   Time Off 1631   Observations & Evaluations   Level of Consciousness 0   Oriented X 4   Heart Rhythm Regular   Respiratory Quality/Effort Unlabored   O2 Device High flow nasal cannula   Bilateral Breath Sounds Clear;Diminished   Skin Condition/Temp Dry;Warm   Abdomen Inspection Soft;Distended   Edema None   Vital Signs   Temp 98.4 °F (36.9 °C)   Pain Assessment   Pain Assessment None - Denies Pain   Hemodialysis Fistula/Graft Arteriovenous fistula Left Forearm   No placement date or time found.   Present on Admission/Arrival: Yes  Access Type: Arteriovenous fistula  Orientation: Left  Access Location: Forearm   Site Assessment Clean, dry & intact   Thrill Present   Bruit Present   Status Deaccessed   Date of Last Dressing Change 05/24/25   Dressing Intervention New   Post-Hemodialysis Assessment   Post-Treatment Procedures Blood returned;Access bleeding time < 10 minutes   Machine Disinfection Process Exterior Machine Disinfection;Acid/Vinegar Clean;Bleach   Rinseback Volume (ml) 300 ml   Blood Volume Processed (Liters) 65.6 L   Dialyzer Clearance Lightly streaked   Duration of Treatment (minutes) 210 minutes   Heparin Amount Administered During Treatment (mL) 0 mL   Hemodialysis Intake (ml) 500 ml   Hemodialysis Output (ml) 1400 ml   NET Removed (ml) 900   Tolerated Treatment Fair   Interventions Taken Ultrafiltration goal decreased   Patient Response to Treatment Tolerated tx fair, bp low at the start of tx   Physician Notified Yes   Patient Disposition Other (Comment)   Primary RN SBAR: BernardaRN  Comments: Tolerated treatment fair, bp low at start of treatment, Dr. Watson aware, ordered to lower UF if needed, 
B SURF AB   Date/Time Value Ref Range Status   04/11/2023 09:23 AM <3.10 mIU/mL Final     Hep B S Ab   Date/Time Value Ref Range Status   05/24/2025 01:38 PM <3.10 mIU/mL Final     Hep B S Ab Interp   Date/Time Value Ref Range Status   05/24/2025 01:38 PM NONREACTIVE NR   Final     Comment:     (NOTE)  The ADVIA Centaur Anti-HBs2 assay is traceable to the World Health   Organization (WHO) Hepatitis B Immunoglobulin 1st International   Reference Preparation (1977). Samples with a calculated value of 10   mIU/mL or greater are considered reactive (protective) in accordance   with the CDC guidelines. The accepted criteria for immunity to HBV is   anti-HBs activity greater than or equal to 10 mIU/mL, as defined by   the WHO International Reference Preparation.  Assay performance has not been established in pregnant women,   patients who are immunosuppressed or immunocompromised, nor have   performance characteristics been established in conjunction with   other 's assays for specific HBV serologic markers. This   assay does not differentiate between vaccine induced immune response   and a response due to infection with HBV. Passively acquired anti-HBs   may be identified following patient transfusion, receipt of   immunoglobulin products, etc.       Post hd note    06/05/25 1415   Treatment   Time Off 1415   Treatment Goal 1000   Observations & Evaluations   Level of Consciousness 0   Oriented X 2   Heart Rhythm Regular   Respiratory Quality/Effort Unlabored   O2 Device Non-rebreather mask   Bilateral Breath Sounds Diminished   Skin Condition/Temp Cool;Dry;Flaky   Abdomen Inspection Flat   Edema Left lower extremity;Right lower extremity   RLE Edema Trace   LLE Edema Trace   Vital Signs   Temp 96.9 °F (36.1 °C)   Pain Assessment   Pain Assessment None - Denies Pain   During Hemodialysis Assessment   Blood Flow Rate (ml/min) 400 ml/min   Arterial Pressure (mmHg) -180 mmHg   Venous Pressure (mmHg) 160   TMP 60

## 2025-06-08 LAB
ERYTHROCYTE [DISTWIDTH] IN BLOOD BY AUTOMATED COUNT: 27 % (ref 11.5–14.5)
GLUCOSE BLD STRIP.AUTO-MCNC: 119 MG/DL (ref 65–117)
GLUCOSE BLD STRIP.AUTO-MCNC: 140 MG/DL (ref 65–117)
HCT VFR BLD AUTO: 23.6 % (ref 36.6–50.3)
HGB BLD-MCNC: 7.4 G/DL (ref 12.1–17)
MCH RBC QN AUTO: 29.6 PG (ref 26–34)
MCHC RBC AUTO-ENTMCNC: 31.4 G/DL (ref 30–36.5)
MCV RBC AUTO: 94.4 FL (ref 80–99)
NRBC # BLD: 0 K/UL (ref 0–0.01)
NRBC BLD-RTO: 0 PER 100 WBC
PLATELET # BLD AUTO: 256 K/UL (ref 150–400)
PMV BLD AUTO: 11.4 FL (ref 8.9–12.9)
RBC # BLD AUTO: 2.5 M/UL (ref 4.1–5.7)
SERVICE CMNT-IMP: ABNORMAL
SERVICE CMNT-IMP: ABNORMAL
WBC # BLD AUTO: 10.2 K/UL (ref 4.1–11.1)

## 2025-06-08 PROCEDURE — 85027 COMPLETE CBC AUTOMATED: CPT

## 2025-06-08 PROCEDURE — 2700000000 HC OXYGEN THERAPY PER DAY

## 2025-06-08 PROCEDURE — 6360000002 HC RX W HCPCS: Performed by: HOSPITALIST

## 2025-06-08 PROCEDURE — 6370000000 HC RX 637 (ALT 250 FOR IP): Performed by: STUDENT IN AN ORGANIZED HEALTH CARE EDUCATION/TRAINING PROGRAM

## 2025-06-08 PROCEDURE — 2580000003 HC RX 258: Performed by: NURSE PRACTITIONER

## 2025-06-08 PROCEDURE — 2500000003 HC RX 250 WO HCPCS: Performed by: STUDENT IN AN ORGANIZED HEALTH CARE EDUCATION/TRAINING PROGRAM

## 2025-06-08 PROCEDURE — 82962 GLUCOSE BLOOD TEST: CPT

## 2025-06-08 PROCEDURE — 2060000000 HC ICU INTERMEDIATE R&B

## 2025-06-08 PROCEDURE — 6370000000 HC RX 637 (ALT 250 FOR IP): Performed by: HOSPITALIST

## 2025-06-08 PROCEDURE — 6360000002 HC RX W HCPCS: Performed by: INTERNAL MEDICINE

## 2025-06-08 PROCEDURE — 6370000000 HC RX 637 (ALT 250 FOR IP): Performed by: INTERNAL MEDICINE

## 2025-06-08 PROCEDURE — 94640 AIRWAY INHALATION TREATMENT: CPT

## 2025-06-08 PROCEDURE — 6360000002 HC RX W HCPCS: Performed by: STUDENT IN AN ORGANIZED HEALTH CARE EDUCATION/TRAINING PROGRAM

## 2025-06-08 PROCEDURE — 5A0945A ASSISTANCE WITH RESPIRATORY VENTILATION, 24-96 CONSECUTIVE HOURS, HIGH NASAL FLOW/VELOCITY: ICD-10-PCS | Performed by: INTERNAL MEDICINE

## 2025-06-08 PROCEDURE — 2500000003 HC RX 250 WO HCPCS: Performed by: HOSPITALIST

## 2025-06-08 PROCEDURE — 36415 COLL VENOUS BLD VENIPUNCTURE: CPT

## 2025-06-08 RX ORDER — ARFORMOTEROL TARTRATE 15 UG/2ML
15 SOLUTION RESPIRATORY (INHALATION)
Status: DISCONTINUED | OUTPATIENT
Start: 2025-06-08 | End: 2025-06-10 | Stop reason: HOSPADM

## 2025-06-08 RX ORDER — BUDESONIDE 0.25 MG/2ML
0.25 INHALANT ORAL
Status: DISCONTINUED | OUTPATIENT
Start: 2025-06-08 | End: 2025-06-10 | Stop reason: HOSPADM

## 2025-06-08 RX ADMIN — SODIUM CHLORIDE, PRESERVATIVE FREE 10 ML: 5 INJECTION INTRAVENOUS at 09:47

## 2025-06-08 RX ADMIN — DORZOLAMIDE HYDROCHLORIDE AND TIMOLOL MALEATE 1 DROP: 20; 5 SOLUTION/ DROPS OPHTHALMIC at 20:30

## 2025-06-08 RX ADMIN — Medication 1 AMPULE: at 09:47

## 2025-06-08 RX ADMIN — HEPARIN SODIUM 5000 UNITS: 5000 INJECTION INTRAVENOUS; SUBCUTANEOUS at 21:32

## 2025-06-08 RX ADMIN — IPRATROPIUM BROMIDE 0.5 MG: 0.5 SOLUTION RESPIRATORY (INHALATION) at 19:49

## 2025-06-08 RX ADMIN — DEXTROSE: 10 SOLUTION INTRAVENOUS at 16:30

## 2025-06-08 RX ADMIN — SODIUM CHLORIDE, PRESERVATIVE FREE 10 ML: 5 INJECTION INTRAVENOUS at 20:30

## 2025-06-08 RX ADMIN — PANTOPRAZOLE SODIUM 40 MG: 40 INJECTION, POWDER, LYOPHILIZED, FOR SOLUTION INTRAVENOUS at 09:45

## 2025-06-08 RX ADMIN — ARFORMOTEROL TARTRATE 15 MCG: 15 SOLUTION RESPIRATORY (INHALATION) at 19:54

## 2025-06-08 RX ADMIN — IPRATROPIUM BROMIDE 0.5 MG: 0.5 SOLUTION RESPIRATORY (INHALATION) at 08:57

## 2025-06-08 RX ADMIN — Medication: at 20:30

## 2025-06-08 RX ADMIN — BUDESONIDE 250 MCG: 0.25 INHALANT RESPIRATORY (INHALATION) at 19:54

## 2025-06-08 RX ADMIN — DORZOLAMIDE HYDROCHLORIDE AND TIMOLOL MALEATE 1 DROP: 20; 5 SOLUTION/ DROPS OPHTHALMIC at 14:45

## 2025-06-08 RX ADMIN — ACETAMINOPHEN 650 MG: 325 TABLET ORAL at 09:46

## 2025-06-08 RX ADMIN — HEPARIN SODIUM 5000 UNITS: 5000 INJECTION INTRAVENOUS; SUBCUTANEOUS at 14:45

## 2025-06-08 RX ADMIN — BUDESONIDE 250 MCG: 0.25 INHALANT RESPIRATORY (INHALATION) at 08:57

## 2025-06-08 RX ADMIN — MONTELUKAST 10 MG: 10 TABLET, FILM COATED ORAL at 09:46

## 2025-06-08 RX ADMIN — MIRTAZAPINE 15 MG: 15 TABLET, FILM COATED ORAL at 21:30

## 2025-06-08 RX ADMIN — DORZOLAMIDE HYDROCHLORIDE AND TIMOLOL MALEATE 1 DROP: 20; 5 SOLUTION/ DROPS OPHTHALMIC at 09:46

## 2025-06-08 RX ADMIN — ARFORMOTEROL TARTRATE 15 MCG: 15 SOLUTION RESPIRATORY (INHALATION) at 08:57

## 2025-06-08 RX ADMIN — Medication: at 09:45

## 2025-06-08 RX ADMIN — Medication 1 AMPULE: at 21:10

## 2025-06-08 RX ADMIN — GABAPENTIN 100 MG: 100 CAPSULE ORAL at 21:30

## 2025-06-08 RX ADMIN — HEPARIN SODIUM 5000 UNITS: 5000 INJECTION INTRAVENOUS; SUBCUTANEOUS at 06:30

## 2025-06-08 RX ADMIN — ACETAMINOPHEN 650 MG: 325 TABLET ORAL at 21:30

## 2025-06-08 ASSESSMENT — PAIN DESCRIPTION - DESCRIPTORS
DESCRIPTORS: ACHING
DESCRIPTORS: DISCOMFORT

## 2025-06-08 ASSESSMENT — PAIN SCALES - GENERAL
PAINLEVEL_OUTOF10: 1
PAINLEVEL_OUTOF10: 6
PAINLEVEL_OUTOF10: 3

## 2025-06-08 ASSESSMENT — PAIN SCALES - WONG BAKER: WONGBAKER_NUMERICALRESPONSE: NO HURT

## 2025-06-08 ASSESSMENT — PAIN DESCRIPTION - LOCATION
LOCATION: CHEST
LOCATION: RIB CAGE

## 2025-06-08 ASSESSMENT — PAIN DESCRIPTION - ORIENTATION: ORIENTATION: OTHER (COMMENT)

## 2025-06-08 NOTE — CONSULTS
Cardiology Consult Note    CC: Fever/sepsis    PCP:Roma Solitario, ELVI - NP  Reason for consult:  CM  Requesting MD:  Dr. Munoz  Admit Date: 5/23/2025   Today's Date: 5/30/2025, when we are consulted.  Cardiologist:  Dr Uriostegui/Chun.   Cardiac Assessment/Plan:   1) Nonischemic CM 4/2023: EF 15-20%; No CAD @ cath 4/2023.      *Nl TSH;  High ferritin (2100) but normal iron % sat (29) 4/2023; No ARB at first d/t low BPs (added as outpt)      *LifeVest, prior compliance then stopped.            *EF 30% 2/2024: ICD rec; Patient declined ICD in 3/2024 and 8/2024:       *EF 15-20% 7/2024; Severe MR. Mod. TR. PASP 50-55.  2) CAD: no focal CAD @ cath 4/2023; Cath 7/2024 w/ worse CM: no CAD except 80% ostial D4: Med Rx only.  2) Brief PAT, NOT afib on initial ECG 4/2023; cont sinus.  3) MR: Mod-severe 4/2023; mod-severe visually & severe by  ERO 2/2024.    4) HTN: low BP limiting Rx 4/2023: higher BPs 2024: stopped midodrine 9/2024: on ARB/added BBlocker  5) Dyslipidemia, labs per PCP  6) ESRD; (in [place]); via left upper extremity AV fistula  7) Marked anemia (Hg 6.7) & heme + 4/2023  8) RA; ?PMR  9) Asthma, tobacco use.  10) Seizure 7/2023.  11) Cirrhosis; (prior EtOH);  large volume ascites 5/2025    Admitted 1 week ago w/ septic shock; no bacteremia; + C diff. Needing pressors.  Subsequent Pafib: IV amio; back to NSR, off amio.  Resp arrest w/ code 5/25 (CPR/epi/bicarb, ?PEA: unclear what was Rx).  2.7 L paracentesis 5/26.    Current cardiac meds: pravachol 10; levophed off.     Rec: add midodrine if needed to improve perfusion/facilitate volume Rx: hold for now.  If pressors/inotropes not needed, add back low dose bblocker/ARB as tolerated.  Hold pravachol w/ LFTs.  PO amio if recurrent Afib.       RHC 4/22/25: RA 21; RV 57/11; PA 59/25, mean 37; W 25/33, mean 24.  CO/I 3.7/2.1    YELENA 4/22/25:    Left Ventricle: Severely reduced left ventricular systolic function with a visually estimated EF of 20 - 25%. Left 
  Please disregard entry.  The patient has been seen by [facility] and we will defer to them.  They were contacted of the consult.  
After talking to patient and family, the patient is a current patient of [facility].  We have contacted Dr. Jovon Forrest and let him know of the consult.  Will defer to PAR.  
Consult received. Will review records, see patient, and make recommendations.  Full progress note to follow.  Thank you for the consult.  Pam Barry NP   Advanced Heart Failure  
Palliative Medicine  Patient Name: Darrel Patterson  YOB: 1945  MRN: [id number]  Age: 80 y.o.  Gender: male    Date of Initial Consult: 5/30/2025  Date of Service: 5/30/2025  Time: 1:17 PM  Provider: ELVI Logan NP  Hospital Day: 8  Admit Date: 5/23/2025  Referring Provider: Roe Munoz MD       Reasons for Consultation:  goals of care, code status    HISTORY OF PRESENT ILLNESS (HPI):   Darrel Patterson is a 80 y.o. male with a past medical history of ESRD on HD T, Th, Sa, COPD, NSTEMI, chronic systolic heart failure with EF 15%, GIB, who was admitted on 5/23/2025 from [facility] ED with c/o 2-3 days of progressively worsening malaise, intermittent confusion, dry cough and lethargy.  Missed last hemodialysis session due to malaise.  ED workup  revealed T. 102F, sats upper 80s on RA, improved to low 90s on 2LNC02.   Admitted for severe sepsis due to c-diff.  Bilirubin elevated at 3.2, wbc 18K, hgb 7.7, plt ct 226.  CTAP: shows large volume ascites, small to moderate pleural effusion with bilateral atelectasis. Cardiomegaly. Nodular liver. Gallbladder distended with several stones. 5/25: soft Bps, drop in sats 02 increased from 3L to 10L, c/o SOB and altered mentation. Went into respiratory arrest followed by cardiac arrest, received 2 rounds of CPR, 2 rounds of epi, 2 IV pushes of bicarb and 1 of IV calcium and ROSC achieved.  Intubated, levophed added, and transferred to ICU.  5/26: paracentesis 2700 mls removed. Started on CRRT with factor 0. Developed afib with RVR. 5/28: vasopressin added, not meeting criteria for SAT/SBT.  Amio drip off.   5/29: ECHO with an ejection fraction of 15 to 20% with moderate mitral regurgitation, moderate to severe tricuspid regurgitation, RVSP 45-55, with a dilated IVC and a large left pleural effusion, TAPSE 0.8, LVIDD 5.8 global hypokinesis.     5/30: remains intubated, levophed down to 1.9ml/hr, vasopressin off since yesterday.  LFTs last checked 5/28 
Seen and examined  Thanks for the consult  A/P:  ESRD TTS at [facility] ,missed Thursday  AVF  hyperkalemia  ANEMIA  Leukocytosis  Ascitis  Hypotension    AB  Blood cx  HD now  Decrease neurontin to qod  Discussed with him,daughter and dialysis team  
hemodialysis, TTS.  Missed Thursday, under-dialyzed.  2. History of AV fistula.  3. Leukocytosis.  4. Confusion.  5. Ascites plus nodular appearing liver.  6. Cachexia.    RECOMMENDATIONS:  As follows:  1. On dialysis now.  2. Blood cultures sent.  3. Worrisome image of the liver.  4. It will be difficult for fluid removal with soft blood pressure.  5. SIENA.  6. Discussed in length with him, dialysis nurse and his daughter.        MD MOLLY WILEY/AQS  D:  05/24/2025 13:10:03  T:  05/24/2025 13:56:31  JOB #:  [id number]     
ENDOSCOPY    UPPER GI ENDOSCOPY,BIOPSY  2023      Family History   Problem Relation Age of Onset    Cancer Father     Cancer Mother     Cancer Brother         colon    Asthma Sister      Social History     Tobacco Use    Smoking status: Former     Current packs/day: 0.00     Average packs/day: 0.8 packs/day for 30.0 years (22.5 ttl pk-yrs)     Types: Cigarettes     Start date:      Quit date:      Years since quittin.4     Passive exposure: Past    Smokeless tobacco: Never    Tobacco comments:     Quit smoking: Quit 30 years ago   Substance Use Topics    Alcohol use: No     Alcohol/week: 0.0 standard drinks of alcohol      No Known Allergies  Current Facility-Administered Medications   Medication Dose Route Frequency    budesonide-formoterol (SYMBICORT) 80-4.5 MCG/ACT inhaler 2 puff  2 puff Inhalation BID RT    albuterol sulfate HFA (PROVENTIL;VENTOLIN;PROAIR) 108 (90 Base) MCG/ACT inhaler 2 puff  2 puff Inhalation Q6H PRN    tiotropium (SPIRIVA RESPIMAT) 2.5 MCG/ACT inhaler 2 puff  2 puff Inhalation Daily RT    Ensifentrine SUSP 2.5 mL  2.5 mL Inhalation BID RT    dorzolamide-timolol (COSOPT) 2-0.5 % ophthalmic solution 1 drop  1 drop Both Eyes TID    gabapentin (NEURONTIN) capsule 300 mg  300 mg Oral QHS    hydrOXYzine HCl (ATARAX) tablet 25 mg  25 mg Oral TID PRN    mirtazapine (REMERON) tablet 15 mg  15 mg Oral Nightly    montelukast (SINGULAIR) tablet 10 mg  10 mg Oral Daily    pantoprazole (PROTONIX) tablet 40 mg  40 mg Oral QAM AC    pravastatin (PRAVACHOL) tablet 10 mg  10 mg Oral Daily    sevelamer (RENVELA) tablet 1,600 mg  1,600 mg Oral TID WC    [START ON 2025] valsartan (DIOVAN) tablet 40 mg  40 mg Oral Q MWF    sodium chloride flush 0.9 % injection 5-40 mL  5-40 mL IntraVENous 2 times per day    sodium chloride flush 0.9 % injection 5-40 mL  5-40 mL IntraVENous PRN    0.9 % sodium chloride infusion   IntraVENous PRN    ondansetron (ZOFRAN-ODT) disintegrating tablet 4 mg  4 mg 
(RENVELA) 800 MG tablet Take 2 tablets by mouth 3 times daily (with meals) 10/18/22  Yes Automatic Reconciliation, Ar   Ensifentrine (OHTUVAYRE) 3 MG/2.5ML SUSP Inhale 2.5 mLs into the lungs in the morning and at bedtime 3/9/25   Roma Solitario APRN - NP   gabapentin (NEURONTIN) 300 MG capsule Take 1 capsule by mouth nightly for 90 days. Max Daily Amount: 300 mg 25  Roma Solitario APRN - NP   hydrOXYzine pamoate (VISTARIL) 25 MG capsule TAKE 1 CAP BY MOUTH THREE (3) TIMES DAILY AS NEEDED FOR ITCHING. 25   Roma Solitario APRN - NP   albuterol sulfate HFA (PROVENTIL;VENTOLIN;PROAIR) 108 (90 Base) MCG/ACT inhaler INHALE 2 PUFFS BY INHALATION EVERY 4 HOURS AS NEEDED FOR WHEEZING. 10/4/24   Roma Solitario APRN - NP   Ergocalciferol (VITAMIN D) 62278 units CAPS Take 50,000 Units by mouth once a week 24   Janay Live MD   ferrous sulfate (IRON 325) 325 (65 Fe) MG tablet Take 1 tablet by mouth every morning (before breakfast)  Patient not taking: Reported on 2025    Automatic Reconciliation, Ar         Allergies/Social/Family History:     No Known Allergies   Social History     Tobacco Use    Smoking status: Former     Current packs/day: 0.00     Average packs/day: 0.8 packs/day for 30.0 years (22.5 ttl pk-yrs)     Types: Cigarettes     Start date:      Quit date:      Years since quittin.4     Passive exposure: Past    Smokeless tobacco: Never    Tobacco comments:     Quit smoking: Quit 30 years ago   Substance Use Topics    Alcohol use: No     Alcohol/week: 0.0 standard drinks of alcohol      Family History   Problem Relation Age of Onset    Cancer Father     Cancer Mother     Cancer Brother         colon    Asthma Sister        LABS AND  DATA:   Reviewed    Peak airway pressure:      Minute ventilation:

## 2025-06-09 ENCOUNTER — APPOINTMENT (OUTPATIENT)
Facility: HOSPITAL | Age: 80
DRG: 870 | End: 2025-06-09
Attending: INTERNAL MEDICINE
Payer: MEDICARE

## 2025-06-09 LAB
ALBUMIN SERPL-MCNC: 2 G/DL (ref 3.5–5)
ALBUMIN/GLOB SERPL: 0.5 (ref 1.1–2.2)
ALP SERPL-CCNC: 190 U/L (ref 45–117)
ALT SERPL-CCNC: 23 U/L (ref 12–78)
ANION GAP SERPL CALC-SCNC: 6 MMOL/L (ref 2–12)
AST SERPL-CCNC: 23 U/L (ref 15–37)
BILIRUB SERPL-MCNC: 1.6 MG/DL (ref 0.2–1)
BUN SERPL-MCNC: 34 MG/DL (ref 6–20)
BUN/CREAT SERPL: 12 (ref 12–20)
CALCIUM SERPL-MCNC: 8.3 MG/DL (ref 8.5–10.1)
CHLORIDE SERPL-SCNC: 97 MMOL/L (ref 97–108)
CO2 SERPL-SCNC: 33 MMOL/L (ref 21–32)
CREAT SERPL-MCNC: 2.93 MG/DL (ref 0.7–1.3)
ERYTHROCYTE [DISTWIDTH] IN BLOOD BY AUTOMATED COUNT: 26.3 % (ref 11.5–14.5)
GLOBULIN SER CALC-MCNC: 4.1 G/DL (ref 2–4)
GLUCOSE BLD STRIP.AUTO-MCNC: 101 MG/DL (ref 65–117)
GLUCOSE BLD STRIP.AUTO-MCNC: 108 MG/DL (ref 65–117)
GLUCOSE BLD STRIP.AUTO-MCNC: 113 MG/DL (ref 65–117)
GLUCOSE BLD STRIP.AUTO-MCNC: 134 MG/DL (ref 65–117)
GLUCOSE BLD STRIP.AUTO-MCNC: 223 MG/DL (ref 65–117)
GLUCOSE BLD STRIP.AUTO-MCNC: 229 MG/DL (ref 65–117)
GLUCOSE BLD STRIP.AUTO-MCNC: 41 MG/DL (ref 65–117)
GLUCOSE BLD STRIP.AUTO-MCNC: 44 MG/DL (ref 65–117)
GLUCOSE BLD STRIP.AUTO-MCNC: 50 MG/DL (ref 65–117)
GLUCOSE BLD STRIP.AUTO-MCNC: 53 MG/DL (ref 65–117)
GLUCOSE BLD STRIP.AUTO-MCNC: 56 MG/DL (ref 65–117)
GLUCOSE BLD STRIP.AUTO-MCNC: 61 MG/DL (ref 65–117)
GLUCOSE BLD STRIP.AUTO-MCNC: 61 MG/DL (ref 65–117)
GLUCOSE BLD STRIP.AUTO-MCNC: 63 MG/DL (ref 65–117)
GLUCOSE BLD STRIP.AUTO-MCNC: 65 MG/DL (ref 65–117)
GLUCOSE BLD STRIP.AUTO-MCNC: 75 MG/DL (ref 65–117)
GLUCOSE BLD STRIP.AUTO-MCNC: 93 MG/DL (ref 65–117)
GLUCOSE SERPL-MCNC: 102 MG/DL (ref 65–100)
HCT VFR BLD AUTO: 24.2 % (ref 36.6–50.3)
HGB BLD-MCNC: 7.5 G/DL (ref 12.1–17)
MAGNESIUM SERPL-MCNC: 1.9 MG/DL (ref 1.6–2.4)
MCH RBC QN AUTO: 29.8 PG (ref 26–34)
MCHC RBC AUTO-ENTMCNC: 31 G/DL (ref 30–36.5)
MCV RBC AUTO: 96 FL (ref 80–99)
NRBC # BLD: 0 K/UL (ref 0–0.01)
NRBC BLD-RTO: 0 PER 100 WBC
PHOSPHATE SERPL-MCNC: 3.7 MG/DL (ref 2.6–4.7)
PLATELET # BLD AUTO: 249 K/UL (ref 150–400)
PMV BLD AUTO: 11.3 FL (ref 8.9–12.9)
POTASSIUM SERPL-SCNC: 3.9 MMOL/L (ref 3.5–5.1)
PROT SERPL-MCNC: 6.1 G/DL (ref 6.4–8.2)
RBC # BLD AUTO: 2.52 M/UL (ref 4.1–5.7)
SERVICE CMNT-IMP: ABNORMAL
SERVICE CMNT-IMP: NORMAL
SODIUM SERPL-SCNC: 136 MMOL/L (ref 136–145)
WBC # BLD AUTO: 8.2 K/UL (ref 4.1–11.1)

## 2025-06-09 PROCEDURE — 2500000003 HC RX 250 WO HCPCS: Performed by: HOSPITALIST

## 2025-06-09 PROCEDURE — 2060000000 HC ICU INTERMEDIATE R&B

## 2025-06-09 PROCEDURE — 6360000002 HC RX W HCPCS: Performed by: INTERNAL MEDICINE

## 2025-06-09 PROCEDURE — 2500000003 HC RX 250 WO HCPCS: Performed by: STUDENT IN AN ORGANIZED HEALTH CARE EDUCATION/TRAINING PROGRAM

## 2025-06-09 PROCEDURE — 6360000002 HC RX W HCPCS: Performed by: HOSPITALIST

## 2025-06-09 PROCEDURE — 82962 GLUCOSE BLOOD TEST: CPT

## 2025-06-09 PROCEDURE — 6370000000 HC RX 637 (ALT 250 FOR IP): Performed by: INTERNAL MEDICINE

## 2025-06-09 PROCEDURE — 2580000003 HC RX 258: Performed by: INTERNAL MEDICINE

## 2025-06-09 PROCEDURE — 94640 AIRWAY INHALATION TREATMENT: CPT

## 2025-06-09 PROCEDURE — 83735 ASSAY OF MAGNESIUM: CPT

## 2025-06-09 PROCEDURE — 80053 COMPREHEN METABOLIC PANEL: CPT

## 2025-06-09 PROCEDURE — 84100 ASSAY OF PHOSPHORUS: CPT

## 2025-06-09 PROCEDURE — 92612 ENDOSCOPY SWALLOW (FEES) VID: CPT

## 2025-06-09 PROCEDURE — 2580000003 HC RX 258: Performed by: STUDENT IN AN ORGANIZED HEALTH CARE EDUCATION/TRAINING PROGRAM

## 2025-06-09 PROCEDURE — 36415 COLL VENOUS BLD VENIPUNCTURE: CPT

## 2025-06-09 PROCEDURE — 6360000002 HC RX W HCPCS: Performed by: STUDENT IN AN ORGANIZED HEALTH CARE EDUCATION/TRAINING PROGRAM

## 2025-06-09 PROCEDURE — 2700000000 HC OXYGEN THERAPY PER DAY

## 2025-06-09 PROCEDURE — 6370000000 HC RX 637 (ALT 250 FOR IP): Performed by: STUDENT IN AN ORGANIZED HEALTH CARE EDUCATION/TRAINING PROGRAM

## 2025-06-09 PROCEDURE — 71045 X-RAY EXAM CHEST 1 VIEW: CPT

## 2025-06-09 PROCEDURE — 99232 SBSQ HOSP IP/OBS MODERATE 35: CPT | Performed by: NURSE PRACTITIONER

## 2025-06-09 PROCEDURE — 85027 COMPLETE CBC AUTOMATED: CPT

## 2025-06-09 PROCEDURE — 6370000000 HC RX 637 (ALT 250 FOR IP): Performed by: HOSPITALIST

## 2025-06-09 PROCEDURE — 92610 EVALUATE SWALLOWING FUNCTION: CPT

## 2025-06-09 RX ORDER — DEXTROSE MONOHYDRATE 50 MG/ML
INJECTION, SOLUTION INTRAVENOUS CONTINUOUS
Status: DISCONTINUED | OUTPATIENT
Start: 2025-06-09 | End: 2025-06-09

## 2025-06-09 RX ORDER — DEXTROSE MONOHYDRATE 100 MG/ML
INJECTION, SOLUTION INTRAVENOUS CONTINUOUS
Status: DISCONTINUED | OUTPATIENT
Start: 2025-06-09 | End: 2025-06-10 | Stop reason: HOSPADM

## 2025-06-09 RX ORDER — HYDROMORPHONE HYDROCHLORIDE 1 MG/ML
1 SOLUTION ORAL
Refills: 0 | Status: DISCONTINUED | OUTPATIENT
Start: 2025-06-09 | End: 2025-06-10 | Stop reason: HOSPADM

## 2025-06-09 RX ADMIN — DEXTROSE 250 ML: 10 SOLUTION INTRAVENOUS at 16:14

## 2025-06-09 RX ADMIN — DEXTROSE 125 ML: 10 SOLUTION INTRAVENOUS at 12:31

## 2025-06-09 RX ADMIN — ARFORMOTEROL TARTRATE 15 MCG: 15 SOLUTION RESPIRATORY (INHALATION) at 07:30

## 2025-06-09 RX ADMIN — HEPARIN SODIUM 5000 UNITS: 5000 INJECTION INTRAVENOUS; SUBCUTANEOUS at 13:39

## 2025-06-09 RX ADMIN — DEXTROSE: 10 SOLUTION INTRAVENOUS at 18:02

## 2025-06-09 RX ADMIN — Medication 1 AMPULE: at 21:25

## 2025-06-09 RX ADMIN — SODIUM CHLORIDE, PRESERVATIVE FREE 10 ML: 5 INJECTION INTRAVENOUS at 08:39

## 2025-06-09 RX ADMIN — ARFORMOTEROL TARTRATE 15 MCG: 15 SOLUTION RESPIRATORY (INHALATION) at 20:44

## 2025-06-09 RX ADMIN — BUDESONIDE 250 MCG: 0.25 INHALANT RESPIRATORY (INHALATION) at 20:44

## 2025-06-09 RX ADMIN — DEXTROSE 125 ML: 10 SOLUTION INTRAVENOUS at 11:55

## 2025-06-09 RX ADMIN — IPRATROPIUM BROMIDE 0.5 MG: 0.5 SOLUTION RESPIRATORY (INHALATION) at 07:35

## 2025-06-09 RX ADMIN — DEXTROSE: 5 SOLUTION INTRAVENOUS at 16:14

## 2025-06-09 RX ADMIN — DORZOLAMIDE HYDROCHLORIDE AND TIMOLOL MALEATE 1 DROP: 20; 5 SOLUTION/ DROPS OPHTHALMIC at 08:39

## 2025-06-09 RX ADMIN — HEPARIN SODIUM 5000 UNITS: 5000 INJECTION INTRAVENOUS; SUBCUTANEOUS at 06:30

## 2025-06-09 RX ADMIN — DORZOLAMIDE HYDROCHLORIDE AND TIMOLOL MALEATE 1 DROP: 20; 5 SOLUTION/ DROPS OPHTHALMIC at 21:33

## 2025-06-09 RX ADMIN — Medication 1 AMPULE: at 09:55

## 2025-06-09 RX ADMIN — DEXTROSE 125 ML: 10 SOLUTION INTRAVENOUS at 17:45

## 2025-06-09 RX ADMIN — DEXTROSE: 5 SOLUTION INTRAVENOUS at 15:38

## 2025-06-09 RX ADMIN — DORZOLAMIDE HYDROCHLORIDE AND TIMOLOL MALEATE 1 DROP: 20; 5 SOLUTION/ DROPS OPHTHALMIC at 13:40

## 2025-06-09 RX ADMIN — SODIUM CHLORIDE, PRESERVATIVE FREE 10 ML: 5 INJECTION INTRAVENOUS at 21:25

## 2025-06-09 RX ADMIN — DEXTROSE 250 ML: 10 SOLUTION INTRAVENOUS at 20:46

## 2025-06-09 RX ADMIN — GABAPENTIN 100 MG: 100 CAPSULE ORAL at 21:24

## 2025-06-09 RX ADMIN — ACETAMINOPHEN 650 MG: 325 TABLET ORAL at 03:37

## 2025-06-09 RX ADMIN — Medication: at 08:49

## 2025-06-09 RX ADMIN — BUDESONIDE 250 MCG: 0.25 INHALANT RESPIRATORY (INHALATION) at 07:30

## 2025-06-09 RX ADMIN — PANTOPRAZOLE SODIUM 40 MG: 40 INJECTION, POWDER, LYOPHILIZED, FOR SOLUTION INTRAVENOUS at 09:55

## 2025-06-09 RX ADMIN — IPRATROPIUM BROMIDE 0.5 MG: 0.5 SOLUTION RESPIRATORY (INHALATION) at 20:39

## 2025-06-09 RX ADMIN — MIRTAZAPINE 15 MG: 15 TABLET, FILM COATED ORAL at 21:24

## 2025-06-09 RX ADMIN — HEPARIN SODIUM 5000 UNITS: 5000 INJECTION INTRAVENOUS; SUBCUTANEOUS at 21:31

## 2025-06-09 ASSESSMENT — PAIN SCALES - GENERAL
PAINLEVEL_OUTOF10: 0
PAINLEVEL_OUTOF10: 8

## 2025-06-09 ASSESSMENT — PAIN DESCRIPTION - LOCATION: LOCATION: RIB CAGE

## 2025-06-09 ASSESSMENT — PAIN DESCRIPTION - DESCRIPTORS: DESCRIPTORS: ACHING;DISCOMFORT

## 2025-06-09 NOTE — CASE COMMUNICATION
Nursing messaged that pt pulled his NGT out while it was running at 45cc/hr. Reporting occasional cough. No desat or hemodynamic instability on VS.     Check 2 view CXR. Concern for aspiration pneumonia in this circumstance.

## 2025-06-09 NOTE — PLAN OF CARE
Problem: Chronic Conditions and Co-morbidities  Goal: Patient's chronic conditions and co-morbidity symptoms are monitored and maintained or improved  Outcome: Progressing     Problem: Discharge Planning  Goal: Discharge to home or other facility with appropriate resources  Outcome: Progressing     Problem: Skin/Tissue Integrity  Goal: Skin integrity remains intact  Description: 1.  Monitor for areas of redness and/or skin breakdown2.  Assess vascular access sites hourly3.  Every 4-6 hours minimum:  Change oxygen saturation probe site4.  Every 4-6 hours:  If on nasal continuous positive airway pressure, respiratory therapy assess nares and determine need for appliance change or resting period  Outcome: Progressing     Problem: ABCDS Injury Assessment  Goal: Absence of physical injury  Outcome: Progressing     Problem: Safety - Adult  Goal: Free from fall injury  Outcome: Progressing     
  Problem: Chronic Conditions and Co-morbidities  Goal: Patient's chronic conditions and co-morbidity symptoms are monitored and maintained or improved  Outcome: Progressing  Flowsheets (Taken 5/26/2025 2000)  Care Plan - Patient's Chronic Conditions and Co-Morbidity Symptoms are Monitored and Maintained or Improved:   Monitor and assess patient's chronic conditions and comorbid symptoms for stability, deterioration, or improvement   Collaborate with multidisciplinary team to address chronic and comorbid conditions and prevent exacerbation or deterioration   Update acute care plan with appropriate goals if chronic or comorbid symptoms are exacerbated and prevent overall improvement and discharge     Problem: Discharge Planning  Goal: Discharge to home or other facility with appropriate resources  Outcome: Progressing  Flowsheets (Taken 5/26/2025 2000)  Discharge to home or other facility with appropriate resources:   Identify barriers to discharge with patient and caregiver   Identify discharge learning needs (meds, wound care, etc)     Problem: Skin/Tissue Integrity  Goal: Skin integrity remains intact  Description: 1.  Monitor for areas of redness and/or skin breakdown2.  Assess vascular access sites hourly3.  Every 4-6 hours minimum:  Change oxygen saturation probe site4.  Every 4-6 hours:  If on nasal continuous positive airway pressure, respiratory therapy assess nares and determine need for appliance change or resting period  Outcome: Progressing  Flowsheets (Taken 5/26/2025 2000)  Skin Integrity Remains Intact:   Monitor for areas of redness and/or skin breakdown   Monitor skin under medical devices   Check visual cues for pain   Pressure redistribution bed/mattress (bed type)   Positioning devices   Turn and reposition as indicated     Problem: ABCDS Injury Assessment  Goal: Absence of physical injury  Outcome: Progressing  Flowsheets (Taken 5/26/2025 2000)  Absence of Physical Injury: Implement safety 
  Problem: Chronic Conditions and Co-morbidities  Goal: Patient's chronic conditions and co-morbidity symptoms are monitored and maintained or improved  Outcome: Progressing  Flowsheets (Taken 6/8/2025 1949)  Care Plan - Patient's Chronic Conditions and Co-Morbidity Symptoms are Monitored and Maintained or Improved:   Monitor and assess patient's chronic conditions and comorbid symptoms for stability, deterioration, or improvement   Collaborate with multidisciplinary team to address chronic and comorbid conditions and prevent exacerbation or deterioration   Update acute care plan with appropriate goals if chronic or comorbid symptoms are exacerbated and prevent overall improvement and discharge     Problem: Discharge Planning  Goal: Discharge to home or other facility with appropriate resources  Outcome: Progressing  Flowsheets (Taken 6/8/2025 1949)  Discharge to home or other facility with appropriate resources:   Identify barriers to discharge with patient and caregiver   Arrange for needed discharge resources and transportation as appropriate   Identify discharge learning needs (meds, wound care, etc)   Arrange for interpreters to assist at discharge as needed   Refer to discharge planning if patient needs post-hospital services based on physician order or complex needs related to functional status, cognitive ability or social support system     Problem: Skin/Tissue Integrity  Goal: Skin integrity remains intact  Description: 1.  Monitor for areas of redness and/or skin breakdown2.  Assess vascular access sites hourly3.  Every 4-6 hours minimum:  Change oxygen saturation probe site4.  Every 4-6 hours:  If on nasal continuous positive airway pressure, respiratory therapy assess nares and determine need for appliance change or resting period  Outcome: Progressing  Flowsheets (Taken 6/8/2025 1949)  Skin Integrity Remains Intact:   Monitor for areas of redness and/or skin breakdown   Assess vascular access sites 
  Problem: Respiratory - Adult  Goal: Achieves optimal ventilation and oxygenation  5/26/2025 2018 by Ormond, Brittany Michelle, RCP  Outcome: Progressing  5/26/2025 0740 by Kiran Carter, RT  Outcome: Not Progressing     
  Problem: Respiratory - Adult  Goal: Achieves optimal ventilation and oxygenation  6/3/2025 1959 by Meri Bar RCP  Outcome: Progressing     
  Problem: Respiratory - Adult  Goal: Achieves optimal ventilation and oxygenation  6/6/2025 1928 by Perla Barry RCP  Outcome: Progressing  6/6/2025 1411 by Jess Graff, RT  Outcome: Not Progressing     
  Problem: Safety - Medical Restraint  Goal: Remains free of injury from restraints (Restraint for Interference with Medical Device)  Description: INTERVENTIONS:1. Determine that other, less restrictive measures have been tried or would not be effective before applying the restraint2. Evaluate the patient's condition at the time of restraint application3. Inform patient/family regarding the reason for restraint4. Q2H: Monitor safety, psychosocial status, comfort, nutrition and hydration  6/3/2025 1133 by Vicky Rodrigues RN  Outcome: Progressing  6/2/2025 2209 by Lesia Rivera RN  Outcome: Progressing  Flowsheets  Taken 6/2/2025 2000 by Lesia Rivera RN  Remains free of injury from restraints (restraint for interference with medical device):   Determine that other, less restrictive measures have been tried or would not be effective before applying the restraint   Evaluate the patient's condition at the time of restraint application   Inform patient/family regarding the reason for restraint   Every 2 hours: Monitor safety, psychosocial status, comfort, nutrition and hydration  Taken 6/2/2025 1105 by Love Yeboah RN  Remains free of injury from restraints (restraint for interference with medical device):   Determine that other, less restrictive measures have been tried or would not be effective before applying the restraint   Evaluate the patient's condition at the time of restraint application   Inform patient/family regarding the reason for restraint   Every 2 hours: Monitor safety, psychosocial status, comfort, nutrition and hydration     
  Problem: Safety - Medical Restraint  Goal: Remains free of injury from restraints (Restraint for Interference with Medical Device)  Description: INTERVENTIONS:1. Determine that other, less restrictive measures have been tried or would not be effective before applying the restraint2. Evaluate the patient's condition at the time of restraint application3. Inform patient/family regarding the reason for restraint4. Q2H: Monitor safety, psychosocial status, comfort, nutrition and hydration  Outcome: Progressing     
I spoke to daughters at bedside and updated them about patient,s status.   Patient has been on PS 5 and PEEP 5 and he is awake, following commands.   I explained to daughters that we should attempt extubation with the under standing that he may fail extubation.   Daughters requesting to remove ventilator and re intubate if needed.   They requesting changing code status to DNR in case cardiac arrest  OK with intubation, reintubation, pressor support if needed at any point.   Daughters hope that once patient is extubated, he may be able to decide about ventilator needs in future  
Assist within 7 day(s).  6.  Patient will participate in upper extremity therapeutic exercise/activities with Stand by Assist for 3 minutes within 7 day(s).    5/24/2025 1317 by Lizbeth Lawson OT  Outcome: Not Progressing     
bed on 6L HF with daughter present in room and cleared for therapy by nursing. He completed bed mobility with min assist and was found soiled with stool when attempting stand with x2 person assist. Total assist required for rear pericare in standing and transfer to recliner chair deferred secondary to increased fatigue and drop in SpO2. Patient returned to supine and positioned into R side lying per his preference. Difficultly obtaining pleth for SpO2 with it ranging from 50s-90s. RN notified and in to assess patient. Patient was left in bed with all needs met, family present in room and bed alarmed.     Functional Outcome Measure:  The patient scored 12/24 on the [facility] outcome measure which is indicative of patient is functioning below his baseline for ADLs.         PLAN :  Recommendations and Planned Interventions:   self care training, therapeutic activities, functional mobility training, balance training, therapeutic exercise, endurance activities, cognitive retraining, patient education, home safety training, and family training/education    Frequency/Duration: OT Plan of Care: 3 times/week    Recommendation for discharge: (in order for the patient to meet his/her long term goals):   Moderate intensity short-term skilled occupational therapy up to 5x/week    Other factors to consider for discharge: patient's current support system is unable to meet their requirements for physical assistance, poor safety awareness, impaired cognition, high risk for falls, not safe to be alone, and concern for safely navigating or managing the home environment    IF patient discharges home will need the following DME: continuing to assess with progress       SUBJECTIVE:   Patient stated, “I'm really tired.”  OBJECTIVE DATA SUMMARY:     Past Medical History:   Diagnosis Date    Anemia due to chronic kidney disease     Asthma     Autoimmune disease     Rheumatoid arthritis    Chronic back pain     Diverticulosis     ESRD (end stage 
concerns and demonstrate effective coping strategies  Description: INTERVENTIONS:1. Assist patient/family to identify coping skills, available support systems and cultural and spiritual values2. Provide emotional support, including active listening and acknowledgement of concerns of patient and caregivers3. Reduce environmental stimuli, as able4. Instruct patient/family in relaxation techniques, as appropriate5. Assess for spiritual pain/suffering and initiate Spiritual Care, Psychosocial Clinical Specialist consults as needed  Recent Flowsheet Documentation  Taken 5/27/2025 2000 by Kamla Sanchez RN  Patient/family able to verbalize anxieties, fears, and concerns, and demonstrate effective coping:   Assist patient/family to identify coping skills, available support systems and cultural and spiritual values   Provide emotional support, including active listening and acknowledgement of concerns of patient and caregivers   Instruct patient/family in relaxation techniques, as appropriate     
improved  5/25/2025 1246 by Janet Amin, RN  Outcome: Not Progressing  Flowsheets (Taken 5/25/2025 0730)  Care Plan - Patient's Chronic Conditions and Co-Morbidity Symptoms are Monitored and Maintained or Improved:   Monitor and assess patient's chronic conditions and comorbid symptoms for stability, deterioration, or improvement   Collaborate with multidisciplinary team to address chronic and comorbid conditions and prevent exacerbation or deterioration   Update acute care plan with appropriate goals if chronic or comorbid symptoms are exacerbated and prevent overall improvement and discharge  5/25/2025 0023 by Kerry Pitt RN  Outcome: Progressing     Problem: Respiratory - Adult  Goal: Achieves optimal ventilation and oxygenation  5/25/2025 1246 by Janet Amin, RN  Outcome: Not Progressing  5/25/2025 0858 by Roma Duggan, RT  Outcome: Progressing  Flowsheets (Taken 5/25/2025 0730 by Janet Amin, RN)  Achieves optimal ventilation and oxygenation:   Assess for changes in respiratory status   Assess for changes in mentation and behavior   Position to facilitate oxygenation and minimize respiratory effort     
restraint   Every 2 hours: Monitor safety, psychosocial status, comfort, nutrition and hydration     Problem: Chronic Conditions and Co-morbidities  Goal: Patient's chronic conditions and co-morbidity symptoms are monitored and maintained or improved  5/29/2025 0221 by Kamla Sanchez RN  Reactivated  5/29/2025 0219 by Kamla Sanchez RN  Outcome: Completed  5/28/2025 1351 by Anitra Pate RN  Outcome: Not Progressing  Flowsheets (Taken 5/28/2025 0800)  Care Plan - Patient's Chronic Conditions and Co-Morbidity Symptoms are Monitored and Maintained or Improved:   Monitor and assess patient's chronic conditions and comorbid symptoms for stability, deterioration, or improvement   Collaborate with multidisciplinary team to address chronic and comorbid conditions and prevent exacerbation or deterioration     Problem: Discharge Planning  Goal: Discharge to home or other facility with appropriate resources  5/29/2025 0221 by Kamla Sanchez RN  Reactivated  5/29/2025 0219 by Kamla Sanchez RN  Outcome: Completed  5/28/2025 1351 by Anitra Pate RN  Outcome: Not Progressing  Flowsheets (Taken 5/28/2025 0800)  Discharge to home or other facility with appropriate resources:   Identify barriers to discharge with patient and caregiver   Arrange for needed discharge resources and transportation as appropriate   Identify discharge learning needs (meds, wound care, etc)   Refer to discharge planning if patient needs post-hospital services based on physician order or complex needs related to functional status, cognitive ability or social support system     Problem: ABCDS Injury Assessment  Goal: Absence of physical injury  5/29/2025 0221 by Kamla Sanchez RN  Reactivated  5/29/2025 0219 by Kamla Sanchez RN  Outcome: Completed  5/28/2025 1351 by Anitra Pate RN  Outcome: Not Progressing     Problem: Safety - Adult  Goal: Free from fall injury  5/29/2025 0221 by Kamla Sanchez 
concerns of patient and caregivers3. Reduce environmental stimuli, as able4. Instruct patient/family in relaxation techniques, as appropriate5. Assess for spiritual pain/suffering and initiate Spiritual Care, Psychosocial Clinical Specialist consults as needed  Outcome: Progressing     Problem: Nutrition Deficit:  Goal: Optimize nutritional status  Outcome: Progressing     
degree  Roughness: 1= mild degree  Breathiness: 0=normal  Asthenia: 1= mild degree  Strain: 0=normal    Respiratory Status/Airway:  Nasal cannula, 10 LPM    Outcome Measure:  Functional Oral Intake Scale (FOIS): 2--Tube dependent with minimal attempts of food or liquid    After treatment:   Patient left in no apparent distress in bed, Call bell left within reach, and Caregiver present    COMMUNICATION/EDUCATION:   The patient's plan of care including recommendations and/or  planned interventions were discussed with: Registered nurse and Physician    Patient/family have participated as able in goal setting and plan of care    Thank you,  Roma Plascencia, SLP    SLP Individual Minutes  Time In: 1115  Time Out: 1135  Minutes: 20      Problem: SLP Adult - Impaired Swallowing  Goal: By Discharge: Advance to least restrictive diet without signs or symptoms of aspiration for planned discharge setting.  See evaluation for individualized goals.  Description: Speech Therapy Goals:  1. Patient will tolerate safest and most efficient PO diet without clinical sequelae of aspiration. Goal initiated 6/9.  2. Patient will complete instrumental swallow assessment to objectively evaluate swallow function. Goal initiated 6/9.  Outcome: Progressing     
Maintain blood pressure and fluid volume within ordered parameters to optimize cerebral perfusion and minimize risk of hemorrhage   Monitor temperature, glucose, and sodium. Initiate appropriate interventions as ordered  Goal: Achieves maximal functionality and self care  5/29/2025 1536 by Anitra Pate RN  Outcome: Not Progressing  Flowsheets (Taken 5/29/2025 0800)  Achieves maximal functionality and self care:   Encourage and assist patient to increase activity and self care with guidance from physical therapy/occupational therapy   Encourage visually impaired, hearing impaired and aphasic patients to use assistive/communication devices     Problem: Cardiovascular - Adult  Goal: Maintains optimal cardiac output and hemodynamic stability  5/29/2025 1536 by Anitra Pate RN  Outcome: Not Progressing  Goal: Absence of cardiac dysrhythmias or at baseline  5/29/2025 1536 by Anitra Pate RN  Outcome: Not Progressing     Problem: Skin/Tissue Integrity - Adult  Goal: Skin integrity remains intact  Description: 1.  Monitor for areas of redness and/or skin breakdown2.  Assess vascular access sites hourly3.  Every 4-6 hours minimum:  Change oxygen saturation probe site4.  Every 4-6 hours:  If on nasal continuous positive airway pressure, respiratory therapy assess nares and determine need for appliance change or resting period  5/29/2025 1536 by Anitra Pate RN  Outcome: Not Progressing  Flowsheets  Taken 5/29/2025 0830  Skin Integrity Remains Intact:   Monitor for areas of redness and/or skin breakdown   Assess vascular access sites hourly  Taken 5/29/2025 0800  Skin Integrity Remains Intact:   Monitor for areas of redness and/or skin breakdown   Assess vascular access sites hourly  Goal: Oral mucous membranes remain intact  5/29/2025 1536 by Anitra Pate RN  Outcome: Not Progressing  Flowsheets  Taken 5/29/2025 0830  Oral Mucous Membranes Remain Intact: Assess oral mucosa and hygiene practices  Taken 
Short Forms Manual 4.0. Revised 2/2020.                                                                                                                                                                                                                              Pain Intervention(s):       Activity Tolerance:   Poor    After treatment:   Patient left in no apparent distress in bed, Call bell within reach, Bed/ chair alarm activated, and Caregiver / family present    COMMUNICATION/EDUCATION:   The patient's plan of care was discussed with: occupational therapist and registered nurse    Patient Education  Education Given To: Patient;Family  Education Provided: Role of Therapy;Plan of Care;Transfer Training  Education Method: Verbal  Barriers to Learning: None  Education Outcome: Continued education needed;Verbalized understanding    Thank you for this referral.  Maria Esther Cloud, PT, DPT  Minutes: 35      Physical Therapy Evaluation Charge Determination   History Examination Presentation Decision-Making   HIGH Complexity :3+ comorbidities / personal factors will impact the outcome/ POC  MEDIUM Complexity : 3 Standardized tests and measures addressin body structure, function, activity limitation and / or participation in recreation  MEDIUM Complexity : Evolving with changing characteristics  AM-PAC  HIGH    Based on the above components, the patient evaluation is determined to be of the following complexity level: Medium    
and/or oral residue that is cleared independently)     COMMUNICATION/EDUCATION:   The patient's plan of care including recommendations, planned interventions, and recommended diet changes were discussed with: Physician    Patient/family have participated as able in goal setting and plan of care and Patient/family agree to work toward stated goals and plan of care    Thank you,  Roma Plascencia, SLP  SLP Individual Minutes  Time In: 1115  Time Out: 1135  Minutes: 20      Problem: SLP Adult - Impaired Swallowing  Goal: By Discharge: Advance to least restrictive diet without signs or symptoms of aspiration for planned discharge setting.  See evaluation for individualized goals.  Description: Speech Therapy Goals:  1. Patient will tolerate safest and most efficient PO diet without clinical sequelae of aspiration. Goal initiated 6/9.  2. Patient will complete instrumental swallow assessment to objectively evaluate swallow function. Goal initiated 6/9. Goal met 6/9.  6/9/2025 1151 by Roma Plascencia, SLP  Outcome: Progressing  6/9/2025 1004 by Roma Plascencia, SLP  Outcome: Progressing     
INTERVENTIONS:1. Assist patient/family to identify coping skills, available support systems and cultural and spiritual values2. Provide emotional support, including active listening and acknowledgement of concerns of patient and caregivers3. Reduce environmental stimuli, as able4. Instruct patient/family in relaxation techniques, as appropriate5. Assess for spiritual pain/suffering and initiate Spiritual Care, Psychosocial Clinical Specialist consults as needed  Outcome: Progressing     Problem: Nutrition Deficit:  Goal: Optimize nutritional status  Outcome: Progressing     Problem: Chronic Conditions and Co-morbidities  Goal: Patient's chronic conditions and co-morbidity symptoms are monitored and maintained or improved  Outcome: Not Progressing  Flowsheets (Taken 6/7/2025 1939)  Care Plan - Patient's Chronic Conditions and Co-Morbidity Symptoms are Monitored and Maintained or Improved:   Monitor and assess patient's chronic conditions and comorbid symptoms for stability, deterioration, or improvement   Collaborate with multidisciplinary team to address chronic and comorbid conditions and prevent exacerbation or deterioration   Update acute care plan with appropriate goals if chronic or comorbid symptoms are exacerbated and prevent overall improvement and discharge     Problem: Respiratory - Adult  Goal: Achieves optimal ventilation and oxygenation  6/7/2025 2030 by Laura Mendez, RN  Outcome: Not Progressing  6/7/2025 2001 by Kizzy Bauer, RT  Outcome: Progressing  Flowsheets (Taken 6/7/2025 1939 by Laura Mendez, RN)  Achieves optimal ventilation and oxygenation:   Assess for changes in respiratory status   Assess for changes in mentation and behavior   Position to facilitate oxygenation and minimize respiratory effort   Oxygen supplementation based on oxygen saturation or arterial blood gases   Encourage broncho-pulmonary hygiene including cough, deep breathe, incentive spirometry   Assess the need for 
  Problem: Nutrition Deficit:  Goal: Optimize nutritional status  5/29/2025 0221 by Kamla Sanchez, RN  Reactivated  5/29/2025 0219 by Kamla Sanchez, CHADD  Outcome: Completed  5/28/2025 1351 by Anitra Pate, RN  Outcome: Not Progressing

## 2025-06-09 NOTE — WOUND CARE
Wound care consulted again for the right hip skin discoloration. We have examined it several times and called \"not a pressure injury\".  The discoloration is an old scar / discolored with smaller small hyperpigmented dots. Patient still states that this is not painful.  Treat it guardedly with regard to pressure on the area. Turn every two hours. No need for any Venelex to this area.   Martha Gil, RN, BSN, CWON

## 2025-06-10 VITALS
SYSTOLIC BLOOD PRESSURE: 86 MMHG | HEART RATE: 61 BPM | OXYGEN SATURATION: 95 % | TEMPERATURE: 95 F | HEIGHT: 72 IN | DIASTOLIC BLOOD PRESSURE: 50 MMHG | BODY MASS INDEX: 17.35 KG/M2 | WEIGHT: 128.09 LBS | RESPIRATION RATE: 21 BRPM

## 2025-06-10 LAB
ALBUMIN SERPL-MCNC: 2 G/DL (ref 3.5–5)
ALBUMIN/GLOB SERPL: 0.5 (ref 1.1–2.2)
ALP SERPL-CCNC: 168 U/L (ref 45–117)
ALT SERPL-CCNC: 22 U/L (ref 12–78)
ANION GAP SERPL CALC-SCNC: 6 MMOL/L (ref 2–12)
AST SERPL-CCNC: 18 U/L (ref 15–37)
BILIRUB SERPL-MCNC: 2 MG/DL (ref 0.2–1)
BUN SERPL-MCNC: 39 MG/DL (ref 6–20)
BUN/CREAT SERPL: 11 (ref 12–20)
CALCIUM SERPL-MCNC: 8.2 MG/DL (ref 8.5–10.1)
CHLORIDE SERPL-SCNC: 95 MMOL/L (ref 97–108)
CO2 SERPL-SCNC: 32 MMOL/L (ref 21–32)
CREAT SERPL-MCNC: 3.5 MG/DL (ref 0.7–1.3)
ERYTHROCYTE [DISTWIDTH] IN BLOOD BY AUTOMATED COUNT: 25.2 % (ref 11.5–14.5)
GLOBULIN SER CALC-MCNC: 4.2 G/DL (ref 2–4)
GLUCOSE BLD STRIP.AUTO-MCNC: 99 MG/DL (ref 65–117)
GLUCOSE SERPL-MCNC: 116 MG/DL (ref 65–100)
HCT VFR BLD AUTO: 24.7 % (ref 36.6–50.3)
HGB BLD-MCNC: 7.5 G/DL (ref 12.1–17)
MAGNESIUM SERPL-MCNC: 2 MG/DL (ref 1.6–2.4)
MCH RBC QN AUTO: 29.6 PG (ref 26–34)
MCHC RBC AUTO-ENTMCNC: 30.4 G/DL (ref 30–36.5)
MCV RBC AUTO: 97.6 FL (ref 80–99)
NRBC # BLD: 0 K/UL (ref 0–0.01)
NRBC BLD-RTO: 0 PER 100 WBC
PHOSPHATE SERPL-MCNC: 5.3 MG/DL (ref 2.6–4.7)
PLATELET # BLD AUTO: 232 K/UL (ref 150–400)
PMV BLD AUTO: 11.6 FL (ref 8.9–12.9)
POTASSIUM SERPL-SCNC: 4.2 MMOL/L (ref 3.5–5.1)
PROT SERPL-MCNC: 6.2 G/DL (ref 6.4–8.2)
RBC # BLD AUTO: 2.53 M/UL (ref 4.1–5.7)
SERVICE CMNT-IMP: NORMAL
SODIUM SERPL-SCNC: 133 MMOL/L (ref 136–145)
WBC # BLD AUTO: 7.4 K/UL (ref 4.1–11.1)

## 2025-06-10 PROCEDURE — 83735 ASSAY OF MAGNESIUM: CPT

## 2025-06-10 PROCEDURE — P9047 ALBUMIN (HUMAN), 25%, 50ML: HCPCS

## 2025-06-10 PROCEDURE — 2580000003 HC RX 258: Performed by: INTERNAL MEDICINE

## 2025-06-10 PROCEDURE — 6360000002 HC RX W HCPCS

## 2025-06-10 PROCEDURE — 85027 COMPLETE CBC AUTOMATED: CPT

## 2025-06-10 PROCEDURE — 82962 GLUCOSE BLOOD TEST: CPT

## 2025-06-10 PROCEDURE — 36415 COLL VENOUS BLD VENIPUNCTURE: CPT

## 2025-06-10 PROCEDURE — 80053 COMPREHEN METABOLIC PANEL: CPT

## 2025-06-10 PROCEDURE — 2700000000 HC OXYGEN THERAPY PER DAY

## 2025-06-10 PROCEDURE — 84100 ASSAY OF PHOSPHORUS: CPT

## 2025-06-10 RX ORDER — ALBUMIN (HUMAN) 12.5 G/50ML
25 SOLUTION INTRAVENOUS ONCE
Status: COMPLETED | OUTPATIENT
Start: 2025-06-10 | End: 2025-06-10

## 2025-06-10 RX ADMIN — ALBUMIN (HUMAN) 25 G: 0.25 INJECTION, SOLUTION INTRAVENOUS at 02:30

## 2025-06-10 RX ADMIN — DEXTROSE: 10 SOLUTION INTRAVENOUS at 01:04

## 2025-06-10 NOTE — DEATH NOTES
Death Pronouncement Note  Patient's Name: Darrel Patterson   Patient's YOB: 1945  MRN Number: [id number]    Admitting Provider: Reji Shah DO  Attending Provider: Randi Hedrick MD    Rapid response called for sudden bradycardia and agonal breathing. On my evaluation patient had a couple agonals breaths before peacefully passing with his daughter at the bedside. Daughter was agreeable to no aggressive measures  at this time and honoring patient's DNR, as witnessed by multiple RNs at bedside.    Patient was examined and the following were absent: Pulses, Blood Pressure, and Respiratory effort    I declared the patient dead on 6/10/2025 at 2:55 AM    Preliminary Cause of Death: Acute hypoxic respiratory failure (HCC)     Electronically signed by ELVI Brooke NP on 6/10/25 at 3:04 AM EDT

## 2025-06-10 NOTE — PROGRESS NOTES
Name: Darrel Patterson   MRN: [id number]  : 1945  2025 10:11 AM      Admit Date: 2025    Admit Diagnosis: Sepsis (HCC) [A41.9]  Lactic acidosis [E87.20]    Assessment/Plan:     Principal Problem:    Sepsis (HCC)  Active Problems:    Severe protein-calorie malnutrition    Cardiac arrest (HCC)    Acute systolic heart failure (HCC)  Resolved Problems:    * No resolved hospital problems. *          25                                                     Assessment:         Septic shock  Aylrlctzapgespx-Z-qzr with RVR  Encephalopathy  ventilator dependent respiratory failure  ESRD TTS at -[facility] [facility]    AVF;[facility]  Hyponatremia, mild, chronic  Severe, anemia-s/p transfusion  Cirrhosis,ascitis  Low EF     Discussion:         CRRT- to  AM.    I will continue to follow care from renal perspective.    Plan:   Had HD .  No acute need for HD today.  Assess for HD in AM.  4K bath.      WALT FLORENTINO.                   Signed By: Reginald Kennedy MD     2025         Subjective:   Hospital day 10   Seen and examined.      Review of Systems:  Review of systems not obtained due to patient factors.   Objective:    BP (!) 138/55   Pulse 75   Temp 98.6 °F (37 °C) (Oral)   Resp 17   Ht 1.829 m (6' 0.01\")   Wt 58.1 kg (128 lb 1.4 oz)   SpO2 94%   BMI 17.37 kg/m²   Physical Exam:  Physical Exam  Constitutional:       Appearance: He is ill-appearing.     On ventilator        Access-  Recent Results (from the past 24 hours)   Blood Gas, Arterial    Collection Time: 25 10:20 AM   Result Value Ref Range    pH, Arterial 7.31 (L) 7.35 - 7.45      pCO2, Arterial 34 (L) 35 - 45 mmHg    pO2, Arterial 163 (H) 80 - 100 mmHg    POC O2 SAT 99 (H) 92 - 97 %    HCO3, Arterial 16 (L) 22 - 26 mmol/L    Base deficit, arterial blood 8.9 mmol/L    FIO2 Arterial 70 %    Set Rate, POC 14      POC PEEP/CPA 8      Source 
                                                                                Name: Darrel Patterson   MRN: [id number]  : 1945  6/3/2025 10:23 AM      Admit Date: 2025    Admit Diagnosis: Sepsis (HCC) [A41.9]  Lactic acidosis [E87.20]    Assessment/Plan:     Principal Problem:    Sepsis (HCC)  Active Problems:    Severe protein-calorie malnutrition    Cardiac arrest (HCC)    Acute systolic heart failure (HCC)  Resolved Problems:    * No resolved hospital problems. *          25                                                     Assessment:         Septic shock  Isrltfdyouozyjd-V-ump with RVR  Encephalopathy  ventilator dependent respiratory failure  ESRD TTS at -[facility] [place]    AVF;[facility]  Hyponatremia, mild, chronic  Severe, anemia-s/p transfusion  Cirrhosis,ascitis  Low EF     Discussion:         CRRT- to  AM.    I will continue to follow care from renal perspective.    Plan:   Plan for HD today.  Palliative following.      WALT FLORENTINO.                   Signed By: Reginald Kennedy MD     Shira 3, 2025         Subjective:   Hospital day 11   Seen and examined.      Review of Systems:  Review of systems not obtained due to patient factors.   Objective:    /60   Pulse 77   Temp 98.7 °F (37.1 °C) (Oral)   Resp 25   Ht 1.829 m (6' 0.01\")   Wt 58.1 kg (128 lb 1.4 oz)   SpO2 97%   BMI 17.37 kg/m²   Physical Exam:  Physical Exam  Constitutional:       Appearance: He is ill-appearing.     On ventilator        Access-  Recent Results (from the past 24 hours)   Blood Gas, Arterial    Collection Time: 25 10:20 AM   Result Value Ref Range    pH, Arterial 7.31 (L) 7.35 - 7.45      pCO2, Arterial 34 (L) 35 - 45 mmHg    pO2, Arterial 163 (H) 80 - 100 mmHg    POC O2 SAT 99 (H) 92 - 97 %    HCO3, Arterial 16 (L) 22 - 26 mmol/L    Base deficit, arterial blood 8.9 mmol/L    FIO2 Arterial 70 %    Set Rate, POC 14      POC PEEP/CPA 8      Source ARTERIAL      Site RIGHT RADIAL      
                                                                                Name: Darrel Patterson   MRN: [id number]  : 1945  2025 2:14 PM      Admit Date: 2025    Admit Diagnosis: Sepsis (HCC) [A41.9]  Lactic acidosis [E87.20]    Assessment/Plan:     Principal Problem:    Sepsis (HCC)  Active Problems:    Severe protein-calorie malnutrition    Cardiac arrest (HCC)    Acute systolic heart failure (HCC)  Resolved Problems:    * No resolved hospital problems. *          25                                                     Assessment:         Septic shock  Srxxcncfpgpqbaw-L-rqo with RVR  Encephalopathy  ventilator dependent respiratory failure  [facility] [place]    AVF;[facility] [place]  Hyponatremia, mild, chronic  Severe, anemia-s/p transfusion  Cirrhosis,ascitis  Low EF     Discussion:         CRRT- to  AM.  Creatinine has trended down from 6.41-4.46 => 3.92 => 2.21   => 1.56today  I will continue to follow care from renal perspective.    Plan:   Plan for HD today.  WALT Potter.               Signed By: Reginald Kennedy MD     May 31, 2025         Subjective:   Hospital day 8   Seen and examined.    Daughter by bedside    Review of Systems:  Review of systems not obtained due to patient factors.   Objective:    BP (!) 140/50   Pulse 78   Temp 97.6 °F (36.4 °C)   Resp 17   Ht 1.829 m (6' 0.01\")   Wt 62.8 kg (138 lb 7.2 oz)   SpO2 97%   BMI 18.77 kg/m²   Physical Exam:  Physical Exam  Constitutional:       Appearance: He is ill-appearing.     On ventilator  30% FiO2  On Levophed drip  On fentanyl drip.  Unable to respond  On D10 drip    Access-  Recent Results (from the past 24 hours)   Blood Gas, Arterial    Collection Time: 25 10:20 AM   Result Value Ref Range    pH, Arterial 7.31 (L) 7.35 - 7.45      pCO2, Arterial 34 (L) 35 - 45 mmHg    pO2, Arterial 163 (H) 80 - 100 mmHg    POC O2 SAT 99 (H) 92 - 97 %    HCO3, Arterial 16 (L) 22 - 26 mmol/L    Base deficit, 
                                                                                Name: Darrel Patterson   MRN: [id number]  : 1945  2025 11:47 AM      Admit Date: 2025    Admit Diagnosis: Sepsis (HCC) [A41.9]  Lactic acidosis [E87.20]    Assessment/Plan:     Principal Problem:    Sepsis (HCC)  Resolved Problems:    * No resolved hospital problems. *          25                                                     Assessment:         Septic shock  Oqvxazwmvzvfdvh-N-gyw with RVR  Encephalopathy  ventilator dependent respiratory failure  ESRD TTS at -[facility] [place]    AVF;[facility]  Hyponatremia, mild, chronic  Severe, anemia-s/p transfusion  Cirrhosis,ascitis  Low EF     Discussion:     CRRT-day 3 today; net fluid balance +650 mL  and +993 on   Transfused yesterday.  Received albumin yesterday.  Creatinine has trended down from 6.41-4.46 => 3.92 => 2.21 today; potassium is also trending down  Not much fluid removal.  Overall positive fluid balance with hypoxic respiratory failure  I will continue to follow care from renal perspective.    Plan:   Challenge fluid removal-initiate 50 mL negative pressure and increase as tolerated for the remainder of duration of the filter life and then stop CVVH  Daily a.m. renal panel           Thank you for involving my team.       MD Dr Aysha Ellison  Cell no- [phone]  Available on perfect serve.      Signed By: Aysha Suazo MD     May 28, 2025         Subjective:   Hospital day 5   Seen and examined.  On CVVH.  Spoke to ICU nurse.  On amnio drip.  Review of Systems:  Review of systems not obtained due to patient factors.   Objective:    BP (!) 107/52   Pulse 69   Temp 99 °F (37.2 °C) (Axillary)   Resp 14   Ht 1.829 m (6' 0.01\")   Wt 64.1 kg (141 lb 5 oz)   SpO2 100%   BMI 19.16 kg/m²   Physical Exam:  Physical Exam  Constitutional:       Appearance: He is ill-appearing.     On CRRT-CVVH,  mL/min  50% FiO2  On 
                                                                                Name: Darrel Patterson   MRN: [id number]  : 1945  2025 9:03 AM      Admit Date: 2025    Admit Diagnosis: Sepsis (HCC) [A41.9]  Lactic acidosis [E87.20]    Assessment/Plan:     Principal Problem:    Sepsis (HCC)  Active Problems:    Severe protein-calorie malnutrition    Cardiac arrest (HCC)    Acute systolic heart failure (HCC)    Acute respiratory failure with hypoxia (HCC)  Resolved Problems:    * No resolved hospital problems. *          25                                                     Assessment:         Septic shock  Rnxudqpaqiwmghe-Q-msv with RVR  Encephalopathy  ventilator dependent respiratory failure  ESRD TTS at -[facility] [place]    AVF  Hyponatremia, mild, chronic  Severe, anemia-s/p transfusion  Cirrhosis,ascitis  Low EF     Discussion:         CRRT- to  AM.  Back on IHD   I will continue to follow care from renal perspective.    Plan:   HD today-> discussed with HD team  SIENA  BIPAP  Palliative following.      Will see again on Monday      DW RN.                   Signed By: Rosendo Guerra MD     2025         Subjective:   Hospital day 15   Seen and examined.      Resting on BIPAP  RN at bedside.       Review of Systems:  Review of systems not obtained due to patient factors.   Objective:    /68   Pulse 72   Temp 97.3 °F (36.3 °C) (Axillary)   Resp 18   Ht 1.829 m (6' 0.01\")   Wt 58.1 kg (128 lb 1.4 oz)   SpO2 100%   BMI 17.37 kg/m²   Physical Exam:  Physical Exam  Constitutional:       Appearance: He is ill-appearing.       Frail/Elderly/Resting  BIPAP  No edema          Access-  Recent Results (from the past 24 hours)   Blood Gas, Arterial    Collection Time: 25 10:20 AM   Result Value Ref Range    pH, Arterial 7.31 (L) 7.35 - 7.45      pCO2, Arterial 34 (L) 35 - 45 mmHg    pO2, Arterial 163 (H) 80 - 100 mmHg    POC O2 SAT 99 (H) 92 - 97 %    HCO3, Arterial 16 (L) 22 
                                                                                Name: Darrel Patterson   MRN: [id number]  : 1945  2025 10:15 AM      Admit Date: 2025    Admit Diagnosis: Sepsis (HCC) [A41.9]  Lactic acidosis [E87.20]    Assessment/Plan:     Principal Problem:    Sepsis (HCC)  Active Problems:    Severe protein-calorie malnutrition    Cardiac arrest (HCC)    Acute systolic heart failure (HCC)  Resolved Problems:    * No resolved hospital problems. *          25                                                     Assessment:         Septic shock  Eeyrhvebufpqldr-L-ooi with RVR  Encephalopathy  ventilator dependent respiratory failure  ESRD TTS at -[facility] [place]    AVF;[facility]  Hyponatremia, mild, chronic  Severe, anemia-s/p transfusion  Cirrhosis,ascitis  Low EF     Discussion:         CRRT- to  AM.    I will continue to follow care from renal perspective.    Plan:   No acute need for HD today.  Plan HD in AM.      Palliative following.      WALT FLORENTINO.                   Signed By: Reginald Kennedy MD     2025         Subjective:   Hospital day 14   Seen and examined.    Extubated.  No edema    Review of Systems:  Review of systems not obtained due to patient factors.   Objective:    BP (!) 115/43   Pulse 76   Temp 98.3 °F (36.8 °C) (Axillary)   Resp 20   Ht 1.829 m (6' 0.01\")   Wt 58.1 kg (128 lb 1.4 oz)   SpO2 99%   BMI 17.37 kg/m²   Physical Exam:  Physical Exam  Constitutional:       Appearance: He is ill-appearing.     On ventilator        Access-  Recent Results (from the past 24 hours)   Blood Gas, Arterial    Collection Time: 25 10:20 AM   Result Value Ref Range    pH, Arterial 7.31 (L) 7.35 - 7.45      pCO2, Arterial 34 (L) 35 - 45 mmHg    pO2, Arterial 163 (H) 80 - 100 mmHg    POC O2 SAT 99 (H) 92 - 97 %    HCO3, Arterial 16 (L) 22 - 26 mmol/L    Base deficit, arterial blood 8.9 mmol/L    FIO2 Arterial 70 %    Set Rate, POC 14      POC 
                     Cardiology Progress Note  6/5/2025     Admit Date: 5/23/2025  Admit Diagnosis: Sepsis (HCC) [A41.9]  Lactic acidosis [E87.20]  CC: none currently  Cardiologist:  Dr Uriostegui/Chun.   Cardiac Assessment/Plan:   1) Nonischemic CM 4/2023: EF 15-20%; No CAD @ cath 4/2023.      *Nl TSH;  High ferritin (2100) but normal iron % sat (29) 4/2023; No ARB at first d/t low BPs (added as outpt)      *LifeVest, prior compliance then stopped.            *EF 30% 2/2024: ICD rec; Patient declined ICD in 3/2024 and 8/2024:       *EF 15-20% 7/2024; Severe MR. Mod. TR. PASP 50-55.  2) CAD: no focal CAD @ cath 4/2023; Cath 7/2024 w/ worse CM: no CAD except 80% ostial D4: Med Rx only.  2) Brief PAT, NOT afib on initial ECG 4/2023; cont sinus.           *Brief PAFib 5/2025.  3) MR: Mod-severe 4/2023; mod-severe visually & severe by  ERO 2/2024.    4) HTN: low BP limiting Rx 4/2023: higher BPs 2024: stopped midodrine 9/2024: on ARB/added BBlocker  5) Dyslipidemia, labs per PCP  6) ESRD; (in [place]); via left upper extremity AV fistula  7) Marked anemia (Hg 6.7) & heme + 4/2023  8) RA; ?PMR  9) Asthma, tobacco use.  10) Seizure 7/2023.  11) Cirrhosis; (prior EtOH);  large volume ascites 5/2025  12) DNR 6/2025    Admitted 1 week ago w/ septic shock; no bacteremia; + C diff. Needing pressors.  Subsequent Pafib: IV amio; back to NSR, off amio.  Resp arrest w/ code 5/25 (CPR/epi/bicarb, ?PEA: unclear what was Rx).  2.7 L paracentesis 5/26.    Current cardiac meds: pravachol 10; levophed off.     Rec 5/30: add milrinone if needed to improve perfusion/facilitate volume Rx: hold for now.  If pressors/inotropes not needed, add back low dose bblocker/ARB as tolerated.  Hold pravachol w/ LFTs.  PO amio if recurrent Afib.     6/2: Remains intubated; off pressors.  -130s; HR 70s; sinus; 92% FiO 40  Na 133;K 3.4; Cr 3; Hg 7.6; plt 108; WBC 17.  Cardiac meds: coreg 3.125 bid; No new cardiac recs; future low dose ARB.    6/3: 
                     Cardiology Progress Note  6/6/2025     Admit Date: 5/23/2025  Admit Diagnosis: Sepsis (HCC) [A41.9]  Lactic acidosis [E87.20]  CC: none currently  Cardiologist:  Dr Uriostegui/Chun.   Cardiac Assessment/Plan:   1) Nonischemic CM 4/2023: EF 15-20%; No CAD @ cath 4/2023.      *Nl TSH;  High ferritin (2100) but normal iron % sat (29) 4/2023; No ARB at first d/t low BPs (added as outpt)      *LifeVest, prior compliance then stopped.            *EF 30% 2/2024: ICD rec; Patient declined ICD in 3/2024 and 8/2024:       *EF 15-20% 7/2024; Severe MR. Mod. TR. PASP 50-55.  2) CAD: no focal CAD @ cath 4/2023; Cath 7/2024 w/ worse CM: no CAD except 80% ostial D4: Med Rx only.  2) Brief PAT, NOT afib on initial ECG 4/2023; cont sinus.           *Brief PAFib 5/2025.  3) MR: Mod-severe 4/2023; mod-severe visually & severe by  ERO 2/2024.    4) HTN: low BP limiting Rx 4/2023: higher BPs 2024: stopped midodrine 9/2024: on ARB/added BBlocker  5) Dyslipidemia, labs per PCP  6) ESRD; (in [place]); via left upper extremity AV fistula  7) Marked anemia (Hg 6.7) & heme + 4/2023  8) RA; ?PMR  9) Asthma, tobacco use.  10) Seizure 7/2023.  11) Cirrhosis; (prior EtOH);  large volume ascites 5/2025  12) DNR 6/2025    Admitted 1 week ago w/ septic shock; no bacteremia; + C diff. Needing pressors.  Subsequent Pafib: IV amio; back to NSR, off amio.  Resp arrest w/ code 5/25 (CPR/epi/bicarb, ?PEA: unclear what was Rx).  2.7 L paracentesis 5/26.    Current cardiac meds: pravachol 10; levophed off.     Rec 5/30: add milrinone if needed to improve perfusion/facilitate volume Rx: hold for now.  If pressors/inotropes not needed, add back low dose bblocker/ARB as tolerated.  Hold pravachol w/ LFTs.  PO amio if recurrent Afib.     6/2: Remains intubated; off pressors.  -130s; HR 70s; sinus; 92% FiO 40  Na 133;K 3.4; Cr 3; Hg 7.6; plt 108; WBC 17.  Cardiac meds: coreg 3.125 bid; No new cardiac recs; future low dose ARB.    6/3: 
      Hospitalist Progress Note    NAME:   Darrel Patterson   : 1945   MRN: [id number]     Date/Time: 2025 7:31 AM  Patient PCP: Roma Solitario APRN - NP    Estimated discharge date:  Barriers: needs culture , resolution of sepsis , possible paracentesis , gi , renal     Assessment / Plan:  Severe sepsis-source  under investigation   Lactic acidosis  Admitted to stepdown unit  For cell count 14.4>>17.3>>18.0  Lactic acid 4.8>> 2.9>> 1.8  Chest x-ray on 2024-stable cardiomegaly mild pulmonary vascular congestive changes  CT abdomen pelvis with IV contrast-large volume ascites anasarca and small to moderate bilateral pleural effusion, nodular liver with cyst, cholelithiasis, bilateral renal atrophy  RVP - negative   Continue empiric vancomycin and Zosyn  Follow cultures and adjust antibiotics as indicated  Obtain sputum culture  Urinalysis with reflex culture- if patient able to make urine     ESRD on  hemodialysis  Hyponatremia   Hyperkalemia   Nephrology consulted for dialysis  Continue PTA sevelamer  Continue PTA as needed Atarax  May need lokelma      Large volume ascites-rule out SBP  Nodular architecture of liver  Elevated bilirubin, ALP of unclear significance  CT findings as above  Patient will need paracentesis already ordered  GI has been consulted    Acute on chronic anemia  Hemoglobin on arrival 7.6>> 8.3>>7.7   elevated  Follow-up haptoglobin peripheral smear  Check iron studies     Elevated troponin-suspect demand ischemia  CAD  Essential hypertension  Hyperlipidemia  Prolonged QT  Troponin trends:108>>94  EKG done on 2025: heart rate of 88 bpm, prolonged QT-QTc 537, left ventricular hypertrophy with repolarization, ST depression in inferior leads  Continue PTA pravastatin, valsartan  Continue to monitor on telemetry avoid QT prolonging agents     COPD  Formulary substitution budesonide arformoterol ipratropium nebulizer  Continue PTA 
      Hospitalist Progress Note    NAME:   Darrel Patterson   : 1945   MRN: [id number]     Date/Time: 2025 1:17 PM  Patient PCP: Roma Solitario APRN - NP    Estimated discharge date:  Barriers: clinical improvement       Assessment / Plan:     Patient was admitted under hospitalist service from [facility] for principal diagnosis of sepsis of unclear etiology on 2025 and started on broad-spectrum antibiotics.  Cultures were sent.  Abdominal imaging showed large volume ascites with nodular liver morphology (due as compared to CT abdomen on 2025) plus new hyperbilirubinemia/elevated alk phos but normal AST/ALT.  Pending diagnostic/therapeutic paracentesis.  Has been on vancomycin/Zosyn since admission.  Patient had hemodialysis 2025 and 900 mL fluid was removed.  Patient's blood pressure has been soft since admission and was on 3 L oxygen in the emergency department that worsened up to 10 L high flow on arrival and [facility].  Patient's hemodynamic and respiratory status has been at fence and today he started complaining of more shortness of breath and noted to have altered mentation.  Stat ABG showed no significant hypoxemia, chest x-ray showed bilateral airspace disease suspecting pneumonia.  At time of my evaluation, patient was lethargic and ill/toxic appearing.  He was shallow and rapid breathing, started on BiPAP but shortly after had respiratory arrest followed by cardiac arrest.  CODE BLUE was initiated, patient in total received 2 rounds of epi, 2 IV pushes of bicarb and 1 of IV calcium and ROSC was achieved after 2 rounds of CPR.  Patient was intubated and transferred to ICU for further management.  Daughter at bedside, updated and in communication with the rest of the family to arrive and discuss further goals of care.     2025-patient stays in persistent shock state despite fluids since in ICU, he is on 22 mics of Levophed this morning and has been 
     Nutrition Note    Chart reviewed, case discussed during CCU rounds.  Pt remains intubated and on TF at 35mL/h, lytes now WNL.  No decision yet made on timing of one way extubation.  Will advance TF to goal rate of Jevity 1.5 @ 45mL/h + Prosource BID + 50mL flush q 4h (provides 1780kcals/108gPro/232gCHO/1120mL)    Electronically signed by Krystyna Pleitez RD, [facility] on 6/4/25 at 12:25 PM EDT    Contact: ext [phone]    
     [facility] HPI.      Name: Darrel Patterson   : 1945   MRN: [id number]   Date: 2025      No chief complaint on file.      Reason for ICU:     Resp distress, hypoxemia and altered mentation followed by respiratory arrest and CODE BLUE 25     HPI & Hospital course     Patient has significant co-morbidity burden including   - ESRD on iHD.   - HfrEF with EF 20-25% per TTE in 2025, moderately reduced RV function   - COPD   - CAD   - Decompensated liver cirrhosis with large volume ascites   - HTN    Patient was admitted under hospitalist service from [facility] for principal diagnosis of sepsis of unclear etiology on 2025 and started on broad-spectrum antibiotics.  Cultures were sent.  Abdominal imaging showed large volume ascites with nodular liver morphology (due as compared to CT abdomen on 2025) plus new hyperbilirubinemia/elevated alk phos but normal AST/ALT.  Pending diagnostic/therapeutic paracentesis.  Has been on vancomycin/Zosyn since admission.  Patient had hemodialysis 2025 and 900 mL fluid was removed.  Patient's blood pressure has been soft since admission and was on 3 L oxygen in the emergency department that worsened up to 10 L high flow on arrival and [facility].  Patient's hemodynamic and respiratory status has been at fence and today he started complaining of more shortness of breath and noted to have altered mentation.  Stat ABG showed no significant hypoxemia, chest x-ray showed bilateral airspace disease suspecting pneumonia.  At time of my evaluation, patient was lethargic and ill/toxic appearing.  He was shallow and rapid breathing, started on BiPAP but shortly after had respiratory arrest followed by cardiac arrest.  CODE BLUE was initiated, patient in total received 2 rounds of epi, 2 IV pushes of bicarb and 1 of IV calcium and ROSC was achieved after 2 rounds of CPR.  Patient was intubated and transferred to ICU for further 
     [facility] HPI.      Name: Darrel Patterson   : 1945   MRN: [id number]   Date: 2025      No chief complaint on file.      Reason for ICU:     Resp distress, hypoxemia and altered mentation followed by respiratory arrest and CODE BLUE 25     HPI & Hospital course     Patient has significant co-morbidity burden including   - ESRD on iHD.   - HfrEF with EF 20-25% per TTE in 2025, moderately reduced RV function   - COPD   - CAD   - Decompensated liver cirrhosis with large volume ascites   - HTN    Patient was admitted under hospitalist service from [facility] for principal diagnosis of sepsis of unclear etiology on 2025 and started on broad-spectrum antibiotics.  Cultures were sent.  Abdominal imaging showed large volume ascites with nodular liver morphology (due as compared to CT abdomen on 2025) plus new hyperbilirubinemia/elevated alk phos but normal AST/ALT.  Pending diagnostic/therapeutic paracentesis.  Has been on vancomycin/Zosyn since admission.  Patient had hemodialysis 2025 and 900 mL fluid was removed.  Patient's blood pressure has been soft since admission and was on 3 L oxygen in the emergency department that worsened up to 10 L high flow on arrival and [facility].  Patient's hemodynamic and respiratory status has been at fence and today he started complaining of more shortness of breath and noted to have altered mentation.  Stat ABG showed no significant hypoxemia, chest x-ray showed bilateral airspace disease suspecting pneumonia.  At time of my evaluation, patient was lethargic and ill/toxic appearing.  He was shallow and rapid breathing, started on BiPAP but shortly after had respiratory arrest followed by cardiac arrest.  CODE BLUE was initiated, patient in total received 2 rounds of epi, 2 IV pushes of bicarb and 1 of IV calcium and ROSC was achieved after 2 rounds of CPR.  Patient was intubated and transferred to ICU for further 
     [facility] Progress Notes      Name: Darrel Patterson   : 1945   MRN: [id number]   Date: 2025      No chief complaint on file.      Reason for ICU:     Resp distress, hypoxemia and altered mentation followed by respiratory arrest and CODE BLUE 25     HPI & Hospital course     Patient has significant co-morbidity burden including   - ESRD on iHD.   - HfrEF with EF 20-25% per TTE in 2025, moderately reduced RV function   - COPD   - CAD   - Decompensated liver cirrhosis with large volume ascites   - HTN    Patient was admitted under hospitalist service from [facility] for principal diagnosis of sepsis of unclear etiology on 2025 and started on broad-spectrum antibiotics.  Cultures were sent.  Abdominal imaging showed large volume ascites with nodular liver morphology (due as compared to CT abdomen on 2025) plus new hyperbilirubinemia/elevated alk phos but normal AST/ALT.  Pending diagnostic/therapeutic paracentesis.  Has been on vancomycin/Zosyn since admission.  Patient had hemodialysis 2025 and 900 mL fluid was removed.  Patient's blood pressure has been soft since admission and was on 3 L oxygen in the emergency department that worsened up to 10 L high flow on arrival and [facility].  Patient's hemodynamic and respiratory status has been at fence and today he started complaining of more shortness of breath and noted to have altered mentation.  Stat ABG showed no significant hypoxemia, chest x-ray showed bilateral airspace disease suspecting pneumonia.  At time of my evaluation, patient was lethargic and ill/toxic appearing.  He was shallow and rapid breathing, started on BiPAP but shortly after had respiratory arrest followed by cardiac arrest.  CODE BLUE was initiated, patient in total received 2 rounds of epi, 2 IV pushes of bicarb and 1 of IV calcium and ROSC was achieved after 2 rounds of CPR.  Patient was intubated and transferred to ICU for 
   05/26/25 0629   B: Both Spontaneous Awakening and Breathing Trials   Did Patient Receive Sedative and/or Opioid IV Medications in the Last 24 Hours Continuously infused meds for sedation   Was Patient Receiving Mechanical Ventilation Yes   Safety Screening Spontaneous Breathing Trial (SBT) FIO2 is greater than 50%     Patient does not meet criteria for SBT at this time.  
   05/27/25 0432   B: Both Spontaneous Awakening and Breathing Trials   Was Patient Receiving Mechanical Ventilation Yes   Safety Screening Spontaneous Breathing Trial (SBT) FIO2 is greater than 50%  (No SAT/SBT Pt is on 60% FiO2 and PEEP is set at 8 cmH2O. Pt does not qualify for SBT at this time.)       
   05/28/25 0400   B: Both Spontaneous Awakening and Breathing Trials   Was Patient Receiving Mechanical Ventilation Yes   Safety Screening Spontaneous Breathing Trial (SBT) Patient is receiving vasopressor  (No SBT per provider, YUNIEL Desai)       
   05/29/25 0552   B: Both Spontaneous Awakening and Breathing Trials   Was Patient Receiving Mechanical Ventilation Yes   Safety Screening Spontaneous Breathing Trial (SBT) Patient is receiving vasopressor  (No SBT att per YUNIEL Desai)       
   05/30/25 0533   B: Both Spontaneous Awakening and Breathing Trials   Did Patient Receive Sedative and/or Opioid IV Medications in the Last 24 Hours Continuously infused meds for sedation   Was Patient Receiving Mechanical Ventilation Yes   Safety Screening Spontaneous Breathing Trial (SBT) Patient is receiving vasopressor     Patient does not meet criteria for SBT at this time.  
   05/31/25 0352   B: Both Spontaneous Awakening and Breathing Trials   Was Patient Receiving Mechanical Ventilation Yes   Safety Screening Spontaneous Breathing Trial (SBT) Patient is agitated  (No SBT per provider MD Hue.)       
   06/01/25 0557   B: Both Spontaneous Awakening and Breathing Trials   Did Patient Receive Sedative and/or Opioid IV Medications in the Last 24 Hours No   Was Patient Receiving Mechanical Ventilation Yes   Safety Screening Spontaneous Breathing Trial (SBT) Proceed with SBT - no exclusion criteria met   RT Notified Ready for SBT Yes   Spontaneous  mL   RSBI Calculated 78.18   Spontaneous Breathing Trial (SBT) Outcome SBT Passed   Patient Meet Extubation Criteria Yes   Provider Ordered Extubation (S)  No  (Ordered to let pt remain on PS/CPAP as long as pt is tolerating PS/CPAP 5/+5, per MD Swann order.)     SBT passed. MD wants to leave pt on PS/CPAP 5/+5/30% as long as pt is tolerating. Pt follows all commands off of sedation and has a positive cuff leak test. SBT was started at 0527 on PS/CPAP 5/+5/30%.   
   06/02/25 0554   B: Both Spontaneous Awakening and Breathing Trials   Did Patient Receive Sedative and/or Opioid IV Medications in the Last 24 Hours No   Was Patient Receiving Mechanical Ventilation Yes   Safety Screening Spontaneous Breathing Trial (SBT) Proceed with SBT - no exclusion criteria met   RT Notified Ready for SBT Yes   Spontaneous  mL   RSBI Calculated 67.07   Spontaneous Breathing Trial (SBT) Outcome SBT Passed   Patient Meet Extubation Criteria Yes   Provider Ordered Extubation (S)  No  (Provider IRENE Diaz ordered to not extubate at this time due to pt having goals of care meeting with pt's family today.)     Pt placed on SBT on PS- 5/+5/40% at 0452. Pt has been on PS/CPAP the entire shift on PS- 8/+5/30%, and has been off of all sedation and following commands with a positive cuff leak test. Pt passed SBT but ordered to not extubate at this time due to awaiting goals of care meeting today with pt's family. PS increased back to 8 cmH2O at 0555 per provider order to keep pt on PS/CPAP as long as pt is tolerating spontaneous mode of ventilation.   
  1146: Code Blue called over head for PEA.     1147: Epi given by Rapid Response nurse before code team arrival.     1148: Arrived to the bedside as part of the [facility] team with MARVIN Novak, Dr. Brown, Dr. Bourgeois, Anesthesia, and Rapid Response Nurse Farooq. On my arrival Compressions and Bag mask Ventilation are in progress. Back board in use. Pulse check: Pt. Remains PEA at this time.     1149: 2nd dose of Epi and 1 AMP of bicarb given a this time per Dr. Brown.     1150: Pulse check: Pulse now present. Rate 87.     1151: Second AMP of bicarb given per Dr. Brown.     1152: 10 mg of Etomidate IVP one time now ordered and given per Dr. Brown.     1153: Second dose of Etomidate IVP one time given for Sedation pre-intubation per Dr. Brown.     1154: Pt. Successfully intubated with a 7.5 ETT, 25 at the teeth. Positive color change.     1200: Pt. Now arrived to his CCU room. Primary care now being provided by CHADD Hutchins.        
  ICU Progress Note          Subjective:      - continues to require vasopressors and on mechanical ventilation.   : Patient remains intubated and sedated.  Started on PSV.     HPI:The patient is a 80/M with ESRD on HD, HFrEF 20-25%, COPD, CAD, Liver cirrhosis with portal hypertension (ascites) who was initially admitted to medicine service on  for possible sepsis. Patient was hypoxic and hypotensive, had HD on  where 900 ml was removed. He was awaiting paracentesis. He had respiratory distress followed by cardiac arrest. Patient was intubated and transferred to ICU on .    Assessment and Plan:    Cardiac arrest/Cardiogenic shock - prior history of systolic heart failure with EF 20%, repeat ECHO on  with EF 15%. Significant valvular abnormality with mitral and tricuspid regurgitation. I believe patient is in cardiogenic shock. Cont. Supportive care.     Cardiogenic shock - EF 15%. Cont. Levophed gtt. Would obtain Cardiology/HF consult.     Decompensated liver cirrhosis with ascites - S/p 2.7 L removal of ascites fluid on . I do not see the cell count on ascites fluid but cultures are negative. Currently on Zosyn, cultures negative to date. Will discontinue Zosyn, . Elevated bilirubin with AST/ALT, initially GI had planned MRCP but decided against it given the co morbidities. I will repeat LFT in am.     Acute hypoxic respiratory failure - intubated on  post cardiac arrest. On minimal vent. Started on SBT, following commands but very weak. Requiring PSV 10/5.    C.diff colitis - on PO Vancomycin.     ESRD on HD - per renal.     I updated daughter at bedside.     Poor prognosis, will get palliative consult.     [facility] time - 40 minutes.     Vital Signs:    BP (!) 141/69   Pulse 72   Temp 97.5 °F (36.4 °C) (Axillary)   Resp 21   Ht 1.829 m (6' 0.01\")   Wt 62.8 kg (138 lb 7.2 oz)   SpO2 96%   BMI 18.77 kg/m²             Temp (24hrs), Av.6 °F (36.4 °C), Min:97.4 °F (36.3 °C), 
  ICU Progress Note          Subjective:     5/30 - continues to require vasopressors and on mechanical ventilation.   5/31: Patient remains intubated and sedated.  Started on PSV.   6/1: Patient is still very weak and lethargic but following commands. Remains intubated, currently on PSV 5/5.    HPI:The patient is a 80/M with ESRD on HD, HFrEF 20-25%, COPD, CAD, Liver cirrhosis with portal hypertension (ascites) who was initially admitted to medicine service on 5/23 for possible sepsis. Patient was hypoxic and hypotensive, had HD on 5/24 where 900 ml was removed. He was awaiting paracentesis. He had respiratory distress followed by cardiac arrest. Patient was intubated and transferred to ICU on 5/25.    Assessment and Plan:    Cardiac arrest/Cardiogenic shock - prior history of systolic heart failure with EF 20%, repeat ECHO on 5/29 with EF 15%. Significant valvular abnormality with mitral and tricuspid regurgitation. I believe patient is in cardiogenic shock. Cont. Supportive care.     Cardiogenic shock - EF 15%. Cont. Levophed gtt. Would obtain Cardiology/HF consult.     Decompensated liver cirrhosis with ascites - S/p 2.7 L removal of ascites fluid on 5/26. I do not see the cell count on ascites fluid but cultures are negative. cultures negative to date. Will discontinue Zosyn, 5/30. Elevated bilirubin with AST/ALT, initially GI had planned MRCP but decided against it given the co morbidities. I will repeat LFT in am.     Acute hypoxic respiratory failure - intubated on 5/25 post cardiac arrest. Started on SBT, following commands but very weak.   -Would be at extremely high risk of reintubation if extubated.     C.diff colitis - on PO Vancomycin.     ESRD on HD - per renal.     I updated daughter at bedside.     Poor prognosis, will get palliative consult.     Discussed the plan with patient's son. Explained that patient would at extremely high risk of reintubation if extubated. Will need to have clear goals 
0700: Bedside report received from CHADD Blandon. Patient awake, calm, in bed, intubated/vented on 30% FiO2, L nare NGT at 62cm, TF, and wrist restraint.     0800: Assessment and vitals completed;see flowsheets.     0900: Patient's daughter at bedside. [facility] conversation discussed with patient's daughter. Plan is to extubate today at 3pm.    1200: Reassessment and vitals completed; see flowsheets.     1600: Reassessment and vitals completed; see flowsheets. TF stopped for plan of extubation.    1700: restraint discontinued.     1715:extubated to 3L NC. Kevin GRIFFIN would like to keep TF held and to reassess tomorrow.     1930:report given to Barber FLORENTINO.   
0700: Bedside report received. Patient intubated/vented, awake, and denies pain. On continuous TF. Denies pain.     0800: reassessment and vitals completed; see flowsheets.    0900: medication administered. During rounds, nutritionist ordered for TF to remain at 35ml/hr continuously and to re-evaluate 6/3/25. MD asked for stool regimen to be held.     1200: reassessment and vitals completed; see flowsheets. Patient daughter at bedside; updated on patient plan of care. Palliative consulted.      1600: reassessment and vital completed; see flowsheets.     1900: bedside report given to CHADD Graf. Patient in bed, awake and without complaint. Family at bedside.   
0710 Bedside and Verbal shift change report given to Liset FLORENTINO (oncoming nurse) by Barber FLORENTINO (offgoing nurse). Report included the following information Nurse Handoff Report, Intake/Output, MAR, Recent Results, Med Rec Status, and Cardiac Rhythm NSR / Afib .    0800 Shift assessment complete, see flowsheet for details. Patient is alert and oriented to self and time, follows commands, 3L NC, afib on the monitor.    0900 Pt axillary temperature 95.8F, ramon hugger applied.       1200 Reassessment complete, see flowsheet for details.     1255 MD notified of oxygen sats in the 70s-80s and then unable to  a reading from multiple different pulse ox. RT at bedside for ABG. Non-rebreather applied.   Patient placed on BiPAP per MD.   Patient's daughter at bedside.     1530 Reassessment complete, see flowsheet for details.     1920 Bedside and Verbal shift change report given to Anamika FLORENTINO (oncoming nurse) by Liset FLORENTINO (offgoing nurse). Report included the following information Nurse Handoff Report, Intake/Output, MAR, Recent Results, and Med Rec Status Cardiac Rhythm Afib.        
0715: Bedside report received from CHADD Graf. Patient in bed, RASS -1. Patient intubated/vented on 30% FiO2, on TF, and bilateral wrist restraint on. Patient's daughter at bedside.    0800: Assessment and vitals completed; see flowsheets. No signs of pain.     1100: RN and patient's daughter participated in rounds. GOC discussed with patient daughter. Patient's daughter verbalized understanding of GOC.     1200: Reassessment and vitals completed; see flowsheets.     1550: Reassessment and vitals completed; see flowsheets.     1915: Patient remains on continuous TF, intubated/vented on 30% FiO2, and on restraint. Bedside report given to CHADD Ballard.    
0720 Bedside and Verbal shift change report given to Liset RN (oncoming nurse) by Kelly RN (offgoing nurse). Report included the following information Nurse Handoff Report, Intake/Output, MAR, Recent Results, Med Rec Status, and Cardiac Rhythm NSR .      0830 Shift assessment complete, see flowsheet for details.     1040 [place] RN at bedside to start CRRT    1050 Dynamic access at bedside to draw blood cultures.     1120 Reassessment complete, see flowsheet for details.     1440 MD at bedside to do paracentesis.     1530 Reassessment complete, see flowsheet for details.     1908 Bedside and Verbal shift change report given to Karla RN (oncoming nurse) by Liset RN (offgoing nurse). Report included the following information Nurse Handoff Report, Intake/Output, MAR, Recent Results, Med Rec Status, and Cardiac Rhythm NSR .      
0730 Bedside and Verbal shift change report given to CHADD Gonzales (oncoming nurse) by CHADD Payne (offgoing nurse). Report included the following information Nurse Handoff Report.     0730 SpO2 measuring in 60s using forehead oximeter. On 9L via non heated high flow. BP 82/62 MAP 69. Pt denying SOB, dizziness, lightheadedness at this time. Able to answer all orientation questions appropriately. Adjusted O2 to 15L SpO2 increased to 82% before losing pleth. Multiple attempts made to get pleth with forehead and finger oximeters. Alerted Dr. Hilario of patient status via secured messaging.     0800 Dr. Hilario at bedside. New orders for 25g Albumin and ABG. Directed this RN to titrate O2 flow back down to 9L.     0910 ABG showing O2 99, RT at bedside, titrated O2 flow rate to 7L.    1040 Turn completed, pt c/o SOB. O2 titrated up to 10L, elevated HOB.     1058 Called RRT and requested pt be rounded on due to hypotension and SOB.    1100 Bed alarm went off, pt presented tripoding on edge of bed resp 40. VS maintaining, respiratory called.    1120 Rapid response called due to SOB and increased weakness. Dr. Hilario alerted via secured messaging. At bedside received STAT orders for chest x ray and repeat ABG. Decision made for ICU transfer with ICU intensivist at bedside.     Code blue initiated see RRT note.   
0730 Bedside and Verbal shift change report given to Liset RN (oncoming nurse) by Anamika RN (offgoing nurse). Report included the following information Nurse Handoff Report, Intake/Output, MAR, Recent Results, Med Rec Status, and Cardiac Rhythm 1 deg AVB .    0745 Shift assessment complete, see flowsheet for details. Patient is on BiPAP, alert and oriented to self and time, follows commands, no complaints of pain at this time.     1134 Daughter (Sadacia) at bedside, wants to proceed with comfort measures. Patient and daughter wants patient to be taken off of BiPAP. Palliative NP at bedside, placed patient on 9L NC.     1145 Reassessment complete, see flowsheet for details.     1250 Patient's o2 sat dropping to 50s, daughter and patient requested BiPAP be placed back on.   RT notified. BiPAP placed back on patient at 70% FiO2.     1600 Reassessment complete, see flowsheet for details.     1940 Bedside and Verbal shift change report given to Cm FLORENTINO (oncoming nurse) by Liset RN (offgoing nurse). Report included the following information Nurse Handoff Report, Intake/Output, MAR, Recent Results, Med Rec Status, and Cardiac Rhythm 1 deg AVB .    
0935- Patient complaints of chest pain on inspiration. MD aware.    End of Shift Note    Bedside shift change report given to May RN (oncoming nurse) by Malissa Hansen RN (offgoing nurse).  Report included the following information SBAR, ED Summary, MAR, Recent Results, and Cardiac Rhythm NSR    Shift worked:  [id number]     Shift summary and any significant changes:     Patient had HD today, had 900 mL removed. Patient having diarrhea,stool sample sent to lab. No other significant changes.     Concerns for physician to address:  none     Zone phone for oncoming shift:   [phone]                        
1200 Bedside report from Janet FLORENTINO.   Patient arrived to 2522 via bed, intubated, NSR on the monitor.  Shift assessment complete, see flowsheet for details.   CHG bath completed and dual skin with Janice FLORENTINO.   NGT placed     Unable to get continuous pulse ox readings, MD notified, orders for ABG.     1430 MD notified of low bps, see MAR for Levophed titrations.   Propofol infusion off     1455 1L LR Bolus (see MAR)     1516 BG 20, rechecked and BG 23. MD notified. D10 bolus given (see MAR)   Orders to start D10 infusion @30ML/HR after bolus. (See MAR).   Q1 BG checks.     1530 Reassessment complete, see flowsheet for details.  BG 74.     Stool sample came back C Diff positive     MD notified of C diff results    MD at bedside to put CVC, per MD give Fentanyl 25mcg bolus (see MAR).   Per Bk GRIFFIN okay to use CVC.     BMP redrawn and sent to lab      1850 BG 54, redrawn BG 68, PRN D10 bolus given (see MAR).     1935 Bedside and Verbal shift change report started to Kelly FLORENTINO (oncoming nurse) by Liset FLORENTINO (offgoing nurse). Report included the following information Nurse Handoff Report, Intake/Output, MAR, Recent Results, Med Rec Status, and Cardiac Rhythm NSR .      1936 Repeat BG 46, D10 bolus given (see MAR)     2011 BG recheck is 80 per tech    2013 Handoff to Kelly FLORENTINO  
1900 - Bedside and Verbal shift change report given to Jacqueline RN (oncoming nurse) by Brayden RN (offgoing nurse). Report included the following information Nurse Handoff Report, Adult Overview, Intake/Output, MAR, Recent Results, Cardiac Rhythm SR, and Neuro Assessment.   Current infusions: Osmolite TF 35 ml/hr. Lines/Drains RIJ CVC, R fem HD, ETT 24cm, NGT 62cm. O2 Vent PS/CPAP 5/5 30%.     2000 - Shift assessment complete, see flowsheet. Jeremiah Diaz NP rounding on pt. Plan to keep pt on PS 5/5 overnight. Added CBC to am labs.    2105 - RT at bedside. Pt tachypneic with low volumes. Increased PS to 8/5. Joe NP notified.    0000 - Reassessment complete, see flowsheet.    0400 - Reassessment complete, see flowsheet.    0452 - RT placed on PS 5/5 for SBT.    0553 - RT placed back on PS 8/5.    0715 - Bedside and Verbal shift change report given to Sarah FLORENTINO (oncoming nurse) by Jacqueline RN (offgoing nurse). Report included the following information Nurse Handoff Report, Adult Overview, Intake/Output, MAR, Recent Results, Cardiac Rhythm SR, and Neuro Assessment.   Current infusions: Osmolite TF 35 ml/hr. Lines/Drains RIJ CVC, R fem HD, ETT 24cm, NGT 62cm. O2 Vent PS/CPAP 8/5 30%.         
1900: Report received at bedside from CHADD Ghotra in SBAR format with all questions and concerns addressed.     2000: Pt assessment completed with findings documented, pt repositioned.     2030: Update given to family members at bedside with all questions and concerns addressed.     0000: Reassessed pt, findings documented, pt repositioned.     0400: Reassessed pt, findings documented, pt repositioned.     0636: POC Glucose obtained and <70. Gave D10 bolus (see MAR). Will reassess POC Glucose 15 mins after bolus complete.     0700: Report given at bedside to CHADD Hutchins in SBAR format with all questions and concerns addressed.     0708: Reassessed POC Glucose, <70, gave D10 bolus (see MAR). Will reassess POC Glucose 15 min after bolus complete.     0727: Reassess POC Glucose, >70 at this time.   
1900: Report received at bedside from CHADD Smith in SBAR format with all questions and concerns addressed.     2000: Pt assessment completed with findings documented, pt repositioned.     2230: [place] at bedside for HD therapy.     0000: Reassessed pt, findings documented, pt repositioned.     0235: Dialysis complete, 1 L removed.     0400: Reassessed pt, findings documented, pt repositioned.     0700: Report given at bedside to CHADD Ghotra in SBAR format with all questions and concerns addressed.   
1915 Bedside and Verbal shift change report given to Lesia RN (oncoming nurse) by Sarah  RN (offgoing nurse). Report included the following information Nurse Handoff Report, Intake/Output, MAR, Recent Results, and Cardiac Rhythm NSR .     2030 Shift assessment completed at this time. Patient intubated, able to follow commands. In bilateral wrist restraints. See flowsheets for details.    0000 Reassessment completed at this time.See flowsheets for details.    0400 Reassessment completed at this time.See flowsheets for details.    0715 Bedside and Verbal shift change report given to Sarah RN (oncoming nurse) by Lesia FLORENTINO (offgoing nurse). Report included the following information Nurse Handoff Report, Intake/Output, MAR, Recent Results, and Cardiac Rhythm NSR .     
1915-Bedside shift change report given to CHADD Aleman (oncoming nurse) by CHADD Pabon (offgoing nurse). Report included the following information Nurse Handoff Report, MAR, Cardiac Rhythm SR, and Event Log.     2000-Assessments and vitals noted in flowsheets.     0030-Reassessments noted.     0054-Axillary temp 100.6, tylenol prn given.     0330-Temp still elevated at 101.8, complete bed bath given, ice packs applied under arms, groin and neck, am labs collected.     0530-Temp 97.8, noted weeping from RLQ, gauze placed, gown changed, cleaned of incontinent BM.    0720-Bedside shift change report given to CHADD Owen (oncoming nurse) by CHADD Aleman (offgoing nurse). Report included the following information Nurse Handoff Report and Event Log.             
1953- SBP were in the 80s 86/77 MAP 77, HR 90, RR 28, temp 97.7 SpO2 96 on 9L HFNC,  Karina Cotter MD notified and ordered midodrine 5mg, patient is asymptomatic no signs of distress.    2153- SBP still in the 80s 83/66 MAP 67, HR 78, Dr.Manuele Cotter notified and ordered albumin.     2312- BP 88/60, MAP 69, HR 72,  Dr. Cotter made aware no new order.    2316- BP 93/65, HR 72 MD made aware, ordered just to monitor.    0049- SBP went to the 80s again 81/60,  HR 69, MAP 68, Karina GRIFFIN made aware, no new order at this time, he is ok with SBP of 80s as long as patient's MAP is above 65.           
2013:Bedside and Verbal shift change report given to CHADD Mendoza (oncoming nurse) by CHADD Hutchins (offgoing nurse). Report included the following information Nurse Handoff Report, Intake/Output, MAR, Recent Results, Med Rec Status, Cardiac Rhythm NSR, Alarm Parameters, Quality Measures, Neuro Assessment, and Event Log.   2030:Shift assessment completed; see flow sheets  0000:Reassessment Completed; see flow sheets  0400;Reassessment completed; see flow sheets  0731;Bedside shift change report given to CHADD Hutchins (oncoming nurse) by Kelly Weston RN (offgoing nurse).  Report included the following information SBAR, Kardex, Intake/Output, MAR, Med Rec Status, Cardiac Rhythm NSR, and Dual Neuro Assessment        Kelly Weston RN                            
2113 Returned call to attempted to get report RN busy waiting for call back  
2130: Report given to Laura on PCU.     2250: Patient transported on bipap in bed to room 2304. All belongings taken with patient to new room. Skin/RIJ site/fistula sites assessed with RN at handoff.     2325: Patients daughter Nadine updated on patient condition and new room number.  
4 Eyes Skin Assessment     NAME:  Darrel Patterson  YOB: 1945  MEDICAL RECORD NUMBER:  [id number]    The patient is being assessed for  Transfer to New Unit    I agree that at least one RN has performed a thorough Head to Toe Skin Assessment on the patient. ALL assessment sites listed below have been assessed.      Areas assessed by both nurses:    Sacrum. Buttock, Coccyx, Ischium and Legs. Feet and Heels        Does the Patient have a Wound? Yes wound(s) were present on assessment. LDA wound assessment was Initiated and completed by RN     Nonblanchable area on the right hip that is already photographed and uploaded in the chart.   Shane Prevention initiated by RN: Yes  Wound Care Orders initiated by RN: No    Pressure Injury (Stage 3,4, Unstageable, DTI, NWPT, and Complex wounds) if present, place Wound referral order by RN under : No    New Ostomies, if present place, Ostomy referral order under : No     Nurse 1 eSignature: Electronically signed by Liset Beltran RN on 5/25/25 at 4:53 PM EDT    **SHARE this note so that the co-signing nurse can place an eSignature**    Nurse 2 eSignature: {Esignature:[id number]}   
4 Eyes Skin Assessment     NAME:  Darrel Patterson  YOB: 1945  MEDICAL RECORD NUMBER:  [id number]    The patient is being assessed for  Transfer to New Unit    I agree that at least one RN has performed a thorough Head to Toe Skin Assessment on the patient. ALL assessment sites listed below have been assessed.      Areas assessed by both nurses:    Head, Face, Ears, Shoulders, Back, Chest, Arms, Elbows, Hands, Sacrum. Buttock, Coccyx, Ischium, and Legs. Feet and Heels        Does the Patient have a Wound? Yes wound(s) were present on assessment. LDA wound assessment was Initiated and completed by RN       Shane Prevention initiated by RN: Yes  Wound Care Orders initiated by RN: Yes    Pressure Injury (Stage 3,4, Unstageable, DTI, NWPT, and Complex wounds) if present, place Wound referral order by RN under : No    New Ostomies, if present place, Ostomy referral order under : No     Nurse 1 eSignature: Electronically signed by Laura Mendez RN on 6/7/25 at 7:57 AM EDT    **SHARE this note so that the co-signing nurse can place an eSignature**    Nurse 2 eSignature: Electronically signed by Chinyere Can RN on 6/7/25 at 8:04 AM EDT    
6/5/25 1930: Bedside and Verbal shift change report given to MERI Sidhu RN (oncoming nurse) by MERI Beltran RN (offgoing nurse). Report included the following information Nurse Handoff Report, Intake/Output, Recent Results, and Cardiac Rhythm NSR/A fib .     2000: Assessment completed. Patient alert, oriented to self and family at bedside. Follows commands. Lungs with coarse crackles throughout. Bipap in place 14/8, FiO2 100%. Abdomen flat, soft, NGT placement confirmed via auscultation of air over epigastrium. Anuric on HD T, Th, Sat. Fistula L lower arm with + thrill and bruit. Peripheral pulses palpable.     2145: Daughter Nadine Traore at bedside, reports family has decided against comfort care at this time, but would like to revisit decision if patient fails or becomes intolerant of BIPAP. Advised daughter will call her if significant change in status. Notified NP of family decision.     0000: Reassessment unchanged.    0200: Blood for am labs drawn and sent. Q4h BG consistently 80's on D10 drip @ 25ml/hr.     0400: Reassessment completed. Patient bathed, linens changed. Central line dressings changed, IV tubing changed per protocol.     0730: Bedside and Verbal shift change report given to MERI Beltran RN (oncoming nurse) by MERI Sidhu RN (offgoing nurse). Report included the following information Nurse Handoff Report, Intake/Output, MAR, Recent Results, and Cardiac Rhythm NSR, 1 deg AVB .     
ADULT PROTOCOL: JET AEROSOL ASSESSMENT    Patient  Darrel Patterson     80 y.o.   male     6/4/2025  1:34 AM    Breath Sounds Pre Procedure: Breath Sounds Pre-Tx SARAHI: Diminished                                  Breath Sounds Pre-Tx LLL: Diminished        Breath Sounds Pre-Tx RUL: Diminished        Breath Sounds Pre-Tx RML: Diminished        Breath Sounds Pre-Tx RLL: Diminished  Breath Sounds Post Procedure: Breath Sounds Post-Tx SARAHI: Diminished          Breath Sounds Post-Tx LLL: Diminished          Breath Sounds Post-Tx RUL: Diminished          Breath Sounds Post-Tx RML: Diminished          Breath Sounds Post-Tx RLL: Diminished                                     Breathing pattern: Pre procedure           Post procedure     Heart Rate: Pre procedure Pre-Tx Pulse: 74           Post procedure Post-Tx Pulse: 89    Resp Rate: Pre procedure Pre-Tx Resps: 25           Post procedure Post-Tx Resps: 27    Peak Flow: Pre bronchodilator             Post bronchodilator       Oxygen: O2 Therapy: Oxygen humidified   ventilator     Changed: No    SpO2:  SpO2: 94 %   with Oxygen                Nebulizer Therapy: Current medications Medications: Arformoterol, Budesonide      Changed: No        Problem List:   Patient Active Problem List   Diagnosis    History of GI bleed    Anemia due to chronic kidney disease    A-V fistula    S/P cataract surgery    Habitual alcohol use    Mild intermittent asthma without complication    Essential hypertension, benign    Diverticula of colon    Gastroesophageal reflux disease without esophagitis    Rheumatoid arthritis with positive rheumatoid factor ([facility])    Glaucoma    ESRD on hemodialysis ([facility])    Impingement syndrome of both shoulders    Hypertensive retinopathy of both eyes    CHF (congestive heart failure) ([facility])    DDD (degenerative disc disease), lumbar    DDD (degenerative disc disease), cervical    Psychophysiological insomnia    Fatigue    Hyperkalemia    Long term (current) use of 
Bedside and Verbal shift change report given to Kayla (oncoming nurse) by Fernando (offgoing nurse). Report included the following information Nurse Handoff Report, MAR, Recent Results, and Cardiac Rhythm NSR .         8:58 AM patient on p/s 10/5    4:40 PM dialysis finished. 1 L off. Tolerated well    Patient remained on pressure support for the remainder of the shift.  Bowel movement x3 (one large; 2 small)      Bedside and Verbal shift change report given to Fernando (oncoming nurse) by Kayla (offgoing nurse). Report included the following information Nurse Handoff Report, MAR, Recent Results, and Cardiac Rhythm NSR .     
Bedside and Verbal shift change report given to Kayla (oncoming nurse) by Fernando (offgoing nurse). Report included the following information Nurse Handoff Report, MAR, Recent Results, and Cardiac Rhythm NSR .       0758 Dr. Kennedy at bedside. Discussed how patient tolerated dialysis yesterday and his morning electrolytes (specifically potassium 3.1; phos 1.5) and possible repletion.  He will review chart.  No dialysis today. Plan for next dialysis on Tuesday.    0945 feed set changed; protein supplement given    1420 Patient daughter is at bedside.  There still seems to be ambivalence regarding whether or not to re-intubate the patient should his respiratory status fail post extubation.   Patient remains on pressure support.  Daughter is inquiring how long the family has to make a decision.  Advised her that I would ask the intensivist to come and speak with her.      1425 Dr Johnson made aware that patient daughter is here. He will come and speak with her.    3:04 PM Dr. Johnson at bedside speaking with daughter.      
Bedside shift change report given to CHADD Gann (oncoming nurse) by CHADD Brandon (offgoing nurse). Report included the following information Nurse Handoff Report, Index, Intake/Output, MAR, Recent Results, Cardiac Rhythm AFib, and Quality Measures.    Report given that pt BG was low all day D10 running at 50 ml/hr. Checked BG on r hand BG still low 61 ran a bolus per protocol. Checked BG again after bolus BG remained at 61.    New line place in R upper arm. Ran D10 50 ml/hr in new line waited 8 mins, flushed 2x and checked sugar from central line and BG was 2    Pt pulled out new line.     Messaged provider about temp of 95- bear hugger placed on pt.    Messaged provider about BP becoming soft. Albumin ordered and hung.    Rapid called.    Patient passed at 2:55 am. Daughter at bedside. Death protocol followed.  Clergy called and present at bedside with family.   
Bedside, Recorded, and Written shift change report given to Kamla RN (oncoming nurse) by Cm FLORENTINO (offgoing nurse). Report included the following information Nurse Handoff Report, Index, Cardiac Rhythm A fib, and Alarm Parameters.      2000 Assessment complete. See flowsheet for details    2325 Critical Lactic Lab of 5.7. NP Laura notified. No new orders at this time.    0026  Central line place in left IJ. Patient tolerated procedure well.      0800 Bedside, Verbal, and Written shift change report given to Cm FLORENTINO (oncoming nurse) by Kamla FLORENTINO and Love FLORENTINO (offgoing nurse). Report included the following information Nurse Handoff Report, Cardiac Rhythm Sinus rhythm, and Neuro Assessment.              
Bedside, Verbal, and Written shift change report given to Kamla RN and Love RN (oncoming nurse) by Cm RN (offgoing nurse). Report included the following information Nurse Handoff Report, Med Rec Status, Cardiac Rhythm Sinus Rhythm, and Neuro Assessment.      2000 Assessment complete. See flowsheet for details.      
C. difficile toxin is intermediate, DNA confirmation to follow. T bili has down trended slightly along with ALP. Paracentesis not performed yet, continuing on broad spectrum antibiotics.   Other source of sepsis remain unclear. Patient has decompensated significantly overnight, worsening septic shock, despite getting fluids, on Levophed max dose and stress dose of steroids.  Has been in ICU. Limited utility of GI in this case. Recommend goals of care discussion as prognosis remains very grim with severe systolic heart failure, ESRD and recent diagnosis of cirrhosis w/ ascites.  
Chart accessed for NOK information and attending MD.  
Chart reviewed. Pt remains intubated, sedated and on CRRT.  Pt still requires pressor support.  Pt has remained medically unstable for OT participation.  Attempted over three days for eval.  Will complete OT order at this time.  Please re-consult when pt is medically stable to participate.    
Chart reviewed. Pt remains intubated/sedated, on CRRT, and requiring pressor support. Pt has remained medically unstable/not appropriate for participation in PT evaluation x3 attempts therefore will complete PT order. Please reconsult when pt is medically stable and appropriate for participation in skilled intervention. Thank you    Darline Young, PT, DPT  
Comprehensive Nutrition Assessment    Type and Reason for Visit:  Initial, Positive nutrition screen    Nutrition Recommendations/Plan:   Continue current diet, consider renal restrictions PRN  Glucerna 2x/day (strawberry)  Encourage PO intakes, honor preferences as able, provide assistance PRN     Malnutrition Assessment:  Malnutrition Status:  Insufficient data (05/24/25 8476)    Context:  Acute Illness     Findings of the 6 clinical characteristics of malnutrition:  Energy Intake:  Unable to assess  Weight Loss:  Unable to assess     Body Fat Loss:  Unable to assess     Muscle Mass Loss:  Unable to assess    Fluid Accumulation:  Unable to assess     Strength:  Not Performed    Nutrition Assessment:    Admitted for sepsis. PMHx includes diverticulosis, ESRD, GERD, HTN, NSTEMI, HLD, seizure. Screened for MST3, pt unsure of weight loss, +decreased appetite/intake. Pt working with IDT at interview attempt. No documented intakes. Fluctuating weights per EMR wt hx, likely somewhat r/t pre and post dialysis fluid shifts. Pt with hx of prefering strawberry ONS - currently only available in glucerna, will add.    Nutrition Related Findings:    Labs: Na 129, K 5.6, BUN 57, Creat 8.65, Gluc 115. Meds: mirtazapine, pantoprazole, pravastatin, sevelamer, lokelma. No edema. BM PTA. Wound Type: None       Current Nutrition Intake & Therapies:    Average Meal Intake: Unable to assess  Average Supplements Intake: None Ordered  ADULT DIET; Regular; 4 carb choices (60 gm/meal); Low Fat/Low Chol/High Fiber/MALLORIE    Anthropometric Measures:  Height: 182.9 cm (6' 0.01\")  Ideal Body Weight (IBW): 178 lbs (81 kg)    Current Body Weight: 49.6 kg (109 lb 5.6 oz), 61.4 % IBW. Weight Source: Not specified  Current BMI (kg/m2): 14.8  BMI Categories: Underweight (BMI less than 22) age over 65    Estimated Daily Nutrient Needs:  Energy Requirements Based On: Kcal/kg  Weight Used for Energy Requirements: Current  Energy (kcal/day): 
Comprehensive Nutrition Assessment    Type and Reason for Visit:  Reassess    Nutrition Recommendations/Plan:   Resume TF as previously ordered (Jevity 1.5 @ 45mL/h + Prosource BID; provides 1780kcals/108gPro/232gCHO/1120mL)   Advance diet as medically able per SLP   Would hold or make bowel regimen prn if pt experiencing diarrhea      Malnutrition Assessment:  Malnutrition Status:  Severe malnutrition (05/28/25 1341)    Context:  Chronic Illness     Findings of the 6 clinical characteristics of malnutrition:  Energy Intake:  Unable to assess  Weight Loss:  Unable to assess     Body Fat Loss:  Severe body fat loss Orbital, Fat Overlying Ribs, Buccal region   Muscle Mass Loss:  Severe muscle mass loss Temples (temporalis), Clavicles (pectoralis & deltoids)  Fluid Accumulation:  Unable to assess (UTD if related specifically to malnutrition or ESRD)     Strength:  Not Performed    Nutrition Assessment:  Chart reviewed, case discussed during CCU rounds.  Pt on HD at time of visit.  TF held yesterday during extubation, plans to resume this today (he had some hypoglycemia in the last 24h).  SLP consulted, plans to see him tomorrow for possible diet advancement.  TF previously at goal of 45mL/h so would resume this.  BM noted yesterday and reportedly loose, so would back off on bowel regimen.       Nutrition Related Findings:    Meds: colace, remeron, glycolax, senokot, protonix.    Edema: +1-generalized, all extremities.    BM: 6/4   Wound Type: None       Current Nutrition Intake & Therapies:    Average Meal Intake: NPO  Average Supplements Intake: None Ordered  Diet NPO  ADULT TUBE FEEDING; Nasogastric; Standard with Fiber; Continuous; 45; No; 50; Q 4 hours; Protein; 1 Dose; BID    Anthropometric Measures:  Height: 182.9 cm (6' 0.01\")  Ideal Body Weight (IBW): 178 lbs (81 kg)       Current Body Weight: 58.1 kg (128 lb 1.4 oz), 61.4 % IBW. Weight Source: Bed scale  Current BMI (kg/m2): 17.4                           
Contacted by primary nurse because they have been unable to obtain an SPO2. On arrival, pt laying on R side in bed. Pt pulled up and HOB 45*. No change in O2. No acute distress. Pt denies dyspnea or chest pain. Attempted SPO2 on fingers, toes, both nares, and both ears. Best waveform was on forehead showing sat ranging 40-90s. Placed pt on NRB and there was no change in SPO2. Removed NRB. Spoke with RT and Dr. Shah. Order for ABG. PO2 found to be 198. RT weaning O2.  
Critical care interdisciplinary rounds today.  Following members present: Case Management, , Clinical Lead, Diabetes Team, Nursing, Nutrition, Pharmacy, and Physician. Family invited to participate.  Plan of care discussed.  See clinical pathway for plan of care and interventions and desired outcomes.     Central line for access.  Femoral HD line remains.  Dr Johnson to discuss with family today for further .  
End of Shift Note    Bedside shift change report given to ABIGAIL Sanchez (oncoming nurse) by Anusha Mckeon RN (offgoing nurse).  Report included the following information SBAR, Intake/Output, MAR, and Recent Results    Shift worked:  7a-77p     Shift summary and any significant changes:     Tube feed, diarrhea     Concerns for physician to address:  Diarrhea possibly due to TF     Zone phone for oncoming shift:          Activity:  Level of Assistance: Maximum assist, patient does 25-49%  Number times ambulated in hallways past shift: 0  Number of times OOB to chair past shift: 0    Cardiac:   Cardiac Monitoring: Yes      Cardiac Rhythm: 1° AV Block    Access:  Current line(s): PIV    Genitourinary:   Urinary Status: Anuria    Respiratory:   O2 Device: PAP (positive airway pressure)  Chronic home O2 use?: NO  Incentive spirometer at bedside: NO    GI:  Last BM (including prior to admit): 06/06/25  Current diet:  Diet NPO  ADULT TUBE FEEDING; Nasogastric; Standard with Fiber; Continuous; 45; No; 50; Q 4 hours; Protein; 1 Dose; BID  Passing flatus: YES    Pain Management:   Patient states pain is manageable on current regimen: YES    Skin:  Hsane Scale Score: 12  Interventions: Wound Offloading (Prevention Methods): Turning, Pillows, Repositioning    Patient Safety:  Fall Risk: Nursing Judgement-Fall Risk High(Add Comments): Yes  Fall Risk Interventions  Nursing Judgement-Fall Risk High(Add Comments): Yes  Toilet Every 2 Hours-In Advance of Need: Yes  Hourly Visual Checks: Eyes closed  Fall Visual Posted: Armband  Room Door Open: Yes  Alarm On: Bed  Patient Moved Closer to Nursing Station: No    Active Consults:   IP CONSULT TO PHARMACY  IP CONSULT TO NEPHROLOGY  IP CONSULT TO GI  IP CONSULT TO ADVANCED HEART FAILURE PROVIDER  IP CONSULT TO CARDIOLOGY  IP CONSULT TO PALLIATIVE CARE  IP CONSULT TO PALLIATIVE CARE    Length of Stay:  Expected LOS: 16  Actual LOS: 15    Anusha Mckeon RN     
End of Shift Note    Bedside shift change report given to Love FLORENTINO (oncoming nurse) by Cm FLORENTINO (offgoing nurse).  Report included the following information SBAR, MAR, Recent Results, Med Rec Status, and Cardiac Rhythm AFib    Shift worked:  2618-8097     Shift summary and any significant changes:     Patient Afib RVR at shift change. EKG done and MD notified. Amio bolus and gtt started. Remains on vent with sedation and on CRRT per nephro orders. Levo titration increased for BP support. MD notified for poor BP readings and arterial line placed after consent and time out performed. Daughter(s) has remained at bedside all shift with continual updates of patient condition and treatments.    Concerns for physician to address:       Zone phone for oncoming shift:          Activity:  Level of Assistance: Dependent, patient does less than 25%  Number times ambulated in hallways past shift: 0  Number of times OOB to chair past shift: 0    Cardiac:   Cardiac Monitoring: Yes      Cardiac Rhythm: Afib    Access:  Current line(s): PIV x2 and right fem trialysis    Genitourinary:   Urinary Status: Anuria    Respiratory:   O2 Device: Ventilator  Chronic home O2 use?: No  Incentive spirometer at bedside: N/A    GI:  Last BM (including prior to admit): 05/24/25  Current diet:  Diet NPO  Passing flatus: YES    Pain Management:   Patient states pain is manageable on current regimen: N/A    Skin:  Shane Scale Score: 12  Interventions: Wound Offloading (Prevention Methods): Bed, pressure redistribution/air    Patient Safety:  Fall Risk: Nursing Judgement-Fall Risk High(Add Comments): Yes  Fall Risk Interventions  Nursing Judgement-Fall Risk High(Add Comments): Yes  Toilet Every 2 Hours-In Advance of Need: Yes  Hourly Visual Checks: Eyes closed, In bed, Quiet  Fall Visual Posted: Armband, Fall sign posted  Room Door Open: Yes  Alarm On: Bed  Patient Moved Closer to Nursing Station: No    Active Consults:   IP CONSULT TO PHARMACY  IP 
End of Shift Note    Bedside shift change report given to Love FLORENTINO (oncoming nurse) by Cm FLORENTINO (offgoing nurse).  Report included the following information SBAR, MAR, Recent Results, and Cardiac Rhythm SR.    Shift worked:  8894-0245     Shift summary and any significant changes:     Remains intubated with fentanyl infusing for comfort. Vent weaning per RT. MD ordered Vasopressin stopped during rounds this am; patient remains on levophed to maintain SBP >90 (see MAR for titrations). CRRT stopped this am per nephrology; stated during rounds he will continue to evaluate.    Concerns for physician to address:       Zone phone for oncoming shift:          Activity:  Level of Assistance: Dependent, patient does less than 25%  Number times ambulated in hallways past shift: 0  Number of times OOB to chair past shift: 0    Cardiac:   Cardiac Monitoring: Yes      Cardiac Rhythm: Sinus rhythm    Access:  Current line(s): PIV , CVC , and Other Trialysis    Genitourinary:   Urinary Status: Anuria    Respiratory:   O2 Device: Ventilator  Chronic home O2 use?: NO  Incentive spirometer at bedside: N/A    GI:  Last BM (including prior to admit): 05/24/25  Current diet:  Diet NPO  Passing flatus: YES    Pain Management:   Patient states pain is manageable on current regimen: N/A    Skin:  Shane Scale Score: 10  Interventions: Wound Offloading (Prevention Methods): Elevate heels, Pillows, Repositioning, Special mattress    Patient Safety:  Fall Risk: Nursing Judgement-Fall Risk High(Add Comments): Yes  Fall Risk Interventions  Nursing Judgement-Fall Risk High(Add Comments): Yes  Toilet Every 2 Hours-In Advance of Need: Yes  Hourly Visual Checks: Eyes closed  Fall Visual Posted: Armband, Fall sign posted  Room Door Open: Yes  Alarm On: Bed  Patient Moved Closer to Nursing Station: No    Active Consults:   IP CONSULT TO PHARMACY  IP CONSULT TO NEPHROLOGY  IP CONSULT TO GI    Length of Stay:  Expected LOS: 16  Actual LOS: 6    Cm 
End of Shift Note    Bedside shift change report given to Love FLORENTINO (oncoming nurse) by Cm FLORENTINO (offgoing nurse).  Report included the following information SBAR, MAR, Recent Results, and Cardiac Rhythm SR.    Shift worked:  9832-9042     Shift summary and any significant changes:     Patient continues on CRRT. Pressor support with Levophed and Vasopressin. Fentanyl for sedation/comfort. No stool this shift/anuric. Labs complete; glucoses stable. Family has remained at bedside and updated as cares implemented.     Concerns for physician to address:       Zone phone for oncoming shift:          Activity:  Level of Assistance: Dependent, patient does less than 25%  Number times ambulated in hallways past shift: 0  Number of times OOB to chair past shift: 0    Cardiac:   Cardiac Monitoring: Yes      Cardiac Rhythm: Sinus rhythm    Access:  Current line(s): PIV , CVC , and Other Trialysis    Genitourinary:   Urinary Status: Anuria    Respiratory:   O2 Device: Ventilator  Chronic home O2 use?: NO  Incentive spirometer at bedside: N/A    GI:  Last BM (including prior to admit): 05/24/25  Current diet:  Diet NPO  Passing flatus: YES    Pain Management:   Patient states pain is manageable on current regimen: N/A    Skin:  Shane Scale Score: 10  Interventions: Wound Offloading (Prevention Methods): Bed, pressure redistribution/air, Elevate heels, Pillows, Repositioning, Special mattress    Patient Safety:  Fall Risk: Nursing Judgement-Fall Risk High(Add Comments): Yes  Fall Risk Interventions  Nursing Judgement-Fall Risk High(Add Comments): Yes  Toilet Every 2 Hours-In Advance of Need: Yes  Hourly Visual Checks: Eyes closed, In bed  Fall Visual Posted: Armband, Fall sign posted  Room Door Open: Yes  Alarm On: Bed  Patient Moved Closer to Nursing Station: No    Active Consults:   IP CONSULT TO PHARMACY  IP CONSULT TO NEPHROLOGY  IP CONSULT TO GI    Length of Stay:  Expected LOS: 3  Actual LOS: 5    Cm 
GI On Call Note    See detailed GI consult note. C diff and paracentesis pending. Continue broad spectrum Abx in interim. MRCP as recommended if negative for SBP    On Call GI to see on request tomorrow, otherwise will resume GI care on 5/27    Guillermo Schmid MD  
General cardiology advised that they will solely follow from a cardiology perspective.  Advanced Heart Failure will sign off at this time.  Please re-consult if we can be of further assistance.      Nina Singh, APRN-NP  Advanced Heart Failure  
Noted change in medical condition with transfer to ICU with need for CRRT, pressors and intubation/sedation.  Will defer OT services at this time, but continue to follow.   
Nurse called to say that she was having trouble reading the sats on the patient. No matter what she used it would not register. Used the bedside monitor. The dynamap. I finally brought over a pulse ox monitor from the respiratory department. Patient had good peripheral pulse. Unsure why the monitors would not read sats. Used the finger probe, the ear probe. Finally the forehead probe was the only thing that would read sats of the patient. Once able to read the sats we were able to begin weaning the patients oxygen flow rate. Started at 6L now 2L.   Total time with patient 45 minutes.   DMG  
PT Note    Noted change in medical condition with transfer to ICU with need for CRRT, pressors and intubation/sedation.  Will defer PT services at this time, but continue to follow.   
Palliative Medicine  --Per attending, Dr. Munoz: 5/30 - continues to require vasopressors and on mechanical ventilation. HPI:The patient is a 80/M with ESRD on HD, HFrEF 20-25%, COPD, CAD, Liver cirrhosis with portal hypertension (ascites) who was initially admitted to medicine service on 5/23 for possible sepsis. Patient was hypoxic and hypotensive, had HD on 5/24 where 900 ml was removed. He was awaiting paracentesis. He had respiratory distress followed by cardiac arrest. Patient was intubated and transferred to ICU on 5/25.    --Per Jyoti Courtney, NP: Darrel Patterson is a 80 y.o. male with a past medical history of ESRD on HD T, Th, Sa, COPD, NSTEMI, chronic systolic heart failure with EF 15%, GIB, who was admitted on 5/23/2025 from [facility] ED with c/o 2-3 days of progressively worsening malaise, intermittent confusion, dry cough and lethargy.  Missed last hemodialysis session due to malaise.  ED workup  revealed T. 102F, sats upper 80s on RA, improved to low 90s on 2LNC02.   Admitted for severe sepsis due to c-diff.  Bilirubin elevated at 3.2, wbc 18K, hgb 7.7, plt ct 226.  CTAP: shows large volume ascites, small to moderate pleural effusion with bilateral atelectasis. Cardiomegaly. Nodular liver. Gallbladder distended with several stones. 5/25: soft Bps, drop in sats 02 increased from 3L to 10L, c/o SOB and altered mentation. Went into respiratory arrest followed by cardiac arrest, received 2 rounds of CPR, 2 rounds of epi, 2 IV pushes of bicarb and 1 of IV calcium and ROSC achieved.  Intubated, levophed added, and transferred to ICU.  5/26: paracentesis 2700 mls removed. Started on CRRT with factor 0. Developed afib with RVR. 5/28: vasopressin added, not meeting criteria for SAT/SBT.  Amio drip off.   5/29: ECHO with an ejection fraction of 15 to 20% with moderate mitral regurgitation, moderate to severe tricuspid regurgitation, RVSP 45-55, with a dilated IVC and a large left pleural effusion, TAPSE 
Palliative Medicine  Patient Name: Darrel Patterson  YOB: 1945  MRN: [id number]  Age: 80 y.o.  Gender: male    Date of Initial Consult: 5/30/2025  Date of Service: 6/6/2025  Time: 11:18 AM  Provider: ELVI Logan NP  Hospital Day: 15  Admit Date: 5/23/2025  Referring Provider: Roe Munoz MD       Reasons for Consultation:  goals of care, code status    HISTORY OF PRESENT ILLNESS (HPI):   Darrel Patterson is a 80 y.o. male with a past medical history of ESRD on HD T, Th, Sa, COPD, NSTEMI, chronic systolic heart failure with EF 15%, GIB, who was admitted on 5/23/2025 from [facility] ED with c/o 2-3 days of progressively worsening malaise, intermittent confusion, dry cough and lethargy.  Missed last hemodialysis session due to malaise.  ED workup  revealed T. 102F, sats upper 80s on RA, improved to low 90s on 2LNC02.   Admitted for severe sepsis due to c-diff.  Bilirubin elevated at 3.2, wbc 18K, hgb 7.7, plt ct 226.  CTAP: shows large volume ascites, small to moderate pleural effusion with bilateral atelectasis. Cardiomegaly. Nodular liver. Gallbladder distended with several stones. 5/25: soft Bps, drop in sats 02 increased from 3L to 10L, c/o SOB and altered mentation. Went into respiratory arrest followed by cardiac arrest, received 2 rounds of CPR, 2 rounds of epi, 2 IV pushes of bicarb and 1 of IV calcium and ROSC achieved.  Intubated, levophed added, and transferred to ICU.  5/26: paracentesis 2700 mls removed. Started on CRRT with factor 0. Developed afib with RVR. 5/28: vasopressin added, not meeting criteria for SAT/SBT.  Amio drip off.   5/29: ECHO with an ejection fraction of 15 to 20% with moderate mitral regurgitation, moderate to severe tricuspid regurgitation, RVSP 45-55, with a dilated IVC and a large left pleural effusion, TAPSE 0.8, LVIDD 5.8 global hypokinesis.     5/30: remains intubated, levophed down to 1.9ml/hr, vasopressin off since yesterday.  LFTs last checked 
Patient arrived here at  2135 5/23/24 from [facility],  since he arrived I could not get an accurate reading of spo2 (after trying everything I could), Dr. Shah at bedside he made aware. After the patient went to CT scan  at around 0130 the SpO2  was reading 40 to 90s with a good pleth, I escalated it to the RT and rapid response nurse, Dr. Shah made aware and ordered ABG, and the HFNC increased it to 15L, current reading as of right now is flactuating 80s to 90s.     0152- blood sugar was 32, gave d10 250 ml bolus. 10 mins after administration blood sugar is 82, Dr Shah is aware., blood sugar at 0459 is 108.   Patient is resting comfortable on bed, SpO2 is reading 100% on 6L HFNC.  
Patient with difficulty obtaining blood cultures due to limb restrictions and limited vessell options. US guidance used to obtain blood cultures x2 in right forearm and right AC.   
Patient's BG has been stable throughout the night.  Notified NP Lloyd and inquired about decreasing the rate of the D10 drip.  Received order to decrease rate to 25 mL/hr.  
Pharmacy Antimicrobial Kinetic Dosing    Indication for Antimicrobials: Sepsis, C. Diff     Current Regimen of Each Antimicrobial:  Vancomycin Pharmacy to Dose; Start Date ; Day # 5  Zosyn 3.375 g IV Q12H; Start Date ; Day # 5  Vancomycin 125 mg PO QID; Start Date ; Day 2    Previous Antimicrobial Therapy:   None    Goal Level: Vancomycin trough 15-20    Date Dose & Interval Measured (mcg/mL) Predicted AUC 24-48 Predicted AUC 24,ss    750mg Q12H 18.6 N/A N/A                   Significant Cultures:    Blood, paired: ngtd, prelim   C. Diff toxin: indeterminate   C. Diff PCR: positive   Blood, paired: ngtd, prelim   Body fluid, ascitic: ngtd, prelim    Labs:  Recent Labs     Units 25  0421 25  2306 25  1115 25  0238 25  1823 25  0617   CREATININE MG/DL 3.92* 4.46* 6.41* 6.90*   < > 6.45*   BUN MG/DL 29* 33* 44* 45*   < > 41*   WBC K/uL 22.7*  --   --  20.0*  --  16.4*   BANDS %  --   --   --   --   --  12    < > = values in this interval not displayed.     Temp (24hrs), Av.8 °F (37.1 °C), Min:97.6 °F (36.4 °C), Max:99.5 °F (37.5 °C)    Conditions for Dosing Consideration: CRRT    Creatinine Clearance (mL/min): Estimated Creatinine Clearance: 13 mL/min (A) (based on SCr of 3.92 mg/dL (H)).     Impression/Plan:   ESRD on HD PTA -- switched to CRRT dosing   Continue vancomycin 750mg IV Q12H  Level scheduled for 0  Antimicrobial stop date 7 days     Pharmacy will follow daily and adjust medications as appropriate for renal function and/or serum levels.    Thank you,  ANITRA ORTEGA [facility]  
Pharmacy Antimicrobial Kinetic Dosing    Indication for Antimicrobials: Sepsis, C. Diff     Current Regimen of Each Antimicrobial:  Vancomycin Pharmacy to Dose; Start Date ; Day # 6  Zosyn 3.375 g IV Q8H; Start Date ; Day # 6  Vancomycin 125 mg PO QID; Start Date ; Day #3    Previous Antimicrobial Therapy:   None    Goal Level: Vancomycin trough 15-20    Date Dose & Interval Measured (mcg/mL) Predicted AUC 24-48 Predicted AUC 24,ss    750mg Q12H 18.6 N/A N/A    750mg Q12H 19.6 N/A N/A            Significant Cultures:    Blood, paired: ngtd, prelim   C. Diff toxin: indeterminate   C. Diff PCR: positive   Blood, paired: ngtd, prelim   Body fluid, ascitic: ngtd, prelim    Labs:  Recent Labs     Units 25  0301 25  0132 25  1645 25  0421 25  1115 25  0238   CREATININE MG/DL 2.21*  --  2.80* 3.92*   < > 6.90*   BUN MG/DL 18  --  23* 29*   < > 45*   WBC K/uL 19.6* 19.4*  --  22.7*  --  20.0*    < > = values in this interval not displayed.     Temp (24hrs), Av.4 °F (36.9 °C), Min:94.8 °F (34.9 °C), Max:99.9 °F (37.7 °C)    Conditions for Dosing Consideration: CRRT    Creatinine Clearance (mL/min): Estimated Creatinine Clearance: 24 mL/min (A) (based on SCr of 2.21 mg/dL (H)).     Impression/Plan:   ESRD on HD PTA -- switched to CRRT dosing (MAR note placed for RN to contact pharmacy when ever CRRT is stopped and HD regimen is resumed)  Continue vancomycin 750mg IV Q12H  Level scheduled for  0400  Antimicrobial stop date 7 days     Pharmacy will follow daily and adjust medications as appropriate for renal function and/or serum levels.    Thank you,  ANITRA ORTEGA [facility]    
Pharmacy Antimicrobial Kinetic Dosing    Indication for Antimicrobials: Sepsis, C. Diff     Current Regimen of Each Antimicrobial:  Vancomycin Pharmacy to Dose; Start Date ; Day # 6  Zosyn 3.375 g IV Q8H; Start Date ; Day # 7  Vancomycin 125 mg PO QID; Start Date ; Day # 4    Previous Antimicrobial Therapy:   None    Goal Level: Vancomycin random level < 20     Date Dose & Interval Measured (mcg/mL) Predicted AUC 24-48 Predicted AUC 24,ss    750mg Q12H 18.6 N/A N/A    750mg Q12H 19.6 N/A N/A    750mg Q12H 23.1 N/A N/A     Significant Cultures:    Blood, paired: negative   C. Diff toxin: indeterminate   C. Diff PCR: positive   Blood, paired: ngtd, prelim   Body fluid, ascitic: ngtd, prelim    Labs:  Recent Labs     Units 25  0316 25  1727 25  0301 25  0132 25  1645 25  0421   CREATININE MG/DL 1.56* 1.81* 2.21*  --    < > 3.92*   BUN MG/DL 16 17 18  --    < > 29*   WBC K/uL 10.3  --  19.6* 19.4*  --  22.7*    < > = values in this interval not displayed.     Temp (24hrs), Av.1 °F (37.3 °C), Min:98.4 °F (36.9 °C), Max:100 °F (37.8 °C)    Conditions for Dosing Consideration: Hemodialysis    Creatinine Clearance (mL/min): Estimated Creatinine Clearance: 34 mL/min (A) (based on SCr of 1.56 mg/dL (H)).     Impression/Plan:   CRRT --> HD PRN   Pre-HD level schedule for  0600  Will dose vancomycin 750mg x 1 dose post-HD tomorrow if level <20  Antimicrobial stop date 7 days (last dose )     Pharmacy will follow daily and adjust medications as appropriate for renal function and/or serum levels.    Thank you,  ANITRA ORTEGA [facility]  
Pharmacy Antimicrobial Kinetic Dosing    Indication for Antimicrobials: Sepsis, C. Diff     Current Regimen of Each Antimicrobial:  Vancomycin Pharmacy to Dose; Start Date ; Day # 7  Zosyn 3.375 g IV Q8H; Start Date ; Day # 8  Vancomycin 125 mg PO QID; Start Date ; Day # 5    Previous Antimicrobial Therapy:   None    Goal Level: Vancomycin random level < 20     Date Dose & Interval Measured (mcg/mL) Predicted AUC 24-48 Predicted AUC 24,ss    750mg Q12H 18.6 N/A N/A    750mg Q12H 19.6 N/A N/A    750mg Q12H 23.1 N/A N/A      25.4       Significant Cultures:    Blood, paired: negative   C. Diff toxin: indeterminate   C. Diff PCR: positive   Blood, paired: ngtd, prelim   Body fluid, ascitic: ngtd, prelim    Labs:  Recent Labs     Units 25  0329 25  0316 25  1727 25  0301 25  0132   CREATININE MG/DL 2.42* 1.56* 1.81* 2.21*  --    BUN MG/DL 26* 16 17 18  --    WBC K/uL 11.6* 10.3  --  19.6* 19.4*     Temp (24hrs), Av.3 °F (37.4 °C), Min:96.7 °F (35.9 °C), Max:101.8 °F (38.8 °C)    Conditions for Dosing Consideration: Hemodialysis    Creatinine Clearance (mL/min): Estimated Creatinine Clearance: 22 mL/min (A) (based on SCr of 2.42 mg/dL (H)).     Impression/Plan:   CRRT --> HD PRN   Pre-HD level this am = 25.4. Will not dose vancomycin today.  Antimicrobial stop date 7 days (last dose  - today)      Pharmacy will follow daily and adjust medications as appropriate for renal function and/or serum levels.    Thank you,  Lizbeth Del Cid [facility]    
Pharmacy Antimicrobial Kinetic Dosing    Indication for Antimicrobials: sepsis     Current Regimen of Each Antimicrobial:  Vancomycin Pharmacy to Dose; Start Date ; Day # 1  Zosyn 3.375 g IV Q12H; Start Date ; Day # 1    Previous Antimicrobial Therapy:     Goal Level: Vancomycin trough 15-20    Date Dose & Interval Measured (mcg/mL) Predicted AUC 24-48 Predicted AUC 24,ss                          Significant Cultures:    Blood, paired: in process    Labs:  Recent Labs     Units 25  1152   CREATININE MG/DL 7.99*   BUN MG/DL 47*   PROCAL ng/mL 15.61   WBC K/uL 14.4*     Temp (24hrs), Av.3 °F (37.4 °C), Min:97.8 °F (36.6 °C), Max:102 °F (38.9 °C)      Conditions for Dosing Consideration: Hemodialysis    Creatinine Clearance (mL/min): Estimated Creatinine Clearance: 7 mL/min (A) (based on SCr of 7.99 mg/dL (H)).       Impression/Plan:   Vancomycin 1500 mg IV load administered   Pt on HD TRS - no inpatient orders yet but will dose vanc by levels  Order level when appropriate  Predicted ONH31-65 = N/A, Predicted AUC24,ss = N/A  CBC & BMP ordered daily per protocol  Antimicrobial stop date 7 days     Pharmacy will follow daily and adjust medications as appropriate for renal function and/or serum levels.    Thank you,  Corinne Garcia, [facility]    
Pt seen and examined   Chart reviewed   On bipap   Discussed with daughter , keep on bipap , no escalation of care   Npo  Palliative on board     Non billable round   
RENAL  PROGRESS NOTE        Subjective:   Noticed events yesterday  In shock  Code blue  Low bs    Objective:   VITALS SIGNS:    BP (!) 80/57   Pulse 84   Temp (!) 100.7 °F (38.2 °C) (Axillary)   Resp 14   Ht 1.829 m (6' 0.01\")   Wt 71.1 kg (156 lb 12 oz)   SpO2 92%   BMI 21.25 kg/m²             Temp (24hrs), Av.6 °F (37 °C), Min:97.5 °F (36.4 °C), Max:100.7 °F (38.2 °C)         PHYSICAL EXAM:  ON VENT  CACHECTIC  DATA REVIEW:     INTAKE / OUTPUT:   Last shift:      No intake/output data recorded.  Last 3 shifts: 1901 -  0700  In: 2173 [P.O.:120; I.V.:1349.9]  Out: 430     Intake/Output Summary (Last 24 hours) at 2025 0948  Last data filed at 2025 0700  Gross per 24 hour   Intake 2052.98 ml   Output 430 ml   Net 1622.98 ml         LABS:   LABS:  Recent Labs     25  0238 25  1823 25  0617 25  1256 25  1152   * 132* 129*   < > 135*   K 5.0 4.9 4.9   < > 5.6*   CL 91* 95* 92*   < > 94*   CO2 19* 17* 25   < > 26   BUN 45* 42* 41*   < > 47*   CREATININE 6.90* 6.53* 6.45*   < > 7.99*   CALCIUM 8.4* 7.5* 8.0*   < > 8.6   PHOS 6.8*  --   --   --   --    MG 2.3  --   --   --  2.2    < > = values in this interval not displayed.     Recent Labs     25  0617 25  1033   WBC 20.0* 16.4* 18.0*   HGB 7.5* 7.9* 7.7*   HCT 22.5* 22.9* 21.9*    198 226     No results for input(s): \"KU\", \"CLU\" in the last 720 hours.    Invalid input(s): \"REESE\", \"CREAU\", \"PROU\"      Assessment:   ESRD TTS at [facility]    AVF;ALSO HAS A NEW NICOLE  Hyponatremia  Septic shock  ANEMIA  Leukocytosis  Cirrhosis,ascitis  Low EF  Hypotension     AB  Blood cx   New nicole  Will start CVVH,davita aware  Very critical  Discussed with ICU RN       Venus Simmons MD            
RENAL  PROGRESS NOTE        Subjective:   resting    Objective:   VITALS SIGNS:    BP 93/64   Pulse 82   Temp 97.8 °F (36.6 °C) (Oral)   Resp 26   Ht 1.829 m (6' 0.01\")   Wt 62.2 kg (137 lb 2 oz)   SpO2 92%   BMI 18.59 kg/m²             Temp (24hrs), Av.8 °F (36.6 °C), Min:97.3 °F (36.3 °C), Max:98.4 °F (36.9 °C)         PHYSICAL EXAM:    NAD  DATA REVIEW:     INTAKE / OUTPUT:   Last shift:      No intake/output data recorded.  Last 3 shifts:  190 -  0700  In: 750 [I.V.:250]  Out: 1400     Intake/Output Summary (Last 24 hours) at 2025 0910  Last data filed at 2025 1631  Gross per 24 hour   Intake 500 ml   Output 1400 ml   Net -900 ml         LABS:   LABS:  Recent Labs     25  0617 25  1033 25  0021 25  1256 25  1152   * 129* 139  --  135*   K 4.9 5.6* 5.7*   < > 5.6*   CL 92* 90* 99  --  94*   CO2 25   --  26   BUN 41* 57* 38*  --  47*   CREATININE 6.45* 8.65* 7.25*  --  7.99*   CALCIUM 8.0* 8.2* 7.0*  --  8.6   MG  --   --   --   --  2.2    < > = values in this interval not displayed.     Recent Labs     25  0617 25  1033 25  0021   WBC 16.4* 18.0* 17.3*   HGB 7.9* 7.7* 8.3*   HCT 22.9* 21.9* 23.8*    226 170     No results for input(s): \"KU\", \"CLU\" in the last 720 hours.    Invalid input(s): \"REESE\", \"CREAU\", \"PROU\"      Assessment:   ESRD TTS at [facility]    AVF  hyperkalemia  ANEMIA  Leukocytosis  Ascitis  Hypotension     AB  Blood cx pending  Next HD tuesday   Will follow    Venus Simmons MD            
Reviewed chart and note patient remains on Bipap.  Will follow up next week for formal evaluation as medically appropriate.      Meri Sidhu M.CD. CCC-SLP   
Speech Pathology Contact Note:    Orders received and appreciated for SLP evaluation. Patient s/p prolonged intubation (intubated 5/25-6/4). Per chart review, patient currently requiring BiPAP and not appropriate for clinical swallow evaluation. SLP will follow up as patient appropriate. Thanks!     Jacqueline Sweet CCC-SLP    
Speech Therapy Contact Note    Order received and chart reviewed. Case discussed with RN. Patient participating in dialysis and not appropriate for swallow evaluation at this time. Will follow-up tomorrow to determine further plan.     Roma Plascencia, VAIBHAV-SLP    
Spiritual Health History and Assessment/Progress Note  [facility]    Follow-up,  , Adjustment to illness,      Name: Darrel Patterson MRN: [id number]    Age: 80 y.o.     Sex: male   Language: English   Yarsanism: Yarsanism   Sepsis (HCC)     Date: 5/26/2025            Total Time Calculated: 9 min              Spiritual Assessment continued in [facility] 2 CRITICAL CARE        Referral/Consult From: Multi-disciplinary team   Encounter Overview/Reason: Follow-up  Service Provided For: Family    Nancy, Belief, Meaning:   Patient unable to assess at this time  Family/Friends No family/friends present      Importance and Influence:  Patient unable to assess at this time  Family/Friends No family/friends present    Community:  Patient Other:    Family/Friends No family/friends present    Assessment and Plan of Care:     Patient Interventions include: Other:    Family/Friends Interventions include: No family/friends present    Patient Plan of Care: Spiritual Care available upon further referral  Family/Friends Plan of Care: Spiritual Care available upon further referral    Electronically signed by ALEXANDREA Kirk on 5/26/2025 at 9:55 AM   
Spiritual Health History and Assessment/Progress Note  [facility]    Crisis (code blue),  , Adjustment to illness,      Name: Darrel Patterson MRN: [id number]    Age: 80 y.o.     Sex: male   Language: English   Advent: Jehovah's witness   Sepsis (HCC)     Date: 5/25/2025            Total Time Calculated: 23 min              Spiritual Assessment began in [facility]M 2 CRITICAL CARE        Referral/Consult From: Multi-disciplinary team   Encounter Overview/Reason: Crisis (code blue)  Service Provided For: Family    Nancy, Belief, Meaning:   Patient unable to assess at this time  Family/Friends have beliefs or practices that help with coping during difficult times      Importance and Influence:  Patient unable to assess at this time  Family/Friends have spiritual/personal beliefs that influence decisions regarding the patient's health    Community:  Patient Other:    Family/Friends are connected with a spiritual community:    Assessment and Plan of Care:     Patient Interventions include: Other:    Family/Friends Interventions include: Facilitated expression of thoughts and feelings, Explored spiritual coping/struggle/distress, Affirmed coping skills/support systems, and Facilitated life review and/or legacy    Patient Plan of Care: Spiritual Care available upon further referral  Family/Friends Plan of Care: Spiritual Care available upon further referral    Electronically signed by ALEXANDREA Kirk on 5/25/2025 at 12:50 PM   
Spiritual Health History and Assessment/Progress Note  [facility]    Crisis, Family Care, Death,      Name: Darrel Patterson MRN: [id number]    Age: 80 y.o.     Sex: male   Language: English   Denominational: Jew   Sepsis (HCC)     Date: 6/10/2025            Total Time Calculated: 110 min              Spiritual Assessment continued in [facility] 2 PROGRESSIVE CARE        Referral/Consult From: Nurse   Encounter Overview/Reason: Crisis  Service Provided For: Family    Nancy, Belief, Meaning:   Patient unable to assess at this time  Family/Friends are connected with a nancy tradition or spiritual practice and have beliefs or practices that help with coping during difficult times      Importance and Influence:  Patient unable to assess at this time  Family/Friends have spiritual/personal beliefs that influence decisions regarding the patient's health    Community:  Patient Other:    Family/Friends feel well-supported. Support system includes: Children and Extended family    Assessment and Plan of Care:     Patient Interventions include: Other: n/a  Family/Friends Interventions include: Facilitated expression of thoughts and feelings, Engaged in theological reflection, and Facilitated life review and/or legacy    Patient Plan of Care: No future visits per patient/family request  Family/Friends Plan of Care: No spiritual needs identified for follow-up    Electronically signed by Chaplain EVELIN on 6/10/2025 at 4:48 AM   
Transport here with patient to room. ED registration notified.       [id number]: Hospitalist paged   
VBG Co-Oximetry results not crossing over into Epic chart. VBG w/ Co-Ox & ABG results given to YUNIEL Desai. See VBG Co-Ox results below:    tHb: 9.1g/dL  SO2: 80.2%  FO2Hb: 78.9%  FCOHb: 1.3%  FMetHb: 0.3%  
    Current Nutrition Intake & Therapies:    Average Meal Intake: NPO  Average Supplements Intake: None Ordered  Diet NPO    Anthropometric Measures:  Height: 182.9 cm (6' 0.01\")  Ideal Body Weight (IBW): 178 lbs (81 kg)       Current Body Weight: 64.1 kg (141 lb 5 oz), 61.4 % IBW. Weight Source: Bed scale  Current BMI (kg/m2): 19.2                             BMI Categories: Underweight (BMI less than 22) age over 65    Estimated Daily Nutrient Needs:  Energy Requirements Based On: Formula  Weight Used for Energy Requirements: Current  Energy (kcal/day): PSU 1606 (MSJ 1390)  Weight Used for Protein Requirements: Current  Protein (g/day): 96-128g (1.5-2gPro/kg)  Method Used for Fluid Requirements: Defer to provider  Fluid (ml/day): per MD    Nutrition Diagnosis:   Severe malnutrition related to catabolic illness, inadequate protein-energy intake as evidenced by criteria as identified in malnutrition assessment    Nutrition Interventions:   Food and/or Nutrient Delivery: Start Parenteral Nutrition, Start Tube Feeding  Nutrition Education/Counseling: No recommendation at this time  Coordination of Nutrition Care: Continue to monitor while inpatient, Interdisciplinary Rounds       Goals:  Goals: Initiate nutrition support, by next RD assessment  Type of Goal: New goal  Previous Goal Met: Progress towards Goal(s) Declining    Nutrition Monitoring and Evaluation:   Behavioral-Environmental Outcomes: None Identified  Food/Nutrient Intake Outcomes: Enteral Nutrition Intake/Tolerance, Parenteral Nutrition Intake/Tolerance, IVF Intake  Physical Signs/Symptoms Outcomes: Biochemical Data, Nutrition Focused Physical Findings, Skin, Weight, Fluid Status or Edema, Hemodynamic Status    Discharge Planning:    Too soon to determine     Krystyna Pleitez RD, [facility]  Contact: ext [phone]    
0.8, LVIDD 5.8 global hypokinesis.      : remains intubated, levophed down to 1.9ml/hr, vasopressin off since yesterday.  LFTs last checked  , , total bili 4.0.  RN is reducing sedation with plan for SBT.     Code Status: Full Code    [facility]/4/2025    10:20 AM   Demographics   Marital Status Single   AMD from  names daughter Nadine as primary and daughter Sis as secondary HCDM. He did not complete the Health Care Instructions section.    Patient / Family Encounter Documentation    Participants (names): Darrel Patterson (intubated, soft restraints, lethargic, alert, very weak), daughter Nadine, Belle Booth, LCSW    Narrative:   --Palliative SW reviewed chart, spoke with assigned nurse, Brandin, who reported rylee Mccoy was at bedside.  --Patient was lying on his right side, intubated and lethargic, occasionally opening his eyes.   --Offered support to Nadine, who shared that family is probably going to agree to extubate patient this afternoon.  --LCSW acknowledged how difficult this situation is for patient and family, offered support as needed.  --Daughter expressed appreciation.  --This writer spoke with attending, Dr. Brown, who shared that family wanted to include son in decision this afternoon.      Psychosocial Issues Identified/ Resilience Factors:  3 of his daughters are most involved: Nadine Traore (daughter), Clarissa Traore (daughter), and Marjanazeb Traore (daughter).  He has [facility] Medicaid. Patient has 10 children. He is , lives at home alone in [facility]. Was driving short distances.Has so far refused to move near to his dtr Nadine (works as NP, lives in [place]).He relies mostly on dtr Nadine Traore, who communicates with the rest of their siblings. Mr. Patterson is not a . He worked as a silver and in \"seafood work.\"  Patient's spouse  suddenly of a massive heart attack 2024. He has lost weight and has very little 
15    Laura Mendez RN                            
Bed  Patient Moved Closer to Nursing Station: No    Active Consults:   IP CONSULT TO PHARMACY  IP CONSULT TO NEPHROLOGY  IP CONSULT TO GI  IP CONSULT TO ADVANCED HEART FAILURE PROVIDER  IP CONSULT TO CARDIOLOGY  IP CONSULT TO PALLIATIVE CARE  IP CONSULT TO PALLIATIVE CARE  IP CONSULT TO PULMONOLOGY    Length of Stay:  Expected LOS: 20  Actual LOS: 17    Juanis Bonds RN                           
PHARMACY  IP CONSULT TO NEPHROLOGY  IP CONSULT TO GI  IP CONSULT TO ADVANCED HEART FAILURE PROVIDER  IP CONSULT TO CARDIOLOGY  IP CONSULT TO PALLIATIVE CARE  IP CONSULT TO PALLIATIVE CARE  IP CONSULT TO PULMONOLOGY    Length of Stay:  Expected LOS: 16  Actual LOS: 16    Laura Mendez RN                            
RN      
Receiving Mechanical Ventilation Yes   Safety Screening Spontaneous Breathing Trial (SBT) Proceed with SBT - no exclusion criteria met   RT Notified Ready for SBT Yes       
Requirements: Current  Energy (kcal/day): 9356-4336 kcals (30-35 kcals/kg bw)  Weight Used for Protein Requirements: Current  Protein (g/day): 87-105g (1.5-1.8 g/kg bw)  Method Used for Fluid Requirements: Defer to provider  Fluid (ml/day): per MD    Nutrition Diagnosis:   Severe malnutrition related to catabolic illness, inadequate protein-energy intake as evidenced by criteria as identified in malnutrition assessment    Nutrition Interventions:   Food and/or Nutrient Delivery: Start Tube Feeding, Start Oral Diet  Nutrition Education/Counseling: No recommendation at this time  Coordination of Nutrition Care: Continue to monitor while inpatient, Interdisciplinary Rounds       Goals:  Goals: Meet at least 75% of estimated needs, by next RD assessment  Type of Goal: Continue current goal  Previous Goal Met: Progress towards Goal(s) Declining    Nutrition Monitoring and Evaluation:   Behavioral-Environmental Outcomes: None Identified  Food/Nutrient Intake Outcomes: Progression of Nutrition  Physical Signs/Symptoms Outcomes: Biochemical Data, Weight, Chewing or Swallowing, Diarrhea, Fluid Status or Edema, Hemodynamic Status, Skin    Discharge Planning:    Too soon to determine     Tasneem Levi RD  Contact: ext [phone]    
Spontaneous Breathing Trial (SBT) Proceed with SBT - no exclusion criteria met   RT Notified Ready for SBT Yes       
Station: No    Active Consults:   IP CONSULT TO PHARMACY  IP CONSULT TO NEPHROLOGY  IP CONSULT TO GI  IP CONSULT TO ADVANCED HEART FAILURE PROVIDER  IP CONSULT TO CARDIOLOGY  IP CONSULT TO PALLIATIVE CARE  IP CONSULT TO PALLIATIVE CARE    Length of Stay:  Expected LOS: 16  Actual LOS: 15    Laura Mendez RN                            
Weight Source: Bed scale  Current BMI (kg/m2): 17.4                             BMI Categories: Underweight (BMI less than 22) age over 65    Estimated Daily Nutrient Needs:  Energy Requirements Based On: Kcal/kg  Weight Used for Energy Requirements: Current  Energy (kcal/day): 3138-1600 (25-30kcals/kg)  Weight Used for Protein Requirements: Current  Protein (g/day): 87-116g (1.5-2gPro/kg)  Method Used for Fluid Requirements: Defer to provider  Fluid (ml/day): per MD    Nutrition Diagnosis:   Severe malnutrition related to catabolic illness, inadequate protein-energy intake as evidenced by criteria as identified in malnutrition assessment  Previous dx continues.     Nutrition Interventions:   Food and/or Nutrient Delivery: Continue Current Tube Feeding, Modify Tube Feeding  Nutrition Education/Counseling: No recommendation at this time  Coordination of Nutrition Care: Continue to monitor while inpatient, Interdisciplinary Rounds       Goals:  Goals: Meet at least 75% of estimated needs, Tolerate nutrition support at goal rate, by next RD assessment  Type of Goal: New goal  Previous Goal Met: Goal(s) Achieved    Nutrition Monitoring and Evaluation:   Behavioral-Environmental Outcomes: None Identified  Food/Nutrient Intake Outcomes: Enteral Nutrition Intake/Tolerance, IVF Intake  Physical Signs/Symptoms Outcomes: Biochemical Data, Nutrition Focused Physical Findings, Skin, Weight, Fluid Status or Edema, Hemodynamic Status    Discharge Planning:    Too soon to determine     Krystyna Pleitez RD, CNSC  Contact: ext [phone]    
current 115; HR 70-90s; sinus; 100% 60% w/ bipap  K 3.9; Cr 2.4; Hg 7.  Cardiac meds: coreg 3.125 bid;   Rec: Change coreg to non-HD days only.    6/7: On Bipap; lethargic, arouses to voice; no apparent complaints  -120s; HR 70-80s; sinus;   K 4.1; Cr 3.21; Hg 7.3;     6/8: On NC; A/O; atypical CP c/w muscular and throat discomfort; No resting dyspnea  -120; HR 80-90s; sinus; 93% 10L;   Hg 7.4    6/9: No angina; cont muscular CP (with turning); No resting dyspnea; He removed the NG tube.  -110s; HR 70s; sinus; 98% 10L  K 3.9 Cr 2.9; alb 2.0; Hg 7.5.    Cardiac meds: coreg 3.125 bid M/W/F non-HD days.    No new cardiac recs.     ____________________________________________________________________________    RHC 4/22/25: RA 21; RV 57/11; PA 59/25, mean 37; W 25/33, mean 24.  CO/I 3.7/2.1    YELENA 4/22/25:    Left Ventricle: Severely reduced left ventricular systolic function with a visually estimated EF of 20 - 25%. Left ventricle is mildly dilated. Normal wall thickness. Severe global hypokinesis present. Abnormal diastolic function.    Right Ventricle: Right ventricle is mildly dilated. Moderately reduced systolic function.    Aortic Valve: Mild regurgitation.    Mitral Valve: Mildly thickened leaflets. Apical displacement of the mitral valve point of coaptation. Severe regurgitation.  EROA 0.4 cm2, Rvol 46 mL. No stenosis noted. MV mean gradient is 1.49 mmHg at HR 65 bpm. MVA by 2D planimetry is 5.9 cm2. MV area by PHT is 5.1 cm2.  3D en face views of the mitral valve were obtained.    Tricuspid Valve: Moderate annular dilation. Severe regurgitation. Moderately elevated RVSP, consistent with moderate pulmonary hypertension. The estimated RVSP is 54 mmHg.    Left Atrium: Left atrium is severely dilated.    Interatrial Septum: PFO present with a bidirectional shunt visible by color Doppler.    Right Atrium: Right atrium is severely dilated.    Echo 5/29/25:    Left Ventricle: Severely reduced left 
leads  Continue PTA pravastatin, valsartan  Continue to monitor on telemetry avoid QT prolonging agents     COPD  ?Possible respiratory failure  Chest x-ray done 5/20/2025-stable cardiomegaly, mild pulmonary vascular congestive changes  Blood gas done on 5/24/2025-pCO2 32P O2 of 98  Formulary substitution budesonide arformoterol ipratropium nebulizer  Continue PTA ensifentrine nebulizer  Orally substitution every 4 hours as needed albuterol nebulizer  Has been difficult to find out site for SpO2 reading.  Patient is not in respiratory distress at this moment.  Need to get repeat ABG  Follow-up chest x-ray    Rrt and then code blue called at 11:42 am   Patient was very dyspneic - his sats were not being read by pulse ox   Abg revealed - ph - 7.43, po2- 93, pco -30, hco3- 19  Icu was informed - Dr Brown responded - advised for Bipap  In the mean time - Patient lost pulse - and code blue was called   Cpr - started and patient achieved rosc   Intubated and transferred to ICU   Family informed at bed side      Peripheral neuropathy  Continue PTA gabapentin nightly     Ophthalmologic disorder  Continue PTA eyedrops     GERD  Continue PTA Protonix     Insomnia  Continue PTA Remeron  Melatonin nightly as needed     Medical Decision Making:   I personally reviewed labs: CBC BMP  I personally reviewed imaging: none   Toxic drug monitoring: Monitor for bleeding while on heparin, daily CBC  Discussed case with: Patient, RN        Code Status: Full  DVT Prophylaxis: Heparin  GI Prophylaxis: Protonix  Baseline: Home with family    I have provided    50    minutes of critical care time.  During this entire length of time I was immediately available to the patient.       The reason for providing this level of medical care was due to a critical illness that impaired one or more vital organ systems, such that there was a high probability of imminent or life threatening deterioration in the patient's condition. This care involved 
palpitations, lower extremity edema  GI:                   See HPI  :                  negative for frequency, dysuria  Integument:   negative for rash and pruritus  Heme:             negative for easy bruising and gum/nose bleeding  Musculoskel: negative for myalgias,  back pain and muscle weakness  Neuro: negative for headaches, dizziness, vertigo  Psych:            negative for feelings of anxiety, depression     Objective:   VITALS:   Last 24hrs VS reviewed since prior progress note. Most recent are:  Vitals:    05/27/25 1000   BP: (!) 84/52   Pulse: (!) 135   Resp: 15   Temp: 99.4 °F (37.4 °C)   SpO2:        Intake/Output Summary (Last 24 hours) at 5/27/2025 1017  Last data filed at 5/27/2025 1000  Gross per 24 hour   Intake 2531.85 ml   Output 2439.4 ml   Net 92.45 ml     PHYSICAL EXAM:  General: WD, WN. Alert, cooperative, no acute distress    HEENT: NC, Atraumatic. Anicteric sclerae.  Lungs:  CTA Bilaterally. No Wheezing/Rhonchi/Rales.  Heart:  Regular  rhythm,  No murmur (), No Rubs, No Gallops  Abdomen: Soft, Non distended, Non tender.  +Bowel sounds, no HSM  Extremities: No c/c/e  Neurologic:  Alert and oriented X 3.  No acute neurological distress   Psych:   Good insight. Not anxious nor agitated.    Lab and Radiology Data Reviewed: (see below)    Medications Reviewed: (see below)  PMH/SH reviewed - no change compared to H&P  ________________________________________________________________________  Total time spent with patient: 30 minutes ________________________________________________________________________  Care Plan discussed with:  Patient    Family     RN               Consultant:       ANILA Choudhary     Procedures: see electronic medical records for all procedures/Xrays and details which were not copied into this note but were reviewed prior to creation of Plan.      LABS:  Recent Labs     05/26/25  0238 05/27/25  0421   WBC 20.0* 22.7*   HGB 7.5* 7.0*   HCT 22.5* 19.5*    169 
(COLACE) 50 MG/5ML liquid 100 mg  100 mg Per NG tube BID    [Held by provider] polyethylene glycol (GLYCOLAX) packet 17 g  17 g Per NG tube BID    pantoprazole (PROTONIX) 40 mg in sodium chloride (PF) 0.9 % 10 mL injection  40 mg IntraVENous Daily    alcohol 62% (NOZIN) nasal  1 ampule  1 ampule Nasal Q12H    albuterol sulfate HFA (PROVENTIL;VENTOLIN;PROAIR) 108 (90 Base) MCG/ACT inhaler 2 puff  2 puff Inhalation Q6H PRN    epoetin nehemiah-epbx (RETACRIT) injection 10,000 Units  10,000 Units SubCUTAneous Once per day on Tuesday Thursday Saturday    gabapentin (NEURONTIN) capsule 100 mg  100 mg Oral QHS    dorzolamide-timolol (COSOPT) 2-0.5 % ophthalmic solution 1 drop  1 drop Both Eyes TID    hydrOXYzine HCl (ATARAX) tablet 25 mg  25 mg Oral TID PRN    mirtazapine (REMERON) tablet 15 mg  15 mg Oral Nightly    montelukast (SINGULAIR) tablet 10 mg  10 mg Oral Daily    [Held by provider] pravastatin (PRAVACHOL) tablet 10 mg  10 mg Oral Daily    sodium chloride flush 0.9 % injection 5-40 mL  5-40 mL IntraVENous 2 times per day    sodium chloride flush 0.9 % injection 5-40 mL  5-40 mL IntraVENous PRN    0.9 % sodium chloride infusion   IntraVENous PRN    polyethylene glycol (GLYCOLAX) packet 17 g  17 g Oral Daily PRN    acetaminophen (TYLENOL) tablet 650 mg  650 mg Oral Q6H PRN    Or    acetaminophen (TYLENOL) suppository 650 mg  650 mg Rectal Q6H PRN    heparin (porcine) injection 5,000 Units  5,000 Units SubCUTAneous 3 times per day    glucose chewable tablet 16 g  4 tablet Oral PRN    dextrose bolus 10% 125 mL  125 mL IntraVENous PRN    Or    dextrose bolus 10% 250 mL  250 mL IntraVENous PRN    glucagon injection 1 mg  1 mg SubCUTAneous PRN     
Range    Fluid Type ASCITIC FLUID      Albumin, Fluid 1.2 g/dL   Culture, Body Fluid (with Gram Stain)    Collection Time: 05/26/25  2:54 PM    Specimen: Ascitic Fluid; Body Fluid   Result Value Ref Range    Special Requests NO SPECIAL REQUESTS      Gram Stain 2+ WBCS SEEN      Gram Stain NO ORGANISMS SEEN      Gram Stain        Gram stain performed on cytocentrifuged sample. Results may reflect greater recovery of cells and organisms present.    Culture PENDING    POCT Glucose    Collection Time: 05/26/25  4:05 PM   Result Value Ref Range    POC Glucose 119 (H) 65 - 117 mg/dL    Performed by: KAYCEE Aquino PCT    POCT Glucose    Collection Time: 05/26/25  5:53 PM   Result Value Ref Range    POC Glucose 110 65 - 117 mg/dL    Performed by: KAYCEE Aquino PCT    POCT Glucose    Collection Time: 05/26/25  8:00 PM   Result Value Ref Range    POC Glucose 135 (H) 65 - 117 mg/dL    Performed by: Wil Acosta    POCT Glucose    Collection Time: 05/26/25 10:08 PM   Result Value Ref Range    POC Glucose 141 (H) 65 - 117 mg/dL    Performed by: Wil Acosta    Comprehensive Metabolic Panel w/ Reflex to MG    Collection Time: 05/26/25 11:06 PM   Result Value Ref Range    Sodium 131 (L) 136 - 145 mmol/L    Potassium 4.1 3.5 - 5.1 mmol/L    Chloride 96 (L) 97 - 108 mmol/L    CO2 23 21 - 32 mmol/L    Anion Gap 12 2 - 12 mmol/L    Glucose 136 (H) 65 - 100 mg/dL    BUN 33 (H) 6 - 20 MG/DL    Creatinine 4.46 (H) 0.70 - 1.30 MG/DL    BUN/Creatinine Ratio 7 (L) 12 - 20      Est, Glom Filt Rate 13 (L) >60 ml/min/1.73m2    Calcium 8.3 (L) 8.5 - 10.1 MG/DL    Total Bilirubin 2.3 (H) 0.2 - 1.0 MG/DL    ALT 25 12 - 78 U/L     (H) 15 - 37 U/L    Alk Phosphatase 89 45 - 117 U/L    Total Protein 6.4 6.4 - 8.2 g/dL    Albumin 2.5 (L) 3.5 - 5.0 g/dL    Globulin 3.9 2.0 - 4.0 g/dL    Albumin/Globulin Ratio 0.6 (L) 1.1 - 2.2     Magnesium    Collection Time: 05/26/25 11:06 PM   Result Value Ref Range    Magnesium 1.9 1.6 - 2.4 mg/dL 
Status    Discharge Planning:    Too soon to determine     Krystyna Pleitez RD, [facility]  Contact: ext [phone]    
and did not want it back in even if it meant he would die.  She was appreciative of that information because pt usually is not forthcoming with decisions.  She has good insight into pt's multiple organ failure.  Will check back tomorrow.   Initial consult note routed to primary continuity provider and/or primary health care team members  Please call with any palliative questions or concerns.  Palliative Care Team is available via perfect serve or via phone.    Referrals to:   [] Outpatient Palliative Care  [] Home Based Palliative Care  [] Home Based Primary Care  [] Hospice       ADVANCE CARE PLANNING:   [] [facility] Interdisciplinary Team has updated the ACP Navigator with Health Care Decision Maker and Patient Capacity      Primary Decision Maker: LeónNadine - Child - [phone]    Secondary Decision Maker: LeónLinda - Child - [phone]  Confirm Advance Directive: None  Patient Would Like to Complete Advance Directive: No/refused    Current Code Status: Full Code     Goals of Care: Goals of Care and Interventions  Patient/Health Care Proxy Stated Goals: Recovery from acute illness  Medical Interventions: Full interventions       Please refer to Palliative Medicine ACP notes for further details.    PALLIATIVE ASSESSMENT:      Palliative Performance Scale (PPS):  PPS: 10    ECOG:   ECOG Status : Completely disabled [4]    Modified ESAS:  Modified-Blodgett Symptom Assessment Scale (ESAS)  Pain Score: No pain  Anxiety Score: Not anxious  Dyspnea Score: No shortness of breath    Clinical Pain Assessment (nonverbal scale for severity on nonverbal patients):   Clinical Pain Assessment  Severity: 0       NVPS:  Adult Nonverbal Pain Scale (NVPS)  Face: No particular expression or smile  Activity (Movement): Laid quietly, normal position  Guarding: Lying quietly, no positioning of hands over areas of bod  Physiology (Vital Signs): Stable vital signs  Respiratory: Baseline RR/SpO2 compliant with ventilator  NVPS 
is worsening despite broad-spectrum antibiotics.  Currently on Levophed 9 mics, vasopressin 0.03 and stress dose steroids.  Plan  - Wean pressors for MAP goal > 60  - Continue antibiotics and follow cultures  - Repeat echocardiogram    # Acute respiratory arrest/failure, status post intubation pericardiac arrest 5/25/2025  # Multifocal pneumonia, on chest x-ray 5/25/2025  - History of COPD,  Plan  - Continue antibiotic  - Follow blood cultures, respiratory cultures  - Continue lung protective ventilation  - Continue DuoNeb nebs every 4 for now    # Decompensated liver cirrhosis/nodular appearance plus moderate to large ascites over last few months  # Hyperbilirubinemia  - Negative hepatitis B surface antigen this admission  - Patient is seen by GI/hepatology, recommended thoracentesis followed by MRCP if needed.    -- S/p Paracentesis 5/26 and 2.7L of fluid removed.   Plan  - Monitor   -- NO further imaging needed per GI at this point until stabilizes     # Diarrhea, resolved.  - Enteric panel negative  - C. difficile stool toxin positive by PCR  Plan  - Will continue p.o. vancomycin while on systemic antibiotics for sepsis.    # ESRD, on hemodialysis.  Last dialysis 5/24/2025.  Plan  - Will follow nephrology recommendations for CRRT       Full code.     Daughter attended the multidisciplinary rounds. I answered all the questions.     CCM time - 40 minutes.       CRITICAL CARE CONSULTANT NOTE  I had a face to face encounter with the patient, reviewed and interpreted patient data including clinical events, labs, images, vital signs, I/O's, and examined patient.  I have discussed the case and the plan and management of the patient's care with the consulting services, the bedside nurses and the respiratory therapist.      NOTE OF PERSONAL INVOLVEMENT IN CARE   This patient has a high probability of imminent, clinically significant deterioration, which requires the highest level of preparedness to intervene urgently. I 
500 cc IV fluids.     On arrival, patient afebrile and hemodynamically stable saturating upper 90s on 2 4 L supplemental oxygen.  He voices no complaints or concerns at this time aside from fatigue.  Daughter at bedside confirms story as noted above and has no further history to provide.\"    ______________________________________________________________________    He is intubated; cannot provide history.  D/W his daughter at bedside (I have seen her at office w/ pt before); She reports he wanted to be a full code at admission.    ECG independently interpreted: 5/23: sinus, LVH/repol changes; 5/27: atypical flutter w/ RVR.  Tele  independently interpreted: sinus  CXR reviewed 5/28: \"No pneumothorax following insertion of left IJ catheter with tip over the distal  SVC. Improving bilateral airspace disease.\"    Labs reviewed; Notable: na 131; K 4.1; Cr 2.42; Hg 7.6; plt 102; trop only ot 108 5/23/25.  High proBNP (useless w/ ESRD). LFTs to 100s/800s.      ___________________________________________  Notable prior cardiac history:  @ OV w/ Dr Mccollum 4/4/25:  Mr Patterson has a h/o:  1) Nonischemic CM 4/2023: EF 15-20%; No CAD @ cath 4/2023.      *Nl TSH;  High ferritin (2100) but normal iron % sat (29) 4/2023; No ARB at first d/t low BPs (added as outpt)      *LifeVest, prior compliance then stopped.            *EF 30% 2/2024: ICD rec; Patient declined ICD in 3/2024 and 8/2024:       *EF 15-20% 7/2024; Severe MR. Mod. TR. PASP 50-55.  2) CAD: no focal CAD @ cath 4/2023; Cath 7/2024 w/ worse CM: no CAD except 80% ostial D4: Med Rx only.  2) Brief PAT, NOT afib on initial ECG 4/2023; cont sinus.  3) MR: Mod-severe 4/2023; mod-severe visually & severe by  ERO 2/2024.       *No role for MitraClip if no ICD w/ CM.  4) HTN: low BP limiting Rx 4/2023: higher BPs 2024: stopped midodrine 9/2024: on ARB/added BBlocker  5) Dyslipidemia, labs per PCP  6) ESRD; (in [place]); via left upper extremity AV fistula  7) Marked anemia (Hg 
along closely for education, support, GOC/ACP discussions as appropriate.    Thank you for including Palliative Medicine in the care of Mr. Darrel Patterson.    Belle Booth, \[facility] [place] [facility]\""W  [phone] ([phone])  
bipap     Anemia   Monitor hb   Medical Decision Making:   I personally reviewed labs:cbc bmp   I personally reviewed imaging:CT abdomen pelvis   I personally reviewed EKG:  Toxic drug monitoring:   Discussed case with: RN         Code Status: full code   DVT Prophylaxis:   GI Prophylaxis:    Subjective:     Chief Complaint / Reason for Physician Visit  \"FU respiratory failure\".  Discussed with RN events overnight.   Remains bipap     Objective:     VITALS:   Last 24hrs VS reviewed since prior progress note. Most recent are:  Patient Vitals for the past 24 hrs:   BP Temp Temp src Pulse Resp SpO2   06/07/25 1548 111/77 98 °F (36.7 °C) -- -- -- --   06/07/25 1530 119/69 -- -- -- -- --   06/07/25 1500 (!) 122/59 -- -- -- -- --   06/07/25 1457 -- -- -- -- -- 100 %   06/07/25 1447 (!) 124/56 98 °F (36.7 °C) -- 78 18 --   06/07/25 1429 -- -- -- -- -- 96 %   06/07/25 1413 -- -- -- -- -- 92 %   06/07/25 1240 109/70 97.6 °F (36.4 °C) -- -- -- --   06/07/25 1132 115/67 97.6 °F (36.4 °C) Axillary -- -- 100 %   06/07/25 0841 -- -- -- -- -- 100 %   06/07/25 0827 -- -- -- -- -- 100 %   06/07/25 0755 -- -- -- 78 -- --   06/07/25 0346 -- -- -- 72 18 98 %   06/07/25 0300 112/68 97.3 °F (36.3 °C) Axillary 81 17 100 %   06/06/25 2330 -- -- -- 77 18 100 %   06/06/25 2259 111/72 97.7 °F (36.5 °C) Axillary 81 18 100 %   06/06/25 2230 123/70 -- -- 78 16 100 %   06/06/25 2200 126/84 -- -- 75 17 100 %   06/06/25 2130 116/63 -- -- 77 18 100 %   06/06/25 2100 121/61 -- -- 74 19 100 %   06/06/25 2030 122/65 97.8 °F (36.6 °C) Axillary 81 17 100 %   06/06/25 2000 108/80 -- -- 75 19 100 %   06/06/25 1930 120/71 -- -- 81 20 98 %   06/06/25 1926 -- -- -- 85 24 (!) 88 %   06/06/25 1925 -- -- -- 85 21 96 %   06/06/25 1924 -- -- -- 85 21 96 %   06/06/25 1920 -- -- -- 77 20 96 %   06/06/25 1900 122/77 -- -- 82 21 --   06/06/25 1830 111/68 -- -- 75 19 100 %   06/06/25 1800 114/74 -- -- 74 21 --         Intake/Output Summary (Last 24 hours) at 6/7/2025 
fluid removed.   Plan  - Monitor   -- NO further imaging needed per GI at this point until stabilizes     # Diarrhea, resolved.  - Enteric panel negative  - C. difficile stool toxin positive by PCR.  Treated with p.o. bank  Plan  - Monitor    # ESRD, on hemodialysis.    Plan  - Will follow nephrology recommendations for renal replacement      Code status: Full code      Care Plan discussed with: Patient/Family and Nurse    I personally spent 45 minutes of critical care time.  This is time spent at this critically ill patient's bedside actively involved in patient care as well as the coordination of care and discussions with the patient's family.  This does not include any procedural time which has been billed separately.       CRITICAL CARE CONSULTANT NOTE  I had a face to face encounter with the patient, reviewed and interpreted patient data including clinical events, labs, images, vital signs, I/O's, and examined patient.  I have discussed the case and the plan and management of the patient's care with the consulting services, the bedside nurses and the respiratory therapist.      NOTE OF PERSONAL INVOLVEMENT IN CARE   This patient has a high probability of imminent, clinically significant deterioration, which requires the highest level of preparedness to intervene urgently. I participated in the decision-making and personally managed or directed the management of the following life and organ supporting interventions that required my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 medications reviewed as noted below.    No results for input(s): \"PH\", \"PCO2\", \"PO2\" in the last 72 hours.  No results for input(s): \"CPK\", \"CKMB\" in the last 72 hours.    Invalid input(s): \"CKNDX\", \"TROIQ\"  Recent Labs     06/05/25  0359 06/06/25  0227 06/07/25  0312   * 136 137   K 4.6 3.9 4.1    101 101   CO2 29 30 29   BUN 47* 28* 32*   WBC 16.8* 13.2* 10.2   HGB 7.4* 7.0* 7.3*   HCT 23.3* 22.3* 22.7*    249 256     Lab Results   Component Value Date/Time    CHOL 160 04/12/2023 04:37 AM    HDL 88 04/12/2023 04:37 AM    LDL 55.2 04/12/2023 04:37 AM     No results for input(s): \"TP\", \"GLOB\", \"GGT\" in the last 72 hours.    Invalid input(s): \"SGOT\", \"GPT\", \"AP\", \"TBIL\", \"ALB\", \"AML\", \"AMYP\", \"LPSE\", \"HLPSE\"  No results for input(s): \"INR\", \"APTT\" in the last 72 hours.    Invalid input(s): \"PTP\"   No components found for: \"GLPOC\"    Current Facility-Administered Medications   Medication Dose Route Frequency    budesonide (PULMICORT) nebulizer suspension 250 mcg  0.25 mg Nebulization BID RT    And    arformoterol tartrate (BROVANA) nebulizer solution 15 mcg  15 mcg Nebulization BID RT    And    ipratropium (ATROVENT) 0.02 % nebulizer solution 0.5 mg  0.5 mg Nebulization TID RT    carvedilol (COREG) tablet 3.125 mg  3.125 mg Oral Q MWF    HYDROmorphone HCl PF (DILAUDID) injection 1 mg  1 mg IntraVENous Q15 Min PRN    diazePAM 
Single     Smoking status: Former smoker.    Tobacco Screening  Patient has used tobacco.     Smoking Status  Type Smoking Status Usage Per Day Years Used Pack Years Total Pack Years    Former smoker         Alcohol  There is a history of alcohol use, but no current usage.     Caffeine  The patient uses caffeine.    Lifestyle  Exercises occasionally.      REVIEW OF SYMPTOMS:  See HPI    ______________________________________________________________________  Patient Active Problem List    Diagnosis Date Noted    Cardiac arrest ([facility]) 05/30/2025    Acute systolic heart failure ([facility]) 05/30/2025    Sepsis ([facility]) 05/23/2025    Mitral valve regurgitation 04/08/2025    Hypoglycemia 10/08/2024    Tachycardia 09/21/2024    Frailty 07/20/2024    Goals of care, counseling/discussion 07/20/2024    DNR (do not resuscitate) discussion 07/20/2024    Severe protein-calorie malnutrition 07/19/2024    Grief 07/17/2024    Palliative care by specialist 07/12/2024    Sarcopenia 07/12/2024    Unintentional weight loss 07/12/2024    Palliative care encounter 07/12/2024    ESRD (end stage renal disease) ([facility]) 07/12/2024    Debility 07/12/2024    NSTEMI (non-ST elevated myocardial infarction) ([facility]) 07/09/2024    GI bleed 06/10/2024    Left renal mass 12/06/2023    Hyperkalemia 07/25/2023    Long term (current) use of systemic steroids 07/25/2023    Weakness generalized 07/25/2023    Fatigue 04/11/2023    DDD (degenerative disc disease), lumbar 12/07/2022    DDD (degenerative disc disease), cervical 12/07/2022    Psychophysiological insomnia 12/07/2022    CHF (congestive heart failure) ([facility]) 04/25/2022    Glaucoma 06/04/2021    Hypertensive retinopathy of both eyes 11/19/2018    ESRD on hemodialysis ([facility])     A-V fistula 07/12/2018    Gastroesophageal reflux disease without esophagitis 10/24/2017    S/P cataract surgery 04/07/2017    Mild intermittent asthma without complication 02/13/2017    Anemia due to chronic kidney disease     Rheumatoid 
\"CPK\", \"CKMB\" in the last 72 hours.    Invalid input(s): \"CKNDX\", \"TROIQ\"  Recent Labs     06/06/25  0227 06/07/25  0312 06/08/25  0710    137  --    K 3.9 4.1  --     101  --    CO2 30 29  --    BUN 28* 32*  --    WBC 13.2* 10.2 10.2   HGB 7.0* 7.3* 7.4*   HCT 22.3* 22.7* 23.6*    256 256     Lab Results   Component Value Date/Time    CHOL 160 04/12/2023 04:37 AM    HDL 88 04/12/2023 04:37 AM    LDL 55.2 04/12/2023 04:37 AM     No results for input(s): \"TP\", \"GLOB\", \"GGT\" in the last 72 hours.    Invalid input(s): \"SGOT\", \"GPT\", \"AP\", \"TBIL\", \"ALB\", \"AML\", \"AMYP\", \"LPSE\", \"HLPSE\"  No results for input(s): \"INR\", \"APTT\" in the last 72 hours.    Invalid input(s): \"PTP\"   No components found for: \"GLPOC\"    Current Facility-Administered Medications   Medication Dose Route Frequency    budesonide (PULMICORT) nebulizer suspension 250 mcg  0.25 mg Nebulization BID RT    And    arformoterol tartrate (BROVANA) nebulizer solution 15 mcg  15 mcg Nebulization BID RT    And    ipratropium (ATROVENT) 0.02 % nebulizer solution 0.5 mg  0.5 mg Nebulization TID RT    carvedilol (COREG) tablet 3.125 mg  3.125 mg Oral Q MWF    HYDROmorphone HCl PF (DILAUDID) injection 1 mg  1 mg IntraVENous Q15 Min PRN    diazePAM (VALIUM) injection 2.5 mg  2.5 mg IntraVENous Q30 Min PRN    albumin human 25% IV solution 25 g  25 g IntraVENous Once    dextrose 10 % infusion   IntraVENous Continuous    albumin human 25% IV solution 12.5 g  12.5 g IntraVENous PRN    balsum peru-castor oil (VENELEX) ointment   Topical BID    potassium phosphate 30 mmol in sodium chloride 0.9 % 500 mL IVPB  30 mmol IntraVENous Once    [Held by provider] senna (SENOKOT) 8.8 MG/5ML syrup 8.8 mg  5 mL Per NG tube BID    [Held by provider] docusate (COLACE) 50 MG/5ML liquid 100 mg  100 mg Per NG tube BID    [Held by provider] polyethylene glycol (GLYCOLAX) packet 17 g  17 g Per NG tube BID    pantoprazole (PROTONIX) 40 mg in sodium chloride (PF) 0.9 % 10 mL 
liquid 100 mg, 100 mg, Per NG tube, BID, Jacqueline Brown MD, 100 mg at 05/31/25 2031    polyethylene glycol (GLYCOLAX) packet 17 g, 17 g, Per NG tube, BID, Jacqueline Brown MD, 17 g at 05/31/25 2021    fentaNYL (SUBLIMAZE) bolus from bag, 50 mcg, IntraVENous, Q30 Min PRN, Jacqueline Brown MD    vancomycin (VANCOCIN) 50 mg/mL oral solution 125 mg, 125 mg, Oral, Q6H, Jacqueline Brown MD, 125 mg at 06/03/25 0926    pantoprazole (PROTONIX) 40 mg in sodium chloride (PF) 0.9 % 10 mL injection, 40 mg, IntraVENous, Daily, Jacqueline Brown MD, 40 mg at 06/03/25 0820    alcohol 62% (NOZIN) nasal  1 ampule, 1 ampule, Nasal, Q12H, Jacqueline Brown MD, 1 ampule at 06/03/25 0820    chlorhexidine (PERIDEX) 0.12 % solution 15 mL, 15 mL, Mouth/Throat, BID, Jacqueline Brown MD, 15 mL at 06/03/25 0820    dextrose 10 % infusion, , IntraVENous, Continuous, Jacqueline Brown MD, Stopped at 05/31/25 0101    budesonide (PULMICORT) nebulizer suspension 250 mcg, 0.25 mg, Nebulization, BID RT, 250 mcg at 06/03/25 0749 **AND** arformoterol tartrate (BROVANA) nebulizer solution 15 mcg, 15 mcg, Nebulization, BID RT, 15 mcg at 06/03/25 0749 **AND** ipratropium (ATROVENT) 0.02 % nebulizer solution 0.5 mg, 0.5 mg, Nebulization, Q6H RT, Jacqueline Brown MD, 0.5 mg at 06/03/25 0749    albuterol sulfate HFA (PROVENTIL;VENTOLIN;PROAIR) 108 (90 Base) MCG/ACT inhaler 2 puff, 2 puff, Inhalation, Q6H PRN, Kimber Hilario MD    epoetin nehemiah-epbx (RETACRIT) injection 10,000 Units, 10,000 Units, SubCUTAneous, Once per day on Tuesday Thursday Saturday, Venus Simmons MD, 10,000 Units at 05/31/25 2018    gabapentin (NEURONTIN) capsule 100 mg, 100 mg, Oral, QHS, Venus Simmons MD, 100 mg at 06/02/25 2103    dorzolamide-timolol (COSOPT) 2-0.5 % ophthalmic solution 1 drop, 1 drop, Both Eyes, TID, Reji Shah, DO, 1 drop at 06/03/25 0820    hydrOXYzine HCl (ATARAX) tablet 25 mg, 25 mg, Oral, TID PRN, Reji Shah, 
   And    arformoterol tartrate (BROVANA) nebulizer solution 15 mcg  15 mcg Nebulization BID RT    And    ipratropium (ATROVENT) 0.02 % nebulizer solution 0.5 mg  0.5 mg Nebulization Q6H RT    albuterol sulfate HFA (PROVENTIL;VENTOLIN;PROAIR) 108 (90 Base) MCG/ACT inhaler 2 puff  2 puff Inhalation Q6H PRN    epoetin nehemiah-epbx (RETACRIT) injection 10,000 Units  10,000 Units SubCUTAneous Once per day on Tuesday Thursday Saturday    gabapentin (NEURONTIN) capsule 100 mg  100 mg Oral QHS    dorzolamide-timolol (COSOPT) 2-0.5 % ophthalmic solution 1 drop  1 drop Both Eyes TID    hydrOXYzine HCl (ATARAX) tablet 25 mg  25 mg Oral TID PRN    mirtazapine (REMERON) tablet 15 mg  15 mg Oral Nightly    montelukast (SINGULAIR) tablet 10 mg  10 mg Oral Daily    [Held by provider] pravastatin (PRAVACHOL) tablet 10 mg  10 mg Oral Daily    sodium chloride flush 0.9 % injection 5-40 mL  5-40 mL IntraVENous 2 times per day    sodium chloride flush 0.9 % injection 5-40 mL  5-40 mL IntraVENous PRN    0.9 % sodium chloride infusion   IntraVENous PRN    ondansetron (ZOFRAN-ODT) disintegrating tablet 4 mg  4 mg Oral Q8H PRN    Or    ondansetron (ZOFRAN) injection 4 mg  4 mg IntraVENous Q6H PRN    polyethylene glycol (GLYCOLAX) packet 17 g  17 g Oral Daily PRN    acetaminophen (TYLENOL) tablet 650 mg  650 mg Oral Q6H PRN    Or    acetaminophen (TYLENOL) suppository 650 mg  650 mg Rectal Q6H PRN    heparin (porcine) injection 5,000 Units  5,000 Units SubCUTAneous 3 times per day    glucose chewable tablet 16 g  4 tablet Oral PRN    dextrose bolus 10% 125 mL  125 mL IntraVENous PRN    Or    dextrose bolus 10% 250 mL  250 mL IntraVENous PRN    glucagon injection 1 mg  1 mg SubCUTAneous PRN        No reported FHx of early CAD or SCD    Reported prior to Admission Medications:  Prior to Admission medications    Medication Sig Start Date End Date Taking? Authorizing Provider   mirtazapine (REMERON) 15 MG tablet TAKE 1 TABLET BY MOUTH EVERY DAY AT

## (undated) DEVICE — 1200 GUARD II KIT W/5MM TUBE W/O VAC TUBE: Brand: GUARDIAN

## (undated) DEVICE — PART NUMBER 108260, VTI 8 MHZ DISPOSABLE DOPPLER PROBE, STRAIGHT, BOX: Brand: VTI 8 MHZ DISPOSABLE DOPPLER PROBE, STRAIGHT, BOX

## (undated) DEVICE — CATH 5F 100CM JL35 -- DXTERITY

## (undated) DEVICE — BAG SPEC BIOHZRD 10 X 10 IN --

## (undated) DEVICE — TOWEL 4 PLY TISS 19X30 SUE WHT

## (undated) DEVICE — REM POLYHESIVE ADULT PATIENT RETURN ELECTRODE: Brand: VALLEYLAB

## (undated) DEVICE — BASIN EMSIS 16OZ GRAPHITE PLAS KID SHP MOLD GRAD FOR ORAL

## (undated) DEVICE — TUBING PRSS MON L6IN PVC M FEM CONN

## (undated) DEVICE — KIT MED IMAG CNTRST AGNT W/ IOPAMIDOL REUSE

## (undated) DEVICE — CANNULA CUSH AD W/ 14FT TBG

## (undated) DEVICE — STAPLER SKIN H3.9MM WIRE DIA0.58MM CRWN 6.9MM 35 STPL ROT

## (undated) DEVICE — GUIDEWIRE VASC J 3 MM 0.035 INX210 CM FIX COR INQWIRE

## (undated) DEVICE — (D)PREP SKN CHLRAPRP APPL 26ML -- CONVERT TO ITEM 371833

## (undated) DEVICE — Device

## (undated) DEVICE — INTRODUCER SURG KT 0.018 IN 4 FR SFT TIP REG NIT VSI

## (undated) DEVICE — CATHETER PRESSURE WEDGE BLLN 6FRX110CM

## (undated) DEVICE — AGENT HEMSTAT W4XL4IN OXIDIZED REGENERATED CELOS ABSRB SFT

## (undated) DEVICE — COVER,MAYO STAND,STERILE: Brand: MEDLINE

## (undated) DEVICE — SOLUTION IV 1000ML 0.9% SOD CHL

## (undated) DEVICE — PROVE COVER: Brand: UNBRANDED

## (undated) DEVICE — SYR 10ML LUER LOK 1/5ML GRAD --

## (undated) DEVICE — CATH 5F 110CM PG145 -- DXTERITY

## (undated) DEVICE — COVIDIEN KENDALL DL DISPOSABLE 3 LEAD SY: Brand: MEDLINE RENEWAL

## (undated) DEVICE — SPECIAL PROCEDURE DRAPE 32" X 34": Brand: SPECIAL PROCEDURE DRAPE

## (undated) DEVICE — ELECTRODE,RADIOTRANSLUCENT,FOAM,5PK: Brand: MEDLINE

## (undated) DEVICE — HI-TORQUE VERSACORE FLOPPY GUIDE WIRE SYSTEM 145 CM: Brand: HI-TORQUE VERSACORE

## (undated) DEVICE — SET GRAV CK VLV NEEDLESS ST 3 GANGED 4WAY STPCOCK HI FLO 10

## (undated) DEVICE — SYR MED 10ML FIX M LT BLU -- MEDALLION

## (undated) DEVICE — GLIDESHEATH SLENDER ACCESS KIT: Brand: GLIDESHEATH SLENDER

## (undated) DEVICE — ROSEN CURVED WIRE GUIDE: Brand: ROSEN

## (undated) DEVICE — ANGIOGRAPHY KIT

## (undated) DEVICE — CONTAINER SPEC 20 ML LID NEUT BUFF FORMALIN 10 % POLYPR STS

## (undated) DEVICE — WATERPROOF, BACTERIA PROOF DRESSING WITH ABSORBENT SEE THROUGH PAD: Brand: OPSITE POST-OP VISIBLE 15X10CM CTN 20

## (undated) DEVICE — HEART CATH-MRMC: Brand: MEDLINE INDUSTRIES, INC.

## (undated) DEVICE — ACCESS KT MINI MAK 4FR 10CM SS -- MAK

## (undated) DEVICE — CATHETER DIAG 6FR L110CM PIGTAILS CRV STYL PIG145 DXTERITY

## (undated) DEVICE — SYR 3ML LL TIP 1/10ML GRAD --

## (undated) DEVICE — DRAPE KIT RAMAPR RADIATION SHIELD

## (undated) DEVICE — 3M™ TEGADERM™ TRANSPARENT FILM DRESSING FRAME STYLE, 1626W, 4 IN X 4-3/4 IN (10 CM X 12 CM), 50/CT 4CT/CASE: Brand: 3M™ TEGADERM™

## (undated) DEVICE — YANKAUER,TAPERED BULBOUS TIP,W/O VENT: Brand: MEDLINE

## (undated) DEVICE — SYRINGE ANGIO 10 CC BRL STD PRNT POLYCARB LT BLU MEDALLION

## (undated) DEVICE — CATHETER COR DIAG TRANSFORMER 3.5 6FR 100CM 2 SIDE H

## (undated) DEVICE — FORCEPS BX L160CM DIA8MM GRSP DISECT CUP TIP NONLOCKING ROT

## (undated) DEVICE — INFECTION CONTROL KIT SYS

## (undated) DEVICE — THE PARA-PAK SYSTEMS PROVIDE STANDARDIZED PROCEDURES FOR THE ROUTINE COLLECTION, TRANSPORTATION, PRESERVATION AND EXAMINATION OF STOOL SPECIMENS FOR INTESTINAL PARASITES. KIT SYSTEMS ARE DESIGNED FOR EASY USE BY INDIVIDUALS NOT TRAINED IN MICROBIOLOGICAL PROCEDURES AND AFFORD AN EXCELLENT MEANS OF MINIMIZING THE ADVERSE EFFECTS OF DELAY IN SPECIMEN TRANSPORT.: Brand: PARA-PAK LV-PVA / 10% FORMALIN / CLEAN

## (undated) DEVICE — CATH IV AUTOGRD BC PNK 20GA 25 -- INSYTE

## (undated) DEVICE — HYPODERMIC SAFETY NEEDLE: Brand: MAGELLAN

## (undated) DEVICE — TR BAND RADIAL ARTERY COMPRESSION DEVICE: Brand: TR BAND

## (undated) DEVICE — SUTURE PERMAHAND SZ 3-0 L30IN NONABSORBABLE BLK SILK BRAID A304H

## (undated) DEVICE — RADIFOCUS OPTITORQUE ANGIOGRAPHIC CATHETER: Brand: OPTITORQUE

## (undated) DEVICE — KIT HND CTRL 3 W STPCOCK ROT END 54IN PREM HI PRSS TBNG AT

## (undated) DEVICE — NEONATAL-ADULT SPO2 SENSOR: Brand: NELLCOR

## (undated) DEVICE — SUT PROL 6-0 24IN BV1 DA BLU --

## (undated) DEVICE — SET ADMIN 16ML TBNG L100IN 2 Y INJ SITE IV PIGGY BK DISP (ORDER IN MULIPLES OF 48)

## (undated) DEVICE — GLIDESHEATH SLENDER TIBIAL PEDAL KIT: Brand: GLIDESHEATH SLENDER TIBIAL PEDAL KIT

## (undated) DEVICE — CATH 5F 100CM JR40 -- DXTERITY

## (undated) DEVICE — BLOCK BITE ENDOSCP AD 21 MM W/ DIL BLU LF DISP

## (undated) DEVICE — TOWEL SURG W17XL27IN STD BLU COT NONFENESTRATED PREWASHED

## (undated) DEVICE — SPLINT WR VELC FOAM NEUT POS DISP FOR RAD ART ACC SFT STRP

## (undated) DEVICE — Z DISCONTINUED PER MEDLINE LINE GAS SAMPLING O2/CO2 LNG AD 13 FT NSL W/ TBNG FILTERLINE

## (undated) DEVICE — TELFA NON-ADHERENT PADS PREPAK: Brand: TELFA

## (undated) DEVICE — KIT,1200CC CANISTER,3/16"X6' TUBING: Brand: MEDLINE INDUSTRIES, INC.

## (undated) DEVICE — SC 3W HP RA OFF NB - PG: Brand: NAMIC

## (undated) DEVICE — BLADE ASSEMB CLP HAIR FINE --

## (undated) DEVICE — GLIDESHEATH SLENDER STAINLESS STEEL KIT: Brand: GLIDESHEATH SLENDER

## (undated) DEVICE — DRESSING TRNSPAR 5IN LEN 4IN W FLM ST BIOCL

## (undated) DEVICE — IV START KIT: Brand: MEDLINE

## (undated) DEVICE — SPONGE LAP 18X18IN STRL -- 5/PK

## (undated) DEVICE — PRESSURE MONITORING SET: Brand: TRUWAVE

## (undated) DEVICE — PADPRO DEFIBRILLATION/PACING/CARDIOVERSION/MONITORING ELECTRODES, ADULT/CHILD GREATER THAN 10 KG RADIOTRANSPARENT ELECTRODE, PHYSIO-CONTROL QUIK-COMBO (M) 60" (152 CM): Brand: PADPRO

## (undated) DEVICE — SPLINT WR POS F/ARTERIAL ACC -- BX/10

## (undated) DEVICE — HANDLE LT SNAP ON ULT DURABLE LENS FOR TRUMPF ALC DISPOSABLE

## (undated) DEVICE — CATHETER IV 20GA L1.16IN OD1.0414-1.1176MM ID0.762-0.8382MM

## (undated) DEVICE — LOOP VES MAXI YEL 2/PK

## (undated) DEVICE — SUTURE PERMAHAND SZ 2-0 L30IN NONABSORBABLE BLK SILK W/O A305H

## (undated) DEVICE — KIT MFLD ISOLATN NACL CNTRST PRT TBNG SPIK W/ PRSS TRNSDUC

## (undated) DEVICE — GDWIRE FIX COR J 3MM 0.035X260 --

## (undated) DEVICE — SOLUTION IV 500ML 0.9% SOD CHL FLX CONT